# Patient Record
Sex: MALE | Race: WHITE | Employment: OTHER | URBAN - METROPOLITAN AREA
[De-identification: names, ages, dates, MRNs, and addresses within clinical notes are randomized per-mention and may not be internally consistent; named-entity substitution may affect disease eponyms.]

---

## 2018-02-27 PROBLEM — I63.9 CEREBROVASCULAR ACCIDENT (CVA) DETERMINED BY CLINICAL ASSESSMENT (HCC): Status: ACTIVE | Noted: 2018-02-27

## 2018-02-27 PROBLEM — M54.9 CVA TENDERNESS: Status: ACTIVE | Noted: 2018-02-27

## 2018-03-02 PROBLEM — G82.20 PARAPLEGIA (HCC): Status: ACTIVE | Noted: 2018-03-02

## 2018-03-06 ENCOUNTER — HOSPITAL ENCOUNTER (INPATIENT)
Dept: REHABILITATION | Age: 73
LOS: 29 days | Discharge: NURSING, REHAB OR LONG-TERM CARE | DRG: 981 | End: 2018-04-04
Attending: PHYSICAL MEDICINE & REHABILITATION | Admitting: PHYSICAL MEDICINE & REHABILITATION
Payer: MEDICARE

## 2018-03-06 PROBLEM — I63.9 ISCHEMIC STROKE (HCC): Status: ACTIVE | Noted: 2018-03-06

## 2018-03-06 LAB
METER GLUCOSE: 215 MG/DL (ref 70–110)
METER GLUCOSE: 228 MG/DL (ref 70–110)

## 2018-03-06 PROCEDURE — 51798 US URINE CAPACITY MEASURE: CPT

## 2018-03-06 PROCEDURE — 82962 GLUCOSE BLOOD TEST: CPT

## 2018-03-06 PROCEDURE — 9990 CHARGE CONVERSION

## 2018-03-06 RX ORDER — WARFARIN SODIUM 10 MG/1
10 TABLET ORAL DAILY
Status: DISCONTINUED | OUTPATIENT
Start: 2018-03-06 | End: 2018-03-07

## 2018-03-06 RX ORDER — SODIUM CHLORIDE 0.9 % (FLUSH) 0.9 %
10 SYRINGE (ML) INJECTION PRN
Status: DISCONTINUED | OUTPATIENT
Start: 2018-03-06 | End: 2018-04-02

## 2018-03-06 RX ORDER — ATORVASTATIN CALCIUM 40 MG/1
40 TABLET, FILM COATED ORAL NIGHTLY
Status: DISCONTINUED | OUTPATIENT
Start: 2018-03-06 | End: 2018-03-07

## 2018-03-06 RX ORDER — AMLODIPINE BESYLATE 2.5 MG/1
2.5 TABLET ORAL DAILY
Status: DISCONTINUED | OUTPATIENT
Start: 2018-03-07 | End: 2018-03-07

## 2018-03-06 RX ORDER — DEXTROSE MONOHYDRATE 50 MG/ML
100 INJECTION, SOLUTION INTRAVENOUS PRN
Status: DISCONTINUED | OUTPATIENT
Start: 2018-03-06 | End: 2018-04-04 | Stop reason: HOSPADM

## 2018-03-06 RX ORDER — PETROLATUM 42 G/100G
OINTMENT TOPICAL 2 TIMES DAILY PRN
Status: DISCONTINUED | OUTPATIENT
Start: 2018-03-06 | End: 2018-04-04 | Stop reason: HOSPADM

## 2018-03-06 RX ORDER — LEVOTHYROXINE SODIUM 0.05 MG/1
50 TABLET ORAL DAILY
Status: DISCONTINUED | OUTPATIENT
Start: 2018-03-07 | End: 2018-03-07

## 2018-03-06 RX ORDER — NICOTINE POLACRILEX 4 MG
15 LOZENGE BUCCAL PRN
Status: DISCONTINUED | OUTPATIENT
Start: 2018-03-06 | End: 2018-04-04 | Stop reason: HOSPADM

## 2018-03-06 RX ORDER — ACETAMINOPHEN 325 MG/1
650 TABLET ORAL EVERY 4 HOURS PRN
Status: DISCONTINUED | OUTPATIENT
Start: 2018-03-06 | End: 2018-03-20 | Stop reason: ALTCHOICE

## 2018-03-06 RX ORDER — DEXTROSE MONOHYDRATE 25 G/50ML
12.5 INJECTION, SOLUTION INTRAVENOUS PRN
Status: DISCONTINUED | OUTPATIENT
Start: 2018-03-06 | End: 2018-04-04 | Stop reason: HOSPADM

## 2018-03-06 RX ORDER — SODIUM CHLORIDE 0.9 % (FLUSH) 0.9 %
10 SYRINGE (ML) INJECTION EVERY 12 HOURS SCHEDULED
Status: DISCONTINUED | OUTPATIENT
Start: 2018-03-06 | End: 2018-04-02

## 2018-03-06 RX ADMIN — WARFARIN SODIUM 10 MG: 10 TABLET ORAL at 18:20

## 2018-03-06 RX ADMIN — INSULIN LISPRO 1 UNITS: 100 INJECTION, SOLUTION INTRAVENOUS; SUBCUTANEOUS at 22:38

## 2018-03-06 RX ADMIN — Medication 10 ML: at 22:31

## 2018-03-06 RX ADMIN — ATORVASTATIN CALCIUM 40 MG: 40 TABLET, FILM COATED ORAL at 22:30

## 2018-03-06 RX ADMIN — PETROLATUM: 42 OINTMENT TOPICAL at 22:41

## 2018-03-06 RX ADMIN — INSULIN LISPRO 2 UNITS: 100 INJECTION, SOLUTION INTRAVENOUS; SUBCUTANEOUS at 18:21

## 2018-03-06 ASSESSMENT — PAIN DESCRIPTION - FREQUENCY: FREQUENCY: INTERMITTENT

## 2018-03-06 ASSESSMENT — PAIN SCALES - GENERAL: PAINLEVEL_OUTOF10: 0

## 2018-03-06 ASSESSMENT — PAIN DESCRIPTION - DESCRIPTORS: DESCRIPTORS: ACHING;BURNING;DISCOMFORT

## 2018-03-06 ASSESSMENT — PAIN DESCRIPTION - PAIN TYPE: TYPE: CHRONIC PAIN

## 2018-03-06 ASSESSMENT — PAIN DESCRIPTION - ORIENTATION: ORIENTATION: LOWER

## 2018-03-06 ASSESSMENT — PAIN DESCRIPTION - PROGRESSION: CLINICAL_PROGRESSION: NOT CHANGED

## 2018-03-06 ASSESSMENT — PAIN DESCRIPTION - LOCATION: LOCATION: BACK

## 2018-03-07 LAB
ALBUMIN SERPL-MCNC: 3.3 G/DL (ref 3.5–5.2)
ALP BLD-CCNC: 61 U/L (ref 40–129)
ALT SERPL-CCNC: 24 U/L (ref 0–40)
ANION GAP SERPL CALCULATED.3IONS-SCNC: 12 MMOL/L (ref 7–16)
AST SERPL-CCNC: 23 U/L (ref 0–39)
BILIRUB SERPL-MCNC: 0.3 MG/DL (ref 0–1.2)
BUN BLDV-MCNC: 23 MG/DL (ref 8–23)
CALCIUM SERPL-MCNC: 8.7 MG/DL (ref 8.6–10.2)
CHLORIDE BLD-SCNC: 106 MMOL/L (ref 98–107)
CO2: 27 MMOL/L (ref 22–29)
CREAT SERPL-MCNC: 1 MG/DL (ref 0.7–1.2)
D DIMER: 839 NG/ML DDU
GFR AFRICAN AMERICAN: >60
GFR NON-AFRICAN AMERICAN: >60 ML/MIN/1.73
GLUCOSE BLD-MCNC: 236 MG/DL (ref 74–109)
HCT VFR BLD CALC: 40 % (ref 37–54)
HEMOGLOBIN: 13.1 G/DL (ref 12.5–16.5)
INR BLD: 2
MCH RBC QN AUTO: 29.4 PG (ref 26–35)
MCHC RBC AUTO-ENTMCNC: 32.8 % (ref 32–34.5)
MCV RBC AUTO: 89.9 FL (ref 80–99.9)
METER GLUCOSE: 177 MG/DL (ref 70–110)
METER GLUCOSE: 182 MG/DL (ref 70–110)
METER GLUCOSE: 219 MG/DL (ref 70–110)
METER GLUCOSE: 257 MG/DL (ref 70–110)
PDW BLD-RTO: 14.4 FL (ref 11.5–15)
PLATELET # BLD: 275 E9/L (ref 130–450)
PMV BLD AUTO: 10.4 FL (ref 7–12)
POTASSIUM SERPL-SCNC: 3.3 MMOL/L (ref 3.5–5)
PROTHROMBIN TIME: 22.7 SEC (ref 9.3–12.4)
RBC # BLD: 4.45 E12/L (ref 3.8–5.8)
SODIUM BLD-SCNC: 145 MMOL/L (ref 132–146)
TOTAL PROTEIN: 6.3 G/DL (ref 6.4–8.3)
WBC # BLD: 8.5 E9/L (ref 4.5–11.5)

## 2018-03-07 PROCEDURE — 97162 PT EVAL MOD COMPLEX 30 MIN: CPT

## 2018-03-07 PROCEDURE — 82962 GLUCOSE BLOOD TEST: CPT

## 2018-03-07 PROCEDURE — 74230 X-RAY XM SWLNG FUNCJ C+: CPT

## 2018-03-07 PROCEDURE — 85610 PROTHROMBIN TIME: CPT

## 2018-03-07 PROCEDURE — 9990 CHARGE CONVERSION

## 2018-03-07 PROCEDURE — 92610 EVALUATE SWALLOWING FUNCTION: CPT

## 2018-03-07 PROCEDURE — 97112 NEUROMUSCULAR REEDUCATION: CPT

## 2018-03-07 PROCEDURE — 80053 COMPREHEN METABOLIC PANEL: CPT

## 2018-03-07 PROCEDURE — 85027 COMPLETE CBC AUTOMATED: CPT

## 2018-03-07 PROCEDURE — 97166 OT EVAL MOD COMPLEX 45 MIN: CPT

## 2018-03-07 PROCEDURE — 86146 BETA-2 GLYCOPROTEIN ANTIBODY: CPT

## 2018-03-07 PROCEDURE — 97530 THERAPEUTIC ACTIVITIES: CPT

## 2018-03-07 PROCEDURE — 97535 SELF CARE MNGMENT TRAINING: CPT

## 2018-03-07 PROCEDURE — 92523 SPEECH SOUND LANG COMPREHEN: CPT

## 2018-03-07 PROCEDURE — 36415 COLL VENOUS BLD VENIPUNCTURE: CPT

## 2018-03-07 PROCEDURE — 86147 CARDIOLIPIN ANTIBODY EA IG: CPT

## 2018-03-07 PROCEDURE — 92611 MOTION FLUOROSCOPY/SWALLOW: CPT

## 2018-03-07 PROCEDURE — 85379 FIBRIN DEGRADATION QUANT: CPT

## 2018-03-07 RX ORDER — POTASSIUM CHLORIDE 20 MEQ/1
20 TABLET, EXTENDED RELEASE ORAL 2 TIMES DAILY WITH MEALS
Status: DISCONTINUED | OUTPATIENT
Start: 2018-03-07 | End: 2018-03-07

## 2018-03-07 RX ORDER — DEXTROSE, SODIUM CHLORIDE, AND POTASSIUM CHLORIDE 5; .45; .075 G/100ML; G/100ML; G/100ML
INJECTION INTRAVENOUS CONTINUOUS
Status: DISCONTINUED | OUTPATIENT
Start: 2018-03-07 | End: 2018-03-09

## 2018-03-07 RX ORDER — SODIUM PHOSPHATE, DIBASIC AND SODIUM PHOSPHATE, MONOBASIC 7; 19 G/133ML; G/133ML
1 ENEMA RECTAL
Status: COMPLETED | OUTPATIENT
Start: 2018-03-07 | End: 2018-03-07

## 2018-03-07 RX ADMIN — INSULIN LISPRO 3 UNITS: 100 INJECTION, SOLUTION INTRAVENOUS; SUBCUTANEOUS at 12:06

## 2018-03-07 RX ADMIN — Medication 10 ML: at 11:33

## 2018-03-07 RX ADMIN — DEXTROSE, SODIUM CHLORIDE, AND POTASSIUM CHLORIDE: 5; .45; .075 INJECTION INTRAVENOUS at 21:18

## 2018-03-07 RX ADMIN — SODIUM PHOSPHATE, DIBASIC AND SODIUM PHOSPHATE, MONOBASIC 1 ENEMA: 7; 19 ENEMA RECTAL at 11:25

## 2018-03-07 RX ADMIN — Medication 10 ML: at 21:38

## 2018-03-07 RX ADMIN — LEVOTHYROXINE SODIUM 50 MCG: 0.05 TABLET ORAL at 06:38

## 2018-03-07 RX ADMIN — PETROLATUM: 42 OINTMENT TOPICAL at 15:38

## 2018-03-07 RX ADMIN — INSULIN LISPRO 2 UNITS: 100 INJECTION, SOLUTION INTRAVENOUS; SUBCUTANEOUS at 06:59

## 2018-03-07 RX ADMIN — AMLODIPINE BESYLATE 2.5 MG: 2.5 TABLET ORAL at 11:25

## 2018-03-07 ASSESSMENT — PAIN SCALES - GENERAL
PAINLEVEL_OUTOF10: 0

## 2018-03-08 LAB
ANION GAP SERPL CALCULATED.3IONS-SCNC: 12 MMOL/L (ref 7–16)
BUN BLDV-MCNC: 19 MG/DL (ref 8–23)
CALCIUM SERPL-MCNC: 8.4 MG/DL (ref 8.6–10.2)
CHLORIDE BLD-SCNC: 102 MMOL/L (ref 98–107)
CO2: 30 MMOL/L (ref 22–29)
CREAT SERPL-MCNC: 1 MG/DL (ref 0.7–1.2)
GFR AFRICAN AMERICAN: >60
GFR NON-AFRICAN AMERICAN: >60 ML/MIN/1.73
GLUCOSE BLD-MCNC: 227 MG/DL (ref 74–109)
HCT VFR BLD CALC: 38.3 % (ref 37–54)
HEMOGLOBIN: 12.3 G/DL (ref 12.5–16.5)
INR BLD: 2.2
MCH RBC QN AUTO: 29.3 PG (ref 26–35)
MCHC RBC AUTO-ENTMCNC: 32.1 % (ref 32–34.5)
MCV RBC AUTO: 91.2 FL (ref 80–99.9)
METER GLUCOSE: 202 MG/DL (ref 70–110)
METER GLUCOSE: 214 MG/DL (ref 70–110)
METER GLUCOSE: 224 MG/DL (ref 70–110)
METER GLUCOSE: 226 MG/DL (ref 70–110)
PDW BLD-RTO: 14.2 FL (ref 11.5–15)
PLATELET # BLD: 279 E9/L (ref 130–450)
PMV BLD AUTO: 10.4 FL (ref 7–12)
POTASSIUM SERPL-SCNC: 3.5 MMOL/L (ref 3.5–5)
PROTHROMBIN TIME: 24.7 SEC (ref 9.3–12.4)
RBC # BLD: 4.2 E12/L (ref 3.8–5.8)
SODIUM BLD-SCNC: 144 MMOL/L (ref 132–146)
WBC # BLD: 7.8 E9/L (ref 4.5–11.5)

## 2018-03-08 PROCEDURE — 85027 COMPLETE CBC AUTOMATED: CPT

## 2018-03-08 PROCEDURE — 97535 SELF CARE MNGMENT TRAINING: CPT

## 2018-03-08 PROCEDURE — 82962 GLUCOSE BLOOD TEST: CPT

## 2018-03-08 PROCEDURE — 97112 NEUROMUSCULAR REEDUCATION: CPT

## 2018-03-08 PROCEDURE — 85610 PROTHROMBIN TIME: CPT

## 2018-03-08 PROCEDURE — 97530 THERAPEUTIC ACTIVITIES: CPT

## 2018-03-08 PROCEDURE — 71045 X-RAY EXAM CHEST 1 VIEW: CPT

## 2018-03-08 PROCEDURE — 9990 CHARGE CONVERSION

## 2018-03-08 PROCEDURE — 74018 RADEX ABDOMEN 1 VIEW: CPT

## 2018-03-08 PROCEDURE — 97110 THERAPEUTIC EXERCISES: CPT

## 2018-03-08 PROCEDURE — 36415 COLL VENOUS BLD VENIPUNCTURE: CPT

## 2018-03-08 PROCEDURE — 80048 BASIC METABOLIC PNL TOTAL CA: CPT

## 2018-03-08 RX ORDER — WARFARIN SODIUM 10 MG/1
10 TABLET ORAL
Status: COMPLETED | OUTPATIENT
Start: 2018-03-08 | End: 2018-03-08

## 2018-03-08 RX ORDER — ATORVASTATIN CALCIUM 40 MG/1
40 TABLET, FILM COATED ORAL NIGHTLY
Status: DISCONTINUED | OUTPATIENT
Start: 2018-03-08 | End: 2018-03-28

## 2018-03-08 RX ORDER — WARFARIN SODIUM 10 MG/1
10 TABLET ORAL
Status: DISCONTINUED | OUTPATIENT
Start: 2018-03-08 | End: 2018-03-08

## 2018-03-08 RX ORDER — AMLODIPINE BESYLATE 2.5 MG/1
2.5 TABLET ORAL DAILY
Status: DISCONTINUED | OUTPATIENT
Start: 2018-03-08 | End: 2018-03-18

## 2018-03-08 RX ORDER — LEVOTHYROXINE SODIUM 0.05 MG/1
50 TABLET ORAL DAILY
Status: DISCONTINUED | OUTPATIENT
Start: 2018-03-09 | End: 2018-03-28

## 2018-03-08 RX ADMIN — Medication 10 ML: at 08:25

## 2018-03-08 RX ADMIN — CEFTRIAXONE 1 G: 1 INJECTION, POWDER, FOR SOLUTION INTRAMUSCULAR; INTRAVENOUS at 17:49

## 2018-03-08 RX ADMIN — DESMOPRESSIN ACETATE 40 MG: 0.2 TABLET ORAL at 21:56

## 2018-03-08 RX ADMIN — AMLODIPINE BESYLATE 2.5 MG: 2.5 TABLET ORAL at 21:56

## 2018-03-08 RX ADMIN — WARFARIN SODIUM 10 MG: 10 TABLET ORAL at 19:05

## 2018-03-08 RX ADMIN — INSULIN LISPRO 1 UNITS: 100 INJECTION, SOLUTION INTRAVENOUS; SUBCUTANEOUS at 21:57

## 2018-03-08 RX ADMIN — DEXTROSE, SODIUM CHLORIDE, AND POTASSIUM CHLORIDE: 5; .45; .075 INJECTION INTRAVENOUS at 11:48

## 2018-03-08 RX ADMIN — PETROLATUM: 42 OINTMENT TOPICAL at 21:58

## 2018-03-08 ASSESSMENT — PAIN SCALES - GENERAL
PAINLEVEL_OUTOF10: 0

## 2018-03-09 LAB
ANION GAP SERPL CALCULATED.3IONS-SCNC: 13 MMOL/L (ref 7–16)
ANTICARDIOLIPIN IGA ANTIBODY: 8 APL (ref 0–11)
ANTICARDIOLIPIN IGG ANTIBODY: 3 GPL (ref 0–14)
BETA-2 GLYCOPROTEIN 1 IGG ANTIBODY: 0 SGU (ref 0–20)
BETA-2 GLYCOPROTEIN 1 IGM ANTIBODY: 0 SMU (ref 0–20)
BUN BLDV-MCNC: 15 MG/DL (ref 8–23)
CALCIUM SERPL-MCNC: 8.5 MG/DL (ref 8.6–10.2)
CARDIOLIPIN AB IGM: 8 MPL (ref 0–12)
CHLORIDE BLD-SCNC: 101 MMOL/L (ref 98–107)
CO2: 31 MMOL/L (ref 22–29)
CREAT SERPL-MCNC: 0.9 MG/DL (ref 0.7–1.2)
GFR AFRICAN AMERICAN: >60
GFR NON-AFRICAN AMERICAN: >60 ML/MIN/1.73
GLUCOSE BLD-MCNC: 284 MG/DL (ref 74–109)
INR BLD: 2.2
METER GLUCOSE: 109 MG/DL (ref 70–110)
METER GLUCOSE: 111 MG/DL (ref 70–110)
METER GLUCOSE: 220 MG/DL (ref 70–110)
METER GLUCOSE: 277 MG/DL (ref 70–110)
POTASSIUM SERPL-SCNC: 3.2 MMOL/L (ref 3.5–5)
PROTHROMBIN TIME: 25.1 SEC (ref 9.3–12.4)
SODIUM BLD-SCNC: 145 MMOL/L (ref 132–146)

## 2018-03-09 PROCEDURE — 97530 THERAPEUTIC ACTIVITIES: CPT

## 2018-03-09 PROCEDURE — 80048 BASIC METABOLIC PNL TOTAL CA: CPT

## 2018-03-09 PROCEDURE — 9990 CHARGE CONVERSION

## 2018-03-09 PROCEDURE — 85610 PROTHROMBIN TIME: CPT

## 2018-03-09 PROCEDURE — 36415 COLL VENOUS BLD VENIPUNCTURE: CPT

## 2018-03-09 PROCEDURE — 82962 GLUCOSE BLOOD TEST: CPT

## 2018-03-09 PROCEDURE — 97110 THERAPEUTIC EXERCISES: CPT

## 2018-03-09 PROCEDURE — 97112 NEUROMUSCULAR REEDUCATION: CPT

## 2018-03-09 RX ORDER — GLYBURIDE 5 MG/1
5 TABLET ORAL 2 TIMES DAILY
Status: DISCONTINUED | OUTPATIENT
Start: 2018-03-09 | End: 2018-03-30

## 2018-03-09 RX ORDER — WARFARIN SODIUM 10 MG/1
10 TABLET ORAL
Status: COMPLETED | OUTPATIENT
Start: 2018-03-09 | End: 2018-03-09

## 2018-03-09 RX ORDER — POTASSIUM CHLORIDE 20MEQ/15ML
40 LIQUID (ML) ORAL 2 TIMES DAILY
Status: COMPLETED | OUTPATIENT
Start: 2018-03-09 | End: 2018-03-09

## 2018-03-09 RX ADMIN — Medication 40 MEQ: at 11:43

## 2018-03-09 RX ADMIN — GLYBURIDE 5 MG: 5 TABLET ORAL at 11:43

## 2018-03-09 RX ADMIN — DESMOPRESSIN ACETATE 40 MG: 0.2 TABLET ORAL at 20:44

## 2018-03-09 RX ADMIN — INSULIN LISPRO 3 UNITS: 100 INJECTION, SOLUTION INTRAVENOUS; SUBCUTANEOUS at 07:15

## 2018-03-09 RX ADMIN — LEVOTHYROXINE SODIUM 50 MCG: 50 TABLET ORAL at 06:46

## 2018-03-09 RX ADMIN — INSULIN LISPRO 2 UNITS: 100 INJECTION, SOLUTION INTRAVENOUS; SUBCUTANEOUS at 11:43

## 2018-03-09 RX ADMIN — AMLODIPINE BESYLATE 2.5 MG: 2.5 TABLET ORAL at 08:06

## 2018-03-09 RX ADMIN — CEFTRIAXONE 1 G: 1 INJECTION, POWDER, FOR SOLUTION INTRAMUSCULAR; INTRAVENOUS at 16:28

## 2018-03-09 RX ADMIN — GLYBURIDE 5 MG: 5 TABLET ORAL at 16:36

## 2018-03-09 RX ADMIN — WARFARIN SODIUM 10 MG: 10 TABLET ORAL at 16:36

## 2018-03-09 RX ADMIN — Medication 40 MEQ: at 16:36

## 2018-03-09 RX ADMIN — Medication 10 ML: at 20:44

## 2018-03-09 RX ADMIN — DEXTROSE, SODIUM CHLORIDE, AND POTASSIUM CHLORIDE: 5; .45; .075 INJECTION INTRAVENOUS at 04:13

## 2018-03-09 ASSESSMENT — PAIN SCALES - GENERAL
PAINLEVEL_OUTOF10: 0

## 2018-03-10 LAB
ANION GAP SERPL CALCULATED.3IONS-SCNC: 13 MMOL/L (ref 7–16)
BUN BLDV-MCNC: 15 MG/DL (ref 8–23)
CALCIUM SERPL-MCNC: 8.8 MG/DL (ref 8.6–10.2)
CHLORIDE BLD-SCNC: 104 MMOL/L (ref 98–107)
CO2: 29 MMOL/L (ref 22–29)
CREAT SERPL-MCNC: 0.8 MG/DL (ref 0.7–1.2)
GFR AFRICAN AMERICAN: >60
GFR NON-AFRICAN AMERICAN: >60 ML/MIN/1.73
GLUCOSE BLD-MCNC: 175 MG/DL (ref 74–109)
HCT VFR BLD CALC: 39.4 % (ref 37–54)
HEMOGLOBIN: 12.4 G/DL (ref 12.5–16.5)
INR BLD: 2
MAGNESIUM: 2 MG/DL (ref 1.6–2.6)
MCH RBC QN AUTO: 28.5 PG (ref 26–35)
MCHC RBC AUTO-ENTMCNC: 31.5 % (ref 32–34.5)
MCV RBC AUTO: 90.6 FL (ref 80–99.9)
METER GLUCOSE: 130 MG/DL (ref 70–110)
METER GLUCOSE: 142 MG/DL (ref 70–110)
METER GLUCOSE: 151 MG/DL (ref 70–110)
METER GLUCOSE: 168 MG/DL (ref 70–110)
PDW BLD-RTO: 14.1 FL (ref 11.5–15)
PLATELET # BLD: 315 E9/L (ref 130–450)
PMV BLD AUTO: 10.2 FL (ref 7–12)
POTASSIUM SERPL-SCNC: 3.4 MMOL/L (ref 3.5–5)
PROTHROMBIN TIME: 22.6 SEC (ref 9.3–12.4)
RBC # BLD: 4.35 E12/L (ref 3.8–5.8)
SODIUM BLD-SCNC: 146 MMOL/L (ref 132–146)
WBC # BLD: 7.6 E9/L (ref 4.5–11.5)

## 2018-03-10 PROCEDURE — 97530 THERAPEUTIC ACTIVITIES: CPT

## 2018-03-10 PROCEDURE — 2580000003 HC RX 258: Performed by: NEUROMUSCULOSKELETAL MEDICINE & OMM

## 2018-03-10 PROCEDURE — 6370000000 HC RX 637 (ALT 250 FOR IP): Performed by: NEUROMUSCULOSKELETAL MEDICINE & OMM

## 2018-03-10 PROCEDURE — 6360000002 HC RX W HCPCS: Performed by: NEUROMUSCULOSKELETAL MEDICINE & OMM

## 2018-03-10 PROCEDURE — 1280000000 HC REHAB R&B

## 2018-03-10 PROCEDURE — 6370000000 HC RX 637 (ALT 250 FOR IP): Performed by: PHYSICAL MEDICINE & REHABILITATION

## 2018-03-10 PROCEDURE — 82962 GLUCOSE BLOOD TEST: CPT

## 2018-03-10 PROCEDURE — 6370000000 HC RX 637 (ALT 250 FOR IP): Performed by: INTERNAL MEDICINE

## 2018-03-10 PROCEDURE — 92526 ORAL FUNCTION THERAPY: CPT

## 2018-03-10 PROCEDURE — 2700000000 HC OXYGEN THERAPY PER DAY

## 2018-03-10 RX ORDER — WARFARIN SODIUM 6 MG/1
12 TABLET ORAL
Status: COMPLETED | OUTPATIENT
Start: 2018-03-10 | End: 2018-03-10

## 2018-03-10 RX ADMIN — INSULIN LISPRO 1 UNITS: 100 INJECTION, SOLUTION INTRAVENOUS; SUBCUTANEOUS at 17:21

## 2018-03-10 RX ADMIN — AMLODIPINE BESYLATE 2.5 MG: 2.5 TABLET ORAL at 08:34

## 2018-03-10 RX ADMIN — INSULIN LISPRO 1 UNITS: 100 INJECTION, SOLUTION INTRAVENOUS; SUBCUTANEOUS at 11:48

## 2018-03-10 RX ADMIN — INSULIN LISPRO 1 UNITS: 100 INJECTION, SOLUTION INTRAVENOUS; SUBCUTANEOUS at 06:39

## 2018-03-10 RX ADMIN — GLYBURIDE 5 MG: 5 TABLET ORAL at 08:34

## 2018-03-10 RX ADMIN — CEFTRIAXONE 1 G: 1 INJECTION, POWDER, FOR SOLUTION INTRAMUSCULAR; INTRAVENOUS at 16:42

## 2018-03-10 RX ADMIN — GLYBURIDE 5 MG: 5 TABLET ORAL at 16:48

## 2018-03-10 RX ADMIN — Medication 10 ML: at 21:33

## 2018-03-10 RX ADMIN — DESMOPRESSIN ACETATE 40 MG: 0.2 TABLET ORAL at 21:38

## 2018-03-10 RX ADMIN — WARFARIN SODIUM 12 MG: 6 TABLET ORAL at 17:58

## 2018-03-10 RX ADMIN — LEVOTHYROXINE SODIUM 50 MCG: 50 TABLET ORAL at 06:33

## 2018-03-10 RX ADMIN — Medication 10 ML: at 11:56

## 2018-03-10 ASSESSMENT — PAIN SCALES - GENERAL: PAINLEVEL_OUTOF10: 0

## 2018-03-11 LAB
INR BLD: 2
METER GLUCOSE: 136 MG/DL (ref 70–110)
METER GLUCOSE: 145 MG/DL (ref 70–110)
METER GLUCOSE: 148 MG/DL (ref 70–110)
METER GLUCOSE: 155 MG/DL (ref 70–110)
PROTHROMBIN TIME: 22.7 SEC (ref 9.3–12.4)

## 2018-03-11 PROCEDURE — 36415 COLL VENOUS BLD VENIPUNCTURE: CPT

## 2018-03-11 PROCEDURE — 2580000003 HC RX 258: Performed by: NEUROMUSCULOSKELETAL MEDICINE & OMM

## 2018-03-11 PROCEDURE — 97535 SELF CARE MNGMENT TRAINING: CPT

## 2018-03-11 PROCEDURE — 1280000000 HC REHAB R&B

## 2018-03-11 PROCEDURE — 97530 THERAPEUTIC ACTIVITIES: CPT

## 2018-03-11 PROCEDURE — 6370000000 HC RX 637 (ALT 250 FOR IP): Performed by: NEUROMUSCULOSKELETAL MEDICINE & OMM

## 2018-03-11 PROCEDURE — 6370000000 HC RX 637 (ALT 250 FOR IP): Performed by: PHYSICAL MEDICINE & REHABILITATION

## 2018-03-11 PROCEDURE — 6360000002 HC RX W HCPCS: Performed by: NEUROMUSCULOSKELETAL MEDICINE & OMM

## 2018-03-11 PROCEDURE — 82962 GLUCOSE BLOOD TEST: CPT

## 2018-03-11 PROCEDURE — 2700000000 HC OXYGEN THERAPY PER DAY

## 2018-03-11 PROCEDURE — 6370000000 HC RX 637 (ALT 250 FOR IP): Performed by: INTERNAL MEDICINE

## 2018-03-11 PROCEDURE — 85610 PROTHROMBIN TIME: CPT

## 2018-03-11 RX ORDER — WARFARIN SODIUM 6 MG/1
12 TABLET ORAL
Status: COMPLETED | OUTPATIENT
Start: 2018-03-11 | End: 2018-03-11

## 2018-03-11 RX ADMIN — GLYBURIDE 5 MG: 5 TABLET ORAL at 18:16

## 2018-03-11 RX ADMIN — AMLODIPINE BESYLATE 2.5 MG: 2.5 TABLET ORAL at 08:50

## 2018-03-11 RX ADMIN — PETROLATUM: 42 OINTMENT TOPICAL at 22:10

## 2018-03-11 RX ADMIN — DESMOPRESSIN ACETATE 40 MG: 0.2 TABLET ORAL at 22:05

## 2018-03-11 RX ADMIN — WARFARIN SODIUM 12 MG: 6 TABLET ORAL at 18:16

## 2018-03-11 RX ADMIN — CEFTRIAXONE 1 G: 1 INJECTION, POWDER, FOR SOLUTION INTRAMUSCULAR; INTRAVENOUS at 17:22

## 2018-03-11 RX ADMIN — Medication 10 ML: at 22:05

## 2018-03-11 RX ADMIN — INSULIN LISPRO 1 UNITS: 100 INJECTION, SOLUTION INTRAVENOUS; SUBCUTANEOUS at 07:10

## 2018-03-11 RX ADMIN — Medication 10 ML: at 09:06

## 2018-03-11 RX ADMIN — INSULIN LISPRO 1 UNITS: 100 INJECTION, SOLUTION INTRAVENOUS; SUBCUTANEOUS at 11:58

## 2018-03-11 RX ADMIN — GLYBURIDE 5 MG: 5 TABLET ORAL at 07:23

## 2018-03-11 RX ADMIN — LEVOTHYROXINE SODIUM 50 MCG: 50 TABLET ORAL at 06:44

## 2018-03-11 RX ADMIN — INSULIN LISPRO 1 UNITS: 100 INJECTION, SOLUTION INTRAVENOUS; SUBCUTANEOUS at 22:05

## 2018-03-11 ASSESSMENT — PAIN SCALES - GENERAL
PAINLEVEL_OUTOF10: 0

## 2018-03-12 LAB
INR BLD: 2.3
METER GLUCOSE: 133 MG/DL (ref 70–110)
METER GLUCOSE: 139 MG/DL (ref 70–110)
METER GLUCOSE: 141 MG/DL (ref 70–110)
METER GLUCOSE: 170 MG/DL (ref 70–110)
PROTHROMBIN TIME: 25.4 SEC (ref 9.3–12.4)

## 2018-03-12 PROCEDURE — 92526 ORAL FUNCTION THERAPY: CPT

## 2018-03-12 PROCEDURE — 6370000000 HC RX 637 (ALT 250 FOR IP)

## 2018-03-12 PROCEDURE — 2700000000 HC OXYGEN THERAPY PER DAY

## 2018-03-12 PROCEDURE — 97535 SELF CARE MNGMENT TRAINING: CPT

## 2018-03-12 PROCEDURE — 2580000003 HC RX 258: Performed by: NEUROMUSCULOSKELETAL MEDICINE & OMM

## 2018-03-12 PROCEDURE — 97110 THERAPEUTIC EXERCISES: CPT

## 2018-03-12 PROCEDURE — 1280000000 HC REHAB R&B

## 2018-03-12 PROCEDURE — 97530 THERAPEUTIC ACTIVITIES: CPT

## 2018-03-12 PROCEDURE — 82962 GLUCOSE BLOOD TEST: CPT

## 2018-03-12 PROCEDURE — 6370000000 HC RX 637 (ALT 250 FOR IP): Performed by: NEUROMUSCULOSKELETAL MEDICINE & OMM

## 2018-03-12 PROCEDURE — 6370000000 HC RX 637 (ALT 250 FOR IP): Performed by: PHYSICAL MEDICINE & REHABILITATION

## 2018-03-12 PROCEDURE — 6360000002 HC RX W HCPCS: Performed by: NEUROMUSCULOSKELETAL MEDICINE & OMM

## 2018-03-12 PROCEDURE — 85610 PROTHROMBIN TIME: CPT

## 2018-03-12 PROCEDURE — 36415 COLL VENOUS BLD VENIPUNCTURE: CPT

## 2018-03-12 RX ORDER — WARFARIN SODIUM 6 MG/1
12 TABLET ORAL
Status: COMPLETED | OUTPATIENT
Start: 2018-03-12 | End: 2018-03-12

## 2018-03-12 RX ORDER — LACTULOSE 10 G/15ML
20 SOLUTION ORAL 2 TIMES DAILY
Status: DISCONTINUED | OUTPATIENT
Start: 2018-03-12 | End: 2018-03-16

## 2018-03-12 RX ADMIN — LEVOTHYROXINE SODIUM 50 MCG: 50 TABLET ORAL at 06:50

## 2018-03-12 RX ADMIN — CEFTRIAXONE 1 G: 1 INJECTION, POWDER, FOR SOLUTION INTRAMUSCULAR; INTRAVENOUS at 16:54

## 2018-03-12 RX ADMIN — LACTULOSE 20 G: 20 SOLUTION ORAL at 23:02

## 2018-03-12 RX ADMIN — INSULIN LISPRO 1 UNITS: 100 INJECTION, SOLUTION INTRAVENOUS; SUBCUTANEOUS at 11:35

## 2018-03-12 RX ADMIN — AMLODIPINE BESYLATE 2.5 MG: 2.5 TABLET ORAL at 08:09

## 2018-03-12 RX ADMIN — GLYBURIDE 5 MG: 5 TABLET ORAL at 06:49

## 2018-03-12 RX ADMIN — GLYBURIDE 5 MG: 5 TABLET ORAL at 17:10

## 2018-03-12 RX ADMIN — DESMOPRESSIN ACETATE 40 MG: 0.2 TABLET ORAL at 21:53

## 2018-03-12 RX ADMIN — WARFARIN SODIUM 12 MG: 6 TABLET ORAL at 18:11

## 2018-03-12 RX ADMIN — INSULIN LISPRO 1 UNITS: 100 INJECTION, SOLUTION INTRAVENOUS; SUBCUTANEOUS at 06:50

## 2018-03-12 RX ADMIN — Medication 10 ML: at 08:09

## 2018-03-12 ASSESSMENT — PAIN SCALES - GENERAL
PAINLEVEL_OUTOF10: 0

## 2018-03-13 LAB
INR BLD: 2.3
PROTHROMBIN TIME: 26.1 SEC (ref 9.3–12.4)

## 2018-03-13 PROCEDURE — 1280000000 HC REHAB R&B

## 2018-03-13 PROCEDURE — 85610 PROTHROMBIN TIME: CPT

## 2018-03-13 PROCEDURE — 97535 SELF CARE MNGMENT TRAINING: CPT

## 2018-03-13 PROCEDURE — 97110 THERAPEUTIC EXERCISES: CPT

## 2018-03-13 PROCEDURE — 2580000003 HC RX 258: Performed by: NEUROMUSCULOSKELETAL MEDICINE & OMM

## 2018-03-13 PROCEDURE — 97112 NEUROMUSCULAR REEDUCATION: CPT

## 2018-03-13 PROCEDURE — 6370000000 HC RX 637 (ALT 250 FOR IP)

## 2018-03-13 PROCEDURE — 6370000000 HC RX 637 (ALT 250 FOR IP): Performed by: PHYSICAL MEDICINE & REHABILITATION

## 2018-03-13 PROCEDURE — 36415 COLL VENOUS BLD VENIPUNCTURE: CPT

## 2018-03-13 PROCEDURE — 97530 THERAPEUTIC ACTIVITIES: CPT

## 2018-03-13 PROCEDURE — 92526 ORAL FUNCTION THERAPY: CPT

## 2018-03-13 PROCEDURE — 6360000002 HC RX W HCPCS: Performed by: NEUROMUSCULOSKELETAL MEDICINE & OMM

## 2018-03-13 RX ORDER — WARFARIN SODIUM 6 MG/1
12 TABLET ORAL
Status: COMPLETED | OUTPATIENT
Start: 2018-03-13 | End: 2018-03-13

## 2018-03-13 RX ADMIN — WARFARIN SODIUM 12 MG: 6 TABLET ORAL at 18:56

## 2018-03-13 RX ADMIN — GLYBURIDE 5 MG: 5 TABLET ORAL at 07:11

## 2018-03-13 RX ADMIN — LACTULOSE 20 G: 20 SOLUTION ORAL at 20:15

## 2018-03-13 RX ADMIN — LEVOTHYROXINE SODIUM 50 MCG: 50 TABLET ORAL at 07:11

## 2018-03-13 RX ADMIN — Medication 15 ML: at 18:02

## 2018-03-13 RX ADMIN — LACTULOSE 20 G: 20 SOLUTION ORAL at 08:46

## 2018-03-13 RX ADMIN — Medication 10 ML: at 21:00

## 2018-03-13 RX ADMIN — AMLODIPINE BESYLATE 2.5 MG: 2.5 TABLET ORAL at 08:46

## 2018-03-13 RX ADMIN — CEFTRIAXONE 1 G: 1 INJECTION, POWDER, FOR SOLUTION INTRAMUSCULAR; INTRAVENOUS at 20:40

## 2018-03-13 RX ADMIN — DESMOPRESSIN ACETATE 40 MG: 0.2 TABLET ORAL at 20:15

## 2018-03-13 RX ADMIN — Medication 15 ML: at 23:39

## 2018-03-13 RX ADMIN — GLYBURIDE 5 MG: 5 TABLET ORAL at 18:01

## 2018-03-13 ASSESSMENT — PAIN SCALES - GENERAL
PAINLEVEL_OUTOF10: 0

## 2018-03-14 LAB
INR BLD: 2.6
METER GLUCOSE: 142 MG/DL (ref 70–110)
PROTHROMBIN TIME: 28.8 SEC (ref 9.3–12.4)

## 2018-03-14 PROCEDURE — 6360000002 HC RX W HCPCS: Performed by: NEUROMUSCULOSKELETAL MEDICINE & OMM

## 2018-03-14 PROCEDURE — 6370000000 HC RX 637 (ALT 250 FOR IP)

## 2018-03-14 PROCEDURE — 97535 SELF CARE MNGMENT TRAINING: CPT

## 2018-03-14 PROCEDURE — 97110 THERAPEUTIC EXERCISES: CPT

## 2018-03-14 PROCEDURE — 97112 NEUROMUSCULAR REEDUCATION: CPT

## 2018-03-14 PROCEDURE — 6370000000 HC RX 637 (ALT 250 FOR IP): Performed by: PHYSICAL MEDICINE & REHABILITATION

## 2018-03-14 PROCEDURE — 85610 PROTHROMBIN TIME: CPT

## 2018-03-14 PROCEDURE — 2700000000 HC OXYGEN THERAPY PER DAY

## 2018-03-14 PROCEDURE — 82962 GLUCOSE BLOOD TEST: CPT

## 2018-03-14 PROCEDURE — 36415 COLL VENOUS BLD VENIPUNCTURE: CPT

## 2018-03-14 PROCEDURE — 1280000000 HC REHAB R&B

## 2018-03-14 PROCEDURE — 2580000003 HC RX 258: Performed by: NEUROMUSCULOSKELETAL MEDICINE & OMM

## 2018-03-14 PROCEDURE — 97530 THERAPEUTIC ACTIVITIES: CPT

## 2018-03-14 RX ORDER — WARFARIN SODIUM 6 MG/1
12 TABLET ORAL
Status: COMPLETED | OUTPATIENT
Start: 2018-03-14 | End: 2018-03-14

## 2018-03-14 RX ADMIN — GLYBURIDE 5 MG: 5 TABLET ORAL at 06:00

## 2018-03-14 RX ADMIN — Medication 15 ML: at 11:35

## 2018-03-14 RX ADMIN — WARFARIN SODIUM 12 MG: 6 TABLET ORAL at 18:02

## 2018-03-14 RX ADMIN — CEFTRIAXONE 1 G: 1 INJECTION, POWDER, FOR SOLUTION INTRAMUSCULAR; INTRAVENOUS at 16:39

## 2018-03-14 RX ADMIN — Medication 15 ML: at 17:35

## 2018-03-14 RX ADMIN — Medication 10 ML: at 21:55

## 2018-03-14 RX ADMIN — Medication 10 ML: at 08:42

## 2018-03-14 RX ADMIN — LEVOTHYROXINE SODIUM 50 MCG: 50 TABLET ORAL at 06:00

## 2018-03-14 RX ADMIN — PETROLATUM: 42 OINTMENT TOPICAL at 22:04

## 2018-03-14 RX ADMIN — LACTULOSE 20 G: 20 SOLUTION ORAL at 21:55

## 2018-03-14 RX ADMIN — GLYBURIDE 5 MG: 5 TABLET ORAL at 17:34

## 2018-03-14 RX ADMIN — LACTULOSE 20 G: 20 SOLUTION ORAL at 08:42

## 2018-03-14 RX ADMIN — DESMOPRESSIN ACETATE 40 MG: 0.2 TABLET ORAL at 21:55

## 2018-03-14 RX ADMIN — Medication 15 ML: at 06:00

## 2018-03-14 RX ADMIN — AMLODIPINE BESYLATE 2.5 MG: 2.5 TABLET ORAL at 08:42

## 2018-03-14 ASSESSMENT — PAIN SCALES - GENERAL
PAINLEVEL_OUTOF10: 0

## 2018-03-15 LAB
ALBUMIN SERPL-MCNC: 3.1 G/DL (ref 3.5–5.2)
ALP BLD-CCNC: 59 U/L (ref 40–129)
ALT SERPL-CCNC: 14 U/L (ref 0–40)
ANION GAP SERPL CALCULATED.3IONS-SCNC: 9 MMOL/L (ref 7–16)
AST SERPL-CCNC: 20 U/L (ref 0–39)
BILIRUB SERPL-MCNC: 0.2 MG/DL (ref 0–1.2)
BUN BLDV-MCNC: 13 MG/DL (ref 8–23)
CALCIUM SERPL-MCNC: 8.7 MG/DL (ref 8.6–10.2)
CHLORIDE BLD-SCNC: 98 MMOL/L (ref 98–107)
CO2: 35 MMOL/L (ref 22–29)
CREAT SERPL-MCNC: 0.9 MG/DL (ref 0.7–1.2)
GFR AFRICAN AMERICAN: >60
GFR NON-AFRICAN AMERICAN: >60 ML/MIN/1.73
GLUCOSE BLD-MCNC: 150 MG/DL (ref 74–109)
HCT VFR BLD CALC: 39.4 % (ref 37–54)
HEMOGLOBIN: 12.5 G/DL (ref 12.5–16.5)
INR BLD: 2.9
MCH RBC QN AUTO: 29.1 PG (ref 26–35)
MCHC RBC AUTO-ENTMCNC: 31.7 % (ref 32–34.5)
MCV RBC AUTO: 91.8 FL (ref 80–99.9)
PDW BLD-RTO: 14.4 FL (ref 11.5–15)
PLATELET # BLD: 292 E9/L (ref 130–450)
PMV BLD AUTO: 10.6 FL (ref 7–12)
POTASSIUM SERPL-SCNC: 3.3 MMOL/L (ref 3.5–5)
PROTHROMBIN TIME: 32.6 SEC (ref 9.3–12.4)
RBC # BLD: 4.29 E12/L (ref 3.8–5.8)
SODIUM BLD-SCNC: 142 MMOL/L (ref 132–146)
TOTAL PROTEIN: 6.1 G/DL (ref 6.4–8.3)
WBC # BLD: 5.4 E9/L (ref 4.5–11.5)

## 2018-03-15 PROCEDURE — 36415 COLL VENOUS BLD VENIPUNCTURE: CPT

## 2018-03-15 PROCEDURE — 97535 SELF CARE MNGMENT TRAINING: CPT

## 2018-03-15 PROCEDURE — 80053 COMPREHEN METABOLIC PANEL: CPT

## 2018-03-15 PROCEDURE — 85610 PROTHROMBIN TIME: CPT

## 2018-03-15 PROCEDURE — 6370000000 HC RX 637 (ALT 250 FOR IP): Performed by: PHYSICAL MEDICINE & REHABILITATION

## 2018-03-15 PROCEDURE — 97110 THERAPEUTIC EXERCISES: CPT

## 2018-03-15 PROCEDURE — 97112 NEUROMUSCULAR REEDUCATION: CPT

## 2018-03-15 PROCEDURE — 2580000003 HC RX 258: Performed by: NEUROMUSCULOSKELETAL MEDICINE & OMM

## 2018-03-15 PROCEDURE — 2700000000 HC OXYGEN THERAPY PER DAY

## 2018-03-15 PROCEDURE — 97530 THERAPEUTIC ACTIVITIES: CPT

## 2018-03-15 PROCEDURE — 1280000000 HC REHAB R&B

## 2018-03-15 PROCEDURE — 6370000000 HC RX 637 (ALT 250 FOR IP)

## 2018-03-15 PROCEDURE — 85027 COMPLETE CBC AUTOMATED: CPT

## 2018-03-15 RX ORDER — METOCLOPRAMIDE HYDROCHLORIDE 5 MG/5ML
5 SOLUTION ORAL
Status: DISCONTINUED | OUTPATIENT
Start: 2018-03-15 | End: 2018-03-17

## 2018-03-15 RX ORDER — POTASSIUM BICARBONATE 25 MEQ/1
25 TABLET, EFFERVESCENT ORAL 2 TIMES DAILY
Status: DISCONTINUED | OUTPATIENT
Start: 2018-03-15 | End: 2018-03-16

## 2018-03-15 RX ORDER — WARFARIN SODIUM 10 MG/1
10 TABLET ORAL
Status: COMPLETED | OUTPATIENT
Start: 2018-03-15 | End: 2018-03-15

## 2018-03-15 RX ADMIN — METOCLOPRAMIDE HYDROCHLORIDE 5 MG: 5 SOLUTION ORAL at 17:38

## 2018-03-15 RX ADMIN — POTASSIUM BICARBONATE 25 MEQ: 25 TABLET, EFFERVESCENT ORAL at 12:18

## 2018-03-15 RX ADMIN — METOCLOPRAMIDE HYDROCHLORIDE 5 MG: 5 SOLUTION ORAL at 21:59

## 2018-03-15 RX ADMIN — AMLODIPINE BESYLATE 2.5 MG: 2.5 TABLET ORAL at 09:49

## 2018-03-15 RX ADMIN — LACTULOSE 20 G: 20 SOLUTION ORAL at 21:59

## 2018-03-15 RX ADMIN — Medication 15 ML: at 01:12

## 2018-03-15 RX ADMIN — METOCLOPRAMIDE HYDROCHLORIDE 5 MG: 5 SOLUTION ORAL at 13:33

## 2018-03-15 RX ADMIN — POTASSIUM BICARBONATE 25 MEQ: 25 TABLET, EFFERVESCENT ORAL at 21:59

## 2018-03-15 RX ADMIN — Medication 10 ML: at 09:52

## 2018-03-15 RX ADMIN — Medication 10 ML: at 21:59

## 2018-03-15 RX ADMIN — DESMOPRESSIN ACETATE 40 MG: 0.2 TABLET ORAL at 21:59

## 2018-03-15 RX ADMIN — GLYBURIDE 5 MG: 5 TABLET ORAL at 06:31

## 2018-03-15 RX ADMIN — Medication 15 ML: at 23:48

## 2018-03-15 RX ADMIN — Medication 15 ML: at 12:12

## 2018-03-15 RX ADMIN — GLYBURIDE 5 MG: 5 TABLET ORAL at 17:38

## 2018-03-15 RX ADMIN — Medication 15 ML: at 17:38

## 2018-03-15 RX ADMIN — WARFARIN SODIUM 10 MG: 10 TABLET ORAL at 18:38

## 2018-03-15 RX ADMIN — Medication 15 ML: at 06:31

## 2018-03-15 RX ADMIN — LEVOTHYROXINE SODIUM 50 MCG: 50 TABLET ORAL at 06:31

## 2018-03-15 ASSESSMENT — PAIN SCALES - GENERAL
PAINLEVEL_OUTOF10: 0

## 2018-03-16 ENCOUNTER — APPOINTMENT (OUTPATIENT)
Dept: CT IMAGING | Age: 73
DRG: 981 | End: 2018-03-16
Payer: MEDICARE

## 2018-03-16 ENCOUNTER — APPOINTMENT (OUTPATIENT)
Dept: GENERAL RADIOLOGY | Age: 73
DRG: 981 | End: 2018-03-16
Payer: MEDICARE

## 2018-03-16 LAB
ANION GAP SERPL CALCULATED.3IONS-SCNC: 12 MMOL/L (ref 7–16)
BUN BLDV-MCNC: 14 MG/DL (ref 8–23)
CALCIUM SERPL-MCNC: 8.7 MG/DL (ref 8.6–10.2)
CHLORIDE BLD-SCNC: 98 MMOL/L (ref 98–107)
CO2: 34 MMOL/L (ref 22–29)
CREAT SERPL-MCNC: 1 MG/DL (ref 0.7–1.2)
GFR AFRICAN AMERICAN: >60
GFR NON-AFRICAN AMERICAN: >60 ML/MIN/1.73
GLUCOSE BLD-MCNC: 140 MG/DL (ref 74–109)
INR BLD: 2.7
MAGNESIUM: 2.3 MG/DL (ref 1.6–2.6)
POTASSIUM SERPL-SCNC: 3.4 MMOL/L (ref 3.5–5)
PROTHROMBIN TIME: 30.8 SEC (ref 9.3–12.4)
SODIUM BLD-SCNC: 144 MMOL/L (ref 132–146)

## 2018-03-16 PROCEDURE — 83735 ASSAY OF MAGNESIUM: CPT

## 2018-03-16 PROCEDURE — 85610 PROTHROMBIN TIME: CPT

## 2018-03-16 PROCEDURE — 80048 BASIC METABOLIC PNL TOTAL CA: CPT

## 2018-03-16 PROCEDURE — 36415 COLL VENOUS BLD VENIPUNCTURE: CPT

## 2018-03-16 PROCEDURE — 74176 CT ABD & PELVIS W/O CONTRAST: CPT

## 2018-03-16 PROCEDURE — 97110 THERAPEUTIC EXERCISES: CPT

## 2018-03-16 PROCEDURE — 6360000004 HC RX CONTRAST MEDICATION: Performed by: RADIOLOGY

## 2018-03-16 PROCEDURE — 2700000000 HC OXYGEN THERAPY PER DAY

## 2018-03-16 PROCEDURE — 2580000003 HC RX 258: Performed by: NEUROMUSCULOSKELETAL MEDICINE & OMM

## 2018-03-16 PROCEDURE — 97530 THERAPEUTIC ACTIVITIES: CPT

## 2018-03-16 PROCEDURE — 1280000000 HC REHAB R&B

## 2018-03-16 PROCEDURE — 6370000000 HC RX 637 (ALT 250 FOR IP): Performed by: PHYSICAL MEDICINE & REHABILITATION

## 2018-03-16 PROCEDURE — 97535 SELF CARE MNGMENT TRAINING: CPT

## 2018-03-16 RX ORDER — BISACODYL 10 MG
10 SUPPOSITORY, RECTAL RECTAL DAILY
Status: DISPENSED | OUTPATIENT
Start: 2018-03-16 | End: 2018-03-20

## 2018-03-16 RX ORDER — WARFARIN SODIUM 6 MG/1
12 TABLET ORAL
Status: DISPENSED | OUTPATIENT
Start: 2018-03-16 | End: 2018-03-17

## 2018-03-16 RX ADMIN — METOCLOPRAMIDE HYDROCHLORIDE 5 MG: 5 SOLUTION ORAL at 06:06

## 2018-03-16 RX ADMIN — POTASSIUM BICARBONATE 25 MEQ: 25 TABLET, EFFERVESCENT ORAL at 08:01

## 2018-03-16 RX ADMIN — LEVOTHYROXINE SODIUM 50 MCG: 50 TABLET ORAL at 06:05

## 2018-03-16 RX ADMIN — Medication 15 ML: at 12:00

## 2018-03-16 RX ADMIN — Medication 15 ML: at 06:05

## 2018-03-16 RX ADMIN — GLYBURIDE 5 MG: 5 TABLET ORAL at 06:05

## 2018-03-16 RX ADMIN — Medication 10 ML: at 22:36

## 2018-03-16 RX ADMIN — METOCLOPRAMIDE HYDROCHLORIDE 5 MG: 5 SOLUTION ORAL at 11:59

## 2018-03-16 RX ADMIN — IOHEXOL 50 ML: 240 INJECTION, SOLUTION INTRATHECAL; INTRAVASCULAR; INTRAVENOUS; ORAL at 11:00

## 2018-03-16 RX ADMIN — AMLODIPINE BESYLATE 2.5 MG: 2.5 TABLET ORAL at 08:01

## 2018-03-16 RX ADMIN — Medication 10 ML: at 08:01

## 2018-03-16 ASSESSMENT — PAIN SCALES - GENERAL
PAINLEVEL_OUTOF10: 0

## 2018-03-17 ENCOUNTER — APPOINTMENT (OUTPATIENT)
Dept: GENERAL RADIOLOGY | Age: 73
DRG: 981 | End: 2018-03-17
Payer: MEDICARE

## 2018-03-17 LAB
ALBUMIN SERPL-MCNC: 3.4 G/DL (ref 3.5–5.2)
ALP BLD-CCNC: 61 U/L (ref 40–129)
ALT SERPL-CCNC: 13 U/L (ref 0–40)
ANION GAP SERPL CALCULATED.3IONS-SCNC: 12 MMOL/L (ref 7–16)
AST SERPL-CCNC: 24 U/L (ref 0–39)
BASOPHILS ABSOLUTE: 0.1 E9/L (ref 0–0.2)
BASOPHILS RELATIVE PERCENT: 1.9 % (ref 0–2)
BILIRUB SERPL-MCNC: 0.5 MG/DL (ref 0–1.2)
BUN BLDV-MCNC: 11 MG/DL (ref 8–23)
CALCIUM SERPL-MCNC: 9 MG/DL (ref 8.6–10.2)
CHLORIDE BLD-SCNC: 98 MMOL/L (ref 98–107)
CO2: 33 MMOL/L (ref 22–29)
CREAT SERPL-MCNC: 0.9 MG/DL (ref 0.7–1.2)
EOSINOPHILS ABSOLUTE: 0.24 E9/L (ref 0.05–0.5)
EOSINOPHILS RELATIVE PERCENT: 4.6 % (ref 0–6)
GFR AFRICAN AMERICAN: >60
GFR NON-AFRICAN AMERICAN: >60 ML/MIN/1.73
GLUCOSE BLD-MCNC: 122 MG/DL (ref 74–109)
HCT VFR BLD CALC: 41.1 % (ref 37–54)
HEMOGLOBIN: 13.1 G/DL (ref 12.5–16.5)
IMMATURE GRANULOCYTES #: 0.02 E9/L
IMMATURE GRANULOCYTES %: 0.4 % (ref 0–5)
INR BLD: 2.5
LYMPHOCYTES ABSOLUTE: 1.27 E9/L (ref 1.5–4)
LYMPHOCYTES RELATIVE PERCENT: 24.5 % (ref 20–42)
MCH RBC QN AUTO: 28.9 PG (ref 26–35)
MCHC RBC AUTO-ENTMCNC: 31.9 % (ref 32–34.5)
MCV RBC AUTO: 90.5 FL (ref 80–99.9)
MONOCYTES ABSOLUTE: 0.47 E9/L (ref 0.1–0.95)
MONOCYTES RELATIVE PERCENT: 9.1 % (ref 2–12)
NEUTROPHILS ABSOLUTE: 3.09 E9/L (ref 1.8–7.3)
NEUTROPHILS RELATIVE PERCENT: 59.5 % (ref 43–80)
PDW BLD-RTO: 14.1 FL (ref 11.5–15)
PLATELET # BLD: 284 E9/L (ref 130–450)
PMV BLD AUTO: 10.2 FL (ref 7–12)
POTASSIUM SERPL-SCNC: 3.5 MMOL/L (ref 3.5–5)
PROTHROMBIN TIME: 27.9 SEC (ref 9.3–12.4)
RBC # BLD: 4.54 E12/L (ref 3.8–5.8)
SODIUM BLD-SCNC: 143 MMOL/L (ref 132–146)
TOTAL PROTEIN: 6.3 G/DL (ref 6.4–8.3)
WBC # BLD: 5.2 E9/L (ref 4.5–11.5)

## 2018-03-17 PROCEDURE — 80053 COMPREHEN METABOLIC PANEL: CPT

## 2018-03-17 PROCEDURE — 6370000000 HC RX 637 (ALT 250 FOR IP): Performed by: PHYSICAL MEDICINE & REHABILITATION

## 2018-03-17 PROCEDURE — 6370000000 HC RX 637 (ALT 250 FOR IP): Performed by: INTERNAL MEDICINE

## 2018-03-17 PROCEDURE — 85025 COMPLETE CBC W/AUTO DIFF WBC: CPT

## 2018-03-17 PROCEDURE — 71045 X-RAY EXAM CHEST 1 VIEW: CPT

## 2018-03-17 PROCEDURE — 2580000003 HC RX 258: Performed by: SURGERY

## 2018-03-17 PROCEDURE — 85610 PROTHROMBIN TIME: CPT

## 2018-03-17 PROCEDURE — 1280000000 HC REHAB R&B

## 2018-03-17 PROCEDURE — 2580000003 HC RX 258: Performed by: PHYSICAL MEDICINE & REHABILITATION

## 2018-03-17 PROCEDURE — 6360000002 HC RX W HCPCS: Performed by: PHYSICAL MEDICINE & REHABILITATION

## 2018-03-17 PROCEDURE — 36415 COLL VENOUS BLD VENIPUNCTURE: CPT

## 2018-03-17 PROCEDURE — 6360000002 HC RX W HCPCS: Performed by: SURGERY

## 2018-03-17 PROCEDURE — 51702 INSERT TEMP BLADDER CATH: CPT

## 2018-03-17 RX ORDER — METOCLOPRAMIDE HYDROCHLORIDE 5 MG/ML
10 INJECTION INTRAMUSCULAR; INTRAVENOUS EVERY 6 HOURS
Status: DISCONTINUED | OUTPATIENT
Start: 2018-03-17 | End: 2018-03-24

## 2018-03-17 RX ORDER — SODIUM CHLORIDE 0.9 % (FLUSH) 0.9 %
10 SYRINGE (ML) INJECTION EVERY 12 HOURS SCHEDULED
Status: CANCELLED | OUTPATIENT
Start: 2018-03-17

## 2018-03-17 RX ORDER — WARFARIN SODIUM 6 MG/1
12 TABLET ORAL
Status: COMPLETED | OUTPATIENT
Start: 2018-03-17 | End: 2018-03-17

## 2018-03-17 RX ORDER — DEXTROSE MONOHYDRATE 25 G/50ML
12.5 INJECTION, SOLUTION INTRAVENOUS PRN
Status: CANCELLED | OUTPATIENT
Start: 2018-03-17

## 2018-03-17 RX ORDER — AMLODIPINE BESYLATE 2.5 MG/1
2.5 TABLET ORAL DAILY
Status: CANCELLED | OUTPATIENT
Start: 2018-03-17

## 2018-03-17 RX ORDER — NICOTINE POLACRILEX 4 MG
15 LOZENGE BUCCAL PRN
Status: CANCELLED | OUTPATIENT
Start: 2018-03-17

## 2018-03-17 RX ORDER — DEXTROSE MONOHYDRATE 50 MG/ML
100 INJECTION, SOLUTION INTRAVENOUS PRN
Status: CANCELLED | OUTPATIENT
Start: 2018-03-17

## 2018-03-17 RX ORDER — PETROLATUM 42 G/100G
OINTMENT TOPICAL 2 TIMES DAILY PRN
Status: CANCELLED | OUTPATIENT
Start: 2018-03-17

## 2018-03-17 RX ORDER — METOCLOPRAMIDE HYDROCHLORIDE 5 MG/5ML
5 SOLUTION ORAL
Status: CANCELLED | OUTPATIENT
Start: 2018-03-17

## 2018-03-17 RX ORDER — SODIUM CHLORIDE 0.9 % (FLUSH) 0.9 %
10 SYRINGE (ML) INJECTION PRN
Status: CANCELLED | OUTPATIENT
Start: 2018-03-17

## 2018-03-17 RX ORDER — GLYBURIDE 5 MG/1
5 TABLET ORAL 2 TIMES DAILY
Status: CANCELLED | OUTPATIENT
Start: 2018-03-17

## 2018-03-17 RX ORDER — DEXTROSE, SODIUM CHLORIDE, AND POTASSIUM CHLORIDE 5; .45; .15 G/100ML; G/100ML; G/100ML
INJECTION INTRAVENOUS
Status: DISPENSED
Start: 2018-03-17 | End: 2018-03-18

## 2018-03-17 RX ORDER — DEXTROSE, SODIUM CHLORIDE, AND POTASSIUM CHLORIDE 5; .45; .15 G/100ML; G/100ML; G/100ML
INJECTION INTRAVENOUS CONTINUOUS
Status: DISCONTINUED | OUTPATIENT
Start: 2018-03-17 | End: 2018-03-19 | Stop reason: ALTCHOICE

## 2018-03-17 RX ORDER — LEVOTHYROXINE SODIUM 0.05 MG/1
50 TABLET ORAL DAILY
Status: CANCELLED | OUTPATIENT
Start: 2018-03-18

## 2018-03-17 RX ORDER — ATORVASTATIN CALCIUM 40 MG/1
40 TABLET, FILM COATED ORAL NIGHTLY
Status: CANCELLED | OUTPATIENT
Start: 2018-03-17

## 2018-03-17 RX ADMIN — SODIUM CHLORIDE: 9 INJECTION, SOLUTION INTRAVENOUS at 00:49

## 2018-03-17 RX ADMIN — GLYBURIDE 5 MG: 5 TABLET ORAL at 17:53

## 2018-03-17 RX ADMIN — METOCLOPRAMIDE 10 MG: 5 INJECTION, SOLUTION INTRAMUSCULAR; INTRAVENOUS at 19:21

## 2018-03-17 RX ADMIN — Medication 15 ML: at 17:53

## 2018-03-17 RX ADMIN — POTASSIUM CHLORIDE: 2 INJECTION, SOLUTION, CONCENTRATE INTRAVENOUS at 14:13

## 2018-03-17 RX ADMIN — BISACODYL 10 MG: 10 SUPPOSITORY RECTAL at 20:05

## 2018-03-17 RX ADMIN — METOCLOPRAMIDE 10 MG: 5 INJECTION, SOLUTION INTRAMUSCULAR; INTRAVENOUS at 12:15

## 2018-03-17 RX ADMIN — DESMOPRESSIN ACETATE 40 MG: 0.2 TABLET ORAL at 18:20

## 2018-03-17 RX ADMIN — WARFARIN SODIUM 12 MG: 6 TABLET ORAL at 17:53

## 2018-03-17 ASSESSMENT — PAIN SCALES - GENERAL
PAINLEVEL_OUTOF10: 0

## 2018-03-18 LAB
INR BLD: 2.4
PROTHROMBIN TIME: 27.1 SEC (ref 9.3–12.4)

## 2018-03-18 PROCEDURE — 6370000000 HC RX 637 (ALT 250 FOR IP): Performed by: INTERNAL MEDICINE

## 2018-03-18 PROCEDURE — 6360000002 HC RX W HCPCS: Performed by: SURGERY

## 2018-03-18 PROCEDURE — 2500000003 HC RX 250 WO HCPCS: Performed by: PHYSICAL MEDICINE & REHABILITATION

## 2018-03-18 PROCEDURE — 85610 PROTHROMBIN TIME: CPT

## 2018-03-18 PROCEDURE — 6370000000 HC RX 637 (ALT 250 FOR IP): Performed by: PHYSICAL MEDICINE & REHABILITATION

## 2018-03-18 PROCEDURE — 36415 COLL VENOUS BLD VENIPUNCTURE: CPT

## 2018-03-18 PROCEDURE — 1280000000 HC REHAB R&B

## 2018-03-18 RX ORDER — FUROSEMIDE 10 MG/ML
10 INJECTION INTRAMUSCULAR; INTRAVENOUS ONCE
Status: COMPLETED | OUTPATIENT
Start: 2018-03-18 | End: 2018-03-18

## 2018-03-18 RX ORDER — WARFARIN SODIUM 6 MG/1
12 TABLET ORAL
Status: COMPLETED | OUTPATIENT
Start: 2018-03-18 | End: 2018-03-18

## 2018-03-18 RX ORDER — AMLODIPINE BESYLATE 5 MG/1
5 TABLET ORAL DAILY
Status: DISCONTINUED | OUTPATIENT
Start: 2018-03-19 | End: 2018-03-28

## 2018-03-18 RX ADMIN — FUROSEMIDE 10 MG: 10 INJECTION, SOLUTION INTRAVENOUS at 12:04

## 2018-03-18 RX ADMIN — GLYBURIDE 5 MG: 5 TABLET ORAL at 18:06

## 2018-03-18 RX ADMIN — METOCLOPRAMIDE 10 MG: 5 INJECTION, SOLUTION INTRAMUSCULAR; INTRAVENOUS at 01:36

## 2018-03-18 RX ADMIN — Medication 15 ML: at 07:48

## 2018-03-18 RX ADMIN — LEVOTHYROXINE SODIUM 50 MCG: 50 TABLET ORAL at 07:56

## 2018-03-18 RX ADMIN — GLYBURIDE 5 MG: 5 TABLET ORAL at 07:48

## 2018-03-18 RX ADMIN — AMLODIPINE BESYLATE 2.5 MG: 2.5 TABLET ORAL at 07:48

## 2018-03-18 RX ADMIN — DESMOPRESSIN ACETATE 40 MG: 0.2 TABLET ORAL at 20:20

## 2018-03-18 RX ADMIN — WARFARIN SODIUM 12 MG: 6 TABLET ORAL at 18:05

## 2018-03-18 RX ADMIN — BISACODYL 10 MG: 10 SUPPOSITORY RECTAL at 07:49

## 2018-03-18 RX ADMIN — METOCLOPRAMIDE 10 MG: 5 INJECTION, SOLUTION INTRAMUSCULAR; INTRAVENOUS at 12:34

## 2018-03-18 RX ADMIN — Medication 15 ML: at 18:06

## 2018-03-18 RX ADMIN — METOCLOPRAMIDE 10 MG: 5 INJECTION, SOLUTION INTRAMUSCULAR; INTRAVENOUS at 18:12

## 2018-03-18 RX ADMIN — METOCLOPRAMIDE 10 MG: 5 INJECTION, SOLUTION INTRAMUSCULAR; INTRAVENOUS at 06:29

## 2018-03-18 RX ADMIN — DEXTROSE MONOHYDRATE, SODIUM CHLORIDE, AND POTASSIUM CHLORIDE: 50; 4.5; 1.49 INJECTION, SOLUTION INTRAVENOUS at 19:00

## 2018-03-18 ASSESSMENT — PAIN SCALES - GENERAL
PAINLEVEL_OUTOF10: 0

## 2018-03-19 ENCOUNTER — TELEPHONE (OUTPATIENT)
Dept: CARDIOLOGY CLINIC | Age: 73
End: 2018-03-19

## 2018-03-19 LAB
ALBUMIN SERPL-MCNC: 3.2 G/DL (ref 3.5–5.2)
ALP BLD-CCNC: 62 U/L (ref 40–129)
ALT SERPL-CCNC: 10 U/L (ref 0–40)
ANION GAP SERPL CALCULATED.3IONS-SCNC: 15 MMOL/L (ref 7–16)
AST SERPL-CCNC: 21 U/L (ref 0–39)
BASOPHILS ABSOLUTE: 0.09 E9/L (ref 0–0.2)
BASOPHILS RELATIVE PERCENT: 1.3 % (ref 0–2)
BILIRUB SERPL-MCNC: 0.4 MG/DL (ref 0–1.2)
BUN BLDV-MCNC: 8 MG/DL (ref 8–23)
CALCIUM SERPL-MCNC: 8.8 MG/DL (ref 8.6–10.2)
CHLORIDE BLD-SCNC: 100 MMOL/L (ref 98–107)
CO2: 30 MMOL/L (ref 22–29)
CREAT SERPL-MCNC: 0.9 MG/DL (ref 0.7–1.2)
EOSINOPHILS ABSOLUTE: 0.29 E9/L (ref 0.05–0.5)
EOSINOPHILS RELATIVE PERCENT: 4.2 % (ref 0–6)
GFR AFRICAN AMERICAN: >60
GFR NON-AFRICAN AMERICAN: >60 ML/MIN/1.73
GLUCOSE BLD-MCNC: 140 MG/DL (ref 74–109)
HCT VFR BLD CALC: 42.2 % (ref 37–54)
HEMOGLOBIN: 13.2 G/DL (ref 12.5–16.5)
IMMATURE GRANULOCYTES #: 0.02 E9/L
IMMATURE GRANULOCYTES %: 0.3 % (ref 0–5)
INR BLD: 3.2
LYMPHOCYTES ABSOLUTE: 1.5 E9/L (ref 1.5–4)
LYMPHOCYTES RELATIVE PERCENT: 22 % (ref 20–42)
MCH RBC QN AUTO: 28.4 PG (ref 26–35)
MCHC RBC AUTO-ENTMCNC: 31.3 % (ref 32–34.5)
MCV RBC AUTO: 90.8 FL (ref 80–99.9)
MONOCYTES ABSOLUTE: 0.76 E9/L (ref 0.1–0.95)
MONOCYTES RELATIVE PERCENT: 11.1 % (ref 2–12)
NEUTROPHILS ABSOLUTE: 4.17 E9/L (ref 1.8–7.3)
NEUTROPHILS RELATIVE PERCENT: 61.1 % (ref 43–80)
PDW BLD-RTO: 14.2 FL (ref 11.5–15)
PLATELET # BLD: 287 E9/L (ref 130–450)
PMV BLD AUTO: 11.1 FL (ref 7–12)
POTASSIUM SERPL-SCNC: 2.9 MMOL/L (ref 3.5–5)
PROTHROMBIN TIME: 36.9 SEC (ref 9.3–12.4)
RBC # BLD: 4.65 E12/L (ref 3.8–5.8)
SODIUM BLD-SCNC: 145 MMOL/L (ref 132–146)
TOTAL PROTEIN: 6.4 G/DL (ref 6.4–8.3)
WBC # BLD: 6.8 E9/L (ref 4.5–11.5)

## 2018-03-19 PROCEDURE — 1280000000 HC REHAB R&B

## 2018-03-19 PROCEDURE — 6370000000 HC RX 637 (ALT 250 FOR IP): Performed by: PHYSICAL MEDICINE & REHABILITATION

## 2018-03-19 PROCEDURE — 2500000003 HC RX 250 WO HCPCS: Performed by: SURGERY

## 2018-03-19 PROCEDURE — 85025 COMPLETE CBC W/AUTO DIFF WBC: CPT

## 2018-03-19 PROCEDURE — 6370000000 HC RX 637 (ALT 250 FOR IP): Performed by: INTERNAL MEDICINE

## 2018-03-19 PROCEDURE — 97535 SELF CARE MNGMENT TRAINING: CPT

## 2018-03-19 PROCEDURE — 99233 SBSQ HOSP IP/OBS HIGH 50: CPT | Performed by: PHYSICAL MEDICINE & REHABILITATION

## 2018-03-19 PROCEDURE — 80053 COMPREHEN METABOLIC PANEL: CPT

## 2018-03-19 PROCEDURE — 85610 PROTHROMBIN TIME: CPT

## 2018-03-19 PROCEDURE — 6360000002 HC RX W HCPCS: Performed by: SURGERY

## 2018-03-19 PROCEDURE — 36415 COLL VENOUS BLD VENIPUNCTURE: CPT

## 2018-03-19 RX ORDER — DEXTROSE, SODIUM CHLORIDE, AND POTASSIUM CHLORIDE 5; .45; .15 G/100ML; G/100ML; G/100ML
INJECTION INTRAVENOUS CONTINUOUS
Status: DISCONTINUED | OUTPATIENT
Start: 2018-03-19 | End: 2018-03-19 | Stop reason: SDUPTHER

## 2018-03-19 RX ORDER — DEXTROSE, SODIUM CHLORIDE, AND POTASSIUM CHLORIDE 5; .45; .3 G/100ML; G/100ML; G/100ML
INJECTION INTRAVENOUS CONTINUOUS
Status: DISCONTINUED | OUTPATIENT
Start: 2018-03-19 | End: 2018-03-27 | Stop reason: ALTCHOICE

## 2018-03-19 RX ORDER — WARFARIN SODIUM 7.5 MG/1
7.5 TABLET ORAL
Status: DISPENSED | OUTPATIENT
Start: 2018-03-19 | End: 2018-03-20

## 2018-03-19 RX ADMIN — METOCLOPRAMIDE 10 MG: 5 INJECTION, SOLUTION INTRAMUSCULAR; INTRAVENOUS at 06:34

## 2018-03-19 RX ADMIN — Medication 15 ML: at 17:26

## 2018-03-19 RX ADMIN — METOCLOPRAMIDE 10 MG: 5 INJECTION, SOLUTION INTRAMUSCULAR; INTRAVENOUS at 11:50

## 2018-03-19 RX ADMIN — GLYBURIDE 5 MG: 5 TABLET ORAL at 07:11

## 2018-03-19 RX ADMIN — Medication 15 ML: at 00:41

## 2018-03-19 RX ADMIN — AMLODIPINE BESYLATE 5 MG: 5 TABLET ORAL at 08:21

## 2018-03-19 RX ADMIN — DEXTROSE MONOHYDRATE, SODIUM CHLORIDE, AND POTASSIUM CHLORIDE: 50; 4.5; 2.98 INJECTION, SOLUTION INTRAVENOUS at 21:30

## 2018-03-19 RX ADMIN — Medication 15 ML: at 07:11

## 2018-03-19 RX ADMIN — LEVOTHYROXINE SODIUM 50 MCG: 50 TABLET ORAL at 07:11

## 2018-03-19 RX ADMIN — METOCLOPRAMIDE 10 MG: 5 INJECTION, SOLUTION INTRAMUSCULAR; INTRAVENOUS at 17:55

## 2018-03-19 RX ADMIN — GLYBURIDE 5 MG: 5 TABLET ORAL at 17:26

## 2018-03-19 RX ADMIN — Medication 15 ML: at 11:50

## 2018-03-19 RX ADMIN — METOCLOPRAMIDE 10 MG: 5 INJECTION, SOLUTION INTRAMUSCULAR; INTRAVENOUS at 00:50

## 2018-03-19 RX ADMIN — DEXTROSE MONOHYDRATE, SODIUM CHLORIDE, AND POTASSIUM CHLORIDE: 50; 4.5; 2.98 INJECTION, SOLUTION INTRAVENOUS at 08:16

## 2018-03-19 RX ADMIN — DESMOPRESSIN ACETATE 40 MG: 0.2 TABLET ORAL at 21:25

## 2018-03-19 ASSESSMENT — PAIN SCALES - GENERAL
PAINLEVEL_OUTOF10: 0

## 2018-03-20 LAB
ANION GAP SERPL CALCULATED.3IONS-SCNC: 15 MMOL/L (ref 7–16)
BUN BLDV-MCNC: 9 MG/DL (ref 8–23)
CALCIUM SERPL-MCNC: 8.9 MG/DL (ref 8.6–10.2)
CHLORIDE BLD-SCNC: 100 MMOL/L (ref 98–107)
CO2: 30 MMOL/L (ref 22–29)
CREAT SERPL-MCNC: 0.9 MG/DL (ref 0.7–1.2)
GFR AFRICAN AMERICAN: >60
GFR NON-AFRICAN AMERICAN: >60 ML/MIN/1.73
GLUCOSE BLD-MCNC: 155 MG/DL (ref 74–109)
INR BLD: 3.7
POTASSIUM SERPL-SCNC: 2.9 MMOL/L (ref 3.5–5)
PROTHROMBIN TIME: 42.8 SEC (ref 9.3–12.4)
SODIUM BLD-SCNC: 145 MMOL/L (ref 132–146)

## 2018-03-20 PROCEDURE — 6370000000 HC RX 637 (ALT 250 FOR IP): Performed by: PHYSICAL MEDICINE & REHABILITATION

## 2018-03-20 PROCEDURE — 7100000001 HC PACU RECOVERY - ADDTL 15 MIN: Performed by: SURGERY

## 2018-03-20 PROCEDURE — 6360000002 HC RX W HCPCS: Performed by: SURGERY

## 2018-03-20 PROCEDURE — 0D9E8ZZ DRAINAGE OF LARGE INTESTINE, VIA NATURAL OR ARTIFICIAL OPENING ENDOSCOPIC: ICD-10-PCS | Performed by: SURGERY

## 2018-03-20 PROCEDURE — 3609009500 HC COLONOSCOPY DIAGNOSTIC OR SCREENING: Performed by: SURGERY

## 2018-03-20 PROCEDURE — 99152 MOD SED SAME PHYS/QHP 5/>YRS: CPT | Performed by: SURGERY

## 2018-03-20 PROCEDURE — 2500000003 HC RX 250 WO HCPCS: Performed by: SURGERY

## 2018-03-20 PROCEDURE — 36415 COLL VENOUS BLD VENIPUNCTURE: CPT

## 2018-03-20 PROCEDURE — 99233 SBSQ HOSP IP/OBS HIGH 50: CPT | Performed by: PHYSICAL MEDICINE & REHABILITATION

## 2018-03-20 PROCEDURE — 85610 PROTHROMBIN TIME: CPT

## 2018-03-20 PROCEDURE — 7100000000 HC PACU RECOVERY - FIRST 15 MIN: Performed by: SURGERY

## 2018-03-20 PROCEDURE — 80048 BASIC METABOLIC PNL TOTAL CA: CPT

## 2018-03-20 PROCEDURE — 6370000000 HC RX 637 (ALT 250 FOR IP): Performed by: NEUROMUSCULOSKELETAL MEDICINE & OMM

## 2018-03-20 PROCEDURE — 99153 MOD SED SAME PHYS/QHP EA: CPT | Performed by: SURGERY

## 2018-03-20 PROCEDURE — 1280000000 HC REHAB R&B

## 2018-03-20 RX ORDER — ACETAMINOPHEN 160 MG/5ML
650 SOLUTION ORAL EVERY 4 HOURS PRN
Status: DISCONTINUED | OUTPATIENT
Start: 2018-03-20 | End: 2018-03-28

## 2018-03-20 RX ORDER — MEPERIDINE HYDROCHLORIDE 50 MG/ML
INJECTION INTRAMUSCULAR; INTRAVENOUS; SUBCUTANEOUS PRN
Status: DISCONTINUED | OUTPATIENT
Start: 2018-03-20 | End: 2018-03-20

## 2018-03-20 RX ORDER — MIDAZOLAM HYDROCHLORIDE 1 MG/ML
INJECTION INTRAMUSCULAR; INTRAVENOUS PRN
Status: DISCONTINUED | OUTPATIENT
Start: 2018-03-20 | End: 2018-03-20

## 2018-03-20 RX ADMIN — Medication 15 ML: at 06:39

## 2018-03-20 RX ADMIN — Medication 15 ML: at 00:42

## 2018-03-20 RX ADMIN — METOCLOPRAMIDE 10 MG: 5 INJECTION, SOLUTION INTRAMUSCULAR; INTRAVENOUS at 18:30

## 2018-03-20 RX ADMIN — GLYBURIDE 5 MG: 5 TABLET ORAL at 16:51

## 2018-03-20 RX ADMIN — METOCLOPRAMIDE 10 MG: 5 INJECTION, SOLUTION INTRAMUSCULAR; INTRAVENOUS at 06:39

## 2018-03-20 RX ADMIN — Medication 15 ML: at 11:31

## 2018-03-20 RX ADMIN — LEVOTHYROXINE SODIUM 50 MCG: 50 TABLET ORAL at 06:39

## 2018-03-20 RX ADMIN — DESMOPRESSIN ACETATE 40 MG: 0.2 TABLET ORAL at 21:38

## 2018-03-20 RX ADMIN — METOCLOPRAMIDE 10 MG: 5 INJECTION, SOLUTION INTRAMUSCULAR; INTRAVENOUS at 00:42

## 2018-03-20 RX ADMIN — METOCLOPRAMIDE 10 MG: 5 INJECTION, SOLUTION INTRAMUSCULAR; INTRAVENOUS at 11:49

## 2018-03-20 RX ADMIN — Medication 15 ML: at 18:30

## 2018-03-20 RX ADMIN — DEXTROSE MONOHYDRATE, SODIUM CHLORIDE, AND POTASSIUM CHLORIDE: 50; 4.5; 2.98 INJECTION, SOLUTION INTRAVENOUS at 12:33

## 2018-03-20 RX ADMIN — LEVOTHYROXINE SODIUM 50 MCG: 50 TABLET ORAL at 11:31

## 2018-03-20 RX ADMIN — GLYBURIDE 5 MG: 5 TABLET ORAL at 06:39

## 2018-03-20 RX ADMIN — ACETAMINOPHEN 650 MG: 325 TABLET, FILM COATED ORAL at 11:30

## 2018-03-20 ASSESSMENT — PAIN SCALES - GENERAL
PAINLEVEL_OUTOF10: 0
PAINLEVEL_OUTOF10: 8
PAINLEVEL_OUTOF10: 0

## 2018-03-20 ASSESSMENT — PAIN DESCRIPTION - PAIN TYPE
TYPE: SURGICAL PAIN
TYPE: SURGICAL PAIN
TYPE: ACUTE PAIN

## 2018-03-20 ASSESSMENT — PAIN DESCRIPTION - LOCATION: LOCATION: HEAD

## 2018-03-20 ASSESSMENT — PAIN DESCRIPTION - ONSET: ONSET: SUDDEN

## 2018-03-21 LAB
ANION GAP SERPL CALCULATED.3IONS-SCNC: 13 MMOL/L (ref 7–16)
BASOPHILS ABSOLUTE: 0.09 E9/L (ref 0–0.2)
BASOPHILS RELATIVE PERCENT: 1.2 % (ref 0–2)
BUN BLDV-MCNC: 8 MG/DL (ref 8–23)
CALCIUM SERPL-MCNC: 8.6 MG/DL (ref 8.6–10.2)
CHLORIDE BLD-SCNC: 100 MMOL/L (ref 98–107)
CO2: 27 MMOL/L (ref 22–29)
CREAT SERPL-MCNC: 0.8 MG/DL (ref 0.7–1.2)
EOSINOPHILS ABSOLUTE: 0.54 E9/L (ref 0.05–0.5)
EOSINOPHILS RELATIVE PERCENT: 7.5 % (ref 0–6)
GFR AFRICAN AMERICAN: >60
GFR NON-AFRICAN AMERICAN: >60 ML/MIN/1.73
GLUCOSE BLD-MCNC: 144 MG/DL (ref 74–109)
HCT VFR BLD CALC: 42.7 % (ref 37–54)
HEMOGLOBIN: 13.7 G/DL (ref 12.5–16.5)
IMMATURE GRANULOCYTES #: 0.02 E9/L
IMMATURE GRANULOCYTES %: 0.3 % (ref 0–5)
INR BLD: 3.9
LYMPHOCYTES ABSOLUTE: 1.48 E9/L (ref 1.5–4)
LYMPHOCYTES RELATIVE PERCENT: 20.5 % (ref 20–42)
MCH RBC QN AUTO: 28.6 PG (ref 26–35)
MCHC RBC AUTO-ENTMCNC: 32.1 % (ref 32–34.5)
MCV RBC AUTO: 89.1 FL (ref 80–99.9)
METER GLUCOSE: 148 MG/DL (ref 70–110)
MONOCYTES ABSOLUTE: 0.65 E9/L (ref 0.1–0.95)
MONOCYTES RELATIVE PERCENT: 9 % (ref 2–12)
NEUTROPHILS ABSOLUTE: 4.43 E9/L (ref 1.8–7.3)
NEUTROPHILS RELATIVE PERCENT: 61.5 % (ref 43–80)
PDW BLD-RTO: 14 FL (ref 11.5–15)
PLATELET # BLD: 291 E9/L (ref 130–450)
PMV BLD AUTO: 10.7 FL (ref 7–12)
POTASSIUM SERPL-SCNC: 2.8 MMOL/L (ref 3.5–5)
PROTHROMBIN TIME: 45.3 SEC (ref 9.3–12.4)
RBC # BLD: 4.79 E12/L (ref 3.8–5.8)
SODIUM BLD-SCNC: 140 MMOL/L (ref 132–146)
WBC # BLD: 7.2 E9/L (ref 4.5–11.5)

## 2018-03-21 PROCEDURE — 97112 NEUROMUSCULAR REEDUCATION: CPT

## 2018-03-21 PROCEDURE — 80048 BASIC METABOLIC PNL TOTAL CA: CPT

## 2018-03-21 PROCEDURE — 6370000000 HC RX 637 (ALT 250 FOR IP): Performed by: PHYSICAL MEDICINE & REHABILITATION

## 2018-03-21 PROCEDURE — 2500000003 HC RX 250 WO HCPCS: Performed by: SURGERY

## 2018-03-21 PROCEDURE — 1280000000 HC REHAB R&B

## 2018-03-21 PROCEDURE — 2580000003 HC RX 258: Performed by: NEUROMUSCULOSKELETAL MEDICINE & OMM

## 2018-03-21 PROCEDURE — 97110 THERAPEUTIC EXERCISES: CPT

## 2018-03-21 PROCEDURE — 6370000000 HC RX 637 (ALT 250 FOR IP): Performed by: FAMILY MEDICINE

## 2018-03-21 PROCEDURE — 6360000002 HC RX W HCPCS: Performed by: SURGERY

## 2018-03-21 PROCEDURE — 97530 THERAPEUTIC ACTIVITIES: CPT

## 2018-03-21 PROCEDURE — 82962 GLUCOSE BLOOD TEST: CPT

## 2018-03-21 PROCEDURE — 6370000000 HC RX 637 (ALT 250 FOR IP): Performed by: SURGERY

## 2018-03-21 PROCEDURE — 85610 PROTHROMBIN TIME: CPT

## 2018-03-21 PROCEDURE — 36415 COLL VENOUS BLD VENIPUNCTURE: CPT

## 2018-03-21 PROCEDURE — 85025 COMPLETE CBC W/AUTO DIFF WBC: CPT

## 2018-03-21 PROCEDURE — 99233 SBSQ HOSP IP/OBS HIGH 50: CPT | Performed by: PHYSICAL MEDICINE & REHABILITATION

## 2018-03-21 RX ORDER — POTASSIUM CHLORIDE 20 MEQ/1
20 TABLET, EXTENDED RELEASE ORAL 2 TIMES DAILY WITH MEALS
Status: DISCONTINUED | OUTPATIENT
Start: 2018-03-21 | End: 2018-03-22

## 2018-03-21 RX ORDER — POTASSIUM BICARBONATE 25 MEQ/1
25 TABLET, EFFERVESCENT ORAL 2 TIMES DAILY
Status: DISCONTINUED | OUTPATIENT
Start: 2018-03-21 | End: 2018-03-28

## 2018-03-21 RX ADMIN — GLYBURIDE 5 MG: 5 TABLET ORAL at 16:47

## 2018-03-21 RX ADMIN — Medication 10 ML: at 00:50

## 2018-03-21 RX ADMIN — DESMOPRESSIN ACETATE 40 MG: 0.2 TABLET ORAL at 20:53

## 2018-03-21 RX ADMIN — Medication 15 ML: at 07:24

## 2018-03-21 RX ADMIN — AMLODIPINE BESYLATE 5 MG: 5 TABLET ORAL at 08:36

## 2018-03-21 RX ADMIN — POTASSIUM BICARBONATE 25 MEQ: 25 TABLET, EFFERVESCENT ORAL at 11:33

## 2018-03-21 RX ADMIN — METOCLOPRAMIDE 10 MG: 5 INJECTION, SOLUTION INTRAMUSCULAR; INTRAVENOUS at 17:00

## 2018-03-21 RX ADMIN — LEVOTHYROXINE SODIUM 50 MCG: 50 TABLET ORAL at 07:25

## 2018-03-21 RX ADMIN — Medication 15 ML: at 00:47

## 2018-03-21 RX ADMIN — Medication 10 ML: at 07:45

## 2018-03-21 RX ADMIN — Medication 15 ML: at 11:38

## 2018-03-21 RX ADMIN — POTASSIUM BICARBONATE 25 MEQ: 25 TABLET, EFFERVESCENT ORAL at 20:53

## 2018-03-21 RX ADMIN — DEXTROSE MONOHYDRATE, SODIUM CHLORIDE, AND POTASSIUM CHLORIDE: 50; 4.5; 2.98 INJECTION, SOLUTION INTRAVENOUS at 16:47

## 2018-03-21 RX ADMIN — METOCLOPRAMIDE 10 MG: 5 INJECTION, SOLUTION INTRAMUSCULAR; INTRAVENOUS at 07:45

## 2018-03-21 RX ADMIN — Medication 15 ML: at 17:00

## 2018-03-21 RX ADMIN — DEXTROSE MONOHYDRATE, SODIUM CHLORIDE, AND POTASSIUM CHLORIDE: 50; 4.5; 2.98 INJECTION, SOLUTION INTRAVENOUS at 02:04

## 2018-03-21 RX ADMIN — METOCLOPRAMIDE 10 MG: 5 INJECTION, SOLUTION INTRAMUSCULAR; INTRAVENOUS at 00:50

## 2018-03-21 RX ADMIN — PETROLATUM: 42 OINTMENT TOPICAL at 00:50

## 2018-03-21 ASSESSMENT — PAIN SCALES - GENERAL: PAINLEVEL_OUTOF10: 0

## 2018-03-22 LAB
ANION GAP SERPL CALCULATED.3IONS-SCNC: 15 MMOL/L (ref 7–16)
BUN BLDV-MCNC: 10 MG/DL (ref 8–23)
CALCIUM SERPL-MCNC: 8.8 MG/DL (ref 8.6–10.2)
CHLORIDE BLD-SCNC: 102 MMOL/L (ref 98–107)
CO2: 27 MMOL/L (ref 22–29)
CREAT SERPL-MCNC: 1 MG/DL (ref 0.7–1.2)
GFR AFRICAN AMERICAN: >60
GFR NON-AFRICAN AMERICAN: >60 ML/MIN/1.73
GLUCOSE BLD-MCNC: 159 MG/DL (ref 74–109)
INR BLD: 2.9
POTASSIUM SERPL-SCNC: 3.2 MMOL/L (ref 3.5–5)
PROTHROMBIN TIME: 32.6 SEC (ref 9.3–12.4)
SODIUM BLD-SCNC: 144 MMOL/L (ref 132–146)

## 2018-03-22 PROCEDURE — 99233 SBSQ HOSP IP/OBS HIGH 50: CPT | Performed by: PHYSICAL MEDICINE & REHABILITATION

## 2018-03-22 PROCEDURE — 97112 NEUROMUSCULAR REEDUCATION: CPT

## 2018-03-22 PROCEDURE — 6360000002 HC RX W HCPCS: Performed by: SURGERY

## 2018-03-22 PROCEDURE — 97535 SELF CARE MNGMENT TRAINING: CPT

## 2018-03-22 PROCEDURE — 80048 BASIC METABOLIC PNL TOTAL CA: CPT

## 2018-03-22 PROCEDURE — 85610 PROTHROMBIN TIME: CPT

## 2018-03-22 PROCEDURE — 6370000000 HC RX 637 (ALT 250 FOR IP): Performed by: PHYSICAL MEDICINE & REHABILITATION

## 2018-03-22 PROCEDURE — 97110 THERAPEUTIC EXERCISES: CPT

## 2018-03-22 PROCEDURE — 6370000000 HC RX 637 (ALT 250 FOR IP)

## 2018-03-22 PROCEDURE — 2580000003 HC RX 258: Performed by: NEUROMUSCULOSKELETAL MEDICINE & OMM

## 2018-03-22 PROCEDURE — 97530 THERAPEUTIC ACTIVITIES: CPT

## 2018-03-22 PROCEDURE — 2500000003 HC RX 250 WO HCPCS: Performed by: SURGERY

## 2018-03-22 PROCEDURE — 1280000000 HC REHAB R&B

## 2018-03-22 PROCEDURE — 6370000000 HC RX 637 (ALT 250 FOR IP): Performed by: SURGERY

## 2018-03-22 PROCEDURE — 36415 COLL VENOUS BLD VENIPUNCTURE: CPT

## 2018-03-22 RX ORDER — FUROSEMIDE 10 MG/ML
20 INJECTION INTRAMUSCULAR; INTRAVENOUS ONCE
Status: COMPLETED | OUTPATIENT
Start: 2018-03-22 | End: 2018-03-22

## 2018-03-22 RX ORDER — WARFARIN SODIUM 7.5 MG/1
7.5 TABLET ORAL
Status: COMPLETED | OUTPATIENT
Start: 2018-03-22 | End: 2018-03-22

## 2018-03-22 RX ADMIN — Medication 15 ML: at 07:01

## 2018-03-22 RX ADMIN — METOCLOPRAMIDE 10 MG: 5 INJECTION, SOLUTION INTRAMUSCULAR; INTRAVENOUS at 12:06

## 2018-03-22 RX ADMIN — DEXTROSE MONOHYDRATE, SODIUM CHLORIDE, AND POTASSIUM CHLORIDE: 50; 4.5; 2.98 INJECTION, SOLUTION INTRAVENOUS at 06:45

## 2018-03-22 RX ADMIN — METOCLOPRAMIDE 10 MG: 5 INJECTION, SOLUTION INTRAMUSCULAR; INTRAVENOUS at 06:45

## 2018-03-22 RX ADMIN — Medication 15 ML: at 11:50

## 2018-03-22 RX ADMIN — DESMOPRESSIN ACETATE 40 MG: 0.2 TABLET ORAL at 21:31

## 2018-03-22 RX ADMIN — Medication 10 ML: at 17:00

## 2018-03-22 RX ADMIN — DEXTROSE MONOHYDRATE, SODIUM CHLORIDE, AND POTASSIUM CHLORIDE: 50; 4.5; 2.98 INJECTION, SOLUTION INTRAVENOUS at 21:31

## 2018-03-22 RX ADMIN — LEVOTHYROXINE SODIUM 50 MCG: 50 TABLET ORAL at 07:01

## 2018-03-22 RX ADMIN — POTASSIUM BICARBONATE 25 MEQ: 25 TABLET, EFFERVESCENT ORAL at 21:31

## 2018-03-22 RX ADMIN — FUROSEMIDE 20 MG: 10 INJECTION, SOLUTION INTRAVENOUS at 09:41

## 2018-03-22 RX ADMIN — AMLODIPINE BESYLATE 5 MG: 5 TABLET ORAL at 09:30

## 2018-03-22 RX ADMIN — WARFARIN SODIUM 7.5 MG: 7.5 TABLET ORAL at 17:11

## 2018-03-22 RX ADMIN — Medication 10 ML: at 00:32

## 2018-03-22 RX ADMIN — Medication 10 ML: at 06:45

## 2018-03-22 RX ADMIN — GLYBURIDE 5 MG: 5 TABLET ORAL at 18:55

## 2018-03-22 RX ADMIN — GLYBURIDE 5 MG: 5 TABLET ORAL at 07:01

## 2018-03-22 RX ADMIN — METOCLOPRAMIDE 10 MG: 5 INJECTION, SOLUTION INTRAMUSCULAR; INTRAVENOUS at 00:32

## 2018-03-22 RX ADMIN — POTASSIUM BICARBONATE 25 MEQ: 25 TABLET, EFFERVESCENT ORAL at 09:30

## 2018-03-22 RX ADMIN — Medication 15 ML: at 17:11

## 2018-03-22 RX ADMIN — Medication 15 ML: at 02:01

## 2018-03-22 RX ADMIN — METOCLOPRAMIDE 10 MG: 5 INJECTION, SOLUTION INTRAMUSCULAR; INTRAVENOUS at 17:00

## 2018-03-22 ASSESSMENT — PAIN SCALES - GENERAL
PAINLEVEL_OUTOF10: 0

## 2018-03-23 LAB
ANION GAP SERPL CALCULATED.3IONS-SCNC: 11 MMOL/L (ref 7–16)
BASOPHILS ABSOLUTE: 0.07 E9/L (ref 0–0.2)
BASOPHILS RELATIVE PERCENT: 0.9 % (ref 0–2)
BUN BLDV-MCNC: 10 MG/DL (ref 8–23)
CALCIUM SERPL-MCNC: 8.8 MG/DL (ref 8.6–10.2)
CHLORIDE BLD-SCNC: 102 MMOL/L (ref 98–107)
CO2: 30 MMOL/L (ref 22–29)
CREAT SERPL-MCNC: 1 MG/DL (ref 0.7–1.2)
EOSINOPHILS ABSOLUTE: 0.44 E9/L (ref 0.05–0.5)
EOSINOPHILS RELATIVE PERCENT: 5.9 % (ref 0–6)
GFR AFRICAN AMERICAN: >60
GFR NON-AFRICAN AMERICAN: >60 ML/MIN/1.73
GLUCOSE BLD-MCNC: 137 MG/DL (ref 74–109)
HCT VFR BLD CALC: 43.2 % (ref 37–54)
HEMOGLOBIN: 13.8 G/DL (ref 12.5–16.5)
IMMATURE GRANULOCYTES #: 0.02 E9/L
IMMATURE GRANULOCYTES %: 0.3 % (ref 0–5)
INR BLD: 2.9
LYMPHOCYTES ABSOLUTE: 1.39 E9/L (ref 1.5–4)
LYMPHOCYTES RELATIVE PERCENT: 18.5 % (ref 20–42)
MCH RBC QN AUTO: 29.1 PG (ref 26–35)
MCHC RBC AUTO-ENTMCNC: 31.9 % (ref 32–34.5)
MCV RBC AUTO: 91.1 FL (ref 80–99.9)
METER GLUCOSE: 138 MG/DL (ref 70–110)
MONOCYTES ABSOLUTE: 0.57 E9/L (ref 0.1–0.95)
MONOCYTES RELATIVE PERCENT: 7.6 % (ref 2–12)
NEUTROPHILS ABSOLUTE: 5.02 E9/L (ref 1.8–7.3)
NEUTROPHILS RELATIVE PERCENT: 66.8 % (ref 43–80)
OCCULT BLOOD, OTHER: POSITIVE
PDW BLD-RTO: 14.3 FL (ref 11.5–15)
PLATELET # BLD: 267 E9/L (ref 130–450)
PMV BLD AUTO: 11 FL (ref 7–12)
POTASSIUM SERPL-SCNC: 3.3 MMOL/L (ref 3.5–5)
PROTHROMBIN TIME: 32.4 SEC (ref 9.3–12.4)
RBC # BLD: 4.74 E12/L (ref 3.8–5.8)
SODIUM BLD-SCNC: 143 MMOL/L (ref 132–146)
TRIGL SERPL-MCNC: 145 MG/DL (ref 0–149)
WBC # BLD: 7.5 E9/L (ref 4.5–11.5)

## 2018-03-23 PROCEDURE — 97110 THERAPEUTIC EXERCISES: CPT

## 2018-03-23 PROCEDURE — 85610 PROTHROMBIN TIME: CPT

## 2018-03-23 PROCEDURE — 80048 BASIC METABOLIC PNL TOTAL CA: CPT

## 2018-03-23 PROCEDURE — 6360000002 HC RX W HCPCS: Performed by: SURGERY

## 2018-03-23 PROCEDURE — 97530 THERAPEUTIC ACTIVITIES: CPT

## 2018-03-23 PROCEDURE — 84478 ASSAY OF TRIGLYCERIDES: CPT

## 2018-03-23 PROCEDURE — 1280000000 HC REHAB R&B

## 2018-03-23 PROCEDURE — G0515 COGNITIVE SKILLS DEVELOPMENT: HCPCS

## 2018-03-23 PROCEDURE — 6370000000 HC RX 637 (ALT 250 FOR IP): Performed by: PHYSICAL MEDICINE & REHABILITATION

## 2018-03-23 PROCEDURE — 97535 SELF CARE MNGMENT TRAINING: CPT

## 2018-03-23 PROCEDURE — 2500000003 HC RX 250 WO HCPCS: Performed by: SURGERY

## 2018-03-23 PROCEDURE — 6370000000 HC RX 637 (ALT 250 FOR IP): Performed by: SURGERY

## 2018-03-23 PROCEDURE — 0D9670Z DRAINAGE OF STOMACH WITH DRAINAGE DEVICE, VIA NATURAL OR ARTIFICIAL OPENING: ICD-10-PCS | Performed by: SURGERY

## 2018-03-23 PROCEDURE — 2580000003 HC RX 258: Performed by: NEUROMUSCULOSKELETAL MEDICINE & OMM

## 2018-03-23 PROCEDURE — 92526 ORAL FUNCTION THERAPY: CPT

## 2018-03-23 PROCEDURE — 96523 IRRIG DRUG DELIVERY DEVICE: CPT

## 2018-03-23 PROCEDURE — 82962 GLUCOSE BLOOD TEST: CPT

## 2018-03-23 PROCEDURE — 36415 COLL VENOUS BLD VENIPUNCTURE: CPT

## 2018-03-23 PROCEDURE — 85025 COMPLETE CBC W/AUTO DIFF WBC: CPT

## 2018-03-23 RX ORDER — ERYTHROMYCIN ETHYLSUCCINATE 200 MG/5ML
250 SUSPENSION ORAL DAILY
Status: DISCONTINUED | OUTPATIENT
Start: 2018-03-23 | End: 2018-03-28

## 2018-03-23 RX ORDER — SUCRALFATE ORAL 1 G/10ML
1 SUSPENSION ORAL EVERY 6 HOURS SCHEDULED
Status: DISCONTINUED | OUTPATIENT
Start: 2018-03-23 | End: 2018-03-26 | Stop reason: CLARIF

## 2018-03-23 RX ADMIN — DEXTROSE MONOHYDRATE, SODIUM CHLORIDE, AND POTASSIUM CHLORIDE: 50; 4.5; 2.98 INJECTION, SOLUTION INTRAVENOUS at 12:09

## 2018-03-23 RX ADMIN — DESMOPRESSIN ACETATE 40 MG: 0.2 TABLET ORAL at 20:42

## 2018-03-23 RX ADMIN — METOCLOPRAMIDE 10 MG: 5 INJECTION, SOLUTION INTRAMUSCULAR; INTRAVENOUS at 12:09

## 2018-03-23 RX ADMIN — Medication 250 MG: at 11:43

## 2018-03-23 RX ADMIN — SUCRALFATE 1 G: 1 SUSPENSION ORAL at 11:43

## 2018-03-23 RX ADMIN — SUCRALFATE 1 G: 1 SUSPENSION ORAL at 17:52

## 2018-03-23 RX ADMIN — LEVOTHYROXINE SODIUM 50 MCG: 50 TABLET ORAL at 06:24

## 2018-03-23 RX ADMIN — METOCLOPRAMIDE 10 MG: 5 INJECTION, SOLUTION INTRAMUSCULAR; INTRAVENOUS at 06:33

## 2018-03-23 RX ADMIN — DEXTROSE MONOHYDRATE, SODIUM CHLORIDE, AND POTASSIUM CHLORIDE: 50; 4.5; 2.98 INJECTION, SOLUTION INTRAVENOUS at 11:59

## 2018-03-23 RX ADMIN — AMLODIPINE BESYLATE 5 MG: 5 TABLET ORAL at 09:05

## 2018-03-23 RX ADMIN — POTASSIUM BICARBONATE 25 MEQ: 25 TABLET, EFFERVESCENT ORAL at 09:05

## 2018-03-23 RX ADMIN — Medication 10 ML: at 00:30

## 2018-03-23 RX ADMIN — METOCLOPRAMIDE 10 MG: 5 INJECTION, SOLUTION INTRAMUSCULAR; INTRAVENOUS at 17:51

## 2018-03-23 RX ADMIN — METOCLOPRAMIDE 10 MG: 5 INJECTION, SOLUTION INTRAMUSCULAR; INTRAVENOUS at 00:30

## 2018-03-23 RX ADMIN — Medication 10 ML: at 06:34

## 2018-03-23 RX ADMIN — Medication 15 ML: at 06:24

## 2018-03-23 RX ADMIN — POTASSIUM BICARBONATE 25 MEQ: 25 TABLET, EFFERVESCENT ORAL at 20:42

## 2018-03-23 RX ADMIN — GLYBURIDE 5 MG: 5 TABLET ORAL at 06:24

## 2018-03-23 RX ADMIN — GLYBURIDE 5 MG: 5 TABLET ORAL at 17:52

## 2018-03-23 RX ADMIN — Medication 15 ML: at 00:20

## 2018-03-23 ASSESSMENT — PAIN SCALES - GENERAL
PAINLEVEL_OUTOF10: 0

## 2018-03-24 LAB
ALBUMIN SERPL-MCNC: 3.2 G/DL (ref 3.5–5.2)
ALP BLD-CCNC: 60 U/L (ref 40–129)
ALT SERPL-CCNC: 10 U/L (ref 0–40)
ANION GAP SERPL CALCULATED.3IONS-SCNC: 11 MMOL/L (ref 7–16)
AST SERPL-CCNC: 17 U/L (ref 0–39)
BILIRUB SERPL-MCNC: 0.5 MG/DL (ref 0–1.2)
BUN BLDV-MCNC: 10 MG/DL (ref 8–23)
CALCIUM SERPL-MCNC: 8.8 MG/DL (ref 8.6–10.2)
CHLORIDE BLD-SCNC: 101 MMOL/L (ref 98–107)
CO2: 33 MMOL/L (ref 22–29)
CREAT SERPL-MCNC: 1.1 MG/DL (ref 0.7–1.2)
GFR AFRICAN AMERICAN: >60
GFR NON-AFRICAN AMERICAN: >60 ML/MIN/1.73
GLUCOSE BLD-MCNC: 142 MG/DL (ref 74–109)
HCT VFR BLD CALC: 40.2 % (ref 37–54)
HEMOGLOBIN: 12.5 G/DL (ref 12.5–16.5)
INR BLD: 3.1
MAGNESIUM: 2 MG/DL (ref 1.6–2.6)
MCH RBC QN AUTO: 28.6 PG (ref 26–35)
MCHC RBC AUTO-ENTMCNC: 31.1 % (ref 32–34.5)
MCV RBC AUTO: 92 FL (ref 80–99.9)
METER GLUCOSE: 101 MG/DL (ref 70–110)
METER GLUCOSE: 113 MG/DL (ref 70–110)
METER GLUCOSE: 124 MG/DL (ref 70–110)
METER GLUCOSE: 124 MG/DL (ref 70–110)
PDW BLD-RTO: 14.3 FL (ref 11.5–15)
PHOSPHORUS: 3.6 MG/DL (ref 2.5–4.5)
PLATELET # BLD: 236 E9/L (ref 130–450)
PMV BLD AUTO: 10.8 FL (ref 7–12)
POTASSIUM REFLEX MAGNESIUM: 3.9 MMOL/L (ref 3.5–5)
PREALBUMIN: 12 MG/DL (ref 20–40)
PROTHROMBIN TIME: 34.8 SEC (ref 9.3–12.4)
RBC # BLD: 4.37 E12/L (ref 3.8–5.8)
SODIUM BLD-SCNC: 145 MMOL/L (ref 132–146)
TOTAL PROTEIN: 6.2 G/DL (ref 6.4–8.3)
WBC # BLD: 6.7 E9/L (ref 4.5–11.5)

## 2018-03-24 PROCEDURE — 85027 COMPLETE CBC AUTOMATED: CPT

## 2018-03-24 PROCEDURE — 83735 ASSAY OF MAGNESIUM: CPT

## 2018-03-24 PROCEDURE — 1280000000 HC REHAB R&B

## 2018-03-24 PROCEDURE — 85610 PROTHROMBIN TIME: CPT

## 2018-03-24 PROCEDURE — 6370000000 HC RX 637 (ALT 250 FOR IP): Performed by: PHYSICAL MEDICINE & REHABILITATION

## 2018-03-24 PROCEDURE — 80053 COMPREHEN METABOLIC PANEL: CPT

## 2018-03-24 PROCEDURE — 82962 GLUCOSE BLOOD TEST: CPT

## 2018-03-24 PROCEDURE — 2580000003 HC RX 258: Performed by: NEUROMUSCULOSKELETAL MEDICINE & OMM

## 2018-03-24 PROCEDURE — 6360000002 HC RX W HCPCS: Performed by: SURGERY

## 2018-03-24 PROCEDURE — 92526 ORAL FUNCTION THERAPY: CPT

## 2018-03-24 PROCEDURE — 2500000003 HC RX 250 WO HCPCS: Performed by: SURGERY

## 2018-03-24 PROCEDURE — 84100 ASSAY OF PHOSPHORUS: CPT

## 2018-03-24 PROCEDURE — 97530 THERAPEUTIC ACTIVITIES: CPT

## 2018-03-24 PROCEDURE — 84134 ASSAY OF PREALBUMIN: CPT

## 2018-03-24 PROCEDURE — 36415 COLL VENOUS BLD VENIPUNCTURE: CPT

## 2018-03-24 PROCEDURE — 97110 THERAPEUTIC EXERCISES: CPT

## 2018-03-24 PROCEDURE — 6370000000 HC RX 637 (ALT 250 FOR IP): Performed by: SURGERY

## 2018-03-24 RX ORDER — FUROSEMIDE 10 MG/ML
20 INJECTION INTRAMUSCULAR; INTRAVENOUS ONCE
Status: COMPLETED | OUTPATIENT
Start: 2018-03-24 | End: 2018-03-24

## 2018-03-24 RX ORDER — METOCLOPRAMIDE HYDROCHLORIDE 5 MG/5ML
10 SOLUTION ORAL EVERY 6 HOURS
Status: DISCONTINUED | OUTPATIENT
Start: 2018-03-24 | End: 2018-03-30

## 2018-03-24 RX ORDER — SUCRALFATE ORAL 1 G/10ML
1 SUSPENSION ORAL EVERY 6 HOURS SCHEDULED
Status: DISCONTINUED | OUTPATIENT
Start: 2018-03-24 | End: 2018-03-24 | Stop reason: CLARIF

## 2018-03-24 RX ADMIN — POTASSIUM BICARBONATE 25 MEQ: 25 TABLET, EFFERVESCENT ORAL at 08:01

## 2018-03-24 RX ADMIN — SUCRALFATE 1 G: 1 SUSPENSION ORAL at 06:58

## 2018-03-24 RX ADMIN — DESMOPRESSIN ACETATE 40 MG: 0.2 TABLET ORAL at 20:16

## 2018-03-24 RX ADMIN — AMLODIPINE BESYLATE 5 MG: 5 TABLET ORAL at 08:01

## 2018-03-24 RX ADMIN — METOCLOPRAMIDE HYDROCHLORIDE 10 MG: 5 SOLUTION ORAL at 20:16

## 2018-03-24 RX ADMIN — GLYBURIDE 5 MG: 5 TABLET ORAL at 06:58

## 2018-03-24 RX ADMIN — LEVOTHYROXINE SODIUM 50 MCG: 50 TABLET ORAL at 06:58

## 2018-03-24 RX ADMIN — METOCLOPRAMIDE 10 MG: 5 INJECTION, SOLUTION INTRAMUSCULAR; INTRAVENOUS at 00:53

## 2018-03-24 RX ADMIN — Medication 10 ML: at 06:00

## 2018-03-24 RX ADMIN — GLYBURIDE 5 MG: 5 TABLET ORAL at 18:06

## 2018-03-24 RX ADMIN — SUCRALFATE 1 G: 1 SUSPENSION ORAL at 18:07

## 2018-03-24 RX ADMIN — Medication 10 ML: at 00:53

## 2018-03-24 RX ADMIN — Medication 250 MG: at 08:08

## 2018-03-24 RX ADMIN — POTASSIUM BICARBONATE 25 MEQ: 25 TABLET, EFFERVESCENT ORAL at 20:16

## 2018-03-24 RX ADMIN — SUCRALFATE 1 G: 1 SUSPENSION ORAL at 01:17

## 2018-03-24 RX ADMIN — FUROSEMIDE 20 MG: 10 INJECTION, SOLUTION INTRAVENOUS at 17:23

## 2018-03-24 RX ADMIN — DEXTROSE MONOHYDRATE, SODIUM CHLORIDE, AND POTASSIUM CHLORIDE: 50; 4.5; 2.98 INJECTION, SOLUTION INTRAVENOUS at 03:24

## 2018-03-24 RX ADMIN — METOCLOPRAMIDE 10 MG: 5 INJECTION, SOLUTION INTRAMUSCULAR; INTRAVENOUS at 05:59

## 2018-03-24 ASSESSMENT — PAIN SCALES - GENERAL
PAINLEVEL_OUTOF10: 0
PAINLEVEL_OUTOF10: 0

## 2018-03-25 LAB
ANION GAP SERPL CALCULATED.3IONS-SCNC: 13 MMOL/L (ref 7–16)
BUN BLDV-MCNC: 10 MG/DL (ref 8–23)
CALCIUM SERPL-MCNC: 9.1 MG/DL (ref 8.6–10.2)
CHLORIDE BLD-SCNC: 101 MMOL/L (ref 98–107)
CO2: 30 MMOL/L (ref 22–29)
CREAT SERPL-MCNC: 1 MG/DL (ref 0.7–1.2)
GFR AFRICAN AMERICAN: >60
GFR NON-AFRICAN AMERICAN: >60 ML/MIN/1.73
GLUCOSE BLD-MCNC: 133 MG/DL (ref 74–109)
INR BLD: 2.2
MAGNESIUM: 1.8 MG/DL (ref 1.6–2.6)
METER GLUCOSE: 102 MG/DL (ref 70–110)
METER GLUCOSE: 103 MG/DL (ref 70–110)
METER GLUCOSE: 120 MG/DL (ref 70–110)
METER GLUCOSE: 123 MG/DL (ref 70–110)
PHOSPHORUS: 4 MG/DL (ref 2.5–4.5)
POTASSIUM REFLEX MAGNESIUM: 3.6 MMOL/L (ref 3.5–5)
PROTHROMBIN TIME: 24.4 SEC (ref 9.3–12.4)
SODIUM BLD-SCNC: 144 MMOL/L (ref 132–146)

## 2018-03-25 PROCEDURE — 6370000000 HC RX 637 (ALT 250 FOR IP): Performed by: SURGERY

## 2018-03-25 PROCEDURE — 83735 ASSAY OF MAGNESIUM: CPT

## 2018-03-25 PROCEDURE — 97530 THERAPEUTIC ACTIVITIES: CPT

## 2018-03-25 PROCEDURE — 1280000000 HC REHAB R&B

## 2018-03-25 PROCEDURE — 84100 ASSAY OF PHOSPHORUS: CPT

## 2018-03-25 PROCEDURE — 6370000000 HC RX 637 (ALT 250 FOR IP): Performed by: INTERNAL MEDICINE

## 2018-03-25 PROCEDURE — 82962 GLUCOSE BLOOD TEST: CPT

## 2018-03-25 PROCEDURE — 85610 PROTHROMBIN TIME: CPT

## 2018-03-25 PROCEDURE — 51702 INSERT TEMP BLADDER CATH: CPT

## 2018-03-25 PROCEDURE — 6370000000 HC RX 637 (ALT 250 FOR IP): Performed by: PHYSICAL MEDICINE & REHABILITATION

## 2018-03-25 PROCEDURE — 6360000002 HC RX W HCPCS: Performed by: SURGERY

## 2018-03-25 PROCEDURE — 36415 COLL VENOUS BLD VENIPUNCTURE: CPT

## 2018-03-25 PROCEDURE — 97112 NEUROMUSCULAR REEDUCATION: CPT

## 2018-03-25 PROCEDURE — 97535 SELF CARE MNGMENT TRAINING: CPT

## 2018-03-25 PROCEDURE — 80048 BASIC METABOLIC PNL TOTAL CA: CPT

## 2018-03-25 PROCEDURE — 2500000003 HC RX 250 WO HCPCS: Performed by: SURGERY

## 2018-03-25 RX ORDER — FUROSEMIDE 10 MG/ML
20 INJECTION INTRAMUSCULAR; INTRAVENOUS ONCE
Status: COMPLETED | OUTPATIENT
Start: 2018-03-25 | End: 2018-03-25

## 2018-03-25 RX ORDER — WARFARIN SODIUM 5 MG/1
5 TABLET ORAL
Status: COMPLETED | OUTPATIENT
Start: 2018-03-25 | End: 2018-03-25

## 2018-03-25 RX ADMIN — GLYBURIDE 5 MG: 5 TABLET ORAL at 17:41

## 2018-03-25 RX ADMIN — SUCRALFATE 1 G: 1 SUSPENSION ORAL at 06:54

## 2018-03-25 RX ADMIN — SUCRALFATE 1 G: 1 SUSPENSION ORAL at 12:56

## 2018-03-25 RX ADMIN — GLYBURIDE 5 MG: 5 TABLET ORAL at 06:54

## 2018-03-25 RX ADMIN — WARFARIN SODIUM 5 MG: 5 TABLET ORAL at 16:11

## 2018-03-25 RX ADMIN — DEXTROSE MONOHYDRATE, SODIUM CHLORIDE, AND POTASSIUM CHLORIDE: 50; 4.5; 2.98 INJECTION, SOLUTION INTRAVENOUS at 15:56

## 2018-03-25 RX ADMIN — POTASSIUM BICARBONATE 25 MEQ: 25 TABLET, EFFERVESCENT ORAL at 07:59

## 2018-03-25 RX ADMIN — AMLODIPINE BESYLATE 5 MG: 5 TABLET ORAL at 07:59

## 2018-03-25 RX ADMIN — METOCLOPRAMIDE HYDROCHLORIDE 10 MG: 5 SOLUTION ORAL at 00:46

## 2018-03-25 RX ADMIN — DESMOPRESSIN ACETATE 40 MG: 0.2 TABLET ORAL at 21:50

## 2018-03-25 RX ADMIN — METOCLOPRAMIDE HYDROCHLORIDE 10 MG: 5 SOLUTION ORAL at 19:45

## 2018-03-25 RX ADMIN — LEVOTHYROXINE SODIUM 50 MCG: 50 TABLET ORAL at 06:54

## 2018-03-25 RX ADMIN — DEXTROSE MONOHYDRATE, SODIUM CHLORIDE, AND POTASSIUM CHLORIDE: 50; 4.5; 2.98 INJECTION, SOLUTION INTRAVENOUS at 01:39

## 2018-03-25 RX ADMIN — POTASSIUM BICARBONATE 25 MEQ: 25 TABLET, EFFERVESCENT ORAL at 21:50

## 2018-03-25 RX ADMIN — FUROSEMIDE 20 MG: 10 INJECTION, SOLUTION INTRAVENOUS at 14:10

## 2018-03-25 RX ADMIN — SUCRALFATE 1 G: 1 SUSPENSION ORAL at 00:46

## 2018-03-25 RX ADMIN — METOCLOPRAMIDE HYDROCHLORIDE 10 MG: 5 SOLUTION ORAL at 06:54

## 2018-03-25 RX ADMIN — Medication 250 MG: at 07:59

## 2018-03-25 RX ADMIN — METOCLOPRAMIDE HYDROCHLORIDE 10 MG: 5 SOLUTION ORAL at 13:47

## 2018-03-25 ASSESSMENT — PAIN SCALES - GENERAL
PAINLEVEL_OUTOF10: 0
PAINLEVEL_OUTOF10: 0

## 2018-03-26 ENCOUNTER — APPOINTMENT (OUTPATIENT)
Dept: GENERAL RADIOLOGY | Age: 73
DRG: 981 | End: 2018-03-26
Payer: MEDICARE

## 2018-03-26 LAB
ANION GAP SERPL CALCULATED.3IONS-SCNC: 15 MMOL/L (ref 7–16)
BUN BLDV-MCNC: 10 MG/DL (ref 8–23)
CALCIUM SERPL-MCNC: 9 MG/DL (ref 8.6–10.2)
CHLORIDE BLD-SCNC: 101 MMOL/L (ref 98–107)
CO2: 30 MMOL/L (ref 22–29)
CREAT SERPL-MCNC: 1 MG/DL (ref 0.7–1.2)
GFR AFRICAN AMERICAN: >60
GFR NON-AFRICAN AMERICAN: >60 ML/MIN/1.73
GLUCOSE BLD-MCNC: 147 MG/DL (ref 74–109)
INR BLD: 1.8
MAGNESIUM: 1.7 MG/DL (ref 1.6–2.6)
METER GLUCOSE: 139 MG/DL (ref 70–110)
METER GLUCOSE: 152 MG/DL (ref 70–110)
METER GLUCOSE: 95 MG/DL (ref 70–110)
PHOSPHORUS: 3.9 MG/DL (ref 2.5–4.5)
POTASSIUM REFLEX MAGNESIUM: 4.2 MMOL/L (ref 3.5–5)
PROTHROMBIN TIME: 19.4 SEC (ref 9.3–12.4)
SODIUM BLD-SCNC: 146 MMOL/L (ref 132–146)

## 2018-03-26 PROCEDURE — 97110 THERAPEUTIC EXERCISES: CPT

## 2018-03-26 PROCEDURE — 92611 MOTION FLUOROSCOPY/SWALLOW: CPT

## 2018-03-26 PROCEDURE — 83735 ASSAY OF MAGNESIUM: CPT

## 2018-03-26 PROCEDURE — 6370000000 HC RX 637 (ALT 250 FOR IP): Performed by: PHYSICAL MEDICINE & REHABILITATION

## 2018-03-26 PROCEDURE — 97530 THERAPEUTIC ACTIVITIES: CPT

## 2018-03-26 PROCEDURE — 82962 GLUCOSE BLOOD TEST: CPT

## 2018-03-26 PROCEDURE — 6370000000 HC RX 637 (ALT 250 FOR IP): Performed by: SURGERY

## 2018-03-26 PROCEDURE — 6370000000 HC RX 637 (ALT 250 FOR IP)

## 2018-03-26 PROCEDURE — 1280000000 HC REHAB R&B

## 2018-03-26 PROCEDURE — 85610 PROTHROMBIN TIME: CPT

## 2018-03-26 PROCEDURE — 74230 X-RAY XM SWLNG FUNCJ C+: CPT

## 2018-03-26 PROCEDURE — 80048 BASIC METABOLIC PNL TOTAL CA: CPT

## 2018-03-26 PROCEDURE — 84100 ASSAY OF PHOSPHORUS: CPT

## 2018-03-26 PROCEDURE — 36415 COLL VENOUS BLD VENIPUNCTURE: CPT

## 2018-03-26 PROCEDURE — 2580000003 HC RX 258: Performed by: NEUROMUSCULOSKELETAL MEDICINE & OMM

## 2018-03-26 RX ORDER — SUCRALFATE 1 G/1
1 TABLET ORAL EVERY 6 HOURS SCHEDULED
Status: DISCONTINUED | OUTPATIENT
Start: 2018-03-26 | End: 2018-03-30

## 2018-03-26 RX ORDER — WARFARIN SODIUM 7.5 MG/1
7.5 TABLET ORAL
Status: COMPLETED | OUTPATIENT
Start: 2018-03-26 | End: 2018-03-26

## 2018-03-26 RX ADMIN — GLYBURIDE 5 MG: 5 TABLET ORAL at 07:03

## 2018-03-26 RX ADMIN — METOCLOPRAMIDE HYDROCHLORIDE 10 MG: 5 SOLUTION ORAL at 07:03

## 2018-03-26 RX ADMIN — Medication 250 MG: at 09:16

## 2018-03-26 RX ADMIN — METOCLOPRAMIDE HYDROCHLORIDE 10 MG: 5 SOLUTION ORAL at 13:19

## 2018-03-26 RX ADMIN — LEVOTHYROXINE SODIUM 50 MCG: 50 TABLET ORAL at 07:03

## 2018-03-26 RX ADMIN — SUCRALFATE 1 G: 1 SUSPENSION ORAL at 13:34

## 2018-03-26 RX ADMIN — WARFARIN SODIUM 7.5 MG: 7.5 TABLET ORAL at 18:29

## 2018-03-26 RX ADMIN — METOCLOPRAMIDE HYDROCHLORIDE 10 MG: 5 SOLUTION ORAL at 01:35

## 2018-03-26 RX ADMIN — POTASSIUM BICARBONATE 25 MEQ: 25 TABLET, EFFERVESCENT ORAL at 21:05

## 2018-03-26 RX ADMIN — SUCRALFATE 1 G: 1 SUSPENSION ORAL at 01:35

## 2018-03-26 RX ADMIN — AMLODIPINE BESYLATE 5 MG: 5 TABLET ORAL at 09:16

## 2018-03-26 RX ADMIN — METOCLOPRAMIDE HYDROCHLORIDE 10 MG: 5 SOLUTION ORAL at 18:30

## 2018-03-26 RX ADMIN — GLYBURIDE 5 MG: 5 TABLET ORAL at 17:03

## 2018-03-26 RX ADMIN — SUCRALFATE 1 G: 1 TABLET ORAL at 18:30

## 2018-03-26 RX ADMIN — Medication 10 ML: at 21:05

## 2018-03-26 RX ADMIN — POTASSIUM BICARBONATE 25 MEQ: 25 TABLET, EFFERVESCENT ORAL at 09:16

## 2018-03-26 RX ADMIN — DESMOPRESSIN ACETATE 40 MG: 0.2 TABLET ORAL at 21:05

## 2018-03-26 ASSESSMENT — PAIN SCALES - GENERAL
PAINLEVEL_OUTOF10: 0

## 2018-03-27 ENCOUNTER — APPOINTMENT (OUTPATIENT)
Dept: GENERAL RADIOLOGY | Age: 73
DRG: 981 | End: 2018-03-27
Payer: MEDICARE

## 2018-03-27 LAB
INR BLD: 1.8
METER GLUCOSE: 106 MG/DL (ref 70–110)
METER GLUCOSE: 110 MG/DL (ref 70–110)
METER GLUCOSE: 110 MG/DL (ref 70–110)
METER GLUCOSE: 156 MG/DL (ref 70–110)
PROTHROMBIN TIME: 19.5 SEC (ref 9.3–12.4)

## 2018-03-27 PROCEDURE — 85610 PROTHROMBIN TIME: CPT

## 2018-03-27 PROCEDURE — 82962 GLUCOSE BLOOD TEST: CPT

## 2018-03-27 PROCEDURE — 2580000003 HC RX 258: Performed by: NEUROMUSCULOSKELETAL MEDICINE & OMM

## 2018-03-27 PROCEDURE — 97530 THERAPEUTIC ACTIVITIES: CPT

## 2018-03-27 PROCEDURE — 6370000000 HC RX 637 (ALT 250 FOR IP): Performed by: SURGERY

## 2018-03-27 PROCEDURE — 97110 THERAPEUTIC EXERCISES: CPT

## 2018-03-27 PROCEDURE — 36415 COLL VENOUS BLD VENIPUNCTURE: CPT

## 2018-03-27 PROCEDURE — 92526 ORAL FUNCTION THERAPY: CPT

## 2018-03-27 PROCEDURE — 6370000000 HC RX 637 (ALT 250 FOR IP): Performed by: PHYSICAL MEDICINE & REHABILITATION

## 2018-03-27 PROCEDURE — 71045 X-RAY EXAM CHEST 1 VIEW: CPT

## 2018-03-27 PROCEDURE — 6370000000 HC RX 637 (ALT 250 FOR IP)

## 2018-03-27 PROCEDURE — 97535 SELF CARE MNGMENT TRAINING: CPT

## 2018-03-27 PROCEDURE — 1280000000 HC REHAB R&B

## 2018-03-27 RX ORDER — WARFARIN SODIUM 7.5 MG/1
7.5 TABLET ORAL
Status: COMPLETED | OUTPATIENT
Start: 2018-03-27 | End: 2018-03-27

## 2018-03-27 RX ADMIN — WARFARIN SODIUM 7.5 MG: 7.5 TABLET ORAL at 20:53

## 2018-03-27 RX ADMIN — SUCRALFATE 1 G: 1 TABLET ORAL at 20:52

## 2018-03-27 RX ADMIN — SUCRALFATE 1 G: 1 TABLET ORAL at 12:24

## 2018-03-27 RX ADMIN — GLYBURIDE 5 MG: 5 TABLET ORAL at 20:52

## 2018-03-27 RX ADMIN — SUCRALFATE 1 G: 1 TABLET ORAL at 07:01

## 2018-03-27 RX ADMIN — POTASSIUM BICARBONATE 25 MEQ: 25 TABLET, EFFERVESCENT ORAL at 09:18

## 2018-03-27 RX ADMIN — METOCLOPRAMIDE HYDROCHLORIDE 10 MG: 5 SOLUTION ORAL at 07:02

## 2018-03-27 RX ADMIN — METOCLOPRAMIDE HYDROCHLORIDE 10 MG: 5 SOLUTION ORAL at 20:52

## 2018-03-27 RX ADMIN — LEVOTHYROXINE SODIUM 50 MCG: 50 TABLET ORAL at 07:01

## 2018-03-27 RX ADMIN — SUCRALFATE 1 G: 1 TABLET ORAL at 00:25

## 2018-03-27 RX ADMIN — DESMOPRESSIN ACETATE 40 MG: 0.2 TABLET ORAL at 20:52

## 2018-03-27 RX ADMIN — POTASSIUM BICARBONATE 25 MEQ: 25 TABLET, EFFERVESCENT ORAL at 20:51

## 2018-03-27 RX ADMIN — Medication 10 ML: at 09:18

## 2018-03-27 RX ADMIN — AMLODIPINE BESYLATE 5 MG: 5 TABLET ORAL at 09:18

## 2018-03-27 RX ADMIN — METOCLOPRAMIDE HYDROCHLORIDE 10 MG: 5 SOLUTION ORAL at 12:24

## 2018-03-27 RX ADMIN — GLYBURIDE 5 MG: 5 TABLET ORAL at 07:02

## 2018-03-27 RX ADMIN — Medication 250 MG: at 09:18

## 2018-03-27 RX ADMIN — Medication 10 ML: at 20:53

## 2018-03-27 RX ADMIN — METOCLOPRAMIDE HYDROCHLORIDE 10 MG: 5 SOLUTION ORAL at 00:25

## 2018-03-27 ASSESSMENT — PAIN SCALES - GENERAL
PAINLEVEL_OUTOF10: 0

## 2018-03-28 LAB
ANION GAP SERPL CALCULATED.3IONS-SCNC: 14 MMOL/L (ref 7–16)
BUN BLDV-MCNC: 16 MG/DL (ref 8–23)
CALCIUM SERPL-MCNC: 9.1 MG/DL (ref 8.6–10.2)
CHLORIDE BLD-SCNC: 103 MMOL/L (ref 98–107)
CO2: 28 MMOL/L (ref 22–29)
CREAT SERPL-MCNC: 1.1 MG/DL (ref 0.7–1.2)
GFR AFRICAN AMERICAN: >60
GFR NON-AFRICAN AMERICAN: >60 ML/MIN/1.73
GLUCOSE BLD-MCNC: 126 MG/DL (ref 74–109)
INR BLD: 1.6
MAGNESIUM: 1.7 MG/DL (ref 1.6–2.6)
METER GLUCOSE: 125 MG/DL (ref 70–110)
METER GLUCOSE: 89 MG/DL (ref 70–110)
METER GLUCOSE: 97 MG/DL (ref 70–110)
METER GLUCOSE: 99 MG/DL (ref 70–110)
PHOSPHORUS: 4.2 MG/DL (ref 2.5–4.5)
POTASSIUM REFLEX MAGNESIUM: 3.6 MMOL/L (ref 3.5–5)
PROTHROMBIN TIME: 17.5 SEC (ref 9.3–12.4)
SODIUM BLD-SCNC: 145 MMOL/L (ref 132–146)

## 2018-03-28 PROCEDURE — 83735 ASSAY OF MAGNESIUM: CPT

## 2018-03-28 PROCEDURE — 97535 SELF CARE MNGMENT TRAINING: CPT

## 2018-03-28 PROCEDURE — 80048 BASIC METABOLIC PNL TOTAL CA: CPT

## 2018-03-28 PROCEDURE — 97530 THERAPEUTIC ACTIVITIES: CPT

## 2018-03-28 PROCEDURE — 85610 PROTHROMBIN TIME: CPT

## 2018-03-28 PROCEDURE — 6370000000 HC RX 637 (ALT 250 FOR IP): Performed by: PHYSICAL MEDICINE & REHABILITATION

## 2018-03-28 PROCEDURE — 6370000000 HC RX 637 (ALT 250 FOR IP): Performed by: SURGERY

## 2018-03-28 PROCEDURE — 84100 ASSAY OF PHOSPHORUS: CPT

## 2018-03-28 PROCEDURE — 92526 ORAL FUNCTION THERAPY: CPT

## 2018-03-28 PROCEDURE — 2580000003 HC RX 258: Performed by: NEUROMUSCULOSKELETAL MEDICINE & OMM

## 2018-03-28 PROCEDURE — 1280000000 HC REHAB R&B

## 2018-03-28 PROCEDURE — 82962 GLUCOSE BLOOD TEST: CPT

## 2018-03-28 PROCEDURE — 36415 COLL VENOUS BLD VENIPUNCTURE: CPT

## 2018-03-28 PROCEDURE — G0515 COGNITIVE SKILLS DEVELOPMENT: HCPCS

## 2018-03-28 RX ORDER — ACETAMINOPHEN 160 MG/5ML
650 SOLUTION ORAL EVERY 4 HOURS PRN
Status: DISCONTINUED | OUTPATIENT
Start: 2018-03-28 | End: 2018-03-30

## 2018-03-28 RX ORDER — POTASSIUM BICARBONATE 25 MEQ/1
25 TABLET, EFFERVESCENT ORAL 2 TIMES DAILY
Status: DISCONTINUED | OUTPATIENT
Start: 2018-03-28 | End: 2018-03-30

## 2018-03-28 RX ORDER — ERYTHROMYCIN ETHYLSUCCINATE 200 MG/5ML
250 SUSPENSION ORAL DAILY
Status: DISCONTINUED | OUTPATIENT
Start: 2018-03-29 | End: 2018-03-30

## 2018-03-28 RX ORDER — AMLODIPINE BESYLATE 5 MG/1
5 TABLET ORAL DAILY
Status: DISCONTINUED | OUTPATIENT
Start: 2018-03-29 | End: 2018-03-30

## 2018-03-28 RX ORDER — LEVOTHYROXINE SODIUM 0.05 MG/1
50 TABLET ORAL DAILY
Status: DISCONTINUED | OUTPATIENT
Start: 2018-03-29 | End: 2018-03-30

## 2018-03-28 RX ORDER — ATORVASTATIN CALCIUM 40 MG/1
40 TABLET, FILM COATED ORAL NIGHTLY
Status: DISCONTINUED | OUTPATIENT
Start: 2018-03-28 | End: 2018-03-30

## 2018-03-28 RX ADMIN — POTASSIUM BICARBONATE 25 MEQ: 25 TABLET, EFFERVESCENT ORAL at 21:24

## 2018-03-28 RX ADMIN — AMLODIPINE BESYLATE 5 MG: 5 TABLET ORAL at 08:47

## 2018-03-28 RX ADMIN — SUCRALFATE 1 G: 1 TABLET ORAL at 23:57

## 2018-03-28 RX ADMIN — GLYBURIDE 5 MG: 5 TABLET ORAL at 17:53

## 2018-03-28 RX ADMIN — METOCLOPRAMIDE HYDROCHLORIDE 10 MG: 5 SOLUTION ORAL at 06:43

## 2018-03-28 RX ADMIN — SUCRALFATE 1 G: 1 TABLET ORAL at 06:44

## 2018-03-28 RX ADMIN — Medication 10 ML: at 08:47

## 2018-03-28 RX ADMIN — METOCLOPRAMIDE HYDROCHLORIDE 10 MG: 5 SOLUTION ORAL at 18:47

## 2018-03-28 RX ADMIN — METOCLOPRAMIDE HYDROCHLORIDE 10 MG: 5 SOLUTION ORAL at 00:19

## 2018-03-28 RX ADMIN — Medication 250 MG: at 08:47

## 2018-03-28 RX ADMIN — METOCLOPRAMIDE HYDROCHLORIDE 10 MG: 5 SOLUTION ORAL at 23:57

## 2018-03-28 RX ADMIN — GLYBURIDE 5 MG: 5 TABLET ORAL at 06:44

## 2018-03-28 RX ADMIN — POTASSIUM BICARBONATE 25 MEQ: 25 TABLET, EFFERVESCENT ORAL at 08:46

## 2018-03-28 RX ADMIN — LEVOTHYROXINE SODIUM 50 MCG: 50 TABLET ORAL at 06:44

## 2018-03-28 RX ADMIN — SUCRALFATE 1 G: 1 TABLET ORAL at 00:19

## 2018-03-28 RX ADMIN — SUCRALFATE 1 G: 1 TABLET ORAL at 18:47

## 2018-03-28 RX ADMIN — PETROLATUM: 42 OINTMENT TOPICAL at 18:58

## 2018-03-28 RX ADMIN — METOCLOPRAMIDE HYDROCHLORIDE 10 MG: 5 SOLUTION ORAL at 12:20

## 2018-03-28 RX ADMIN — DESMOPRESSIN ACETATE 40 MG: 0.2 TABLET ORAL at 21:24

## 2018-03-28 RX ADMIN — Medication 10 ML: at 21:46

## 2018-03-28 RX ADMIN — SUCRALFATE 1 G: 1 TABLET ORAL at 12:20

## 2018-03-28 ASSESSMENT — PAIN SCALES - GENERAL
PAINLEVEL_OUTOF10: 0

## 2018-03-29 LAB
ANION GAP SERPL CALCULATED.3IONS-SCNC: 12 MMOL/L (ref 7–16)
BUN BLDV-MCNC: 14 MG/DL (ref 8–23)
CALCIUM SERPL-MCNC: 8.9 MG/DL (ref 8.6–10.2)
CHLORIDE BLD-SCNC: 101 MMOL/L (ref 98–107)
CO2: 30 MMOL/L (ref 22–29)
CREAT SERPL-MCNC: 1 MG/DL (ref 0.7–1.2)
GFR AFRICAN AMERICAN: >60
GFR NON-AFRICAN AMERICAN: >60 ML/MIN/1.73
GLUCOSE BLD-MCNC: 117 MG/DL (ref 74–109)
HCT VFR BLD CALC: 40.7 % (ref 37–54)
HEMOGLOBIN: 12.7 G/DL (ref 12.5–16.5)
INR BLD: 1.6
MCH RBC QN AUTO: 28.5 PG (ref 26–35)
MCHC RBC AUTO-ENTMCNC: 31.2 % (ref 32–34.5)
MCV RBC AUTO: 91.3 FL (ref 80–99.9)
METER GLUCOSE: 112 MG/DL (ref 70–110)
METER GLUCOSE: 112 MG/DL (ref 70–110)
METER GLUCOSE: 75 MG/DL (ref 70–110)
PDW BLD-RTO: 14.1 FL (ref 11.5–15)
PLATELET # BLD: 210 E9/L (ref 130–450)
PMV BLD AUTO: 10.7 FL (ref 7–12)
POTASSIUM SERPL-SCNC: 3.1 MMOL/L (ref 3.5–5)
PROTHROMBIN TIME: 17.9 SEC (ref 9.3–12.4)
RBC # BLD: 4.46 E12/L (ref 3.8–5.8)
SODIUM BLD-SCNC: 143 MMOL/L (ref 132–146)
WBC # BLD: 6.4 E9/L (ref 4.5–11.5)

## 2018-03-29 PROCEDURE — 6370000000 HC RX 637 (ALT 250 FOR IP): Performed by: PHYSICAL MEDICINE & REHABILITATION

## 2018-03-29 PROCEDURE — 2740000010 HC MISC ORTHOTIC: Performed by: SURGERY

## 2018-03-29 PROCEDURE — 82962 GLUCOSE BLOOD TEST: CPT

## 2018-03-29 PROCEDURE — 7100000001 HC PACU RECOVERY - ADDTL 15 MIN: Performed by: SURGERY

## 2018-03-29 PROCEDURE — 1280000000 HC REHAB R&B

## 2018-03-29 PROCEDURE — 0DH63UZ INSERTION OF FEEDING DEVICE INTO STOMACH, PERCUTANEOUS APPROACH: ICD-10-PCS | Performed by: SURGERY

## 2018-03-29 PROCEDURE — 36415 COLL VENOUS BLD VENIPUNCTURE: CPT

## 2018-03-29 PROCEDURE — 85610 PROTHROMBIN TIME: CPT

## 2018-03-29 PROCEDURE — 6370000000 HC RX 637 (ALT 250 FOR IP): Performed by: SURGERY

## 2018-03-29 PROCEDURE — L0625 LO FLEX L1-BELOW L5 PRE OTS: HCPCS | Performed by: SURGERY

## 2018-03-29 PROCEDURE — 2500000003 HC RX 250 WO HCPCS: Performed by: SURGERY

## 2018-03-29 PROCEDURE — 99152 MOD SED SAME PHYS/QHP 5/>YRS: CPT | Performed by: SURGERY

## 2018-03-29 PROCEDURE — 7100000000 HC PACU RECOVERY - FIRST 15 MIN: Performed by: SURGERY

## 2018-03-29 PROCEDURE — 80048 BASIC METABOLIC PNL TOTAL CA: CPT

## 2018-03-29 PROCEDURE — 2580000003 HC RX 258: Performed by: NEUROMUSCULOSKELETAL MEDICINE & OMM

## 2018-03-29 PROCEDURE — 3609018000 HC EGD ESOPHAGOGASTRODUODENOSCOPY PEG TUBE INSERTION: Performed by: SURGERY

## 2018-03-29 PROCEDURE — 85027 COMPLETE CBC AUTOMATED: CPT

## 2018-03-29 RX ORDER — DEXTROSE, SODIUM CHLORIDE, AND POTASSIUM CHLORIDE 5; .45; .3 G/100ML; G/100ML; G/100ML
INJECTION INTRAVENOUS CONTINUOUS
Status: DISCONTINUED | OUTPATIENT
Start: 2018-03-29 | End: 2018-04-01

## 2018-03-29 RX ADMIN — POTASSIUM BICARBONATE 25 MEQ: 25 TABLET, EFFERVESCENT ORAL at 21:16

## 2018-03-29 RX ADMIN — METOCLOPRAMIDE HYDROCHLORIDE 10 MG: 5 SOLUTION ORAL at 18:44

## 2018-03-29 RX ADMIN — LEVOTHYROXINE SODIUM 50 MCG: 50 TABLET ORAL at 07:26

## 2018-03-29 RX ADMIN — GLYBURIDE 5 MG: 5 TABLET ORAL at 17:33

## 2018-03-29 RX ADMIN — SUCRALFATE 1 G: 1 TABLET ORAL at 18:44

## 2018-03-29 RX ADMIN — SUCRALFATE 1 G: 1 TABLET ORAL at 07:26

## 2018-03-29 RX ADMIN — DEXTROSE MONOHYDRATE, SODIUM CHLORIDE, AND POTASSIUM CHLORIDE: 50; 4.5; 2.98 INJECTION, SOLUTION INTRAVENOUS at 16:19

## 2018-03-29 RX ADMIN — Medication 10 ML: at 09:01

## 2018-03-29 RX ADMIN — DESMOPRESSIN ACETATE 40 MG: 0.2 TABLET ORAL at 21:16

## 2018-03-29 RX ADMIN — ERYTHROMYCIN ETHYLSUCCINATE 250 MG: 200 SUSPENSION ORAL at 08:49

## 2018-03-29 RX ADMIN — AMLODIPINE BESYLATE 5 MG: 5 TABLET ORAL at 08:49

## 2018-03-29 RX ADMIN — METOCLOPRAMIDE HYDROCHLORIDE 10 MG: 5 SOLUTION ORAL at 07:26

## 2018-03-29 RX ADMIN — ACETAMINOPHEN 650 MG: 650 SOLUTION ORAL at 17:33

## 2018-03-29 RX ADMIN — ACETAMINOPHEN 650 MG: 650 SOLUTION ORAL at 21:18

## 2018-03-29 RX ADMIN — POTASSIUM BICARBONATE 25 MEQ: 25 TABLET, EFFERVESCENT ORAL at 08:49

## 2018-03-29 ASSESSMENT — PAIN DESCRIPTION - PAIN TYPE
TYPE: ACUTE PAIN
TYPE: ACUTE PAIN

## 2018-03-29 ASSESSMENT — PAIN DESCRIPTION - LOCATION
LOCATION: ABDOMEN
LOCATION: ABDOMEN

## 2018-03-29 ASSESSMENT — PAIN DESCRIPTION - DESCRIPTORS: DESCRIPTORS: CRAMPING;SORE

## 2018-03-29 ASSESSMENT — PAIN DESCRIPTION - FREQUENCY: FREQUENCY: INTERMITTENT

## 2018-03-29 ASSESSMENT — PAIN SCALES - GENERAL
PAINLEVEL_OUTOF10: 5
PAINLEVEL_OUTOF10: 0
PAINLEVEL_OUTOF10: 0
PAINLEVEL_OUTOF10: 3
PAINLEVEL_OUTOF10: 0
PAINLEVEL_OUTOF10: 0

## 2018-03-29 ASSESSMENT — PAIN DESCRIPTION - ONSET: ONSET: SUDDEN

## 2018-03-30 LAB
ANION GAP SERPL CALCULATED.3IONS-SCNC: 16 MMOL/L (ref 7–16)
BUN BLDV-MCNC: 13 MG/DL (ref 8–23)
CALCIUM SERPL-MCNC: 9.1 MG/DL (ref 8.6–10.2)
CHLORIDE BLD-SCNC: 99 MMOL/L (ref 98–107)
CO2: 28 MMOL/L (ref 22–29)
CREAT SERPL-MCNC: 1 MG/DL (ref 0.7–1.2)
GFR AFRICAN AMERICAN: >60
GFR NON-AFRICAN AMERICAN: >60 ML/MIN/1.73
GLUCOSE BLD-MCNC: 143 MG/DL (ref 74–109)
INR BLD: 1.6
MAGNESIUM: 1.7 MG/DL (ref 1.6–2.6)
METER GLUCOSE: 124 MG/DL (ref 70–110)
METER GLUCOSE: 125 MG/DL (ref 70–110)
METER GLUCOSE: 127 MG/DL (ref 70–110)
METER GLUCOSE: 132 MG/DL (ref 70–110)
PHOSPHORUS: 2.6 MG/DL (ref 2.5–4.5)
POTASSIUM REFLEX MAGNESIUM: 3 MMOL/L (ref 3.5–5)
PROTHROMBIN TIME: 18 SEC (ref 9.3–12.4)
SODIUM BLD-SCNC: 143 MMOL/L (ref 132–146)

## 2018-03-30 PROCEDURE — 2500000003 HC RX 250 WO HCPCS: Performed by: SURGERY

## 2018-03-30 PROCEDURE — 6370000000 HC RX 637 (ALT 250 FOR IP)

## 2018-03-30 PROCEDURE — 36415 COLL VENOUS BLD VENIPUNCTURE: CPT

## 2018-03-30 PROCEDURE — 97530 THERAPEUTIC ACTIVITIES: CPT

## 2018-03-30 PROCEDURE — 6370000000 HC RX 637 (ALT 250 FOR IP): Performed by: SURGERY

## 2018-03-30 PROCEDURE — 97110 THERAPEUTIC EXERCISES: CPT

## 2018-03-30 PROCEDURE — 82962 GLUCOSE BLOOD TEST: CPT

## 2018-03-30 PROCEDURE — 85610 PROTHROMBIN TIME: CPT

## 2018-03-30 PROCEDURE — 3E0G76Z INTRODUCTION OF NUTRITIONAL SUBSTANCE INTO UPPER GI, VIA NATURAL OR ARTIFICIAL OPENING: ICD-10-PCS | Performed by: SURGERY

## 2018-03-30 PROCEDURE — 1280000000 HC REHAB R&B

## 2018-03-30 PROCEDURE — 97535 SELF CARE MNGMENT TRAINING: CPT

## 2018-03-30 PROCEDURE — 80048 BASIC METABOLIC PNL TOTAL CA: CPT

## 2018-03-30 PROCEDURE — 92526 ORAL FUNCTION THERAPY: CPT

## 2018-03-30 PROCEDURE — 6370000000 HC RX 637 (ALT 250 FOR IP): Performed by: PHYSICAL MEDICINE & REHABILITATION

## 2018-03-30 PROCEDURE — 84100 ASSAY OF PHOSPHORUS: CPT

## 2018-03-30 PROCEDURE — 83735 ASSAY OF MAGNESIUM: CPT

## 2018-03-30 RX ORDER — SUCRALFATE 1 G/1
1 TABLET ORAL EVERY 6 HOURS SCHEDULED
Status: DISCONTINUED | OUTPATIENT
Start: 2018-03-30 | End: 2018-04-04 | Stop reason: HOSPADM

## 2018-03-30 RX ORDER — ACETAMINOPHEN 160 MG/5ML
650 SOLUTION ORAL EVERY 4 HOURS PRN
Status: DISCONTINUED | OUTPATIENT
Start: 2018-03-30 | End: 2018-04-04 | Stop reason: HOSPADM

## 2018-03-30 RX ORDER — AMLODIPINE BESYLATE 5 MG/1
5 TABLET ORAL DAILY
Status: DISCONTINUED | OUTPATIENT
Start: 2018-03-30 | End: 2018-04-04 | Stop reason: HOSPADM

## 2018-03-30 RX ORDER — GLYBURIDE 5 MG/1
5 TABLET ORAL 2 TIMES DAILY
Status: DISCONTINUED | OUTPATIENT
Start: 2018-03-30 | End: 2018-04-04 | Stop reason: HOSPADM

## 2018-03-30 RX ORDER — WARFARIN SODIUM 10 MG/1
10 TABLET ORAL
Status: COMPLETED | OUTPATIENT
Start: 2018-03-30 | End: 2018-03-30

## 2018-03-30 RX ORDER — POTASSIUM BICARBONATE 25 MEQ/1
25 TABLET, EFFERVESCENT ORAL 2 TIMES DAILY
Status: DISCONTINUED | OUTPATIENT
Start: 2018-03-30 | End: 2018-04-04 | Stop reason: HOSPADM

## 2018-03-30 RX ORDER — POTASSIUM CHLORIDE 1.5 G/1.77G
40 POWDER, FOR SOLUTION ORAL ONCE
Status: COMPLETED | OUTPATIENT
Start: 2018-03-30 | End: 2018-03-30

## 2018-03-30 RX ORDER — ATORVASTATIN CALCIUM 40 MG/1
40 TABLET, FILM COATED ORAL NIGHTLY
Status: DISCONTINUED | OUTPATIENT
Start: 2018-03-30 | End: 2018-04-04 | Stop reason: HOSPADM

## 2018-03-30 RX ORDER — ERYTHROMYCIN ETHYLSUCCINATE 200 MG/5ML
250 SUSPENSION ORAL DAILY
Status: DISCONTINUED | OUTPATIENT
Start: 2018-03-30 | End: 2018-04-04 | Stop reason: HOSPADM

## 2018-03-30 RX ORDER — LEVOTHYROXINE SODIUM 0.05 MG/1
50 TABLET ORAL DAILY
Status: DISCONTINUED | OUTPATIENT
Start: 2018-03-30 | End: 2018-04-04 | Stop reason: HOSPADM

## 2018-03-30 RX ORDER — METOCLOPRAMIDE HYDROCHLORIDE 5 MG/5ML
10 SOLUTION ORAL EVERY 6 HOURS
Status: DISCONTINUED | OUTPATIENT
Start: 2018-03-30 | End: 2018-04-04 | Stop reason: HOSPADM

## 2018-03-30 RX ADMIN — SUCRALFATE 1 G: 1 TABLET ORAL at 20:24

## 2018-03-30 RX ADMIN — AMLODIPINE BESYLATE 5 MG: 5 TABLET ORAL at 09:03

## 2018-03-30 RX ADMIN — POTASSIUM BICARBONATE 25 MEQ: 25 TABLET, EFFERVESCENT ORAL at 21:36

## 2018-03-30 RX ADMIN — POTASSIUM BICARBONATE 25 MEQ: 25 TABLET, EFFERVESCENT ORAL at 09:03

## 2018-03-30 RX ADMIN — METOCLOPRAMIDE HYDROCHLORIDE 10 MG: 5 SOLUTION ORAL at 06:27

## 2018-03-30 RX ADMIN — POTASSIUM CHLORIDE 40 MEQ: 1.5 POWDER, FOR SOLUTION ORAL at 19:58

## 2018-03-30 RX ADMIN — LEVOTHYROXINE SODIUM 50 MCG: 50 TABLET ORAL at 06:27

## 2018-03-30 RX ADMIN — ACETAMINOPHEN 650 MG: 650 SOLUTION ORAL at 01:22

## 2018-03-30 RX ADMIN — POTASSIUM CHLORIDE 40 MEQ: 1.5 POWDER, FOR SOLUTION ORAL at 16:55

## 2018-03-30 RX ADMIN — WARFARIN SODIUM 10 MG: 10 TABLET ORAL at 17:55

## 2018-03-30 RX ADMIN — SUCRALFATE 1 G: 1 TABLET ORAL at 11:51

## 2018-03-30 RX ADMIN — SUCRALFATE 1 G: 1 TABLET ORAL at 06:27

## 2018-03-30 RX ADMIN — METOCLOPRAMIDE HYDROCHLORIDE 10 MG: 5 SOLUTION ORAL at 11:52

## 2018-03-30 RX ADMIN — DESMOPRESSIN ACETATE 40 MG: 0.2 TABLET ORAL at 21:36

## 2018-03-30 RX ADMIN — GLYBURIDE 5 MG: 5 TABLET ORAL at 16:55

## 2018-03-30 RX ADMIN — SUCRALFATE 1 G: 1 TABLET ORAL at 00:18

## 2018-03-30 RX ADMIN — METOCLOPRAMIDE HYDROCHLORIDE 10 MG: 5 SOLUTION ORAL at 20:24

## 2018-03-30 RX ADMIN — METOCLOPRAMIDE HYDROCHLORIDE 10 MG: 5 SOLUTION ORAL at 00:18

## 2018-03-30 RX ADMIN — GLYBURIDE 5 MG: 5 TABLET ORAL at 06:27

## 2018-03-30 RX ADMIN — ERYTHROMYCIN ETHYLSUCCINATE 250 MG: 200 SUSPENSION ORAL at 09:03

## 2018-03-30 RX ADMIN — DEXTROSE MONOHYDRATE, SODIUM CHLORIDE, AND POTASSIUM CHLORIDE: 50; 4.5; 2.98 INJECTION, SOLUTION INTRAVENOUS at 09:05

## 2018-03-30 ASSESSMENT — PAIN SCALES - GENERAL
PAINLEVEL_OUTOF10: 0
PAINLEVEL_OUTOF10: 3
PAINLEVEL_OUTOF10: 0
PAINLEVEL_OUTOF10: 0

## 2018-03-31 LAB
ALBUMIN SERPL-MCNC: 3.1 G/DL (ref 3.5–5.2)
ALP BLD-CCNC: 60 U/L (ref 40–129)
ALT SERPL-CCNC: 8 U/L (ref 0–40)
ANION GAP SERPL CALCULATED.3IONS-SCNC: 13 MMOL/L (ref 7–16)
AST SERPL-CCNC: 13 U/L (ref 0–39)
BILIRUB SERPL-MCNC: 0.4 MG/DL (ref 0–1.2)
BUN BLDV-MCNC: 14 MG/DL (ref 8–23)
CALCIUM SERPL-MCNC: 8.8 MG/DL (ref 8.6–10.2)
CHLORIDE BLD-SCNC: 103 MMOL/L (ref 98–107)
CO2: 27 MMOL/L (ref 22–29)
CREAT SERPL-MCNC: 0.9 MG/DL (ref 0.7–1.2)
GFR AFRICAN AMERICAN: >60
GFR NON-AFRICAN AMERICAN: >60 ML/MIN/1.73
GLUCOSE BLD-MCNC: 116 MG/DL (ref 74–109)
HCT VFR BLD CALC: 39.6 % (ref 37–54)
HEMOGLOBIN: 12.5 G/DL (ref 12.5–16.5)
INR BLD: 1.8
MAGNESIUM: 1.8 MG/DL (ref 1.6–2.6)
MCH RBC QN AUTO: 28.9 PG (ref 26–35)
MCHC RBC AUTO-ENTMCNC: 31.6 % (ref 32–34.5)
MCV RBC AUTO: 91.5 FL (ref 80–99.9)
METER GLUCOSE: 103 MG/DL (ref 70–110)
METER GLUCOSE: 115 MG/DL (ref 70–110)
METER GLUCOSE: 130 MG/DL (ref 70–110)
METER GLUCOSE: 90 MG/DL (ref 70–110)
PDW BLD-RTO: 14.2 FL (ref 11.5–15)
PLATELET # BLD: 215 E9/L (ref 130–450)
PMV BLD AUTO: 10.8 FL (ref 7–12)
POTASSIUM REFLEX MAGNESIUM: 3.2 MMOL/L (ref 3.5–5)
POTASSIUM SERPL-SCNC: ABNORMAL MMOL/L (ref 3.5–5)
PROTHROMBIN TIME: 20.1 SEC (ref 9.3–12.4)
RBC # BLD: 4.33 E12/L (ref 3.8–5.8)
SODIUM BLD-SCNC: 143 MMOL/L (ref 132–146)
TOTAL PROTEIN: 6.1 G/DL (ref 6.4–8.3)
WBC # BLD: 6.4 E9/L (ref 4.5–11.5)

## 2018-03-31 PROCEDURE — 85027 COMPLETE CBC AUTOMATED: CPT

## 2018-03-31 PROCEDURE — 6370000000 HC RX 637 (ALT 250 FOR IP): Performed by: PHYSICAL MEDICINE & REHABILITATION

## 2018-03-31 PROCEDURE — 6370000000 HC RX 637 (ALT 250 FOR IP)

## 2018-03-31 PROCEDURE — 83735 ASSAY OF MAGNESIUM: CPT

## 2018-03-31 PROCEDURE — 85610 PROTHROMBIN TIME: CPT

## 2018-03-31 PROCEDURE — 97535 SELF CARE MNGMENT TRAINING: CPT

## 2018-03-31 PROCEDURE — 99232 SBSQ HOSP IP/OBS MODERATE 35: CPT | Performed by: PHYSICAL MEDICINE & REHABILITATION

## 2018-03-31 PROCEDURE — 92526 ORAL FUNCTION THERAPY: CPT | Performed by: SPEECH-LANGUAGE PATHOLOGIST

## 2018-03-31 PROCEDURE — 36415 COLL VENOUS BLD VENIPUNCTURE: CPT

## 2018-03-31 PROCEDURE — 97110 THERAPEUTIC EXERCISES: CPT

## 2018-03-31 PROCEDURE — 82962 GLUCOSE BLOOD TEST: CPT

## 2018-03-31 PROCEDURE — 97530 THERAPEUTIC ACTIVITIES: CPT

## 2018-03-31 PROCEDURE — 1280000000 HC REHAB R&B

## 2018-03-31 PROCEDURE — 80053 COMPREHEN METABOLIC PANEL: CPT

## 2018-03-31 RX ORDER — WARFARIN SODIUM 10 MG/1
10 TABLET ORAL
Status: COMPLETED | OUTPATIENT
Start: 2018-03-31 | End: 2018-03-31

## 2018-03-31 RX ADMIN — GLYBURIDE 5 MG: 5 TABLET ORAL at 06:45

## 2018-03-31 RX ADMIN — LEVOTHYROXINE SODIUM 50 MCG: 50 TABLET ORAL at 06:45

## 2018-03-31 RX ADMIN — DESMOPRESSIN ACETATE 40 MG: 0.2 TABLET ORAL at 21:17

## 2018-03-31 RX ADMIN — ERYTHROMYCIN ETHYLSUCCINATE 250 MG: 200 SUSPENSION ORAL at 10:15

## 2018-03-31 RX ADMIN — PETROLATUM: 42 OINTMENT TOPICAL at 19:20

## 2018-03-31 RX ADMIN — GLYBURIDE 5 MG: 5 TABLET ORAL at 18:01

## 2018-03-31 RX ADMIN — POTASSIUM BICARBONATE 25 MEQ: 25 TABLET, EFFERVESCENT ORAL at 10:09

## 2018-03-31 RX ADMIN — METOCLOPRAMIDE HYDROCHLORIDE 10 MG: 5 SOLUTION ORAL at 12:39

## 2018-03-31 RX ADMIN — POTASSIUM BICARBONATE 25 MEQ: 25 TABLET, EFFERVESCENT ORAL at 21:17

## 2018-03-31 RX ADMIN — METOCLOPRAMIDE HYDROCHLORIDE 10 MG: 5 SOLUTION ORAL at 06:45

## 2018-03-31 RX ADMIN — SUCRALFATE 1 G: 1 TABLET ORAL at 06:45

## 2018-03-31 RX ADMIN — SUCRALFATE 1 G: 1 TABLET ORAL at 12:39

## 2018-03-31 RX ADMIN — METOCLOPRAMIDE HYDROCHLORIDE 10 MG: 5 SOLUTION ORAL at 19:07

## 2018-03-31 RX ADMIN — AMLODIPINE BESYLATE 5 MG: 5 TABLET ORAL at 10:10

## 2018-03-31 RX ADMIN — SUCRALFATE 1 G: 1 TABLET ORAL at 19:08

## 2018-03-31 RX ADMIN — SUCRALFATE 1 G: 1 TABLET ORAL at 00:16

## 2018-03-31 RX ADMIN — METOCLOPRAMIDE HYDROCHLORIDE 10 MG: 5 SOLUTION ORAL at 00:16

## 2018-03-31 RX ADMIN — WARFARIN SODIUM 10 MG: 10 TABLET ORAL at 18:01

## 2018-03-31 ASSESSMENT — PAIN SCALES - GENERAL
PAINLEVEL_OUTOF10: 0

## 2018-04-01 LAB
ANION GAP SERPL CALCULATED.3IONS-SCNC: 11 MMOL/L (ref 7–16)
BUN BLDV-MCNC: 15 MG/DL (ref 8–23)
CALCIUM SERPL-MCNC: 8.7 MG/DL (ref 8.6–10.2)
CHLORIDE BLD-SCNC: 100 MMOL/L (ref 98–107)
CO2: 29 MMOL/L (ref 22–29)
CREAT SERPL-MCNC: 0.9 MG/DL (ref 0.7–1.2)
GFR AFRICAN AMERICAN: >60
GFR NON-AFRICAN AMERICAN: >60 ML/MIN/1.73
GLUCOSE BLD-MCNC: 121 MG/DL (ref 74–109)
INR BLD: 2
METER GLUCOSE: 125 MG/DL (ref 70–110)
METER GLUCOSE: 133 MG/DL (ref 70–110)
METER GLUCOSE: 84 MG/DL (ref 70–110)
POTASSIUM SERPL-SCNC: 3.1 MMOL/L (ref 3.5–5)
PROTHROMBIN TIME: 22.2 SEC (ref 9.3–12.4)
SODIUM BLD-SCNC: 140 MMOL/L (ref 132–146)

## 2018-04-01 PROCEDURE — 97110 THERAPEUTIC EXERCISES: CPT

## 2018-04-01 PROCEDURE — 80048 BASIC METABOLIC PNL TOTAL CA: CPT

## 2018-04-01 PROCEDURE — 36415 COLL VENOUS BLD VENIPUNCTURE: CPT

## 2018-04-01 PROCEDURE — 6370000000 HC RX 637 (ALT 250 FOR IP): Performed by: PHYSICAL MEDICINE & REHABILITATION

## 2018-04-01 PROCEDURE — 85610 PROTHROMBIN TIME: CPT

## 2018-04-01 PROCEDURE — 97535 SELF CARE MNGMENT TRAINING: CPT

## 2018-04-01 PROCEDURE — 97530 THERAPEUTIC ACTIVITIES: CPT

## 2018-04-01 PROCEDURE — 82962 GLUCOSE BLOOD TEST: CPT

## 2018-04-01 PROCEDURE — 6370000000 HC RX 637 (ALT 250 FOR IP)

## 2018-04-01 PROCEDURE — 1280000000 HC REHAB R&B

## 2018-04-01 RX ORDER — WARFARIN SODIUM 10 MG/1
10 TABLET ORAL
Status: COMPLETED | OUTPATIENT
Start: 2018-04-01 | End: 2018-04-01

## 2018-04-01 RX ADMIN — METOCLOPRAMIDE HYDROCHLORIDE 10 MG: 5 SOLUTION ORAL at 00:08

## 2018-04-01 RX ADMIN — GLYBURIDE 5 MG: 5 TABLET ORAL at 06:34

## 2018-04-01 RX ADMIN — GLYBURIDE 5 MG: 5 TABLET ORAL at 17:55

## 2018-04-01 RX ADMIN — POTASSIUM BICARBONATE 25 MEQ: 25 TABLET, EFFERVESCENT ORAL at 10:29

## 2018-04-01 RX ADMIN — SUCRALFATE 1 G: 1 TABLET ORAL at 00:08

## 2018-04-01 RX ADMIN — METOCLOPRAMIDE HYDROCHLORIDE 10 MG: 5 SOLUTION ORAL at 06:34

## 2018-04-01 RX ADMIN — SUCRALFATE 1 G: 1 TABLET ORAL at 19:19

## 2018-04-01 RX ADMIN — METOCLOPRAMIDE HYDROCHLORIDE 10 MG: 5 SOLUTION ORAL at 12:00

## 2018-04-01 RX ADMIN — DESMOPRESSIN ACETATE 40 MG: 0.2 TABLET ORAL at 21:14

## 2018-04-01 RX ADMIN — AMLODIPINE BESYLATE 5 MG: 5 TABLET ORAL at 10:29

## 2018-04-01 RX ADMIN — SUCRALFATE 1 G: 1 TABLET ORAL at 12:00

## 2018-04-01 RX ADMIN — ERYTHROMYCIN ETHYLSUCCINATE 250 MG: 200 SUSPENSION ORAL at 10:30

## 2018-04-01 RX ADMIN — SUCRALFATE 1 G: 1 TABLET ORAL at 06:34

## 2018-04-01 RX ADMIN — METOCLOPRAMIDE HYDROCHLORIDE 10 MG: 5 SOLUTION ORAL at 19:19

## 2018-04-01 RX ADMIN — POTASSIUM BICARBONATE 25 MEQ: 25 TABLET, EFFERVESCENT ORAL at 21:14

## 2018-04-01 RX ADMIN — LEVOTHYROXINE SODIUM 50 MCG: 50 TABLET ORAL at 06:34

## 2018-04-01 RX ADMIN — WARFARIN SODIUM 10 MG: 10 TABLET ORAL at 17:55

## 2018-04-01 ASSESSMENT — PAIN SCALES - GENERAL
PAINLEVEL_OUTOF10: 0

## 2018-04-02 ENCOUNTER — APPOINTMENT (OUTPATIENT)
Dept: GENERAL RADIOLOGY | Age: 73
DRG: 981 | End: 2018-04-02
Payer: MEDICARE

## 2018-04-02 LAB
ANION GAP SERPL CALCULATED.3IONS-SCNC: 17 MMOL/L (ref 7–16)
BUN BLDV-MCNC: 12 MG/DL (ref 8–23)
CALCIUM SERPL-MCNC: 9 MG/DL (ref 8.6–10.2)
CHLORIDE BLD-SCNC: 101 MMOL/L (ref 98–107)
CO2: 26 MMOL/L (ref 22–29)
CREAT SERPL-MCNC: 0.8 MG/DL (ref 0.7–1.2)
GFR AFRICAN AMERICAN: >60
GFR NON-AFRICAN AMERICAN: >60 ML/MIN/1.73
GLUCOSE BLD-MCNC: 130 MG/DL (ref 74–109)
INR BLD: 1.9
MAGNESIUM: 1.8 MG/DL (ref 1.6–2.6)
METER GLUCOSE: 107 MG/DL (ref 70–110)
METER GLUCOSE: 128 MG/DL (ref 70–110)
METER GLUCOSE: 147 MG/DL (ref 70–110)
METER GLUCOSE: 89 MG/DL (ref 70–110)
PHOSPHORUS: 3.7 MG/DL (ref 2.5–4.5)
POTASSIUM REFLEX MAGNESIUM: 3.1 MMOL/L (ref 3.5–5)
PROTHROMBIN TIME: 21.5 SEC (ref 9.3–12.4)
SODIUM BLD-SCNC: 144 MMOL/L (ref 132–146)

## 2018-04-02 PROCEDURE — 85610 PROTHROMBIN TIME: CPT

## 2018-04-02 PROCEDURE — 97110 THERAPEUTIC EXERCISES: CPT

## 2018-04-02 PROCEDURE — 80048 BASIC METABOLIC PNL TOTAL CA: CPT

## 2018-04-02 PROCEDURE — 92526 ORAL FUNCTION THERAPY: CPT

## 2018-04-02 PROCEDURE — 36415 COLL VENOUS BLD VENIPUNCTURE: CPT

## 2018-04-02 PROCEDURE — 1280000000 HC REHAB R&B

## 2018-04-02 PROCEDURE — 97530 THERAPEUTIC ACTIVITIES: CPT

## 2018-04-02 PROCEDURE — 6370000000 HC RX 637 (ALT 250 FOR IP): Performed by: SURGERY

## 2018-04-02 PROCEDURE — 6370000000 HC RX 637 (ALT 250 FOR IP)

## 2018-04-02 PROCEDURE — 97535 SELF CARE MNGMENT TRAINING: CPT

## 2018-04-02 PROCEDURE — 99232 SBSQ HOSP IP/OBS MODERATE 35: CPT | Performed by: PHYSICAL MEDICINE & REHABILITATION

## 2018-04-02 PROCEDURE — 71045 X-RAY EXAM CHEST 1 VIEW: CPT

## 2018-04-02 PROCEDURE — 82962 GLUCOSE BLOOD TEST: CPT

## 2018-04-02 PROCEDURE — 6370000000 HC RX 637 (ALT 250 FOR IP): Performed by: PHYSICAL MEDICINE & REHABILITATION

## 2018-04-02 PROCEDURE — 83735 ASSAY OF MAGNESIUM: CPT

## 2018-04-02 PROCEDURE — 84100 ASSAY OF PHOSPHORUS: CPT

## 2018-04-02 RX ORDER — POTASSIUM BICARBONATE 25 MEQ/1
25 TABLET, EFFERVESCENT ORAL ONCE
Status: COMPLETED | OUTPATIENT
Start: 2018-04-02 | End: 2018-04-02

## 2018-04-02 RX ORDER — WARFARIN SODIUM 10 MG/1
10 TABLET ORAL
Status: COMPLETED | OUTPATIENT
Start: 2018-04-02 | End: 2018-04-02

## 2018-04-02 RX ADMIN — METOCLOPRAMIDE HYDROCHLORIDE 10 MG: 5 SOLUTION ORAL at 00:03

## 2018-04-02 RX ADMIN — METOCLOPRAMIDE HYDROCHLORIDE 10 MG: 5 SOLUTION ORAL at 13:30

## 2018-04-02 RX ADMIN — METOCLOPRAMIDE HYDROCHLORIDE 10 MG: 5 SOLUTION ORAL at 17:21

## 2018-04-02 RX ADMIN — METOCLOPRAMIDE HYDROCHLORIDE 10 MG: 5 SOLUTION ORAL at 06:30

## 2018-04-02 RX ADMIN — GLYBURIDE 5 MG: 5 TABLET ORAL at 17:21

## 2018-04-02 RX ADMIN — POTASSIUM BICARBONATE 25 MEQ: 25 TABLET, EFFERVESCENT ORAL at 09:27

## 2018-04-02 RX ADMIN — SUCRALFATE 1 G: 1 TABLET ORAL at 06:29

## 2018-04-02 RX ADMIN — POTASSIUM BICARBONATE 25 MEQ: 25 TABLET, EFFERVESCENT ORAL at 21:00

## 2018-04-02 RX ADMIN — LEVOTHYROXINE SODIUM 50 MCG: 50 TABLET ORAL at 06:30

## 2018-04-02 RX ADMIN — SUCRALFATE 1 G: 1 TABLET ORAL at 17:22

## 2018-04-02 RX ADMIN — GLYBURIDE 5 MG: 5 TABLET ORAL at 06:30

## 2018-04-02 RX ADMIN — POTASSIUM BICARBONATE 25 MEQ: 25 TABLET, EFFERVESCENT ORAL at 11:18

## 2018-04-02 RX ADMIN — SUCRALFATE 1 G: 1 TABLET ORAL at 00:03

## 2018-04-02 RX ADMIN — AMLODIPINE BESYLATE 5 MG: 5 TABLET ORAL at 09:27

## 2018-04-02 RX ADMIN — WARFARIN SODIUM 10 MG: 10 TABLET ORAL at 17:21

## 2018-04-02 RX ADMIN — DESMOPRESSIN ACETATE 40 MG: 0.2 TABLET ORAL at 21:00

## 2018-04-02 RX ADMIN — SUCRALFATE 1 G: 1 TABLET ORAL at 11:19

## 2018-04-02 RX ADMIN — ERYTHROMYCIN ETHYLSUCCINATE 250 MG: 200 SUSPENSION ORAL at 09:27

## 2018-04-02 ASSESSMENT — PAIN SCALES - GENERAL
PAINLEVEL_OUTOF10: 0

## 2018-04-03 LAB
ANION GAP SERPL CALCULATED.3IONS-SCNC: 11 MMOL/L (ref 7–16)
CHLORIDE BLD-SCNC: 102 MMOL/L (ref 98–107)
CO2: 31 MMOL/L (ref 22–29)
INR BLD: 2.1
METER GLUCOSE: 119 MG/DL (ref 70–110)
METER GLUCOSE: 131 MG/DL (ref 70–110)
METER GLUCOSE: 131 MG/DL (ref 70–110)
METER GLUCOSE: 98 MG/DL (ref 70–110)
POTASSIUM SERPL-SCNC: 3.8 MMOL/L (ref 3.5–5)
PROTHROMBIN TIME: 22.9 SEC (ref 9.3–12.4)
SODIUM BLD-SCNC: 144 MMOL/L (ref 132–146)

## 2018-04-03 PROCEDURE — 97535 SELF CARE MNGMENT TRAINING: CPT

## 2018-04-03 PROCEDURE — 97112 NEUROMUSCULAR REEDUCATION: CPT

## 2018-04-03 PROCEDURE — 97530 THERAPEUTIC ACTIVITIES: CPT

## 2018-04-03 PROCEDURE — 6370000000 HC RX 637 (ALT 250 FOR IP): Performed by: PHYSICAL MEDICINE & REHABILITATION

## 2018-04-03 PROCEDURE — 97110 THERAPEUTIC EXERCISES: CPT

## 2018-04-03 PROCEDURE — 85610 PROTHROMBIN TIME: CPT

## 2018-04-03 PROCEDURE — 36415 COLL VENOUS BLD VENIPUNCTURE: CPT

## 2018-04-03 PROCEDURE — 6370000000 HC RX 637 (ALT 250 FOR IP)

## 2018-04-03 PROCEDURE — 80051 ELECTROLYTE PANEL: CPT

## 2018-04-03 PROCEDURE — 82962 GLUCOSE BLOOD TEST: CPT

## 2018-04-03 PROCEDURE — 1280000000 HC REHAB R&B

## 2018-04-03 RX ORDER — WARFARIN SODIUM 10 MG/1
10 TABLET ORAL
Status: COMPLETED | OUTPATIENT
Start: 2018-04-03 | End: 2018-04-03

## 2018-04-03 RX ADMIN — DESMOPRESSIN ACETATE 40 MG: 0.2 TABLET ORAL at 21:12

## 2018-04-03 RX ADMIN — WARFARIN SODIUM 10 MG: 10 TABLET ORAL at 17:48

## 2018-04-03 RX ADMIN — METOCLOPRAMIDE HYDROCHLORIDE 10 MG: 5 SOLUTION ORAL at 17:48

## 2018-04-03 RX ADMIN — SUCRALFATE 1 G: 1 TABLET ORAL at 17:48

## 2018-04-03 RX ADMIN — SUCRALFATE 1 G: 1 TABLET ORAL at 07:03

## 2018-04-03 RX ADMIN — AMLODIPINE BESYLATE 5 MG: 5 TABLET ORAL at 08:51

## 2018-04-03 RX ADMIN — POTASSIUM BICARBONATE 25 MEQ: 25 TABLET, EFFERVESCENT ORAL at 08:51

## 2018-04-03 RX ADMIN — ERYTHROMYCIN ETHYLSUCCINATE 250 MG: 200 SUSPENSION ORAL at 08:51

## 2018-04-03 RX ADMIN — SUCRALFATE 1 G: 1 TABLET ORAL at 11:36

## 2018-04-03 RX ADMIN — METOCLOPRAMIDE HYDROCHLORIDE 10 MG: 5 SOLUTION ORAL at 00:32

## 2018-04-03 RX ADMIN — POTASSIUM BICARBONATE 25 MEQ: 25 TABLET, EFFERVESCENT ORAL at 21:12

## 2018-04-03 RX ADMIN — SUCRALFATE 1 G: 1 TABLET ORAL at 00:31

## 2018-04-03 RX ADMIN — LEVOTHYROXINE SODIUM 50 MCG: 50 TABLET ORAL at 07:03

## 2018-04-03 RX ADMIN — METOCLOPRAMIDE HYDROCHLORIDE 10 MG: 5 SOLUTION ORAL at 11:36

## 2018-04-03 RX ADMIN — GLYBURIDE 5 MG: 5 TABLET ORAL at 07:03

## 2018-04-03 RX ADMIN — GLYBURIDE 5 MG: 5 TABLET ORAL at 17:48

## 2018-04-03 RX ADMIN — METOCLOPRAMIDE HYDROCHLORIDE 10 MG: 5 SOLUTION ORAL at 07:03

## 2018-04-03 ASSESSMENT — PAIN SCALES - GENERAL
PAINLEVEL_OUTOF10: 0

## 2018-04-04 VITALS
RESPIRATION RATE: 16 BRPM | TEMPERATURE: 98.4 F | SYSTOLIC BLOOD PRESSURE: 126 MMHG | HEIGHT: 74 IN | DIASTOLIC BLOOD PRESSURE: 64 MMHG | WEIGHT: 298.4 LBS | BODY MASS INDEX: 38.3 KG/M2 | HEART RATE: 54 BPM | OXYGEN SATURATION: 95 %

## 2018-04-04 LAB
ANION GAP SERPL CALCULATED.3IONS-SCNC: 11 MMOL/L (ref 7–16)
ANION GAP SERPL CALCULATED.3IONS-SCNC: 14 MMOL/L (ref 7–16)
BUN BLDV-MCNC: 15 MG/DL (ref 8–23)
CALCIUM SERPL-MCNC: 9.1 MG/DL (ref 8.6–10.2)
CHLORIDE BLD-SCNC: 101 MMOL/L (ref 98–107)
CHLORIDE BLD-SCNC: 101 MMOL/L (ref 98–107)
CO2: 25 MMOL/L (ref 22–29)
CO2: 28 MMOL/L (ref 22–29)
CREAT SERPL-MCNC: 0.8 MG/DL (ref 0.7–1.2)
GFR AFRICAN AMERICAN: >60
GFR NON-AFRICAN AMERICAN: >60 ML/MIN/1.73
GLUCOSE BLD-MCNC: 130 MG/DL (ref 74–109)
INR BLD: 2.4
MAGNESIUM: 1.8 MG/DL (ref 1.6–2.6)
METER GLUCOSE: 104 MG/DL (ref 70–110)
METER GLUCOSE: 121 MG/DL (ref 70–110)
PHOSPHORUS: 4.5 MG/DL (ref 2.5–4.5)
POTASSIUM REFLEX MAGNESIUM: 3.6 MMOL/L (ref 3.5–5)
POTASSIUM SERPL-SCNC: 3.9 MMOL/L (ref 3.5–5)
PROTHROMBIN TIME: 26.8 SEC (ref 9.3–12.4)
SODIUM BLD-SCNC: 140 MMOL/L (ref 132–146)
SODIUM BLD-SCNC: 140 MMOL/L (ref 132–146)

## 2018-04-04 PROCEDURE — 82962 GLUCOSE BLOOD TEST: CPT

## 2018-04-04 PROCEDURE — 51798 US URINE CAPACITY MEASURE: CPT

## 2018-04-04 PROCEDURE — 6370000000 HC RX 637 (ALT 250 FOR IP): Performed by: PHYSICAL MEDICINE & REHABILITATION

## 2018-04-04 PROCEDURE — 80051 ELECTROLYTE PANEL: CPT

## 2018-04-04 PROCEDURE — 6360000002 HC RX W HCPCS: Performed by: SURGERY

## 2018-04-04 PROCEDURE — 80048 BASIC METABOLIC PNL TOTAL CA: CPT

## 2018-04-04 PROCEDURE — 84100 ASSAY OF PHOSPHORUS: CPT

## 2018-04-04 PROCEDURE — 36415 COLL VENOUS BLD VENIPUNCTURE: CPT

## 2018-04-04 PROCEDURE — 85610 PROTHROMBIN TIME: CPT

## 2018-04-04 PROCEDURE — 83735 ASSAY OF MAGNESIUM: CPT

## 2018-04-04 RX ORDER — WARFARIN SODIUM 10 MG/1
10 TABLET ORAL DAILY
Status: DISCONTINUED | OUTPATIENT
Start: 2018-04-04 | End: 2018-04-04 | Stop reason: HOSPADM

## 2018-04-04 RX ORDER — SUCRALFATE 1 G/1
1 TABLET ORAL 4 TIMES DAILY
Qty: 120 TABLET | Refills: 3
Start: 2018-04-04 | End: 2018-09-07 | Stop reason: ALTCHOICE

## 2018-04-04 RX ORDER — PETROLATUM 42 G/100G
OINTMENT TOPICAL
Qty: 1 TUBE | Refills: 0
Start: 2018-04-04 | End: 2019-01-30

## 2018-04-04 RX ORDER — POTASSIUM BICARBONATE 25 MEQ/1
25 TABLET, EFFERVESCENT ORAL 2 TIMES DAILY
Qty: 60 TABLET | Refills: 3
Start: 2018-04-04 | End: 2018-09-07 | Stop reason: ALTCHOICE

## 2018-04-04 RX ORDER — AMLODIPINE BESYLATE 5 MG/1
5 TABLET ORAL DAILY
Qty: 30 TABLET | Refills: 3
Start: 2018-04-04 | End: 2018-09-07 | Stop reason: SDUPTHER

## 2018-04-04 RX ORDER — WARFARIN SODIUM 10 MG/1
TABLET ORAL
Qty: 30 TABLET | Refills: 3
Start: 2018-04-04 | End: 2018-09-07

## 2018-04-04 RX ORDER — METOCLOPRAMIDE HYDROCHLORIDE 5 MG/5ML
10 SOLUTION ORAL EVERY 6 HOURS
Qty: 600 ML | Refills: 0
Start: 2018-04-04 | End: 2018-09-07 | Stop reason: ALTCHOICE

## 2018-04-04 RX ORDER — ERYTHROMYCIN ETHYLSUCCINATE 200 MG/5ML
250 SUSPENSION ORAL DAILY
Qty: 63 ML | Refills: 0
Start: 2018-04-04 | End: 2018-04-14

## 2018-04-04 RX ORDER — GLYBURIDE 5 MG/1
5 TABLET ORAL 2 TIMES DAILY
Qty: 30 TABLET | Refills: 3
Start: 2018-04-04 | End: 2018-09-07 | Stop reason: ALTCHOICE

## 2018-04-04 RX ORDER — ACETAMINOPHEN 160 MG/5ML
650 SOLUTION ORAL EVERY 4 HOURS PRN
Qty: 473 ML | Refills: 3
Start: 2018-04-04 | End: 2018-09-07 | Stop reason: ALTCHOICE

## 2018-04-04 RX ORDER — ATORVASTATIN CALCIUM 40 MG/1
40 TABLET, FILM COATED ORAL NIGHTLY
Qty: 30 TABLET | Refills: 3
Start: 2018-04-04 | End: 2018-09-07 | Stop reason: ALTCHOICE

## 2018-04-04 RX ORDER — LEVOTHYROXINE SODIUM 0.05 MG/1
50 TABLET ORAL DAILY
Qty: 30 TABLET | Refills: 3 | Status: ON HOLD
Start: 2018-04-05 | End: 2018-09-14 | Stop reason: HOSPADM

## 2018-04-04 RX ORDER — NICOTINE POLACRILEX 4 MG
15 LOZENGE BUCCAL PRN
Qty: 45 G | Refills: 1 | Status: ON HOLD | OUTPATIENT
Start: 2018-04-04 | End: 2018-09-28 | Stop reason: HOSPADM

## 2018-04-04 RX ADMIN — POTASSIUM BICARBONATE 25 MEQ: 25 TABLET, EFFERVESCENT ORAL at 08:48

## 2018-04-04 RX ADMIN — METOCLOPRAMIDE HYDROCHLORIDE 10 MG: 5 SOLUTION ORAL at 00:24

## 2018-04-04 RX ADMIN — SUCRALFATE 1 G: 1 TABLET ORAL at 00:24

## 2018-04-04 RX ADMIN — ERYTHROMYCIN ETHYLSUCCINATE 250 MG: 200 SUSPENSION ORAL at 08:48

## 2018-04-04 RX ADMIN — GLYBURIDE 5 MG: 5 TABLET ORAL at 06:46

## 2018-04-04 RX ADMIN — AMLODIPINE BESYLATE 5 MG: 5 TABLET ORAL at 08:48

## 2018-04-04 RX ADMIN — LEVOTHYROXINE SODIUM 50 MCG: 50 TABLET ORAL at 06:46

## 2018-04-04 RX ADMIN — METOCLOPRAMIDE HYDROCHLORIDE 10 MG: 5 SOLUTION ORAL at 06:46

## 2018-04-04 RX ADMIN — SUCRALFATE 1 G: 1 TABLET ORAL at 06:46

## 2018-04-04 ASSESSMENT — PAIN SCALES - GENERAL
PAINLEVEL_OUTOF10: 0

## 2018-06-18 ENCOUNTER — HOSPITAL ENCOUNTER (OUTPATIENT)
Age: 73
Discharge: HOME | End: 2018-06-20
Payer: MEDICARE

## 2018-06-18 ENCOUNTER — OFFICE VISIT (OUTPATIENT)
Dept: NON INVASIVE DIAGNOSTICS | Age: 73
End: 2018-06-18
Payer: MEDICARE

## 2018-06-18 VITALS
WEIGHT: 295.6 LBS | HEART RATE: 81 BPM | SYSTOLIC BLOOD PRESSURE: 120 MMHG | DIASTOLIC BLOOD PRESSURE: 78 MMHG | RESPIRATION RATE: 18 BRPM | BODY MASS INDEX: 37.94 KG/M2 | HEIGHT: 74 IN

## 2018-06-18 DIAGNOSIS — Z95.0 CARDIAC PACEMAKER IN SITU: Primary | ICD-10-CM

## 2018-06-18 DIAGNOSIS — R00.1 BRADYCARDIA: ICD-10-CM

## 2018-06-18 LAB
INR BLD: 2
PROTHROMBIN TIME: 22.9 SEC (ref 9.3–12.4)

## 2018-06-18 PROCEDURE — G8417 CALC BMI ABV UP PARAM F/U: HCPCS | Performed by: INTERNAL MEDICINE

## 2018-06-18 PROCEDURE — 93280 PM DEVICE PROGR EVAL DUAL: CPT | Performed by: INTERNAL MEDICINE

## 2018-06-18 PROCEDURE — 3017F COLORECTAL CA SCREEN DOC REV: CPT | Performed by: INTERNAL MEDICINE

## 2018-06-18 PROCEDURE — 85610 PROTHROMBIN TIME: CPT

## 2018-06-18 PROCEDURE — 99214 OFFICE O/P EST MOD 30 MIN: CPT | Performed by: INTERNAL MEDICINE

## 2018-06-18 PROCEDURE — G8598 ASA/ANTIPLAT THER USED: HCPCS | Performed by: INTERNAL MEDICINE

## 2018-06-18 PROCEDURE — G8427 DOCREV CUR MEDS BY ELIG CLIN: HCPCS | Performed by: INTERNAL MEDICINE

## 2018-06-18 PROCEDURE — 4040F PNEUMOC VAC/ADMIN/RCVD: CPT | Performed by: INTERNAL MEDICINE

## 2018-06-18 PROCEDURE — 1036F TOBACCO NON-USER: CPT | Performed by: INTERNAL MEDICINE

## 2018-06-18 PROCEDURE — 1123F ACP DISCUSS/DSCN MKR DOCD: CPT | Performed by: INTERNAL MEDICINE

## 2018-06-18 PROCEDURE — 93288 INTERROG EVL PM/LDLS PM IP: CPT | Performed by: INTERNAL MEDICINE

## 2018-07-09 ENCOUNTER — HOSPITAL ENCOUNTER (OUTPATIENT)
Age: 73
Discharge: HOME | End: 2018-07-11
Payer: MEDICARE

## 2018-07-09 LAB
INR BLD: 2.7
PROTHROMBIN TIME: 30.3 SEC (ref 9.3–12.4)

## 2018-07-09 PROCEDURE — 85610 PROTHROMBIN TIME: CPT

## 2018-07-11 ENCOUNTER — HOSPITAL ENCOUNTER (OUTPATIENT)
Age: 73
Discharge: HOME | End: 2018-07-11
Payer: MEDICARE

## 2018-07-11 LAB
ALBUMIN SERPL-MCNC: 4.3 G/DL (ref 3.5–5.2)
ALP BLD-CCNC: 60 U/L (ref 40–129)
ALT SERPL-CCNC: 7 U/L (ref 0–40)
ANION GAP SERPL CALCULATED.3IONS-SCNC: 13 MMOL/L (ref 7–16)
AST SERPL-CCNC: 12 U/L (ref 0–39)
BASOPHILS ABSOLUTE: 0.07 E9/L (ref 0–0.2)
BASOPHILS RELATIVE PERCENT: 1.1 % (ref 0–2)
BILIRUB SERPL-MCNC: 0.4 MG/DL (ref 0–1.2)
BUN BLDV-MCNC: 28 MG/DL (ref 8–23)
CALCIUM SERPL-MCNC: 9.7 MG/DL (ref 8.6–10.2)
CHLORIDE BLD-SCNC: 103 MMOL/L (ref 98–107)
CO2: 27 MMOL/L (ref 22–29)
CREAT SERPL-MCNC: 1 MG/DL (ref 0.7–1.2)
EOSINOPHILS ABSOLUTE: 0.18 E9/L (ref 0.05–0.5)
EOSINOPHILS RELATIVE PERCENT: 2.9 % (ref 0–6)
GFR AFRICAN AMERICAN: >60
GFR NON-AFRICAN AMERICAN: >60 ML/MIN/1.73
GLUCOSE BLD-MCNC: 105 MG/DL (ref 74–109)
HBA1C MFR BLD: 5.2 % (ref 4–5.6)
HCT VFR BLD CALC: 42.1 % (ref 37–54)
HEMOGLOBIN: 13.3 G/DL (ref 12.5–16.5)
IMMATURE GRANULOCYTES #: 0.01 E9/L
IMMATURE GRANULOCYTES %: 0.2 % (ref 0–5)
LYMPHOCYTES ABSOLUTE: 1.48 E9/L (ref 1.5–4)
LYMPHOCYTES RELATIVE PERCENT: 23.8 % (ref 20–42)
MAGNESIUM: 1.5 MG/DL (ref 1.6–2.6)
MCH RBC QN AUTO: 28.4 PG (ref 26–35)
MCHC RBC AUTO-ENTMCNC: 31.6 % (ref 32–34.5)
MCV RBC AUTO: 90 FL (ref 80–99.9)
MONOCYTES ABSOLUTE: 0.49 E9/L (ref 0.1–0.95)
MONOCYTES RELATIVE PERCENT: 7.9 % (ref 2–12)
NEUTROPHILS ABSOLUTE: 3.99 E9/L (ref 1.8–7.3)
NEUTROPHILS RELATIVE PERCENT: 64.1 % (ref 43–80)
PDW BLD-RTO: 14.3 FL (ref 11.5–15)
PHOSPHORUS: 3.2 MG/DL (ref 2.5–4.5)
PLATELET # BLD: 236 E9/L (ref 130–450)
PMV BLD AUTO: 10.5 FL (ref 7–12)
POTASSIUM SERPL-SCNC: 3.9 MMOL/L (ref 3.5–5)
RBC # BLD: 4.68 E12/L (ref 3.8–5.8)
SODIUM BLD-SCNC: 143 MMOL/L (ref 132–146)
TOTAL PROTEIN: 7.3 G/DL (ref 6.4–8.3)
URIC ACID, SERUM: 6.9 MG/DL (ref 3.4–7)
WBC # BLD: 6.2 E9/L (ref 4.5–11.5)

## 2018-07-11 PROCEDURE — 84100 ASSAY OF PHOSPHORUS: CPT

## 2018-07-11 PROCEDURE — 85025 COMPLETE CBC W/AUTO DIFF WBC: CPT

## 2018-07-11 PROCEDURE — 83036 HEMOGLOBIN GLYCOSYLATED A1C: CPT

## 2018-07-11 PROCEDURE — 80053 COMPREHEN METABOLIC PANEL: CPT

## 2018-07-11 PROCEDURE — 86147 CARDIOLIPIN ANTIBODY EA IG: CPT

## 2018-07-11 PROCEDURE — 83735 ASSAY OF MAGNESIUM: CPT

## 2018-07-11 PROCEDURE — 84550 ASSAY OF BLOOD/URIC ACID: CPT

## 2018-07-11 PROCEDURE — 36415 COLL VENOUS BLD VENIPUNCTURE: CPT

## 2018-07-13 LAB
ANTICARDIOLIPIN IGA ANTIBODY: 4 APL (ref 0–11)
ANTICARDIOLIPIN IGG ANTIBODY: 3 GPL (ref 0–14)
CARDIOLIPIN AB IGM: 0 MPL (ref 0–12)

## 2018-07-23 ENCOUNTER — HOSPITAL ENCOUNTER (OUTPATIENT)
Age: 73
Discharge: HOME | End: 2018-07-25
Payer: MEDICARE

## 2018-07-23 LAB
INR BLD: 3.4
PROTHROMBIN TIME: 38 SEC (ref 9.3–12.4)
T3 TOTAL: 71.6 NG/DL (ref 80–200)
T3 UPTAKE PERCENT: 35 % (ref 22.5–37)
T4 TOTAL: 7.2 MCG/DL (ref 4.5–11.7)
TSH SERPL DL<=0.05 MIU/L-ACNC: 1.59 UIU/ML (ref 0.27–4.2)

## 2018-07-23 PROCEDURE — 85610 PROTHROMBIN TIME: CPT

## 2018-07-23 PROCEDURE — 84480 ASSAY TRIIODOTHYRONINE (T3): CPT

## 2018-07-23 PROCEDURE — 84443 ASSAY THYROID STIM HORMONE: CPT

## 2018-07-23 PROCEDURE — 84479 ASSAY OF THYROID (T3 OR T4): CPT

## 2018-07-23 PROCEDURE — 84436 ASSAY OF TOTAL THYROXINE: CPT

## 2018-08-06 ENCOUNTER — HOSPITAL ENCOUNTER (OUTPATIENT)
Age: 73
Discharge: HOME | End: 2018-08-08
Payer: MEDICARE

## 2018-08-06 LAB
INR BLD: 2.5
PROTHROMBIN TIME: 28.9 SEC (ref 9.3–12.4)

## 2018-08-06 PROCEDURE — 85610 PROTHROMBIN TIME: CPT

## 2018-09-04 ENCOUNTER — HOSPITAL ENCOUNTER (OUTPATIENT)
Age: 73
Discharge: HOME | DRG: 178 | End: 2018-09-06
Payer: MEDICARE

## 2018-09-04 LAB
ALBUMIN SERPL-MCNC: 4.1 G/DL (ref 3.5–5.2)
ALP BLD-CCNC: 62 U/L (ref 40–129)
ALT SERPL-CCNC: 9 U/L (ref 0–40)
ANION GAP SERPL CALCULATED.3IONS-SCNC: 19 MMOL/L (ref 7–16)
AST SERPL-CCNC: 17 U/L (ref 0–39)
BASOPHILS ABSOLUTE: 0.07 E9/L (ref 0–0.2)
BASOPHILS RELATIVE PERCENT: 0.9 % (ref 0–2)
BILIRUB SERPL-MCNC: 0.3 MG/DL (ref 0–1.2)
BUN BLDV-MCNC: 31 MG/DL (ref 8–23)
CALCIUM SERPL-MCNC: 9.6 MG/DL (ref 8.6–10.2)
CHLORIDE BLD-SCNC: 102 MMOL/L (ref 98–107)
CHOLESTEROL, TOTAL: 164 MG/DL (ref 0–199)
CO2: 23 MMOL/L (ref 22–29)
CREAT SERPL-MCNC: 1.2 MG/DL (ref 0.7–1.2)
EOSINOPHILS ABSOLUTE: 0.28 E9/L (ref 0.05–0.5)
EOSINOPHILS RELATIVE PERCENT: 3.7 % (ref 0–6)
GFR AFRICAN AMERICAN: >60
GFR NON-AFRICAN AMERICAN: 59 ML/MIN/1.73
GLUCOSE BLD-MCNC: 91 MG/DL (ref 74–109)
HBA1C MFR BLD: 5.4 % (ref 4–5.6)
HCT VFR BLD CALC: 45.7 % (ref 37–54)
HDLC SERPL-MCNC: 39 MG/DL
HEMOGLOBIN: 14 G/DL (ref 12.5–16.5)
IMMATURE GRANULOCYTES #: 0.03 E9/L
IMMATURE GRANULOCYTES %: 0.4 % (ref 0–5)
INR BLD: 13.3
LDL CHOLESTEROL CALCULATED: 86 MG/DL (ref 0–99)
LYMPHOCYTES ABSOLUTE: 2.33 E9/L (ref 1.5–4)
LYMPHOCYTES RELATIVE PERCENT: 31.1 % (ref 20–42)
MCH RBC QN AUTO: 28.5 PG (ref 26–35)
MCHC RBC AUTO-ENTMCNC: 30.6 % (ref 32–34.5)
MCV RBC AUTO: 92.9 FL (ref 80–99.9)
MONOCYTES ABSOLUTE: 0.51 E9/L (ref 0.1–0.95)
MONOCYTES RELATIVE PERCENT: 6.8 % (ref 2–12)
NEUTROPHILS ABSOLUTE: 4.27 E9/L (ref 1.8–7.3)
NEUTROPHILS RELATIVE PERCENT: 57.1 % (ref 43–80)
PDW BLD-RTO: 14.9 FL (ref 11.5–15)
PLATELET # BLD: 238 E9/L (ref 130–450)
PMV BLD AUTO: 11.1 FL (ref 7–12)
POTASSIUM SERPL-SCNC: 4.3 MMOL/L (ref 3.5–5)
PROTHROMBIN TIME: 144.5 SEC (ref 9.3–12.4)
RBC # BLD: 4.92 E12/L (ref 3.8–5.8)
SODIUM BLD-SCNC: 144 MMOL/L (ref 132–146)
TOTAL PROTEIN: 7.4 G/DL (ref 6.4–8.3)
TRIGL SERPL-MCNC: 194 MG/DL (ref 0–149)
TSH SERPL DL<=0.05 MIU/L-ACNC: 1.85 UIU/ML (ref 0.27–4.2)
VLDLC SERPL CALC-MCNC: 39 MG/DL
WBC # BLD: 7.5 E9/L (ref 4.5–11.5)

## 2018-09-04 PROCEDURE — 83036 HEMOGLOBIN GLYCOSYLATED A1C: CPT

## 2018-09-04 PROCEDURE — 85610 PROTHROMBIN TIME: CPT

## 2018-09-04 PROCEDURE — 85025 COMPLETE CBC W/AUTO DIFF WBC: CPT

## 2018-09-04 PROCEDURE — 80061 LIPID PANEL: CPT

## 2018-09-04 PROCEDURE — 80053 COMPREHEN METABOLIC PANEL: CPT

## 2018-09-04 PROCEDURE — 84443 ASSAY THYROID STIM HORMONE: CPT

## 2018-09-07 ENCOUNTER — HOSPITAL ENCOUNTER (INPATIENT)
Age: 73
LOS: 7 days | Discharge: NURSING, REHAB OR LONG-TERM CARE | DRG: 178 | End: 2018-09-14
Attending: EMERGENCY MEDICINE | Admitting: FAMILY MEDICINE
Payer: MEDICARE

## 2018-09-07 ENCOUNTER — HOSPITAL ENCOUNTER (OUTPATIENT)
Age: 73
Discharge: HOME | End: 2018-09-07
Payer: MEDICARE

## 2018-09-07 ENCOUNTER — APPOINTMENT (OUTPATIENT)
Dept: CT IMAGING | Age: 73
DRG: 178 | End: 2018-09-07
Payer: MEDICARE

## 2018-09-07 ENCOUNTER — APPOINTMENT (OUTPATIENT)
Dept: GENERAL RADIOLOGY | Age: 73
DRG: 178 | End: 2018-09-07
Payer: MEDICARE

## 2018-09-07 DIAGNOSIS — R07.9 CHEST PAIN, UNSPECIFIED TYPE: Primary | ICD-10-CM

## 2018-09-07 DIAGNOSIS — J18.9 PNEUMONIA DUE TO ORGANISM: ICD-10-CM

## 2018-09-07 LAB
ALBUMIN SERPL-MCNC: 4 G/DL (ref 3.5–5.2)
ALP BLD-CCNC: 68 U/L (ref 40–129)
ALT SERPL-CCNC: 8 U/L (ref 0–40)
ANION GAP SERPL CALCULATED.3IONS-SCNC: 14 MMOL/L (ref 7–16)
ANION GAP SERPL CALCULATED.3IONS-SCNC: 15 MMOL/L (ref 7–16)
AST SERPL-CCNC: 12 U/L (ref 0–39)
BASOPHILS ABSOLUTE: 0.04 E9/L (ref 0–0.2)
BASOPHILS ABSOLUTE: 0.07 E9/L (ref 0–0.2)
BASOPHILS RELATIVE PERCENT: 0.4 % (ref 0–2)
BASOPHILS RELATIVE PERCENT: 0.6 % (ref 0–2)
BILIRUB SERPL-MCNC: 0.7 MG/DL (ref 0–1.2)
BUN BLDV-MCNC: 28 MG/DL (ref 8–23)
BUN BLDV-MCNC: 29 MG/DL (ref 8–23)
CALCIUM SERPL-MCNC: 9.2 MG/DL (ref 8.6–10.2)
CALCIUM SERPL-MCNC: 9.6 MG/DL (ref 8.6–10.2)
CHLORIDE BLD-SCNC: 102 MMOL/L (ref 98–107)
CHLORIDE BLD-SCNC: 103 MMOL/L (ref 98–107)
CHOLESTEROL, TOTAL: 164 MG/DL (ref 0–199)
CO2: 24 MMOL/L (ref 22–29)
CO2: 27 MMOL/L (ref 22–29)
CREAT SERPL-MCNC: 1.2 MG/DL (ref 0.7–1.2)
CREAT SERPL-MCNC: 1.3 MG/DL (ref 0.7–1.2)
EKG ATRIAL RATE: 83 BPM
EKG P AXIS: 31 DEGREES
EKG P-R INTERVAL: 230 MS
EKG Q-T INTERVAL: 358 MS
EKG QRS DURATION: 88 MS
EKG QTC CALCULATION (BAZETT): 420 MS
EKG R AXIS: -39 DEGREES
EKG T AXIS: 47 DEGREES
EKG VENTRICULAR RATE: 83 BPM
EOSINOPHILS ABSOLUTE: 0.02 E9/L (ref 0.05–0.5)
EOSINOPHILS ABSOLUTE: 0.15 E9/L (ref 0.05–0.5)
EOSINOPHILS RELATIVE PERCENT: 0.2 % (ref 0–6)
EOSINOPHILS RELATIVE PERCENT: 1.4 % (ref 0–6)
GFR AFRICAN AMERICAN: >60
GFR AFRICAN AMERICAN: >60
GFR NON-AFRICAN AMERICAN: 54 ML/MIN/1.73
GFR NON-AFRICAN AMERICAN: 59 ML/MIN/1.73
GLUCOSE BLD-MCNC: 101 MG/DL (ref 74–109)
GLUCOSE BLD-MCNC: 149 MG/DL (ref 74–109)
HBA1C MFR BLD: 5.4 % (ref 4–5.6)
HCT VFR BLD CALC: 40.9 % (ref 37–54)
HCT VFR BLD CALC: 45.7 % (ref 37–54)
HDLC SERPL-MCNC: 41 MG/DL
HEMOGLOBIN: 13.2 G/DL (ref 12.5–16.5)
HEMOGLOBIN: 14.2 G/DL (ref 12.5–16.5)
IMMATURE GRANULOCYTES #: 0.04 E9/L
IMMATURE GRANULOCYTES #: 0.05 E9/L
IMMATURE GRANULOCYTES %: 0.4 % (ref 0–5)
IMMATURE GRANULOCYTES %: 0.5 % (ref 0–5)
INR BLD: 3.1
LACTIC ACID: 1.7 MMOL/L (ref 0.5–2.2)
LDL CHOLESTEROL CALCULATED: 86 MG/DL (ref 0–99)
LYMPHOCYTES ABSOLUTE: 1.01 E9/L (ref 1.5–4)
LYMPHOCYTES ABSOLUTE: 1.35 E9/L (ref 1.5–4)
LYMPHOCYTES RELATIVE PERCENT: 12.3 % (ref 20–42)
LYMPHOCYTES RELATIVE PERCENT: 9.9 % (ref 20–42)
MCH RBC QN AUTO: 28.2 PG (ref 26–35)
MCH RBC QN AUTO: 28.3 PG (ref 26–35)
MCHC RBC AUTO-ENTMCNC: 31.1 % (ref 32–34.5)
MCHC RBC AUTO-ENTMCNC: 32.3 % (ref 32–34.5)
MCV RBC AUTO: 87.4 FL (ref 80–99.9)
MCV RBC AUTO: 91.2 FL (ref 80–99.9)
MONOCYTES ABSOLUTE: 0.7 E9/L (ref 0.1–0.95)
MONOCYTES ABSOLUTE: 0.91 E9/L (ref 0.1–0.95)
MONOCYTES RELATIVE PERCENT: 6.4 % (ref 2–12)
MONOCYTES RELATIVE PERCENT: 8.9 % (ref 2–12)
NEUTROPHILS ABSOLUTE: 8.16 E9/L (ref 1.8–7.3)
NEUTROPHILS ABSOLUTE: 8.62 E9/L (ref 1.8–7.3)
NEUTROPHILS RELATIVE PERCENT: 78.8 % (ref 43–80)
NEUTROPHILS RELATIVE PERCENT: 80.2 % (ref 43–80)
PDW BLD-RTO: 14.4 FL (ref 11.5–15)
PDW BLD-RTO: 14.4 FL (ref 11.5–15)
PLATELET # BLD: 234 E9/L (ref 130–450)
PLATELET # BLD: 246 E9/L (ref 130–450)
PMV BLD AUTO: 10.8 FL (ref 7–12)
PMV BLD AUTO: 11.1 FL (ref 7–12)
POTASSIUM REFLEX MAGNESIUM: 3.6 MMOL/L (ref 3.5–5)
POTASSIUM SERPL-SCNC: 3.7 MMOL/L (ref 3.5–5)
PROTHROMBIN TIME: 34 SEC (ref 9.3–12.4)
RBC # BLD: 4.68 E12/L (ref 3.8–5.8)
RBC # BLD: 5.01 E12/L (ref 3.8–5.8)
SODIUM BLD-SCNC: 140 MMOL/L (ref 132–146)
SODIUM BLD-SCNC: 145 MMOL/L (ref 132–146)
TOTAL PROTEIN: 7.6 G/DL (ref 6.4–8.3)
TRIGL SERPL-MCNC: 187 MG/DL (ref 0–149)
TROPONIN: <0.01 NG/ML (ref 0–0.03)
TSH SERPL DL<=0.05 MIU/L-ACNC: 2.17 UIU/ML (ref 0.27–4.2)
VLDLC SERPL CALC-MCNC: 37 MG/DL
WBC # BLD: 10.2 E9/L (ref 4.5–11.5)
WBC # BLD: 10.9 E9/L (ref 4.5–11.5)

## 2018-09-07 PROCEDURE — 2580000003 HC RX 258: Performed by: EMERGENCY MEDICINE

## 2018-09-07 PROCEDURE — 84484 ASSAY OF TROPONIN QUANT: CPT

## 2018-09-07 PROCEDURE — 36415 COLL VENOUS BLD VENIPUNCTURE: CPT

## 2018-09-07 PROCEDURE — 87040 BLOOD CULTURE FOR BACTERIA: CPT

## 2018-09-07 PROCEDURE — 80061 LIPID PANEL: CPT

## 2018-09-07 PROCEDURE — 85610 PROTHROMBIN TIME: CPT

## 2018-09-07 PROCEDURE — 85025 COMPLETE CBC W/AUTO DIFF WBC: CPT

## 2018-09-07 PROCEDURE — 70450 CT HEAD/BRAIN W/O DYE: CPT

## 2018-09-07 PROCEDURE — 84443 ASSAY THYROID STIM HORMONE: CPT

## 2018-09-07 PROCEDURE — 80053 COMPREHEN METABOLIC PANEL: CPT

## 2018-09-07 PROCEDURE — 80048 BASIC METABOLIC PNL TOTAL CA: CPT

## 2018-09-07 PROCEDURE — 6360000002 HC RX W HCPCS: Performed by: INTERNAL MEDICINE

## 2018-09-07 PROCEDURE — 96374 THER/PROPH/DIAG INJ IV PUSH: CPT

## 2018-09-07 PROCEDURE — 84479 ASSAY OF THYROID (T3 OR T4): CPT

## 2018-09-07 PROCEDURE — 83036 HEMOGLOBIN GLYCOSYLATED A1C: CPT

## 2018-09-07 PROCEDURE — 6370000000 HC RX 637 (ALT 250 FOR IP): Performed by: EMERGENCY MEDICINE

## 2018-09-07 PROCEDURE — 2140000000 HC CCU INTERMEDIATE R&B

## 2018-09-07 PROCEDURE — 83605 ASSAY OF LACTIC ACID: CPT

## 2018-09-07 PROCEDURE — 71045 X-RAY EXAM CHEST 1 VIEW: CPT

## 2018-09-07 PROCEDURE — 99285 EMERGENCY DEPT VISIT HI MDM: CPT

## 2018-09-07 PROCEDURE — 84480 ASSAY TRIIODOTHYRONINE (T3): CPT

## 2018-09-07 PROCEDURE — 2500000003 HC RX 250 WO HCPCS: Performed by: EMERGENCY MEDICINE

## 2018-09-07 PROCEDURE — 6360000002 HC RX W HCPCS: Performed by: EMERGENCY MEDICINE

## 2018-09-07 PROCEDURE — 84436 ASSAY OF TOTAL THYROXINE: CPT

## 2018-09-07 RX ORDER — GLIPIZIDE 5 MG/1
5 TABLET, FILM COATED, EXTENDED RELEASE ORAL 2 TIMES DAILY
Status: ON HOLD | COMMUNITY
End: 2018-09-14 | Stop reason: HOSPADM

## 2018-09-07 RX ORDER — METOCLOPRAMIDE 10 MG/1
10 TABLET ORAL 4 TIMES DAILY PRN
Status: ON HOLD | COMMUNITY
End: 2018-09-14 | Stop reason: HOSPADM

## 2018-09-07 RX ORDER — 0.9 % SODIUM CHLORIDE 0.9 %
1000 INTRAVENOUS SOLUTION INTRAVENOUS ONCE
Status: DISCONTINUED | OUTPATIENT
Start: 2018-09-07 | End: 2018-09-12

## 2018-09-07 RX ORDER — NITROGLYCERIN 0.4 MG/1
0.4 TABLET SUBLINGUAL EVERY 5 MIN PRN
Status: DISCONTINUED | OUTPATIENT
Start: 2018-09-07 | End: 2018-09-12

## 2018-09-07 RX ORDER — DEXTROSE MONOHYDRATE 50 MG/ML
100 INJECTION, SOLUTION INTRAVENOUS PRN
Status: DISCONTINUED | OUTPATIENT
Start: 2018-09-07 | End: 2018-09-12

## 2018-09-07 RX ORDER — DEXTROSE MONOHYDRATE 25 G/50ML
12.5 INJECTION, SOLUTION INTRAVENOUS PRN
Status: DISCONTINUED | OUTPATIENT
Start: 2018-09-07 | End: 2018-09-12

## 2018-09-07 RX ORDER — SODIUM CHLORIDE 9 MG/ML
INJECTION, SOLUTION INTRAVENOUS CONTINUOUS
Status: DISCONTINUED | OUTPATIENT
Start: 2018-09-07 | End: 2018-09-08

## 2018-09-07 RX ORDER — PETROLATUM 42 G/100G
OINTMENT TOPICAL 2 TIMES DAILY PRN
Status: DISCONTINUED | OUTPATIENT
Start: 2018-09-07 | End: 2018-09-12

## 2018-09-07 RX ORDER — WARFARIN SODIUM 10 MG/1
10 TABLET ORAL DAILY
Status: ON HOLD | COMMUNITY
End: 2018-09-14

## 2018-09-07 RX ORDER — LOVASTATIN 20 MG/1
20 TABLET ORAL NIGHTLY
Status: ON HOLD | COMMUNITY
End: 2018-09-14

## 2018-09-07 RX ORDER — FENTANYL CITRATE 50 UG/ML
50 INJECTION, SOLUTION INTRAMUSCULAR; INTRAVENOUS ONCE
Status: COMPLETED | OUTPATIENT
Start: 2018-09-07 | End: 2018-09-07

## 2018-09-07 RX ADMIN — DOXYCYCLINE 100 MG: 100 INJECTION, POWDER, LYOPHILIZED, FOR SOLUTION INTRAVENOUS at 23:17

## 2018-09-07 RX ADMIN — FENTANYL CITRATE 50 MCG: 50 INJECTION, SOLUTION INTRAMUSCULAR; INTRAVENOUS at 19:33

## 2018-09-07 RX ADMIN — NITROGLYCERIN 0.4 MG: 0.4 TABLET SUBLINGUAL at 18:09

## 2018-09-07 RX ADMIN — SODIUM CHLORIDE: 9 INJECTION, SOLUTION INTRAVENOUS at 18:10

## 2018-09-07 RX ADMIN — SODIUM CHLORIDE: 9 INJECTION, SOLUTION INTRAVENOUS at 22:29

## 2018-09-07 RX ADMIN — NITROGLYCERIN 0.4 MG: 0.4 TABLET SUBLINGUAL at 17:50

## 2018-09-07 RX ADMIN — CEFTRIAXONE SODIUM 2 G: 2 INJECTION, POWDER, FOR SOLUTION INTRAMUSCULAR; INTRAVENOUS at 21:02

## 2018-09-07 ASSESSMENT — PAIN DESCRIPTION - PROGRESSION
CLINICAL_PROGRESSION: NOT CHANGED
CLINICAL_PROGRESSION: NOT CHANGED

## 2018-09-07 ASSESSMENT — PAIN DESCRIPTION - FREQUENCY
FREQUENCY: INTERMITTENT
FREQUENCY: INTERMITTENT

## 2018-09-07 ASSESSMENT — PAIN DESCRIPTION - DESCRIPTORS
DESCRIPTORS: CONSTANT;DISCOMFORT;SORE
DESCRIPTORS: DISCOMFORT;CONSTANT;SORE

## 2018-09-07 ASSESSMENT — PAIN DESCRIPTION - ORIENTATION
ORIENTATION: MID
ORIENTATION: MID

## 2018-09-07 ASSESSMENT — PAIN SCALES - GENERAL
PAINLEVEL_OUTOF10: 7
PAINLEVEL_OUTOF10: 6
PAINLEVEL_OUTOF10: 6

## 2018-09-07 ASSESSMENT — PAIN DESCRIPTION - PAIN TYPE
TYPE: ACUTE PAIN
TYPE: ACUTE PAIN

## 2018-09-07 ASSESSMENT — PAIN DESCRIPTION - LOCATION
LOCATION: CHEST
LOCATION: CHEST

## 2018-09-07 NOTE — ED PROVIDER NOTES
HPI:  18, Time: 5:49 PM         Vita Berrios is a 67 y.o. male past medical history of hypertension, hyperlipidemia, type II diabetes, right cardiac s/p pacemaker in , CVA (right mudullar stroke in , right CHRISTIAN stroke 2018) DVT x3, factor 5 [place]iden on coumadin 10 mg presenting to the ED for chest pain, beginning about 7 hours ago. The complaint has been constant, chest heaviness, 6-7/10 in severity, and worsened by nothing, better by nothing, no radiation. He reports he was sitting in the chair watching TV when he first felt chest pain. He states nitroglycerin did not help the pain. He also reports dizziness, SOB and weakness bilateral lower extremities and denied diaphoresis. He also mentioned he had fall after chest pain developed because he legs were weak but  LOC, seizure-like movement, hit his head. His previous INR on 18 was 13 and his PCP advised him to hold coumadin. His INR this AM was 3.1. He was seen by EP in July to interrogation pacemaker. Review of Systems:   Pertinent positives and negatives are stated within HPI, all other systems reviewed and are negative.      --------------------------------------------- PAST HISTORY ---------------------------------------------  Past Medical History:  has a past medical history of Diabetes mellitus ([facility] [address] 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[facility] [address] Hypertension; Ischemic stroke ([facility] [address] Pacemaker; Paraplegia ([facility] [address] Stroke (cerebrum) ([facility] [address] and Thyroid disease. Past Surgical History:  has a past surgical history that includes eye surgery; Knee arthroscopy (right knee); pr colonoscopy flx dx w/collj spec when pfrmd (N/A, 3/20/2018); Colonoscopy; and pr egd percutaneous placement gastrostomy tube (N/A, 3/29/2018). Social History:  reports that he has quit smoking. His smoking use included Cigars. He has never used smokeless tobacco. He reports that he does not drink alcohol or use drugs.     Family History: family history includes Heart Disease in his

## 2018-09-08 ENCOUNTER — APPOINTMENT (OUTPATIENT)
Dept: GENERAL RADIOLOGY | Age: 73
DRG: 178 | End: 2018-09-08
Payer: MEDICARE

## 2018-09-08 LAB
ANION GAP SERPL CALCULATED.3IONS-SCNC: 13 MMOL/L (ref 7–16)
BACTERIA: ABNORMAL /HPF
BASOPHILS ABSOLUTE: 0.04 E9/L (ref 0–0.2)
BASOPHILS RELATIVE PERCENT: 0.3 % (ref 0–2)
BILIRUBIN URINE: NEGATIVE
BLOOD, URINE: ABNORMAL
BUN BLDV-MCNC: 29 MG/DL (ref 8–23)
CALCIUM SERPL-MCNC: 8.8 MG/DL (ref 8.6–10.2)
CHLORIDE BLD-SCNC: 102 MMOL/L (ref 98–107)
CHLORIDE URINE RANDOM: 59 MMOL/L
CLARITY: CLEAR
CO2: 24 MMOL/L (ref 22–29)
COLOR: YELLOW
CREAT SERPL-MCNC: 1.1 MG/DL (ref 0.7–1.2)
CREATININE URINE: 203 MG/DL (ref 40–278)
CRYSTALS, UA: ABNORMAL
EOSINOPHILS ABSOLUTE: 0.03 E9/L (ref 0.05–0.5)
EOSINOPHILS RELATIVE PERCENT: 0.3 % (ref 0–6)
GFR AFRICAN AMERICAN: >60
GFR NON-AFRICAN AMERICAN: >60 ML/MIN/1.73
GLUCOSE BLD-MCNC: 139 MG/DL (ref 74–109)
GLUCOSE URINE: NEGATIVE MG/DL
HCT VFR BLD CALC: 39 % (ref 37–54)
HEMOGLOBIN: 12.5 G/DL (ref 12.5–16.5)
IMMATURE GRANULOCYTES #: 0.07 E9/L
IMMATURE GRANULOCYTES %: 0.6 % (ref 0–5)
INR BLD: 3.7
KETONES, URINE: NEGATIVE MG/DL
LEUKOCYTE ESTERASE, URINE: NEGATIVE
LYMPHOCYTES ABSOLUTE: 1.57 E9/L (ref 1.5–4)
LYMPHOCYTES RELATIVE PERCENT: 13.2 % (ref 20–42)
MCH RBC QN AUTO: 28.4 PG (ref 26–35)
MCHC RBC AUTO-ENTMCNC: 32.1 % (ref 32–34.5)
MCV RBC AUTO: 88.6 FL (ref 80–99.9)
METER GLUCOSE: 141 MG/DL (ref 70–110)
METER GLUCOSE: 144 MG/DL (ref 70–110)
METER GLUCOSE: 150 MG/DL (ref 70–110)
MONOCYTES ABSOLUTE: 1.3 E9/L (ref 0.1–0.95)
MONOCYTES RELATIVE PERCENT: 11 % (ref 2–12)
NEUTROPHILS ABSOLUTE: 8.86 E9/L (ref 1.8–7.3)
NEUTROPHILS RELATIVE PERCENT: 74.6 % (ref 43–80)
NITRITE, URINE: NEGATIVE
OSMOLALITY URINE: 871 MOSM/KG (ref 300–900)
PDW BLD-RTO: 14.5 FL (ref 11.5–15)
PH UA: 6 (ref 5–9)
PLATELET # BLD: 212 E9/L (ref 130–450)
PMV BLD AUTO: 10.7 FL (ref 7–12)
POTASSIUM SERPL-SCNC: 3.4 MMOL/L (ref 3.5–5)
POTASSIUM, UR: 53.6 MMOL/L
PROCALCITONIN: 0.14 NG/ML (ref 0–0.08)
PROTEIN PROTEIN: 46 MG/DL (ref 0–12)
PROTEIN UA: 30 MG/DL
PROTEIN/CREAT RATIO: 0.2
PROTEIN/CREAT RATIO: 0.2 (ref 0–0.2)
PROTHROMBIN TIME: 41.5 SEC (ref 9.3–12.4)
RBC # BLD: 4.4 E12/L (ref 3.8–5.8)
RBC UA: ABNORMAL /HPF (ref 0–2)
SODIUM BLD-SCNC: 139 MMOL/L (ref 132–146)
SODIUM URINE: 30 MMOL/L
SPECIFIC GRAVITY UA: 1.02 (ref 1–1.03)
T3 TOTAL: 72.43 NG/DL (ref 80–200)
T3 UPTAKE PERCENT: 36.2 % (ref 22.5–37)
T4 TOTAL: 7.5 MCG/DL (ref 4.5–11.7)
UROBILINOGEN, URINE: 0.2 E.U./DL
WBC # BLD: 11.9 E9/L (ref 4.5–11.5)
WBC UA: ABNORMAL /HPF (ref 0–5)

## 2018-09-08 PROCEDURE — 6370000000 HC RX 637 (ALT 250 FOR IP): Performed by: FAMILY MEDICINE

## 2018-09-08 PROCEDURE — 85025 COMPLETE CBC W/AUTO DIFF WBC: CPT

## 2018-09-08 PROCEDURE — 71046 X-RAY EXAM CHEST 2 VIEWS: CPT

## 2018-09-08 PROCEDURE — 84300 ASSAY OF URINE SODIUM: CPT

## 2018-09-08 PROCEDURE — 99222 1ST HOSP IP/OBS MODERATE 55: CPT | Performed by: INTERNAL MEDICINE

## 2018-09-08 PROCEDURE — 84133 ASSAY OF URINE POTASSIUM: CPT

## 2018-09-08 PROCEDURE — 2140000000 HC CCU INTERMEDIATE R&B

## 2018-09-08 PROCEDURE — 82436 ASSAY OF URINE CHLORIDE: CPT

## 2018-09-08 PROCEDURE — 2580000003 HC RX 258: Performed by: FAMILY MEDICINE

## 2018-09-08 PROCEDURE — 82962 GLUCOSE BLOOD TEST: CPT

## 2018-09-08 PROCEDURE — 80048 BASIC METABOLIC PNL TOTAL CA: CPT

## 2018-09-08 PROCEDURE — 81001 URINALYSIS AUTO W/SCOPE: CPT

## 2018-09-08 PROCEDURE — 2700000000 HC OXYGEN THERAPY PER DAY

## 2018-09-08 PROCEDURE — 36415 COLL VENOUS BLD VENIPUNCTURE: CPT

## 2018-09-08 PROCEDURE — 85610 PROTHROMBIN TIME: CPT

## 2018-09-08 PROCEDURE — 6360000002 HC RX W HCPCS: Performed by: INTERNAL MEDICINE

## 2018-09-08 PROCEDURE — 83935 ASSAY OF URINE OSMOLALITY: CPT

## 2018-09-08 PROCEDURE — 84156 ASSAY OF PROTEIN URINE: CPT

## 2018-09-08 PROCEDURE — 99221 1ST HOSP IP/OBS SF/LOW 40: CPT | Performed by: PSYCHIATRY & NEUROLOGY

## 2018-09-08 PROCEDURE — 82570 ASSAY OF URINE CREATININE: CPT

## 2018-09-08 PROCEDURE — 94664 DEMO&/EVAL PT USE INHALER: CPT

## 2018-09-08 PROCEDURE — 84145 PROCALCITONIN (PCT): CPT

## 2018-09-08 RX ORDER — ALBUTEROL SULFATE 2.5 MG/3ML
2.5 SOLUTION RESPIRATORY (INHALATION) 3 TIMES DAILY
Status: DISCONTINUED | OUTPATIENT
Start: 2018-09-08 | End: 2018-09-12

## 2018-09-08 RX ORDER — AMLODIPINE BESYLATE 5 MG/1
5 TABLET ORAL DAILY
Status: DISCONTINUED | OUTPATIENT
Start: 2018-09-08 | End: 2018-09-12

## 2018-09-08 RX ORDER — SODIUM CHLORIDE 0.9 % (FLUSH) 0.9 %
10 SYRINGE (ML) INJECTION 2 TIMES DAILY
Status: DISCONTINUED | OUTPATIENT
Start: 2018-09-08 | End: 2018-09-12

## 2018-09-08 RX ORDER — LISINOPRIL 20 MG/1
40 TABLET ORAL DAILY
Status: DISCONTINUED | OUTPATIENT
Start: 2018-09-08 | End: 2018-09-12

## 2018-09-08 RX ORDER — GLIPIZIDE 5 MG/1
10 TABLET, FILM COATED, EXTENDED RELEASE ORAL
Status: DISCONTINUED | OUTPATIENT
Start: 2018-09-09 | End: 2018-09-12

## 2018-09-08 RX ORDER — LEVOTHYROXINE SODIUM 0.05 MG/1
50 TABLET ORAL DAILY
Status: DISCONTINUED | OUTPATIENT
Start: 2018-09-08 | End: 2018-09-12

## 2018-09-08 RX ORDER — LOVASTATIN 20 MG/1
20 TABLET ORAL NIGHTLY
Status: DISCONTINUED | OUTPATIENT
Start: 2018-09-08 | End: 2018-09-12

## 2018-09-08 RX ORDER — METOCLOPRAMIDE 10 MG/1
10 TABLET ORAL
Status: DISCONTINUED | OUTPATIENT
Start: 2018-09-08 | End: 2018-09-12

## 2018-09-08 RX ADMIN — METFORMIN HYDROCHLORIDE 1000 MG: 1000 TABLET ORAL at 12:43

## 2018-09-08 RX ADMIN — METOCLOPRAMIDE 10 MG: 10 TABLET ORAL at 21:25

## 2018-09-08 RX ADMIN — AMLODIPINE BESYLATE 5 MG: 5 TABLET ORAL at 12:43

## 2018-09-08 RX ADMIN — METOCLOPRAMIDE 10 MG: 10 TABLET ORAL at 17:26

## 2018-09-08 RX ADMIN — LISINOPRIL 40 MG: 20 TABLET ORAL at 12:43

## 2018-09-08 RX ADMIN — METOCLOPRAMIDE 10 MG: 10 TABLET ORAL at 12:43

## 2018-09-08 RX ADMIN — METFORMIN HYDROCHLORIDE 1000 MG: 1000 TABLET ORAL at 17:25

## 2018-09-08 RX ADMIN — ALBUTEROL SULFATE 2.5 MG: 2.5 SOLUTION RESPIRATORY (INHALATION) at 17:15

## 2018-09-08 RX ADMIN — Medication 10 ML: at 21:30

## 2018-09-08 RX ADMIN — LEVOTHYROXINE SODIUM 50 MCG: 50 TABLET ORAL at 12:43

## 2018-09-08 ASSESSMENT — PAIN DESCRIPTION - ORIENTATION: ORIENTATION: OUTER

## 2018-09-08 ASSESSMENT — PAIN DESCRIPTION - PAIN TYPE: TYPE: ACUTE PAIN

## 2018-09-08 ASSESSMENT — PAIN DESCRIPTION - FREQUENCY: FREQUENCY: INTERMITTENT

## 2018-09-08 ASSESSMENT — PAIN DESCRIPTION - PROGRESSION: CLINICAL_PROGRESSION: NOT CHANGED

## 2018-09-08 ASSESSMENT — PAIN DESCRIPTION - DESCRIPTORS: DESCRIPTORS: CONSTANT;DISCOMFORT

## 2018-09-08 ASSESSMENT — PAIN DESCRIPTION - LOCATION: LOCATION: CHEST

## 2018-09-08 ASSESSMENT — PAIN SCALES - GENERAL: PAINLEVEL_OUTOF10: 3

## 2018-09-08 NOTE — CONSULTS
mg, 2.5 mg, Nebulization, TID  nitroGLYCERIN (NITROSTAT) SL tablet 0.4 mg, 0.4 mg, Sublingual, Q5 Min PRN  0.9 % sodium chloride bolus, 1,000 mL, Intravenous, Once  dextrose 5 % solution, 100 mL/hr, Intravenous, PRN  dextrose 50 % solution 12.5 g, 12.5 g, Intravenous, PRN  glucagon (rDNA) injection 1 mg, 1 mg, Intramuscular, PRN  mineral oil-hydrophilic petrolatum (HYDROPHOR) ointment, , Topical, BID PRN  [START ON 3/8/2019] warfarin (COUMADIN) daily dosing (placeholder), , Other, RX Placeholder  Allergies:  Patient has no known allergies.     Social History:    TOBACCO:  Never used tobacco  ETOH:  Never drank alcohol  DRUGS:  Never used recreational drugs    Family History:   Family History   Problem Relation Age of Onset    Heart Disease Mother     Stroke Father      REVIEW OF SYSTEMS:    CONSTITUTIONAL:  positive for  fatigue  EYES:  negative  HEENT:  negative  RESPIRATORY:  negative  CARDIOVASCULAR:  positive for  chest pain  GASTROINTESTINAL:  negative  GENITOURINARY:  negative  INTEGUMENT/BREAST:  negative  HEMATOLOGIC/LYMPHATIC:  negative  PHYSICAL EXAM:      Vitals:    VITALS:  BP (!) 156/72   Pulse 65   Temp 97.9 °F (36.6 °C) (Oral)   Resp 18   Ht 6' 2\" (1.88 m)   Wt 271 lb 8 oz (123.2 kg)   SpO2 96%   BMI 34.86 kg/m²   24HR INTAKE/OUTPUT:    Intake/Output Summary (Last 24 hours) at 09/08/18 1337  Last data filed at 09/08/18 1203   Gross per 24 hour   Intake              240 ml   Output              350 ml   Net             -110 ml       Constitutional:  Well developed and nourished, appears weak  HEENT:  Normal , oral mucosa dry  Respiratory:  Clear except for decreased breath sounds at bases  Cardiovascular/Edema:  Regular, no rub or gallop  Gastrointestinal:  Obese , soft, no cva tenderness  Neurologic:  Alert and oriented x 3 , nonfocal  Skin:  Decreased turgor  Other:  No calf tenderness    DATA:    CBC:   Lab Results   Component Value Date    WBC 11.9 09/08/2018    RBC 4.40 09/08/2018    HGB

## 2018-09-08 NOTE — CONSULTS
Inpatient consult to Neurology  Consult performed by: Carolyne Topete ordered by: Myrna Morrell        Neurology Consult Note    9/8/2018     REASON FOR CONSULTATION: Recurrent CVA        HISTORY OF PRESENT ILLNESS:   Yesterday around 10 AM he felt numbness under his chin, in front of neck and his chest. Few hours later he was walking in his driveway when he veered to left and fell. No LC. No visual changes and no additonal weakness or numbness aside from his chronic problems. No change in his voice or speech. A 67year old  man was seen by neurology service in Feb/March 2018: At that time he presented with b/l leg weakness and fatigue. He was unable to stand up from his chair due to this weakness and was light headed upon trying. He fell asleep in the recliner. He awoke having vomited on himself and was incontinent of urine and stool, which is not normal for him. He was still unable to stand. Open magnet MRI then showed probable old R medullary, left thalamic and L occipital strokes. MRI again without DWI in 2014 showed R medullary and L occipital strokes. Therefore the gliosis in left thalamus in Feb/March 2018 MRI could be responsible for the symptoms at that time but unfortunately there was no DWI and hence it was not clear it this lesion was new or old. Limited quality MRA showed possible moderate focal atherosclerotic narrowing at the origin of the left internal carotid artery and possible mild focal atherosclerotic narrowing at the origin of the right internal carotid artery. TTE and then KINA were normal.     He has a hx of Factor V for which he was on Coumadin. At home he is on Coumadin and 20 mg Lovastatin. No ASA. He lives alone.  He walks by cane.         Past Medical History:   Diagnosis Date    Diabetes mellitus ([address]     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[address]     Hypertension     Ischemic stroke ([address] 03/06/2018    Pacemaker     Paraplegia ([address]     Stroke (cerebrum) ([address] 02/27/2018    Thyroid disease         Past Surgical History:   Procedure Laterality Date    COLONOSCOPY      EYE SURGERY      KNEE ARTHROSCOPY  right knee    PACEMAKER PLACEMENT      [place] COLONOSCOPY FLX DX W/COLLJ SPEC WHEN PFRMD N/A 3/20/2018    COLONOSCOPY DIAGNOSTIC performed by Norma Tejada MD at [facility] EGD PERCUTANEOUS PLACEMENT GASTROSTOMY TUBE N/A 3/29/2018    PEG TUBE/PT. IN 5507 B performed by Norma Tejada MD at [facility]       Prior to Admission medications    Medication Sig Start Date End Date Taking? Authorizing Provider   metoclopramide (REGLAN) 10 MG tablet Take 10 mg by mouth 4 times daily as needed   Yes Historical Provider, MD   warfarin (COUMADIN) 10 MG tablet Take 10 mg by mouth daily   Yes Historical Provider, MD   glipiZIDE (GLUCOTROL XL) 5 MG extended release tablet Take 5 mg by mouth 2 times daily   Yes Historical Provider, MD   lovastatin (MEVACOR) 20 MG tablet Take 20 mg by mouth nightly   Yes Historical Provider, MD   mineral oil-hydrophilic petrolatum (HYDROPHOR) ointment Apply topically as needed. 4/4/18  Yes Arben Coronado MD   levothyroxine (SYNTHROID) 50 MCG tablet 1 tablet by PEG Tube route Daily 4/5/18  Yes Arben Coronado MD   amLODIPine (NORVASC) 5 MG tablet Take 1 tablet by mouth daily 1/19/18  Yes Historical Provider, MD   metFORMIN (GLUCOPHAGE) 1000 MG tablet Take 1 tablet by mouth 2 times daily 1/19/18  Yes Historical Provider, MD   lisinopril (PRINIVIL;ZESTRIL) 40 MG tablet Take 40 mg by mouth daily   Yes Historical Provider, MD   glucose (GLUTOSE) 40 % GEL Take 37.5 mLs by mouth as needed (hypoglycemia) 4/4/18   Arben Coronado MD       Allergies:  Patient has no known allergies.      Family History   Problem Relation Age of Onset    Heart Disease Mother     Stroke Father        Social History   Substance Use Topics    Smoking status: Former Smoker     Types: Cigars    Smokeless tobacco: Never Used      Comment: occassional cigar    Alcohol use No          ROS:  GENERAL:  No weight change, change in appetite, fever or chills. HEENT:  No headache, blurred or double vision. CARDIOPULMONARY:  No chest pain, palpitations or shortness of breath. GASTROINTESTINAL:  No anorexia, nausea or vomiting, no diarrhea  GENITOURINARY:  No dysuria  ENDOCRINE:  No heat/cold intolerance, no excessive thirst.  HEMATOLOGY/ONCOLOGY:  No bruising or bleeding. MUSCULOSKELETAL: No swelling. PSYCHIATRIC:  No change in personality, affect or depression. EXAMINATION:  BP (!) 156/72   Pulse 65   Temp 97.9 °F (36.6 °C) (Oral)   Resp 18   Ht 6' 2\" (1.88 m)   Wt 271 lb 8 oz (123.2 kg)   SpO2 96%   BMI 34.86 kg/m²   GEN: NAD  HEENT: Neck supple, no carotid, ophthalmic or ocular bruits  Cardiovascular: RRR, 2+ distal pulses x 4 ext  Pulm: CTA  AAOx3, follows commands, no aphasia/dysarthria. PERRL, EOMI, VFF, normal pursuit, Has undershoot saccades bilaterally. no gaze preference/nystagmus. Intact facial sensation, Subtle right sided central facial palsy. Intact hearing bilaterally. Tongue midline. Right palate is lower than left. Neck muscles and shoulder shrug 5/5. Sensation: intact all over to LT, no neglect. Motor: 5/5 in bilateral upper extremities. At least 4/5 in bilateral lower extremities. Normal bulk and tone, No drift/orbiting. DTRs: +1 biceps/triceps/BR/Knees, +1 ankles, plantar is down in right but UP in left. Coordination: Mild impaired F-N in right . Normal H-S B/L. Gait: Was not tested    ASSESSMENT:  Central trunk numbness with some subtle abnormal neurological signs, unclear which is new and which can be attributed to previous strokes. At home on Coumadin and Lovastatin 20 mg which were continued. INR: 9/4/2018: 13.3; 9/7: 3.1; 9/8: 3.7. [facility] on 9/7: no acute intracranial disease (known old left sided occipital stroke).      PLAN:   MRI brain + MRA head and neck  Lipid panel,

## 2018-09-08 NOTE — CONSULTS
EXAM:   VITALS:   Patient Vitals for the past 8 hrs:   BP Temp Temp src Pulse Resp SpO2   09/08/18 1202 (!) 156/72 97.9 °F (36.6 °C) Oral 65 18 -   09/08/18 0800 (!) 142/68 97.7 °F (36.5 °C) Oral 65 18 96 %       Intake/Output Summary (Last 24 hours) at 09/08/18 1250  Last data filed at 09/08/18 1203   Gross per 24 hour   Intake              240 ml   Output              350 ml   Net             -110 ml        CONSTITUTIONAL:   A&O x 3, NAD  SKIN:    No rash, No suspicious lesions  HEENT:    EOMI, MMM, No thrush  NECK:   No bruits, No JVP apprechiated  CV:     Sinus,  No Murmus, No Rubs, No gallop  PULMONARY:  Couse,  No Wheezing, No Rales, No Rhonchi or Egophony  ABDOMEN:    Soft, non-tender. BS normal. No R/R/G  EXT:   No deformities or skin discoloration.   No clubbing. + lower    extremity edema, No venous stasis  PULSE:  Peripheral pulses barely palpable  PSYCHIATRIC: Approprate  MS:   No fracture, No gross muscle weakness  NEUROLOGIC:  Non focal exam.     DATA: IMAGING & TESTING:     LABORATORY TESTS:    CBC:   Lab Results   Component Value Date    WBC 11.9 09/08/2018    RBC 4.40 09/08/2018    HGB 12.5 09/08/2018    HCT 39.0 09/08/2018    MCV 88.6 09/08/2018    MCH 28.4 09/08/2018    MCHC 32.1 09/08/2018    RDW 14.5 09/08/2018     09/08/2018    MPV 10.7 09/08/2018     CMP:    Lab Results   Component Value Date     09/08/2018    K 3.4 09/08/2018    K 3.6 09/07/2018     09/08/2018    CO2 24 09/08/2018    BUN 29 09/08/2018    CREATININE 1.1 09/08/2018    GFRAA >60 09/08/2018    LABGLOM >60 09/08/2018    GLUCOSE 139 09/08/2018    GLUCOSE 297 01/04/2012    PROT 7.6 09/07/2018    LABALBU 4.0 09/07/2018    LABALBU 3.4 01/04/2012    CALCIUM 8.8 09/08/2018    BILITOT 0.7 09/07/2018    ALKPHOS 68 09/07/2018    AST 12 09/07/2018    ALT 8 09/07/2018     PT/INR:    Lab Results   Component Value Date    PROTIME 41.5 09/08/2018    PROTIME 12.2 12/30/2011    INR 3.7 09/08/2018     TSH:    Lab Results Component Value Date    TSH 2.170 2018        PRO-BNP: No results found for: PROBNP   ABGs:   Lab Results   Component Value Date    PH 7.417 2014    PO2 87.9 2014    PCO2 44.8 2014     Hemoglobin A1C: No components found for: HGBA1C    IMAGING:  Imaging tests were completed and reviewed and discussed radiology and primary care team and reveals   Xr Chest Standard (2 Vw)    Result Date: 2018  Patient MRN: [id number] : 1945 Age:  67 years Gender: Male Order Date: 2018 9:45 AM Exam: XR CHEST (2 VW) Number of Images: 2 views Indication:   re: possible CAP MAY GO TO RADIOLOGY OFF THE MONITOR re: possible CAP Comparison: None. Findings: There is a suggestion of bilateral pleural effusions The heart is enlarged. The lung fields demonstrate no significant pulmonary vascular congestion and edema. The aorta is tortuous ectatic and calcified. .     Cardiomegaly Findings compatible with atherosclerotic disease of the aorta. There are likely small bilateral pleural effusions     Ct Head Wo Contrast    Result Date: 2018  Patient MRN:  [id number] : 1945 Age: 67 years Gender: Male Order Date:  2018 5:45 PM EXAM: CT HEAD WO CONTRAST NUMBER OF IMAGES:  120 INDICATION:  STROKE COMPARISON: MRI 2018 Technique: Low-dose CT  acquisition technique included one of following options; 1 . Automated exposure control, 2. Adjustment of MA and or KV according to patient's size or 3. Use of iterative reconstruction. Multiple CT sections were obtained with sagittal and coronal MPR reconstructions. The ventricles are prominent. The gyri and sulci appear  prominent. The white matter appears  prominent. There is no evidence for hemorrhage. There is no mass effect identified. There is no mass identified. There is evidence for a lacunar infarct likely old. There is evidence for a cortical infarct likely old.      Diffuse atrophy likely age related Findings compatible with small vessel ischemic

## 2018-09-09 ENCOUNTER — APPOINTMENT (OUTPATIENT)
Dept: MRI IMAGING | Age: 73
DRG: 178 | End: 2018-09-09
Payer: MEDICARE

## 2018-09-09 LAB
ALBUMIN SERPL-MCNC: 3.2 G/DL (ref 3.5–5.2)
ALP BLD-CCNC: 51 U/L (ref 40–129)
ALT SERPL-CCNC: 11 U/L (ref 0–40)
ANION GAP SERPL CALCULATED.3IONS-SCNC: 17 MMOL/L (ref 7–16)
AST SERPL-CCNC: 11 U/L (ref 0–39)
BILIRUB SERPL-MCNC: 0.6 MG/DL (ref 0–1.2)
BUN BLDV-MCNC: 26 MG/DL (ref 8–23)
CALCIUM SERPL-MCNC: 8.5 MG/DL (ref 8.6–10.2)
CHLORIDE BLD-SCNC: 102 MMOL/L (ref 98–107)
CHOLESTEROL, TOTAL: 130 MG/DL (ref 0–199)
CO2: 24 MMOL/L (ref 22–29)
CREAT SERPL-MCNC: 1.4 MG/DL (ref 0.7–1.2)
GFR AFRICAN AMERICAN: 60
GFR NON-AFRICAN AMERICAN: 50 ML/MIN/1.73
GLUCOSE BLD-MCNC: 143 MG/DL (ref 74–109)
HBA1C MFR BLD: 5.3 % (ref 4–5.6)
HCT VFR BLD CALC: 36.6 % (ref 37–54)
HDLC SERPL-MCNC: 38 MG/DL
HEMOGLOBIN: 11.4 G/DL (ref 12.5–16.5)
INR BLD: 3.3
LDL CHOLESTEROL CALCULATED: 70 MG/DL (ref 0–99)
MCH RBC QN AUTO: 28 PG (ref 26–35)
MCHC RBC AUTO-ENTMCNC: 31.1 % (ref 32–34.5)
MCV RBC AUTO: 89.9 FL (ref 80–99.9)
METER GLUCOSE: 119 MG/DL (ref 70–110)
METER GLUCOSE: 125 MG/DL (ref 70–110)
METER GLUCOSE: 175 MG/DL (ref 70–110)
PDW BLD-RTO: 14.9 FL (ref 11.5–15)
PLATELET # BLD: 205 E9/L (ref 130–450)
PMV BLD AUTO: 11.3 FL (ref 7–12)
POTASSIUM SERPL-SCNC: 3.5 MMOL/L (ref 3.5–5)
PROTHROMBIN TIME: 37 SEC (ref 9.3–12.4)
RBC # BLD: 4.07 E12/L (ref 3.8–5.8)
SODIUM BLD-SCNC: 143 MMOL/L (ref 132–146)
TOTAL PROTEIN: 6.3 G/DL (ref 6.4–8.3)
TRIGL SERPL-MCNC: 111 MG/DL (ref 0–149)
VLDLC SERPL CALC-MCNC: 22 MG/DL
WBC # BLD: 10.8 E9/L (ref 4.5–11.5)

## 2018-09-09 PROCEDURE — 80061 LIPID PANEL: CPT

## 2018-09-09 PROCEDURE — 6370000000 HC RX 637 (ALT 250 FOR IP): Performed by: FAMILY MEDICINE

## 2018-09-09 PROCEDURE — 36415 COLL VENOUS BLD VENIPUNCTURE: CPT

## 2018-09-09 PROCEDURE — 80053 COMPREHEN METABOLIC PANEL: CPT

## 2018-09-09 PROCEDURE — 6360000002 HC RX W HCPCS: Performed by: INTERNAL MEDICINE

## 2018-09-09 PROCEDURE — 94640 AIRWAY INHALATION TREATMENT: CPT

## 2018-09-09 PROCEDURE — 2140000000 HC CCU INTERMEDIATE R&B

## 2018-09-09 PROCEDURE — 2700000000 HC OXYGEN THERAPY PER DAY

## 2018-09-09 PROCEDURE — 99232 SBSQ HOSP IP/OBS MODERATE 35: CPT | Performed by: CLINICAL NURSE SPECIALIST

## 2018-09-09 PROCEDURE — 85027 COMPLETE CBC AUTOMATED: CPT

## 2018-09-09 PROCEDURE — 83036 HEMOGLOBIN GLYCOSYLATED A1C: CPT

## 2018-09-09 PROCEDURE — 85610 PROTHROMBIN TIME: CPT

## 2018-09-09 PROCEDURE — 70547 MR ANGIOGRAPHY NECK W/O DYE: CPT

## 2018-09-09 PROCEDURE — 70544 MR ANGIOGRAPHY HEAD W/O DYE: CPT

## 2018-09-09 PROCEDURE — 2580000003 HC RX 258: Performed by: FAMILY MEDICINE

## 2018-09-09 PROCEDURE — 2580000003 HC RX 258: Performed by: INTERNAL MEDICINE

## 2018-09-09 PROCEDURE — 99232 SBSQ HOSP IP/OBS MODERATE 35: CPT | Performed by: INTERNAL MEDICINE

## 2018-09-09 PROCEDURE — P9046 ALBUMIN (HUMAN), 25%, 20 ML: HCPCS | Performed by: INTERNAL MEDICINE

## 2018-09-09 PROCEDURE — 70551 MRI BRAIN STEM W/O DYE: CPT

## 2018-09-09 PROCEDURE — 82962 GLUCOSE BLOOD TEST: CPT

## 2018-09-09 RX ORDER — ALBUMIN (HUMAN) 12.5 G/50ML
25 SOLUTION INTRAVENOUS ONCE
Status: COMPLETED | OUTPATIENT
Start: 2018-09-09 | End: 2018-09-09

## 2018-09-09 RX ORDER — SODIUM CHLORIDE 450 MG/100ML
INJECTION, SOLUTION INTRAVENOUS CONTINUOUS
Status: DISCONTINUED | OUTPATIENT
Start: 2018-09-09 | End: 2018-09-10

## 2018-09-09 RX ADMIN — ALBUTEROL SULFATE 2.5 MG: 2.5 SOLUTION RESPIRATORY (INHALATION) at 08:21

## 2018-09-09 RX ADMIN — SODIUM CHLORIDE: 4.5 INJECTION, SOLUTION INTRAVENOUS at 15:11

## 2018-09-09 RX ADMIN — Medication 10 ML: at 21:25

## 2018-09-09 RX ADMIN — AMLODIPINE BESYLATE 5 MG: 5 TABLET ORAL at 08:05

## 2018-09-09 RX ADMIN — METOCLOPRAMIDE 10 MG: 10 TABLET ORAL at 06:27

## 2018-09-09 RX ADMIN — METOCLOPRAMIDE 10 MG: 10 TABLET ORAL at 15:11

## 2018-09-09 RX ADMIN — METOCLOPRAMIDE 10 MG: 10 TABLET ORAL at 21:25

## 2018-09-09 RX ADMIN — ALBUMIN (HUMAN) 25 G: 0.25 INJECTION, SOLUTION INTRAVENOUS at 15:11

## 2018-09-09 RX ADMIN — METFORMIN HYDROCHLORIDE 1000 MG: 1000 TABLET ORAL at 15:11

## 2018-09-09 RX ADMIN — METFORMIN HYDROCHLORIDE 1000 MG: 1000 TABLET ORAL at 08:06

## 2018-09-09 RX ADMIN — Medication 10 ML: at 08:06

## 2018-09-09 RX ADMIN — LISINOPRIL 40 MG: 20 TABLET ORAL at 08:06

## 2018-09-09 RX ADMIN — LEVOTHYROXINE SODIUM 50 MCG: 50 TABLET ORAL at 06:27

## 2018-09-09 RX ADMIN — ALBUTEROL SULFATE 2.5 MG: 2.5 SOLUTION RESPIRATORY (INHALATION) at 20:57

## 2018-09-09 RX ADMIN — METOCLOPRAMIDE 10 MG: 10 TABLET ORAL at 12:42

## 2018-09-09 ASSESSMENT — PAIN SCALES - GENERAL: PAINLEVEL_OUTOF10: 0

## 2018-09-09 NOTE — PLAN OF CARE
Problem: Nutrition  Goal: Optimal nutrition therapy  Outcome: Ongoing  Nutrition Problem: Unintended weight loss  Intervention: Food and/or Nutrient Delivery: Continue current diet, Start ONS (Ensure high protein BID per d/w pt)  Nutritional Goals: pt to consume >75% meals and ONS

## 2018-09-09 NOTE — PROGRESS NOTES
· Comparative Standards (Estimated Nutrition Needs):  · Estimated Daily Total Kcal: 8298-1959 (MSJ REE 2065 x 1.2 SF)  · Estimated Daily Protein (g): 115-130      Nutrition Risk Level: Low    Nutrition Interventions: Continued Inpatient Monitoring, Coordination of Care, Education Initiated (supplement options discussed)    Nutrition Evaluation:   · Evaluation: Goals set   · Goals: pt to consume >75% meals and ONS     · Monitoring: Meal Intake, Supplement Intake, Comparative Standards, Weight, Pertinent Labs, Diet Tolerance, Skin Integrity, Ascites/Edema    See Adult Nutrition Doc Flowsheet for more detail.      Electronically signed by Beto Vicente RD, LD on 9/9/18 at 1:12 PM    Contact Number: Ext [phone]

## 2018-09-09 NOTE — PROGRESS NOTES
Yellow 09/08/2018    PROTEINU 30 09/08/2018    PHUR 6.0 09/08/2018    WBCUA 1-3 09/08/2018    WBCUA 0-1 12/30/2011    RBCUA 1-3 09/08/2018    RBCUA 1-3 12/30/2011    BACTERIA RARE 09/08/2018    CLARITYU Clear 09/08/2018    SPECGRAV 1.025 09/08/2018    LEUKOCYTESUR Negative 09/08/2018    UROBILINOGEN 0.2 09/08/2018    BILIRUBINUR Negative 09/08/2018    BILIRUBINUR NEGATIVE 12/30/2011    BLOODU SMALL 09/08/2018    GLUCOSEU Negative 09/08/2018    GLUCOSEU >=1000 12/30/2011     HgBA1c:    Lab Results   Component Value Date    LABA1C 5.3 09/09/2018     Microalbumen/Creatinine ratio:  No components found for: RUCREAT  TSH:    Lab Results   Component Value Date    TSH 2.170 09/07/2018     VITAMIN B12: No components found for: B12  FOLATE:  No results found for: FOLATE  Scheduled Meds:   metoclopramide  10 mg Oral 4x Daily AC & HS    glipiZIDE  10 mg Oral Daily with breakfast    metFORMIN  1,000 mg Oral BID WC    amLODIPine  5 mg Oral Daily    levothyroxine  50 mcg Oral Daily    lovastatin  20 mg Oral Nightly    lisinopril  40 mg Oral Daily    albuterol  2.5 mg Nebulization TID    sodium chloride flush  10 mL Intravenous BID    sodium chloride  1,000 mL Intravenous Once    [START ON 3/8/2019] warfarin (COUMADIN) daily dosing (placeholder)   Other RX Placeholder     Continuous Infusions:   dextrose       PRN Meds:.nitroGLYCERIN, dextrose, dextrose, glucagon (rDNA), mineral oil-hydrophilic petrolatum    IMPRESSION/RECOMMENDATIONS:      1. Stage 2 CKD with prerenal azotemia versus volume overload. Baseline serum creatinine is 0.8 mg/dl  2. Hx of hypertension  3. Hx of multiple CVA  4. Hx PPM/SSS with normal LVEF  5. DM type 2  6. Chest pain- rule out aspiration versus GERD  7. Atelectasis -procalcitonin levels ordered to evaluate need for antibiotics.   8. Hypoalbuminemia      PLAN:     - urine studies noted and suggest prerenal state  - await pulmonary workup  - strict intake and output  - monitor renal function

## 2018-09-09 NOTE — PROGRESS NOTES
EOSABS 0.03 09/08/2018    BASOSABS 0.04 09/08/2018     CMP:    Lab Results   Component Value Date     09/09/2018    K 3.5 09/09/2018    K 3.6 09/07/2018     09/09/2018    CO2 24 09/09/2018    BUN 26 09/09/2018    CREATININE 1.4 09/09/2018    GFRAA 60 09/09/2018    LABGLOM 50 09/09/2018    GLUCOSE 143 09/09/2018    GLUCOSE 297 01/04/2012    PROT 6.3 09/09/2018    LABALBU 3.2 09/09/2018    LABALBU 3.4 01/04/2012    CALCIUM 8.5 09/09/2018    BILITOT 0.6 09/09/2018    ALKPHOS 51 09/09/2018    AST 11 09/09/2018    ALT 11 09/09/2018     HgBA1c:    Lab Results   Component Value Date    LABA1C 5.3 09/09/2018     FLP:    Lab Results   Component Value Date    TRIG 111 09/09/2018    HDL 38 09/09/2018    LDLCALC 70 09/09/2018    LABVLDL 22 09/09/2018     MRI Brain:  Mild-to-moderate chronic small vessel ischemic disease and age-related  involutional changes without acute stroke, midline shift mass effect.     Chronic encephalomalacia left PCA distribution     Chronic lacunar infarcts left anteromedial thalamus and right dorsal  medulla.     Loss of normal V4 segment flow void consistent with occlusion as on  prior examinations. I personally reviewed the patient's lab and imaging studies at this time. Assessment:     Doubt acute stroke   No focal weakness numbness or tingling    Now notes intermitted chest discomfort with deep breathing -- doubt neurological    He is at risk for recurrent strokes   After review of MRA, marked stenosis of his posterior circulation - most likely cause of his previous infarctions (medulary and occipital lobe)    ?  Hypercoagulability -- heme reports lab work normal   Numerous strokes, DVTs and has been maintained on warfarin     Patient Active Problem List   Diagnosis    Hypertension    Diabetes mellitus ([facility] [address]    Hyperlipidemia        Plan:     Neuro to follow serially    All questions answered by patient and Zahra Bailey  3:02 PM  9/9/2018    I spent 25

## 2018-09-10 ENCOUNTER — APPOINTMENT (OUTPATIENT)
Dept: GENERAL RADIOLOGY | Age: 73
DRG: 178 | End: 2018-09-10
Payer: MEDICARE

## 2018-09-10 LAB
ALBUMIN SERPL-MCNC: 3.3 G/DL (ref 3.5–5.2)
ALP BLD-CCNC: 62 U/L (ref 40–129)
ALT SERPL-CCNC: 13 U/L (ref 0–40)
ANION GAP SERPL CALCULATED.3IONS-SCNC: 16 MMOL/L (ref 7–16)
AST SERPL-CCNC: 15 U/L (ref 0–39)
BILIRUB SERPL-MCNC: 0.6 MG/DL (ref 0–1.2)
BUN BLDV-MCNC: 26 MG/DL (ref 8–23)
CALCIUM SERPL-MCNC: 8.7 MG/DL (ref 8.6–10.2)
CHLORIDE BLD-SCNC: 104 MMOL/L (ref 98–107)
CO2: 22 MMOL/L (ref 22–29)
CREAT SERPL-MCNC: 1.2 MG/DL (ref 0.7–1.2)
GFR AFRICAN AMERICAN: >60
GFR NON-AFRICAN AMERICAN: 59 ML/MIN/1.73
GLUCOSE BLD-MCNC: 155 MG/DL (ref 74–109)
HCT VFR BLD CALC: 41.3 % (ref 37–54)
HEMOGLOBIN: 13 G/DL (ref 12.5–16.5)
INR BLD: 2.8
MAGNESIUM: 1.5 MG/DL (ref 1.6–2.6)
MCH RBC QN AUTO: 28.3 PG (ref 26–35)
MCHC RBC AUTO-ENTMCNC: 31.5 % (ref 32–34.5)
MCV RBC AUTO: 90 FL (ref 80–99.9)
METER GLUCOSE: 141 MG/DL (ref 70–110)
PDW BLD-RTO: 14.9 FL (ref 11.5–15)
PHOSPHORUS: 2.6 MG/DL (ref 2.5–4.5)
PLATELET # BLD: 229 E9/L (ref 130–450)
PMV BLD AUTO: 11.1 FL (ref 7–12)
POTASSIUM SERPL-SCNC: 3.1 MMOL/L (ref 3.5–5)
PROTHROMBIN TIME: 31.5 SEC (ref 9.3–12.4)
RBC # BLD: 4.59 E12/L (ref 3.8–5.8)
SODIUM BLD-SCNC: 142 MMOL/L (ref 132–146)
TOTAL PROTEIN: 6.9 G/DL (ref 6.4–8.3)
WBC # BLD: 8.8 E9/L (ref 4.5–11.5)

## 2018-09-10 PROCEDURE — 85027 COMPLETE CBC AUTOMATED: CPT

## 2018-09-10 PROCEDURE — 84100 ASSAY OF PHOSPHORUS: CPT

## 2018-09-10 PROCEDURE — 85610 PROTHROMBIN TIME: CPT

## 2018-09-10 PROCEDURE — 97166 OT EVAL MOD COMPLEX 45 MIN: CPT

## 2018-09-10 PROCEDURE — G8987 SELF CARE CURRENT STATUS: HCPCS

## 2018-09-10 PROCEDURE — 97530 THERAPEUTIC ACTIVITIES: CPT

## 2018-09-10 PROCEDURE — 2700000000 HC OXYGEN THERAPY PER DAY

## 2018-09-10 PROCEDURE — 6360000002 HC RX W HCPCS: Performed by: INTERNAL MEDICINE

## 2018-09-10 PROCEDURE — 6370000000 HC RX 637 (ALT 250 FOR IP): Performed by: FAMILY MEDICINE

## 2018-09-10 PROCEDURE — 83735 ASSAY OF MAGNESIUM: CPT

## 2018-09-10 PROCEDURE — 97162 PT EVAL MOD COMPLEX 30 MIN: CPT

## 2018-09-10 PROCEDURE — 6370000000 HC RX 637 (ALT 250 FOR IP): Performed by: INTERNAL MEDICINE

## 2018-09-10 PROCEDURE — 80053 COMPREHEN METABOLIC PANEL: CPT

## 2018-09-10 PROCEDURE — G8988 SELF CARE GOAL STATUS: HCPCS

## 2018-09-10 PROCEDURE — 2140000000 HC CCU INTERMEDIATE R&B

## 2018-09-10 PROCEDURE — 36415 COLL VENOUS BLD VENIPUNCTURE: CPT

## 2018-09-10 PROCEDURE — 92611 MOTION FLUOROSCOPY/SWALLOW: CPT | Performed by: SPEECH-LANGUAGE PATHOLOGIST

## 2018-09-10 PROCEDURE — 82962 GLUCOSE BLOOD TEST: CPT

## 2018-09-10 PROCEDURE — 99232 SBSQ HOSP IP/OBS MODERATE 35: CPT | Performed by: INTERNAL MEDICINE

## 2018-09-10 PROCEDURE — G8978 MOBILITY CURRENT STATUS: HCPCS

## 2018-09-10 PROCEDURE — 94640 AIRWAY INHALATION TREATMENT: CPT

## 2018-09-10 PROCEDURE — 74230 X-RAY XM SWLNG FUNCJ C+: CPT

## 2018-09-10 PROCEDURE — 2580000003 HC RX 258: Performed by: INTERNAL MEDICINE

## 2018-09-10 PROCEDURE — 2500000003 HC RX 250 WO HCPCS: Performed by: FAMILY MEDICINE

## 2018-09-10 PROCEDURE — G8979 MOBILITY GOAL STATUS: HCPCS

## 2018-09-10 RX ORDER — POTASSIUM CHLORIDE 1.5 G/1.77G
40 POWDER, FOR SOLUTION ORAL ONCE
Status: COMPLETED | OUTPATIENT
Start: 2018-09-10 | End: 2018-09-10

## 2018-09-10 RX ORDER — DEXTROSE, SODIUM CHLORIDE, AND POTASSIUM CHLORIDE 5; .45; .15 G/100ML; G/100ML; G/100ML
INJECTION INTRAVENOUS CONTINUOUS
Status: DISCONTINUED | OUTPATIENT
Start: 2018-09-10 | End: 2018-09-11

## 2018-09-10 RX ORDER — MAGNESIUM SULFATE IN WATER 40 MG/ML
2 INJECTION, SOLUTION INTRAVENOUS ONCE
Status: COMPLETED | OUTPATIENT
Start: 2018-09-10 | End: 2018-09-10

## 2018-09-10 RX ADMIN — LISINOPRIL 40 MG: 20 TABLET ORAL at 07:54

## 2018-09-10 RX ADMIN — METFORMIN HYDROCHLORIDE 1000 MG: 1000 TABLET ORAL at 07:54

## 2018-09-10 RX ADMIN — MAGNESIUM SULFATE HEPTAHYDRATE 2 G: 40 INJECTION, SOLUTION INTRAVENOUS at 12:06

## 2018-09-10 RX ADMIN — LEVOTHYROXINE SODIUM 50 MCG: 50 TABLET ORAL at 07:05

## 2018-09-10 RX ADMIN — METOCLOPRAMIDE 10 MG: 10 TABLET ORAL at 12:06

## 2018-09-10 RX ADMIN — SODIUM CHLORIDE: 4.5 INJECTION, SOLUTION INTRAVENOUS at 05:03

## 2018-09-10 RX ADMIN — BARIUM SULFATE 45 G: 0.6 CREAM ORAL at 15:08

## 2018-09-10 RX ADMIN — POTASSIUM CHLORIDE 40 MEQ: 1.5 POWDER, FOR SOLUTION ORAL at 12:06

## 2018-09-10 RX ADMIN — ALBUTEROL SULFATE 2.5 MG: 2.5 SOLUTION RESPIRATORY (INHALATION) at 16:20

## 2018-09-10 RX ADMIN — AMLODIPINE BESYLATE 5 MG: 5 TABLET ORAL at 07:54

## 2018-09-10 RX ADMIN — BARIUM SULFATE 58 G: 960 POWDER, FOR SUSPENSION ORAL at 15:09

## 2018-09-10 RX ADMIN — METOCLOPRAMIDE 10 MG: 10 TABLET ORAL at 07:05

## 2018-09-10 ASSESSMENT — PAIN SCALES - GENERAL: PAINLEVEL_OUTOF10: 0

## 2018-09-10 NOTE — PROGRESS NOTES
SPEECH/LANGUAGE PATHOLOGY  VIDEOFLUOROSCOPIC STUDY OF SWALLOWING (MBS)      SUMMARY OF EVALUATION     DYSPHAGIA DIAGNOSIS:  Marked-severe pharyngeal dysphagia     [] Malnutrition indicators have been identified and nursing has been notified to ensure a dietary consult is ordered. DIET RECOMMENDATIONS:    [x]   NPO  Nothing by mouth including oral meds. Consider   []  NG tube  [x]  PEG tube     []  Regular consistency foods with _consistency liquids     []   Dental soft with _consistency liquids  []  Dysphagia 3 (advanced) with _consistency liquids   []  Dysphagia 2 (mechanically altered) with_consistency liquids   []  Dysphagia 1 (pureed) with _consistency liquids   []  Initiate trial feeding under the Speech Pathologists supervision   []  Other:         FEEDING RECOMMENDATIONS:   []No assistance required    []Stand by assist   []Full assistance required   []Set up required   []Supervision with all PO intake    []Double swallow    []Chin tuck  []Multiple swallow     []Feed in upright position   []Small bites/sips   []Feed reclined 45 degrees_  []Alternate solids / liquids  []Check for oral pocketing  []No straw    []Throat clear       []Place food on left side  []Place food on right side  []Spoon sip liquids   []Effortful swallow  []Lewis water protocol  []Modified Veronica Marycarmen water protocol     []Administer meds (crushedwhole) with: __   []Administer meds via feeding tube  []Other       THERAPY RECOMMENDATIONS:      []Therapy is not recommended      [x]Therapy is recommended to:    []Improve oral motor strength and range of motion    [x]Improve tongue base retraction    [x]Improve laryngeal strength and range of motion    []Address cricopharyngeal dysfunction (Shaker Exercises)    [x]Provide Deep Pharyngeal Neuromuscular Stimulation (DPNS)   []Repeat swallow study in    []Other:    []Therapy at the discretion of facility/treating Speech Pathologist       PROCEDURE     Consistencies Administered During the

## 2018-09-10 NOTE — PROGRESS NOTES
NC          ALLERGIES: Patient has no known allergies.     DIET : DIET LOW SODIUM 2 GM; Carb Control: 4 carb choices (60 gms)/meal  Dietary Nutrition Supplements: Low Calorie High Protein Supplement    Current Lab and Diagnostic Tests:   Recent Results (from the past 24 hour(s))   POCT Glucose    Collection Time: 09/09/18  3:09 PM   Result Value Ref Range    Meter Glucose 119 (H) 70 - 110 mg/dL   POCT Glucose    Collection Time: 09/09/18  9:33 PM   Result Value Ref Range    Meter Glucose 175 (H) 70 - 110 mg/dL   Protime-INR    Collection Time: 09/10/18  6:45 AM   Result Value Ref Range    Protime 31.5 (H) 9.3 - 12.4 sec    INR 2.8    CBC    Collection Time: 09/10/18  6:45 AM   Result Value Ref Range    WBC 8.8 4.5 - 11.5 E9/L    RBC 4.59 3.80 - 5.80 E12/L    Hemoglobin 13.0 12.5 - 16.5 g/dL    Hematocrit 41.3 37.0 - 54.0 %    MCV 90.0 80.0 - 99.9 fL    MCH 28.3 26.0 - 35.0 pg    MCHC 31.5 (L) 32.0 - 34.5 %    RDW 14.9 11.5 - 15.0 fL    Platelets 040 354 - 254 E9/L    MPV 11.1 7.0 - 12.0 fL   Magnesium    Collection Time: 09/10/18  6:45 AM   Result Value Ref Range    Magnesium 1.5 (L) 1.6 - 2.6 mg/dL   Phosphorus    Collection Time: 09/10/18  6:45 AM   Result Value Ref Range    Phosphorus 2.6 2.5 - 4.5 mg/dL   Comprehensive Metabolic Panel    Collection Time: 09/10/18  6:45 AM   Result Value Ref Range    Sodium 142 132 - 146 mmol/L    Potassium 3.1 (L) 3.5 - 5.0 mmol/L    Chloride 104 98 - 107 mmol/L    CO2 22 22 - 29 mmol/L    Anion Gap 16 7 - 16 mmol/L    Glucose 155 (H) 74 - 109 mg/dL    BUN 26 (H) 8 - 23 mg/dL    CREATININE 1.2 0.7 - 1.2 mg/dL    GFR Non-African American 59 >=60 mL/min/1.73    GFR African American >60     Calcium 8.7 8.6 - 10.2 mg/dL    Total Protein 6.9 6.4 - 8.3 g/dL    Alb 3.3 (L) 3.5 - 5.2 g/dL    Total Bilirubin 0.6 0.0 - 1.2 mg/dL    Alkaline Phosphatase 62 40 - 129 U/L    ALT 13 0 - 40 U/L    AST 15 0 - 39 U/L   POCT Glucose    Collection Time: 09/10/18  7:06 AM   Result Value Ref Range Physician Signature:_____________________________________    Print Signature:_________________________________________    Date:     Time:      9/17/18 - Signed off at this date and time 0700. Patient was discharged on 9/14/17 - 1831.

## 2018-09-10 NOTE — PROGRESS NOTES
0.3 09/08/2018    MONOSABS 1.30 09/08/2018    LYMPHSABS 1.57 09/08/2018    EOSABS 0.03 09/08/2018    BASOSABS 0.04 09/08/2018     BMP:    Lab Results   Component Value Date     09/09/2018    K 3.5 09/09/2018    K 3.6 09/07/2018     09/09/2018    CO2 24 09/09/2018    BUN 26 09/09/2018    LABALBU 3.2 09/09/2018    LABALBU 3.4 01/04/2012    CREATININE 1.4 09/09/2018    CALCIUM 8.5 09/09/2018    GFRAA 60 09/09/2018    LABGLOM 50 09/09/2018     PT/INR:    Lab Results   Component Value Date    PROTIME 37.0 09/09/2018    PROTIME 12.2 12/30/2011    INR 3.3 09/09/2018     Last 3 Troponin:    Lab Results   Component Value Date    TROPONINI <0.01 09/07/2018    TROPONINI 0.06 02/28/2018    TROPONINI 0.06 02/27/2018     TSH:    Lab Results   Component Value Date    TSH 2.170 09/07/2018      Assessment:     1. Fever on admission with elevated procalcitonin level ? C/W pneumonia ? aspiration; MBS has been ordered  2. CKD2  3. H/O prior CVA's with no evidence for new CVA on this admission  4. H/O DVT's  5. SSS with h/o pacemaker  6. Type II DM  7.  Benign essential HTN    Plan:       Continue same plan and orders

## 2018-09-10 NOTE — PROGRESS NOTES
GLUCOSE 155 09/10/2018    GLUCOSE 297 2012    PROT 6.9 09/10/2018    LABALBU 3.3 09/10/2018    LABALBU 3.4 2012    CALCIUM 8.7 09/10/2018    BILITOT 0.6 09/10/2018    ALKPHOS 62 09/10/2018    AST 15 09/10/2018    ALT 13 09/10/2018     PT/INR:    Lab Results   Component Value Date    PROTIME 31.5 09/10/2018    PROTIME 12.2 2011    INR 2.8 09/10/2018     TSH:    Lab Results   Component Value Date    TSH 2.170 2018        PRO-BNP: No results found for: PROBNP   ABGs:   Lab Results   Component Value Date    PH 7.417 2014    PO2 87.9 2014    PCO2 44.8 2014     Hemoglobin A1C: No components found for: HGBA1C    IMAGING:  Imaging tests were completed and reviewed and discussed radiology and primary care team and reveals   Xr Chest Standard (2 Vw)    Result Date: 2018  Patient MRN: [id number] : 1945 Age:  67 years Gender: Male Order Date: 2018 9:45 AM Exam: XR CHEST (2 VW) Number of Images: 2 views Indication:   re: possible CAP MAY GO TO RADIOLOGY OFF THE MONITOR re: possible CAP Comparison: None. Findings: There is a suggestion of bilateral pleural effusions The heart is enlarged. The lung fields demonstrate no significant pulmonary vascular congestion and edema. The aorta is tortuous ectatic and calcified. .     Cardiomegaly Findings compatible with atherosclerotic disease of the aorta. There are likely small bilateral pleural effusions     Ct Head Wo Contrast    Result Date: 2018  Patient MRN:  [id number] : 1945 Age: 67 years Gender: Male Order Date:  2018 5:45 PM EXAM: CT HEAD WO CONTRAST NUMBER OF IMAGES:  120 INDICATION:  STROKE COMPARISON: MRI 2018 Technique: Low-dose CT  acquisition technique included one of following options; 1 . Automated exposure control, 2. Adjustment of MA and or KV according to patient's size or 3. Use of iterative reconstruction. Multiple CT sections were obtained with sagittal and coronal MPR reconstructions.  The

## 2018-09-11 LAB
ANION GAP SERPL CALCULATED.3IONS-SCNC: 15 MMOL/L (ref 7–16)
BUN BLDV-MCNC: 22 MG/DL (ref 8–23)
CALCIUM SERPL-MCNC: 9.1 MG/DL (ref 8.6–10.2)
CHLORIDE BLD-SCNC: 105 MMOL/L (ref 98–107)
CO2: 24 MMOL/L (ref 22–29)
CREAT SERPL-MCNC: 1.1 MG/DL (ref 0.7–1.2)
GFR AFRICAN AMERICAN: >60
GFR NON-AFRICAN AMERICAN: >60 ML/MIN/1.73
GLUCOSE BLD-MCNC: 179 MG/DL (ref 74–109)
INR BLD: 2.3
MAGNESIUM: 1.6 MG/DL (ref 1.6–2.6)
METER GLUCOSE: 156 MG/DL (ref 70–110)
METER GLUCOSE: 167 MG/DL (ref 70–110)
METER GLUCOSE: 168 MG/DL (ref 70–110)
POTASSIUM SERPL-SCNC: 3.7 MMOL/L (ref 3.5–5)
PROCALCITONIN: 0.1 NG/ML (ref 0–0.08)
PROTHROMBIN TIME: 26.3 SEC (ref 9.3–12.4)
SODIUM BLD-SCNC: 144 MMOL/L (ref 132–146)

## 2018-09-11 PROCEDURE — 82962 GLUCOSE BLOOD TEST: CPT

## 2018-09-11 PROCEDURE — 84145 PROCALCITONIN (PCT): CPT

## 2018-09-11 PROCEDURE — 2700000000 HC OXYGEN THERAPY PER DAY

## 2018-09-11 PROCEDURE — 6360000002 HC RX W HCPCS: Performed by: INTERNAL MEDICINE

## 2018-09-11 PROCEDURE — 94640 AIRWAY INHALATION TREATMENT: CPT

## 2018-09-11 PROCEDURE — 36415 COLL VENOUS BLD VENIPUNCTURE: CPT

## 2018-09-11 PROCEDURE — 83735 ASSAY OF MAGNESIUM: CPT

## 2018-09-11 PROCEDURE — 99233 SBSQ HOSP IP/OBS HIGH 50: CPT | Performed by: INTERNAL MEDICINE

## 2018-09-11 PROCEDURE — 2500000003 HC RX 250 WO HCPCS: Performed by: INTERNAL MEDICINE

## 2018-09-11 PROCEDURE — 80048 BASIC METABOLIC PNL TOTAL CA: CPT

## 2018-09-11 PROCEDURE — 85610 PROTHROMBIN TIME: CPT

## 2018-09-11 PROCEDURE — 97530 THERAPEUTIC ACTIVITIES: CPT

## 2018-09-11 PROCEDURE — 2580000003 HC RX 258: Performed by: FAMILY MEDICINE

## 2018-09-11 PROCEDURE — 2140000000 HC CCU INTERMEDIATE R&B

## 2018-09-11 PROCEDURE — 92526 ORAL FUNCTION THERAPY: CPT

## 2018-09-11 RX ORDER — DEXTROSE, SODIUM CHLORIDE, AND POTASSIUM CHLORIDE 5; .45; .15 G/100ML; G/100ML; G/100ML
INJECTION INTRAVENOUS CONTINUOUS
Status: DISCONTINUED | OUTPATIENT
Start: 2018-09-11 | End: 2018-09-12

## 2018-09-11 RX ORDER — MAGNESIUM SULFATE 1 G/100ML
1 INJECTION INTRAVENOUS ONCE
Status: COMPLETED | OUTPATIENT
Start: 2018-09-11 | End: 2018-09-11

## 2018-09-11 RX ADMIN — DEXTROSE, SODIUM CHLORIDE, AND POTASSIUM CHLORIDE: 5; .45; .15 INJECTION INTRAVENOUS at 23:10

## 2018-09-11 RX ADMIN — Medication 10 ML: at 12:29

## 2018-09-11 RX ADMIN — DEXTROSE, SODIUM CHLORIDE, AND POTASSIUM CHLORIDE: 5; .45; .15 INJECTION INTRAVENOUS at 01:01

## 2018-09-11 RX ADMIN — ALBUTEROL SULFATE 2.5 MG: 2.5 SOLUTION RESPIRATORY (INHALATION) at 08:48

## 2018-09-11 RX ADMIN — MAGNESIUM SULFATE IN DEXTROSE 1 G: 10 INJECTION, SOLUTION INTRAVENOUS at 12:29

## 2018-09-11 RX ADMIN — ALBUTEROL SULFATE 2.5 MG: 2.5 SOLUTION RESPIRATORY (INHALATION) at 14:59

## 2018-09-11 RX ADMIN — Medication 10 ML: at 01:01

## 2018-09-11 RX ADMIN — DEXTROSE, SODIUM CHLORIDE, AND POTASSIUM CHLORIDE: 5; .45; .15 INJECTION INTRAVENOUS at 16:26

## 2018-09-11 ASSESSMENT — PAIN SCALES - GENERAL
PAINLEVEL_OUTOF10: 0

## 2018-09-11 NOTE — PROGRESS NOTES
OT BEDSIDE TREATMENT NOTE      Date:2018  Patient Name: Maci Quinn  MRN: [id number]  : 1945  Room: [address] [place], [place] [address]        Evaluating OT: Mar Valencia OTR/L #[id number]      Recommended Adaptive Equipment: tbd   AM-PAC Daily Activity Raw Score:   G-Code: CK     Diagnosis: Chest pain; Fall     Past Medical History:   Past Medical History        Past Medical History:   Diagnosis Date    Diabetes mellitus ([facility]      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[facility]      Hypertension      Ischemic stroke ([facility] 2018    Pacemaker      Paraplegia (HCC)      Stroke (cerebrum) ([facility] 2018    Thyroid disease           Precautions: Fall risk, O2, Hx CVA    Per OT Eval:   Home Living: Pt lives alone in a 1 story home with 3 ADRIANNA and B hand rails    Bathroom setup: walk-in shower  Equipment owned: [facility]     Prior Level of Function: Independent with ADLs; Independent with IADLs; ambulated with [facility]  Driving: yes  Occupation: enjoys watching football    Pain: No c/o pain this date. Cognition: Pt able to follow multiple step commands. Fair+ safety/problem solving. Goals:  STG 1 :  Pt will complete LB self-care tasks with min A  STG 2 :  Pt will complete functional transfers with min A  STG 3 :  Pt will demonstrate F activity tolerance while completing ADL task. STG 4 :  Pt will complete functional ambulation / item retrieval task with min A     Functional Assessment:      Initial Eval 9/10 Current status: 2018   Feeding  indep  Independent   Grooming  Min A (seated) SBA  Pt demo'd ability to wash face seated EOB requiring assist for balance and safety. Upper Body Dressing Min A  Per eval.   Will continue to assess.    Lower Body Dressing Dependent  Max A  Pt educated on cross leg method, Pt limited by decreased ROM. Pt able to partially doff L sock  requiring assistance to doff completely.     Bathing Mod A Per eval.  Will continue to assess.    Toileting  NT NT    Bed Mobility  Supine to Sit: Max A  Sit to Supine:max A

## 2018-09-11 NOTE — PROGRESS NOTES
[facility]  Division of Pulmonary, Critical Care and Sleep Medicine  Progress Note      Admit Date:  9/7/2018    MRN:   [id number]        Patient:        PCP:   Newton Min DO    CONSULT : Possible pneumonia    SUBJECTIVE:    MBS done - final results pending  Patient is choking when he eats  The MRI was reviewed with neurology   Procalcitonin is low (> 1 is clinically important)  MRI pending  Cough noted    OBJECTIVE:     PHYSICAL EXAM:   VITALS:   Patient Vitals for the past 8 hrs:   BP Temp Temp src Pulse Resp SpO2 Weight   09/11/18 1130 - 99 °F (37.2 °C) Oral 68 20 98 % -   09/11/18 0745 126/68 97.5 °F (36.4 °C) Oral 64 18 97 % -   09/11/18 0612 - - - - - - 278 lb 3.2 oz (126.2 kg)       Intake/Output Summary (Last 24 hours) at 09/11/18 1229  Last data filed at 09/11/18 0745   Gross per 24 hour   Intake              180 ml   Output              300 ml   Net             -120 ml        CONSTITUTIONAL:   A&O x 3, NAD  SKIN:    No rash, No suspicious lesions  HEENT:    EOMI, MMM, No thrush  NECK:   No bruits, No JVP apprechiated  CV:     Sinus,  No Murmus, No Rubs, No gallop  PULMONARY:  Couse,    ABDOMEN:    Soft, non-tender. BS normal. No R/R/G  EXT:   No deformities or skin discoloration. No clubbing. + lower    extremity edema,  PULSE:  Peripheral pulses barely palpable  PSYCHIATRIC: Approprate  MS:   No fracture.    NEUROLOGIC:  Slight weakness on the legs      DATA: IMAGING & TESTING:     LABORATORY TESTS:    CBC:   Lab Results   Component Value Date    WBC 8.8 09/10/2018    RBC 4.59 09/10/2018    HGB 13.0 09/10/2018    HCT 41.3 09/10/2018    MCV 90.0 09/10/2018    MCH 28.3 09/10/2018    MCHC 31.5 09/10/2018    RDW 14.9 09/10/2018     09/10/2018    MPV 11.1 09/10/2018     CMP:    Lab Results   Component Value Date     09/11/2018    K 3.7 09/11/2018    K 3.6 09/07/2018     09/11/2018    CO2 24 09/11/2018    BUN 22 09/11/2018    CREATININE 1.1 09/11/2018

## 2018-09-11 NOTE — PROGRESS NOTES
Department of Internal Medicine  Nephrology Attending Progress Note    Events review. SUBJECTIVE:  We are following Mr Amy Parmar for hypertension. Patient reports feeling much better has no medical complaints.     Physical Exam:    VITALS:  /68   Pulse 64   Temp 97.5 °F (36.4 °C) (Oral)   Resp 18   Ht 6' 2\" (1.88 m)   Wt 278 lb 3.2 oz (126.2 kg)   SpO2 97%   BMI 35.72 kg/m²   24HR INTAKE/OUTPUT:      Intake/Output Summary (Last 24 hours) at 09/11/18 1019  Last data filed at 09/11/18 0612   Gross per 24 hour   Intake              180 ml   Output              300 ml   Net             -120 ml       Constitutional:  Awake alert and nonacute distress  HEENT:  Pupils are equal and reactive  Cardiovascular/Edema:  Heart sounds regular rate and rhythm  Respiratory:  Lungs clear to auscultation  Gastrointestinal:  Abdomen soft nontender  Other:  No lower extremities edema    DATA:    CBC with Differential:    Lab Results   Component Value Date    WBC 8.8 09/10/2018    RBC 4.59 09/10/2018    HGB 13.0 09/10/2018    HCT 41.3 09/10/2018     09/10/2018    MCV 90.0 09/10/2018    MCH 28.3 09/10/2018    MCHC 31.5 09/10/2018    RDW 14.9 09/10/2018    SEGSPCT 80 01/04/2012    BANDSPCT 1 11/27/2014    LYMPHOPCT 13.2 09/08/2018    MONOPCT 11.0 09/08/2018    MYELOPCT 0.9 02/28/2018    BASOPCT 0.3 09/08/2018    MONOSABS 1.30 09/08/2018    LYMPHSABS 1.57 09/08/2018    EOSABS 0.03 09/08/2018    BASOSABS 0.04 09/08/2018     CMP:    Lab Results   Component Value Date     09/11/2018    K 3.7 09/11/2018    K 3.6 09/07/2018     09/11/2018    CO2 24 09/11/2018    BUN 22 09/11/2018    CREATININE 1.1 09/11/2018    GFRAA >60 09/11/2018    LABGLOM >60 09/11/2018    GLUCOSE 179 09/11/2018    GLUCOSE 297 01/04/2012    PROT 6.9 09/10/2018    LABALBU 3.3 09/10/2018    LABALBU 3.4 01/04/2012    CALCIUM 9.1 09/11/2018    BILITOT 0.6 09/10/2018    ALKPHOS 62 09/10/2018    AST 15 09/10/2018    ALT 13 09/10/2018     Magnesium:

## 2018-09-11 NOTE — PROGRESS NOTES
Physical Therapy  [facility]  Daily Treatment Note  NAME: Husam Ngo  : 1945  MRN: [id number]    Date of Service: 2018   Evaluating Therapist: Yuki Feliz PT, DPT     Room #: 8962/6008-P  DIAGNOSIS: Chest Pain  PRECAUTIONS: Falls, Hx of multiple CVAs, L residual paresthesia, DM, O2  PROCEDURES: NA     Social:  Pt lives with alone in a 1 floor plan with 3 step(s) and 2 rail(s) to enter. Prior to admission pt walked with [facility] and was Independent.                Initial Evaluation  Date: 9/10/18 Treatment  Date:    18 Short Term/ Long Term   Goals   AM-PAC 6 Clicks 51/61  77/85     Was pt agreeable to Eval/treatment? Yes  yes     Does pt have pain? No c/o pain  no c/o pain      Bed Mobility  Rolling: NT  Supine to sit: MaxA  Sit to supine: NT  Scooting: Dep  Rolling; NT  Supine to sit: Mod A  Sit to supine: NT  Scooting; Mod A to EOB Basim   Transfers Sit to stand: MaxA  Stand to sit: MaxA  Stand pivot: MaxA with [address]  Sit to stand: Max A  Stand to sit: Mod A  Stand pivot: Mod A with ww Basim with [address]   Ambulation   2 feet with MaxA with [address]  3 feet to bedside chair Mod A with ww >75 feet with Basim with [address]   Stair negotiation: ascended and descended NT  NT >4 steps with 1 rail with Basim   BLE ROM WNL       BLE strength B hip flex 4-/5  B knee ext and DF 4+/5   Increase by 1/3 MMT grade   Balance Sitting: Basim  Standing: MaxA with [address]  Sitting: SBA  Standing: Mod A with ww Sitting: SBA  Standing: Basim with [address]        Pt performed therapeutic exercise of the following: Seated B LE AP's 20 reps and LAQ 3 x 10 reps. Patient education  Pt was educated on safety, therex and hand placement for transfers    Patient response to education:   Pt verbalized understanding Pt demonstrated skill Pt requires further education in this area   x x x     Additional Comments: Pt supine on arrival and agreeable to treatment. Pt was able to advance B LE's to EOB.  However required Mod A for trunk

## 2018-09-11 NOTE — CARE COORDINATION
SOCIAL WORK AND DISCHARGE PLANNING:  Pt lives with alone in a 1 floor plan with 3 steps to enter. Prior to admission pt walked with cane and was Independent. 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 [place] in going [place] [facility] [place]. He was there not long ago this year. I provided him with kyle choices incase he is denied. Pt for peg tube today.  Boyd Dodd  9/11/2018

## 2018-09-12 LAB
BLOOD CULTURE, ROUTINE: NORMAL
CULTURE, BLOOD 2: NORMAL
INR BLD: 2.4
METER GLUCOSE: 136 MG/DL (ref 70–110)
METER GLUCOSE: 160 MG/DL (ref 70–110)
METER GLUCOSE: 164 MG/DL (ref 70–110)
PROTHROMBIN TIME: 26.9 SEC (ref 9.3–12.4)

## 2018-09-12 PROCEDURE — 6360000002 HC RX W HCPCS: Performed by: SURGERY

## 2018-09-12 PROCEDURE — 2580000003 HC RX 258: Performed by: SURGERY

## 2018-09-12 PROCEDURE — 99152 MOD SED SAME PHYS/QHP 5/>YRS: CPT | Performed by: SURGERY

## 2018-09-12 PROCEDURE — 36415 COLL VENOUS BLD VENIPUNCTURE: CPT

## 2018-09-12 PROCEDURE — 82962 GLUCOSE BLOOD TEST: CPT

## 2018-09-12 PROCEDURE — 92526 ORAL FUNCTION THERAPY: CPT

## 2018-09-12 PROCEDURE — 85610 PROTHROMBIN TIME: CPT

## 2018-09-12 PROCEDURE — 97535 SELF CARE MNGMENT TRAINING: CPT

## 2018-09-12 PROCEDURE — 97530 THERAPEUTIC ACTIVITIES: CPT

## 2018-09-12 PROCEDURE — 94640 AIRWAY INHALATION TREATMENT: CPT

## 2018-09-12 PROCEDURE — 2700000000 HC OXYGEN THERAPY PER DAY

## 2018-09-12 PROCEDURE — L0625 LO FLEX L1-BELOW L5 PRE OTS: HCPCS | Performed by: SURGERY

## 2018-09-12 PROCEDURE — 2740000010 HC MISC ORTHOTIC: Performed by: SURGERY

## 2018-09-12 PROCEDURE — 3609013300 HC EGD TUBE PLACEMENT: Performed by: SURGERY

## 2018-09-12 PROCEDURE — 7100000001 HC PACU RECOVERY - ADDTL 15 MIN: Performed by: SURGERY

## 2018-09-12 PROCEDURE — 2500000003 HC RX 250 WO HCPCS: Performed by: SURGERY

## 2018-09-12 PROCEDURE — 99233 SBSQ HOSP IP/OBS HIGH 50: CPT | Performed by: INTERNAL MEDICINE

## 2018-09-12 PROCEDURE — 2140000000 HC CCU INTERMEDIATE R&B

## 2018-09-12 PROCEDURE — 7100000000 HC PACU RECOVERY - FIRST 15 MIN: Performed by: SURGERY

## 2018-09-12 PROCEDURE — 6370000000 HC RX 637 (ALT 250 FOR IP): Performed by: SURGERY

## 2018-09-12 PROCEDURE — 0DH63UZ INSERTION OF FEEDING DEVICE INTO STOMACH, PERCUTANEOUS APPROACH: ICD-10-PCS | Performed by: SURGERY

## 2018-09-12 PROCEDURE — 3E0G76Z INTRODUCTION OF NUTRITIONAL SUBSTANCE INTO UPPER GI, VIA NATURAL OR ARTIFICIAL OPENING: ICD-10-PCS | Performed by: SURGERY

## 2018-09-12 RX ORDER — LOVASTATIN 20 MG/1
20 TABLET ORAL NIGHTLY
Status: DISCONTINUED | OUTPATIENT
Start: 2018-09-12 | End: 2018-09-14 | Stop reason: HOSPADM

## 2018-09-12 RX ORDER — MIDAZOLAM HYDROCHLORIDE 1 MG/ML
INJECTION INTRAMUSCULAR; INTRAVENOUS PRN
Status: DISCONTINUED | OUTPATIENT
Start: 2018-09-12 | End: 2018-09-14 | Stop reason: HOSPADM

## 2018-09-12 RX ORDER — AMLODIPINE BESYLATE 5 MG/1
5 TABLET ORAL DAILY
Status: DISCONTINUED | OUTPATIENT
Start: 2018-09-12 | End: 2018-09-14 | Stop reason: HOSPADM

## 2018-09-12 RX ORDER — MEPERIDINE HYDROCHLORIDE 50 MG/ML
INJECTION INTRAMUSCULAR; INTRAVENOUS; SUBCUTANEOUS PRN
Status: DISCONTINUED | OUTPATIENT
Start: 2018-09-12 | End: 2018-09-12

## 2018-09-12 RX ORDER — PETROLATUM 42 G/100G
OINTMENT TOPICAL 2 TIMES DAILY PRN
Status: DISCONTINUED | OUTPATIENT
Start: 2018-09-12 | End: 2018-09-14 | Stop reason: HOSPADM

## 2018-09-12 RX ORDER — SODIUM CHLORIDE 0.9 % (FLUSH) 0.9 %
10 SYRINGE (ML) INJECTION 2 TIMES DAILY
Status: DISCONTINUED | OUTPATIENT
Start: 2018-09-12 | End: 2018-09-14 | Stop reason: HOSPADM

## 2018-09-12 RX ORDER — LEVOTHYROXINE SODIUM 0.05 MG/1
50 TABLET ORAL DAILY
Status: DISCONTINUED | OUTPATIENT
Start: 2018-09-12 | End: 2018-09-14 | Stop reason: HOSPADM

## 2018-09-12 RX ORDER — METOCLOPRAMIDE 10 MG/1
10 TABLET ORAL
Status: DISCONTINUED | OUTPATIENT
Start: 2018-09-12 | End: 2018-09-14 | Stop reason: HOSPADM

## 2018-09-12 RX ORDER — NITROGLYCERIN 0.4 MG/1
0.4 TABLET SUBLINGUAL EVERY 5 MIN PRN
Status: DISCONTINUED | OUTPATIENT
Start: 2018-09-12 | End: 2018-09-14 | Stop reason: HOSPADM

## 2018-09-12 RX ORDER — GLIPIZIDE 5 MG/1
10 TABLET, FILM COATED, EXTENDED RELEASE ORAL
Status: DISCONTINUED | OUTPATIENT
Start: 2018-09-13 | End: 2018-09-13 | Stop reason: SDUPTHER

## 2018-09-12 RX ORDER — ALBUTEROL SULFATE 2.5 MG/3ML
2.5 SOLUTION RESPIRATORY (INHALATION) 3 TIMES DAILY
Status: DISCONTINUED | OUTPATIENT
Start: 2018-09-12 | End: 2018-09-14 | Stop reason: HOSPADM

## 2018-09-12 RX ORDER — LISINOPRIL 20 MG/1
40 TABLET ORAL DAILY
Status: DISCONTINUED | OUTPATIENT
Start: 2018-09-12 | End: 2018-09-13

## 2018-09-12 RX ORDER — DEXTROSE, SODIUM CHLORIDE, AND POTASSIUM CHLORIDE 5; .45; .15 G/100ML; G/100ML; G/100ML
INJECTION INTRAVENOUS CONTINUOUS
Status: DISCONTINUED | OUTPATIENT
Start: 2018-09-12 | End: 2018-09-12

## 2018-09-12 RX ADMIN — METOCLOPRAMIDE 10 MG: 10 TABLET ORAL at 11:16

## 2018-09-12 RX ADMIN — PETROLATUM: 42 OINTMENT TOPICAL at 11:14

## 2018-09-12 RX ADMIN — METOCLOPRAMIDE 10 MG: 10 TABLET ORAL at 22:07

## 2018-09-12 RX ADMIN — ALBUTEROL SULFATE 2.5 MG: 2.5 SOLUTION RESPIRATORY (INHALATION) at 20:02

## 2018-09-12 RX ADMIN — Medication 10 ML: at 22:08

## 2018-09-12 RX ADMIN — METFORMIN HYDROCHLORIDE 1000 MG: 1000 TABLET ORAL at 16:12

## 2018-09-12 RX ADMIN — DEXTROSE, SODIUM CHLORIDE, AND POTASSIUM CHLORIDE: 5; .45; .15 INJECTION INTRAVENOUS at 11:13

## 2018-09-12 RX ADMIN — METOCLOPRAMIDE 10 MG: 10 TABLET ORAL at 16:12

## 2018-09-12 RX ADMIN — LISINOPRIL 40 MG: 20 TABLET ORAL at 11:18

## 2018-09-12 RX ADMIN — METFORMIN HYDROCHLORIDE 1000 MG: 1000 TABLET ORAL at 11:17

## 2018-09-12 RX ADMIN — ALBUTEROL SULFATE 2.5 MG: 2.5 SOLUTION RESPIRATORY (INHALATION) at 12:57

## 2018-09-12 RX ADMIN — AMLODIPINE BESYLATE 5 MG: 5 TABLET ORAL at 11:19

## 2018-09-12 ASSESSMENT — PAIN SCALES - GENERAL
PAINLEVEL_OUTOF10: 0

## 2018-09-12 NOTE — PROGRESS NOTES
to Sit: Max A  Sit to Supine:max A Supine<>Sit Max A  Therapist provided skilled cuing on technique and safety along with max A for supine<.sit  (educated on log roll due to peg tube placement). Functional Transfers Max A Max A  Min A (elevated surface)  Therapist facilitated multiple sit to stand transfers and stand pivot transfers utilizing w/w. Pt required min A from elevated bed and max A from chair. Cuing on hand placement, body mechanics and safety . Functional Mobility Mod A Min A  Therapist facilitated several side steps and several steps to.from chair - cuing on posture, w/w management and safety       Sit balance: SBA seated EOB for balance and safety. Stand balance: Mod/Min A with ww for balance. Endurance/Activity tolerance: Fair-     Comments: Upon arrival pt supine in bed and agreeable to OT Session. Therapist facilitated bed mobility (educating pt on log roll technique due to peg tube placement), unsupported sitting balance at EOB, functional transfers (sit to stand and stand pivots; max A from standard height surfaces, min A from elevated surfaces), standing tolerance tasks (at w/w addressing posture, balance and activity tolerance) and functional ambulation task with w/w (cuing on posture, w/w management and safety) - skilled cuing on hand placement, posture, body mechanics and safety. Therapist facilitated self-care retraining: UB/LB self-care tasks, toileting hygiene task and grooming task while educating pt on modified techniques, posture, safety and energy conservation techniques. Skilled monitoring of HR, O2 sats and pts response to treatment. Pt demonstrating fair understanding of education/techniques, requiring additional training / education   At end of session semi-supine in bed all lines and tubes intact, call light within reach. · Pt has made good progress towards set goals.    · Continue with current plan of care    Timed Treatment: 25 minutes  Tx Time in: 1425  TxTime out: 6056 [facility] system OTR/L #[id number]

## 2018-09-12 NOTE — PROGRESS NOTES
ALKPHOS 62 09/10/2018    AST 15 09/10/2018    ALT 13 09/10/2018     Magnesium:    Lab Results   Component Value Date    MG 1.6 09/11/2018     Phosphorus:    Lab Results   Component Value Date    PHOS 2.6 09/10/2018     Radiology Review:      CXR 9/7/18   1.  Mild CHF pattern versus low lung volumes with crowding of the   bronchovascular structures. 2.  Left basilar opacity suspicious for scarring and/or platelike   atelectasis.             MRI Brain 9/9/18           Mild-to-moderate chronic small vessel ischemic disease and age-related   involutional changes without acute stroke, midline shift mass effect.       Chronic encephalomalacia left PCA distribution       Chronic lacunar infarcts left anteromedial thalamus and right dorsal   medulla.       Loss of normal V4 segment flow void consistent with occlusion as on   prior examinations. BRIEF SUMMARY OF INITIAL CONSULT:    Briefly Mr. Yomi Oglesby is a 70-year-old man with history of hypertension, type II mellitus, factor V Leyden deficiency, hyperlipidemia, sick sinus syndrome status post pacemaker placement, CVA, hypothyroidism, who was follow up regularly with Dr. Blaine Garza was admitted on September 7, 2018 after she presented to the emergency room complaining of lower extremities weakness. His creatinine level on admission was 1.2 mg/dl. IMPRESSION/RECOMMENDATIONS:      1. Hypertension, controlled, on amlodipine and lisinopril   2.  Dysphagia,  for PEG today    Plan:    · Continue D5 1/2NS + 20 mEq KCL at 60 cc/hour until TF started  · Continue to monitor renal function  · Continue to monitor blood pressure

## 2018-09-13 LAB
ANION GAP SERPL CALCULATED.3IONS-SCNC: 12 MMOL/L (ref 7–16)
ANION GAP SERPL CALCULATED.3IONS-SCNC: 16 MMOL/L (ref 7–16)
BUN BLDV-MCNC: 23 MG/DL (ref 8–23)
BUN BLDV-MCNC: 27 MG/DL (ref 8–23)
CALCIUM SERPL-MCNC: 9.1 MG/DL (ref 8.6–10.2)
CALCIUM SERPL-MCNC: 9.1 MG/DL (ref 8.6–10.2)
CHLORIDE BLD-SCNC: 106 MMOL/L (ref 98–107)
CHLORIDE BLD-SCNC: 107 MMOL/L (ref 98–107)
CO2: 20 MMOL/L (ref 22–29)
CO2: 25 MMOL/L (ref 22–29)
CREAT SERPL-MCNC: 1.6 MG/DL (ref 0.7–1.2)
CREAT SERPL-MCNC: 1.7 MG/DL (ref 0.7–1.2)
GFR AFRICAN AMERICAN: 48
GFR AFRICAN AMERICAN: 51
GFR NON-AFRICAN AMERICAN: 40 ML/MIN/1.73
GFR NON-AFRICAN AMERICAN: 42 ML/MIN/1.73
GLUCOSE BLD-MCNC: 147 MG/DL (ref 74–109)
GLUCOSE BLD-MCNC: 155 MG/DL (ref 74–109)
HCT VFR BLD CALC: 35.9 % (ref 37–54)
HEMOGLOBIN: 11.3 G/DL (ref 12.5–16.5)
INR BLD: 3
MCH RBC QN AUTO: 28 PG (ref 26–35)
MCHC RBC AUTO-ENTMCNC: 31.5 % (ref 32–34.5)
MCV RBC AUTO: 89.1 FL (ref 80–99.9)
METER GLUCOSE: 133 MG/DL (ref 70–110)
METER GLUCOSE: 139 MG/DL (ref 70–110)
METER GLUCOSE: 153 MG/DL (ref 70–110)
METER GLUCOSE: 155 MG/DL (ref 70–110)
PDW BLD-RTO: 15.1 FL (ref 11.5–15)
PLATELET # BLD: 288 E9/L (ref 130–450)
PMV BLD AUTO: 10.2 FL (ref 7–12)
POTASSIUM SERPL-SCNC: 3.3 MMOL/L (ref 3.5–5)
POTASSIUM SERPL-SCNC: 3.7 MMOL/L (ref 3.5–5)
PROTHROMBIN TIME: 33.5 SEC (ref 9.3–12.4)
RBC # BLD: 4.03 E12/L (ref 3.8–5.8)
SODIUM BLD-SCNC: 139 MMOL/L (ref 132–146)
SODIUM BLD-SCNC: 147 MMOL/L (ref 132–146)
WBC # BLD: 7 E9/L (ref 4.5–11.5)

## 2018-09-13 PROCEDURE — 85610 PROTHROMBIN TIME: CPT

## 2018-09-13 PROCEDURE — 82962 GLUCOSE BLOOD TEST: CPT

## 2018-09-13 PROCEDURE — 2580000003 HC RX 258: Performed by: SURGERY

## 2018-09-13 PROCEDURE — 36415 COLL VENOUS BLD VENIPUNCTURE: CPT

## 2018-09-13 PROCEDURE — 94640 AIRWAY INHALATION TREATMENT: CPT

## 2018-09-13 PROCEDURE — 87147 CULTURE TYPE IMMUNOLOGIC: CPT

## 2018-09-13 PROCEDURE — 6360000002 HC RX W HCPCS: Performed by: SURGERY

## 2018-09-13 PROCEDURE — 6370000000 HC RX 637 (ALT 250 FOR IP): Performed by: SURGERY

## 2018-09-13 PROCEDURE — 92526 ORAL FUNCTION THERAPY: CPT

## 2018-09-13 PROCEDURE — 80048 BASIC METABOLIC PNL TOTAL CA: CPT

## 2018-09-13 PROCEDURE — 6370000000 HC RX 637 (ALT 250 FOR IP): Performed by: FAMILY MEDICINE

## 2018-09-13 PROCEDURE — 2580000003 HC RX 258: Performed by: INTERNAL MEDICINE

## 2018-09-13 PROCEDURE — 87205 SMEAR GRAM STAIN: CPT

## 2018-09-13 PROCEDURE — 87070 CULTURE OTHR SPECIMN AEROBIC: CPT

## 2018-09-13 PROCEDURE — 85027 COMPLETE CBC AUTOMATED: CPT

## 2018-09-13 PROCEDURE — 2700000000 HC OXYGEN THERAPY PER DAY

## 2018-09-13 PROCEDURE — 2140000000 HC CCU INTERMEDIATE R&B

## 2018-09-13 PROCEDURE — 99232 SBSQ HOSP IP/OBS MODERATE 35: CPT | Performed by: INTERNAL MEDICINE

## 2018-09-13 RX ORDER — SODIUM CHLORIDE 9 MG/ML
INJECTION, SOLUTION INTRAVENOUS CONTINUOUS
Status: DISCONTINUED | OUTPATIENT
Start: 2018-09-13 | End: 2018-09-14

## 2018-09-13 RX ORDER — POTASSIUM CHLORIDE 20MEQ/15ML
40 LIQUID (ML) ORAL ONCE
Status: COMPLETED | OUTPATIENT
Start: 2018-09-13 | End: 2018-09-13

## 2018-09-13 RX ORDER — IPRATROPIUM BROMIDE AND ALBUTEROL SULFATE 2.5; .5 MG/3ML; MG/3ML
1 SOLUTION RESPIRATORY (INHALATION) EVERY 4 HOURS PRN
Status: DISCONTINUED | OUTPATIENT
Start: 2018-09-13 | End: 2018-09-14 | Stop reason: HOSPADM

## 2018-09-13 RX ORDER — GLIPIZIDE 5 MG/1
5 TABLET, FILM COATED, EXTENDED RELEASE ORAL 2 TIMES DAILY
Status: DISCONTINUED | OUTPATIENT
Start: 2018-09-13 | End: 2018-09-14 | Stop reason: HOSPADM

## 2018-09-13 RX ORDER — DEXTROSE MONOHYDRATE 50 MG/ML
INJECTION, SOLUTION INTRAVENOUS CONTINUOUS
Status: DISCONTINUED | OUTPATIENT
Start: 2018-09-13 | End: 2018-09-13

## 2018-09-13 RX ORDER — AMOXICILLIN AND CLAVULANATE POTASSIUM 875; 125 MG/1; MG/1
1 TABLET, FILM COATED ORAL EVERY 12 HOURS SCHEDULED
Status: DISCONTINUED | OUTPATIENT
Start: 2018-09-13 | End: 2018-09-14 | Stop reason: HOSPADM

## 2018-09-13 RX ADMIN — SODIUM CHLORIDE: 9 INJECTION, SOLUTION INTRAVENOUS at 23:32

## 2018-09-13 RX ADMIN — METFORMIN HYDROCHLORIDE 1000 MG: 1000 TABLET ORAL at 10:30

## 2018-09-13 RX ADMIN — ALBUTEROL SULFATE 2.5 MG: 2.5 SOLUTION RESPIRATORY (INHALATION) at 08:07

## 2018-09-13 RX ADMIN — METOCLOPRAMIDE 10 MG: 10 TABLET ORAL at 23:32

## 2018-09-13 RX ADMIN — DEXTROSE MONOHYDRATE: 50 INJECTION, SOLUTION INTRAVENOUS at 11:43

## 2018-09-13 RX ADMIN — AMOXICILLIN AND CLAVULANATE POTASSIUM 1 TABLET: 875; 125 TABLET, FILM COATED ORAL at 23:32

## 2018-09-13 RX ADMIN — AMLODIPINE BESYLATE 5 MG: 5 TABLET ORAL at 10:30

## 2018-09-13 RX ADMIN — LEVOTHYROXINE SODIUM 50 MCG: 50 TABLET ORAL at 05:45

## 2018-09-13 RX ADMIN — POTASSIUM CHLORIDE 40 MEQ: 1.5 SOLUTION ORAL at 10:30

## 2018-09-13 RX ADMIN — METFORMIN HYDROCHLORIDE 1000 MG: 1000 TABLET ORAL at 16:49

## 2018-09-13 RX ADMIN — ALBUTEROL SULFATE 2.5 MG: 2.5 SOLUTION RESPIRATORY (INHALATION) at 11:25

## 2018-09-13 RX ADMIN — ALBUTEROL SULFATE 2.5 MG: 2.5 SOLUTION RESPIRATORY (INHALATION) at 20:03

## 2018-09-13 RX ADMIN — Medication 10 ML: at 10:32

## 2018-09-13 RX ADMIN — METOCLOPRAMIDE 10 MG: 10 TABLET ORAL at 16:49

## 2018-09-13 RX ADMIN — DEXTROSE MONOHYDRATE: 50 INJECTION, SOLUTION INTRAVENOUS at 10:31

## 2018-09-13 RX ADMIN — METOCLOPRAMIDE 10 MG: 10 TABLET ORAL at 05:45

## 2018-09-13 RX ADMIN — AMOXICILLIN AND CLAVULANATE POTASSIUM 1 TABLET: 875; 125 TABLET, FILM COATED ORAL at 10:30

## 2018-09-13 RX ADMIN — METOCLOPRAMIDE 10 MG: 10 TABLET ORAL at 10:33

## 2018-09-13 ASSESSMENT — PAIN SCALES - GENERAL
PAINLEVEL_OUTOF10: 0

## 2018-09-13 NOTE — PROGRESS NOTES
Department of Internal Medicine  Nephrology Attending Progress Note    Events review. SUBJECTIVE:  We are following Mr Larry Juares for hypertension. Patient reports feeling much better has no medical complaints.     Physical Exam:    VITALS:  BP (!) 114/59   Pulse 74   Temp 97.9 °F (36.6 °C) (Temporal)   Resp 16   Ht 6' 2\" (1.88 m)   Wt 280 lb (127 kg)   SpO2 92%   BMI 35.95 kg/m²   24HR INTAKE/OUTPUT:      Intake/Output Summary (Last 24 hours) at 09/13/18 0847  Last data filed at 09/13/18 4875   Gross per 24 hour   Intake                0 ml   Output              400 ml   Net             -400 ml       Constitutional:  Awake alert and nonacute distress  HEENT:  Pupils are equal and reactive  Cardiovascular/Edema:  Heart sounds regular rate and rhythm  Respiratory:  Lungs clear to auscultation  Gastrointestinal:  Abdomen soft nontender, PEG tube in place   Other:  No lower extremities edema    Scheduled Meds:   [START ON 3/8/2019] warfarin (COUMADIN) daily dosing (placeholder)   Other RX Placeholder    metoclopramide  10 mg Oral 4x Daily AC & HS    glipiZIDE  10 mg Oral Daily with breakfast    metFORMIN  1,000 mg Oral BID WC    amLODIPine  5 mg Oral Daily    levothyroxine  50 mcg Oral Daily    lovastatin  20 mg Oral Nightly    lisinopril  40 mg Oral Daily    albuterol  2.5 mg Nebulization TID    sodium chloride flush  10 mL Intravenous BID     Continuous Infusions:    PRN Meds:.midazolam, nitroGLYCERIN, mineral oil-hydrophilic petrolatum      DATA:    CBC with Differential:    Lab Results   Component Value Date    WBC 7.0 09/13/2018    RBC 4.03 09/13/2018    HGB 11.3 09/13/2018    HCT 35.9 09/13/2018     09/13/2018    MCV 89.1 09/13/2018    MCH 28.0 09/13/2018    MCHC 31.5 09/13/2018    RDW 15.1 09/13/2018    SEGSPCT 80 01/04/2012    BANDSPCT 1 11/27/2014    LYMPHOPCT 13.2 09/08/2018    MONOPCT 11.0 09/08/2018    MYELOPCT 0.9 02/28/2018    BASOPCT 0.3 09/08/2018    MONOSABS 1.30 09/08/2018 inhibition. 2. Hypertension, controlled, on amlodipine and lisinopril   3. Hypernatremia, dehydration, NPO last 36 hours   4. Hypokalemia, 2/2 no oral intake   5.  Dysphagia,  S/p PEG, to start TF today     Plan:    · D5W at 75 cc/hour  · Hold lisinopril   · Replace K   · Continue to monitor renal function  · Continue to monitor blood pressure

## 2018-09-13 NOTE — PROGRESS NOTES
1959 ml water ( EN + free water flush)  · Anthropometric Measures:  · Ht: 6' 2\" (188 cm)   · Current Body Wt: 280 lb (127 kg) (9/13 standing scale)  · Admission Body Wt: 271 lb (122.9 kg) (9/7 first measured)  · Usual Body Wt: 320 lb (145.2 kg) (2/2018 EMR actual (confirmed by pt))  · % Weight Change: ~46lb (14%) wt loss x 7 mon ,     · Ideal Body Wt: 175 lb (79.4 kg) (adjust for paraplegic), % [place] Body 155% (used admission weight)  · Adjusted Body Wt: 200 lb (90.7 kg), body weight adjusted for Obesity, Paraplegia  · BMI Classification: BMI 35.0 - 39.9 Obese Class II  · Comparative Standards (Estimated Nutrition Needs):  · Estimated Daily Total Kcal: 8955-3006  · Estimated Daily Protein (g): 115-130  · Estimated Daily Fluid (ml/day): 5727-4041    Nutrition Risk Level: Moderate    Nutrition Interventions:   Continue NPO, Modify current Tube Feeding (Recommend 1.5 calorie diabetic formula ( Glucerna 1.5) goal rate 65 ml/hr to provide 1560 ml 2340 kcals, 128 grams protein, 1174 ml water. Continue current free water flush when no IV fliuids.)  Continued Inpatient Monitoring, Coordination of Care, Education not appropriate at this time    Nutrition Evaluation:   · Evaluation: Goals set   · Goals: Tolerate EN at goal rate. · Monitoring: NPO Status, TF Intake, TF Tolerance, Gastric Residuals, Fluid Balance, Ascites/Edema, Weight, Comparative Standards, Pertinent Labs, Chewing/Swallowing    See Adult Nutrition Doc Flowsheet for more detail.      Electronically signed by Jessee Hamman, RD, LD on 9/13/18 at 12:02 PM    Contact Number:  Ext [phone]

## 2018-09-13 NOTE — PROGRESS NOTES
13.2 09/08/2018    MONOPCT 11.0 09/08/2018    MYELOPCT 0.9 02/28/2018    BASOPCT 0.3 09/08/2018    MONOSABS 1.30 09/08/2018    LYMPHSABS 1.57 09/08/2018    EOSABS 0.03 09/08/2018    BASOSABS 0.04 09/08/2018     CMP:    Lab Results   Component Value Date     09/13/2018    K 3.3 09/13/2018    K 3.6 09/07/2018     09/13/2018    CO2 25 09/13/2018    BUN 23 09/13/2018    CREATININE 1.7 09/13/2018    GFRAA 48 09/13/2018    LABGLOM 40 09/13/2018    GLUCOSE 147 09/13/2018    GLUCOSE 297 01/04/2012    PROT 6.9 09/10/2018    LABALBU 3.3 09/10/2018    LABALBU 3.4 01/04/2012    CALCIUM 9.1 09/13/2018    BILITOT 0.6 09/10/2018    ALKPHOS 62 09/10/2018    AST 15 09/10/2018    ALT 13 09/10/2018       Current Inpatient Medications    Current Facility-Administered Medications: dextrose 5 % solution, , Intravenous, Continuous  ipratropium-albuterol (DUONEB) nebulizer solution 1 ampule, 1 ampule, Inhalation, Q4H PRN  amoxicillin-clavulanate (AUGMENTIN) 875-125 MG per tablet 1 tablet, 1 tablet, Oral, 2 times per day  midazolam (VERSED) injection, , , PRN  nitroGLYCERIN (NITROSTAT) SL tablet 0.4 mg, 0.4 mg, Sublingual, Q5 Min PRN  mineral oil-hydrophilic petrolatum (HYDROPHOR) ointment, , Topical, BID PRN  [START ON 3/8/2019] warfarin (COUMADIN) daily dosing (placeholder), , Other, RX Placeholder  metoclopramide (REGLAN) tablet 10 mg, 10 mg, Oral, 4x Daily AC & HS  glipiZIDE (GLUCOTROL XL) extended release tablet 10 mg, 10 mg, Oral, Daily with breakfast  metFORMIN (GLUCOPHAGE) tablet 1,000 mg, 1,000 mg, Oral, BID WC  amLODIPine (NORVASC) tablet 5 mg, 5 mg, Oral, Daily  levothyroxine (SYNTHROID) tablet 50 mcg, 50 mcg, Oral, Daily  lovastatin (MEVACOR) tablet 20 mg, 20 mg, Oral, Nightly  albuterol (PROVENTIL) nebulizer solution 2.5 mg, 2.5 mg, Nebulization, TID  sodium chloride flush 0.9 % injection 10 mL, 10 mL, Intravenous, BID    ASSESSMENT AND PLAN productive cough assess  G in place initiate TF  Mobilize as possible  Long

## 2018-09-13 NOTE — PROGRESS NOTES
[place]  Division of Pulmonary, Critical Care and Sleep Medicine  Progress Note      Admit Date:  9/7/2018    MRN:   [id number]        Patient:        PCP:   Bree Casas DO    CONSULT : Possible pneumonia    SUBJECTIVE:    MBS done - final results pending  Patient is choking when he eats  The MRI was reviewed with neurology   Procalcitonin is low (> 1 is clinically important)  Cough noted  D5 started - for high Na    OBJECTIVE:     PHYSICAL EXAM:   VITALS:   Patient Vitals for the past 8 hrs:   Temp Temp src Height Weight   09/13/18 1142 - - 6' 2\" (1.88 m) -   09/13/18 0830 97.9 °F (36.6 °C) Temporal - -   09/13/18 0544 - - - 280 lb (127 kg)       Intake/Output Summary (Last 24 hours) at 09/13/18 1340  Last data filed at 09/13/18 8243   Gross per 24 hour   Intake                0 ml   Output              200 ml   Net             -200 ml        CONSTITUTIONAL:   A&O x 3, NAD  SKIN:    No rash, No suspicious lesions  HEENT:    EOMI, MMM, No thrush  NECK:   No bruits, No JVP apprechiated  CV:     Sinus,  No Murmus, No Rubs, No gallop  PULMONARY:  Couse,    ABDOMEN:    Soft, non-tender. BS normal. No R/R/G  EXT:   No deformities or skin discoloration. No clubbing. + lower    extremity edema,  PULSE:  Peripheral pulses barely palpable  PSYCHIATRIC: Approprate  MS:   No fracture.    NEUROLOGIC:  Slight weakness on the legs      DATA: IMAGING & TESTING:     LABORATORY TESTS:    CBC:   Lab Results   Component Value Date    WBC 7.0 09/13/2018    RBC 4.03 09/13/2018    HGB 11.3 09/13/2018    HCT 35.9 09/13/2018    MCV 89.1 09/13/2018    MCH 28.0 09/13/2018    MCHC 31.5 09/13/2018    RDW 15.1 09/13/2018     09/13/2018    MPV 10.2 09/13/2018     CMP:    Lab Results   Component Value Date     09/13/2018    K 3.3 09/13/2018    K 3.6 09/07/2018     09/13/2018    CO2 25 09/13/2018    BUN 23 09/13/2018    CREATININE 1.7 09/13/2018    GFRAA 48 09/13/2018    LABGLOM 40

## 2018-09-14 ENCOUNTER — APPOINTMENT (OUTPATIENT)
Dept: GENERAL RADIOLOGY | Age: 73
DRG: 178 | End: 2018-09-14
Payer: MEDICARE

## 2018-09-14 VITALS
TEMPERATURE: 97 F | RESPIRATION RATE: 16 BRPM | HEART RATE: 71 BPM | OXYGEN SATURATION: 96 % | WEIGHT: 277.7 LBS | HEIGHT: 74 IN | BODY MASS INDEX: 35.64 KG/M2 | DIASTOLIC BLOOD PRESSURE: 67 MMHG | SYSTOLIC BLOOD PRESSURE: 146 MMHG

## 2018-09-14 LAB
ANION GAP SERPL CALCULATED.3IONS-SCNC: 13 MMOL/L (ref 7–16)
BUN BLDV-MCNC: 24 MG/DL (ref 8–23)
CALCIUM SERPL-MCNC: 9.3 MG/DL (ref 8.6–10.2)
CHLORIDE BLD-SCNC: 105 MMOL/L (ref 98–107)
CO2: 26 MMOL/L (ref 22–29)
CREAT SERPL-MCNC: 1.3 MG/DL (ref 0.7–1.2)
GFR AFRICAN AMERICAN: >60
GFR NON-AFRICAN AMERICAN: 54 ML/MIN/1.73
GLUCOSE BLD-MCNC: 159 MG/DL (ref 74–109)
INR BLD: 2.7
METER GLUCOSE: 142 MG/DL (ref 70–110)
METER GLUCOSE: 148 MG/DL (ref 70–110)
METER GLUCOSE: 159 MG/DL (ref 70–110)
POTASSIUM SERPL-SCNC: 3.6 MMOL/L (ref 3.5–5)
PROTHROMBIN TIME: 30.4 SEC (ref 9.3–12.4)
SODIUM BLD-SCNC: 144 MMOL/L (ref 132–146)

## 2018-09-14 PROCEDURE — 6370000000 HC RX 637 (ALT 250 FOR IP): Performed by: SURGERY

## 2018-09-14 PROCEDURE — 97535 SELF CARE MNGMENT TRAINING: CPT

## 2018-09-14 PROCEDURE — 36415 COLL VENOUS BLD VENIPUNCTURE: CPT

## 2018-09-14 PROCEDURE — 99232 SBSQ HOSP IP/OBS MODERATE 35: CPT | Performed by: INTERNAL MEDICINE

## 2018-09-14 PROCEDURE — 6370000000 HC RX 637 (ALT 250 FOR IP): Performed by: FAMILY MEDICINE

## 2018-09-14 PROCEDURE — 85610 PROTHROMBIN TIME: CPT

## 2018-09-14 PROCEDURE — 71045 X-RAY EXAM CHEST 1 VIEW: CPT

## 2018-09-14 PROCEDURE — 80048 BASIC METABOLIC PNL TOTAL CA: CPT

## 2018-09-14 PROCEDURE — 97530 THERAPEUTIC ACTIVITIES: CPT

## 2018-09-14 PROCEDURE — 6360000002 HC RX W HCPCS: Performed by: SURGERY

## 2018-09-14 PROCEDURE — 82962 GLUCOSE BLOOD TEST: CPT

## 2018-09-14 PROCEDURE — 2580000003 HC RX 258: Performed by: SURGERY

## 2018-09-14 PROCEDURE — 94640 AIRWAY INHALATION TREATMENT: CPT

## 2018-09-14 RX ORDER — METOCLOPRAMIDE 10 MG/1
10 TABLET ORAL
Qty: 120 TABLET | Refills: 3 | Status: SHIPPED | OUTPATIENT
Start: 2018-09-14 | End: 2019-01-30

## 2018-09-14 RX ORDER — WARFARIN SODIUM 10 MG/1
10 TABLET ORAL DAILY
Qty: 30 TABLET | Refills: 3 | Status: ON HOLD | OUTPATIENT
Start: 2018-09-14 | End: 2018-09-28 | Stop reason: HOSPADM

## 2018-09-14 RX ORDER — LEVOTHYROXINE SODIUM 0.05 MG/1
50 TABLET ORAL DAILY
Qty: 30 TABLET | Refills: 3 | Status: SHIPPED | OUTPATIENT
Start: 2018-09-15 | End: 2019-02-04 | Stop reason: SDUPTHER

## 2018-09-14 RX ORDER — NITROGLYCERIN 0.4 MG/1
TABLET SUBLINGUAL
Qty: 25 TABLET | Refills: 3 | Status: SHIPPED | OUTPATIENT
Start: 2018-09-14 | End: 2019-01-30

## 2018-09-14 RX ORDER — LISINOPRIL 40 MG/1
40 TABLET ORAL DAILY
Qty: 30 TABLET | Refills: 3 | Status: ON HOLD | OUTPATIENT
Start: 2018-09-14 | End: 2018-09-28 | Stop reason: HOSPADM

## 2018-09-14 RX ORDER — DOXAZOSIN 2 MG/1
2 TABLET ORAL DAILY
Status: DISCONTINUED | OUTPATIENT
Start: 2018-09-14 | End: 2018-09-14 | Stop reason: HOSPADM

## 2018-09-14 RX ORDER — LOVASTATIN 20 MG/1
20 TABLET ORAL NIGHTLY
Qty: 30 TABLET | Refills: 3 | Status: SHIPPED | OUTPATIENT
Start: 2018-09-14 | End: 2019-02-04 | Stop reason: SDUPTHER

## 2018-09-14 RX ORDER — ALBUTEROL SULFATE 2.5 MG/3ML
2.5 SOLUTION RESPIRATORY (INHALATION) 3 TIMES DAILY
Qty: 120 EACH | Refills: 3 | Status: SHIPPED | OUTPATIENT
Start: 2018-09-14 | End: 2019-01-30

## 2018-09-14 RX ORDER — AMLODIPINE BESYLATE 5 MG/1
5 TABLET ORAL DAILY
Qty: 30 TABLET | Refills: 0 | Status: SHIPPED | OUTPATIENT
Start: 2018-09-14 | End: 2019-01-30

## 2018-09-14 RX ORDER — AMOXICILLIN AND CLAVULANATE POTASSIUM 875; 125 MG/1; MG/1
1 TABLET, FILM COATED ORAL 2 TIMES DAILY
Qty: 14 TABLET | Refills: 0 | Status: SHIPPED | OUTPATIENT
Start: 2018-09-14 | End: 2018-09-21

## 2018-09-14 RX ORDER — GLIPIZIDE 10 MG/1
10 TABLET ORAL
Qty: 60 TABLET | Refills: 3 | Status: SHIPPED | OUTPATIENT
Start: 2018-09-14 | End: 2019-02-04 | Stop reason: SDUPTHER

## 2018-09-14 RX ADMIN — METFORMIN HYDROCHLORIDE 1000 MG: 1000 TABLET ORAL at 08:34

## 2018-09-14 RX ADMIN — ALBUTEROL SULFATE 2.5 MG: 2.5 SOLUTION RESPIRATORY (INHALATION) at 08:50

## 2018-09-14 RX ADMIN — METOCLOPRAMIDE 10 MG: 10 TABLET ORAL at 17:36

## 2018-09-14 RX ADMIN — METOCLOPRAMIDE 10 MG: 10 TABLET ORAL at 11:36

## 2018-09-14 RX ADMIN — ALBUTEROL SULFATE 2.5 MG: 2.5 SOLUTION RESPIRATORY (INHALATION) at 12:10

## 2018-09-14 RX ADMIN — AMLODIPINE BESYLATE 5 MG: 5 TABLET ORAL at 08:34

## 2018-09-14 RX ADMIN — AMOXICILLIN AND CLAVULANATE POTASSIUM 1 TABLET: 875; 125 TABLET, FILM COATED ORAL at 08:35

## 2018-09-14 RX ADMIN — LEVOTHYROXINE SODIUM 50 MCG: 50 TABLET ORAL at 07:08

## 2018-09-14 RX ADMIN — Medication 10 ML: at 08:35

## 2018-09-14 RX ADMIN — METFORMIN HYDROCHLORIDE 1000 MG: 1000 TABLET ORAL at 17:36

## 2018-09-14 RX ADMIN — METOCLOPRAMIDE 10 MG: 10 TABLET ORAL at 07:08

## 2018-09-14 ASSESSMENT — PAIN SCALES - GENERAL
PAINLEVEL_OUTOF10: 0

## 2018-09-14 NOTE — PROGRESS NOTES
Physical Therapy    [facility]  Daily [facility] Note  NAME: Prudence Dan  : 1945  MRN: [id number]    Date of Service: 2018    Evaluating Therapist: Tamy Powers PT, DPT     Room #: 2819/1024-B  DIAGNOSIS: Chest Pain  PRECAUTIONS: Falls, Hx of multiple CVAs, L residual paresthesia, DM, O2  PROCEDURES: PEG placement ()     Social:  Pt lives with alone in a 1 floor plan with 3 step(s) and 2 rail(s) to enter. Prior to admission pt walked with [facility] and was Independent.                       Initial Evaluation  Date: 9/10/18 Treatment  Date: 18 Short Term/ Long Term   Goals   AM-PAC 6 Clicks      Was pt agreeable to Eval/treatment? Yes Yes     Does pt have pain? No c/o pain No c/o pain     Bed Mobility  Rolling: NT  Supine to sit: MaxA  Sit to supine: NT  Scooting: Dep Rolling: NT  Supine to sit: Min A  Sit to supine: NT  Scooting: Min A toward EOB Basim   Transfers Sit to stand: MaxA  Stand to sit: MaxA  Stand pivot: MaxA with [employer] Sit to stand: Min A elevated bed  Stand to sit: Min A  Stand pivot: Min A with [employer] Basim with [employer]   Ambulation   2 feet with MaxA with [employer] 5 feet forward and backward with Basim with ww >75 feet with Basim with [employer]   Stair negotiation: ascended and descended NT NT >4 steps with 1 rail with Basim   BLE ROM WNL NT     BLE strength B hip flex 4-/5  B knee ext and DF 4+/5 NT Increase by 1/3 MMT grade   Balance Sitting: Basim  Standing: MaxA with [employer] Sitting: SBA  Standing: Min A with [employer] Sitting: SBA  Standing: Basim with [employer]      Pt is alert and oriented x 3    Pt performed therapeutic exercise of the following:   Functional mobility  5x STS transfer with Basim from bedside chair    Patient education  Pt was educated on safety with functional mobility, gait mechanics, and benefits of OOB activity.      Patient response to education:   Pt verbalized understanding Pt demonstrated skill Pt requires further education in this area   x x x     Additional

## 2018-09-14 NOTE — PROGRESS NOTES
Occupational Therapy    OT BEDSIDE TREATMENT NOTE      Date:2018  Patient Name: Lili Quiroga  MRN: [id number]  : 1945  Room: [address] [place], [place] [address]    Evaluating OT: Tj Gutiérrez OTR/L #[id number]      Recommended Adaptive Equipment: tbd   AM-PAC Daily Activity Raw Score: 15/24  G-Code: CK     Diagnosis: Chest pain; Fall    Surgery:  PEG tube placement 18     Past Medical History:   Past Medical History           Past Medical History:   Diagnosis Date    Diabetes mellitus ([facility]      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      Hypertension      I[facility]ic stroke [facility] 2018    Pacemaker      Paraplegia (HCC)      Stroke (cerebrum) ([facility] 2018    Thyroid disease           Precautions: Fall risk, O2, Hx CVA     Per OT Eval:   Home Living: Pt lives alone in a 1 story home with 3 ADRIANNA and B hand rails    Bathroom setup: walk-in shower  Equipment owned: [facility]     Prior Level of Function: Independent with ADLs; Independent with IADLs; ambulated with [facility]  Driving: yes  Occupation: enjoys watching football     Pain: No c/o pain this date.     Cognition: Pt able to follow multiple step commands. Fair+ safety/problem solving.     Goals:  STG 1 :  Pt will complete LB self-care tasks with min A  STG 2 :  Pt will complete functional transfers with min A  STG 3 :  Pt will demonstrate F activity tolerance while completing ADL task. STG 4 :  Pt will complete functional ambulation / item retrieval task with min A      Functional Assessment:     Initial Eval 9/10 Current status: 2018   Feeding  NPO PEG tube   Grooming  Min A (seated) SBA  N/t per last report    Upper Body Dressing Min A  Min A  To [facility] gown requiring assist to thread B UE over hands, pt able to pull over B shoulders.     Lower Body Dressing Dependent  MOD A to david underwear, shorts requiring assist to thread B LE with v/c's for technique and safety     Bathing Mod A Mod A (simulated) per last report; pt educated with regards to DME, energy

## 2018-09-14 NOTE — PROGRESS NOTES
Admit Date: 9/7/2018      Subjective:     Patient: no acute issues reported overnight  Medication side effects: none    Scheduled Meds:   amoxicillin-clavulanate  1 tablet Oral 2 times per day    glipiZIDE  5 mg Oral BID    [START ON 3/8/2019] warfarin (COUMADIN) daily dosing (placeholder)   Other RX Placeholder    metoclopramide  10 mg Oral 4x Daily AC & HS    metFORMIN  1,000 mg Oral BID WC    amLODIPine  5 mg Oral Daily    levothyroxine  50 mcg Oral Daily    lovastatin  20 mg Oral Nightly    albuterol  2.5 mg Nebulization TID    sodium chloride flush  10 mL Intravenous BID     Continuous Infusions:   sodium chloride 50 mL/hr at 09/13/18 2332     PRN Meds:ipratropium-albuterol, midazolam, nitroGLYCERIN, mineral oil-hydrophilic petrolatum    Objective:   I/O last 3 completed shifts: In: 380 [I.V.:380]  Out: 625 [Urine:625]  No intake/output data recorded.     BP (!) 139/57   Pulse 70   Temp 98.1 °F (36.7 °C) (Temporal)   Resp 18   Ht 6' 2\" (1.88 m)   Wt 280 lb (127 kg)   SpO2 93%   BMI 35.95 kg/m²   General appearance: alert, appears stated age and cooperative  Skin:negative  Heent:negative  Lungs: diminished breath sounds bibasilar  Heart: regular rate and rhythm, S1, S2 normal, no murmur, click, rub or gallop  Abdomen: soft, non-tender; bowel sounds normal; no masses,  no organomegaly  Extremities:no edema  Neurologic: Grossly normal    Labs:  CBC with Differential:    Lab Results   Component Value Date    WBC 7.0 09/13/2018    RBC 4.03 09/13/2018    HGB 11.3 09/13/2018    HCT 35.9 09/13/2018     09/13/2018    MCV 89.1 09/13/2018    MCH 28.0 09/13/2018    MCHC 31.5 09/13/2018    RDW 15.1 09/13/2018    SEGSPCT 80 01/04/2012    BANDSPCT 1 11/27/2014    LYMPHOPCT 13.2 09/08/2018    MONOPCT 11.0 09/08/2018    MYELOPCT 0.9 02/28/2018    BASOPCT 0.3 09/08/2018    MONOSABS 1.30 09/08/2018    LYMPHSABS 1.57 09/08/2018    EOSABS 0.03 09/08/2018    BASOSABS 0.04 09/08/2018     BMP:    Lab Results

## 2018-09-14 NOTE — CARE COORDINATION
SOCIAL WORK AND DISCHARGE PLANNING: notified rep that pt would not be coming to [facility] today but to expect him over weekend. Son will need called by jairo Fitzpatrick  9/14/2018

## 2018-09-15 LAB
CULTURE, RESPIRATORY: NORMAL
SMEAR, RESPIRATORY: NORMAL

## 2018-09-23 ENCOUNTER — TELEPHONE (OUTPATIENT)
Dept: OTHER | Facility: CLINIC | Age: 73
End: 2018-09-23

## 2018-09-23 ENCOUNTER — HOSPITAL ENCOUNTER (INPATIENT)
Age: 73
LOS: 5 days | Discharge: NURSING, REHAB OR LONG-TERM CARE | DRG: 177 | End: 2018-09-28
Attending: EMERGENCY MEDICINE | Admitting: INTERNAL MEDICINE
Payer: MEDICARE

## 2018-09-23 ENCOUNTER — APPOINTMENT (OUTPATIENT)
Dept: GENERAL RADIOLOGY | Age: 73
DRG: 177 | End: 2018-09-23
Payer: MEDICARE

## 2018-09-23 DIAGNOSIS — J81.0 ACUTE PULMONARY EDEMA (HCC): ICD-10-CM

## 2018-09-23 DIAGNOSIS — J18.9 HCAP (HEALTHCARE-ASSOCIATED PNEUMONIA): Primary | ICD-10-CM

## 2018-09-23 DIAGNOSIS — R79.1 SUPRATHERAPEUTIC INR: ICD-10-CM

## 2018-09-23 LAB
ANION GAP SERPL CALCULATED.3IONS-SCNC: 11 MMOL/L (ref 7–16)
BACTERIA: PRESENT /HPF
BASOPHILS ABSOLUTE: 0.1 E9/L (ref 0–0.2)
BASOPHILS RELATIVE PERCENT: 0.7 % (ref 0–2)
BILIRUBIN URINE: NEGATIVE
BLOOD, URINE: NEGATIVE
BUN BLDV-MCNC: 37 MG/DL (ref 8–23)
CALCIUM SERPL-MCNC: 9.5 MG/DL (ref 8.6–10.2)
CHLORIDE BLD-SCNC: 95 MMOL/L (ref 98–107)
CLARITY: CLEAR
CO2: 32 MMOL/L (ref 22–29)
COLOR: YELLOW
CREAT SERPL-MCNC: 1.1 MG/DL (ref 0.7–1.2)
EKG ATRIAL RATE: 178 BPM
EKG Q-T INTERVAL: 476 MS
EKG QRS DURATION: 78 MS
EKG QTC CALCULATION (BAZETT): 635 MS
EKG R AXIS: -27 DEGREES
EKG T AXIS: 30 DEGREES
EKG VENTRICULAR RATE: 107 BPM
EOSINOPHILS ABSOLUTE: 0.36 E9/L (ref 0.05–0.5)
EOSINOPHILS RELATIVE PERCENT: 2.4 % (ref 0–6)
GFR AFRICAN AMERICAN: >60
GFR NON-AFRICAN AMERICAN: >60 ML/MIN/1.73
GLUCOSE BLD-MCNC: 162 MG/DL (ref 74–109)
GLUCOSE URINE: NEGATIVE MG/DL
HCT VFR BLD CALC: 35.8 % (ref 37–54)
HEMOGLOBIN: 11.1 G/DL (ref 12.5–16.5)
IMMATURE GRANULOCYTES #: 0.12 E9/L
IMMATURE GRANULOCYTES %: 0.8 % (ref 0–5)
INR BLD: 5.1
KETONES, URINE: NEGATIVE MG/DL
LACTIC ACID: 2 MMOL/L (ref 0.5–2.2)
LEUKOCYTE ESTERASE, URINE: NEGATIVE
LYMPHOCYTES ABSOLUTE: 1.25 E9/L (ref 1.5–4)
LYMPHOCYTES RELATIVE PERCENT: 8.3 % (ref 20–42)
MCH RBC QN AUTO: 27.8 PG (ref 26–35)
MCHC RBC AUTO-ENTMCNC: 31 % (ref 32–34.5)
MCV RBC AUTO: 89.5 FL (ref 80–99.9)
METER GLUCOSE: 110 MG/DL (ref 70–110)
METER GLUCOSE: 112 MG/DL (ref 70–110)
MONOCYTES ABSOLUTE: 1.29 E9/L (ref 0.1–0.95)
MONOCYTES RELATIVE PERCENT: 8.5 % (ref 2–12)
NEUTROPHILS ABSOLUTE: 11.98 E9/L (ref 1.8–7.3)
NEUTROPHILS RELATIVE PERCENT: 79.3 % (ref 43–80)
NITRITE, URINE: NEGATIVE
PDW BLD-RTO: 15.2 FL (ref 11.5–15)
PH UA: 5.5 (ref 5–9)
PLATELET # BLD: 409 E9/L (ref 130–450)
PMV BLD AUTO: 11.1 FL (ref 7–12)
POTASSIUM SERPL-SCNC: 4.9 MMOL/L (ref 3.5–5)
PRO-BNP: 618 PG/ML (ref 0–125)
PROTEIN UA: NORMAL MG/DL
PROTHROMBIN TIME: 57.3 SEC (ref 9.3–12.4)
RBC # BLD: 4 E12/L (ref 3.8–5.8)
RBC UA: ABNORMAL /HPF (ref 0–2)
SODIUM BLD-SCNC: 138 MMOL/L (ref 132–146)
SPECIFIC GRAVITY UA: 1.02 (ref 1–1.03)
TROPONIN: <0.01 NG/ML (ref 0–0.03)
UROBILINOGEN, URINE: 0.2 E.U./DL
WBC # BLD: 15.1 E9/L (ref 4.5–11.5)
WBC UA: ABNORMAL /HPF (ref 0–5)

## 2018-09-23 PROCEDURE — 83605 ASSAY OF LACTIC ACID: CPT

## 2018-09-23 PROCEDURE — 83880 ASSAY OF NATRIURETIC PEPTIDE: CPT

## 2018-09-23 PROCEDURE — 85610 PROTHROMBIN TIME: CPT

## 2018-09-23 PROCEDURE — 94660 CPAP INITIATION&MGMT: CPT

## 2018-09-23 PROCEDURE — 6370000000 HC RX 637 (ALT 250 FOR IP): Performed by: INTERNAL MEDICINE

## 2018-09-23 PROCEDURE — 2060000000 HC ICU INTERMEDIATE R&B

## 2018-09-23 PROCEDURE — 96374 THER/PROPH/DIAG INJ IV PUSH: CPT

## 2018-09-23 PROCEDURE — 87040 BLOOD CULTURE FOR BACTERIA: CPT

## 2018-09-23 PROCEDURE — 82962 GLUCOSE BLOOD TEST: CPT

## 2018-09-23 PROCEDURE — 80048 BASIC METABOLIC PNL TOTAL CA: CPT

## 2018-09-23 PROCEDURE — 71045 X-RAY EXAM CHEST 1 VIEW: CPT

## 2018-09-23 PROCEDURE — 87088 URINE BACTERIA CULTURE: CPT

## 2018-09-23 PROCEDURE — 6360000002 HC RX W HCPCS: Performed by: EMERGENCY MEDICINE

## 2018-09-23 PROCEDURE — 87324 CLOSTRIDIUM AG IA: CPT

## 2018-09-23 PROCEDURE — 6360000002 HC RX W HCPCS: Performed by: INTERNAL MEDICINE

## 2018-09-23 PROCEDURE — 99285 EMERGENCY DEPT VISIT HI MDM: CPT

## 2018-09-23 PROCEDURE — 81001 URINALYSIS AUTO W/SCOPE: CPT

## 2018-09-23 PROCEDURE — 36415 COLL VENOUS BLD VENIPUNCTURE: CPT

## 2018-09-23 PROCEDURE — 85025 COMPLETE CBC W/AUTO DIFF WBC: CPT

## 2018-09-23 PROCEDURE — 2580000003 HC RX 258: Performed by: INTERNAL MEDICINE

## 2018-09-23 PROCEDURE — 2580000003 HC RX 258: Performed by: EMERGENCY MEDICINE

## 2018-09-23 PROCEDURE — 84484 ASSAY OF TROPONIN QUANT: CPT

## 2018-09-23 RX ORDER — GUAIFENESIN 100 MG/5ML
200 SOLUTION ORAL EVERY 4 HOURS PRN
Status: ON HOLD | COMMUNITY
End: 2018-09-28 | Stop reason: HOSPADM

## 2018-09-23 RX ORDER — LEVOTHYROXINE SODIUM 0.05 MG/1
50 TABLET ORAL DAILY
Status: DISCONTINUED | OUTPATIENT
Start: 2018-09-24 | End: 2018-09-28 | Stop reason: HOSPADM

## 2018-09-23 RX ORDER — KETOROLAC TROMETHAMINE 30 MG/ML
30 INJECTION, SOLUTION INTRAMUSCULAR; INTRAVENOUS ONCE
Status: COMPLETED | OUTPATIENT
Start: 2018-09-23 | End: 2018-09-23

## 2018-09-23 RX ORDER — SODIUM CHLORIDE 9 MG/ML
INJECTION, SOLUTION INTRAVENOUS CONTINUOUS
Status: DISCONTINUED | OUTPATIENT
Start: 2018-09-23 | End: 2018-09-24

## 2018-09-23 RX ORDER — POTASSIUM CHLORIDE 1.5 G/1.77G
40 POWDER, FOR SOLUTION ORAL DAILY
Status: ON HOLD | COMMUNITY
End: 2018-09-28 | Stop reason: HOSPADM

## 2018-09-23 RX ORDER — ACETAMINOPHEN 325 MG/1
650 TABLET ORAL EVERY 4 HOURS PRN
Status: DISCONTINUED | OUTPATIENT
Start: 2018-09-23 | End: 2018-09-28 | Stop reason: HOSPADM

## 2018-09-23 RX ORDER — ACETAMINOPHEN 325 MG/1
650 TABLET ORAL EVERY 4 HOURS PRN
COMMUNITY
End: 2019-07-25

## 2018-09-23 RX ORDER — ALBUTEROL SULFATE 2.5 MG/3ML
2.5 SOLUTION RESPIRATORY (INHALATION) 3 TIMES DAILY
Status: DISCONTINUED | OUTPATIENT
Start: 2018-09-23 | End: 2018-09-28 | Stop reason: HOSPADM

## 2018-09-23 RX ORDER — 0.9 % SODIUM CHLORIDE 0.9 %
500 INTRAVENOUS SOLUTION INTRAVENOUS ONCE
Status: COMPLETED | OUTPATIENT
Start: 2018-09-23 | End: 2018-09-23

## 2018-09-23 RX ORDER — NYSTATIN 100000 U/G
CREAM TOPICAL 2 TIMES DAILY
Status: DISCONTINUED | OUTPATIENT
Start: 2018-09-23 | End: 2018-09-28 | Stop reason: HOSPADM

## 2018-09-23 RX ORDER — GUAIFENESIN 100 MG/5ML
200 SOLUTION ORAL EVERY 4 HOURS PRN
Status: DISCONTINUED | OUTPATIENT
Start: 2018-09-23 | End: 2018-09-28 | Stop reason: HOSPADM

## 2018-09-23 RX ORDER — PETROLATUM 42 G/100G
OINTMENT TOPICAL 2 TIMES DAILY PRN
Status: DISCONTINUED | OUTPATIENT
Start: 2018-09-23 | End: 2018-09-28 | Stop reason: HOSPADM

## 2018-09-23 RX ORDER — 0.9 % SODIUM CHLORIDE 0.9 %
2000 INTRAVENOUS SOLUTION INTRAVENOUS ONCE
Status: DISCONTINUED | OUTPATIENT
Start: 2018-09-23 | End: 2018-09-23

## 2018-09-23 RX ORDER — NICOTINE POLACRILEX 4 MG
15 LOZENGE BUCCAL PRN
Status: DISCONTINUED | OUTPATIENT
Start: 2018-09-23 | End: 2018-09-25 | Stop reason: SDUPTHER

## 2018-09-23 RX ORDER — GLIPIZIDE 5 MG/1
10 TABLET ORAL
Status: DISCONTINUED | OUTPATIENT
Start: 2018-09-24 | End: 2018-09-28 | Stop reason: HOSPADM

## 2018-09-23 RX ORDER — ACETAMINOPHEN 325 MG/1
650 TABLET ORAL ONCE
Status: DISCONTINUED | OUTPATIENT
Start: 2018-09-23 | End: 2018-09-23

## 2018-09-23 RX ORDER — NITROGLYCERIN 0.4 MG/1
0.4 TABLET SUBLINGUAL EVERY 5 MIN PRN
Status: DISCONTINUED | OUTPATIENT
Start: 2018-09-23 | End: 2018-09-28 | Stop reason: HOSPADM

## 2018-09-23 RX ORDER — METOCLOPRAMIDE 10 MG/1
10 TABLET ORAL
Status: DISCONTINUED | OUTPATIENT
Start: 2018-09-23 | End: 2018-09-28 | Stop reason: HOSPADM

## 2018-09-23 RX ORDER — SIMVASTATIN 10 MG
10 TABLET ORAL NIGHTLY
Status: DISCONTINUED | OUTPATIENT
Start: 2018-09-23 | End: 2018-09-28 | Stop reason: HOSPADM

## 2018-09-23 RX ADMIN — KETOROLAC TROMETHAMINE 30 MG: 30 INJECTION, SOLUTION INTRAMUSCULAR at 09:37

## 2018-09-23 RX ADMIN — PETROLATUM: 42 OINTMENT TOPICAL at 21:33

## 2018-09-23 RX ADMIN — NYSTATIN: 100000 CREAM TOPICAL at 21:33

## 2018-09-23 RX ADMIN — PIPERACILLIN SODIUM AND TAZOBACTAM SODIUM 3.38 G: 3; .375 INJECTION, POWDER, LYOPHILIZED, FOR SOLUTION INTRAVENOUS at 10:34

## 2018-09-23 RX ADMIN — METOCLOPRAMIDE 10 MG: 10 TABLET ORAL at 21:33

## 2018-09-23 RX ADMIN — SODIUM CHLORIDE: 9 INJECTION, SOLUTION INTRAVENOUS at 17:19

## 2018-09-23 RX ADMIN — SODIUM CHLORIDE 500 ML: 9 INJECTION, SOLUTION INTRAVENOUS at 13:41

## 2018-09-23 RX ADMIN — PIPERACILLIN SODIUM AND TAZOBACTAM SODIUM 3.38 G: 3; .375 INJECTION, POWDER, LYOPHILIZED, FOR SOLUTION INTRAVENOUS at 20:45

## 2018-09-23 RX ADMIN — METFORMIN HYDROCHLORIDE 1000 MG: 1000 TABLET ORAL at 21:33

## 2018-09-23 RX ADMIN — SODIUM CHLORIDE 2000 ML: 9 INJECTION, SOLUTION INTRAVENOUS at 09:37

## 2018-09-23 RX ADMIN — SIMVASTATIN 10 MG: 10 TABLET, FILM COATED ORAL at 20:46

## 2018-09-23 RX ADMIN — VANCOMYCIN HYDROCHLORIDE 2000 MG: 10 INJECTION, POWDER, LYOPHILIZED, FOR SOLUTION INTRAVENOUS at 11:46

## 2018-09-23 ASSESSMENT — PAIN SCALES - GENERAL
PAINLEVEL_OUTOF10: 0

## 2018-09-23 NOTE — ED NOTES
Pt suctioned orally for large amount of thick yellow green sputum     Ranjeet Roper RN  09/23/18 0791

## 2018-09-23 NOTE — ED PROVIDER NOTES
History: family history includes Heart Disease in his mother; Stroke in his father. The patients home medications have been reviewed. Allergies: Patient has no known allergies. ---------------------------------------------------PHYSICAL EXAM--------------------------------------    Constitutional/General: Alert and oriented x3, non toxic in NAD  Head: Normocephalic and atraumatic  Eyes: PERRL, EOMI  Neck: Supple, full ROM. Pulmonary: Rhonchi diffusely. No wheezes or rales. Not in respiratory distress  Cardiovascular:  Tachycardic. Regular rhythm. No murmurs, gallops, or rubs. 2+ distal pulses  Chest: no chest wall tenderness  Abdomen: Soft. Non tender. Non distended. Peg tube in place. +BS. No rebound, guarding, or rigidity. No pulsatile masses appreciated. Musculoskeletal: Moves all extremities x 4. 1+ edema. Warm and well perfused, no clubbing or cyanosis. Capillary refill <3 seconds  Skin: warm and dry. No rashes. Neurologic: GCS 15, CN II-XII grossly intact, no focal deficits, symmetric strength 5/5 in the upper and lower extremities bilaterally  Psych: Normal Affect    -------------------------------------------------- RESULTS -------------------------------------------------  I have personally reviewed all laboratory and imaging results for this patient. Results are listed below.      LABS:  Results for orders placed or performed during the hospital encounter of 09/23/18   Culture Blood #1   Result Value Ref Range    Blood Culture, Routine 5 Days- no growth    Culture Blood #2   Result Value Ref Range    Culture, Blood 2 5 Days- no growth    Urine Culture   Result Value Ref Range    Urine Culture, Routine Growth not present    CLOSTRIDIUM DIFFICILE EIA   Result Value Ref Range    C difficile Toxin, EIA       Result: C Difficile Toxins A and B not detected  *  *  Normal Range: Not detected     Respiratory Panel, Film Array   Result Value Ref Range    Film Array Adenovirus       Result: Not Detected  *  *  Normal Range: Not Detected      Film Array Coronavirus HKU1       Result: Not Detected  *  *  Normal Range: Not Detected      Film Array Conoravirus NL63       Result: Not Detected  *  *  Normal Range: Not Detected      Film Array Coronavirus 229E       Result: Not Detected  *  *  Normal Range: Not Detected      Film Array Coronavirus OC43       Result: Not Detected  *  *  Normal Range: Not Detected      Film Array Influenza A Virus       Result: Not Detected  *  *  Normal Range: Not Detected      Film Array Influenza A Virus H1       Result: Not Detected  *  *  Normal Range: Not Detected      Film Array Influenza A Virus 09H1       Result: Not Detected  *  *  Normal Range: Not Detected      Film Array Influenza A Virus H3       Result: Not Detected  *  *  Normal Range: Not Detected      Film Array Influenza B       Result: Not Detected  *  *  Normal Range: Not Detected      Film Array Metapneumovirus       Result: Not Detected  *  *  Normal Range: Not Detected      Film Array Parainfluenza Virus 1       Result: Not Detected  *  *  Normal Range: Not Detected      Film Array Parainfluenza Virus 2       Result: Not Detected  *  *  Normal Range: Not Detected      Film Array Parainfluenza Virus 3       Result: Not Detected  *  *  Normal Range: Not Detected      Film Array Parainfluenza Virus 4       Result: Not Detected  *  *  Normal Range: Not Detected      Film Array Respiratory Syncitial Virus       Result: Not Detected  *  *  Normal Range: Not Detected      Film Array Rhinovirus/Enterovirus       Result: Not Detected  *  *  Normal Range: Not Detected      Film Array Bordetella Pertusis       Result: Not Detected  *  *  Normal Range: Not Detected      Film Array Chlamydophilia Pneumoniae       Result: Not Detected  *  *  Normal Range: Not Detected      Film Array Mycoplasma Pneumoniae       Result: Not Detected  *  *  Normal Range: Not Detected     Respiratory Culture   Result Value Ref Range >60     Calcium 9.5 8.6 - 10.2 mg/dL   Protime-INR   Result Value Ref Range    Protime 57.3 (H) 9.3 - 12.4 sec    INR 5.1 (HH)    Brain Natriuretic Peptide   Result Value Ref Range    Pro- (H) 0 - 125 pg/mL   Lactic Acid, Plasma   Result Value Ref Range    Lactic Acid 2.0 0.5 - 2.2 mmol/L   Urinalysis   Result Value Ref Range    Color, UA Yellow Straw/Yellow    Clarity, UA Clear Clear    Glucose, Ur Negative Negative mg/dL    Bilirubin Urine Negative Negative    Ketones, Urine Negative Negative mg/dL    Specific Gravity, UA 1.020 1.005 - 1.030    Blood, Urine Negative Negative    pH, UA 5.5 5.0 - 9.0    Protein, UA TRACE Negative mg/dL    Urobilinogen, Urine 0.2 <2.0 E.U./dL    Nitrite, Urine Negative Negative    Leukocyte Esterase, Urine Negative Negative   Microscopic Urinalysis   Result Value Ref Range    WBC, UA NONE 0 - 5 /HPF    RBC, UA NONE 0 - 2 /HPF    Bacteria, UA PRESENT (A) /HPF   Basic metabolic panel   Result Value Ref Range    Sodium 136 132 - 146 mmol/L    Potassium 4.6 3.5 - 5.0 mmol/L    Chloride 99 98 - 107 mmol/L    CO2 27 22 - 29 mmol/L    Anion Gap 10 7 - 16 mmol/L    Glucose 116 (H) 74 - 109 mg/dL    BUN 42 (H) 8 - 23 mg/dL    CREATININE 1.4 (H) 0.7 - 1.2 mg/dL    GFR Non-African American 50 >=60 mL/min/1.73    GFR African American 60     Calcium 8.6 8.6 - 10.2 mg/dL   CBC   Result Value Ref Range    WBC 10.8 4.5 - 11.5 E9/L    RBC 3.55 (L) 3.80 - 5.80 E12/L    Hemoglobin 9.9 (L) 12.5 - 16.5 g/dL    Hematocrit 32.1 (L) 37.0 - 54.0 %    MCV 90.4 80.0 - 99.9 fL    MCH 27.9 26.0 - 35.0 pg    MCHC 30.8 (L) 32.0 - 34.5 %    RDW 15.3 (H) 11.5 - 15.0 fL    Platelets 969 534 - 222 E9/L    MPV 10.9 7.0 - 12.0 fL   Procalcitonin   Result Value Ref Range    Procalcitonin 0.12 (H) 0.00 - 0.08 ng/mL   Protime-INR   Result Value Ref Range    Protime 82.4 (H) 9.3 - 12.4 sec    INR 7.5 (HH)    Comprehensive metabolic panel   Result Value Ref Range    Sodium 138 132 - 146 mmol/L    Potassium 4.0 3.5 - 5.0 mmol/L    Chloride 94 (L) 98 - 107 mmol/L    CO2 25 22 - 29 mmol/L    Anion Gap 19 (H) 7 - 16 mmol/L    Glucose 298 (H) 74 - 109 mg/dL    BUN 29 (H) 8 - 23 mg/dL    CREATININE 0.9 0.7 - 1.2 mg/dL    GFR Non-African American >60 >=60 mL/min/1.73    GFR African American >60     Calcium 7.0 (L) 8.6 - 10.2 mg/dL    Total Protein 5.2 (L) 6.4 - 8.3 g/dL    Alb 2.1 (L) 3.5 - 5.2 g/dL    Total Bilirubin 0.2 0.0 - 1.2 mg/dL    Alkaline Phosphatase 64 40 - 129 U/L    ALT 14 0 - 40 U/L    AST 17 0 - 39 U/L   Protime-INR   Result Value Ref Range    Protime 81.2 (H) 9.3 - 12.4 sec    INR 7.4 (HH)    CBC Auto Differential   Result Value Ref Range    WBC 7.9 4.5 - 11.5 E9/L    RBC 3.09 (L) 3.80 - 5.80 E12/L    Hemoglobin 8.6 (L) 12.5 - 16.5 g/dL    Hematocrit 28.6 (L) 37.0 - 54.0 %    MCV 92.6 80.0 - 99.9 fL    MCH 27.8 26.0 - 35.0 pg    MCHC 30.1 (L) 32.0 - 34.5 %    RDW 15.1 (H) 11.5 - 15.0 fL    Platelets 679 685 - 844 E9/L    MPV 10.7 7.0 - 12.0 fL    Neutrophils % 65.5 43.0 - 80.0 %    Immature Granulocytes % 0.6 0.0 - 5.0 %    Lymphocytes % 15.0 (L) 20.0 - 42.0 %    Monocytes % 7.7 2.0 - 12.0 %    Eosinophils % 10.4 (H) 0.0 - 6.0 %    Basophils % 0.8 0.0 - 2.0 %    Neutrophils # 5.18 1.80 - 7.30 E9/L    Immature Granulocytes # 0.05 E9/L    Lymphocytes # 1.19 (L) 1.50 - 4.00 E9/L    Monocytes # 0.61 0.10 - 0.95 E9/L    Eosinophils # 0.82 (H) 0.05 - 0.50 E9/L    Basophils # 0.06 0.00 - 0.20 E9/L   D-Dimer, Quantitative   Result Value Ref Range    D-Dimer, Quant 934 ng/mL DDU   IgG, IgA, IgM   Result Value Ref Range    IgA 240 70 - 400 mg/dL    IgG 609 (L) 700 - 1600 mg/dL    IgM <25 (L) 40 - 230 mg/dL   Protime-INR   Result Value Ref Range    Protime 47.2 (H) 9.3 - 12.4 sec    INR 4.2    Protime-INR   Result Value Ref Range    Protime 35.2 (H) 9.3 - 12.4 sec    INR 3.1    Basic metabolic panel   Result Value Ref Range    Sodium 145 132 - 146 mmol/L    Potassium 5.0 3.5 - 5.0 mmol/L    Chloride 102 98 - 107 mmol/L    CO2 34 (H) 22 - 29 mmol/L    Anion Gap 9 7 - 16 mmol/L    Glucose 95 74 - 109 mg/dL    BUN 17 8 - 23 mg/dL    CREATININE 0.9 0.7 - 1.2 mg/dL    GFR Non-African American >60 >=60 mL/min/1.73    GFR African American >60     Calcium 9.2 8.6 - 10.2 mg/dL   Protime-INR   Result Value Ref Range    Protime 27.3 (H) 9.3 - 12.4 sec    INR 2.4    Basic metabolic panel   Result Value Ref Range    Sodium 142 132 - 146 mmol/L    Potassium 5.3 (H) 3.5 - 5.0 mmol/L    Chloride 99 98 - 107 mmol/L    CO2 35 (H) 22 - 29 mmol/L    Anion Gap 8 7 - 16 mmol/L    Glucose 106 74 - 109 mg/dL    BUN 16 8 - 23 mg/dL    CREATININE 0.9 0.7 - 1.2 mg/dL    GFR Non-African American >60 >=60 mL/min/1.73    GFR African American >60     Calcium 9.6 8.6 - 10.2 mg/dL   CBC   Result Value Ref Range    WBC 10.6 4.5 - 11.5 E9/L    RBC 3.88 3.80 - 5.80 E12/L    Hemoglobin 10.7 (L) 12.5 - 16.5 g/dL    Hematocrit 35.9 (L) 37.0 - 54.0 %    MCV 92.5 80.0 - 99.9 fL    MCH 27.6 26.0 - 35.0 pg    MCHC 29.8 (L) 32.0 - 34.5 %    RDW 14.3 11.5 - 15.0 fL    Platelets 404 141 - 793 E9/L    MPV 9.8 7.0 - 12.0 fL   POCT Glucose   Result Value Ref Range    Meter Glucose 112 (H) 70 - 110 mg/dL   POCT Glucose   Result Value Ref Range    Meter Glucose 110 70 - 110 mg/dL   POCT Glucose   Result Value Ref Range    Meter Glucose 131 (H) 70 - 110 mg/dL   POCT Glucose   Result Value Ref Range    Meter Glucose 75 70 - 110 mg/dL   POCT Glucose   Result Value Ref Range    Meter Glucose 77 70 - 110 mg/dL   POCT Glucose   Result Value Ref Range    Meter Glucose 59 (L) 70 - 110 mg/dL   POCT Glucose   Result Value Ref Range    Meter Glucose 137 (H) 70 - 110 mg/dL   POCT Glucose   Result Value Ref Range    Meter Glucose 77 70 - 110 mg/dL   POCT Glucose   Result Value Ref Range    Meter Glucose 93 70 - 110 mg/dL   POCT Glucose   Result Value Ref Range    Meter Glucose 100 70 - 110 mg/dL   POCT Glucose   Result Value Ref Range    Meter Glucose 148 (H) 70 - 110 mg/dL   POCT Glucose   Result Value were reviewed. ------------------------------ ED COURSE/MEDICAL DECISION MAKING----------------------  Medications   ketorolac (TORADOL) injection 30 mg (30 mg Intravenous Given 9/23/18 0937)   vancomycin (VANCOCIN) 2,000 mg in dextrose 5 % 500 mL IVPB (0 mg Intravenous Stopped 9/23/18 1447)   piperacillin-tazobactam (ZOSYN) 3.375 g in dextrose 5 % 100 mL IVPB (mini-bag) (0 g Intravenous Stopped 9/23/18 1114)   0.9 % sodium chloride bolus (0 mLs Intravenous Stopped 9/23/18 1447)   dextrose 50 % solution (  Given 9/24/18 0460)       Medical Decision Making:    Patient presents with a productive sputum and hypoxia at his facility. I am concern for healthcare associated pneumonia. X-ray confirms diagnosis. Patient has remained hemodynamically stable in the emergency department. He does have increased work of breathing well on the nasal cannula and I feel he would benefit from BiPAP. I believe this is secondary to pulmonary edema. He had no chest pain at any point. I do not feel this is consistent with ACS. We will hold off on his IV hydration after he already gotten some in the emergency department seeing the pulmonary edema. I believe patient will benefit from admission for further evaluation and management. I do not feel he requires the intensive care unit at this point. He does feel improved while in the emergency department. He'll be admitted to telemetry. Discussed patient care with Dr. Dl Arguello for admission. This patient's ED course included: a personal history and physicial examination, re-evaluation prior to disposition, cardiac monitoring and continuous pulse oximetry    This patient has remained hemodynamically stable during their ED course.     Re-Evaluations:           4-Hour Sepsis Re-examination  9/23/18   9:17 AM          Vital Signs:   Vitals:    09/27/18 1550 09/27/18 1910 09/27/18 2000 09/28/18 0815   BP:   (!) 176/83 138/70   Pulse:   93 101   Resp: 14 14 20 (!) 32   Temp:   98.3 °F (36.8

## 2018-09-24 LAB
ANION GAP SERPL CALCULATED.3IONS-SCNC: 10 MMOL/L (ref 7–16)
BUN BLDV-MCNC: 42 MG/DL (ref 8–23)
C DIFFICILE TOXIN, EIA: NORMAL
CALCIUM SERPL-MCNC: 8.6 MG/DL (ref 8.6–10.2)
CHLORIDE BLD-SCNC: 99 MMOL/L (ref 98–107)
CO2: 27 MMOL/L (ref 22–29)
CREAT SERPL-MCNC: 1.4 MG/DL (ref 0.7–1.2)
GFR AFRICAN AMERICAN: 60
GFR NON-AFRICAN AMERICAN: 50 ML/MIN/1.73
GLUCOSE BLD-MCNC: 116 MG/DL (ref 74–109)
HCT VFR BLD CALC: 32.1 % (ref 37–54)
HEMOGLOBIN: 9.9 G/DL (ref 12.5–16.5)
INR BLD: 7.5
MCH RBC QN AUTO: 27.9 PG (ref 26–35)
MCHC RBC AUTO-ENTMCNC: 30.8 % (ref 32–34.5)
MCV RBC AUTO: 90.4 FL (ref 80–99.9)
METER GLUCOSE: 131 MG/DL (ref 70–110)
METER GLUCOSE: 137 MG/DL (ref 70–110)
METER GLUCOSE: 59 MG/DL (ref 70–110)
METER GLUCOSE: 75 MG/DL (ref 70–110)
METER GLUCOSE: 77 MG/DL (ref 70–110)
PDW BLD-RTO: 15.3 FL (ref 11.5–15)
PLATELET # BLD: 330 E9/L (ref 130–450)
PMV BLD AUTO: 10.9 FL (ref 7–12)
POTASSIUM SERPL-SCNC: 4.6 MMOL/L (ref 3.5–5)
PROCALCITONIN: 0.12 NG/ML (ref 0–0.08)
PROTHROMBIN TIME: 82.4 SEC (ref 9.3–12.4)
RBC # BLD: 3.55 E12/L (ref 3.8–5.8)
SODIUM BLD-SCNC: 136 MMOL/L (ref 132–146)
WBC # BLD: 10.8 E9/L (ref 4.5–11.5)

## 2018-09-24 PROCEDURE — 87486 CHLMYD PNEUM DNA AMP PROBE: CPT

## 2018-09-24 PROCEDURE — 2580000003 HC RX 258: Performed by: INTERNAL MEDICINE

## 2018-09-24 PROCEDURE — 82962 GLUCOSE BLOOD TEST: CPT

## 2018-09-24 PROCEDURE — 80048 BASIC METABOLIC PNL TOTAL CA: CPT

## 2018-09-24 PROCEDURE — 2060000000 HC ICU INTERMEDIATE R&B

## 2018-09-24 PROCEDURE — 6360000002 HC RX W HCPCS: Performed by: INTERNAL MEDICINE

## 2018-09-24 PROCEDURE — 84145 PROCALCITONIN (PCT): CPT

## 2018-09-24 PROCEDURE — 87798 DETECT AGENT NOS DNA AMP: CPT

## 2018-09-24 PROCEDURE — 94660 CPAP INITIATION&MGMT: CPT

## 2018-09-24 PROCEDURE — 2580000003 HC RX 258

## 2018-09-24 PROCEDURE — 99222 1ST HOSP IP/OBS MODERATE 55: CPT | Performed by: INTERNAL MEDICINE

## 2018-09-24 PROCEDURE — 87581 M.PNEUMON DNA AMP PROBE: CPT

## 2018-09-24 PROCEDURE — 85610 PROTHROMBIN TIME: CPT

## 2018-09-24 PROCEDURE — 99223 1ST HOSP IP/OBS HIGH 75: CPT | Performed by: INTERNAL MEDICINE

## 2018-09-24 PROCEDURE — 36415 COLL VENOUS BLD VENIPUNCTURE: CPT

## 2018-09-24 PROCEDURE — 51798 US URINE CAPACITY MEASURE: CPT

## 2018-09-24 PROCEDURE — 6370000000 HC RX 637 (ALT 250 FOR IP): Performed by: INTERNAL MEDICINE

## 2018-09-24 PROCEDURE — APPSS45 APP SPLIT SHARED TIME 31-45 MINUTES: Performed by: NURSE PRACTITIONER

## 2018-09-24 PROCEDURE — 85027 COMPLETE CBC AUTOMATED: CPT

## 2018-09-24 PROCEDURE — 94640 AIRWAY INHALATION TREATMENT: CPT

## 2018-09-24 PROCEDURE — 87633 RESP VIRUS 12-25 TARGETS: CPT

## 2018-09-24 RX ORDER — SODIUM CHLORIDE 9 MG/ML
INJECTION, SOLUTION INTRAVENOUS CONTINUOUS
Status: DISCONTINUED | OUTPATIENT
Start: 2018-09-24 | End: 2018-09-25

## 2018-09-24 RX ORDER — DEXTROSE MONOHYDRATE 25 G/50ML
INJECTION, SOLUTION INTRAVENOUS
Status: COMPLETED
Start: 2018-09-24 | End: 2018-09-24

## 2018-09-24 RX ADMIN — METOCLOPRAMIDE 10 MG: 10 TABLET ORAL at 06:06

## 2018-09-24 RX ADMIN — SODIUM CHLORIDE: 9 INJECTION, SOLUTION INTRAVENOUS at 06:07

## 2018-09-24 RX ADMIN — NYSTATIN: 100000 CREAM TOPICAL at 20:18

## 2018-09-24 RX ADMIN — ALBUTEROL SULFATE 2.5 MG: 2.5 SOLUTION RESPIRATORY (INHALATION) at 10:38

## 2018-09-24 RX ADMIN — GLIPIZIDE 10 MG: 5 TABLET ORAL at 16:03

## 2018-09-24 RX ADMIN — METOCLOPRAMIDE 10 MG: 10 TABLET ORAL at 20:08

## 2018-09-24 RX ADMIN — NYSTATIN: 100000 CREAM TOPICAL at 09:11

## 2018-09-24 RX ADMIN — PIPERACILLIN SODIUM AND TAZOBACTAM SODIUM 3.38 G: 3; .375 INJECTION, POWDER, LYOPHILIZED, FOR SOLUTION INTRAVENOUS at 20:08

## 2018-09-24 RX ADMIN — LEVOTHYROXINE SODIUM 50 MCG: 50 TABLET ORAL at 06:06

## 2018-09-24 RX ADMIN — METFORMIN HYDROCHLORIDE 1000 MG: 1000 TABLET ORAL at 20:08

## 2018-09-24 RX ADMIN — METOCLOPRAMIDE 10 MG: 10 TABLET ORAL at 16:03

## 2018-09-24 RX ADMIN — PIPERACILLIN SODIUM AND TAZOBACTAM SODIUM 3.38 G: 3; .375 INJECTION, POWDER, LYOPHILIZED, FOR SOLUTION INTRAVENOUS at 04:21

## 2018-09-24 RX ADMIN — SODIUM CHLORIDE: 9 INJECTION, SOLUTION INTRAVENOUS at 22:35

## 2018-09-24 RX ADMIN — METOCLOPRAMIDE 10 MG: 10 TABLET ORAL at 10:51

## 2018-09-24 RX ADMIN — ALBUTEROL SULFATE 2.5 MG: 2.5 SOLUTION RESPIRATORY (INHALATION) at 22:05

## 2018-09-24 RX ADMIN — GLIPIZIDE 10 MG: 5 TABLET ORAL at 06:06

## 2018-09-24 RX ADMIN — SIMVASTATIN 10 MG: 10 TABLET, FILM COATED ORAL at 20:08

## 2018-09-24 RX ADMIN — DEXTROSE MONOHYDRATE: 25 INJECTION, SOLUTION INTRAVENOUS at 23:20

## 2018-09-24 RX ADMIN — PIPERACILLIN SODIUM AND TAZOBACTAM SODIUM 3.38 G: 3; .375 INJECTION, POWDER, LYOPHILIZED, FOR SOLUTION INTRAVENOUS at 11:59

## 2018-09-24 RX ADMIN — METFORMIN HYDROCHLORIDE 1000 MG: 1000 TABLET ORAL at 09:11

## 2018-09-24 ASSESSMENT — PAIN SCALES - GENERAL
PAINLEVEL_OUTOF10: 0
PAINLEVEL_OUTOF10: 0

## 2018-09-24 NOTE — CONSULTS
8. 6       LIVER PROFILE:No results for input(s): AST, ALT, LABALBU in the last 72 hours. URINARY CATHETER OUTPUT (Romero):  Urethral Catheter Non-latex;Straight-tip 16 fr-Output (mL): 575 mL    DRAIN/TUBE OUTPUT:     Microbiology :  Recent Labs      09/23/18   0855   BC  24 Hours- no growth     Recent Labs      09/23/18   0910   BLOODCULT2  24 Hours- no growth     Recent Labs      09/23/18   0912   LABURIN  Growth not present, incubation continues     No results for input(s): CULTRESP in the last 72 hours. No results for input(s): WNDABS in the last 72 hours. Radiology :  XR CHEST PORTABLE   Final Result   Since examination dated 09/14/2018, interval development   of bilateral pleural effusions with associated airspace disease   involving the left mid to lower lung field and right lower lobe. Recommend clinical correlation.               Assessment and Plan:      44-year-old male with multiple comorbidities and recurrent strokes with a recently placed PEG tube presenting with shortness of breath, fever or chills, hypoxia and productive cough    Concern for nosocomial pneumonia  Recurrent strokes last in February 2018  CK D  Bradycardia status post pacemaker dependent    Plan  - Currently, there  is a concern if he's having tube feed aspiration  - He does have productive cough currently, consider lowering the tube feed rate, consult surgery  - Respiratory cultures  - Continue Zosyn for now  - Keep head elevated 45°    Thank you for your consult, please call for any qs                Yu Loyd MD

## 2018-09-24 NOTE — PROGRESS NOTES
Called Dr. Linda Luna about resuming coumadin.  Would still like to hold because of pt INR level of 5.1 9/23/18

## 2018-09-24 NOTE — PLAN OF CARE
Problem: Nutrition  Goal: Optimal nutrition therapy  Outcome: Ongoing  Nutrition Problem: Inadequate oral intake  Intervention: Food and/or Nutrient Delivery: Continue NPO, Modify current Tube Feeding (Reccommend modifying goal to Diabetic @ 65ml/hr continous w/ protein modulars BID )  Nutritional Goals: TF to goal w/ good tolerance

## 2018-09-24 NOTE — CONSULTS
CONSTITUTIONAL:   A&O x 3, NAD  SKIN:    No rash, No suspicious lesions  HEENT:    EOMI, MMM, No thrush  NECK:   No bruits, No JVP apprechiated  CV:     Sinus,    PULMONARY:  Couse,  + Rhonchi or Egophony  ABDOMEN:    Soft, non-tender. BS normal. No R/R/G  EXT:   No deformities. No clubbing. + lower        extremity edema, No venous stasis  PULSE:  Peripheral pulses barely palpable   PSYCHIATRIC: Unable to assess  MS:   No fracture, Gross muscle weakness LE, Biceps less  NEUROLOGIC:  Findings similar to previous with focal exam deficites. DATA: IMAGING & TESTING:     LABORATORY TESTS:    CBC:   Lab Results   Component Value Date    WBC 10.8 09/24/2018    RBC 3.55 09/24/2018    HGB 9.9 09/24/2018    HCT 32.1 09/24/2018    MCV 90.4 09/24/2018    MCH 27.9 09/24/2018    MCHC 30.8 09/24/2018    RDW 15.3 09/24/2018     09/24/2018    MPV 10.9 09/24/2018     CMP:    Lab Results   Component Value Date     09/24/2018    K 4.6 09/24/2018    K 3.6 09/07/2018    CL 99 09/24/2018    CO2 27 09/24/2018    BUN 42 09/24/2018    CREATININE 1.4 09/24/2018    GFRAA 60 09/24/2018    LABGLOM 50 09/24/2018    GLUCOSE 116 09/24/2018    GLUCOSE 297 01/04/2012    PROT 6.9 09/10/2018    LABALBU 3.3 09/10/2018    LABALBU 3.4 01/04/2012    CALCIUM 8.6 09/24/2018    BILITOT 0.6 09/10/2018    ALKPHOS 62 09/10/2018    AST 15 09/10/2018    ALT 13 09/10/2018        PRO-BNP:   Lab Results   Component Value Date    PROBNP 618 (H) 09/23/2018      ABGs:   Lab Results   Component Value Date    PH 7.417 12/29/2014    PO2 87.9 12/29/2014    PCO2 44.8 12/29/2014     Hemoglobin A1C: No components found for: HGBA1C    IMAGING:  Imaging tests were completed and reviewed and discussed radiology and primary care team and reveals   Ct Head Wo Contrast    Result Date: 9/7/2018    Diffuse atrophy likely age related Findings compatible with small vessel ischemic changes. Findings compatible with a lacunar infarct, likely old.  Findings compatible

## 2018-09-24 NOTE — PROGRESS NOTES
Date: 9/24/2018    Time: 12:18 AM    Patient Placed On BIPAP/CPAP/ Non-Invasive Ventilation? Yes    If no must comment. Facial area red/color change? No           If YES are Blister/Lesion present? No   If yes must notify nursing staff  BIPAP/CPAP skin barrier?   Yes    Skin barrier type:Liquicel       Comments: Patient remains on BIPAP from previous shift        Tammy Aragon

## 2018-09-24 NOTE — PROGRESS NOTES
Spoke with Dr. Hilaria Perez about critical lab values for PT/INR.  States to continue to hold coumadin, no vitamin K at this time

## 2018-09-24 NOTE — CONSULTS
Mela Shrestha is a 67 y.o. male    Neurology consulted for possible stroke    Past Medical History:     Past Medical History:   Diagnosis Date    Diabetes mellitus ([facility]     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[facility]     Hypertension     Ischemic stroke ([facility] 03/06/2018    Pacemaker     Paraplegia ([facility]     Stroke (cerebrum) ([facility] 02/27/2018    Thyroid disease        Past Surgical History:     Past Surgical History:   Procedure Laterality Date    COLONOSCOPY      EYE SURGERY      KNEE ARTHROSCOPY  right knee    PACEMAKER PLACEMENT      IA COLONOSCOPY FLX DX W/COLLJ SPEC WHEN PFRMD N/A 3/20/2018    COLONOSCOPY DIAGNOSTIC performed by Xenia Melgar MD at [address] [place] [address] EGD PERCUTANEOUS PLACEMENT GASTROSTOMY TUBE N/A 3/29/2018    PEG TUBE/PT. IN 5507 B performed by Xenia Melgar MD at [address] [place] [address] EGD PERCUTANEOUS PLACEMENT GASTROSTOMY TUBE N/A 9/12/2018    EGD PEG TUBE PLACEMENT performed by Xenia Melgar MD at [facility] ENDOSCOPY       Allergies:     Patient has no known allergies. Medications:     Prior to Admission medications    Medication Sig Start Date End Date Taking? Authorizing Provider   acetaminophen (TYLENOL) 325 MG tablet 650 mg by PEG Tube route every 4 hours as needed for Pain or Fever    Historical Provider, MD   guaiFENesin (ROBITUSSIN) 100 MG/5ML SOLN oral solution 200 mg by Per G Tube route every 4 hours as needed (PRN congestion)    Historical Provider, MD   magnesium hydroxide (MILK OF MAGNESIA) 400 MG/5ML suspension 30 mLs by Per G Tube route as needed for Constipation    Historical Provider, MD   potassium chloride (KLOR-CON) 20 MEQ packet Take 40 mEq by mouth daily    Historical Provider, MD   nitroGLYCERIN (NITROSTAT) 0.4 MG SL tablet up to max of 3 total doses.  If no relief after 1 dose, call 911. 9/14/18   Lesley Wang DO   albuterol (PROVENTIL) (2.5 MG/3ML) 0.083% nebulizer solution Take 3 mLs by nebulization 3 times daily 9/14/18   Lesley Wang DO 3.4 01/04/2012    CALCIUM 8.6 09/24/2018    BILITOT 0.6 09/10/2018    ALKPHOS 62 09/10/2018    AST 15 09/10/2018    ALT 13 09/10/2018         I independently reviewed the labs and imaging studies at today's appointment. Assessment:     Nonfocal exam.   Unlikely this event represents acute stroke. Patient Active Problem List   Diagnosis    Hypertension    Diabetes mellitus ([address]    Hyperlipidemia with target LDL less than 100    Abnormal EKG    Normal nuclear stress test    Thyroid mass    Bradycardia    Cerebral infarction (HCC)    Aspiration into airway    Dysphagia    Dizziness    Respiratory failure (HCC)    Difficult airway for intubation    Staphylococcus aureus pneumonia (HCC)    Altered mental status    Acute respiratory failure (HCC)    Altered mental state    Stroke ([address]    Hypernatremia    Impaired mobility and ADLs    Cardiac pacemaker in situ    Cerebrovascular accident (CVA) determined by clinical assessment ([address]    CVA tenderness    Paraplegia (HCC)    Ischemic stroke ([address]    Chest pain    HCAP (healthcare-associated pneumonia)    Pneumonia    Acute diastolic congestive heart failure ([address]       Plan:     No further stroke work up needed at this time. Neurology will sign off. Please call with questions.      Mónica Gil  3:46 PM  9/24/2018

## 2018-09-24 NOTE — PLAN OF CARE
Problem: Falls - Risk of:  Goal: Absence of physical injury  Absence of physical injury     Outcome: Met This Shift      Problem: Confusion - Acute:  Goal: Mental status will be restored to baseline  Mental status will be restored to baseline    Outcome: Ongoing      Problem: Injury - Risk of, Physical Injury:  Goal: Absence of physical injury  Absence of physical injury     Outcome: Met This Shift

## 2018-09-24 NOTE — H&P
Abhi Sutherland is a 67 y.o. male presenting to the ED for shortness for breath, beginning this morning. Pt was found by nursing staff at the NH with a low SpO2 of 76%. He was given an albuterol tx PTA. Upon arrival patient complains of subjective fever, chills, and has had a productive cough with colored sputum. Pt says that he has been choking on sputum within the past week. Hx of CVA and gastronomy tube in place and takes nothing PO. He is anticoagulated on coumadin. No other complaints. Pt denies GI symptoms, abdominal pain, headache, dizziness, changes in vision, chest pain, back pain, neck pain, extremity pain, weakness, numbness, tingling or any other symptoms at this time. was hypotensive and low urine output admitted for treatment . Past Medical History:   Diagnosis Date    Diabetes mellitus ([facility]     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[facility]     Hypertension     Ischemic stroke ([facility] 03/06/2018    Pacemaker     Paraplegia ([facility]     Stroke (cerebrum) ([facility] 02/27/2018    Thyroid disease        Past Surgical History:   Procedure Laterality Date    COLONOSCOPY      EYE SURGERY      KNEE ARTHROSCOPY  right knee    PACEMAKER PLACEMENT      LA COLONOSCOPY FLX DX W/COLLJ SPEC WHEN PFRMD N/A 3/20/2018    COLONOSCOPY DIAGNOSTIC performed by Diya Amaro MD at [address] [place] [address] EGD PERCUTANEOUS PLACEMENT GASTROSTOMY TUBE N/A 3/29/2018    PEG TUBE/PT. IN 5507 B performed by Diya Amaro MD at [address] [place] [address] EGD PERCUTANEOUS PLACEMENT GASTROSTOMY TUBE N/A 9/12/2018    EGD PEG TUBE PLACEMENT performed by Diya Amaro MD at [facility] [address] ENDOSCOPY       Family History   Problem Relation Age of Onset    Heart Disease Mother     Stroke Father        Prior to Admission medications    Medication Sig Start Date End Date Taking?  Authorizing Provider   acetaminophen (TYLENOL) 325 MG tablet 650 mg by PEG Tube route every 4 hours as needed for Pain or Fever    Historical Provider, MD   guaiFENesin (ROBITUSSIN) 12/30/2011    INR 5.1 09/23/2018          Assessment and Plan:    Patient Active Problem List   Diagnosis    Pneumonia HCAP    Hypoxia due to above     Leukocytosis     HTN    DM    CKD    Cerebral infarction (HCC)remote     Cardiac pacemaker in situ    Intravascular volume depletion

## 2018-09-25 LAB
ALBUMIN SERPL-MCNC: 2.1 G/DL (ref 3.5–5.2)
ALP BLD-CCNC: 64 U/L (ref 40–129)
ALT SERPL-CCNC: 14 U/L (ref 0–40)
ANION GAP SERPL CALCULATED.3IONS-SCNC: 19 MMOL/L (ref 7–16)
AST SERPL-CCNC: 17 U/L (ref 0–39)
BASOPHILS ABSOLUTE: 0.06 E9/L (ref 0–0.2)
BASOPHILS RELATIVE PERCENT: 0.8 % (ref 0–2)
BILIRUB SERPL-MCNC: 0.2 MG/DL (ref 0–1.2)
BUN BLDV-MCNC: 29 MG/DL (ref 8–23)
CALCIUM SERPL-MCNC: 7 MG/DL (ref 8.6–10.2)
CHLORIDE BLD-SCNC: 94 MMOL/L (ref 98–107)
CO2: 25 MMOL/L (ref 22–29)
CREAT SERPL-MCNC: 0.9 MG/DL (ref 0.7–1.2)
D DIMER: 934 NG/ML DDU
EOSINOPHILS ABSOLUTE: 0.82 E9/L (ref 0.05–0.5)
EOSINOPHILS RELATIVE PERCENT: 10.4 % (ref 0–6)
FILM ARRAY ADENOVIRUS: NORMAL
FILM ARRAY BORDETELLA PERTUSSIS: NORMAL
FILM ARRAY CHLAMYDOPHILIA PNEUMONIAE: NORMAL
FILM ARRAY CORONAVIRUS 229E: NORMAL
FILM ARRAY CORONAVIRUS HKU1: NORMAL
FILM ARRAY CORONAVIRUS NL63: NORMAL
FILM ARRAY CORONAVIRUS OC43: NORMAL
FILM ARRAY INFLUENZA A VIRUS 09H1: NORMAL
FILM ARRAY INFLUENZA A VIRUS H1: NORMAL
FILM ARRAY INFLUENZA A VIRUS H3: NORMAL
FILM ARRAY INFLUENZA A VIRUS: NORMAL
FILM ARRAY INFLUENZA B: NORMAL
FILM ARRAY METAPNEUMOVIRUS: NORMAL
FILM ARRAY MYCOPLASMA PNEUMONIAE: NORMAL
FILM ARRAY PARAINFLUENZA VIRUS 1: NORMAL
FILM ARRAY PARAINFLUENZA VIRUS 2: NORMAL
FILM ARRAY PARAINFLUENZA VIRUS 3: NORMAL
FILM ARRAY PARAINFLUENZA VIRUS 4: NORMAL
FILM ARRAY RESPIRATORY SYNCITIAL VIRUS: NORMAL
FILM ARRAY RHINOVIRUS/ENTEROVIRUS: NORMAL
GFR AFRICAN AMERICAN: >60
GFR NON-AFRICAN AMERICAN: >60 ML/MIN/1.73
GLUCOSE BLD-MCNC: 298 MG/DL (ref 74–109)
HCT VFR BLD CALC: 28.6 % (ref 37–54)
HEMOGLOBIN: 8.6 G/DL (ref 12.5–16.5)
IGA: 240 MG/DL (ref 70–400)
IGG: 609 MG/DL (ref 700–1600)
IGM: <25 MG/DL (ref 40–230)
IMMATURE GRANULOCYTES #: 0.05 E9/L
IMMATURE GRANULOCYTES %: 0.6 % (ref 0–5)
INR BLD: 7.4
LYMPHOCYTES ABSOLUTE: 1.19 E9/L (ref 1.5–4)
LYMPHOCYTES RELATIVE PERCENT: 15 % (ref 20–42)
MCH RBC QN AUTO: 27.8 PG (ref 26–35)
MCHC RBC AUTO-ENTMCNC: 30.1 % (ref 32–34.5)
MCV RBC AUTO: 92.6 FL (ref 80–99.9)
METER GLUCOSE: 100 MG/DL (ref 70–110)
METER GLUCOSE: 109 MG/DL (ref 70–110)
METER GLUCOSE: 129 MG/DL (ref 70–110)
METER GLUCOSE: 148 MG/DL (ref 70–110)
METER GLUCOSE: 77 MG/DL (ref 70–110)
METER GLUCOSE: 93 MG/DL (ref 70–110)
MONOCYTES ABSOLUTE: 0.61 E9/L (ref 0.1–0.95)
MONOCYTES RELATIVE PERCENT: 7.7 % (ref 2–12)
NEUTROPHILS ABSOLUTE: 5.18 E9/L (ref 1.8–7.3)
NEUTROPHILS RELATIVE PERCENT: 65.5 % (ref 43–80)
PDW BLD-RTO: 15.1 FL (ref 11.5–15)
PLATELET # BLD: 317 E9/L (ref 130–450)
PMV BLD AUTO: 10.7 FL (ref 7–12)
POTASSIUM SERPL-SCNC: 4 MMOL/L (ref 3.5–5)
PROTHROMBIN TIME: 81.2 SEC (ref 9.3–12.4)
RBC # BLD: 3.09 E12/L (ref 3.8–5.8)
SODIUM BLD-SCNC: 138 MMOL/L (ref 132–146)
TOTAL PROTEIN: 5.2 G/DL (ref 6.4–8.3)
URINE CULTURE, ROUTINE: NORMAL
WBC # BLD: 7.9 E9/L (ref 4.5–11.5)

## 2018-09-25 PROCEDURE — 85378 FIBRIN DEGRADE SEMIQUANT: CPT

## 2018-09-25 PROCEDURE — 80053 COMPREHEN METABOLIC PANEL: CPT

## 2018-09-25 PROCEDURE — 2060000000 HC ICU INTERMEDIATE R&B

## 2018-09-25 PROCEDURE — 87070 CULTURE OTHR SPECIMN AEROBIC: CPT

## 2018-09-25 PROCEDURE — 6370000000 HC RX 637 (ALT 250 FOR IP): Performed by: INTERNAL MEDICINE

## 2018-09-25 PROCEDURE — 2580000003 HC RX 258: Performed by: INTERNAL MEDICINE

## 2018-09-25 PROCEDURE — 99233 SBSQ HOSP IP/OBS HIGH 50: CPT | Performed by: INTERNAL MEDICINE

## 2018-09-25 PROCEDURE — 85025 COMPLETE CBC W/AUTO DIFF WBC: CPT

## 2018-09-25 PROCEDURE — 85610 PROTHROMBIN TIME: CPT

## 2018-09-25 PROCEDURE — 94640 AIRWAY INHALATION TREATMENT: CPT

## 2018-09-25 PROCEDURE — 82784 ASSAY IGA/IGD/IGG/IGM EACH: CPT

## 2018-09-25 PROCEDURE — 87205 SMEAR GRAM STAIN: CPT

## 2018-09-25 PROCEDURE — 82962 GLUCOSE BLOOD TEST: CPT

## 2018-09-25 PROCEDURE — 6360000002 HC RX W HCPCS: Performed by: INTERNAL MEDICINE

## 2018-09-25 PROCEDURE — 87186 SC STD MICRODIL/AGAR DIL: CPT

## 2018-09-25 PROCEDURE — 2700000000 HC OXYGEN THERAPY PER DAY

## 2018-09-25 PROCEDURE — 94660 CPAP INITIATION&MGMT: CPT

## 2018-09-25 PROCEDURE — 87077 CULTURE AEROBIC IDENTIFY: CPT

## 2018-09-25 PROCEDURE — 99232 SBSQ HOSP IP/OBS MODERATE 35: CPT | Performed by: INTERNAL MEDICINE

## 2018-09-25 PROCEDURE — 36415 COLL VENOUS BLD VENIPUNCTURE: CPT

## 2018-09-25 RX ORDER — DEXTROSE MONOHYDRATE 50 MG/ML
100 INJECTION, SOLUTION INTRAVENOUS PRN
Status: DISCONTINUED | OUTPATIENT
Start: 2018-09-24 | End: 2018-09-28 | Stop reason: HOSPADM

## 2018-09-25 RX ORDER — DEXTROSE MONOHYDRATE 25 G/50ML
12.5 INJECTION, SOLUTION INTRAVENOUS PRN
Status: DISCONTINUED | OUTPATIENT
Start: 2018-09-24 | End: 2018-09-28 | Stop reason: HOSPADM

## 2018-09-25 RX ORDER — NICOTINE POLACRILEX 4 MG
15 LOZENGE BUCCAL PRN
Status: DISCONTINUED | OUTPATIENT
Start: 2018-09-24 | End: 2018-09-28 | Stop reason: HOSPADM

## 2018-09-25 RX ADMIN — METOCLOPRAMIDE 10 MG: 10 TABLET ORAL at 20:30

## 2018-09-25 RX ADMIN — PIPERACILLIN SODIUM AND TAZOBACTAM SODIUM 3.38 G: 3; .375 INJECTION, POWDER, LYOPHILIZED, FOR SOLUTION INTRAVENOUS at 02:47

## 2018-09-25 RX ADMIN — METFORMIN HYDROCHLORIDE 1000 MG: 1000 TABLET ORAL at 20:30

## 2018-09-25 RX ADMIN — ALBUTEROL SULFATE 2.5 MG: 2.5 SOLUTION RESPIRATORY (INHALATION) at 07:58

## 2018-09-25 RX ADMIN — LEVOTHYROXINE SODIUM 50 MCG: 50 TABLET ORAL at 06:21

## 2018-09-25 RX ADMIN — METOCLOPRAMIDE 10 MG: 10 TABLET ORAL at 12:31

## 2018-09-25 RX ADMIN — PIPERACILLIN SODIUM AND TAZOBACTAM SODIUM 3.38 G: 3; .375 INJECTION, POWDER, LYOPHILIZED, FOR SOLUTION INTRAVENOUS at 20:30

## 2018-09-25 RX ADMIN — METFORMIN HYDROCHLORIDE 1000 MG: 1000 TABLET ORAL at 12:31

## 2018-09-25 RX ADMIN — METOCLOPRAMIDE 10 MG: 10 TABLET ORAL at 06:21

## 2018-09-25 RX ADMIN — PETROLATUM: 42 OINTMENT TOPICAL at 20:30

## 2018-09-25 RX ADMIN — METOCLOPRAMIDE 10 MG: 10 TABLET ORAL at 17:00

## 2018-09-25 RX ADMIN — SODIUM CHLORIDE: 9 INJECTION, SOLUTION INTRAVENOUS at 12:31

## 2018-09-25 RX ADMIN — ALBUTEROL SULFATE 2.5 MG: 2.5 SOLUTION RESPIRATORY (INHALATION) at 20:13

## 2018-09-25 RX ADMIN — NYSTATIN: 100000 CREAM TOPICAL at 09:27

## 2018-09-25 RX ADMIN — NYSTATIN: 100000 CREAM TOPICAL at 20:31

## 2018-09-25 RX ADMIN — SIMVASTATIN 10 MG: 10 TABLET, FILM COATED ORAL at 20:30

## 2018-09-25 RX ADMIN — PIPERACILLIN SODIUM AND TAZOBACTAM SODIUM 3.38 G: 3; .375 INJECTION, POWDER, LYOPHILIZED, FOR SOLUTION INTRAVENOUS at 12:32

## 2018-09-25 ASSESSMENT — PAIN SCALES - GENERAL
PAINLEVEL_OUTOF10: 0
PAINLEVEL_OUTOF10: 0

## 2018-09-25 NOTE — CARE COORDINATION
SOCIAL WORK / DISCHARGE PLANNING:  Sw met with pt and dtr, William Hayward at bedside confirmed by pt that he wishes to return to [facility] at discharge. No precert will be needed. N17,envelope and ambulance forms completed, in soft chart.                      Electronically signed by ROHIT Phillips on 9/25/2018 at 11:47 AM

## 2018-09-25 NOTE — PROGRESS NOTES
[facility] Cardiology Inpatient Progress Note    Patient is a 67 y.o. male of [address] [place] [address] seen in hospital follow up. Chief complaint: SOB/Pneumonia    HPI: SOB. No CP.      Patient Active Problem List   Diagnosis    Hypertension    Diabetes mellitus ([facility]    Hyperlipidemia with target LDL less than 100    Abnormal EKG    Normal nuclear stress test    Thyroid mass    Bradycardia    Cerebral infarction (HCC)    Aspiration into airway    Dysphagia    Dizziness    Respiratory failure (HCC)    Difficult airway for intubation    Staphylococcus aureus pneumonia (HCC)    Altered mental status    Acute respiratory failure (HCC)    Altered mental state    Stroke ([facility]    Hypernatremia    Impaired mobility and ADLs    Cardiac pacemaker in situ    Cerebrovascular accident (CVA) determined by clinical assessment ([facility]    CVA tenderness    Paraplegia (HCC)    Ischemic stroke ([facility]    Chest pain    HCAP (healthcare-associated pneumonia)    Pneumonia    Acute diastolic congestive heart failure (HCC)       No Known Allergies    Current Facility-Administered Medications   Medication Dose Route Frequency Provider Last Rate Last Dose    glucose (GLUTOSE) 40 % oral gel 15 g  15 g Oral PRN Rory Martinez MD        dextrose 50 % solution 12.5 g  12.5 g Intravenous PRN Rory Martinez MD        glucagon (rDNA) injection 1 mg  1 mg Intramuscular PRN Rory Martinez MD        dextrose 5 % solution  100 mL/hr Intravenous PRN Rory Martinez MD        warfarin (COUMADIN) daily dosing (placeholder)   Other RX Placeholder Nohemy Lancaster MD        0.9 % sodium chloride infusion   Intravenous Continuous Kwesi Novoa MD 75 mL/hr at 09/24/18 2154      acetaminophen (TYLENOL) tablet 650 mg  650 mg PEG Tube Q4H PRN Paola Helton MD        albuterol (PROVENTIL) nebulizer solution 2.5 mg  2.5 mg Nebulization TID Rory Martinez MD   2.5 mg at 09/25/18 8775    glipiZIDE (GLUCOTROL)

## 2018-09-25 NOTE — PROGRESS NOTES
2012    PROT 5.2 2018    LABALBU 2.1 2018    LABALBU 3.4 2012    CALCIUM 7.0 2018    BILITOT 0.2 2018    ALKPHOS 64 2018    AST 17 2018    ALT 14 2018        PRO-BNP:   Lab Results   Component Value Date    PROBNP 618 (H) 2018      ABGs:   Lab Results   Component Value Date    PH 7.417 2014    PO2 87.9 2014    PCO2 44.8 2014     Hemoglobin A1C: No components found for: HGBA1C    IMAGING:  Imaging tests were completed and reviewed and discussed radiology and primary care team and reveals   Ct Head Wo Contrast    Result Date: 2018    Diffuse atrophy likely age related Findings compatible with small vessel ischemic changes. Findings compatible with a lacunar infarct, likely old. Findings compatible with a cortical infarct, likely old. Mra Head Wo Contrast    Redemonstration of occlusion of the mid to distal right V4 segment and nonvisualization of the right A1 segment. . Otherwise, negative for abrupt occlusion or hemodynamically significant stenosis. Xr Chest Portable    Result Date: 2018  Patient MRN:  [id number] : 1945 Age: 67 years Gender: Male Order Date:  2018 9:00 AM EXAM: XR CHEST PORTABLE NUMBER OF IMAGES:  2 INDICATION: Cough Technique: AP view of the chest obtained portably on 2018 9:00 AM Comparison:  Comparison is made to AP view of the chest dated 2018. Findings: The cardiomediastinal silhouette is enlarged. There has been interval development of bilateral pleural effusions with associated patchy airspace opacities involving the left mid to lower lung field and right lower lobe. There is no pneumothorax. The visualized osseous structures demonstrate degenerative changes. Lines and Tubes: A left-sided dual-lead cardiac pacer is identified, with leads terminating in satisfactory position.      Since examination dated 2018, interval development of bilateral pleural effusions with

## 2018-09-25 NOTE — PROGRESS NOTES
09/07/2018    CL 94 09/25/2018    CO2 25 09/25/2018    BUN 29 09/25/2018    CREATININE 0.9 09/25/2018    GFRAA >60 09/25/2018    LABGLOM >60 09/25/2018    GLUCOSE 298 09/25/2018    GLUCOSE 297 01/04/2012    PROT 5.2 09/25/2018    LABALBU 2.1 09/25/2018    LABALBU 3.4 01/04/2012    CALCIUM 7.0 09/25/2018    BILITOT 0.2 09/25/2018    ALKPHOS 64 09/25/2018    AST 17 09/25/2018    ALT 14 09/25/2018     Magnesium:    Lab Results   Component Value Date    MG 1.6 09/11/2018     Phosphorus:    Lab Results   Component Value Date    PHOS 2.6 09/10/2018       Radiology Review:    CXR 09/23/18   Since examination dated 09/14/2018, interval development   of bilateral pleural effusions with associated airspace disease   involving the left mid to lower lung field and right lower lobe. Recommend clinical correlation.           BRIEF SUMMARY OF INITIAL CONSULT     Briefly Mr. Grisel Jones  is a 71-year-old man with history of hypertension, type II diabetes mellitus, factor V Leyden deficiency, hyperlipidemia, sick sinus syndrome s/p pacemaker placement, CVA, hypothyroidism, recently admitted with weakness; and who was readmitted on 09/23/18 after presented to the ER from [facility] complaining of SOB and aspiration pneumonia; his creatinine level on admission was 1.1 and has increased to 1.4 mg/dl  reason for consultation. Priro to admission his medications included lisinopril and he had received Ketorolac in the ER.      IMPRESSION/RECOMMENDATIONS:       1. JUAN stage I, Pre-renal JUAN hemodynamically mediated 2/2 to poor oral intake, ACE inhibition and VILLATORO-2 inhibition. Resolved with iv fluids. 2. Aspiration Pneumonia vs HCAP, on Zosyn  3. HTN, lisinopril on hold   4. T2DM, on metformin, glipizide  5. Factor V deficiency, INR 5.1, Coumadin on hold  6. HFpEF, 60%, stage II DD, on nitroglycerin   7. Dysphagia s/p PEG tube placement, on TF   8. S/p CVA  9. Anemia, normocytic  10. QT prolongation QTc 635  11.  Nutrition: TF 65cc/h + 30 Patient/surrogate requesting vaccine

## 2018-09-26 LAB
INR BLD: 4.2
METER GLUCOSE: 120 MG/DL (ref 70–110)
METER GLUCOSE: 129 MG/DL (ref 70–110)
METER GLUCOSE: 144 MG/DL (ref 70–110)
METER GLUCOSE: 153 MG/DL (ref 70–110)
PROTHROMBIN TIME: 47.2 SEC (ref 9.3–12.4)

## 2018-09-26 PROCEDURE — 94660 CPAP INITIATION&MGMT: CPT

## 2018-09-26 PROCEDURE — 2700000000 HC OXYGEN THERAPY PER DAY

## 2018-09-26 PROCEDURE — 99232 SBSQ HOSP IP/OBS MODERATE 35: CPT | Performed by: INTERNAL MEDICINE

## 2018-09-26 PROCEDURE — 6360000002 HC RX W HCPCS: Performed by: INTERNAL MEDICINE

## 2018-09-26 PROCEDURE — 2580000003 HC RX 258: Performed by: INTERNAL MEDICINE

## 2018-09-26 PROCEDURE — 85610 PROTHROMBIN TIME: CPT

## 2018-09-26 PROCEDURE — 82962 GLUCOSE BLOOD TEST: CPT

## 2018-09-26 PROCEDURE — 94640 AIRWAY INHALATION TREATMENT: CPT

## 2018-09-26 PROCEDURE — 36415 COLL VENOUS BLD VENIPUNCTURE: CPT

## 2018-09-26 PROCEDURE — 2060000000 HC ICU INTERMEDIATE R&B

## 2018-09-26 PROCEDURE — 6370000000 HC RX 637 (ALT 250 FOR IP): Performed by: INTERNAL MEDICINE

## 2018-09-26 RX ADMIN — METFORMIN HYDROCHLORIDE 1000 MG: 1000 TABLET ORAL at 20:30

## 2018-09-26 RX ADMIN — ALBUTEROL SULFATE 2.5 MG: 2.5 SOLUTION RESPIRATORY (INHALATION) at 20:51

## 2018-09-26 RX ADMIN — PIPERACILLIN SODIUM AND TAZOBACTAM SODIUM 3.38 G: 3; .375 INJECTION, POWDER, LYOPHILIZED, FOR SOLUTION INTRAVENOUS at 03:57

## 2018-09-26 RX ADMIN — PIPERACILLIN SODIUM AND TAZOBACTAM SODIUM 3.38 G: 3; .375 INJECTION, POWDER, LYOPHILIZED, FOR SOLUTION INTRAVENOUS at 20:25

## 2018-09-26 RX ADMIN — GLIPIZIDE 10 MG: 5 TABLET ORAL at 16:25

## 2018-09-26 RX ADMIN — METFORMIN HYDROCHLORIDE 1000 MG: 1000 TABLET ORAL at 08:00

## 2018-09-26 RX ADMIN — GLIPIZIDE 10 MG: 5 TABLET ORAL at 06:10

## 2018-09-26 RX ADMIN — PETROLATUM: 42 OINTMENT TOPICAL at 08:02

## 2018-09-26 RX ADMIN — ALBUTEROL SULFATE 2.5 MG: 2.5 SOLUTION RESPIRATORY (INHALATION) at 10:08

## 2018-09-26 RX ADMIN — SIMVASTATIN 10 MG: 10 TABLET, FILM COATED ORAL at 20:30

## 2018-09-26 RX ADMIN — METOCLOPRAMIDE 10 MG: 10 TABLET ORAL at 10:32

## 2018-09-26 RX ADMIN — METOCLOPRAMIDE 10 MG: 10 TABLET ORAL at 06:10

## 2018-09-26 RX ADMIN — METOCLOPRAMIDE 10 MG: 10 TABLET ORAL at 16:25

## 2018-09-26 RX ADMIN — NYSTATIN: 100000 CREAM TOPICAL at 08:52

## 2018-09-26 RX ADMIN — PIPERACILLIN SODIUM AND TAZOBACTAM SODIUM 3.38 G: 3; .375 INJECTION, POWDER, LYOPHILIZED, FOR SOLUTION INTRAVENOUS at 12:30

## 2018-09-26 RX ADMIN — LEVOTHYROXINE SODIUM 50 MCG: 50 TABLET ORAL at 06:10

## 2018-09-26 RX ADMIN — METOCLOPRAMIDE 10 MG: 10 TABLET ORAL at 20:30

## 2018-09-26 RX ADMIN — NYSTATIN: 100000 CREAM TOPICAL at 20:37

## 2018-09-26 ASSESSMENT — PAIN SCALES - GENERAL
PAINLEVEL_OUTOF10: 0
PAINLEVEL_OUTOF10: 4
PAINLEVEL_OUTOF10: 0
PAINLEVEL_OUTOF10: 0

## 2018-09-26 NOTE — PROGRESS NOTES
CALCIUM 7.0 2018    BILITOT 0.2 2018    ALKPHOS 64 2018    AST 17 2018    ALT 14 2018        PRO-BNP:   Lab Results   Component Value Date    PROBNP 618 (H) 2018      ABGs:   Lab Results   Component Value Date    PH 7.417 2014    PO2 87.9 2014    PCO2 44.8 2014     Hemoglobin A1C: No components found for: HGBA1C    IMAGING:  Imaging tests were completed and reviewed and discussed radiology and primary care team and reveals   Ct Head Wo Contrast    Result Date: 2018    Diffuse atrophy likely age related Findings compatible with small vessel ischemic changes. Findings compatible with a lacunar infarct, likely old. Findings compatible with a cortical infarct, likely old. Mra Head Wo Contrast    Redemonstration of occlusion of the mid to distal right V4 segment and nonvisualization of the right A1 segment. . Otherwise, negative for abrupt occlusion or hemodynamically significant stenosis. Xr Chest Portable    Result Date: 2018  Patient MRN:  [id number] : 1945 Age: 67 years Gender: Male Order Date:  2018 9:00 AM EXAM: XR CHEST PORTABLE NUMBER OF IMAGES:  2 INDICATION: Cough Technique: AP view of the chest obtained portably on 2018 9:00 AM Comparison:  Comparison is made to AP view of the chest dated 2018. Findings: The cardiomediastinal silhouette is enlarged. There has been interval development of bilateral pleural effusions with associated patchy airspace opacities involving the left mid to lower lung field and right lower lobe. There is no pneumothorax. The visualized osseous structures demonstrate degenerative changes. Lines and Tubes: A left-sided dual-lead cardiac pacer is identified, with leads terminating in satisfactory position. Since examination dated 2018, interval development of bilateral pleural effusions with associated airspace disease involving the left mid to lower lung field and right lower lobe.

## 2018-09-26 NOTE — CONSULTS
Department of General Surgery - Adult  Surgical Service   Attending Consult Note      Reason for Consult:  Aspiration  Requesting Physician:  Lesley Wang DO    CHIEF COMPLAINT:  Aspiration    History Obtained From:  patient, electronic medical record    HISTORY OF PRESENT ILLNESS:                The patient is a 67 y.o. male who presents with aspiration and respiratory compromise. Past Medical History:        Diagnosis Date    Diabetes mellitus ([facility]     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[facility]     Hypertension     Ischemic stroke ([facility] 03/06/2018    Pacemaker     Paraplegia ([facility]     Stroke (cerebrum) ([facility] 02/27/2018    Thyroid disease      Past Surgical History:        Procedure Laterality Date    COLONOSCOPY      EYE SURGERY      KNEE ARTHROSCOPY  right knee    PACEMAKER PLACEMENT      DE COLONOSCOPY FLX DX W/COLLJ SPEC WHEN PFRMD N/A 3/20/2018    COLONOSCOPY DIAGNOSTIC performed by Maycol Mendez MD at [address] [place] [address] EGD PERCUTANEOUS PLACEMENT GASTROSTOMY TUBE N/A 3/29/2018    PEG TUBE/PT.  IN 5507 B performed by Maycol Mendez MD at [address] [place] [address] EGD PERCUTANEOUS PLACEMENT GASTROSTOMY TUBE N/A 9/12/2018    EGD PEG TUBE PLACEMENT performed by Maycol Mendez MD at [facility] ENDOSCOPY     Current Medications:   Current Facility-Administered Medications: glucose (GLUTOSE) 40 % oral gel 15 g, 15 g, Oral, PRN  dextrose 50 % solution 12.5 g, 12.5 g, Intravenous, PRN  glucagon (rDNA) injection 1 mg, 1 mg, Intramuscular, PRN  dextrose 5 % solution, 100 mL/hr, Intravenous, PRN  warfarin (COUMADIN) daily dosing (placeholder), , Other, RX Placeholder  acetaminophen (TYLENOL) tablet 650 mg, 650 mg, PEG Tube, Q4H PRN  albuterol (PROVENTIL) nebulizer solution 2.5 mg, 2.5 mg, Nebulization, TID  glipiZIDE (GLUCOTROL) tablet 10 mg, 10 mg, PEG Tube, BID AC  guaiFENesin (ROBITUSSIN) 100 MG/5ML oral solution 200 mg, 200 mg, Per G Tube, Q4H PRN  levothyroxine (SYNTHROID) tablet 50 mcg, 50 mcg, PEG Tube,

## 2018-09-26 NOTE — PROGRESS NOTES
[facility] Cardiology Inpatient Progress Note    Patient is a 67 y.o. male of Kristina Meredith DO seen in hospital follow up. Chief complaint: SOB/Pneumonia    HPI: SOB. No CP.      Patient Active Problem List   Diagnosis    Hypertension    Diabetes mellitus ([facility]    Hyperlipidemia with target LDL less than 100    Abnormal EKG    Normal nuclear stress test    Thyroid mass    Bradycardia    Cerebral infarction (HCC)    Aspiration into airway    Dysphagia    Dizziness    Respiratory failure (HCC)    Difficult airway for intubation    Staphylococcus aureus pneumonia (HCC)    Altered mental status    Acute respiratory failure (HCC)    Altered mental state    Stroke ([facility]    Hypernatremia    Impaired mobility and ADLs    Cardiac pacemaker in situ    Cerebrovascular accident (CVA) determined by clinical assessment ([facility]    CVA tenderness    Paraplegia (HCC)    Ischemic stroke ([facility]    Chest pain    HCAP (healthcare-associated pneumonia)    Pneumonia    Acute diastolic congestive heart failure (HCC)       No Known Allergies    Current Facility-Administered Medications   Medication Dose Route Frequency Provider Last Rate Last Dose    glucose (GLUTOSE) 40 % oral gel 15 g  15 g Oral PRN Gregoria Hilario MD        dextrose 50 % solution 12.5 g  12.5 g Intravenous PRN Gregoria Hilario MD        glucagon (rDNA) injection 1 mg  1 mg Intramuscular PRN Gregoria Hilario MD        dextrose 5 % solution  100 mL/hr Intravenous PRN Gregoria Hilario MD        warfarin (COUMADIN) daily dosing (placeholder)   Other RX Placeholder Krystin Forrester MD        acetaminophen (TYLENOL) tablet 650 mg  650 mg PEG Tube Q4H PRN Gregoria Hilario MD        albuterol (PROVENTIL) nebulizer solution 2.5 mg  2.5 mg Nebulization TID Gregoria Hilario MD   2.5 mg at 09/25/18 2013    glipiZIDE (GLUCOTROL) tablet 10 mg  10 mg PEG Tube BID AC Paola Lloyd MD   10 mg at 09/26/18 0610    guaiFENesin (ROBITUSSIN) 100

## 2018-09-26 NOTE — PROGRESS NOTES
Microbiology :  No results for input(s): BC in the last 72 hours. No results for input(s): Sachin Black in the last 72 hours. No results for input(s): LABURIN in the last 72 hours. Recent Labs      09/25/18   1230   CULTRESP  Oral Pharyngeal Ana Cristina reduced*  Moderate growth  Identification and sensitivity to follow       No results for input(s): WNDABS in the last 72 hours. Radiology :  XR CHEST PORTABLE   Final Result   Since examination dated 09/14/2018, interval development   of bilateral pleural effusions with associated airspace disease   involving the left mid to lower lung field and right lower lobe. Recommend clinical correlation.             URINARY CATHETER OUTPUT (Romero):  Urethral Catheter Non-latex;Straight-tip 16 fr-Output (mL): 1250 mL    Assessment and Plan:      65-year-old male with multiple comorbidities and recurrent strokes with a recently placed PEG tube presenting with shortness of breath, fever or chills, hypoxia and productive cough     Concern for nosocomial pneumonia  - resp cultures with reduced ana cristina , presence of GNR oxidase positive  - not the best sample but pt has copious productive cough   Recurrent strokes last in February 2018  CK D  Bradycardia status post pacemaker dependent     Plan  -- Continue Zosyn for now  - Keep head elevated 45°  - follow resp cultures          Electronically signed by Amy Muse MD on 9/26/2018 at 3:36 PM

## 2018-09-27 LAB
ANION GAP SERPL CALCULATED.3IONS-SCNC: 9 MMOL/L (ref 7–16)
BUN BLDV-MCNC: 17 MG/DL (ref 8–23)
CALCIUM SERPL-MCNC: 9.2 MG/DL (ref 8.6–10.2)
CHLORIDE BLD-SCNC: 102 MMOL/L (ref 98–107)
CO2: 34 MMOL/L (ref 22–29)
CREAT SERPL-MCNC: 0.9 MG/DL (ref 0.7–1.2)
CULTURE, RESPIRATORY: ABNORMAL
CULTURE, RESPIRATORY: ABNORMAL
GFR AFRICAN AMERICAN: >60
GFR NON-AFRICAN AMERICAN: >60 ML/MIN/1.73
GLUCOSE BLD-MCNC: 95 MG/DL (ref 74–109)
INR BLD: 3.1
METER GLUCOSE: 120 MG/DL (ref 70–110)
METER GLUCOSE: 86 MG/DL (ref 70–110)
METER GLUCOSE: 92 MG/DL (ref 70–110)
ORGANISM: ABNORMAL
POTASSIUM SERPL-SCNC: 5 MMOL/L (ref 3.5–5)
PROTHROMBIN TIME: 35.2 SEC (ref 9.3–12.4)
SMEAR, RESPIRATORY: ABNORMAL
SODIUM BLD-SCNC: 145 MMOL/L (ref 132–146)

## 2018-09-27 PROCEDURE — 2580000003 HC RX 258: Performed by: INTERNAL MEDICINE

## 2018-09-27 PROCEDURE — 99232 SBSQ HOSP IP/OBS MODERATE 35: CPT | Performed by: INTERNAL MEDICINE

## 2018-09-27 PROCEDURE — 6370000000 HC RX 637 (ALT 250 FOR IP): Performed by: INTERNAL MEDICINE

## 2018-09-27 PROCEDURE — 2580000003 HC RX 258: Performed by: FAMILY MEDICINE

## 2018-09-27 PROCEDURE — 36415 COLL VENOUS BLD VENIPUNCTURE: CPT

## 2018-09-27 PROCEDURE — 82962 GLUCOSE BLOOD TEST: CPT

## 2018-09-27 PROCEDURE — 6360000002 HC RX W HCPCS: Performed by: INTERNAL MEDICINE

## 2018-09-27 PROCEDURE — 2060000000 HC ICU INTERMEDIATE R&B

## 2018-09-27 PROCEDURE — 80048 BASIC METABOLIC PNL TOTAL CA: CPT

## 2018-09-27 PROCEDURE — 94640 AIRWAY INHALATION TREATMENT: CPT

## 2018-09-27 PROCEDURE — 94660 CPAP INITIATION&MGMT: CPT

## 2018-09-27 PROCEDURE — 85610 PROTHROMBIN TIME: CPT

## 2018-09-27 PROCEDURE — 2700000000 HC OXYGEN THERAPY PER DAY

## 2018-09-27 PROCEDURE — 2500000003 HC RX 250 WO HCPCS: Performed by: SURGERY

## 2018-09-27 RX ORDER — SODIUM CHLORIDE 0.9 % (FLUSH) 0.9 %
10 SYRINGE (ML) INJECTION 2 TIMES DAILY
Status: DISCONTINUED | OUTPATIENT
Start: 2018-09-27 | End: 2018-09-28 | Stop reason: HOSPADM

## 2018-09-27 RX ORDER — LEVOFLOXACIN 750 MG/1
750 TABLET ORAL DAILY
Status: DISCONTINUED | OUTPATIENT
Start: 2018-09-27 | End: 2018-09-28

## 2018-09-27 RX ORDER — AMLODIPINE BESYLATE 5 MG/1
5 TABLET ORAL DAILY
Status: DISCONTINUED | OUTPATIENT
Start: 2018-09-27 | End: 2018-09-28 | Stop reason: HOSPADM

## 2018-09-27 RX ADMIN — Medication 10 ML: at 21:43

## 2018-09-27 RX ADMIN — PIPERACILLIN SODIUM AND TAZOBACTAM SODIUM 3.38 G: 3; .375 INJECTION, POWDER, LYOPHILIZED, FOR SOLUTION INTRAVENOUS at 13:53

## 2018-09-27 RX ADMIN — METOCLOPRAMIDE 10 MG: 10 TABLET ORAL at 12:41

## 2018-09-27 RX ADMIN — LEVOFLOXACIN 750 MG: 750 TABLET, FILM COATED ORAL at 21:43

## 2018-09-27 RX ADMIN — METOCLOPRAMIDE 10 MG: 10 TABLET ORAL at 21:43

## 2018-09-27 RX ADMIN — ALBUTEROL SULFATE 2.5 MG: 2.5 SOLUTION RESPIRATORY (INHALATION) at 08:07

## 2018-09-27 RX ADMIN — ALBUTEROL SULFATE 2.5 MG: 2.5 SOLUTION RESPIRATORY (INHALATION) at 19:10

## 2018-09-27 RX ADMIN — PIPERACILLIN SODIUM AND TAZOBACTAM SODIUM 3.38 G: 3; .375 INJECTION, POWDER, LYOPHILIZED, FOR SOLUTION INTRAVENOUS at 04:19

## 2018-09-27 RX ADMIN — SILVER SULFADIAZINE: 10 CREAM TOPICAL at 12:41

## 2018-09-27 RX ADMIN — METFORMIN HYDROCHLORIDE 1000 MG: 1000 TABLET ORAL at 09:16

## 2018-09-27 RX ADMIN — NYSTATIN: 100000 CREAM TOPICAL at 09:17

## 2018-09-27 RX ADMIN — METFORMIN HYDROCHLORIDE 1000 MG: 1000 TABLET ORAL at 21:43

## 2018-09-27 RX ADMIN — LEVOTHYROXINE SODIUM 50 MCG: 50 TABLET ORAL at 06:53

## 2018-09-27 RX ADMIN — AMLODIPINE BESYLATE 5 MG: 5 TABLET ORAL at 13:49

## 2018-09-27 RX ADMIN — METOCLOPRAMIDE 10 MG: 10 TABLET ORAL at 18:02

## 2018-09-27 RX ADMIN — SILVER SULFADIAZINE: 10 CREAM TOPICAL at 21:44

## 2018-09-27 RX ADMIN — METOCLOPRAMIDE 10 MG: 10 TABLET ORAL at 06:53

## 2018-09-27 RX ADMIN — SIMVASTATIN 10 MG: 10 TABLET, FILM COATED ORAL at 21:43

## 2018-09-27 RX ADMIN — ALBUTEROL SULFATE 2.5 MG: 2.5 SOLUTION RESPIRATORY (INHALATION) at 15:50

## 2018-09-27 RX ADMIN — GLIPIZIDE 10 MG: 5 TABLET ORAL at 06:53

## 2018-09-27 RX ADMIN — Medication 10 ML: at 13:58

## 2018-09-27 RX ADMIN — GLIPIZIDE 10 MG: 5 TABLET ORAL at 18:02

## 2018-09-27 ASSESSMENT — PAIN SCALES - GENERAL
PAINLEVEL_OUTOF10: 0
PAINLEVEL_OUTOF10: 0

## 2018-09-27 NOTE — PROGRESS NOTES
TSH:    Lab Results   Component Value Date    TSH 2.170 09/07/2018         Rhythm Strip: normal sinus rhythm. ASSESSMENT & PLAN:    Patient Active Problem List   Diagnosis    Hypertension    Diabetes mellitus ([facility]    Hyperlipidemia with target LDL less than 100    Abnormal EKG    Normal nuclear stress test    Thyroid mass    Bradycardia    Cerebral infarction (HCC)    Aspiration into airway    Dysphagia    Dizziness    Respiratory failure (HCC)    Difficult airway for intubation    Staphylococcus aureus pneumonia (HCC)    Altered mental status    Acute respiratory failure (HCC)    Altered mental state    Stroke ([facility]    Hypernatremia    Impaired mobility and ADLs    Cardiac pacemaker in situ    Cerebrovascular accident (CVA) determined by clinical assessment ([facility]    CVA tenderness    Paraplegia ([facility]    Ischemic stroke ([facility]    Chest pain    HCAP (healthcare-associated pneumonia)    Pneumonia    Acute diastolic congestive heart failure ([facility]     1. SOB/Pneumonia: Per Pulmonary.      See below. On antibiotics.      2. Acute diastolic CHF: CXR suggests some congestion and effusions.      Marginally elevated BNP.      PRN loop diuretics.      Normal LVEF and no significant valve issues as of KINA of 3/18.      3. Possible CVA/Hx CVA: Echo and monitor okay.      KINA negative for cardiac SP.      4. Pacer: Follows with [facility] EP. [employer] interrogated device, stable.      5. HTN: Observe.     6. DM: Per primary service.      7. Hypercoag state/Factor 5 Leiden: On warfarin.      8. Renal insuff: Follow labs. ACE-I held due to GRECIA     Discussed with Dr. Dana Henderson. Electronically signed by MELANIE Garcia CNP on 9/27/18 at 9:57 AM    I independently performed an evaluation on the patient. I have reviewed the above documentation completed by the advance practitioner.  Please see my additional contributions to the HPI, physical exam, assessment and medical decision

## 2018-09-27 NOTE — PROGRESS NOTES
Admit Date: 9/23/2018      Subjective:     Patient: no complaints this am; remains on IV antibiotics per ID for aspiration pneumonia      Scheduled Meds:   warfarin (COUMADIN) daily dosing (placeholder)   Other RX Placeholder    albuterol  2.5 mg Nebulization TID    glipiZIDE  10 mg PEG Tube BID AC    levothyroxine  50 mcg PEG Tube Daily    simvastatin  10 mg Oral Nightly    metFORMIN  1,000 mg PEG Tube BID    metoclopramide  10 mg PEG Tube 4x Daily AC & HS    piperacillin-tazobactam  3.375 g Intravenous Q8H    nystatin   Topical BID     Continuous Infusions:   dextrose       PRN Meds:glucose, dextrose, glucagon (rDNA), dextrose, acetaminophen, guaiFENesin, magnesium hydroxide, mineral oil-hydrophilic petrolatum, nitroGLYCERIN    Objective:   I/O last 3 completed shifts: In: 2899 [I.V.:254; NG/GT:1901; IV Piggyback:400]  Out: 2175 [Urine:2175]  I/O this shift:   In: 482 [NG/GT:482]  Out: 450 [Urine:450]    BP (!) 158/74   Pulse 71   Temp 97.9 °F (36.6 °C) (Temporal)   Resp 22   Ht 6' 2\" (1.88 m)   Wt 293 lb (132.9 kg)   SpO2 96%   BMI 37.62 kg/m²   General appearance: alert, appears stated age and cooperative  Skin:negative  Heent:negative  Lungs: diminished breath sounds bibasilar  Heart: regular rate and rhythm, S1, S2 normal, no murmur, click, rub or gallop  Abdomen: soft, non-tender; bowel sounds normal; no masses,  no organomegaly  Extremities:no edema  Neurologic: Grossly normal    Labs:  CBC with Differential:    Lab Results   Component Value Date    WBC 7.9 09/25/2018    RBC 3.09 09/25/2018    HGB 8.6 09/25/2018    HCT 28.6 09/25/2018     09/25/2018    MCV 92.6 09/25/2018    MCH 27.8 09/25/2018    MCHC 30.1 09/25/2018    RDW 15.1 09/25/2018    SEGSPCT 80 01/04/2012    BANDSPCT 1 11/27/2014    LYMPHOPCT 15.0 09/25/2018    MONOPCT 7.7 09/25/2018    MYELOPCT 0.9 02/28/2018    BASOPCT 0.8 09/25/2018    MONOSABS 0.61 09/25/2018    LYMPHSABS 1.19 09/25/2018    EOSABS 0.82 09/25/2018

## 2018-09-27 NOTE — PROGRESS NOTES
3.4 2012    CALCIUM 7.0 2018    BILITOT 0.2 2018    ALKPHOS 64 2018    AST 17 2018    ALT 14 2018        PRO-BNP:   Lab Results   Component Value Date    PROBNP 618 (H) 2018      ABGs:   Lab Results   Component Value Date    PH 7.417 2014    PO2 87.9 2014    PCO2 44.8 2014     Hemoglobin A1C: No components found for: HGBA1C    IMAGING:  Imaging tests were completed and reviewed and discussed radiology and primary care team and reveals   Ct Head Wo Contrast    Result Date: 2018    Diffuse atrophy likely age related Findings compatible with small vessel ischemic changes. Findings compatible with a lacunar infarct, likely old. Findings compatible with a cortical infarct, likely old. Mra Head Wo Contrast    Redemonstration of occlusion of the mid to distal right V4 segment and nonvisualization of the right A1 segment. . Otherwise, negative for abrupt occlusion or hemodynamically significant stenosis. Xr Chest Portable    Result Date: 2018  Patient MRN:  [id number] : 1945 Age: 67 years Gender: Male Order Date:  2018 9:00 AM EXAM: XR CHEST PORTABLE NUMBER OF IMAGES:  2 INDICATION: Cough Technique: AP view of the chest obtained portably on 2018 9:00 AM Comparison:  Comparison is made to AP view of the chest dated 2018. Findings: The cardiomediastinal silhouette is enlarged. There has been interval development of bilateral pleural effusions with associated patchy airspace opacities involving the left mid to lower lung field and right lower lobe. There is no pneumothorax. The visualized osseous structures demonstrate degenerative changes. Lines and Tubes: A left-sided dual-lead cardiac pacer is identified, with leads terminating in satisfactory position.      Since examination dated 2018, interval development of bilateral pleural effusions with associated airspace disease involving the left mid to lower lung field and right lower lobe. Recommend clinical correlation. Mra Neck Wo Contrast  Mild atherosclerotic disease without hemodynamically stenosis or occlusion identified involving cervical arterial vasculature. Mri Brain Wo Contrast  Result Date: 9/9/2018    Mild-to-moderate chronic small vessel ischemic disease and age-related involutional changes without acute stroke, midline shift mass effect. Chronic encephalomalacia left PCA distribution Chronic lacunar infarcts left anteromedial thalamus and right dorsal medulla. Loss of normal V4 segment flow void consistent with occlusion as on prior examinations. Fl Modified Barium Swallow W Video    Result Date: 9/11/2018  1. Aspiration with pudding consistency, laryngeal penetration with honey and pudding consistencies identified. 2. Please refer to the report by speech pathology for additional details and recommendations. Assessment  1. Aspiration pneumonia   2. NHAP - pseudomonas   3. CVA (multiple)  4. Reported factor deficiency - non found but still reported? 5. CKD  6. Pacer for SSS  7. Recent PEG tube    Plan  1. ID consulted - notes antibiotics noted  2. Bronchodialtors to continue for airway clearance  3. HOB elevated. 4. Viral panel = negative  5. Monitoring INR  6. PT OT  7.            Lilliam Zarco DO, MPH, FCCP, FACOI, FACP,

## 2018-09-27 NOTE — PROGRESS NOTES
09/25/2018    BUN 29 09/25/2018    CREATININE 0.9 09/25/2018    GFRAA >60 09/25/2018    LABGLOM >60 09/25/2018    GLUCOSE 298 09/25/2018    GLUCOSE 297 01/04/2012    PROT 5.2 09/25/2018    LABALBU 2.1 09/25/2018    LABALBU 3.4 01/04/2012    CALCIUM 7.0 09/25/2018    BILITOT 0.2 09/25/2018    ALKPHOS 64 09/25/2018    AST 17 09/25/2018    ALT 14 09/25/2018     Magnesium:    Lab Results   Component Value Date    MG 1.6 09/11/2018     Phosphorus:    Lab Results   Component Value Date    PHOS 2.6 09/10/2018       Radiology Review:    CXR 09/23/18   Since examination dated 09/14/2018, interval development   of bilateral pleural effusions with associated airspace disease   involving the left mid to lower lung field and right lower lobe. Recommend clinical correlation.           BRIEF SUMMARY OF INITIAL CONSULT     Briefly Mr. Dakotah Gardner  is a 76-year-old man with history of hypertension, type II diabetes mellitus, factor V Leyden deficiency, hyperlipidemia, sick sinus syndrome s/p pacemaker placement, CVA, hypothyroidism, recently admitted with weakness; and who was readmitted on 09/23/18 after presented to the ER from [facility] complaining of SOB and aspiration pneumonia; his creatinine level on admission was 1.1 and has increased to 1.4 mg/dl  reason for consultation. Priro to admission his medications included lisinopril and he had received Ketorolac in the ER.      IMPRESSION/RECOMMENDATIONS:       1. JUAN stage I, Pre-renal JUAN hemodynamically mediated 2/2 to poor oral intake, ACE inhibition and VILLATORO-2 inhibition. Resolved    2. Aspiration Pneumonia vs HCAP, on Zosyn  3. HTN, lisinopril on hold, start amlodipine   4. T2DM, on metformin, glipizide  5. Factor V deficiency, INR 5.1, Coumadin on hold  6. HFpEF, 60%, stage II DD, on nitroglycerin   7. Dysphagia s/p PEG tube placement, on TF   8. S/p CVA  9. Anemia, normocytic  10. QT prolongation QTc 635  11.  Nutrition: TF 75cc/h + 30 cc/4h    PLAN:    · Amlodipine 5 mg daily

## 2018-09-28 VITALS
BODY MASS INDEX: 37.6 KG/M2 | RESPIRATION RATE: 32 BRPM | TEMPERATURE: 98.3 F | OXYGEN SATURATION: 93 % | DIASTOLIC BLOOD PRESSURE: 70 MMHG | WEIGHT: 293 LBS | HEIGHT: 74 IN | HEART RATE: 101 BPM | SYSTOLIC BLOOD PRESSURE: 138 MMHG

## 2018-09-28 LAB
ANION GAP SERPL CALCULATED.3IONS-SCNC: 8 MMOL/L (ref 7–16)
BLOOD CULTURE, ROUTINE: NORMAL
BUN BLDV-MCNC: 16 MG/DL (ref 8–23)
CALCIUM SERPL-MCNC: 9.6 MG/DL (ref 8.6–10.2)
CHLORIDE BLD-SCNC: 99 MMOL/L (ref 98–107)
CO2: 35 MMOL/L (ref 22–29)
CREAT SERPL-MCNC: 0.9 MG/DL (ref 0.7–1.2)
CULTURE, BLOOD 2: NORMAL
GFR AFRICAN AMERICAN: >60
GFR NON-AFRICAN AMERICAN: >60 ML/MIN/1.73
GLUCOSE BLD-MCNC: 106 MG/DL (ref 74–109)
HCT VFR BLD CALC: 35.9 % (ref 37–54)
HEMOGLOBIN: 10.7 G/DL (ref 12.5–16.5)
INR BLD: 2.4
MCH RBC QN AUTO: 27.6 PG (ref 26–35)
MCHC RBC AUTO-ENTMCNC: 29.8 % (ref 32–34.5)
MCV RBC AUTO: 92.5 FL (ref 80–99.9)
METER GLUCOSE: 86 MG/DL (ref 70–110)
PDW BLD-RTO: 14.3 FL (ref 11.5–15)
PLATELET # BLD: 411 E9/L (ref 130–450)
PMV BLD AUTO: 9.8 FL (ref 7–12)
POTASSIUM SERPL-SCNC: 5.3 MMOL/L (ref 3.5–5)
PROTHROMBIN TIME: 27.3 SEC (ref 9.3–12.4)
RBC # BLD: 3.88 E12/L (ref 3.8–5.8)
SODIUM BLD-SCNC: 142 MMOL/L (ref 132–146)
WBC # BLD: 10.6 E9/L (ref 4.5–11.5)

## 2018-09-28 PROCEDURE — 82962 GLUCOSE BLOOD TEST: CPT

## 2018-09-28 PROCEDURE — 36569 INSJ PICC 5 YR+ W/O IMAGING: CPT

## 2018-09-28 PROCEDURE — 6360000002 HC RX W HCPCS: Performed by: INTERNAL MEDICINE

## 2018-09-28 PROCEDURE — 94660 CPAP INITIATION&MGMT: CPT

## 2018-09-28 PROCEDURE — 6370000000 HC RX 637 (ALT 250 FOR IP): Performed by: INTERNAL MEDICINE

## 2018-09-28 PROCEDURE — 2700000000 HC OXYGEN THERAPY PER DAY

## 2018-09-28 PROCEDURE — APPSS15 APP SPLIT SHARED TIME 0-15 MINUTES: Performed by: NURSE PRACTITIONER

## 2018-09-28 PROCEDURE — 99232 SBSQ HOSP IP/OBS MODERATE 35: CPT | Performed by: INTERNAL MEDICINE

## 2018-09-28 PROCEDURE — 85610 PROTHROMBIN TIME: CPT

## 2018-09-28 PROCEDURE — 76937 US GUIDE VASCULAR ACCESS: CPT

## 2018-09-28 PROCEDURE — 36415 COLL VENOUS BLD VENIPUNCTURE: CPT

## 2018-09-28 PROCEDURE — 80048 BASIC METABOLIC PNL TOTAL CA: CPT

## 2018-09-28 PROCEDURE — 2580000003 HC RX 258: Performed by: FAMILY MEDICINE

## 2018-09-28 PROCEDURE — 02HV33Z INSERTION OF INFUSION DEVICE INTO SUPERIOR VENA CAVA, PERCUTANEOUS APPROACH: ICD-10-PCS | Performed by: FAMILY MEDICINE

## 2018-09-28 PROCEDURE — 85027 COMPLETE CBC AUTOMATED: CPT

## 2018-09-28 PROCEDURE — 2580000003 HC RX 258: Performed by: INTERNAL MEDICINE

## 2018-09-28 PROCEDURE — C1751 CATH, INF, PER/CENT/MIDLINE: HCPCS

## 2018-09-28 RX ORDER — WARFARIN SODIUM 5 MG/1
5 TABLET ORAL DAILY
Status: DISCONTINUED | OUTPATIENT
Start: 2018-09-28 | End: 2018-09-28 | Stop reason: HOSPADM

## 2018-09-28 RX ORDER — WARFARIN SODIUM 5 MG/1
5 TABLET ORAL
Status: DISCONTINUED | OUTPATIENT
Start: 2018-09-28 | End: 2018-09-28

## 2018-09-28 RX ORDER — WARFARIN SODIUM 5 MG/1
TABLET ORAL
Qty: 30 TABLET | Refills: 3 | Status: SHIPPED | OUTPATIENT
Start: 2018-09-28 | End: 2018-10-01 | Stop reason: DRUGHIGH

## 2018-09-28 RX ADMIN — METOCLOPRAMIDE 10 MG: 10 TABLET ORAL at 06:18

## 2018-09-28 RX ADMIN — NYSTATIN: 100000 CREAM TOPICAL at 09:29

## 2018-09-28 RX ADMIN — GLIPIZIDE 10 MG: 5 TABLET ORAL at 06:18

## 2018-09-28 RX ADMIN — METFORMIN HYDROCHLORIDE 1000 MG: 1000 TABLET ORAL at 09:29

## 2018-09-28 RX ADMIN — METOCLOPRAMIDE 10 MG: 10 TABLET ORAL at 09:29

## 2018-09-28 RX ADMIN — SILVER SULFADIAZINE: 10 CREAM TOPICAL at 09:30

## 2018-09-28 RX ADMIN — ALBUTEROL SULFATE 2.5 MG: 2.5 SOLUTION RESPIRATORY (INHALATION) at 11:41

## 2018-09-28 RX ADMIN — CEFEPIME HYDROCHLORIDE 2 G: 2 INJECTION, POWDER, FOR SOLUTION INTRAVENOUS at 11:55

## 2018-09-28 RX ADMIN — AMLODIPINE BESYLATE 5 MG: 5 TABLET ORAL at 09:29

## 2018-09-28 RX ADMIN — LEVOTHYROXINE SODIUM 50 MCG: 50 TABLET ORAL at 06:18

## 2018-09-28 ASSESSMENT — PAIN SCALES - GENERAL: PAINLEVEL_OUTOF10: 0

## 2018-09-28 NOTE — PROGRESS NOTES
standpoint when okay with others. Follow-up with Dr. Ruperto Nissen at his discretion.      Discussed with Dr. Ruperto Nissen. Electronically signed by MELANIE Ko CNP on 9/28/18 at 9:47 AM    I independently performed an evaluation on the patient. I have reviewed the above documentation completed by the advance practitioner. Please see my additional contributions to the HPI, physical exam, assessment and medical decision making. Bro Raya D.O.   Cardiologist  Cardiology, [address]

## 2018-09-28 NOTE — PROGRESS NOTES
Department of General Surgery - Adult  Surgical Service   Attending Progress Note      SUBJECTIVE:  Stable close to baseline    OBJECTIVE  Tolerating TF    Physical    VITALS:  /70   Pulse 101   Temp 98.3 °F (36.8 °C) (Temporal)   Resp (!) 32   Ht 6' 2\" (1.88 m)   Wt 293 lb (132.9 kg)   SpO2 93%   BMI 37.62 kg/m²   INTAKE/OUTPUT:    Intake/Output Summary (Last 24 hours) at 18 1056  Last data filed at 18 0647   Gross per 24 hour   Intake             2207 ml   Output             3900 ml   Net            -1693 ml     TEMPERATURE:  Current - Temp: 98.3 °F (36.8 °C);  Max - Temp  Av.2 °F (36.8 °C)  Min: 98.1 °F (36.7 °C)  Max: 98.3 °F (36.8 °C)  RESPIRATIONS RANGE: Resp  Av  Min: 14  Max: 32  PULSE RANGE: Pulse  Av.8  Min: 61  Max: 101  BLOOD PRESSURE RANGE:  Systolic (93AXG), TDK:356 , Min:138 , KNM:034   ; Diastolic (88BIT), SCE:36, Min:66, Max:83    CONSTITUTIONAL:  awake, alert, cooperative, no apparent distress, appears older than stated age and morbidly obese  EYES:  lids and lashes normal, pupils equal, round and reactive to light, extra-ocular muscles intact, sclera clear and conjunctiva normal  NECK:  supple, symmetrical, trachea midline, skin normal, no stridor and no jugular venous distension  LUNGS:  no increased work of breathing, good air exchange, no retractions and diminished breath sounds right base and left base  CARDIOVASCULAR:  normal apical pulses, regularly irregular rhythm and normal S1 and S2  ABDOMEN:  hypoactive bowel sounds, soft, distended, non-tender, voluntary guarding absent and no masses palpated  Data  CBC with Differential:    Lab Results   Component Value Date    WBC 7.9 2018    RBC 3.09 2018    HGB 8.6 2018    HCT 28.6 2018     2018    MCV 92.6 2018    MCH 27.8 2018    MCHC 30.1 2018    RDW 15.1 2018    SEGSPCT 80 2012    BANDSPCT 1 2014    LYMPHOPCT 15.0 2018 tablet 10 mg, 10 mg, PEG Tube, 4x Daily AC & HS  mineral oil-hydrophilic petrolatum (HYDROPHOR) ointment, , Topical, BID PRN  nitroGLYCERIN (NITROSTAT) SL tablet 0.4 mg, 0.4 mg, Sublingual, Q5 Min PRN  nystatin (MYCOSTATIN) cream, , Topical, BID    ASSESSMENT AND PLAN clinically back to baseline  Tolerating TF   OK for discharge

## 2018-09-28 NOTE — PROGRESS NOTES
ID Progress Note      Brief Interval History  pneumonia  Subjective:  Nasal bridge injury from oxygen tube  Minimal cough  Tube feed residual  not high  The patient is awake and alert. Tolerating medications. Reports no side effects. Afebrile. 10 ROS otherwise negative unless otherwise specified above. Objective:    Vitals:    09/28/18 0815   BP: 138/70   Pulse: 101   Resp: (!) 32   Temp:    SpO2: 93%     General appearance: Alert, Awake   Skin: Warm and dry  Eyes: Pink palpebral conjunctivae. PERRL  Ears: External ears normal. No tragal tenderness. TM intact  Nose/Sinuses: Nares normal. Septum midline. Mucosa normal. No sinus tenderness. Oropharynx: Oropharynx clear with no exudates seen  Neck: Neck supple. No jugular venous distension, lymphadenopathy or thyromegaly Trachea midline  Lungs: Lungs clear to auscultation bilaterally. No rhonchi, crackles or wheezes  Heart: S1 S2  Regular rate and rhythm. No rub, murmur or gallop  Abdomen: Abdomen soft, non-tender. BS normal. No masses, organomegaly, PEG tube in place  Extremities: No edema, Peripheral pulses palpable  Musculoskeletal: Muscular strength appears intact. No joint effusion, tenderness, swelling or warmth          Labs:  No results for input(s): WBC, RBC, HGB, HCT, MCV, MCH, MCHC, RDW, PLT, MPV in the last 72 hours. CMP:    Lab Results   Component Value Date     09/27/2018    K 5.0 09/27/2018    K 3.6 09/07/2018     09/27/2018    CO2 34 09/27/2018    BUN 17 09/27/2018    CREATININE 0.9 09/27/2018    GFRAA >60 09/27/2018    LABGLOM >60 09/27/2018    GLUCOSE 95 09/27/2018    GLUCOSE 297 01/04/2012    PROT 5.2 09/25/2018    LABALBU 2.1 09/25/2018    LABALBU 3.4 01/04/2012    CALCIUM 9.2 09/27/2018    BILITOT 0.2 09/25/2018    ALKPHOS 64 09/25/2018    AST 17 09/25/2018    ALT 14 09/25/2018          Microbiology :  No results for input(s): BC in the last 72 hours. No results for input(s): Sachin Black in the last 72 hours.   No results

## 2018-09-28 NOTE — CARE COORDINATION
SOCIAL WORK / DISCHARGE PLANNING:  Sw spoke with [facility] Primrose rep, alert to probable dc today with IV atb x 7 days. He will have midline placed. Transport will be arranged via ambulance when discharge order placed. No precert is needed.                    Electronically signed by ROHIT Mercedes on 9/28/2018 at 10:22 AM

## 2018-09-28 NOTE — PROGRESS NOTES
ID Progress Note      Brief Interval History  pneumonia  Subjective:  More comfortable today  Minimal cough  Tube feed residual  not high  The patient is awake and alert. Tolerating medications. Reports no side effects. Afebrile. 10 ROS otherwise negative unless otherwise specified above. Objective:    Vitals:    09/27/18 1910   BP:    Pulse:    Resp: 14   Temp:    SpO2: 94%     General appearance: Alert, Awake   Skin: Warm and dry  Eyes: Pink palpebral conjunctivae. PERRL  Ears: External ears normal. No tragal tenderness. TM intact  Nose/Sinuses: Nares normal. Septum midline. Mucosa normal. No sinus tenderness. Oropharynx: Oropharynx clear with no exudates seen  Neck: Neck supple. No jugular venous distension, lymphadenopathy or thyromegaly Trachea midline  Lungs: Lungs clear to auscultation bilaterally. No rhonchi, crackles or wheezes  Heart: S1 S2  Regular rate and rhythm. No rub, murmur or gallop  Abdomen: Abdomen soft, non-tender. BS normal. No masses, organomegaly, PEG tube in place  Extremities: No edema, Peripheral pulses palpable  Musculoskeletal: Muscular strength appears intact. No joint effusion, tenderness, swelling or warmth          Labs:  Recent Labs      09/25/18   0445   WBC  7.9   RBC  3.09*   HGB  8.6*   HCT  28.6*   MCV  92.6   MCH  27.8   MCHC  30.1*   RDW  15.1*   PLT  317   MPV  10.7     CMP:    Lab Results   Component Value Date     09/27/2018    K 5.0 09/27/2018    K 3.6 09/07/2018     09/27/2018    CO2 34 09/27/2018    BUN 17 09/27/2018    CREATININE 0.9 09/27/2018    GFRAA >60 09/27/2018    LABGLOM >60 09/27/2018    GLUCOSE 95 09/27/2018    GLUCOSE 297 01/04/2012    PROT 5.2 09/25/2018    LABALBU 2.1 09/25/2018    LABALBU 3.4 01/04/2012    CALCIUM 9.2 09/27/2018    BILITOT 0.2 09/25/2018    ALKPHOS 64 09/25/2018    AST 17 09/25/2018    ALT 14 09/25/2018          Microbiology :  No results for input(s): BC in the last 72 hours.   No results for input(s): Izzy Gonzales in the last 72 hours. No results for input(s): LABURIN in the last 72 hours. Recent Labs      09/25/18   1230   CULTRESP  Oral Pharyngeal Ana Cristina reduced*  Moderate growth     No results for input(s): WNDABS in the last 72 hours. Radiology :  XR CHEST PORTABLE   Final Result   Since examination dated 09/14/2018, interval development   of bilateral pleural effusions with associated airspace disease   involving the left mid to lower lung field and right lower lobe. Recommend clinical correlation.             URINARY CATHETER OUTPUT (Romero):  Urethral Catheter Non-latex;Straight-tip 16 fr-Output (mL): 700 mL    Assessment and Plan:      78-year-old male with multiple comorbidities and recurrent strokes with a recently placed PEG tube presenting with shortness of breath, fever or chills, hypoxia and productive cough     Concern for nosocomial pneumonia  - resp cultures with reduced ana cristina , presence of GNR oxidase positive  - not the best sample but pt has copious productive cough   Recurrent strokes last in February 2018  CK D  Bradycardia status post pacemaker dependent     Plan  -- D/C Zosyn for now, CHANGE to cefepime for 7 more days (Levaquin in intermediate resistance  - can use midline for the same  - Keep head elevated 45°            Electronically signed by Saida Vance MD on 9/27/2018 at 8:43 PM

## 2018-09-28 NOTE — PROGRESS NOTES
Admit Date: 9/23/2018      Subjective:     Patient: no complaints this am  Medication side effects: none    Scheduled Meds:   silver sulfADIAZINE   Topical BID    amLODIPine  5 mg Oral Daily    sodium chloride flush  10 mL Intravenous BID    levofloxacin  750 mg Oral Daily    warfarin (COUMADIN) daily dosing (placeholder)   Other RX Placeholder    albuterol  2.5 mg Nebulization TID    glipiZIDE  10 mg PEG Tube BID AC    levothyroxine  50 mcg PEG Tube Daily    simvastatin  10 mg Oral Nightly    metFORMIN  1,000 mg PEG Tube BID    metoclopramide  10 mg PEG Tube 4x Daily AC & HS    nystatin   Topical BID     Continuous Infusions:   dextrose       PRN Meds:glucose, dextrose, glucagon (rDNA), dextrose, acetaminophen, guaiFENesin, magnesium hydroxide, mineral oil-hydrophilic petrolatum, nitroGLYCERIN    Objective:   I/O last 3 completed shifts: In: 2161 [NG/GT:1627]  Out: 2650 [Urine:2650]  No intake/output data recorded.     BP (!) 176/83   Pulse 93   Temp 98.3 °F (36.8 °C) (Temporal)   Resp 20   Ht 6' 2\" (1.88 m)   Wt 293 lb (132.9 kg)   SpO2 92%   BMI 37.62 kg/m²   General appearance: alert, appears stated age and cooperative  Skin:negative  Heent:negative  Lungs: diminished breath sounds bibasilar  Heart: regular rate and rhythm, S1, S2 normal, no murmur, click, rub or gallop  Abdomen: soft, non-tender; bowel sounds normal; no masses,  no organomegaly  Extremities:no edema  Neurologic: Grossly normal    Labs:  CBC with Differential:    Lab Results   Component Value Date    WBC 7.9 09/25/2018    RBC 3.09 09/25/2018    HGB 8.6 09/25/2018    HCT 28.6 09/25/2018     09/25/2018    MCV 92.6 09/25/2018    MCH 27.8 09/25/2018    MCHC 30.1 09/25/2018    RDW 15.1 09/25/2018    SEGSPCT 80 01/04/2012    BANDSPCT 1 11/27/2014    LYMPHOPCT 15.0 09/25/2018    MONOPCT 7.7 09/25/2018    MYELOPCT 0.9 02/28/2018    BASOPCT 0.8 09/25/2018    MONOSABS 0.61 09/25/2018    LYMPHSABS 1.19 09/25/2018    EOSABS 0.82 09/25/2018    BASOSABS 0.06 09/25/2018     BMP:    Lab Results   Component Value Date     09/27/2018    K 5.0 09/27/2018    K 3.6 09/07/2018     09/27/2018    CO2 34 09/27/2018    BUN 17 09/27/2018    LABALBU 2.1 09/25/2018    LABALBU 3.4 01/04/2012    CREATININE 0.9 09/27/2018    CALCIUM 9.2 09/27/2018    GFRAA >60 09/27/2018    LABGLOM >60 09/27/2018     PT/INR:    Lab Results   Component Value Date    PROTIME 27.3 09/28/2018    PROTIME 12.2 12/30/2011    INR 2.4 09/28/2018     Last 3 Troponin:    Lab Results   Component Value Date    TROPONINI <0.01 09/23/2018    TROPONINI <0.01 09/07/2018    TROPONINI 0.06 02/28/2018     TSH:    Lab Results   Component Value Date    TSH 2.170 09/07/2018      Assessment:     1. Aspiration pneumonia  2. Acute diastolic HF  3. H/O recurrent DVT of the LE's  4. Pacemaker  5.  H/O CVA with dysphagia s/p PEG    Plan:       Continue same plan and orders    Restart coumadin once INR <3  IV antibiotics as per ID service  DC back to SNF when medically stable

## 2018-09-28 NOTE — PROGRESS NOTES
AST 17 2018    ALT 14 2018        PRO-BNP:   Lab Results   Component Value Date    PROBNP 618 (H) 2018      ABGs:   Lab Results   Component Value Date    PH 7.417 2014    PO2 87.9 2014    PCO2 44.8 2014     Hemoglobin A1C: No components found for: HGBA1C    IMAGING:  Imaging tests were completed and reviewed and discussed radiology and primary care team and reveals   Ct Head Wo Contrast    Result Date: 2018    Diffuse atrophy likely age related Findings compatible with small vessel ischemic changes. Findings compatible with a lacunar infarct, likely old. Findings compatible with a cortical infarct, likely old. Mra Head Wo Contrast    Redemonstration of occlusion of the mid to distal right V4 segment and nonvisualization of the right A1 segment. . Otherwise, negative for abrupt occlusion or hemodynamically significant stenosis. Xr Chest Portable    Result Date: 2018  Patient MRN:  [id number] : 1945 Age: 67 years Gender: Male Order Date:  2018 9:00 AM EXAM: XR CHEST PORTABLE NUMBER OF IMAGES:  2 INDICATION: Cough Technique: AP view of the chest obtained portably on 2018 9:00 AM Comparison:  Comparison is made to AP view of the chest dated 2018. Findings: The cardiomediastinal silhouette is enlarged. There has been interval development of bilateral pleural effusions with associated patchy airspace opacities involving the left mid to lower lung field and right lower lobe. There is no pneumothorax. The visualized osseous structures demonstrate degenerative changes. Lines and Tubes: A left-sided dual-lead cardiac pacer is identified, with leads terminating in satisfactory position. Since examination dated 2018, interval development of bilateral pleural effusions with associated airspace disease involving the left mid to lower lung field and right lower lobe. Recommend clinical correlation.     Mra Neck Wo Contrast  Mild atherosclerotic

## 2018-10-01 ENCOUNTER — HOSPITAL ENCOUNTER (INPATIENT)
Age: 73
LOS: 4 days | Discharge: NURSING, REHAB OR LONG-TERM CARE | DRG: 871 | End: 2018-10-05
Attending: EMERGENCY MEDICINE | Admitting: FAMILY MEDICINE
Payer: MEDICARE

## 2018-10-01 ENCOUNTER — APPOINTMENT (OUTPATIENT)
Dept: GENERAL RADIOLOGY | Age: 73
DRG: 871 | End: 2018-10-01
Payer: MEDICARE

## 2018-10-01 ENCOUNTER — APPOINTMENT (OUTPATIENT)
Dept: CT IMAGING | Age: 73
DRG: 871 | End: 2018-10-01
Payer: MEDICARE

## 2018-10-01 DIAGNOSIS — J96.02 ACUTE RESPIRATORY FAILURE WITH HYPOXIA AND HYPERCAPNIA (HCC): Primary | ICD-10-CM

## 2018-10-01 DIAGNOSIS — J96.01 ACUTE RESPIRATORY FAILURE WITH HYPOXIA AND HYPERCAPNIA (HCC): Primary | ICD-10-CM

## 2018-10-01 DIAGNOSIS — J18.9 HCAP (HEALTHCARE-ASSOCIATED PNEUMONIA): ICD-10-CM

## 2018-10-01 DIAGNOSIS — J90 PLEURAL EFFUSION: ICD-10-CM

## 2018-10-01 LAB
AADO2: 345.4 MMHG
AADO2: 504.2 MMHG
ALBUMIN SERPL-MCNC: 3.3 G/DL (ref 3.5–5.2)
ALP BLD-CCNC: 77 U/L (ref 40–129)
ALT SERPL-CCNC: 14 U/L (ref 0–40)
ANION GAP SERPL CALCULATED.3IONS-SCNC: 5 MMOL/L (ref 7–16)
ANION GAP SERPL CALCULATED.3IONS-SCNC: 6 MMOL/L (ref 7–16)
APTT: 31.1 SEC (ref 24.5–35.1)
AST SERPL-CCNC: 16 U/L (ref 0–39)
B.E.: 11.4 MMOL/L (ref -3–3)
B.E.: 11.7 MMOL/L (ref -3–3)
BACTERIA: ABNORMAL /HPF
BASOPHILS ABSOLUTE: 0.13 E9/L (ref 0–0.2)
BASOPHILS RELATIVE PERCENT: 1.1 % (ref 0–2)
BILIRUB SERPL-MCNC: 0.4 MG/DL (ref 0–1.2)
BILIRUBIN URINE: NEGATIVE
BLOOD, URINE: ABNORMAL
BUN BLDV-MCNC: 32 MG/DL (ref 8–23)
BUN BLDV-MCNC: 35 MG/DL (ref 8–23)
CALCIUM SERPL-MCNC: 8.8 MG/DL (ref 8.6–10.2)
CALCIUM SERPL-MCNC: 9.5 MG/DL (ref 8.6–10.2)
CASTS 2: ABNORMAL /LPF
CASTS UA: ABNORMAL /LPF
CASTS: ABNORMAL /LPF
CHLORIDE BLD-SCNC: 94 MMOL/L (ref 98–107)
CHLORIDE BLD-SCNC: 98 MMOL/L (ref 98–107)
CLARITY: CLEAR
CO2: 38 MMOL/L (ref 22–29)
CO2: 38 MMOL/L (ref 22–29)
COHB: 0.9 % (ref 0–1.5)
COHB: 2 % (ref 0–1.5)
COLOR: YELLOW
CREAT SERPL-MCNC: 0.9 MG/DL (ref 0.7–1.2)
CREAT SERPL-MCNC: 1.1 MG/DL (ref 0.7–1.2)
CRITICAL: ABNORMAL
CRITICAL: ABNORMAL
DATE ANALYZED: ABNORMAL
DATE ANALYZED: ABNORMAL
DATE OF COLLECTION: ABNORMAL
DATE OF COLLECTION: ABNORMAL
EKG ATRIAL RATE: 95 BPM
EKG P AXIS: 27 DEGREES
EKG P-R INTERVAL: 230 MS
EKG Q-T INTERVAL: 336 MS
EKG QRS DURATION: 76 MS
EKG QTC CALCULATION (BAZETT): 422 MS
EKG R AXIS: -28 DEGREES
EKG T AXIS: 7 DEGREES
EKG VENTRICULAR RATE: 95 BPM
EOSINOPHILS ABSOLUTE: 1.32 E9/L (ref 0.05–0.5)
EOSINOPHILS RELATIVE PERCENT: 10.7 % (ref 0–6)
FIO2: 100 %
FIO2: 100 %
GFR AFRICAN AMERICAN: >60
GFR AFRICAN AMERICAN: >60
GFR NON-AFRICAN AMERICAN: >60 ML/MIN/1.73
GFR NON-AFRICAN AMERICAN: >60 ML/MIN/1.73
GLUCOSE BLD-MCNC: 134 MG/DL (ref 74–109)
GLUCOSE BLD-MCNC: 160 MG/DL (ref 74–109)
GLUCOSE URINE: NEGATIVE MG/DL
HCO3: 38.4 MMOL/L (ref 22–26)
HCO3: 42.7 MMOL/L (ref 22–26)
HCT VFR BLD CALC: 39.2 % (ref 37–54)
HEMOGLOBIN: 11.6 G/DL (ref 12.5–16.5)
HHB: 0.5 % (ref 0–5)
HHB: 5.7 % (ref 0–5)
IMMATURE GRANULOCYTES #: 0.23 E9/L
IMMATURE GRANULOCYTES %: 1.9 % (ref 0–5)
INR BLD: 1.3
KETONES, URINE: NEGATIVE MG/DL
LAB: ABNORMAL
LAB: ABNORMAL
LACTIC ACID: 1 MMOL/L (ref 0.5–2.2)
LEUKOCYTE ESTERASE, URINE: NEGATIVE
LYMPHOCYTES ABSOLUTE: 1.42 E9/L (ref 1.5–4)
LYMPHOCYTES RELATIVE PERCENT: 11.5 % (ref 20–42)
Lab: ABNORMAL
Lab: ABNORMAL
MCH RBC QN AUTO: 28.3 PG (ref 26–35)
MCHC RBC AUTO-ENTMCNC: 29.6 % (ref 32–34.5)
MCV RBC AUTO: 95.6 FL (ref 80–99.9)
METER GLUCOSE: 139 MG/DL (ref 70–110)
METER GLUCOSE: 161 MG/DL (ref 70–110)
METHB: 0.1 % (ref 0–1.5)
METHB: 0.3 % (ref 0–1.5)
MODE: AC
MODE: AC
MONOCYTES ABSOLUTE: 0.96 E9/L (ref 0.1–0.95)
MONOCYTES RELATIVE PERCENT: 7.8 % (ref 2–12)
NEUTROPHILS ABSOLUTE: 8.26 E9/L (ref 1.8–7.3)
NEUTROPHILS RELATIVE PERCENT: 67 % (ref 43–80)
NITRITE, URINE: NEGATIVE
O2 CONTENT: 15.8 ML/DL
O2 CONTENT: 16 ML/DL
O2 SATURATION: 94.2 % (ref 92–98.5)
O2 SATURATION: 99.5 % (ref 92–98.5)
O2HB: 92 % (ref 94–97)
O2HB: 98.5 % (ref 94–97)
OPERATOR ID: 166
OPERATOR ID: 166
PATIENT TEMP: 37 C
PATIENT TEMP: 37 C
PCO2: 102.7 MMHG (ref 35–45)
PCO2: 62.4 MMHG (ref 35–45)
PDW BLD-RTO: 14.6 FL (ref 11.5–15)
PEEP/CPAP: 5 CMH2O
PEEP/CPAP: 8 CMH2O
PFO2: 0.81 MMHG/%
PFO2: 2.8 MMHG/%
PH BLOOD GAS: 7.24 (ref 7.35–7.45)
PH BLOOD GAS: 7.41 (ref 7.35–7.45)
PH UA: 5.5 (ref 5–9)
PLATELET # BLD: 468 E9/L (ref 130–450)
PMV BLD AUTO: 9.7 FL (ref 7–12)
PO2: 280.2 MMHG (ref 60–100)
PO2: 81.1 MMHG (ref 60–100)
POTASSIUM SERPL-SCNC: 5.3 MMOL/L (ref 3.5–5)
POTASSIUM SERPL-SCNC: 6.3 MMOL/L (ref 3.5–5)
PRO-BNP: 231 PG/ML (ref 0–125)
PROTEIN UA: 30 MG/DL
PROTHROMBIN TIME: 14.8 SEC (ref 9.3–12.4)
RBC # BLD: 4.1 E12/L (ref 3.8–5.8)
RBC UA: ABNORMAL /HPF (ref 0–2)
RI(T): 123 %
RI(T): 622 %
RR MECHANICAL: 14 B/MIN
RR MECHANICAL: 16 B/MIN
SODIUM BLD-SCNC: 138 MMOL/L (ref 132–146)
SODIUM BLD-SCNC: 141 MMOL/L (ref 132–146)
SOURCE, BLOOD GAS: ABNORMAL
SOURCE, BLOOD GAS: ABNORMAL
SPECIFIC GRAVITY UA: >=1.03 (ref 1–1.03)
THB: 10.9 G/DL (ref 11.5–16.5)
THB: 12.3 G/DL (ref 11.5–16.5)
TIME ANALYZED: 1148
TIME ANALYZED: 1623
TOTAL PROTEIN: 7.3 G/DL (ref 6.4–8.3)
TROPONIN: <0.01 NG/ML (ref 0–0.03)
UROBILINOGEN, URINE: 0.2 E.U./DL
VT MECHANICAL: 500 ML
VT MECHANICAL: 500 ML
WBC # BLD: 12.3 E9/L (ref 4.5–11.5)
WBC UA: ABNORMAL /HPF (ref 0–5)
YEAST: ABNORMAL

## 2018-10-01 PROCEDURE — 2580000003 HC RX 258

## 2018-10-01 PROCEDURE — 71045 X-RAY EXAM CHEST 1 VIEW: CPT

## 2018-10-01 PROCEDURE — 36556 INSERT NON-TUNNEL CV CATH: CPT

## 2018-10-01 PROCEDURE — 36415 COLL VENOUS BLD VENIPUNCTURE: CPT

## 2018-10-01 PROCEDURE — 2580000003 HC RX 258: Performed by: EMERGENCY MEDICINE

## 2018-10-01 PROCEDURE — 6370000000 HC RX 637 (ALT 250 FOR IP)

## 2018-10-01 PROCEDURE — C9113 INJ PANTOPRAZOLE SODIUM, VIA: HCPCS | Performed by: INTERNAL MEDICINE

## 2018-10-01 PROCEDURE — 94760 N-INVAS EAR/PLS OXIMETRY 1: CPT

## 2018-10-01 PROCEDURE — 2500000003 HC RX 250 WO HCPCS: Performed by: EMERGENCY MEDICINE

## 2018-10-01 PROCEDURE — 81001 URINALYSIS AUTO W/SCOPE: CPT

## 2018-10-01 PROCEDURE — 3E0G76Z INTRODUCTION OF NUTRITIONAL SUBSTANCE INTO UPPER GI, VIA NATURAL OR ARTIFICIAL OPENING: ICD-10-PCS | Performed by: FAMILY MEDICINE

## 2018-10-01 PROCEDURE — 83880 ASSAY OF NATRIURETIC PEPTIDE: CPT

## 2018-10-01 PROCEDURE — 71250 CT THORAX DX C-: CPT

## 2018-10-01 PROCEDURE — 80048 BASIC METABOLIC PNL TOTAL CA: CPT

## 2018-10-01 PROCEDURE — 02HV33Z INSERTION OF INFUSION DEVICE INTO SUPERIOR VENA CAVA, PERCUTANEOUS APPROACH: ICD-10-PCS | Performed by: STUDENT IN AN ORGANIZED HEALTH CARE EDUCATION/TRAINING PROGRAM

## 2018-10-01 PROCEDURE — 87081 CULTURE SCREEN ONLY: CPT

## 2018-10-01 PROCEDURE — 2000000000 HC ICU R&B

## 2018-10-01 PROCEDURE — 82805 BLOOD GASES W/O2 SATURATION: CPT

## 2018-10-01 PROCEDURE — 0BH18EZ INSERTION OF ENDOTRACHEAL AIRWAY INTO TRACHEA, VIA NATURAL OR ARTIFICIAL OPENING ENDOSCOPIC: ICD-10-PCS | Performed by: EMERGENCY MEDICINE

## 2018-10-01 PROCEDURE — 6360000002 HC RX W HCPCS: Performed by: EMERGENCY MEDICINE

## 2018-10-01 PROCEDURE — 6360000002 HC RX W HCPCS: Performed by: INTERNAL MEDICINE

## 2018-10-01 PROCEDURE — 99285 EMERGENCY DEPT VISIT HI MDM: CPT

## 2018-10-01 PROCEDURE — 36592 COLLECT BLOOD FROM PICC: CPT

## 2018-10-01 PROCEDURE — 85025 COMPLETE CBC W/AUTO DIFF WBC: CPT

## 2018-10-01 PROCEDURE — 36600 WITHDRAWAL OF ARTERIAL BLOOD: CPT

## 2018-10-01 PROCEDURE — 85610 PROTHROMBIN TIME: CPT

## 2018-10-01 PROCEDURE — 31500 INSERT EMERGENCY AIRWAY: CPT

## 2018-10-01 PROCEDURE — 5A1945Z RESPIRATORY VENTILATION, 24-96 CONSECUTIVE HOURS: ICD-10-PCS | Performed by: EMERGENCY MEDICINE

## 2018-10-01 PROCEDURE — 87040 BLOOD CULTURE FOR BACTERIA: CPT

## 2018-10-01 PROCEDURE — 6370000000 HC RX 637 (ALT 250 FOR IP): Performed by: EMERGENCY MEDICINE

## 2018-10-01 PROCEDURE — 2580000003 HC RX 258: Performed by: INTERNAL MEDICINE

## 2018-10-01 PROCEDURE — 96375 TX/PRO/DX INJ NEW DRUG ADDON: CPT

## 2018-10-01 PROCEDURE — 80053 COMPREHEN METABOLIC PANEL: CPT

## 2018-10-01 PROCEDURE — 85730 THROMBOPLASTIN TIME PARTIAL: CPT

## 2018-10-01 PROCEDURE — 96365 THER/PROPH/DIAG IV INF INIT: CPT

## 2018-10-01 PROCEDURE — 83605 ASSAY OF LACTIC ACID: CPT

## 2018-10-01 PROCEDURE — 94002 VENT MGMT INPAT INIT DAY: CPT

## 2018-10-01 PROCEDURE — 0W9B3ZZ DRAINAGE OF LEFT PLEURAL CAVITY, PERCUTANEOUS APPROACH: ICD-10-PCS | Performed by: STUDENT IN AN ORGANIZED HEALTH CARE EDUCATION/TRAINING PROGRAM

## 2018-10-01 PROCEDURE — 82962 GLUCOSE BLOOD TEST: CPT

## 2018-10-01 PROCEDURE — 84484 ASSAY OF TROPONIN QUANT: CPT

## 2018-10-01 RX ORDER — PROPOFOL 10 MG/ML
10 INJECTION, EMULSION INTRAVENOUS CONTINUOUS
Status: DISCONTINUED | OUTPATIENT
Start: 2018-10-01 | End: 2018-10-01

## 2018-10-01 RX ORDER — ACETAMINOPHEN 325 MG/1
650 TABLET ORAL EVERY 4 HOURS PRN
Status: DISCONTINUED | OUTPATIENT
Start: 2018-10-01 | End: 2018-10-05 | Stop reason: HOSPADM

## 2018-10-01 RX ORDER — ETOMIDATE 2 MG/ML
INJECTION INTRAVENOUS DAILY PRN
Status: COMPLETED | OUTPATIENT
Start: 2018-10-01 | End: 2018-10-01

## 2018-10-01 RX ORDER — PANTOPRAZOLE SODIUM 40 MG/10ML
40 INJECTION, POWDER, LYOPHILIZED, FOR SOLUTION INTRAVENOUS DAILY
Status: DISCONTINUED | OUTPATIENT
Start: 2018-10-01 | End: 2018-10-05 | Stop reason: HOSPADM

## 2018-10-01 RX ORDER — SODIUM CHLORIDE 0.9 % (FLUSH) 0.9 %
10 SYRINGE (ML) INJECTION EVERY 12 HOURS SCHEDULED
Status: DISCONTINUED | OUTPATIENT
Start: 2018-10-01 | End: 2018-10-05 | Stop reason: HOSPADM

## 2018-10-01 RX ORDER — CHLORHEXIDINE GLUCONATE 0.12 MG/ML
15 RINSE ORAL 2 TIMES DAILY
Status: DISCONTINUED | OUTPATIENT
Start: 2018-10-01 | End: 2018-10-03

## 2018-10-01 RX ORDER — ROCURONIUM BROMIDE 10 MG/ML
INJECTION, SOLUTION INTRAVENOUS
Status: DISPENSED
Start: 2018-10-01 | End: 2018-10-01

## 2018-10-01 RX ORDER — DEXTROSE MONOHYDRATE 25 G/50ML
50 INJECTION, SOLUTION INTRAVENOUS PRN
Status: DISCONTINUED | OUTPATIENT
Start: 2018-10-01 | End: 2018-10-05 | Stop reason: HOSPADM

## 2018-10-01 RX ORDER — WARFARIN SODIUM 7.5 MG/1
7.5 TABLET ORAL NIGHTLY
COMMUNITY
Start: 2018-09-30 | End: 2019-01-30

## 2018-10-01 RX ORDER — FENTANYL CITRATE 50 UG/ML
100 INJECTION, SOLUTION INTRAMUSCULAR; INTRAVENOUS ONCE
Status: DISCONTINUED | OUTPATIENT
Start: 2018-10-01 | End: 2018-10-02

## 2018-10-01 RX ORDER — ACETAMINOPHEN 650 MG/1
650 SUPPOSITORY RECTAL ONCE
Status: COMPLETED | OUTPATIENT
Start: 2018-10-01 | End: 2018-10-01

## 2018-10-01 RX ORDER — ETOMIDATE 2 MG/ML
INJECTION INTRAVENOUS
Status: DISPENSED
Start: 2018-10-01 | End: 2018-10-01

## 2018-10-01 RX ORDER — 0.9 % SODIUM CHLORIDE 0.9 %
1000 INTRAVENOUS SOLUTION INTRAVENOUS ONCE
Status: COMPLETED | OUTPATIENT
Start: 2018-10-01 | End: 2018-10-01

## 2018-10-01 RX ORDER — SODIUM CHLORIDE 0.9 % (FLUSH) 0.9 %
SYRINGE (ML) INJECTION
Status: COMPLETED
Start: 2018-10-01 | End: 2018-10-01

## 2018-10-01 RX ORDER — DEXTROSE MONOHYDRATE 100 MG/ML
INJECTION, SOLUTION INTRAVENOUS CONTINUOUS
Status: DISCONTINUED | OUTPATIENT
Start: 2018-10-01 | End: 2018-10-02

## 2018-10-01 RX ORDER — CALCIUM GLUCONATE 94 MG/ML
1 INJECTION, SOLUTION INTRAVENOUS ONCE
Status: COMPLETED | OUTPATIENT
Start: 2018-10-01 | End: 2018-10-01

## 2018-10-01 RX ORDER — 0.9 % SODIUM CHLORIDE 0.9 %
10 VIAL (ML) INJECTION DAILY
Status: DISCONTINUED | OUTPATIENT
Start: 2018-10-01 | End: 2018-10-05 | Stop reason: HOSPADM

## 2018-10-01 RX ORDER — SODIUM CHLORIDE 0.9 % (FLUSH) 0.9 %
10 SYRINGE (ML) INJECTION PRN
Status: DISCONTINUED | OUTPATIENT
Start: 2018-10-01 | End: 2018-10-05 | Stop reason: HOSPADM

## 2018-10-01 RX ORDER — GLYCOPYRROLATE 1 MG/5ML
1 SOLUTION ORAL EVERY 8 HOURS PRN
COMMUNITY
Start: 2018-09-30 | End: 2019-01-30

## 2018-10-01 RX ORDER — SODIUM CHLORIDE 0.9 % (FLUSH) 0.9 %
10 SYRINGE (ML) INJECTION PRN
Status: DISCONTINUED | OUTPATIENT
Start: 2018-10-01 | End: 2018-10-04

## 2018-10-01 RX ORDER — ROCURONIUM BROMIDE 10 MG/ML
INJECTION, SOLUTION INTRAVENOUS DAILY PRN
Status: COMPLETED | OUTPATIENT
Start: 2018-10-01 | End: 2018-10-01

## 2018-10-01 RX ORDER — ACETAMINOPHEN 325 MG/1
TABLET ORAL
Status: COMPLETED
Start: 2018-10-01 | End: 2018-10-01

## 2018-10-01 RX ADMIN — CALCIUM GLUCONATE 1 G: 98 INJECTION, SOLUTION INTRAVENOUS at 14:29

## 2018-10-01 RX ADMIN — ROCURONIUM BROMIDE 100 MG: 10 INJECTION, SOLUTION INTRAVENOUS at 11:05

## 2018-10-01 RX ADMIN — SODIUM CHLORIDE, PRESERVATIVE FREE 10 ML: 5 INJECTION INTRAVENOUS at 15:53

## 2018-10-01 RX ADMIN — DEXTROSE MONOHYDRATE: 100 INJECTION, SOLUTION INTRAVENOUS at 15:51

## 2018-10-01 RX ADMIN — INSULIN HUMAN 10 UNITS: 100 INJECTION, SOLUTION PARENTERAL at 14:35

## 2018-10-01 RX ADMIN — Medication 10 ML: at 16:04

## 2018-10-01 RX ADMIN — MEROPENEM 1 G: 1 INJECTION, POWDER, FOR SOLUTION INTRAVENOUS at 18:18

## 2018-10-01 RX ADMIN — Medication 125 MCG/HR: at 20:49

## 2018-10-01 RX ADMIN — MEROPENEM 1 G: 1 INJECTION, POWDER, FOR SOLUTION INTRAVENOUS at 11:58

## 2018-10-01 RX ADMIN — DEXTROSE MONOHYDRATE 50 ML: 25 INJECTION, SOLUTION INTRAVENOUS at 14:35

## 2018-10-01 RX ADMIN — SODIUM BICARBONATE 50 MEQ: 84 INJECTION, SOLUTION INTRAVENOUS at 14:35

## 2018-10-01 RX ADMIN — Medication 10 ML: at 15:53

## 2018-10-01 RX ADMIN — ACETAMINOPHEN 650 MG: 650 SUPPOSITORY RECTAL at 12:03

## 2018-10-01 RX ADMIN — SODIUM CHLORIDE 1000 ML: 9 INJECTION, SOLUTION INTRAVENOUS at 11:27

## 2018-10-01 RX ADMIN — ETOMIDATE 16 MG: 2 INJECTION, SOLUTION INTRAVENOUS at 11:04

## 2018-10-01 RX ADMIN — ACETAMINOPHEN 650 MG: 325 TABLET ORAL at 20:32

## 2018-10-01 RX ADMIN — Medication 50 MCG/HR: at 15:44

## 2018-10-01 RX ADMIN — PANTOPRAZOLE SODIUM 40 MG: 40 INJECTION, POWDER, FOR SOLUTION INTRAVENOUS at 16:04

## 2018-10-01 RX ADMIN — PROPOFOL 10 MCG/KG/MIN: 10 INJECTION, EMULSION INTRAVENOUS at 12:06

## 2018-10-01 ASSESSMENT — PAIN SCALES - GENERAL
PAINLEVEL_OUTOF10: 0

## 2018-10-01 ASSESSMENT — PULMONARY FUNCTION TESTS
PIF_VALUE: 25
PIF_VALUE: 23
PIF_VALUE: 26

## 2018-10-01 NOTE — FLOWSHEET NOTE
Initial Inpatient Wound Care    Admit Date: 10/1/2018 10:49 AM    Reason for consult:  Sacrum, nose ,penis, gluteal fold  Significant history:  Adm with respiratory distress  Hx chronic aspiration, CHF, ischemic stroke, paraplegia  PEG,  Wound history:  POA  Findings: intubated  Left side of penis.  2 cm  X 0.5 blister     10/01/18 1542   Wound 10/01/18 Sacrum   Date First Assessed/Time First Assessed: 10/01/18 1542   Wound Event: Pressure  [facility]: Sacrum  Pre-existing: Yes  Photo Taken: Yes   Wound Image    Wound Deep tissue/Injury   Wound Length (cm) 4 cm   Wound Width (cm) 8 cm   Wound Depth (cm)  utd   Calculated Wound Size (cm^2) (l*w) 32 cm^2   Wound Assessment (dark purple)   Drainage Amount None   Odor None   Tory-wound Assessment Red   Wound 10/01/18 Nose   Date First Assessed/Time First Assessed: 10/01/18 1545   Wound Event: Pressure  [facility]: Nose  Pre-existing: Yes  Photo Taken: Yes   Wound Image    Wound Unstageable   Wound Length (cm) 2 cm   Wound Width (cm) 1 cm   Wound Depth (cm)  obs   Calculated Wound Size (cm^2) (l*w) 2 cm^2   Wound Assessment Brown;Red;Yellow  (br)   Drainage Amount Scant   Drainage Description Serosanguinous   Odor None   Tory-wound Assessment Pink   Wound 10/01/18 rt gluteal cleft   Date First Assessed/Time First Assessed: 10/01/18 1551   Wound Event: Pressure  Wound Description (Comments): rt gluteal cleft  Pre-existing: Yes   Wound Length (cm) 4 cm   Wound Width (cm) 2 cm   Wound Depth (cm)  obs   Calculated Wound Size (cm^2) (l*w) 8 cm^2   Wound Assessment Yellow  (purple)   Drainage Amount Scant   Odor None   Tory-wound Assessment Pink       Impression: unstageable nose pressure injury  Pressure injury sacrum DTI  Wound rt gluteal cleft  Penis blister  Plan: aquaphor  Dr. Khris Dixon talking to family re plans    Halina Tyson 10/1/2018 3:53 PM

## 2018-10-01 NOTE — ED PROVIDER NOTES
--------------------------------------------- PAST HISTORY ---------------------------------------------  Past Medical History:  has a past medical history of Diabetes mellitus ([facility] 75.); 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[facility] 75.); Hypertension; Ischemic stroke ([facility] 75.); Pacemaker; Paraplegia ([facility] Stroke (cerebrum) ([facility] 75.); and Thyroid disease. Past Surgical History:  has a past surgical history that includes eye surgery; Knee arthroscopy (right knee); pr colonoscopy flx dx w/collj spec when pfrmd (N/A, 3/20/2018); Colonoscopy; pr egd percutaneous placement gastrostomy tube (N/A, 3/29/2018); pacemaker placement; and pr egd percutaneous placement gastrostomy tube (N/A, 9/12/2018). Social History:  reports that he has quit smoking. His smoking use included Cigars. He has never used smokeless tobacco. He reports that he does not drink alcohol or use drugs. Family History: family history includes Heart Disease in his mother; Stroke in his father. The patients home medications have been reviewed. Allergies: Patient has no known allergies.     -------------------------------------------------- RESULTS -------------------------------------------------    LABS:  Results for orders placed or performed during the hospital encounter of 10/01/18   CBC Auto Differential   Result Value Ref Range    WBC 12.3 (H) 4.5 - 11.5 E9/L    RBC 4.10 3.80 - 5.80 E12/L    Hemoglobin 11.6 (L) 12.5 - 16.5 g/dL    Hematocrit 39.2 37.0 - 54.0 %    MCV 95.6 80.0 - 99.9 fL    MCH 28.3 26.0 - 35.0 pg    MCHC 29.6 (L) 32.0 - 34.5 %    RDW 14.6 11.5 - 15.0 fL    Platelets 298 (H) 857 - 450 E9/L    MPV 9.7 7.0 - 12.0 fL    Neutrophils % 67.0 43.0 - 80.0 %    Immature Granulocytes % 1.9 0.0 - 5.0 %    Lymphocytes % 11.5 (L) 20.0 - 42.0 %    Monocytes % 7.8 2.0 - 12.0 %    Eosinophils % 10.7 (H) 0.0 - 6.0 %    Basophils % 1.1 0.0 - 2.0 %    Neutrophils # 8.26 (H) 1.80 - 7.30 E9/L    Immature Granulocytes # 0.23 E9/L    Lymphocytes # 1.42 (L) 1.50 - 4.00 E9/L    Monocytes # 0.96 (H) 0.10 - 0.95 E9/L    Eosinophils # 1.32 (H) 0.05 - 0.50 E9/L    Basophils # 0.13 0.00 - 0.20 E9/L   Comprehensive Metabolic Panel   Result Value Ref Range    Sodium 138 132 - 146 mmol/L    Potassium 6.3 (H) 3.5 - 5.0 mmol/L    Chloride 94 (L) 98 - 107 mmol/L    CO2 38 (H) 22 - 29 mmol/L    Anion Gap 6 (L) 7 - 16 mmol/L    Glucose 134 (H) 74 - 109 mg/dL    BUN 32 (H) 8 - 23 mg/dL    CREATININE 0.9 0.7 - 1.2 mg/dL    GFR Non-African American >60 >=60 mL/min/1.73    GFR African American >60     Calcium 9.5 8.6 - 10.2 mg/dL    Total Protein 7.3 6.4 - 8.3 g/dL    Alb 3.3 (L) 3.5 - 5.2 g/dL    Total Bilirubin 0.4 0.0 - 1.2 mg/dL    Alkaline Phosphatase 77 40 - 129 U/L    ALT 14 0 - 40 U/L    AST 16 0 - 39 U/L   Lactic Acid, Plasma   Result Value Ref Range    Lactic Acid 1.0 0.5 - 2.2 mmol/L   Urinalysis   Result Value Ref Range    Color, UA Yellow Straw/Yellow    Clarity, UA Clear Clear    Glucose, Ur Negative Negative mg/dL    Bilirubin Urine Negative Negative    Ketones, Urine Negative Negative mg/dL    Specific Gravity, UA >=1.030 1.005 - 1.030    Blood, Urine MODERATE (A) Negative    pH, UA 5.5 5.0 - 9.0    Protein, UA 30 (A) Negative mg/dL    Urobilinogen, Urine 0.2 <2.0 E.U./dL    Nitrite, Urine Negative Negative    Leukocyte Esterase, Urine Negative Negative   Troponin   Result Value Ref Range    Troponin <0.01 0.00 - 0.03 ng/mL   Brain Natriuretic Peptide   Result Value Ref Range    Pro- (H) 0 - 125 pg/mL   Blood Gas, Arterial   Result Value Ref Range    Date Analyzed [id number]     Time Analyzed 1560     Source: Blood Arterial     pH, Blood Gas 7.237 (L) 7.350 - 7.450    PCO2 102.7 (HH) 35.0 - 45.0 mmHg    PO2 81.1 60.0 - 100.0 mmHg    HCO3 42.7 (H) 22.0 - 26.0 mmol/L    B.E. 11.4 (H) -3.0 - 3.0 mmol/L    O2 Sat 94.2 92.0 - 98.5 %    PO2/FIO2 0.81 mmHg/%    AaDO2 504.2 mmHg    RI(T) 622 %    O2Hb 92.0 (L) 94.0 - 97.0 %    COHb 2.0 (H) 0.0 - 1.5 %    MetHb 0.3 0.0 - 1.5 %    O2

## 2018-10-01 NOTE — ED NOTES
Patient has skin alterations including:  [facility] nose  Description wound    Patient has skin alterations including:  [facility] left buttock   Description wound    Patient has skin alterations including:  [facility] coccyx  Description approx.  6cm x 5cm wound         Luis Angel Styles RN  10/01/18 [id number]

## 2018-10-01 NOTE — FLOWSHEET NOTE
Initial Inpatient Wound Care    Admit Date: 10/1/2018 10:49 AM    Reason for consult:  Sacral and nose wounds, rt gluteal fold  Significant history:  ***    Wound history:  POA    Findings:  Intubated     10/01/18 1542   Wound 10/01/18 Sacrum   Date First Assessed/Time First Assessed: 10/01/18 1542   Wound Event: Pressure  [facility]: Sacrum  Pre-existing: Yes  Photo Taken: Yes   Wound Image    Wound Length (cm) 4 cm   Wound Width (cm) 8 cm   Wound Depth (cm)  utd   Calculated Wound Size (cm^2) (l*w) 32 cm^2   Wound Assessment (dark purple)   Drainage Amount None   Odor None   Tory-wound Assessment Red     Impression:  ***    Interventions in place:  ***    Plan:***      Carmin Boxer 10/1/2018 3:44 PM

## 2018-10-02 ENCOUNTER — APPOINTMENT (OUTPATIENT)
Dept: GENERAL RADIOLOGY | Age: 73
DRG: 871 | End: 2018-10-02
Payer: MEDICARE

## 2018-10-02 LAB
ANION GAP SERPL CALCULATED.3IONS-SCNC: 6 MMOL/L (ref 7–16)
BUN BLDV-MCNC: 41 MG/DL (ref 8–23)
CALCIUM SERPL-MCNC: 8.8 MG/DL (ref 8.6–10.2)
CHLORIDE BLD-SCNC: 93 MMOL/L (ref 98–107)
CO2: 38 MMOL/L (ref 22–29)
CREAT SERPL-MCNC: 1.1 MG/DL (ref 0.7–1.2)
GFR AFRICAN AMERICAN: >60
GFR NON-AFRICAN AMERICAN: >60 ML/MIN/1.73
GLUCOSE BLD-MCNC: 181 MG/DL (ref 74–109)
HCT VFR BLD CALC: 30.5 % (ref 37–54)
HEMOGLOBIN: 9.4 G/DL (ref 12.5–16.5)
LV EF: 55 %
LVEF MODALITY: NORMAL
MCH RBC QN AUTO: 28.7 PG (ref 26–35)
MCHC RBC AUTO-ENTMCNC: 30.8 % (ref 32–34.5)
MCV RBC AUTO: 93.3 FL (ref 80–99.9)
PDW BLD-RTO: 15 FL (ref 11.5–15)
PLATELET # BLD: 286 E9/L (ref 130–450)
PMV BLD AUTO: 10.6 FL (ref 7–12)
POTASSIUM SERPL-SCNC: 4.6 MMOL/L (ref 3.5–5)
RBC # BLD: 3.27 E12/L (ref 3.8–5.8)
SODIUM BLD-SCNC: 137 MMOL/L (ref 132–146)
WBC # BLD: 8.3 E9/L (ref 4.5–11.5)

## 2018-10-02 PROCEDURE — 85027 COMPLETE CBC AUTOMATED: CPT

## 2018-10-02 PROCEDURE — 80048 BASIC METABOLIC PNL TOTAL CA: CPT

## 2018-10-02 PROCEDURE — 93306 TTE W/DOPPLER COMPLETE: CPT

## 2018-10-02 PROCEDURE — 6370000000 HC RX 637 (ALT 250 FOR IP): Performed by: FAMILY MEDICINE

## 2018-10-02 PROCEDURE — 2000000000 HC ICU R&B

## 2018-10-02 PROCEDURE — 87205 SMEAR GRAM STAIN: CPT

## 2018-10-02 PROCEDURE — 2580000003 HC RX 258: Performed by: INTERNAL MEDICINE

## 2018-10-02 PROCEDURE — 87077 CULTURE AEROBIC IDENTIFY: CPT

## 2018-10-02 PROCEDURE — 6360000002 HC RX W HCPCS: Performed by: INTERNAL MEDICINE

## 2018-10-02 PROCEDURE — C9113 INJ PANTOPRAZOLE SODIUM, VIA: HCPCS | Performed by: INTERNAL MEDICINE

## 2018-10-02 PROCEDURE — 6360000004 HC RX CONTRAST MEDICATION: Performed by: INTERNAL MEDICINE

## 2018-10-02 PROCEDURE — 2500000003 HC RX 250 WO HCPCS: Performed by: EMERGENCY MEDICINE

## 2018-10-02 PROCEDURE — 94640 AIRWAY INHALATION TREATMENT: CPT

## 2018-10-02 PROCEDURE — 94003 VENT MGMT INPAT SUBQ DAY: CPT

## 2018-10-02 PROCEDURE — 2580000003 HC RX 258: Performed by: EMERGENCY MEDICINE

## 2018-10-02 PROCEDURE — 87070 CULTURE OTHR SPECIMN AEROBIC: CPT

## 2018-10-02 PROCEDURE — 71045 X-RAY EXAM CHEST 1 VIEW: CPT

## 2018-10-02 PROCEDURE — 36415 COLL VENOUS BLD VENIPUNCTURE: CPT

## 2018-10-02 PROCEDURE — 2580000003 HC RX 258: Performed by: FAMILY MEDICINE

## 2018-10-02 PROCEDURE — 87186 SC STD MICRODIL/AGAR DIL: CPT

## 2018-10-02 PROCEDURE — 6370000000 HC RX 637 (ALT 250 FOR IP): Performed by: INTERNAL MEDICINE

## 2018-10-02 RX ORDER — PROPOFOL 10 MG/ML
10 INJECTION, EMULSION INTRAVENOUS
Status: DISCONTINUED | OUTPATIENT
Start: 2018-10-02 | End: 2018-10-03

## 2018-10-02 RX ORDER — ALBUTEROL SULFATE 2.5 MG/3ML
2.5 SOLUTION RESPIRATORY (INHALATION) 4 TIMES DAILY
Status: DISCONTINUED | OUTPATIENT
Start: 2018-10-02 | End: 2018-10-05 | Stop reason: HOSPADM

## 2018-10-02 RX ORDER — DEXTROSE AND SODIUM CHLORIDE 5; .45 G/100ML; G/100ML
INJECTION, SOLUTION INTRAVENOUS CONTINUOUS
Status: DISCONTINUED | OUTPATIENT
Start: 2018-10-02 | End: 2018-10-05 | Stop reason: HOSPADM

## 2018-10-02 RX ADMIN — Medication 125 MCG/HR: at 05:42

## 2018-10-02 RX ADMIN — DEXTROSE AND SODIUM CHLORIDE: 5; 450 INJECTION, SOLUTION INTRAVENOUS at 11:46

## 2018-10-02 RX ADMIN — MEROPENEM 1 G: 1 INJECTION, POWDER, FOR SOLUTION INTRAVENOUS at 07:58

## 2018-10-02 RX ADMIN — Medication 200 MCG/HR: at 22:37

## 2018-10-02 RX ADMIN — Medication 10 ML: at 07:58

## 2018-10-02 RX ADMIN — MEROPENEM 1 G: 1 INJECTION, POWDER, FOR SOLUTION INTRAVENOUS at 16:19

## 2018-10-02 RX ADMIN — CHLORHEXIDINE GLUCONATE 0.12% ORAL RINSE 15 ML: 1.2 LIQUID ORAL at 07:58

## 2018-10-02 RX ADMIN — PROPOFOL 10 MCG/KG/MIN: 10 INJECTION, EMULSION INTRAVENOUS at 09:28

## 2018-10-02 RX ADMIN — Medication 175 MCG/HR: at 10:05

## 2018-10-02 RX ADMIN — CHLORHEXIDINE GLUCONATE 0.12% ORAL RINSE 15 ML: 1.2 LIQUID ORAL at 00:48

## 2018-10-02 RX ADMIN — ENOXAPARIN SODIUM 40 MG: 40 INJECTION, SOLUTION INTRAVENOUS; SUBCUTANEOUS at 11:46

## 2018-10-02 RX ADMIN — PETROLATUM: 42 OINTMENT TOPICAL at 08:16

## 2018-10-02 RX ADMIN — PANTOPRAZOLE SODIUM 40 MG: 40 INJECTION, POWDER, FOR SOLUTION INTRAVENOUS at 07:58

## 2018-10-02 RX ADMIN — PETROLATUM: 42 OINTMENT TOPICAL at 00:48

## 2018-10-02 RX ADMIN — DEXTROSE MONOHYDRATE: 100 INJECTION, SOLUTION INTRAVENOUS at 04:03

## 2018-10-02 RX ADMIN — ALBUTEROL SULFATE 2.5 MG: 2.5 SOLUTION RESPIRATORY (INHALATION) at 21:22

## 2018-10-02 RX ADMIN — Medication 200 MCG/HR: at 19:26

## 2018-10-02 RX ADMIN — Medication 200 MCG/HR: at 16:48

## 2018-10-02 RX ADMIN — Medication 10 ML: at 00:50

## 2018-10-02 RX ADMIN — Medication 200 MCG/HR: at 13:28

## 2018-10-02 RX ADMIN — MEROPENEM 1 G: 1 INJECTION, POWDER, FOR SOLUTION INTRAVENOUS at 00:48

## 2018-10-02 RX ADMIN — PROPOFOL 10 MCG/KG/MIN: 10 INJECTION, EMULSION INTRAVENOUS at 19:29

## 2018-10-02 RX ADMIN — Medication 125 MCG/HR: at 01:21

## 2018-10-02 RX ADMIN — PERFLUTREN 1.65 MG: 6.52 INJECTION, SUSPENSION INTRAVENOUS at 12:44

## 2018-10-02 ASSESSMENT — PAIN SCALES - GENERAL
PAINLEVEL_OUTOF10: 0

## 2018-10-02 ASSESSMENT — PULMONARY FUNCTION TESTS
PIF_VALUE: 22
PIF_VALUE: 22
PIF_VALUE: 5

## 2018-10-02 NOTE — PATIENT CARE CONFERENCE
Intensive Care Daily Quality Rounding Checklist      ICU Team Members: Dr. Maria Antonia Kitchen, Dr. Jennie Tucker (resident), charge nurse, bedside nurse, clinical pharmacist    ICU Day #: 2    Intubation Date: 10/1    Ventilator Day #: 2    Central Line Insertion Date: 10/1      Day #: 2     Arterial Line Insertion Date: N/A      Day #: N/A    DVT Prophylaxis: Lovenox    GI Prophylaxis: Protonix    Skin Issues/ Wounds and ordered treatment discussed on rounds: yes-SOS precautions and has wounds on sacrum and buttocks    Goals/ Plans for the Day: Daily labs, wean vent to extubate, soft wrist restraints while on vent to prevent pulling tube, consult thoracic surgery

## 2018-10-02 NOTE — H&P
newly  developed loculated pleural effusion. ASSESSMENT:  1. Acute hypoxic and hypercapnic respiratory failure in the setting of  what appears to be a newly developed loculated pleural effusion compatible  with probable empyema. 2.  Probable septic shock due to empyema. 3.  History of dysphagia with PEG tube placement. 4.  Prior history of CVA. 5.  History of chronic DVTs. PLAN AND TREATMENT:  See orders with appropriate consultations. DISCHARGE PLAN:  We will speak with family regarding code status and  advanced directives.         Janice Rodriguez DO    D: 10/02/2018 6:17:41       T: 10/02/2018 6:57:36     [email or web address]  Job#: [id number]     Doc#: [id number]    CC:

## 2018-10-02 NOTE — PROGRESS NOTES
154/71 - - 62 15 98 %   10/02/18 0900 (!) 153/76 - - 66 21 100 %   10/02/18 0800 137/67 99 °F (37.2 °C) CORE 61 15 90 %   10/02/18 0700 (!) 149/65 - - 60 15 94 %   10/02/18 0600 131/64 - - 65 20 97 %         Intake/Output Summary (Last 24 hours) at 10/02/18 1129  Last data filed at 10/02/18 1100   Gross per 24 hour   Intake              700 ml   Output             1682 ml   Net             -982 ml     Wt Readings from Last 2 Encounters:   10/01/18 290 lb (131.5 kg)   09/14/18 277 lb 11.2 oz (126 kg)     Body mass index is 37.23 kg/m².         PHYSICAL EXAMINATION:    General appearance - Awakens to voice, no apparent distress  Mental status - Awakens to voice, weakly follows commands  Eyes - pp pupils  Nose -cut on bridge of nose, ng in place  Mouth -orally intubated, poor dentition  Neck - supple, no significant adenopathy  Chest - symmetric but diminished air entry, few left chest rales  Heart - normal rate, regular rhythm, normal S1, S2, no murmurs, rubs, clicks or gallops  Abdomen - soft, nontender, nondistended, no masses or organomegaly +peg  Extremities - peripheral pulses normal, no pedal edema, no clubbing or cyanosis  Skin - normal coloration and turgor, no rashes, no suspicious skin lesions noted      Any additional physical findings:    MEDICATIONS:    Scheduled Meds:   albuterol  2.5 mg Nebulization Once    sodium chloride flush  10 mL Intravenous 2 times per day    pantoprazole  40 mg Intravenous Daily    And    sodium chloride (PF)  10 mL Intravenous Daily    chlorhexidine  15 mL Mouth/Throat BID    mineral oil-hydrophilic petrolatum   Topical BID    meropenem  1 g Intravenous Q8H     Continuous Infusions:   propofol 10 mcg/kg/min (10/02/18 0928)    dextrose 5 % and 0.45 % NaCl      fentaNYL 5 mcg/ml in 0.9%  ml infusion 200 mcg/hr (10/02/18 1015)     PRN Meds:     perflutren lipid microspheres 1.5 mL ONCE PRN   dextrose 50 mL PRN   sodium chloride flush 10 mL PRN   sodium chloride

## 2018-10-02 NOTE — CONSULTS
Scattered rhonchi. Cardiovascular: S1 and S2 are rhythmic and regular. No murmurs appreciated. Abdomen: Positive bowel sounds to auscultation. Benign to palpation. No masses felt. No hepatosplenomegaly. PEG. Extremities: No clubbing, no cyanosis, no edema. Lines: Right femoral TLC 10/1/18.       CBC+dif:  Recent Labs      10/01/18   1115  10/02/18   1000   WBC  12.3*  8.3   HGB  11.6*  9.4*   HCT  39.2  30.5*   MCV  95.6  93.3   PLT  468*  286   NEUTROABS  8.26*   --      Lab Results   Component Value Date    CRP 25.2 (H) 03/01/2018      No results found for: CRPHS  Lab Results   Component Value Date    SEDRATE 62 (H) 03/01/2018     Lab Results   Component Value Date    ALT 14 10/01/2018    AST 16 10/01/2018    ALKPHOS 77 10/01/2018    BILITOT 0.4 10/01/2018     Lab Results   Component Value Date     10/02/2018    K 4.6 10/02/2018    K 3.6 09/07/2018    CL 93 10/02/2018    CO2 38 10/02/2018    BUN 41 10/02/2018    CREATININE 1.1 10/02/2018    GFRAA >60 10/02/2018    LABGLOM >60 10/02/2018    GLUCOSE 181 10/02/2018    GLUCOSE 297 01/04/2012    PROT 7.3 10/01/2018    LABALBU 3.3 10/01/2018    LABALBU 3.4 01/04/2012    CALCIUM 8.8 10/02/2018    BILITOT 0.4 10/01/2018    ALKPHOS 77 10/01/2018    AST 16 10/01/2018    ALT 14 10/01/2018       Lab Results   Component Value Date    PROTIME 14.8 10/01/2018    PROTIME 12.2 12/30/2011    INR 1.3 10/01/2018       Lab Results   Component Value Date    TSH 2.170 09/07/2018       Lab Results   Component Value Date    COLORU Yellow 10/01/2018    PHUR 5.5 10/01/2018    LABCAST MODERATE 10/01/2018    WBCUA 0-1 10/01/2018    WBCUA 0-1 12/30/2011    RBCUA 10-20 10/01/2018    RBCUA 1-3 12/30/2011    YEAST FEW 10/01/2018    BACTERIA NONE 10/01/2018    CLARITYU Clear 10/01/2018    SPECGRAV >=1.030 10/01/2018    LEUKOCYTESUR Negative 10/01/2018    UROBILINOGEN 0.2 10/01/2018    BILIRUBINUR Negative 10/01/2018    BILIRUBINUR NEGATIVE 12/30/2011    BLOODU MODERATE 10/01/2018 GLUCOSEU Negative 10/01/2018    GLUCOSEU >=1000 12/30/2011     Radiology:  Noted    Microbiology:  Pending  No results for input(s): BC in the last 72 hours. No results for input(s): ORG in the last 72 hours. No results for input(s): Seamus Dunks in the last 72 hours. No results for input(s): STREPNEUMAGU in the last 72 hours. No results for input(s): LP1UAG in the last 72 hours. No results for input(s): ASO in the last 72 hours. No results for input(s): CULTRESP in the last 72 hours. Assessment:  · HCAP with Pseudomonas aeruginosa   · Empyema  · Acute respiratory failure  · Fever with leukocytosis associated to empyema    Plan:    · Continue Meropenem  For now   · Check cultures, baseline ESR, CRP  · CTS to see for decortication  · Will follow with you    Thank you for having us see this patient in consultation. The case was discussed with Dr Yaneth Herrmann.     Leno Ohara  12:28 PM  10/2/2018

## 2018-10-02 NOTE — CONSULTS
being treated with  cefepime for pneumonia, but came back in extremeness. He is hypotensive to  the 70s. PAST MEDICAL HISTORY:  Reported to include prior strokes, factor V Leiden  is reported, diabetes, hypertension, he had a PEG previously, he has had a  pacemaker, paraplegia, and thyroid disease. PAST SURGICAL HISTORY:  Includes eye surgery _____, thoracoscopy,  colonoscopy. FAMILY HISTORY:  Reported positive for heart disease and stroke. SOCIAL HISTORY:  He was residing at [facility]. MEDICATIONS PRIOR TO ADMISSION:  Warfarin 7.5 mg daily, oxygen 10 liters  per minute, glycopyrrolate, cefepime 2 g IV q. 12, nitro p.r.n., albuterol  nebs t.i.d., Reglan 10 per G-tube q.i.d., glipizide 10 b.i.d. per PEG,  Synthroid 50 mcg daily, amlodipine 5 daily, lovastatin 20 daily at night  and _____ b.i.d. REVIEW OF SYSTEMS:  Obviously could not be obtained as he is unresponsive,  sedated with propofol. ALLERGIES:  He has no known drug allergies. PHYSICAL EXAMINATION:  GENERAL:  He is comfortable on the vent. Vent settings as I mentioned  above. VITAL SIGNS:  Temp is 38.8, up from 38. Pulse 76. Respiratory rate was  16. Blood pressure was 70/46 when evaluated, but it is now up to 85/45  with wide open fluids. SKIN:  There was an abrasion over the bridge of the nose. HEENT:  Eyes had clear sclerae and equal pupils. Palate and gums were  normal.  He is orally intubated. NECK:  Without masses or adenopathy. CHEST:  Symmetric. Actually, there is surprisingly no accessory muscle  use. HEART:  Regular in rate and rhythm. LUNGS:  Surprisingly had symmetric breath sounds, likely referred from the  right. ABDOMEN:  Obese, soft, and nontender without masses or organomegaly. EXTREMITIES:  Showed no cyanosis and no limb edema. DATA:  CT images of the chest were reviewed. There is complete  opacification of the left hemithorax by a newly developed loculated pleural  effusion.   Chest x-ray also

## 2018-10-03 ENCOUNTER — APPOINTMENT (OUTPATIENT)
Dept: GENERAL RADIOLOGY | Age: 73
DRG: 871 | End: 2018-10-03
Payer: MEDICARE

## 2018-10-03 ENCOUNTER — APPOINTMENT (OUTPATIENT)
Dept: CT IMAGING | Age: 73
DRG: 871 | End: 2018-10-03
Payer: MEDICARE

## 2018-10-03 ENCOUNTER — APPOINTMENT (OUTPATIENT)
Dept: ULTRASOUND IMAGING | Age: 73
DRG: 871 | End: 2018-10-03
Payer: MEDICARE

## 2018-10-03 LAB
AADO2: 102.4 MMHG
AADO2: 114.7 MMHG
ANION GAP SERPL CALCULATED.3IONS-SCNC: 5 MMOL/L (ref 7–16)
APPEARANCE FLUID: NORMAL
APPEARANCE FLUID: NORMAL
B.E.: 7.6 MMOL/L (ref -3–3)
B.E.: 9.3 MMOL/L (ref -3–3)
BASOPHILS ABSOLUTE: 0.08 E9/L (ref 0–0.2)
BASOPHILS RELATIVE PERCENT: 1.1 % (ref 0–2)
BUN BLDV-MCNC: 36 MG/DL (ref 8–23)
C-REACTIVE PROTEIN: 4.9 MG/DL (ref 0–0.4)
CALCIUM SERPL-MCNC: 8.6 MG/DL (ref 8.6–10.2)
CELL COUNT FLUID TYPE: NORMAL
CELL COUNT FLUID TYPE: NORMAL
CHLORIDE BLD-SCNC: 94 MMOL/L (ref 98–107)
CO2: 36 MMOL/L (ref 22–29)
COHB: 1 % (ref 0–1.5)
COHB: 1.1 % (ref 0–1.5)
COLOR FLUID: NORMAL
CREAT SERPL-MCNC: 1 MG/DL (ref 0.7–1.2)
CRITICAL: ABNORMAL
CRITICAL: ABNORMAL
CRITICAL: NORMAL
DATE ANALYZED: ABNORMAL
DATE ANALYZED: ABNORMAL
DATE ANALYZED: NORMAL
DATE OF COLLECTION: ABNORMAL
DATE OF COLLECTION: ABNORMAL
DATE OF COLLECTION: NORMAL
EOSINOPHILS ABSOLUTE: 0.9 E9/L (ref 0.05–0.5)
EOSINOPHILS RELATIVE PERCENT: 12.4 % (ref 0–6)
FIO2: 40 %
FIO2: 40 %
FLUID TYPE: NORMAL
GFR AFRICAN AMERICAN: >60
GFR NON-AFRICAN AMERICAN: >60 ML/MIN/1.73
GLUCOSE BLD-MCNC: 146 MG/DL (ref 74–109)
GLUCOSE, FLUID: 140 MG/DL
HCO3: 32.4 MMOL/L (ref 22–26)
HCO3: 35.7 MMOL/L (ref 22–26)
HCT VFR BLD CALC: 30.7 % (ref 37–54)
HEMATOCRIT FLUID: 2 %
HEMOGLOBIN: 9.2 G/DL (ref 12.5–16.5)
HHB: 2.2 % (ref 0–5)
HHB: 2.3 % (ref 0–5)
IMMATURE GRANULOCYTES #: 0.09 E9/L
IMMATURE GRANULOCYTES %: 1.2 % (ref 0–5)
LAB: ABNORMAL
LAB: ABNORMAL
LAB: NORMAL
LD, FLUID: 323 U/L
LYMPHOCYTES ABSOLUTE: 1.11 E9/L (ref 1.5–4)
LYMPHOCYTES RELATIVE PERCENT: 15.3 % (ref 20–42)
Lab: ABNORMAL
Lab: ABNORMAL
Lab: NORMAL
MCH RBC QN AUTO: 27.8 PG (ref 26–35)
MCHC RBC AUTO-ENTMCNC: 30 % (ref 32–34.5)
MCV RBC AUTO: 92.7 FL (ref 80–99.9)
METHB: 0.1 % (ref 0–1.5)
METHB: 0.3 % (ref 0–1.5)
MODE: ABNORMAL
MODE: AC
MONOCYTE, FLUID: 67 %
MONOCYTES ABSOLUTE: 0.59 E9/L (ref 0.1–0.95)
MONOCYTES RELATIVE PERCENT: 8.1 % (ref 2–12)
MRSA CULTURE ONLY: NORMAL
NEUTROPHIL, FLUID: 33 %
NEUTROPHILS ABSOLUTE: 4.48 E9/L (ref 1.8–7.3)
NEUTROPHILS RELATIVE PERCENT: 61.9 % (ref 43–80)
NUCLEATED CELLS FLUID: 1146 /UL
O2 CONTENT: 14 ML/DL
O2 CONTENT: 15.1 ML/DL
O2 SATURATION: 97.7 % (ref 92–98.5)
O2 SATURATION: 97.8 % (ref 92–98.5)
O2HB: 96.4 % (ref 94–97)
O2HB: 96.6 % (ref 94–97)
OPERATOR ID: 1190
OPERATOR ID: ABNORMAL
OPERATOR ID: ABNORMAL
PATIENT TEMP: 37 C
PATIENT TEMP: 37 C
PCO2: 47 MMHG (ref 35–45)
PCO2: 58.5 MMHG (ref 35–45)
PDW BLD-RTO: 15.3 FL (ref 11.5–15)
PEEP/CPAP: 8 CMH2O
PFO2: 2.64 MMHG/%
PFO2: 2.66 MMHG/%
PH BLOOD GAS: 7.4 (ref 7.35–7.45)
PH BLOOD GAS: 7.46 (ref 7.35–7.45)
PH BLOOD GAS: 7.47
PLATELET # BLD: 262 E9/L (ref 130–450)
PMV BLD AUTO: 9.6 FL (ref 7–12)
PO2: 105.5 MMHG (ref 60–100)
PO2: 106.5 MMHG (ref 60–100)
POTASSIUM SERPL-SCNC: 4.3 MMOL/L (ref 3.5–5)
PROTEIN FLUID: 3.9 G/DL
PS: 5 CMH20
RBC # BLD: 3.31 E12/L (ref 3.8–5.8)
RBC FLUID: NORMAL /UL
RI(T): 108 %
RI(T): 97 %
RR MECHANICAL: 16 B/MIN
SEDIMENTATION RATE, ERYTHROCYTE: 77 MM/HR (ref 0–15)
SODIUM BLD-SCNC: 135 MMOL/L (ref 132–146)
SOURCE, BLOOD GAS: ABNORMAL
SOURCE, BLOOD GAS: ABNORMAL
SOURCE, BLOOD GAS: NORMAL
THB: 10.2 G/DL (ref 11.5–16.5)
THB: 11 G/DL (ref 11.5–16.5)
TIME ANALYZED: 1308
TIME ANALYZED: 1628
TIME ANALYZED: 639
VT MECHANICAL: 500 ML
WBC # BLD: 7.3 E9/L (ref 4.5–11.5)

## 2018-10-03 PROCEDURE — 87210 SMEAR WET MOUNT SALINE/INK: CPT

## 2018-10-03 PROCEDURE — 84157 ASSAY OF PROTEIN OTHER: CPT

## 2018-10-03 PROCEDURE — 87102 FUNGUS ISOLATION CULTURE: CPT

## 2018-10-03 PROCEDURE — 88112 CYTOPATH CELL ENHANCE TECH: CPT

## 2018-10-03 PROCEDURE — 89051 BODY FLUID CELL COUNT: CPT

## 2018-10-03 PROCEDURE — 80048 BASIC METABOLIC PNL TOTAL CA: CPT

## 2018-10-03 PROCEDURE — 83986 ASSAY PH BODY FLUID NOS: CPT

## 2018-10-03 PROCEDURE — 86140 C-REACTIVE PROTEIN: CPT

## 2018-10-03 PROCEDURE — 36415 COLL VENOUS BLD VENIPUNCTURE: CPT

## 2018-10-03 PROCEDURE — 6360000002 HC RX W HCPCS: Performed by: INTERNAL MEDICINE

## 2018-10-03 PROCEDURE — 6370000000 HC RX 637 (ALT 250 FOR IP): Performed by: INTERNAL MEDICINE

## 2018-10-03 PROCEDURE — C9113 INJ PANTOPRAZOLE SODIUM, VIA: HCPCS | Performed by: INTERNAL MEDICINE

## 2018-10-03 PROCEDURE — 85013 SPUN MICROHEMATOCRIT: CPT

## 2018-10-03 PROCEDURE — 87206 SMEAR FLUORESCENT/ACID STAI: CPT

## 2018-10-03 PROCEDURE — 87070 CULTURE OTHR SPECIMN AEROBIC: CPT

## 2018-10-03 PROCEDURE — 94640 AIRWAY INHALATION TREATMENT: CPT

## 2018-10-03 PROCEDURE — 32555 ASPIRATE PLEURA W/ IMAGING: CPT

## 2018-10-03 PROCEDURE — 87116 MYCOBACTERIA CULTURE: CPT

## 2018-10-03 PROCEDURE — 2500000003 HC RX 250 WO HCPCS: Performed by: EMERGENCY MEDICINE

## 2018-10-03 PROCEDURE — 71045 X-RAY EXAM CHEST 1 VIEW: CPT

## 2018-10-03 PROCEDURE — 2000000000 HC ICU R&B

## 2018-10-03 PROCEDURE — 82805 BLOOD GASES W/O2 SATURATION: CPT

## 2018-10-03 PROCEDURE — 36600 WITHDRAWAL OF ARTERIAL BLOOD: CPT

## 2018-10-03 PROCEDURE — 2580000003 HC RX 258: Performed by: FAMILY MEDICINE

## 2018-10-03 PROCEDURE — 85025 COMPLETE CBC W/AUTO DIFF WBC: CPT

## 2018-10-03 PROCEDURE — 87205 SMEAR GRAM STAIN: CPT

## 2018-10-03 PROCEDURE — 36592 COLLECT BLOOD FROM PICC: CPT

## 2018-10-03 PROCEDURE — 71250 CT THORAX DX C-: CPT

## 2018-10-03 PROCEDURE — 82947 ASSAY GLUCOSE BLOOD QUANT: CPT

## 2018-10-03 PROCEDURE — 2580000003 HC RX 258: Performed by: INTERNAL MEDICINE

## 2018-10-03 PROCEDURE — 87015 SPECIMEN INFECT AGNT CONCNTJ: CPT

## 2018-10-03 PROCEDURE — 94003 VENT MGMT INPAT SUBQ DAY: CPT

## 2018-10-03 PROCEDURE — 85651 RBC SED RATE NONAUTOMATED: CPT

## 2018-10-03 PROCEDURE — 88305 TISSUE EXAM BY PATHOLOGIST: CPT

## 2018-10-03 PROCEDURE — 83615 LACTATE (LD) (LDH) ENZYME: CPT

## 2018-10-03 RX ADMIN — ALBUTEROL SULFATE 2.5 MG: 2.5 SOLUTION RESPIRATORY (INHALATION) at 08:48

## 2018-10-03 RX ADMIN — DEXTROSE AND SODIUM CHLORIDE: 5; 450 INJECTION, SOLUTION INTRAVENOUS at 16:59

## 2018-10-03 RX ADMIN — Medication 150 MCG/HR: at 01:03

## 2018-10-03 RX ADMIN — Medication 10 ML: at 08:38

## 2018-10-03 RX ADMIN — PROPOFOL 10 MCG/KG/MIN: 10 INJECTION, EMULSION INTRAVENOUS at 08:00

## 2018-10-03 RX ADMIN — CHLORHEXIDINE GLUCONATE 0.12% ORAL RINSE 15 ML: 1.2 LIQUID ORAL at 08:37

## 2018-10-03 RX ADMIN — PETROLATUM: 42 OINTMENT TOPICAL at 20:06

## 2018-10-03 RX ADMIN — DEXTROSE AND SODIUM CHLORIDE: 5; 450 INJECTION, SOLUTION INTRAVENOUS at 02:31

## 2018-10-03 RX ADMIN — ALBUTEROL SULFATE 2.5 MG: 2.5 SOLUTION RESPIRATORY (INHALATION) at 11:19

## 2018-10-03 RX ADMIN — MEROPENEM 1 G: 1 INJECTION, POWDER, FOR SOLUTION INTRAVENOUS at 08:00

## 2018-10-03 RX ADMIN — ENOXAPARIN SODIUM 40 MG: 40 INJECTION, SOLUTION INTRAVENOUS; SUBCUTANEOUS at 08:38

## 2018-10-03 RX ADMIN — ALBUTEROL SULFATE 2.5 MG: 2.5 SOLUTION RESPIRATORY (INHALATION) at 15:47

## 2018-10-03 RX ADMIN — Medication 10 ML: at 20:07

## 2018-10-03 RX ADMIN — MEROPENEM 1 G: 1 INJECTION, POWDER, FOR SOLUTION INTRAVENOUS at 01:09

## 2018-10-03 RX ADMIN — CHLORHEXIDINE GLUCONATE 0.12% ORAL RINSE 15 ML: 1.2 LIQUID ORAL at 01:09

## 2018-10-03 RX ADMIN — PANTOPRAZOLE SODIUM 40 MG: 40 INJECTION, POWDER, FOR SOLUTION INTRAVENOUS at 08:37

## 2018-10-03 RX ADMIN — PETROLATUM: 42 OINTMENT TOPICAL at 08:40

## 2018-10-03 RX ADMIN — MEROPENEM 1 G: 1 INJECTION, POWDER, FOR SOLUTION INTRAVENOUS at 16:59

## 2018-10-03 RX ADMIN — Medication 150 MCG/HR: at 09:48

## 2018-10-03 RX ADMIN — PETROLATUM: 42 OINTMENT TOPICAL at 01:09

## 2018-10-03 ASSESSMENT — PULMONARY FUNCTION TESTS
PIF_VALUE: 24
PIF_VALUE: 30
PIF_VALUE: 23
PIF_VALUE: 16
PIF_VALUE: 23
PIF_VALUE: 13
PIF_VALUE: 26
PIF_VALUE: 21

## 2018-10-03 ASSESSMENT — PAIN SCALES - GENERAL
PAINLEVEL_OUTOF10: 0

## 2018-10-03 NOTE — PROGRESS NOTES
110/75 - - 70 9 100 %   10/03/18 0849 - - - - 16 99 %   10/03/18 0845 - - - 63 15 99 %   10/03/18 0800 135/62 98.4 °F (36.9 °C) CORE 64 16 99 %   10/03/18 0700 (!) 155/83 - - 61 15 100 %         Intake/Output Summary (Last 24 hours) at 10/03/18 1208  Last data filed at 10/03/18 0800   Gross per 24 hour   Intake              260 ml   Output             1173 ml   Net             -913 ml     Wt Readings from Last 2 Encounters:   10/02/18 281 lb 4.9 oz (127.6 kg)   09/14/18 277 lb 11.2 oz (126 kg)     Body mass index is 36.12 kg/m².         PHYSICAL EXAMINATION:    General appearance - Sedated, no apparent distress  Mental status - Sedated, weakly follows commands  Eyes - pp pupils  Nose -cut on bridge of nose, ng in place  Mouth -orally intubated, poor dentition  Neck - supple, no significant adenopathy  Chest - symmetric but diminished air entry, few left chest rales  Heart - normal rate, regular rhythm, normal S1, S2, no murmurs, rubs, clicks or gallops  Abdomen - soft, nontender, nondistended, no masses or organomegaly +peg  Extremities - peripheral pulses normal, no pedal edema, no clubbing or cyanosis  Skin - normal coloration and turgor, no rashes, no suspicious skin lesions noted      Any additional physical findings:    MEDICATIONS:    Scheduled Meds:   enoxaparin  40 mg Subcutaneous Daily    albuterol  2.5 mg Nebulization 4x daily    albuterol  2.5 mg Nebulization Once    sodium chloride flush  10 mL Intravenous 2 times per day    pantoprazole  40 mg Intravenous Daily    And    sodium chloride (PF)  10 mL Intravenous Daily    chlorhexidine  15 mL Mouth/Throat BID    mineral oil-hydrophilic petrolatum   Topical BID    meropenem  1 g Intravenous Q8H     Continuous Infusions:   propofol 10 mcg/kg/min (10/03/18 0800)    dextrose 5 % and 0.45 % NaCl 75 mL/hr at 10/03/18 0231    fentaNYL 5 mcg/ml in 0.9%  ml infusion 150 mcg/hr (10/03/18 0948)     PRN Meds:     dextrose 50 mL PRN   sodium chloride

## 2018-10-03 NOTE — PROGRESS NOTES
10/03/2018    LYMPHSABS 1.11 10/03/2018    EOSABS 0.90 10/03/2018    BASOSABS 0.08 10/03/2018     BMP:    Lab Results   Component Value Date     10/03/2018    K 4.3 10/03/2018    K 3.6 09/07/2018    CL 94 10/03/2018    CO2 36 10/03/2018    BUN 36 10/03/2018    LABALBU 3.3 10/01/2018    LABALBU 3.4 01/04/2012    CREATININE 1.0 10/03/2018    CALCIUM 8.6 10/03/2018    GFRAA >60 10/03/2018    LABGLOM >60 10/03/2018     PT/INR:    Lab Results   Component Value Date    PROTIME 14.8 10/01/2018    PROTIME 12.2 12/30/2011    INR 1.3 10/01/2018     Last 3 Troponin:    Lab Results   Component Value Date    TROPONINI <0.01 10/01/2018    TROPONINI <0.01 09/23/2018    TROPONINI <0.01 09/07/2018     TSH:    Lab Results   Component Value Date    TSH 2.170 09/07/2018      Assessment:     1. Acute hypoxic respiratory failure in the setting of HCAP Pseudomonas  2. H/O CVA  3. H/O chronic DVT's  4. Type II DM  5.  H/O pacemaker    Plan:       Continue same plan and orders

## 2018-10-03 NOTE — CONSULTS
aeration of both lung  fields with slight haziness compatible with layering pleural fluid. No  obvious loculations are identified. PAST MEDICAL HISTORY:  Relative to prior strokes and history of factor V  Leiden deficiency. The patient had been maintained on chronic Coumadin  therapy for his life. There is no history of pulmonary embolism. The  patient does have history of bilateral lower extremity DVTs. At one point,  he was told that he might be having \"white clot\" thrown to his brain  causing strokes. There has never been a discussion or a study for atrial  septal defect or other explanation for the above physiology as described. OTHER MEDICAL/SURGICAL HISTORY:  Remarkable for diabetes, hypertension,  percutaneous gastrostomy, pacemaker, paraplegia, and thyroid disease. There is history of knee arthroplasty. SOCIAL HISTORY:  Negative for alcohol or tobacco use. CURRENT MEDICATIONS:  Include Tylenol, Proventil, Peridex, Lovenox  prophylactic dose, meropenem, and Protonix. ALLERGIES:  Denied. REVIEW OF SYSTEMS:  Could not be obtained as the patient is intubated. PHYSICAL EXAMINATION:  NECK:  Reveals no cervical or supraclavicular lymphadenopathy. No jugular  venous distention is noted. CHEST:  Breath sounds are reduced to both bases and present anteriorly  without rhonchi or wheezes. CARDIOVASCULAR:  Heart tones are irregular. ABDOMEN:  Soft, nontender. No masses. EXTREMITIES:  Peripheral pulses are easily palpable. There is 1+ edema,  both lower extremities without venous stasis changes or ulceration. LABORATORY STUDIES:  Include admitting white blood count of 10.8 with  maximum WBC of 12.3 yesterday, currently 8.3. There is a history of  positive blood cultures from 09/25/2018, Pseudomonas aeruginosa, with no  positive culture subsequent to that time.     IMPRESSION:  Pseudomonas aeruginosa pneumonia with history of aspiration  and respiratory failure, requiring ventilatory support. Marked improvement  in what appeared to be a left-sided loculated effusion, now with symmetric  bilateral pulmonary aeration with what appears to be a layered pleural  effusion. Impression at this point is that the patient does not require  surgical intervention such as a decortication. Consideration may be given  to repeat CT scan of the chest to reassess overall pleural space fluid and  infiltrates. If significant effusions are noted which are nonloculated,  consideration may be given to bilateral thoracocentesis under ultrasound  guidance at bedside. Above has been discussed with the patient's family and again, there is no  indication for decortication at this time.         Fadumo Cortez MD    D: 10/02/2018 17:57:56       T: 10/02/2018 19:21:49     [email or web address]  Job#: [id number]     Doc#: [id number]    CC:  Dr. Sincere Gill DO

## 2018-10-03 NOTE — PROCEDURES
Blayne Palacios is a 67 y.o. male patient. 1. Acute respiratory failure with hypoxia and hypercapnia (HCC)    2. Pleural effusion    3. HCAP (healthcare-associated pneumonia)      Past Medical History:   Diagnosis Date    Diabetes mellitus ([facility].)     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[facility].)     Hypertension     Ischemic stroke ([facility].) 03/06/2018    Pacemaker     Paraplegia ([facility]     Stroke (cerebrum) ([facility].) 02/27/2018    Thyroid disease      Blood pressure 135/77, pulse 71, temperature 98.4 °F (36.9 °C), temperature source Core, resp. rate 16, height 6' 2\" (1.88 m), weight 281 lb 4.9 oz (127.6 kg), SpO2 96 %. Thoracentesis  Date/Time: 10/3/2018 12:44 PM  Performed by: Chris Hu by: Lorenza Salazar   Consent: Written consent obtained. Risks and benefits: risks, benefits and alternatives were discussed  Consent given by: power of   Patient consent: the patient's understanding of the procedure matches consent given  Procedure consent: procedure consent matches procedure scheduled  Relevant documents: relevant documents present and verified  Test results: test results available and properly labeled  Site marked: the operative site was marked  Imaging studies: imaging studies available  Required items: required blood products, implants, devices, and special equipment available  Patient identity confirmed: arm band  Time out: Immediately prior to procedure a \"time out\" was called to verify the correct patient, procedure, equipment, support staff and site/side marked as required. Preparation: Patient was prepped and draped in the usual sterile fashion. Local anesthesia used: yes    Anesthesia:  Local anesthesia used: yes  Local Anesthetic: lidocaine 1% without epinephrine  Anesthetic total: 4 mL    Sedation:  Patient sedated: no  Patient tolerance: Patient tolerated the procedure well with no immediate complications  Comments: Left thoracentesis. Ultrasound assisted. Directly supervised by Dr. Khris Dixon.

## 2018-10-03 NOTE — PROGRESS NOTES
DAILY VENTILATOR WEANING ASSESSMENT PERFORMED    P/FIO2 Ratio = 266          (<100= do not Wean)                  Cs = 63                         (<32= Instability)  Plat. Pressure = 17   MV = 8.14  RSBI =    Instabilities:       Cardiovascular =       CNS =       Respiratory =       Metabolic =    Parameters    yes    Wean per protocol  yes    Ask Physician for a weaning plan yes    Additional Comments: Patient going for CT at 11am, will begin weaning to PS after. Performed by Clarisa Lyle RRT      Reference Table:    Cardiovascular     CNS      1. Mean BP less than or equal to 75   1. Neuromuscular blockade  2. Heart Rate greater than 130   2. RASS of -3, -4, -5  3. Myocardial Ischemia    3. RASS of +3, +4  4. Mechanical Assist Device    4. ICP greater than 15 or             Intracranial Hypertension         Respiratory      Metabolic  1. PEEP equal to or greater than 10cm/H20  1. Temp. (8hrs) less than 95 or > 103  2. Respiratory Rate greater than 35   2. WBC < 5000 or > 59519  3. Minute Volume greater than 15L  4. pH less than 7.30  5.  Deteriorating chest X-ray

## 2018-10-03 NOTE — PROGRESS NOTES
palpation. No masses felt. No hepatosplenomegaly. PEG. Extremities: No clubbing, no cyanosis, Minimal edema. Lines: Right femoral TLC 10/1/18.     Laboratory and Tests Review:  Lab Results   Component Value Date    WBC 7.3 10/03/2018    WBC 8.3 10/02/2018    WBC 12.3 (H) 10/01/2018    HGB 9.2 (L) 10/03/2018    HCT 30.7 (L) 10/03/2018    MCV 92.7 10/03/2018     10/03/2018     Lab Results   Component Value Date    NEUTROABS 4.48 10/03/2018    NEUTROABS 8.26 (H) 10/01/2018    NEUTROABS 5.18 09/25/2018     Lab Results   Component Value Date    CRP 4.9 (H) 10/03/2018    CRP 25.2 (H) 03/01/2018     No results found for: CRPHS  Lab Results   Component Value Date    SEDRATE 77 (H) 10/03/2018    SEDRATE 62 (H) 03/01/2018     Lab Results   Component Value Date    ALT 14 10/01/2018    AST 16 10/01/2018    ALKPHOS 77 10/01/2018    BILITOT 0.4 10/01/2018     Lab Results   Component Value Date     10/03/2018    K 4.3 10/03/2018    K 3.6 09/07/2018    CL 94 10/03/2018    CO2 36 10/03/2018    BUN 36 10/03/2018    CREATININE 1.0 10/03/2018    CREATININE 1.1 10/02/2018    CREATININE 1.1 10/01/2018    GFRAA >60 10/03/2018    LABGLOM >60 10/03/2018    GLUCOSE 146 10/03/2018    GLUCOSE 297 01/04/2012    PROT 7.3 10/01/2018    LABALBU 3.3 10/01/2018    LABALBU 3.4 01/04/2012    CALCIUM 8.6 10/03/2018    BILITOT 0.4 10/01/2018    ALKPHOS 77 10/01/2018    AST 16 10/01/2018    ALT 14 10/01/2018     Radiology:      Microbiology:   Respiratory culture 9/25/18 Pseudomonas aeruginosa (intermediate to Levofloxacin)  Respiratory culture 10/2/18 oropharyngeal ana cristina absent  Nares pending  Blood cultures 2/1/18 negative so far    ASSESSMENT:  · HCAP secondary to Pseudomonas aeruginosa  · Empyema associated to pneumonia  · Acute respiratory failure  · Fever with leukocytosis secondary to empyema    PLAN:  · Continue Meropenem  · Check cultures  · Repeat CT    Renetta Galindo  10:06 AM  10/3/2018

## 2018-10-04 ENCOUNTER — TELEPHONE (OUTPATIENT)
Dept: CARDIOLOGY CLINIC | Age: 73
End: 2018-10-04

## 2018-10-04 LAB
ANION GAP SERPL CALCULATED.3IONS-SCNC: 7 MMOL/L (ref 7–16)
BASOPHILS ABSOLUTE: 0.06 E9/L (ref 0–0.2)
BASOPHILS RELATIVE PERCENT: 0.9 % (ref 0–2)
BUN BLDV-MCNC: 23 MG/DL (ref 8–23)
CALCIUM SERPL-MCNC: 8.8 MG/DL (ref 8.6–10.2)
CHLORIDE BLD-SCNC: 101 MMOL/L (ref 98–107)
CO2: 34 MMOL/L (ref 22–29)
CREAT SERPL-MCNC: 0.9 MG/DL (ref 0.7–1.2)
EOSINOPHILS ABSOLUTE: 0.76 E9/L (ref 0.05–0.5)
EOSINOPHILS RELATIVE PERCENT: 11.1 % (ref 0–6)
GFR AFRICAN AMERICAN: >60
GFR NON-AFRICAN AMERICAN: >60 ML/MIN/1.73
GLUCOSE BLD-MCNC: 133 MG/DL (ref 74–109)
GRAM STAIN ORDERABLE: NORMAL
HCT VFR BLD CALC: 30.7 % (ref 37–54)
HEMOGLOBIN: 9.5 G/DL (ref 12.5–16.5)
IMMATURE GRANULOCYTES #: 0.07 E9/L
IMMATURE GRANULOCYTES %: 1 % (ref 0–5)
KOH PREP: NORMAL
LYMPHOCYTES ABSOLUTE: 1.01 E9/L (ref 1.5–4)
LYMPHOCYTES RELATIVE PERCENT: 14.8 % (ref 20–42)
MCH RBC QN AUTO: 28.7 PG (ref 26–35)
MCHC RBC AUTO-ENTMCNC: 30.9 % (ref 32–34.5)
MCV RBC AUTO: 92.7 FL (ref 80–99.9)
MONOCYTES ABSOLUTE: 0.64 E9/L (ref 0.1–0.95)
MONOCYTES RELATIVE PERCENT: 9.4 % (ref 2–12)
NEUTROPHILS ABSOLUTE: 4.29 E9/L (ref 1.8–7.3)
NEUTROPHILS RELATIVE PERCENT: 62.8 % (ref 43–80)
PDW BLD-RTO: 14.8 FL (ref 11.5–15)
PLATELET # BLD: 243 E9/L (ref 130–450)
PMV BLD AUTO: 9.7 FL (ref 7–12)
POTASSIUM SERPL-SCNC: 4 MMOL/L (ref 3.5–5)
RBC # BLD: 3.31 E12/L (ref 3.8–5.8)
SODIUM BLD-SCNC: 142 MMOL/L (ref 132–146)
WBC # BLD: 6.8 E9/L (ref 4.5–11.5)

## 2018-10-04 PROCEDURE — 85025 COMPLETE CBC W/AUTO DIFF WBC: CPT

## 2018-10-04 PROCEDURE — 2580000003 HC RX 258: Performed by: INTERNAL MEDICINE

## 2018-10-04 PROCEDURE — 94640 AIRWAY INHALATION TREATMENT: CPT

## 2018-10-04 PROCEDURE — 1200000000 HC SEMI PRIVATE

## 2018-10-04 PROCEDURE — 6370000000 HC RX 637 (ALT 250 FOR IP): Performed by: FAMILY MEDICINE

## 2018-10-04 PROCEDURE — 80048 BASIC METABOLIC PNL TOTAL CA: CPT

## 2018-10-04 PROCEDURE — 36592 COLLECT BLOOD FROM PICC: CPT

## 2018-10-04 PROCEDURE — 2580000003 HC RX 258: Performed by: FAMILY MEDICINE

## 2018-10-04 PROCEDURE — 6360000002 HC RX W HCPCS: Performed by: INTERNAL MEDICINE

## 2018-10-04 PROCEDURE — 2700000000 HC OXYGEN THERAPY PER DAY

## 2018-10-04 PROCEDURE — 36415 COLL VENOUS BLD VENIPUNCTURE: CPT

## 2018-10-04 PROCEDURE — C9113 INJ PANTOPRAZOLE SODIUM, VIA: HCPCS | Performed by: INTERNAL MEDICINE

## 2018-10-04 RX ADMIN — MEROPENEM 1 G: 1 INJECTION, POWDER, FOR SOLUTION INTRAVENOUS at 16:39

## 2018-10-04 RX ADMIN — PETROLATUM: 42 OINTMENT TOPICAL at 17:30

## 2018-10-04 RX ADMIN — ENOXAPARIN SODIUM 40 MG: 40 INJECTION, SOLUTION INTRAVENOUS; SUBCUTANEOUS at 09:00

## 2018-10-04 RX ADMIN — Medication 10 ML: at 09:00

## 2018-10-04 RX ADMIN — MEROPENEM 1 G: 1 INJECTION, POWDER, FOR SOLUTION INTRAVENOUS at 09:00

## 2018-10-04 RX ADMIN — DEXTROSE AND SODIUM CHLORIDE: 5; 450 INJECTION, SOLUTION INTRAVENOUS at 22:45

## 2018-10-04 RX ADMIN — PANTOPRAZOLE SODIUM 40 MG: 40 INJECTION, POWDER, FOR SOLUTION INTRAVENOUS at 09:00

## 2018-10-04 RX ADMIN — PETROLATUM: 42 OINTMENT TOPICAL at 21:35

## 2018-10-04 RX ADMIN — ALBUTEROL SULFATE 2.5 MG: 2.5 SOLUTION RESPIRATORY (INHALATION) at 20:40

## 2018-10-04 RX ADMIN — ALBUTEROL SULFATE 2.5 MG: 2.5 SOLUTION RESPIRATORY (INHALATION) at 12:18

## 2018-10-04 RX ADMIN — DEXTROSE AND SODIUM CHLORIDE: 5; 450 INJECTION, SOLUTION INTRAVENOUS at 06:03

## 2018-10-04 RX ADMIN — ALBUTEROL SULFATE 2.5 MG: 2.5 SOLUTION RESPIRATORY (INHALATION) at 16:10

## 2018-10-04 RX ADMIN — MEROPENEM 1 G: 1 INJECTION, POWDER, FOR SOLUTION INTRAVENOUS at 00:20

## 2018-10-04 RX ADMIN — ALBUTEROL SULFATE 2.5 MG: 2.5 SOLUTION RESPIRATORY (INHALATION) at 08:53

## 2018-10-04 RX ADMIN — PETROLATUM: 42 OINTMENT TOPICAL at 09:00

## 2018-10-04 ASSESSMENT — PAIN SCALES - GENERAL
PAINLEVEL_OUTOF10: 0

## 2018-10-04 NOTE — PROGRESS NOTES
Intensive Care Daily Quality Rounding Checklist      ICU Team Members: Bedside Nurse, ,  and Nursing Unit Leadership    ICU Day #: NUMBER: 4    Intubation Date: extubated 10/3 0    Ventilator Day #: NUMBER: NA    Central Line Insertion Date: October 1        Day #: NUMBER: 4     Arterial Line Insertion Date: NA 0      Day #: NUMBER: 0    DVT Prophylaxis: lovenox  GI Prophylaxis:    Skin Issues/ Wounds and ordered treatment discussed on rounds: Y    Goals/ Plans for the Day: {[facility] Quality Flow Goal(s) of the Day/Commitment(s):[id number]}

## 2018-10-04 NOTE — PATIENT CARE CONFERENCE
..  Intensive Care Daily Quality Rounding Checklist      ICU Team Members: Bedside Nurse, Nursing Unit Leadership, Respiratory Therapy, Pharmacist and [facility]    ICU Day #: NUMBER: 4    Intubation Date: October 1    Ventilator Day #: NA    Central Line Insertion Date: NA        Day #:NA     Arterial Line Insertion Date: NA      Day #: NA    DVT Prophylaxis:    GI Prophylaxis:    Skin Issues/ Wounds and ordered treatment discussed on rounds: Y    Goals/ Plans for the Day: transfer out of ICU

## 2018-10-04 NOTE — PROGRESS NOTES
[address] [place] [address] [facility]  Progress Note    SUBJECTIVE:  Chief Complaint   Patient presents with    Respiratory Distress     unresponsive resp distress      Patient is tolerating medications. RN reported no adverse drug reactions. Patient underwent a thoracentesis and about 1 L of bloody fluid was obtained Gram stain showing mononuclear leukocytes. Patient was extubated. Review of systems:  A 10 point review of systems was done. As stated above, otherwise negative. Medications:   enoxaparin  40 mg Subcutaneous Daily    albuterol  2.5 mg Nebulization 4x daily    albuterol  2.5 mg Nebulization Once    sodium chloride flush  10 mL Intravenous 2 times per day    pantoprazole  40 mg Intravenous Daily    And    sodium chloride (PF)  10 mL Intravenous Daily    mineral oil-hydrophilic petrolatum   Topical BID    meropenem  1 g Intravenous Q8H      dextrose 5 % and 0.45 % NaCl 75 mL/hr at 10/04/18 0603     dextrose, sodium chloride flush, sodium chloride flush, acetaminophen    OBJECTIVE:  BP (!) 164/71   Pulse 77   Temp 98.5 °F (36.9 °C) (Core)   Resp 30   Ht 6' 2\" (1.88 m)   Wt 271 lb 13.2 oz (123.3 kg)   SpO2 97%   BMI 34.90 kg/m²   Temp  Av.7 °F (37.6 °C)  Min: 98.5 °F (36.9 °C)  Max: 100.5 °F (38.1 °C)  Constitutional: The patient is extubated. Family present. Skin: Warm and dry. No rashes were noted. HEENT: Round pupils. No jaundice. Moist mucous membranes, no ulcerations, no thrush. ETT. Abrasion over bridge of nose. Neck: Supple to movements. Chest: No use of accessory muscles to breathe. Diminished breath sounds. Scattered rhonchi. Cardiovascular: S1 and S2 are rhythmic and regular. No murmurs appreciated. Abdomen: Positive bowel sounds to auscultation. Benign to palpation. PEG. Extremities: No clubbing, no cyanosis, Minimal edema. Lines: Right femoral TLC 10/1/18.     Laboratory and Tests Review:  Lab Results   Component Value Date    WBC 6.8

## 2018-10-04 NOTE — TELEPHONE ENCOUNTER
I spoke to Rehab to make Mr. Blanquita Bowling a 2 wk HFU apt.   THey states he is still in the hospital.

## 2018-10-04 NOTE — PROGRESS NOTES
10/4/2018  2:14 PM      Nutrition Assessment    Type and Reason for Visit: Reassess, Consult (TF Ordering and Management)    Nutrition Recommendations: Pt has been extubated, is tolerating it and will be transferred out of ICU. Per consult, ordered TF:    TF ORDERED: Diabetic 1.5 (Glucerna 1.5) to goal rate 50 ml/hr and 150 ml water flushes every 6 hrs  This will provide: 1200 ml/d, 1800 ginette, 99 g pro, 1511 ml total free water    Malnutrition Assessment:  · Malnutrition Status: At risk for malnutrition  · Context: Chronic illness  · Findings of the 6 clinical characteristics of malnutrition (Minimum of 2 out of 6 clinical characteristics is required to make the diagnosis of moderate or severe Protein Calorie Malnutrition based on AND/ASPEN Guidelines):  1. Energy Intake-Greater than 75%, not able to assess    2. Weight Loss-10% loss or greater, in 6 months  3. Fat Loss-No significant subcutaneous fat loss,    4. Muscle Loss-No significant muscle mass loss,    5. Fluid Accumulation-Mild fluid accumulation,    6.   Strength-Not measured    Nutrition Diagnosis:   · Problem: Inadequate oral intake  · Etiology: related to Difficulty swallowing     Signs and symptoms:  as evidenced by Nutrition support - EN, NPO status due to medical condition, Presence of wounds, Weight loss greater than or equal to 10% in 6 months, Localized or generalized fluid accumulation    Nutrition Assessment:  · Subjective Assessment: Pt extubated and to be transferred out of ICU  · Nutrition-Focused Physical Findings: appears resting, PEG in place/clamped, +BS, +1 edema, -I/O >3L  · Wound Type: Pressure Ulcer, Unstageable, Deep Tissue Injury  · Current Nutrition Therapies:  · Oral Diet Orders: NPO   · Oral Diet intake: NPO  · Oral Nutrition Supplement (ONS) Orders: None  · ONS intake: NPO  · Anthropometric Measures:  · Ht: 6' 2\" (188 cm)   · Current Body Wt: 271 lb (122.9 kg) (10/4 ICU bed - loss with -fluid balance)  · Admission Body Wt:

## 2018-10-04 NOTE — CARE COORDINATION
Social Work/ Discharge planning : Patient was admitted from [facility] SNF. SW spoke to Landon goetz from [facility] and they can accept back at discharge if plan. Patient has been extubated and currently on 6 liters. Patient will not need pre-cert. N 17 generated.  SW to follow Electronically signed by ROHIT Read on 10/4/2018 at 11:55 AM

## 2018-10-05 VITALS
BODY MASS INDEX: 34.71 KG/M2 | TEMPERATURE: 98.5 F | HEART RATE: 65 BPM | WEIGHT: 270.44 LBS | DIASTOLIC BLOOD PRESSURE: 68 MMHG | OXYGEN SATURATION: 95 % | SYSTOLIC BLOOD PRESSURE: 138 MMHG | RESPIRATION RATE: 16 BRPM | HEIGHT: 74 IN

## 2018-10-05 LAB
ANION GAP SERPL CALCULATED.3IONS-SCNC: 10 MMOL/L (ref 7–16)
BASOPHILS ABSOLUTE: 0.04 E9/L (ref 0–0.2)
BASOPHILS RELATIVE PERCENT: 0.6 % (ref 0–2)
BUN BLDV-MCNC: 19 MG/DL (ref 8–23)
CALCIUM SERPL-MCNC: 9 MG/DL (ref 8.6–10.2)
CHLORIDE BLD-SCNC: 101 MMOL/L (ref 98–107)
CO2: 30 MMOL/L (ref 22–29)
CREAT SERPL-MCNC: 0.7 MG/DL (ref 0.7–1.2)
EOSINOPHILS ABSOLUTE: 0.71 E9/L (ref 0.05–0.5)
EOSINOPHILS RELATIVE PERCENT: 10.2 % (ref 0–6)
GFR AFRICAN AMERICAN: >60
GFR NON-AFRICAN AMERICAN: >60 ML/MIN/1.73
GLUCOSE BLD-MCNC: 140 MG/DL (ref 74–109)
HCT VFR BLD CALC: 27.6 % (ref 37–54)
HEMOGLOBIN: 8.5 G/DL (ref 12.5–16.5)
IMMATURE GRANULOCYTES #: 0.06 E9/L
IMMATURE GRANULOCYTES %: 0.9 % (ref 0–5)
LYMPHOCYTES ABSOLUTE: 0.93 E9/L (ref 1.5–4)
LYMPHOCYTES RELATIVE PERCENT: 13.4 % (ref 20–42)
MCH RBC QN AUTO: 28.6 PG (ref 26–35)
MCHC RBC AUTO-ENTMCNC: 30.8 % (ref 32–34.5)
MCV RBC AUTO: 92.9 FL (ref 80–99.9)
MONOCYTES ABSOLUTE: 0.46 E9/L (ref 0.1–0.95)
MONOCYTES RELATIVE PERCENT: 6.6 % (ref 2–12)
NEUTROPHILS ABSOLUTE: 4.74 E9/L (ref 1.8–7.3)
NEUTROPHILS RELATIVE PERCENT: 68.3 % (ref 43–80)
PDW BLD-RTO: 14.6 FL (ref 11.5–15)
PLATELET # BLD: 219 E9/L (ref 130–450)
PMV BLD AUTO: 10.1 FL (ref 7–12)
POTASSIUM SERPL-SCNC: 4.1 MMOL/L (ref 3.5–5)
RBC # BLD: 2.97 E12/L (ref 3.8–5.8)
SODIUM BLD-SCNC: 141 MMOL/L (ref 132–146)
WBC # BLD: 6.9 E9/L (ref 4.5–11.5)

## 2018-10-05 PROCEDURE — 2700000000 HC OXYGEN THERAPY PER DAY

## 2018-10-05 PROCEDURE — 36415 COLL VENOUS BLD VENIPUNCTURE: CPT

## 2018-10-05 PROCEDURE — 94640 AIRWAY INHALATION TREATMENT: CPT

## 2018-10-05 PROCEDURE — 80048 BASIC METABOLIC PNL TOTAL CA: CPT

## 2018-10-05 PROCEDURE — C9113 INJ PANTOPRAZOLE SODIUM, VIA: HCPCS | Performed by: INTERNAL MEDICINE

## 2018-10-05 PROCEDURE — 85025 COMPLETE CBC W/AUTO DIFF WBC: CPT

## 2018-10-05 PROCEDURE — 2580000003 HC RX 258: Performed by: EMERGENCY MEDICINE

## 2018-10-05 PROCEDURE — 6360000002 HC RX W HCPCS: Performed by: INTERNAL MEDICINE

## 2018-10-05 PROCEDURE — 6360000002 HC RX W HCPCS: Performed by: FAMILY MEDICINE

## 2018-10-05 PROCEDURE — 2580000003 HC RX 258: Performed by: FAMILY MEDICINE

## 2018-10-05 PROCEDURE — 2580000003 HC RX 258: Performed by: INTERNAL MEDICINE

## 2018-10-05 RX ORDER — HEPARIN SODIUM (PORCINE) LOCK FLUSH IV SOLN 100 UNIT/ML 100 UNIT/ML
300 SOLUTION INTRAVENOUS EVERY 12 HOURS SCHEDULED
Status: DISCONTINUED | OUTPATIENT
Start: 2018-10-05 | End: 2018-10-05 | Stop reason: HOSPADM

## 2018-10-05 RX ORDER — HEPARIN SODIUM (PORCINE) LOCK FLUSH IV SOLN 100 UNIT/ML 100 UNIT/ML
300 SOLUTION INTRAVENOUS PRN
Status: DISCONTINUED | OUTPATIENT
Start: 2018-10-05 | End: 2018-10-05 | Stop reason: HOSPADM

## 2018-10-05 RX ADMIN — MEROPENEM 1 G: 1 INJECTION, POWDER, FOR SOLUTION INTRAVENOUS at 16:49

## 2018-10-05 RX ADMIN — Medication 300 UNITS: at 09:36

## 2018-10-05 RX ADMIN — ALBUTEROL SULFATE 2.5 MG: 2.5 SOLUTION RESPIRATORY (INHALATION) at 09:00

## 2018-10-05 RX ADMIN — ENOXAPARIN SODIUM 40 MG: 40 INJECTION, SOLUTION INTRAVENOUS; SUBCUTANEOUS at 09:35

## 2018-10-05 RX ADMIN — MEROPENEM 1 G: 1 INJECTION, POWDER, FOR SOLUTION INTRAVENOUS at 09:35

## 2018-10-05 RX ADMIN — SODIUM CHLORIDE, PRESERVATIVE FREE 300 UNITS: 5 INJECTION INTRAVENOUS at 17:30

## 2018-10-05 RX ADMIN — ALTEPLASE 1 MG: 2.2 INJECTION, POWDER, LYOPHILIZED, FOR SOLUTION INTRAVENOUS at 11:27

## 2018-10-05 RX ADMIN — ALBUTEROL SULFATE 2.5 MG: 2.5 SOLUTION RESPIRATORY (INHALATION) at 17:11

## 2018-10-05 RX ADMIN — ALBUTEROL SULFATE 2.5 MG: 2.5 SOLUTION RESPIRATORY (INHALATION) at 12:23

## 2018-10-05 RX ADMIN — Medication 10 ML: at 09:37

## 2018-10-05 RX ADMIN — Medication 10 ML: at 09:35

## 2018-10-05 RX ADMIN — PANTOPRAZOLE SODIUM 40 MG: 40 INJECTION, POWDER, FOR SOLUTION INTRAVENOUS at 09:35

## 2018-10-05 RX ADMIN — PETROLATUM: 42 OINTMENT TOPICAL at 09:36

## 2018-10-05 RX ADMIN — SODIUM CHLORIDE, PRESERVATIVE FREE 10 ML: 5 INJECTION INTRAVENOUS at 17:30

## 2018-10-05 RX ADMIN — MEROPENEM 1 G: 1 INJECTION, POWDER, FOR SOLUTION INTRAVENOUS at 00:47

## 2018-10-05 ASSESSMENT — PAIN SCALES - GENERAL
PAINLEVEL_OUTOF10: 0
PAINLEVEL_OUTOF10: 0

## 2018-10-05 NOTE — CARE COORDINATION
Social Work / discharge planning    10/5/2018 10:23 AM     Wife and daughter at bedside and feel patient is not ready for SNF level of care and asked for LTAC. Choices provided and would like [facility].     Referral made to [facility]  Await chart review,    Electronically signed by ROHIT Harris on 10/5/2018 at 10:24 AM

## 2018-10-05 NOTE — PROGRESS NOTES
normocephalic, atraumatic. Neck: no adenopathy, no carotid bruit, no JVD, supple, symmetrical, trachea midline and thyroid not enlarged, symmetric, no tenderness/mass/nodules  Lungs: diminished breath sounds bibasilar  Heart: regular rate and rhythm, S1, S2 normal, no murmur, click, rub or gallop  Abdomen: soft, non-tender; bowel sounds normal; no masses,  no organomegaly  Extremities: extremities normal, atraumatic, no cyanosis or edema  Neurologic: Mental status: Alert, oriented, thought content appropriate    Assessment:   Active Problems:    Acute respiratory failure with hypoxia (HCC)  Resolved Problems:    * No resolved hospital problems. *    Plan:   1. Liza Mckinley for discharge to rehab facility.     Matthew Brown D.O.  8:19 AM  Jennifer Stephen

## 2018-10-05 NOTE — PROGRESS NOTES
edema. Lines: Right femoral TLC 10/1/18. Laboratory and Tests Review:  Lab Results   Component Value Date    WBC 6.9 10/05/2018    WBC 6.8 10/04/2018    WBC 7.3 10/03/2018    HGB 8.5 (L) 10/05/2018    HCT 27.6 (L) 10/05/2018    MCV 92.9 10/05/2018     10/05/2018     Lab Results   Component Value Date    NEUTROABS 4.74 10/05/2018    NEUTROABS 4.29 10/04/2018    NEUTROABS 4.48 10/03/2018     Lab Results   Component Value Date    CRP 4.9 (H) 10/03/2018    CRP 25.2 (H) 03/01/2018     No results found for: CRPHS  Lab Results   Component Value Date    SEDRATE 77 (H) 10/03/2018    SEDRATE 62 (H) 03/01/2018     Lab Results   Component Value Date    ALT 14 10/01/2018    AST 16 10/01/2018    ALKPHOS 77 10/01/2018    BILITOT 0.4 10/01/2018     Lab Results   Component Value Date     10/05/2018    K 4.1 10/05/2018    K 3.6 09/07/2018     10/05/2018    CO2 30 10/05/2018    BUN 19 10/05/2018    CREATININE 0.7 10/05/2018    CREATININE 0.9 10/04/2018    CREATININE 1.0 10/03/2018    GFRAA >60 10/05/2018    LABGLOM >60 10/05/2018    GLUCOSE 140 10/05/2018    GLUCOSE 297 01/04/2012    PROT 7.3 10/01/2018    LABALBU 3.3 10/01/2018    LABALBU 3.4 01/04/2012    CALCIUM 9.0 10/05/2018    BILITOT 0.4 10/01/2018    ALKPHOS 77 10/01/2018    AST 16 10/01/2018    ALT 14 10/01/2018     Radiology:      Microbiology:   Respiratory culture 9/25/18 Pseudomonas aeruginosa (intermediate to Levofloxacin)  Respiratory culture 10/2/18 oropharyngeal ana cristina absent. Pseudomonas aeruginosa  Nares pending  Blood cultures 2/1/18 negative so far    ASSESSMENT:  · HCAP secondary to Pseudomonas aeruginosa  · Pleural effusion associated to pneumonia. Underwent thoracentesis. Bloody fluid was obtained.  Empyema seems to be less likely  · Acute respiratory failure  · Fever with leukocytosis secondary to empyema    PLAN:  · Continue Meropenem  · Check pleural fluid cultures  · Discharge planning the [facility]  1:38 PM  10/5/2018

## 2018-10-06 LAB
BLOOD CULTURE, ROUTINE: NORMAL
BODY FLUID CULTURE, STERILE: NORMAL
CULTURE, BLOOD 2: NORMAL
CULTURE, RESPIRATORY: ABNORMAL
CULTURE, RESPIRATORY: ABNORMAL
GRAM STAIN RESULT: NORMAL
ORGANISM: ABNORMAL
SMEAR, RESPIRATORY: ABNORMAL

## 2018-10-12 NOTE — DISCHARGE SUMMARY
ear canals, both ears   Nose:   Nares normal, septum midline, mucosa normal, no drainage    or sinus tenderness   Throat:   Lips, mucosa, and tongue normal; teeth and gums normal   Neck:   Supple, symmetrical, trachea midline, no adenopathy;        thyroid:  No enlargement/tenderness/nodules; no carotid    bruit or JVD   Back:     Symmetric, no curvature, ROM normal, no CVA tenderness   Lungs:     Clear to auscultation bilaterally, respirations unlabored   Chest wall:    No tenderness or deformity   Heart:    Regular rate and rhythm, S1 and S2 normal, no murmur, rub   or gallop   Abdomen:     Soft, non-tender, bowel sounds active all four quadrants,     no masses, no organomegaly   Genitalia:    Normal male without lesion, discharge or tenderness   Rectal:    Normal tone, normal prostate, no masses or tenderness;    guaiac negative stool   Extremities:   Extremities normal, atraumatic, no cyanosis or edema   Pulses:   2+ and symmetric all extremities   Skin:   Skin color, texture, turgor normal, no rashes or lesions   Lymph nodes:   Cervical, supraclavicular, and axillary nodes normal   Neurologic:   CNII-XII intact. Normal strength, sensation and reflexes       throughout       Disposition:   LTAC, [facility].     Signed:  Chely Casillas D.O.  10/12/2018, 9:20 AM

## 2018-10-23 NOTE — DISCHARGE SUMMARY
[address]                 [address] [facility],  [address]                              DISCHARGE SUMMARY    PATIENT NAME: America Serrano                     :         1945  MED REC NO:   [id number]                            ROOM:       4501  ACCOUNT NO:   [de-identified]                           ADMIT DATE:  2018  PROVIDER:     Pretty Galvan DO                  DISCHARGE DATE:  2018    FINAL DIAGNOSES:  1. Acute hypoxic respiratory failure in the setting of aspiration  pneumonia. 2.  History of CVA with dysphagia, status post PEG tube. 3.  Benign essential hypertension. 4.  Acute diastolic heart failure. 5.  History of pacemaker. 6.  Type 2 diabetes mellitus without complications. 7.  History of chronic DVTs of the lower extremities. CHIEF COMPLAINT/PRESENTING ILLNESS/PHYSICAL FINDINGS:  The patient is  an elderly male with prior history of CVA with dysphagia, currently  residing in a skilled nursing facility on PEG tube feeds. He presents  to this hospital emergency room with acute hypoxic respiratory  failure. Imaging studies performed of the chest reveals the presence  of aspiration pneumonia. His PEG tube feeds were placed on hold and  Pulmonary and Infectious Disease were asked to assess the patient. Physical assessment reveals his temperature and vital signs to be  stable. ENT exam is negative. Heart is regular. The lungs  demonstrate diminished breath sounds at both lung bases. The abdomen  is noted to be soft with positive bowel sounds. Extremities reveal no  evidence for edema and/or cyanosis. HOSPITAL COURSE:  His chest x-ray revealed lung infiltrate, but also  there is noted to be some pulmonary vascular congestion for which  Cardiology was asked to see him. They felt that he had acute  diastolic heart failure, superimposed upon aspiration pneumonia.   He  was treated accordingly and eventually was restarted on his PEG tube  feeds and was transferred back to the nursing home in satisfactory  condition. PHYSICIANS FOLLOWING THE PATIENT DURING THIS HOSPITAL STAY:   Pulmonary, Cardiology and Infectious Disease services. DISCHARGE CONDITION:  Level of function is fair. UNREPORTED TEST RESULTS AND INTENDED FOLLOWUP:  Nonapplicable. LEVEL OF ACTIVITY:  Up ad goldie with assist.    DISCHARGE DIET:  Includes PEG tube feeds. DISCHARGE MEDICATIONS:  See discharge med reconciliation form. FOLLOWUP:  Followup will be by me upon his release from the nursing  home.         Brandie Florian DO    D: 10/22/2018 19:48:15       T: 10/23/2018 2:29:01     [email or web address]  Job#: [id number]     Doc#: [id number]    CC:

## 2018-11-05 LAB
FUNGUS (MYCOLOGY) CULTURE: NORMAL
FUNGUS STAIN: NORMAL

## 2018-11-07 ENCOUNTER — APPOINTMENT (OUTPATIENT)
Dept: CT IMAGING | Age: 73
End: 2018-11-07
Payer: MEDICARE

## 2018-11-07 ENCOUNTER — HOSPITAL ENCOUNTER (EMERGENCY)
Age: 73
Discharge: HOME | End: 2018-11-07
Attending: EMERGENCY MEDICINE
Payer: MEDICARE

## 2018-11-07 VITALS
WEIGHT: 260 LBS | DIASTOLIC BLOOD PRESSURE: 74 MMHG | SYSTOLIC BLOOD PRESSURE: 151 MMHG | RESPIRATION RATE: 16 BRPM | OXYGEN SATURATION: 94 % | TEMPERATURE: 97.8 F | HEART RATE: 72 BPM | HEIGHT: 74 IN | BODY MASS INDEX: 33.37 KG/M2

## 2018-11-07 DIAGNOSIS — N30.00 ACUTE CYSTITIS WITHOUT HEMATURIA: ICD-10-CM

## 2018-11-07 DIAGNOSIS — R10.9 ABDOMINAL PAIN, UNSPECIFIED ABDOMINAL LOCATION: Primary | ICD-10-CM

## 2018-11-07 LAB
ALBUMIN SERPL-MCNC: 3 G/DL (ref 3.5–5.2)
ALP BLD-CCNC: 97 U/L (ref 40–129)
ALT SERPL-CCNC: 8 U/L (ref 0–40)
ANION GAP SERPL CALCULATED.3IONS-SCNC: 11 MMOL/L (ref 7–16)
AST SERPL-CCNC: 10 U/L (ref 0–39)
BACTERIA: ABNORMAL /HPF
BASOPHILS ABSOLUTE: 0.07 E9/L (ref 0–0.2)
BASOPHILS RELATIVE PERCENT: 0.9 % (ref 0–2)
BILIRUB SERPL-MCNC: 0.3 MG/DL (ref 0–1.2)
BILIRUBIN URINE: NEGATIVE
BLOOD, URINE: NEGATIVE
BUN BLDV-MCNC: 29 MG/DL (ref 8–23)
CALCIUM SERPL-MCNC: 8.9 MG/DL (ref 8.6–10.2)
CHLORIDE BLD-SCNC: 96 MMOL/L (ref 98–107)
CLARITY: ABNORMAL
CO2: 29 MMOL/L (ref 22–29)
COLOR: YELLOW
CREAT SERPL-MCNC: 1 MG/DL (ref 0.7–1.2)
CRYSTALS, UA: ABNORMAL
EOSINOPHILS ABSOLUTE: 0.35 E9/L (ref 0.05–0.5)
EOSINOPHILS RELATIVE PERCENT: 4.4 % (ref 0–6)
EPITHELIAL CELLS, UA: ABNORMAL /HPF
GFR AFRICAN AMERICAN: >60
GFR NON-AFRICAN AMERICAN: >60 ML/MIN/1.73
GLUCOSE BLD-MCNC: 144 MG/DL (ref 74–99)
GLUCOSE URINE: NEGATIVE MG/DL
HCT VFR BLD CALC: 31.6 % (ref 37–54)
HEMOGLOBIN: 10.3 G/DL (ref 12.5–16.5)
IMMATURE GRANULOCYTES #: 0.03 E9/L
IMMATURE GRANULOCYTES %: 0.4 % (ref 0–5)
INR BLD: 2.7
KETONES, URINE: NEGATIVE MG/DL
LACTIC ACID: 1.9 MMOL/L (ref 0.5–2.2)
LEUKOCYTE ESTERASE, URINE: ABNORMAL
LIPASE: 18 U/L (ref 13–60)
LYMPHOCYTES ABSOLUTE: 1.51 E9/L (ref 1.5–4)
LYMPHOCYTES RELATIVE PERCENT: 18.8 % (ref 20–42)
MCH RBC QN AUTO: 28.6 PG (ref 26–35)
MCHC RBC AUTO-ENTMCNC: 32.6 % (ref 32–34.5)
MCV RBC AUTO: 87.8 FL (ref 80–99.9)
MONOCYTES ABSOLUTE: 0.61 E9/L (ref 0.1–0.95)
MONOCYTES RELATIVE PERCENT: 7.6 % (ref 2–12)
NEUTROPHILS ABSOLUTE: 5.45 E9/L (ref 1.8–7.3)
NEUTROPHILS RELATIVE PERCENT: 67.9 % (ref 43–80)
NITRITE, URINE: NEGATIVE
PDW BLD-RTO: 15.2 FL (ref 11.5–15)
PH UA: >=9 (ref 5–9)
PLATELET # BLD: 292 E9/L (ref 130–450)
PMV BLD AUTO: 11.2 FL (ref 7–12)
POTASSIUM SERPL-SCNC: 4.3 MMOL/L (ref 3.5–5)
PROTEIN UA: ABNORMAL MG/DL
PROTHROMBIN TIME: 30.6 SEC (ref 9.3–12.4)
RBC # BLD: 3.6 E12/L (ref 3.8–5.8)
RBC UA: ABNORMAL /HPF (ref 0–2)
SODIUM BLD-SCNC: 136 MMOL/L (ref 132–146)
SPECIFIC GRAVITY UA: 1.01 (ref 1–1.03)
TOTAL PROTEIN: 6.8 G/DL (ref 6.4–8.3)
UROBILINOGEN, URINE: 0.2 E.U./DL
WBC # BLD: 8 E9/L (ref 4.5–11.5)
WBC UA: ABNORMAL /HPF (ref 0–5)

## 2018-11-07 PROCEDURE — 36415 COLL VENOUS BLD VENIPUNCTURE: CPT

## 2018-11-07 PROCEDURE — 81001 URINALYSIS AUTO W/SCOPE: CPT

## 2018-11-07 PROCEDURE — 85610 PROTHROMBIN TIME: CPT

## 2018-11-07 PROCEDURE — 83690 ASSAY OF LIPASE: CPT

## 2018-11-07 PROCEDURE — 85025 COMPLETE CBC W/AUTO DIFF WBC: CPT

## 2018-11-07 PROCEDURE — 74177 CT ABD & PELVIS W/CONTRAST: CPT

## 2018-11-07 PROCEDURE — 6360000004 HC RX CONTRAST MEDICATION: Performed by: RADIOLOGY

## 2018-11-07 PROCEDURE — 87186 SC STD MICRODIL/AGAR DIL: CPT

## 2018-11-07 PROCEDURE — 80053 COMPREHEN METABOLIC PANEL: CPT

## 2018-11-07 PROCEDURE — 87088 URINE BACTERIA CULTURE: CPT

## 2018-11-07 PROCEDURE — 83605 ASSAY OF LACTIC ACID: CPT

## 2018-11-07 PROCEDURE — 99284 EMERGENCY DEPT VISIT MOD MDM: CPT

## 2018-11-07 RX ORDER — SODIUM CHLORIDE 0.9 % (FLUSH) 0.9 %
10 SYRINGE (ML) INJECTION PRN
Status: DISCONTINUED | OUTPATIENT
Start: 2018-11-07 | End: 2018-11-08 | Stop reason: HOSPADM

## 2018-11-07 RX ORDER — CEFDINIR 300 MG/1
300 CAPSULE ORAL 2 TIMES DAILY
Qty: 20 CAPSULE | Refills: 0 | Status: SHIPPED | OUTPATIENT
Start: 2018-11-07 | End: 2018-11-17

## 2018-11-07 RX ADMIN — IOPAMIDOL 110 ML: 755 INJECTION, SOLUTION INTRAVENOUS at 19:56

## 2018-11-07 ASSESSMENT — PAIN DESCRIPTION - PAIN TYPE: TYPE: ACUTE PAIN

## 2018-11-07 ASSESSMENT — PAIN SCALES - GENERAL: PAINLEVEL_OUTOF10: 8

## 2018-11-07 ASSESSMENT — PAIN DESCRIPTION - LOCATION: LOCATION: ABDOMEN

## 2018-11-07 ASSESSMENT — ENCOUNTER SYMPTOMS
COUGH: 0
SHORTNESS OF BREATH: 0
DIARRHEA: 1
WHEEZING: 0
SINUS PRESSURE: 0
SORE THROAT: 0
NAUSEA: 0
BACK PAIN: 0
ABDOMINAL PAIN: 1
EYE DISCHARGE: 0
EYE REDNESS: 0
VOMITING: 0
EYE PAIN: 0

## 2018-11-08 NOTE — ED PROVIDER NOTES
Patient is a 41-year-old male presenting to the ED from [facility] rehab facility. Patient states he had a stroke earlier this year and pneumonia in September patient has been at the rehab facility since September. Patient has had improvement in his shortness of breath though increased abdominal distention. Patient states he's had watery diarrhea since this morning patient had x-ray performed at facility showing suspected obstruction. Patient states he has pain in the right lower quadrant denies any history of abdominal surgery in the past. Patient denies any nausea or vomiting rash fever weakness headache vision changes. Review of Systems   Constitutional: Negative for chills and fever. HENT: Negative for ear pain, sinus pressure and sore throat. Eyes: Negative for pain, discharge and redness. Respiratory: Negative for cough, shortness of breath and wheezing. Cardiovascular: Negative for chest pain. Gastrointestinal: Positive for abdominal pain and diarrhea. Negative for nausea and vomiting. Genitourinary: Negative for dysuria and frequency. Musculoskeletal: Negative for arthralgias and back pain. Skin: Negative for rash and wound. Neurological: Negative for weakness and headaches. Hematological: Negative for adenopathy. All other systems reviewed and are negative. Physical Exam   Constitutional: He is oriented to person, place, and time. He appears well-developed and well-nourished. HENT:   Head: Normocephalic and atraumatic. Eyes: Conjunctivae are normal.   Neck: Normal range of motion. Neck supple. Cardiovascular: Normal rate, regular rhythm and normal heart sounds. No murmur heard. Pulmonary/Chest: Effort normal and breath sounds normal. No respiratory distress. He has no wheezes. He has no rales. Abdominal: Soft. Bowel sounds are normal. He exhibits no distension. There is tenderness. There is no rebound and no guarding.    Musculoskeletal: He exhibits no edema, tenderness or deformity. Neurological: He is alert and oriented to person, place, and time. No cranial nerve deficit. Coordination normal.   Skin: Skin is warm and dry. Nursing note and vitals reviewed. Procedures    MDM  Number of Diagnoses or Management Options  Diagnosis management comments: Patient be evaluated for possible obstruction will perform CT abdomen pelvis with IV contrast. Patient has full belly but not distended. Patient has no guarding does not appear to have or peritonitis. 8:30 Ct shows no obstruction, given patients urinalysis showing high amount of wbc present will treat possible cystitis. Patient will follow up with pcp as outpatient.     --------------------------------------------- PAST HISTORY ---------------------------------------------  Past Medical History:  has a past medical history of Diabetes mellitus ([facility] 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[facility] Hypertension; Ischemic stroke ([facility] Pacemaker; Paraplegia ([facility] Stroke (cerebrum) ([facility] and Thyroid disease. Past Surgical History:  has a past surgical history that includes eye surgery; Knee arthroscopy (right knee); pr colonoscopy flx dx w/collj spec when pfrmd (N/A, 3/20/2018); Colonoscopy; pr egd percutaneous placement gastrostomy tube (N/A, 3/29/2018); pacemaker placement; and pr egd percutaneous placement gastrostomy tube (N/A, 9/12/2018). Social History:  reports that he has quit smoking. His smoking use included Cigars. He has never used smokeless tobacco. He reports that he does not drink alcohol or use drugs. Family History: family history includes Heart Disease in his mother; Stroke in his father. The patients home medications have been reviewed. Allergies: Patient has no known allergies.     -------------------------------------------------- RESULTS -------------------------------------------------  Labs:  Results for orders placed or performed during the hospital encounter of 11/07/18   CBC Auto Protein, UA TRACE Negative mg/dL    Urobilinogen, Urine 0.2 <2.0 E.U./dL    Nitrite, Urine Negative Negative    Leukocyte Esterase, Urine LARGE (A) Negative   Microscopic Urinalysis   Result Value Ref Range    WBC, UA 5-10 0 - 5 /HPF    RBC, UA 1-3 0 - 2 /HPF    Epi Cells NONE /HPF    Bacteria, UA MANY (A) /HPF    Crystals Many        Radiology:  CT ABDOMEN PELVIS W IV CONTRAST Additional Contrast? None   Final Result       Left renal mass appears to be unchanged from prior study      Cholelithiasis      Moderate left-sided pleural effusion      Coronary calcification suggesting of coronary artery disease      There is no evidence of small bowel obstruction                      ------------------------- NURSING NOTES AND VITALS REVIEWED ---------------------------  Date / Time Roomed:  11/7/2018  6:16 PM  ED Bed Assignment:  25/25    The nursing notes within the ED encounter and vital signs as below have been reviewed. BP (!) 151/74   Pulse 72   Temp 97.8 °F (36.6 °C) (Oral)   Resp 16   Ht 6' 2\" (1.88 m)   Wt 260 lb (117.9 kg)   SpO2 94%   BMI 33.38 kg/m²   Oxygen Saturation Interpretation: Normal      ------------------------------------------ PROGRESS NOTES ------------------------------------------  12:05 AM  I have spoken with the patient and discussed todays results, in addition to providing specific details for the plan of care and counseling regarding the diagnosis and prognosis. Their questions are answered at this time and they are agreeable with the plan. I discussed at length with them reasons for immediate return here for re evaluation. They will followup with their primary care physician by calling their office tomorrow. --------------------------------- ADDITIONAL PROVIDER NOTES ---------------------------------  At this time the patient is without objective evidence of an acute process requiring hospitalization or inpatient management.   They have remained hemodynamically stable

## 2018-11-10 LAB
ORGANISM: ABNORMAL
URINE CULTURE, ROUTINE: ABNORMAL
URINE CULTURE, ROUTINE: ABNORMAL

## 2018-11-20 LAB
AFB CULTURE (MYCOBACTERIA): NORMAL
AFB SMEAR: NORMAL

## 2018-11-26 ENCOUNTER — TELEPHONE (OUTPATIENT)
Dept: NON INVASIVE DIAGNOSTICS | Age: 73
End: 2018-11-26

## 2019-01-30 PROBLEM — E66.01 CLASS 2 SEVERE OBESITY DUE TO EXCESS CALORIES WITH SERIOUS COMORBIDITY AND BODY MASS INDEX (BMI) OF 35.0 TO 35.9 IN ADULT (HCC): Status: ACTIVE | Noted: 2019-01-30

## 2019-01-30 PROBLEM — N28.89 LEFT RENAL MASS: Status: ACTIVE | Noted: 2019-01-30

## 2019-01-30 PROBLEM — J18.9 PNEUMONIA: Status: RESOLVED | Noted: 2018-09-23 | Resolved: 2019-01-30

## 2019-01-30 PROBLEM — M54.9 CVA TENDERNESS: Status: RESOLVED | Noted: 2018-02-27 | Resolved: 2019-01-30

## 2019-01-30 PROBLEM — R07.9 CHEST PAIN: Status: RESOLVED | Noted: 2018-09-07 | Resolved: 2019-01-30

## 2019-01-30 PROBLEM — J18.9 HCAP (HEALTHCARE-ASSOCIATED PNEUMONIA): Status: RESOLVED | Noted: 2018-09-23 | Resolved: 2019-01-30

## 2019-01-30 PROBLEM — J96.01 ACUTE RESPIRATORY FAILURE WITH HYPOXIA (HCC): Status: RESOLVED | Noted: 2018-10-01 | Resolved: 2019-01-30

## 2019-01-30 PROBLEM — G82.20 PARAPLEGIA (HCC): Status: RESOLVED | Noted: 2018-03-02 | Resolved: 2019-01-30

## 2019-01-30 PROBLEM — E03.9 ACQUIRED HYPOTHYROIDISM: Status: ACTIVE | Noted: 2019-01-30

## 2019-01-30 PROBLEM — Z86.718 H/O DEEP VENOUS THROMBOSIS: Status: ACTIVE | Noted: 2019-01-30

## 2019-03-01 ENCOUNTER — TELEPHONE (OUTPATIENT)
Dept: NON INVASIVE DIAGNOSTICS | Age: 74
End: 2019-03-01

## 2019-03-04 ENCOUNTER — NURSE ONLY (OUTPATIENT)
Dept: NON INVASIVE DIAGNOSTICS | Age: 74
End: 2019-03-04
Payer: MEDICARE

## 2019-03-04 DIAGNOSIS — Z95.0 CARDIAC PACEMAKER IN SITU: Primary | ICD-10-CM

## 2019-03-04 DIAGNOSIS — R00.1 BRADYCARDIA: ICD-10-CM

## 2019-03-04 PROCEDURE — 93294 REM INTERROG EVL PM/LDLS PM: CPT | Performed by: INTERNAL MEDICINE

## 2019-03-04 PROCEDURE — 93296 REM INTERROG EVL PM/IDS: CPT | Performed by: INTERNAL MEDICINE

## 2019-04-08 ENCOUNTER — HOSPITAL ENCOUNTER (OUTPATIENT)
Age: 74
Discharge: HOME | End: 2019-04-10
Payer: MEDICARE

## 2019-04-08 DIAGNOSIS — Z86.718 H/O DEEP VENOUS THROMBOSIS: ICD-10-CM

## 2019-04-08 LAB
INR BLD: 1.7
PROTHROMBIN TIME: 19.7 SEC (ref 9.3–12.4)

## 2019-04-08 PROCEDURE — 85610 PROTHROMBIN TIME: CPT

## 2019-04-18 ENCOUNTER — HOSPITAL ENCOUNTER (OUTPATIENT)
Age: 74
Discharge: HOME | End: 2019-04-18
Payer: MEDICARE

## 2019-04-18 LAB
ALBUMIN SERPL-MCNC: 4.2 G/DL (ref 3.5–5.2)
ALP BLD-CCNC: 64 U/L (ref 40–129)
ALT SERPL-CCNC: 7 U/L (ref 0–40)
ANION GAP SERPL CALCULATED.3IONS-SCNC: 14 MMOL/L (ref 7–16)
AST SERPL-CCNC: 13 U/L (ref 0–39)
BASOPHILS ABSOLUTE: 0.07 E9/L (ref 0–0.2)
BASOPHILS RELATIVE PERCENT: 1.2 % (ref 0–2)
BILIRUB SERPL-MCNC: 0.3 MG/DL (ref 0–1.2)
BUN BLDV-MCNC: 35 MG/DL (ref 8–23)
CALCIUM SERPL-MCNC: 9.6 MG/DL (ref 8.6–10.2)
CHLORIDE BLD-SCNC: 104 MMOL/L (ref 98–107)
CO2: 23 MMOL/L (ref 22–29)
CREAT SERPL-MCNC: 1.5 MG/DL (ref 0.7–1.2)
EOSINOPHILS ABSOLUTE: 0.16 E9/L (ref 0.05–0.5)
EOSINOPHILS RELATIVE PERCENT: 2.6 % (ref 0–6)
GFR AFRICAN AMERICAN: 55
GFR NON-AFRICAN AMERICAN: 46 ML/MIN/1.73
GLUCOSE BLD-MCNC: 108 MG/DL (ref 74–99)
HCT VFR BLD CALC: 42.8 % (ref 37–54)
HEMOGLOBIN: 13.5 G/DL (ref 12.5–16.5)
IMMATURE GRANULOCYTES #: 0.03 E9/L
IMMATURE GRANULOCYTES %: 0.5 % (ref 0–5)
LYMPHOCYTES ABSOLUTE: 1.76 E9/L (ref 1.5–4)
LYMPHOCYTES RELATIVE PERCENT: 28.9 % (ref 20–42)
MAGNESIUM: 1.7 MG/DL (ref 1.6–2.6)
MCH RBC QN AUTO: 28.6 PG (ref 26–35)
MCHC RBC AUTO-ENTMCNC: 31.5 % (ref 32–34.5)
MCV RBC AUTO: 90.7 FL (ref 80–99.9)
MONOCYTES ABSOLUTE: 0.46 E9/L (ref 0.1–0.95)
MONOCYTES RELATIVE PERCENT: 7.6 % (ref 2–12)
NEUTROPHILS ABSOLUTE: 3.6 E9/L (ref 1.8–7.3)
NEUTROPHILS RELATIVE PERCENT: 59.2 % (ref 43–80)
PDW BLD-RTO: 15.5 FL (ref 11.5–15)
PHOSPHORUS: 2.9 MG/DL (ref 2.5–4.5)
PLATELET # BLD: 253 E9/L (ref 130–450)
PMV BLD AUTO: 10.5 FL (ref 7–12)
POTASSIUM SERPL-SCNC: 4.5 MMOL/L (ref 3.5–5)
RBC # BLD: 4.72 E12/L (ref 3.8–5.8)
SODIUM BLD-SCNC: 141 MMOL/L (ref 132–146)
TOTAL PROTEIN: 7.6 G/DL (ref 6.4–8.3)
URIC ACID, SERUM: 8.5 MG/DL (ref 3.4–7)
WBC # BLD: 6.1 E9/L (ref 4.5–11.5)

## 2019-04-18 PROCEDURE — 84100 ASSAY OF PHOSPHORUS: CPT

## 2019-04-18 PROCEDURE — 83735 ASSAY OF MAGNESIUM: CPT

## 2019-04-18 PROCEDURE — 84550 ASSAY OF BLOOD/URIC ACID: CPT

## 2019-04-18 PROCEDURE — 85025 COMPLETE CBC W/AUTO DIFF WBC: CPT

## 2019-04-18 PROCEDURE — 80053 COMPREHEN METABOLIC PANEL: CPT

## 2019-04-18 PROCEDURE — 36415 COLL VENOUS BLD VENIPUNCTURE: CPT

## 2019-04-22 ENCOUNTER — HOSPITAL ENCOUNTER (OUTPATIENT)
Age: 74
Discharge: HOME | End: 2019-04-24
Payer: MEDICARE

## 2019-04-22 DIAGNOSIS — Z86.718 H/O DEEP VENOUS THROMBOSIS: ICD-10-CM

## 2019-04-22 LAB
INR BLD: 2
PROTHROMBIN TIME: 22.4 SEC (ref 9.3–12.4)

## 2019-04-22 PROCEDURE — 85610 PROTHROMBIN TIME: CPT

## 2019-05-06 ENCOUNTER — HOSPITAL ENCOUNTER (OUTPATIENT)
Age: 74
Discharge: HOME | End: 2019-05-08
Payer: MEDICARE

## 2019-05-06 DIAGNOSIS — I10 ESSENTIAL HYPERTENSION: ICD-10-CM

## 2019-05-06 DIAGNOSIS — E11.9 TYPE 2 DIABETES MELLITUS WITHOUT COMPLICATION, WITHOUT LONG-TERM CURRENT USE OF INSULIN (HCC): ICD-10-CM

## 2019-05-06 DIAGNOSIS — E03.9 ACQUIRED HYPOTHYROIDISM: ICD-10-CM

## 2019-05-06 DIAGNOSIS — Z86.718 H/O DEEP VENOUS THROMBOSIS: ICD-10-CM

## 2019-05-06 LAB
ALBUMIN SERPL-MCNC: 4.2 G/DL (ref 3.5–5.2)
ALP BLD-CCNC: 63 U/L (ref 40–129)
ALT SERPL-CCNC: 7 U/L (ref 0–40)
ANION GAP SERPL CALCULATED.3IONS-SCNC: 12 MMOL/L (ref 7–16)
AST SERPL-CCNC: 17 U/L (ref 0–39)
BASOPHILS ABSOLUTE: 0.08 E9/L (ref 0–0.2)
BASOPHILS RELATIVE PERCENT: 1.3 % (ref 0–2)
BILIRUB SERPL-MCNC: 0.2 MG/DL (ref 0–1.2)
BUN BLDV-MCNC: 38 MG/DL (ref 8–23)
CALCIUM SERPL-MCNC: 9.8 MG/DL (ref 8.6–10.2)
CHLORIDE BLD-SCNC: 103 MMOL/L (ref 98–107)
CHOLESTEROL, TOTAL: 168 MG/DL (ref 0–199)
CO2: 27 MMOL/L (ref 22–29)
CREAT SERPL-MCNC: 1.5 MG/DL (ref 0.7–1.2)
EOSINOPHILS ABSOLUTE: 0.18 E9/L (ref 0.05–0.5)
EOSINOPHILS RELATIVE PERCENT: 3 % (ref 0–6)
GFR AFRICAN AMERICAN: 55
GFR NON-AFRICAN AMERICAN: 46 ML/MIN/1.73
GLUCOSE BLD-MCNC: 79 MG/DL (ref 74–99)
HCT VFR BLD CALC: 43.6 % (ref 37–54)
HDLC SERPL-MCNC: 36 MG/DL
HEMOGLOBIN: 13.5 G/DL (ref 12.5–16.5)
IMMATURE GRANULOCYTES #: 0.03 E9/L
IMMATURE GRANULOCYTES %: 0.5 % (ref 0–5)
INR BLD: 1.8
LDL CHOLESTEROL CALCULATED: 96 MG/DL (ref 0–99)
LYMPHOCYTES ABSOLUTE: 1.4 E9/L (ref 1.5–4)
LYMPHOCYTES RELATIVE PERCENT: 23 % (ref 20–42)
MCH RBC QN AUTO: 29.1 PG (ref 26–35)
MCHC RBC AUTO-ENTMCNC: 31 % (ref 32–34.5)
MCV RBC AUTO: 94 FL (ref 80–99.9)
MONOCYTES ABSOLUTE: 0.47 E9/L (ref 0.1–0.95)
MONOCYTES RELATIVE PERCENT: 7.7 % (ref 2–12)
NEUTROPHILS ABSOLUTE: 3.94 E9/L (ref 1.8–7.3)
NEUTROPHILS RELATIVE PERCENT: 64.5 % (ref 43–80)
PDW BLD-RTO: 15.6 FL (ref 11.5–15)
PLATELET # BLD: 240 E9/L (ref 130–450)
PMV BLD AUTO: 11.2 FL (ref 7–12)
POTASSIUM SERPL-SCNC: 5.1 MMOL/L (ref 3.5–5)
PROTHROMBIN TIME: 20.4 SEC (ref 9.3–12.4)
RBC # BLD: 4.64 E12/L (ref 3.8–5.8)
SODIUM BLD-SCNC: 142 MMOL/L (ref 132–146)
TOTAL PROTEIN: 7.5 G/DL (ref 6.4–8.3)
TRIGL SERPL-MCNC: 181 MG/DL (ref 0–149)
TSH SERPL DL<=0.05 MIU/L-ACNC: 2.24 UIU/ML (ref 0.27–4.2)
VLDLC SERPL CALC-MCNC: 36 MG/DL
WBC # BLD: 6.1 E9/L (ref 4.5–11.5)

## 2019-05-06 PROCEDURE — 80061 LIPID PANEL: CPT

## 2019-05-06 PROCEDURE — 85610 PROTHROMBIN TIME: CPT

## 2019-05-06 PROCEDURE — 84443 ASSAY THYROID STIM HORMONE: CPT

## 2019-05-06 PROCEDURE — 80053 COMPREHEN METABOLIC PANEL: CPT

## 2019-05-06 PROCEDURE — 85025 COMPLETE CBC W/AUTO DIFF WBC: CPT

## 2019-05-06 PROCEDURE — 83036 HEMOGLOBIN GLYCOSYLATED A1C: CPT

## 2019-05-07 LAB — HBA1C MFR BLD: 5.3 % (ref 4–5.6)

## 2019-05-20 ENCOUNTER — HOSPITAL ENCOUNTER (OUTPATIENT)
Age: 74
Discharge: HOME | End: 2019-05-22
Payer: MEDICARE

## 2019-05-20 DIAGNOSIS — Z86.718 H/O DEEP VENOUS THROMBOSIS: ICD-10-CM

## 2019-05-20 LAB
INR BLD: 1.8
PROTHROMBIN TIME: 20.4 SEC (ref 9.3–12.4)

## 2019-05-20 PROCEDURE — 85610 PROTHROMBIN TIME: CPT

## 2019-06-03 ENCOUNTER — NURSE ONLY (OUTPATIENT)
Dept: NON INVASIVE DIAGNOSTICS | Age: 74
End: 2019-06-03
Payer: MEDICARE

## 2019-06-03 DIAGNOSIS — R00.1 BRADYCARDIA: ICD-10-CM

## 2019-06-03 DIAGNOSIS — Z95.0 CARDIAC PACEMAKER IN SITU: Primary | ICD-10-CM

## 2019-06-03 PROCEDURE — 93294 REM INTERROG EVL PM/LDLS PM: CPT | Performed by: INTERNAL MEDICINE

## 2019-06-03 PROCEDURE — 93296 REM INTERROG EVL PM/IDS: CPT | Performed by: INTERNAL MEDICINE

## 2019-06-17 ENCOUNTER — HOSPITAL ENCOUNTER (OUTPATIENT)
Age: 74
Discharge: HOME | End: 2019-06-19
Payer: MEDICARE

## 2019-06-17 DIAGNOSIS — Z86.718 H/O DEEP VENOUS THROMBOSIS: ICD-10-CM

## 2019-06-17 LAB
INR BLD: 2
PROTHROMBIN TIME: 22.6 SEC (ref 9.3–12.4)

## 2019-06-17 PROCEDURE — 85610 PROTHROMBIN TIME: CPT

## 2019-06-25 ENCOUNTER — TELEPHONE (OUTPATIENT)
Dept: NON INVASIVE DIAGNOSTICS | Age: 74
End: 2019-06-25

## 2019-06-25 NOTE — PROGRESS NOTES
See [employer] [place] report. Remote monitoring reviewed over a 90 day period. End of 90 day monitoring period date of service 6-3-19.

## 2019-06-25 NOTE — TELEPHONE ENCOUNTER
We have received your remote transmission. Our staff will contact you if there is anything that needs to be discussed. Your next appointment is 09/03/2019 remote transmission from home    Pt is non-wireless    Spoke with pt and verified remote transmission from home. Grace Jaquez

## 2019-06-25 NOTE — TELEPHONE ENCOUNTER
----- Message from Mandie Figueroa RN sent at 6/25/2019  7:14 AM EDT -----  Successful transmission received. Please call patient and give next appointment.

## 2019-07-15 ENCOUNTER — HOSPITAL ENCOUNTER (OUTPATIENT)
Age: 74
Discharge: HOME | End: 2019-07-17
Payer: MEDICARE

## 2019-07-15 DIAGNOSIS — Z86.718 H/O DEEP VENOUS THROMBOSIS: ICD-10-CM

## 2019-07-15 LAB
INR BLD: 2.2
PROTHROMBIN TIME: 24.5 SEC (ref 9.3–12.4)

## 2019-07-15 PROCEDURE — 85610 PROTHROMBIN TIME: CPT

## 2019-07-18 NOTE — PROGRESS NOTES
[facility]  Outpatient Progress Note  Sarah Bond  1945  Date of Service: 7/25/2019  PCP: Sunny Rodriguez DO  Electrophysiologist: Tamiko Hawkins MD        Subjective: Sarah Bond is seen today in outpatient follow-up. The patient currently feels well and offers no complaints from a device POV. The device site looks well healed and free from infection or erosion. The patient denies any chest pain, dyspnea, palpitations, dizziness, syncope, orthopnea or paroxysmal nocturnal dyspnea.     Patient Active Problem List   Diagnosis    Hypertension    Diabetes mellitus ([facility]    Hyperlipidemia with target LDL less than 100    Cerebral infarction ([facility]    Impaired mobility and ADLs    Cardiac pacemaker in situ    [facility]brovascular accident ([facility]A) determined by clinical assessment [facility]    Left renal mass    Class 2 severe obesity due to excess calories with serious comorbidity and body mass index (BMI) of 35.0 to 35.9 in adult [facility])    Acquired hypothyroidism    H/O deep venous thrombosis       Current Outpatient Medications   Medication Sig Dispense Refill    omeprazole (PRILOSEC) 20 MG delayed release capsule Take 1 capsule by mouth daily 90 capsule 0    lovastatin (MEVACOR) 20 MG tablet Take 1 tablet by mouth nightly 90 tablet 0    metoprolol tartrate (LOPRESSOR) 25 MG tablet Take 1 tablet by mouth 2 times daily 180 tablet 0    lisinopril (PRINIVIL;ZESTRIL) 10 MG tablet Take 1 tablet by mouth daily 90 tablet 0    metFORMIN (GLUCOPHAGE) 1000 MG tablet Take 1 tablet by mouth 2 times daily 180 tablet 0    levothyroxine (SYNTHROID) 50 MCG tablet Take 1 tablet by mouth Daily 90 tablet 0    amLODIPine (NORVASC) 5 MG tablet Take 1 tablet by mouth daily (Patient taking differently: Take 10 mg by mouth daily ) 90 tablet 1    warfarin (COUMADIN) 5 MG tablet Take 1 tablet by mouth daily 7.5mg mon-fri and 10mg Saturday and

## 2019-07-25 ENCOUNTER — OFFICE VISIT (OUTPATIENT)
Dept: NON INVASIVE DIAGNOSTICS | Age: 74
End: 2019-07-25
Payer: MEDICARE

## 2019-07-25 VITALS
SYSTOLIC BLOOD PRESSURE: 100 MMHG | HEART RATE: 94 BPM | WEIGHT: 262 LBS | HEIGHT: 74 IN | BODY MASS INDEX: 33.62 KG/M2 | RESPIRATION RATE: 20 BRPM | DIASTOLIC BLOOD PRESSURE: 72 MMHG

## 2019-07-25 DIAGNOSIS — I49.5 SINUS NODE DYSFUNCTION (HCC): ICD-10-CM

## 2019-07-25 DIAGNOSIS — Z95.0 CARDIAC PACEMAKER IN SITU: Primary | ICD-10-CM

## 2019-07-25 PROCEDURE — 93280 PM DEVICE PROGR EVAL DUAL: CPT | Performed by: INTERNAL MEDICINE

## 2019-07-25 PROCEDURE — G8427 DOCREV CUR MEDS BY ELIG CLIN: HCPCS | Performed by: INTERNAL MEDICINE

## 2019-07-25 PROCEDURE — 99214 OFFICE O/P EST MOD 30 MIN: CPT | Performed by: INTERNAL MEDICINE

## 2019-07-25 PROCEDURE — 1036F TOBACCO NON-USER: CPT | Performed by: INTERNAL MEDICINE

## 2019-07-25 PROCEDURE — G8417 CALC BMI ABV UP PARAM F/U: HCPCS | Performed by: INTERNAL MEDICINE

## 2019-07-25 PROCEDURE — G8598 ASA/ANTIPLAT THER USED: HCPCS | Performed by: INTERNAL MEDICINE

## 2019-07-25 PROCEDURE — 4040F PNEUMOC VAC/ADMIN/RCVD: CPT | Performed by: INTERNAL MEDICINE

## 2019-07-25 PROCEDURE — 1123F ACP DISCUSS/DSCN MKR DOCD: CPT | Performed by: INTERNAL MEDICINE

## 2019-07-25 PROCEDURE — 3017F COLORECTAL CA SCREEN DOC REV: CPT | Performed by: INTERNAL MEDICINE

## 2019-08-05 ENCOUNTER — HOSPITAL ENCOUNTER (OUTPATIENT)
Age: 74
Discharge: HOME | End: 2019-08-07
Payer: MEDICARE

## 2019-08-05 DIAGNOSIS — Z86.718 H/O DEEP VENOUS THROMBOSIS: ICD-10-CM

## 2019-08-05 DIAGNOSIS — E11.9 TYPE 2 DIABETES MELLITUS WITHOUT COMPLICATION, WITHOUT LONG-TERM CURRENT USE OF INSULIN (HCC): ICD-10-CM

## 2019-08-05 DIAGNOSIS — E03.9 ACQUIRED HYPOTHYROIDISM: ICD-10-CM

## 2019-08-05 LAB
ALBUMIN SERPL-MCNC: 4.1 G/DL (ref 3.5–5.2)
ALP BLD-CCNC: 62 U/L (ref 40–129)
ALT SERPL-CCNC: <5 U/L (ref 0–40)
ANION GAP SERPL CALCULATED.3IONS-SCNC: 18 MMOL/L (ref 7–16)
AST SERPL-CCNC: 16 U/L (ref 0–39)
BASOPHILS ABSOLUTE: 0.09 E9/L (ref 0–0.2)
BASOPHILS RELATIVE PERCENT: 1.3 % (ref 0–2)
BILIRUB SERPL-MCNC: <0.2 MG/DL (ref 0–1.2)
BUN BLDV-MCNC: 41 MG/DL (ref 8–23)
CALCIUM SERPL-MCNC: 9.6 MG/DL (ref 8.6–10.2)
CHLORIDE BLD-SCNC: 105 MMOL/L (ref 98–107)
CHOLESTEROL, TOTAL: 199 MG/DL (ref 0–199)
CO2: 21 MMOL/L (ref 22–29)
CREAT SERPL-MCNC: 1.6 MG/DL (ref 0.7–1.2)
EOSINOPHILS ABSOLUTE: 0.25 E9/L (ref 0.05–0.5)
EOSINOPHILS RELATIVE PERCENT: 3.7 % (ref 0–6)
GFR AFRICAN AMERICAN: 51
GFR NON-AFRICAN AMERICAN: 42 ML/MIN/1.73
GLUCOSE BLD-MCNC: 107 MG/DL (ref 74–99)
HBA1C MFR BLD: 5.8 % (ref 4–5.6)
HCT VFR BLD CALC: 42.7 % (ref 37–54)
HDLC SERPL-MCNC: 36 MG/DL
HEMOGLOBIN: 13 G/DL (ref 12.5–16.5)
IMMATURE GRANULOCYTES #: 0.03 E9/L
IMMATURE GRANULOCYTES %: 0.4 % (ref 0–5)
INR BLD: 2.1
LDL CHOLESTEROL CALCULATED: 122 MG/DL (ref 0–99)
LYMPHOCYTES ABSOLUTE: 1.63 E9/L (ref 1.5–4)
LYMPHOCYTES RELATIVE PERCENT: 24.1 % (ref 20–42)
MCH RBC QN AUTO: 29.7 PG (ref 26–35)
MCHC RBC AUTO-ENTMCNC: 30.4 % (ref 32–34.5)
MCV RBC AUTO: 97.5 FL (ref 80–99.9)
MONOCYTES ABSOLUTE: 0.57 E9/L (ref 0.1–0.95)
MONOCYTES RELATIVE PERCENT: 8.4 % (ref 2–12)
NEUTROPHILS ABSOLUTE: 4.18 E9/L (ref 1.8–7.3)
NEUTROPHILS RELATIVE PERCENT: 62.1 % (ref 43–80)
PDW BLD-RTO: 14.6 FL (ref 11.5–15)
PLATELET # BLD: 250 E9/L (ref 130–450)
PMV BLD AUTO: 10.9 FL (ref 7–12)
POTASSIUM SERPL-SCNC: 5 MMOL/L (ref 3.5–5)
PROTHROMBIN TIME: 23 SEC (ref 9.3–12.4)
RBC # BLD: 4.38 E12/L (ref 3.8–5.8)
SODIUM BLD-SCNC: 144 MMOL/L (ref 132–146)
TOTAL PROTEIN: 7.5 G/DL (ref 6.4–8.3)
TRIGL SERPL-MCNC: 205 MG/DL (ref 0–149)
TSH SERPL DL<=0.05 MIU/L-ACNC: 1.74 UIU/ML (ref 0.27–4.2)
VLDLC SERPL CALC-MCNC: 41 MG/DL
WBC # BLD: 6.8 E9/L (ref 4.5–11.5)

## 2019-08-05 PROCEDURE — 85610 PROTHROMBIN TIME: CPT

## 2019-08-05 PROCEDURE — 80053 COMPREHEN METABOLIC PANEL: CPT

## 2019-08-05 PROCEDURE — 85025 COMPLETE CBC W/AUTO DIFF WBC: CPT

## 2019-08-05 PROCEDURE — 80061 LIPID PANEL: CPT

## 2019-08-05 PROCEDURE — 83036 HEMOGLOBIN GLYCOSYLATED A1C: CPT

## 2019-08-05 PROCEDURE — 84443 ASSAY THYROID STIM HORMONE: CPT

## 2019-09-03 ENCOUNTER — NURSE ONLY (OUTPATIENT)
Dept: NON INVASIVE DIAGNOSTICS | Age: 74
End: 2019-09-03
Payer: MEDICARE

## 2019-09-03 ENCOUNTER — HOSPITAL ENCOUNTER (OUTPATIENT)
Age: 74
Discharge: HOME | End: 2019-09-05
Payer: MEDICARE

## 2019-09-03 DIAGNOSIS — Z95.0 CARDIAC PACEMAKER IN SITU: Primary | ICD-10-CM

## 2019-09-03 DIAGNOSIS — Z86.718 H/O DEEP VENOUS THROMBOSIS: ICD-10-CM

## 2019-09-03 DIAGNOSIS — I49.5 SINUS NODE DYSFUNCTION (HCC): ICD-10-CM

## 2019-09-03 PROCEDURE — 93296 REM INTERROG EVL PM/IDS: CPT | Performed by: INTERNAL MEDICINE

## 2019-09-03 PROCEDURE — 93294 REM INTERROG EVL PM/LDLS PM: CPT | Performed by: INTERNAL MEDICINE

## 2019-09-03 PROCEDURE — 85610 PROTHROMBIN TIME: CPT

## 2019-09-04 LAB
INR BLD: 1.9
PROTHROMBIN TIME: 21.4 SEC (ref 9.3–12.4)

## 2019-09-17 ENCOUNTER — TELEPHONE (OUTPATIENT)
Dept: NON INVASIVE DIAGNOSTICS | Age: 74
End: 2019-09-17

## 2019-09-30 ENCOUNTER — HOSPITAL ENCOUNTER (OUTPATIENT)
Age: 74
Discharge: HOME | End: 2019-10-02
Payer: MEDICARE

## 2019-09-30 LAB
INR BLD: 2.1
PROTHROMBIN TIME: 24.1 SEC (ref 9.3–12.4)

## 2019-09-30 PROCEDURE — 85610 PROTHROMBIN TIME: CPT

## 2019-11-15 ENCOUNTER — HOSPITAL ENCOUNTER (OUTPATIENT)
Age: 74
Discharge: HOME | End: 2019-11-17
Payer: MEDICARE

## 2019-11-15 DIAGNOSIS — Z86.718 H/O DEEP VENOUS THROMBOSIS: ICD-10-CM

## 2019-11-15 DIAGNOSIS — E03.9 ACQUIRED HYPOTHYROIDISM: ICD-10-CM

## 2019-11-15 DIAGNOSIS — I10 ESSENTIAL HYPERTENSION: ICD-10-CM

## 2019-11-15 DIAGNOSIS — E11.9 TYPE 2 DIABETES MELLITUS WITHOUT COMPLICATION, WITHOUT LONG-TERM CURRENT USE OF INSULIN (HCC): ICD-10-CM

## 2019-11-15 PROBLEM — N18.30 STAGE 3 CHRONIC KIDNEY DISEASE (HCC): Status: ACTIVE | Noted: 2019-11-15

## 2019-11-15 LAB
ALBUMIN SERPL-MCNC: 4.1 G/DL (ref 3.5–5.2)
ALP BLD-CCNC: 74 U/L (ref 40–129)
ALT SERPL-CCNC: 8 U/L (ref 0–40)
ANION GAP SERPL CALCULATED.3IONS-SCNC: 18 MMOL/L (ref 7–16)
AST SERPL-CCNC: 14 U/L (ref 0–39)
BASOPHILS ABSOLUTE: 0.07 E9/L (ref 0–0.2)
BASOPHILS RELATIVE PERCENT: 1.2 % (ref 0–2)
BILIRUB SERPL-MCNC: 0.4 MG/DL (ref 0–1.2)
BUN BLDV-MCNC: 33 MG/DL (ref 8–23)
CALCIUM SERPL-MCNC: 9.7 MG/DL (ref 8.6–10.2)
CHLORIDE BLD-SCNC: 101 MMOL/L (ref 98–107)
CHOLESTEROL, TOTAL: 206 MG/DL (ref 0–199)
CO2: 22 MMOL/L (ref 22–29)
CREAT SERPL-MCNC: 1.4 MG/DL (ref 0.7–1.2)
EOSINOPHILS ABSOLUTE: 0.18 E9/L (ref 0.05–0.5)
EOSINOPHILS RELATIVE PERCENT: 3.2 % (ref 0–6)
GFR AFRICAN AMERICAN: 60
GFR NON-AFRICAN AMERICAN: 49 ML/MIN/1.73
GLUCOSE BLD-MCNC: 103 MG/DL (ref 74–99)
HBA1C MFR BLD: 6.2 % (ref 4–5.6)
HCT VFR BLD CALC: 44.8 % (ref 37–54)
HDLC SERPL-MCNC: 37 MG/DL
HEMOGLOBIN: 13.8 G/DL (ref 12.5–16.5)
IMMATURE GRANULOCYTES #: 0.02 E9/L
IMMATURE GRANULOCYTES %: 0.4 % (ref 0–5)
LDL CHOLESTEROL CALCULATED: 122 MG/DL (ref 0–99)
LYMPHOCYTES ABSOLUTE: 1.49 E9/L (ref 1.5–4)
LYMPHOCYTES RELATIVE PERCENT: 26.3 % (ref 20–42)
MCH RBC QN AUTO: 29.1 PG (ref 26–35)
MCHC RBC AUTO-ENTMCNC: 30.8 % (ref 32–34.5)
MCV RBC AUTO: 94.3 FL (ref 80–99.9)
MONOCYTES ABSOLUTE: 0.41 E9/L (ref 0.1–0.95)
MONOCYTES RELATIVE PERCENT: 7.2 % (ref 2–12)
NEUTROPHILS ABSOLUTE: 3.5 E9/L (ref 1.8–7.3)
NEUTROPHILS RELATIVE PERCENT: 61.7 % (ref 43–80)
PDW BLD-RTO: 13.6 FL (ref 11.5–15)
PLATELET # BLD: 245 E9/L (ref 130–450)
PMV BLD AUTO: 11.1 FL (ref 7–12)
POTASSIUM SERPL-SCNC: 5.1 MMOL/L (ref 3.5–5)
RBC # BLD: 4.75 E12/L (ref 3.8–5.8)
SODIUM BLD-SCNC: 141 MMOL/L (ref 132–146)
TOTAL PROTEIN: 7.9 G/DL (ref 6.4–8.3)
TRIGL SERPL-MCNC: 233 MG/DL (ref 0–149)
TSH SERPL DL<=0.05 MIU/L-ACNC: 2.09 UIU/ML (ref 0.27–4.2)
VLDLC SERPL CALC-MCNC: 47 MG/DL
WBC # BLD: 5.7 E9/L (ref 4.5–11.5)

## 2019-11-15 PROCEDURE — 85025 COMPLETE CBC W/AUTO DIFF WBC: CPT

## 2019-11-15 PROCEDURE — 80061 LIPID PANEL: CPT

## 2019-11-15 PROCEDURE — 83036 HEMOGLOBIN GLYCOSYLATED A1C: CPT

## 2019-11-15 PROCEDURE — 80053 COMPREHEN METABOLIC PANEL: CPT

## 2019-11-15 PROCEDURE — 84443 ASSAY THYROID STIM HORMONE: CPT

## 2019-11-25 ENCOUNTER — HOSPITAL ENCOUNTER (OUTPATIENT)
Age: 74
Discharge: HOME | End: 2019-11-27
Payer: MEDICARE

## 2019-11-25 DIAGNOSIS — Z79.01 CURRENT USE OF LONG TERM ANTICOAGULATION: ICD-10-CM

## 2019-11-25 DIAGNOSIS — Z86.718 H/O DEEP VENOUS THROMBOSIS: ICD-10-CM

## 2019-11-25 LAB
INR BLD: 2.2
PROTHROMBIN TIME: 24.9 SEC (ref 9.3–12.4)

## 2019-11-25 PROCEDURE — 85610 PROTHROMBIN TIME: CPT

## 2019-12-05 ENCOUNTER — NURSE ONLY (OUTPATIENT)
Dept: NON INVASIVE DIAGNOSTICS | Age: 74
End: 2019-12-05
Payer: MEDICARE

## 2019-12-05 DIAGNOSIS — I49.5 SINUS NODE DYSFUNCTION (HCC): ICD-10-CM

## 2019-12-05 DIAGNOSIS — Z95.0 CARDIAC PACEMAKER IN SITU: Primary | ICD-10-CM

## 2019-12-05 PROCEDURE — 93294 REM INTERROG EVL PM/LDLS PM: CPT | Performed by: INTERNAL MEDICINE

## 2019-12-05 PROCEDURE — 93296 REM INTERROG EVL PM/IDS: CPT | Performed by: INTERNAL MEDICINE

## 2019-12-17 ENCOUNTER — TELEPHONE (OUTPATIENT)
Dept: NON INVASIVE DIAGNOSTICS | Age: 74
End: 2019-12-17

## 2020-01-07 ENCOUNTER — HOSPITAL ENCOUNTER (OUTPATIENT)
Age: 75
Discharge: HOME | End: 2020-01-09
Payer: MEDICARE

## 2020-01-07 LAB
INR BLD: 2.2
PROTHROMBIN TIME: 25.5 SEC (ref 9.3–12.4)

## 2020-01-07 PROCEDURE — 85610 PROTHROMBIN TIME: CPT

## 2020-02-03 ENCOUNTER — HOSPITAL ENCOUNTER (OUTPATIENT)
Age: 75
Discharge: HOME | End: 2020-02-05
Payer: MEDICARE

## 2020-02-03 LAB
INR BLD: 2.9
PROTHROMBIN TIME: 33.6 SEC (ref 9.3–12.4)

## 2020-02-03 PROCEDURE — 85610 PROTHROMBIN TIME: CPT

## 2020-02-14 ENCOUNTER — HOSPITAL ENCOUNTER (OUTPATIENT)
Age: 75
Discharge: HOME | End: 2020-02-16
Payer: MEDICARE

## 2020-02-14 LAB
ALBUMIN SERPL-MCNC: 4.1 G/DL (ref 3.5–5.2)
ALP BLD-CCNC: 57 U/L (ref 40–129)
ALT SERPL-CCNC: 8 U/L (ref 0–40)
ANION GAP SERPL CALCULATED.3IONS-SCNC: 19 MMOL/L (ref 7–16)
AST SERPL-CCNC: 13 U/L (ref 0–39)
BASOPHILS ABSOLUTE: 0.05 E9/L (ref 0–0.2)
BASOPHILS RELATIVE PERCENT: 0.9 % (ref 0–2)
BILIRUB SERPL-MCNC: 0.4 MG/DL (ref 0–1.2)
BUN BLDV-MCNC: 38 MG/DL (ref 8–23)
CALCIUM SERPL-MCNC: 9.9 MG/DL (ref 8.6–10.2)
CHLORIDE BLD-SCNC: 103 MMOL/L (ref 98–107)
CHOLESTEROL, TOTAL: 168 MG/DL (ref 0–199)
CO2: 21 MMOL/L (ref 22–29)
CREAT SERPL-MCNC: 1.6 MG/DL (ref 0.7–1.2)
EOSINOPHILS ABSOLUTE: 0.14 E9/L (ref 0.05–0.5)
EOSINOPHILS RELATIVE PERCENT: 2.5 % (ref 0–6)
GFR AFRICAN AMERICAN: 51
GFR NON-AFRICAN AMERICAN: 42 ML/MIN/1.73
GLUCOSE BLD-MCNC: 109 MG/DL (ref 74–99)
HBA1C MFR BLD: 6.1 % (ref 4–5.6)
HCT VFR BLD CALC: 44 % (ref 37–54)
HDLC SERPL-MCNC: 35 MG/DL
HEMOGLOBIN: 13.5 G/DL (ref 12.5–16.5)
IMMATURE GRANULOCYTES #: 0.02 E9/L
IMMATURE GRANULOCYTES %: 0.4 % (ref 0–5)
INR BLD: 2.1
LDL CHOLESTEROL CALCULATED: 86 MG/DL (ref 0–99)
LYMPHOCYTES ABSOLUTE: 1.45 E9/L (ref 1.5–4)
LYMPHOCYTES RELATIVE PERCENT: 25.5 % (ref 20–42)
MCH RBC QN AUTO: 29.2 PG (ref 26–35)
MCHC RBC AUTO-ENTMCNC: 30.7 % (ref 32–34.5)
MCV RBC AUTO: 95 FL (ref 80–99.9)
MONOCYTES ABSOLUTE: 0.46 E9/L (ref 0.1–0.95)
MONOCYTES RELATIVE PERCENT: 8.1 % (ref 2–12)
NEUTROPHILS ABSOLUTE: 3.57 E9/L (ref 1.8–7.3)
NEUTROPHILS RELATIVE PERCENT: 62.6 % (ref 43–80)
PDW BLD-RTO: 13.8 FL (ref 11.5–15)
PLATELET # BLD: 241 E9/L (ref 130–450)
PMV BLD AUTO: 11.1 FL (ref 7–12)
POTASSIUM SERPL-SCNC: 4.8 MMOL/L (ref 3.5–5)
PROTHROMBIN TIME: 24 SEC (ref 9.3–12.4)
RBC # BLD: 4.63 E12/L (ref 3.8–5.8)
SODIUM BLD-SCNC: 143 MMOL/L (ref 132–146)
TOTAL PROTEIN: 7.5 G/DL (ref 6.4–8.3)
TRIGL SERPL-MCNC: 235 MG/DL (ref 0–149)
TSH SERPL DL<=0.05 MIU/L-ACNC: 1.58 UIU/ML (ref 0.27–4.2)
VLDLC SERPL CALC-MCNC: 47 MG/DL
WBC # BLD: 5.7 E9/L (ref 4.5–11.5)

## 2020-02-14 PROCEDURE — 80061 LIPID PANEL: CPT

## 2020-02-14 PROCEDURE — 85610 PROTHROMBIN TIME: CPT

## 2020-02-14 PROCEDURE — 80053 COMPREHEN METABOLIC PANEL: CPT

## 2020-02-14 PROCEDURE — 85025 COMPLETE CBC W/AUTO DIFF WBC: CPT

## 2020-02-14 PROCEDURE — 84443 ASSAY THYROID STIM HORMONE: CPT

## 2020-02-14 PROCEDURE — 83036 HEMOGLOBIN GLYCOSYLATED A1C: CPT

## 2020-03-05 ENCOUNTER — NURSE ONLY (OUTPATIENT)
Dept: NON INVASIVE DIAGNOSTICS | Age: 75
End: 2020-03-05
Payer: MEDICARE

## 2020-03-05 PROCEDURE — 93294 REM INTERROG EVL PM/LDLS PM: CPT | Performed by: INTERNAL MEDICINE

## 2020-03-05 PROCEDURE — 93296 REM INTERROG EVL PM/IDS: CPT | Performed by: INTERNAL MEDICINE

## 2020-03-11 ENCOUNTER — TELEPHONE (OUTPATIENT)
Dept: NON INVASIVE DIAGNOSTICS | Age: 75
End: 2020-03-11

## 2020-06-09 ENCOUNTER — NURSE ONLY (OUTPATIENT)
Dept: NON INVASIVE DIAGNOSTICS | Age: 75
End: 2020-06-09
Payer: MEDICARE

## 2020-06-09 PROCEDURE — 93294 REM INTERROG EVL PM/LDLS PM: CPT | Performed by: INTERNAL MEDICINE

## 2020-06-09 PROCEDURE — 93296 REM INTERROG EVL PM/IDS: CPT | Performed by: INTERNAL MEDICINE

## 2020-07-08 ENCOUNTER — HOSPITAL ENCOUNTER (OUTPATIENT)
Age: 75
Discharge: HOME | End: 2020-07-10
Payer: MEDICARE

## 2020-07-08 LAB
ALBUMIN SERPL-MCNC: 4.1 G/DL (ref 3.5–5.2)
ALP BLD-CCNC: 67 U/L (ref 40–129)
ALT SERPL-CCNC: 7 U/L (ref 0–40)
ANION GAP SERPL CALCULATED.3IONS-SCNC: 15 MMOL/L (ref 7–16)
AST SERPL-CCNC: 15 U/L (ref 0–39)
BASOPHILS ABSOLUTE: 0.06 E9/L (ref 0–0.2)
BASOPHILS RELATIVE PERCENT: 1 % (ref 0–2)
BILIRUB SERPL-MCNC: 0.2 MG/DL (ref 0–1.2)
BUN BLDV-MCNC: 30 MG/DL (ref 8–23)
CALCIUM SERPL-MCNC: 9.4 MG/DL (ref 8.6–10.2)
CHLORIDE BLD-SCNC: 102 MMOL/L (ref 98–107)
CHOLESTEROL, TOTAL: 189 MG/DL (ref 0–199)
CO2: 23 MMOL/L (ref 22–29)
CREAT SERPL-MCNC: 1.5 MG/DL (ref 0.7–1.2)
EOSINOPHILS ABSOLUTE: 0.16 E9/L (ref 0.05–0.5)
EOSINOPHILS RELATIVE PERCENT: 2.7 % (ref 0–6)
GFR AFRICAN AMERICAN: 55
GFR NON-AFRICAN AMERICAN: 46 ML/MIN/1.73
GLUCOSE BLD-MCNC: 126 MG/DL (ref 74–99)
HBA1C MFR BLD: 6.4 % (ref 4–5.6)
HCT VFR BLD CALC: 43.8 % (ref 37–54)
HDLC SERPL-MCNC: 33 MG/DL
HEMOGLOBIN: 13.3 G/DL (ref 12.5–16.5)
IMMATURE GRANULOCYTES #: 0.02 E9/L
IMMATURE GRANULOCYTES %: 0.3 % (ref 0–5)
INR BLD: 2.9
LDL CHOLESTEROL CALCULATED: 94 MG/DL (ref 0–99)
LYMPHOCYTES ABSOLUTE: 1.46 E9/L (ref 1.5–4)
LYMPHOCYTES RELATIVE PERCENT: 25 % (ref 20–42)
MCH RBC QN AUTO: 29.3 PG (ref 26–35)
MCHC RBC AUTO-ENTMCNC: 30.4 % (ref 32–34.5)
MCV RBC AUTO: 96.5 FL (ref 80–99.9)
MONOCYTES ABSOLUTE: 0.51 E9/L (ref 0.1–0.95)
MONOCYTES RELATIVE PERCENT: 8.7 % (ref 2–12)
NEUTROPHILS ABSOLUTE: 3.63 E9/L (ref 1.8–7.3)
NEUTROPHILS RELATIVE PERCENT: 62.3 % (ref 43–80)
PDW BLD-RTO: 14.4 FL (ref 11.5–15)
PLATELET # BLD: 236 E9/L (ref 130–450)
PMV BLD AUTO: 10.8 FL (ref 7–12)
POTASSIUM SERPL-SCNC: 5.2 MMOL/L (ref 3.5–5)
PROTHROMBIN TIME: 33.1 SEC (ref 9.3–12.4)
RBC # BLD: 4.54 E12/L (ref 3.8–5.8)
SODIUM BLD-SCNC: 140 MMOL/L (ref 132–146)
TOTAL PROTEIN: 7.2 G/DL (ref 6.4–8.3)
TRIGL SERPL-MCNC: 308 MG/DL (ref 0–149)
TSH SERPL DL<=0.05 MIU/L-ACNC: 2.35 UIU/ML (ref 0.27–4.2)
VLDLC SERPL CALC-MCNC: 62 MG/DL
WBC # BLD: 5.8 E9/L (ref 4.5–11.5)

## 2020-07-08 PROCEDURE — 85610 PROTHROMBIN TIME: CPT

## 2020-07-08 PROCEDURE — 85025 COMPLETE CBC W/AUTO DIFF WBC: CPT

## 2020-07-08 PROCEDURE — 80061 LIPID PANEL: CPT

## 2020-07-08 PROCEDURE — 80053 COMPREHEN METABOLIC PANEL: CPT

## 2020-07-08 PROCEDURE — 83036 HEMOGLOBIN GLYCOSYLATED A1C: CPT

## 2020-07-08 PROCEDURE — 84443 ASSAY THYROID STIM HORMONE: CPT

## 2020-10-13 ENCOUNTER — HOSPITAL ENCOUNTER (OUTPATIENT)
Age: 75
Discharge: HOME | End: 2020-10-15
Payer: MEDICARE

## 2020-10-13 LAB
ALBUMIN SERPL-MCNC: 4 G/DL (ref 3.5–5.2)
ALP BLD-CCNC: 65 U/L (ref 40–129)
ALT SERPL-CCNC: 7 U/L (ref 0–40)
ANION GAP SERPL CALCULATED.3IONS-SCNC: 15 MMOL/L (ref 7–16)
AST SERPL-CCNC: 18 U/L (ref 0–39)
BASOPHILS ABSOLUTE: 0.07 E9/L (ref 0–0.2)
BASOPHILS RELATIVE PERCENT: 1.1 % (ref 0–2)
BILIRUB SERPL-MCNC: 0.2 MG/DL (ref 0–1.2)
BUN BLDV-MCNC: 35 MG/DL (ref 8–23)
CALCIUM SERPL-MCNC: 9.5 MG/DL (ref 8.6–10.2)
CHLORIDE BLD-SCNC: 103 MMOL/L (ref 98–107)
CHOLESTEROL, TOTAL: 173 MG/DL (ref 0–199)
CO2: 22 MMOL/L (ref 22–29)
CREAT SERPL-MCNC: 1.5 MG/DL (ref 0.7–1.2)
EOSINOPHILS ABSOLUTE: 0.13 E9/L (ref 0.05–0.5)
EOSINOPHILS RELATIVE PERCENT: 2.1 % (ref 0–6)
GFR AFRICAN AMERICAN: 55
GFR NON-AFRICAN AMERICAN: 46 ML/MIN/1.73
GLUCOSE BLD-MCNC: 111 MG/DL (ref 74–99)
HCT VFR BLD CALC: 44.1 % (ref 37–54)
HDLC SERPL-MCNC: 32 MG/DL
HEMOGLOBIN: 13.6 G/DL (ref 12.5–16.5)
IMMATURE GRANULOCYTES #: 0.02 E9/L
IMMATURE GRANULOCYTES %: 0.3 % (ref 0–5)
INR BLD: 2.8
LDL CHOLESTEROL CALCULATED: 90 MG/DL (ref 0–99)
LYMPHOCYTES ABSOLUTE: 1.14 E9/L (ref 1.5–4)
LYMPHOCYTES RELATIVE PERCENT: 18.6 % (ref 20–42)
MCH RBC QN AUTO: 29.6 PG (ref 26–35)
MCHC RBC AUTO-ENTMCNC: 30.8 % (ref 32–34.5)
MCV RBC AUTO: 96.1 FL (ref 80–99.9)
MONOCYTES ABSOLUTE: 0.4 E9/L (ref 0.1–0.95)
MONOCYTES RELATIVE PERCENT: 6.5 % (ref 2–12)
NEUTROPHILS ABSOLUTE: 4.36 E9/L (ref 1.8–7.3)
NEUTROPHILS RELATIVE PERCENT: 71.4 % (ref 43–80)
PDW BLD-RTO: 14.4 FL (ref 11.5–15)
PLATELET # BLD: 226 E9/L (ref 130–450)
PMV BLD AUTO: 10.9 FL (ref 7–12)
POTASSIUM SERPL-SCNC: 5.4 MMOL/L (ref 3.5–5)
PROTHROMBIN TIME: 32.2 SEC (ref 9.3–12.4)
RBC # BLD: 4.59 E12/L (ref 3.8–5.8)
SODIUM BLD-SCNC: 140 MMOL/L (ref 132–146)
TOTAL PROTEIN: 7 G/DL (ref 6.4–8.3)
TRIGL SERPL-MCNC: 256 MG/DL (ref 0–149)
TSH SERPL DL<=0.05 MIU/L-ACNC: 1.67 UIU/ML (ref 0.27–4.2)
VLDLC SERPL CALC-MCNC: 51 MG/DL
WBC # BLD: 6.1 E9/L (ref 4.5–11.5)

## 2020-10-13 PROCEDURE — 80053 COMPREHEN METABOLIC PANEL: CPT

## 2020-10-13 PROCEDURE — 85610 PROTHROMBIN TIME: CPT

## 2020-10-13 PROCEDURE — 85025 COMPLETE CBC W/AUTO DIFF WBC: CPT

## 2020-10-13 PROCEDURE — 84443 ASSAY THYROID STIM HORMONE: CPT

## 2020-10-13 PROCEDURE — 83036 HEMOGLOBIN GLYCOSYLATED A1C: CPT

## 2020-10-13 PROCEDURE — 80061 LIPID PANEL: CPT

## 2020-10-14 LAB — HBA1C MFR BLD: 6.4 % (ref 4–5.6)

## 2021-01-04 DIAGNOSIS — E11.9 TYPE 2 DIABETES MELLITUS WITHOUT COMPLICATION, WITHOUT LONG-TERM CURRENT USE OF INSULIN (HCC): ICD-10-CM

## 2021-01-04 DIAGNOSIS — Z86.718 H/O DEEP VENOUS THROMBOSIS: ICD-10-CM

## 2021-01-04 DIAGNOSIS — E03.9 ACQUIRED HYPOTHYROIDISM: ICD-10-CM

## 2021-01-04 LAB
ALBUMIN SERPL-MCNC: 3.8 G/DL (ref 3.5–5.2)
ALP BLD-CCNC: 62 U/L (ref 40–129)
ALT SERPL-CCNC: 13 U/L (ref 0–40)
ANION GAP SERPL CALCULATED.3IONS-SCNC: 10 MMOL/L (ref 7–16)
AST SERPL-CCNC: 18 U/L (ref 0–39)
BASOPHILS ABSOLUTE: 0.07 E9/L (ref 0–0.2)
BASOPHILS RELATIVE PERCENT: 1.2 % (ref 0–2)
BILIRUB SERPL-MCNC: 0.4 MG/DL (ref 0–1.2)
BUN BLDV-MCNC: 26 MG/DL (ref 8–23)
CALCIUM SERPL-MCNC: 9.3 MG/DL (ref 8.6–10.2)
CHLORIDE BLD-SCNC: 99 MMOL/L (ref 98–107)
CHOLESTEROL, TOTAL: 213 MG/DL (ref 0–199)
CO2: 29 MMOL/L (ref 22–29)
CREAT SERPL-MCNC: 2.1 MG/DL (ref 0.7–1.2)
EOSINOPHILS ABSOLUTE: 0.17 E9/L (ref 0.05–0.5)
EOSINOPHILS RELATIVE PERCENT: 2.9 % (ref 0–6)
GFR AFRICAN AMERICAN: 37
GFR NON-AFRICAN AMERICAN: 31 ML/MIN/1.73
GLUCOSE BLD-MCNC: 149 MG/DL (ref 74–99)
HCT VFR BLD CALC: 44 % (ref 37–54)
HDLC SERPL-MCNC: 36 MG/DL
HEMOGLOBIN: 13.6 G/DL (ref 12.5–16.5)
IMMATURE GRANULOCYTES #: 0.03 E9/L
IMMATURE GRANULOCYTES %: 0.5 % (ref 0–5)
INR BLD: 2
LDL CHOLESTEROL CALCULATED: 115 MG/DL (ref 0–99)
LYMPHOCYTES ABSOLUTE: 1.24 E9/L (ref 1.5–4)
LYMPHOCYTES RELATIVE PERCENT: 21.3 % (ref 20–42)
MCH RBC QN AUTO: 29.9 PG (ref 26–35)
MCHC RBC AUTO-ENTMCNC: 30.9 % (ref 32–34.5)
MCV RBC AUTO: 96.7 FL (ref 80–99.9)
MONOCYTES ABSOLUTE: 0.46 E9/L (ref 0.1–0.95)
MONOCYTES RELATIVE PERCENT: 7.9 % (ref 2–12)
NEUTROPHILS ABSOLUTE: 3.84 E9/L (ref 1.8–7.3)
NEUTROPHILS RELATIVE PERCENT: 66.2 % (ref 43–80)
PDW BLD-RTO: 14.3 FL (ref 11.5–15)
PLATELET # BLD: 230 E9/L (ref 130–450)
PMV BLD AUTO: 10.9 FL (ref 7–12)
POTASSIUM SERPL-SCNC: 5 MMOL/L (ref 3.5–5)
PROTHROMBIN TIME: 22.8 SEC (ref 9.3–12.4)
RBC # BLD: 4.55 E12/L (ref 3.8–5.8)
SODIUM BLD-SCNC: 138 MMOL/L (ref 132–146)
TOTAL PROTEIN: 7.3 G/DL (ref 6.4–8.3)
TRIGL SERPL-MCNC: 312 MG/DL (ref 0–149)
TSH SERPL DL<=0.05 MIU/L-ACNC: 2.44 UIU/ML (ref 0.27–4.2)
VLDLC SERPL CALC-MCNC: 62 MG/DL
WBC # BLD: 5.8 E9/L (ref 4.5–11.5)

## 2021-01-05 LAB — HBA1C MFR BLD: 6.7 % (ref 4–5.6)

## 2021-01-18 ENCOUNTER — TELEPHONE (OUTPATIENT)
Dept: NON INVASIVE DIAGNOSTICS | Age: 76
End: 2021-01-18

## 2021-01-18 NOTE — TELEPHONE ENCOUNTER
Patient was due 09/10/2020 and has not yet set a remote transmission to the office. Mailed the patient a letter to remote them to hook up their monitor and send us a remote transmission.

## 2021-01-26 ENCOUNTER — NURSE ONLY (OUTPATIENT)
Dept: NON INVASIVE DIAGNOSTICS | Age: 76
End: 2021-01-26
Payer: MEDICARE

## 2021-01-26 DIAGNOSIS — Z95.0 CARDIAC PACEMAKER IN SITU: Primary | ICD-10-CM

## 2021-01-26 DIAGNOSIS — I49.5 SINUS NODE DYSFUNCTION (HCC): ICD-10-CM

## 2021-01-26 PROCEDURE — 93296 REM INTERROG EVL PM/IDS: CPT | Performed by: INTERNAL MEDICINE

## 2021-01-26 PROCEDURE — 93294 REM INTERROG EVL PM/LDLS PM: CPT | Performed by: INTERNAL MEDICINE

## 2021-02-16 NOTE — PROGRESS NOTES
See [employer] [place] report. Remote monitoring reviewed over a 90 day period. End of 90 day monitoring period date of service 1.26.2021.

## 2021-02-18 ENCOUNTER — TELEPHONE (OUTPATIENT)
Dept: NON INVASIVE DIAGNOSTICS | Age: 76
End: 2021-02-18

## 2021-02-18 NOTE — TELEPHONE ENCOUNTER
We have received your remote transmission. Our staff will contact you if there is anything that needs to be discussed. Your next appointment is 04/27/2021 remote transmission from home. Spoke to patient. Verbalized understanding of next transmission date.

## 2021-02-18 NOTE — TELEPHONE ENCOUNTER
----- Message from Julieta Redding RN sent at 2/16/2021 11:35 AM EST -----  Successful transmission received. Please call patient and give next appointment.

## 2021-04-13 DIAGNOSIS — E11.9 TYPE 2 DIABETES MELLITUS WITHOUT COMPLICATION, WITHOUT LONG-TERM CURRENT USE OF INSULIN (HCC): ICD-10-CM

## 2021-04-13 DIAGNOSIS — E03.9 ACQUIRED HYPOTHYROIDISM: ICD-10-CM

## 2021-04-13 PROBLEM — I49.5 SINUS NODE DYSFUNCTION (HCC): Status: ACTIVE | Noted: 2021-04-13

## 2021-04-13 LAB
ALBUMIN SERPL-MCNC: 4.1 G/DL (ref 3.5–5.2)
ALP BLD-CCNC: 59 U/L (ref 40–129)
ALT SERPL-CCNC: 10 U/L (ref 0–40)
ANION GAP SERPL CALCULATED.3IONS-SCNC: 17 MMOL/L (ref 7–16)
AST SERPL-CCNC: 20 U/L (ref 0–39)
BASOPHILS ABSOLUTE: 0.05 E9/L (ref 0–0.2)
BASOPHILS RELATIVE PERCENT: 0.8 % (ref 0–2)
BILIRUB SERPL-MCNC: 0.2 MG/DL (ref 0–1.2)
BUN BLDV-MCNC: 31 MG/DL (ref 8–23)
CALCIUM SERPL-MCNC: 9.6 MG/DL (ref 8.6–10.2)
CHLORIDE BLD-SCNC: 104 MMOL/L (ref 98–107)
CHOLESTEROL, TOTAL: 216 MG/DL (ref 0–199)
CO2: 21 MMOL/L (ref 22–29)
CREAT SERPL-MCNC: 1.7 MG/DL (ref 0.7–1.2)
EOSINOPHILS ABSOLUTE: 0.16 E9/L (ref 0.05–0.5)
EOSINOPHILS RELATIVE PERCENT: 2.7 % (ref 0–6)
GFR AFRICAN AMERICAN: 48
GFR NON-AFRICAN AMERICAN: 39 ML/MIN/1.73
GLUCOSE BLD-MCNC: 113 MG/DL (ref 74–99)
HBA1C MFR BLD: 6.8 % (ref 4–5.6)
HCT VFR BLD CALC: 44.8 % (ref 37–54)
HDLC SERPL-MCNC: 34 MG/DL
HEMOGLOBIN: 13.5 G/DL (ref 12.5–16.5)
IMMATURE GRANULOCYTES #: 0.04 E9/L
IMMATURE GRANULOCYTES %: 0.7 % (ref 0–5)
LDL CHOLESTEROL CALCULATED: ABNORMAL MG/DL (ref 0–99)
LYMPHOCYTES ABSOLUTE: 1.31 E9/L (ref 1.5–4)
LYMPHOCYTES RELATIVE PERCENT: 22.1 % (ref 20–42)
MCH RBC QN AUTO: 29.2 PG (ref 26–35)
MCHC RBC AUTO-ENTMCNC: 30.1 % (ref 32–34.5)
MCV RBC AUTO: 96.8 FL (ref 80–99.9)
MONOCYTES ABSOLUTE: 0.39 E9/L (ref 0.1–0.95)
MONOCYTES RELATIVE PERCENT: 6.6 % (ref 2–12)
NEUTROPHILS ABSOLUTE: 3.97 E9/L (ref 1.8–7.3)
NEUTROPHILS RELATIVE PERCENT: 67.1 % (ref 43–80)
PDW BLD-RTO: 14.5 FL (ref 11.5–15)
PLATELET # BLD: 240 E9/L (ref 130–450)
PMV BLD AUTO: 11.2 FL (ref 7–12)
POTASSIUM SERPL-SCNC: 4.8 MMOL/L (ref 3.5–5)
RBC # BLD: 4.63 E12/L (ref 3.8–5.8)
SODIUM BLD-SCNC: 142 MMOL/L (ref 132–146)
TOTAL PROTEIN: 7 G/DL (ref 6.4–8.3)
TRIGL SERPL-MCNC: 427 MG/DL (ref 0–149)
TSH SERPL DL<=0.05 MIU/L-ACNC: 2.25 UIU/ML (ref 0.27–4.2)
VLDLC SERPL CALC-MCNC: ABNORMAL MG/DL
WBC # BLD: 5.9 E9/L (ref 4.5–11.5)

## 2021-07-12 DIAGNOSIS — Z86.718 H/O DEEP VENOUS THROMBOSIS: ICD-10-CM

## 2021-07-12 DIAGNOSIS — E03.9 ACQUIRED HYPOTHYROIDISM: ICD-10-CM

## 2021-07-12 DIAGNOSIS — E11.9 TYPE 2 DIABETES MELLITUS WITHOUT COMPLICATION, WITHOUT LONG-TERM CURRENT USE OF INSULIN (HCC): ICD-10-CM

## 2021-07-12 LAB
ALBUMIN SERPL-MCNC: 3.9 G/DL (ref 3.5–5.2)
ALP BLD-CCNC: 59 U/L (ref 40–129)
ALT SERPL-CCNC: 8 U/L (ref 0–40)
ANION GAP SERPL CALCULATED.3IONS-SCNC: 14 MMOL/L (ref 7–16)
AST SERPL-CCNC: 17 U/L (ref 0–39)
BASOPHILS ABSOLUTE: 0.07 E9/L (ref 0–0.2)
BASOPHILS RELATIVE PERCENT: 1.2 % (ref 0–2)
BILIRUB SERPL-MCNC: 0.2 MG/DL (ref 0–1.2)
BUN BLDV-MCNC: 30 MG/DL (ref 6–23)
CALCIUM SERPL-MCNC: 8.8 MG/DL (ref 8.6–10.2)
CHLORIDE BLD-SCNC: 103 MMOL/L (ref 98–107)
CHOLESTEROL, TOTAL: 181 MG/DL (ref 0–199)
CO2: 24 MMOL/L (ref 22–29)
CREAT SERPL-MCNC: 1.4 MG/DL (ref 0.7–1.2)
EOSINOPHILS ABSOLUTE: 0.14 E9/L (ref 0.05–0.5)
EOSINOPHILS RELATIVE PERCENT: 2.5 % (ref 0–6)
GFR AFRICAN AMERICAN: 60
GFR NON-AFRICAN AMERICAN: 49 ML/MIN/1.73
GLUCOSE BLD-MCNC: 132 MG/DL (ref 74–99)
HBA1C MFR BLD: 6.6 % (ref 4–5.6)
HCT VFR BLD CALC: 43.8 % (ref 37–54)
HDLC SERPL-MCNC: 31 MG/DL
HEMOGLOBIN: 13.5 G/DL (ref 12.5–16.5)
IMMATURE GRANULOCYTES #: 0.02 E9/L
IMMATURE GRANULOCYTES %: 0.4 % (ref 0–5)
INR BLD: 3.1
LDL CHOLESTEROL CALCULATED: 92 MG/DL (ref 0–99)
LYMPHOCYTES ABSOLUTE: 1.33 E9/L (ref 1.5–4)
LYMPHOCYTES RELATIVE PERCENT: 23.7 % (ref 20–42)
MCH RBC QN AUTO: 29.5 PG (ref 26–35)
MCHC RBC AUTO-ENTMCNC: 30.8 % (ref 32–34.5)
MCV RBC AUTO: 95.6 FL (ref 80–99.9)
MONOCYTES ABSOLUTE: 0.52 E9/L (ref 0.1–0.95)
MONOCYTES RELATIVE PERCENT: 9.3 % (ref 2–12)
NEUTROPHILS ABSOLUTE: 3.54 E9/L (ref 1.8–7.3)
NEUTROPHILS RELATIVE PERCENT: 62.9 % (ref 43–80)
PDW BLD-RTO: 14.6 FL (ref 11.5–15)
PLATELET # BLD: 225 E9/L (ref 130–450)
PMV BLD AUTO: 10.9 FL (ref 7–12)
POTASSIUM SERPL-SCNC: 4.5 MMOL/L (ref 3.5–5)
PROTHROMBIN TIME: 34.9 SEC (ref 9.3–12.4)
RBC # BLD: 4.58 E12/L (ref 3.8–5.8)
SODIUM BLD-SCNC: 141 MMOL/L (ref 132–146)
TOTAL PROTEIN: 7.1 G/DL (ref 6.4–8.3)
TRIGL SERPL-MCNC: 289 MG/DL (ref 0–149)
TSH SERPL DL<=0.05 MIU/L-ACNC: 2.52 UIU/ML (ref 0.27–4.2)
VLDLC SERPL CALC-MCNC: 58 MG/DL
WBC # BLD: 5.6 E9/L (ref 4.5–11.5)

## 2021-10-22 DIAGNOSIS — E03.9 ACQUIRED HYPOTHYROIDISM: ICD-10-CM

## 2021-10-22 DIAGNOSIS — E11.9 TYPE 2 DIABETES MELLITUS WITHOUT COMPLICATION, WITHOUT LONG-TERM CURRENT USE OF INSULIN (HCC): ICD-10-CM

## 2021-10-22 LAB
ALBUMIN SERPL-MCNC: 4.1 G/DL (ref 3.5–5.2)
ALP BLD-CCNC: 60 U/L (ref 40–129)
ALT SERPL-CCNC: 10 U/L (ref 0–40)
ANION GAP SERPL CALCULATED.3IONS-SCNC: 15 MMOL/L (ref 7–16)
AST SERPL-CCNC: 20 U/L (ref 0–39)
BASOPHILS ABSOLUTE: 0.07 E9/L (ref 0–0.2)
BASOPHILS RELATIVE PERCENT: 1.3 % (ref 0–2)
BILIRUB SERPL-MCNC: 0.2 MG/DL (ref 0–1.2)
BUN BLDV-MCNC: 30 MG/DL (ref 6–23)
CALCIUM SERPL-MCNC: 9.4 MG/DL (ref 8.6–10.2)
CHLORIDE BLD-SCNC: 101 MMOL/L (ref 98–107)
CHOLESTEROL, TOTAL: 194 MG/DL (ref 0–199)
CO2: 23 MMOL/L (ref 22–29)
CREAT SERPL-MCNC: 1.4 MG/DL (ref 0.7–1.2)
EOSINOPHILS ABSOLUTE: 0.14 E9/L (ref 0.05–0.5)
EOSINOPHILS RELATIVE PERCENT: 2.5 % (ref 0–6)
GFR AFRICAN AMERICAN: 59
GFR NON-AFRICAN AMERICAN: 49 ML/MIN/1.73
GLUCOSE BLD-MCNC: 135 MG/DL (ref 74–99)
HBA1C MFR BLD: 6.6 % (ref 4–5.6)
HCT VFR BLD CALC: 42.1 % (ref 37–54)
HDLC SERPL-MCNC: 34 MG/DL
HEMOGLOBIN: 13.5 G/DL (ref 12.5–16.5)
IMMATURE GRANULOCYTES #: 0.03 E9/L
IMMATURE GRANULOCYTES %: 0.5 % (ref 0–5)
LDL CHOLESTEROL CALCULATED: 97 MG/DL (ref 0–99)
LYMPHOCYTES ABSOLUTE: 1.27 E9/L (ref 1.5–4)
LYMPHOCYTES RELATIVE PERCENT: 22.7 % (ref 20–42)
MCH RBC QN AUTO: 29.2 PG (ref 26–35)
MCHC RBC AUTO-ENTMCNC: 32.1 % (ref 32–34.5)
MCV RBC AUTO: 91.1 FL (ref 80–99.9)
MONOCYTES ABSOLUTE: 0.49 E9/L (ref 0.1–0.95)
MONOCYTES RELATIVE PERCENT: 8.8 % (ref 2–12)
NEUTROPHILS ABSOLUTE: 3.6 E9/L (ref 1.8–7.3)
NEUTROPHILS RELATIVE PERCENT: 64.2 % (ref 43–80)
PDW BLD-RTO: 14.6 FL (ref 11.5–15)
PLATELET # BLD: 227 E9/L (ref 130–450)
PMV BLD AUTO: 10.9 FL (ref 7–12)
POTASSIUM SERPL-SCNC: 5.1 MMOL/L (ref 3.5–5)
RBC # BLD: 4.62 E12/L (ref 3.8–5.8)
SODIUM BLD-SCNC: 139 MMOL/L (ref 132–146)
TOTAL PROTEIN: 6.5 G/DL (ref 6.4–8.3)
TRIGL SERPL-MCNC: 317 MG/DL (ref 0–149)
TSH SERPL DL<=0.05 MIU/L-ACNC: 2.05 UIU/ML (ref 0.27–4.2)
VLDLC SERPL CALC-MCNC: 63 MG/DL
WBC # BLD: 5.6 E9/L (ref 4.5–11.5)

## 2021-11-22 DIAGNOSIS — Z01.812 PRE-OPERATIVE LABORATORY EXAMINATION: ICD-10-CM

## 2021-11-22 LAB
ANION GAP SERPL CALCULATED.3IONS-SCNC: 16 MMOL/L (ref 7–16)
BUN BLDV-MCNC: 28 MG/DL (ref 6–23)
CALCIUM SERPL-MCNC: 9.8 MG/DL (ref 8.6–10.2)
CHLORIDE BLD-SCNC: 104 MMOL/L (ref 98–107)
CO2: 22 MMOL/L (ref 22–29)
CREAT SERPL-MCNC: 1.5 MG/DL (ref 0.7–1.2)
GFR AFRICAN AMERICAN: 55
GFR NON-AFRICAN AMERICAN: 45 ML/MIN/1.73
GLUCOSE BLD-MCNC: 147 MG/DL (ref 74–99)
POTASSIUM SERPL-SCNC: 5.1 MMOL/L (ref 3.5–5)
SODIUM BLD-SCNC: 142 MMOL/L (ref 132–146)

## 2022-01-01 ENCOUNTER — APPOINTMENT (OUTPATIENT)
Dept: GENERAL RADIOLOGY | Age: 77
DRG: 177 | End: 2022-01-01
Payer: MEDICARE

## 2022-01-01 ENCOUNTER — CLINICAL DOCUMENTATION ONLY (OUTPATIENT)
Facility: CLINIC | Age: 77
End: 2022-01-01

## 2022-01-01 ENCOUNTER — HOSPITAL ENCOUNTER (INPATIENT)
Age: 77
LOS: 5 days | DRG: 177 | End: 2022-12-04
Attending: STUDENT IN AN ORGANIZED HEALTH CARE EDUCATION/TRAINING PROGRAM | Admitting: INTERNAL MEDICINE
Payer: MEDICARE

## 2022-01-01 ENCOUNTER — APPOINTMENT (OUTPATIENT)
Dept: ULTRASOUND IMAGING | Age: 77
DRG: 177 | End: 2022-01-01
Payer: MEDICARE

## 2022-01-01 ENCOUNTER — ANESTHESIA EVENT (OUTPATIENT)
Dept: ENDOSCOPY | Age: 77
End: 2022-01-01
Payer: MEDICARE

## 2022-01-01 ENCOUNTER — ANESTHESIA (OUTPATIENT)
Dept: ENDOSCOPY | Age: 77
End: 2022-01-01
Payer: MEDICARE

## 2022-01-01 ENCOUNTER — APPOINTMENT (OUTPATIENT)
Dept: CT IMAGING | Age: 77
DRG: 177 | End: 2022-01-01
Payer: MEDICARE

## 2022-01-01 ENCOUNTER — TELEPHONE (OUTPATIENT)
Dept: OTHER | Facility: CLINIC | Age: 77
End: 2022-01-01

## 2022-01-01 VITALS
WEIGHT: 307 LBS | HEART RATE: 59 BPM | SYSTOLIC BLOOD PRESSURE: 118 MMHG | OXYGEN SATURATION: 50 % | DIASTOLIC BLOOD PRESSURE: 58 MMHG | TEMPERATURE: 97.4 F | BODY MASS INDEX: 38.17 KG/M2 | HEIGHT: 75 IN

## 2022-01-01 DIAGNOSIS — E11.9 TYPE 2 DIABETES MELLITUS WITHOUT COMPLICATION, WITHOUT LONG-TERM CURRENT USE OF INSULIN (HCC): ICD-10-CM

## 2022-01-01 DIAGNOSIS — E03.9 ACQUIRED HYPOTHYROIDISM: ICD-10-CM

## 2022-01-01 DIAGNOSIS — R14.0 ABDOMINAL DISTENTION: Primary | ICD-10-CM

## 2022-01-01 LAB
ALBUMIN SERPL-MCNC: 2.3 G/DL (ref 3.5–5.2)
ALBUMIN SERPL-MCNC: 2.6 G/DL (ref 3.5–5.2)
ALBUMIN SERPL-MCNC: 2.7 G/DL (ref 3.5–5.2)
ALBUMIN SERPL-MCNC: 2.7 G/DL (ref 3.5–5.2)
ALBUMIN SERPL-MCNC: 3.9 G/DL (ref 3.5–5.2)
ALP BLD-CCNC: 58 U/L (ref 40–129)
ALP BLD-CCNC: 65 U/L (ref 40–129)
ALP BLD-CCNC: 70 U/L (ref 40–129)
ALP BLD-CCNC: 73 U/L (ref 40–129)
ALP BLD-CCNC: 78 U/L (ref 40–129)
ALT SERPL-CCNC: 10 U/L (ref 0–40)
ALT SERPL-CCNC: 5 U/L (ref 0–40)
ALT SERPL-CCNC: 6 U/L (ref 0–40)
ALT SERPL-CCNC: 9 U/L (ref 0–40)
ALT SERPL-CCNC: <5 U/L (ref 0–40)
ANION GAP SERPL CALCULATED.3IONS-SCNC: 10 MMOL/L (ref 7–16)
ANION GAP SERPL CALCULATED.3IONS-SCNC: 14 MMOL/L (ref 7–16)
ANION GAP SERPL CALCULATED.3IONS-SCNC: 7 MMOL/L (ref 7–16)
ANION GAP SERPL CALCULATED.3IONS-SCNC: 9 MMOL/L (ref 7–16)
ANION GAP SERPL CALCULATED.3IONS-SCNC: 9 MMOL/L (ref 7–16)
APTT: 36.3 SEC (ref 24.5–35.1)
AST SERPL-CCNC: 12 U/L (ref 0–39)
AST SERPL-CCNC: 14 U/L (ref 0–39)
AST SERPL-CCNC: 7 U/L (ref 0–39)
AST SERPL-CCNC: 7 U/L (ref 0–39)
AST SERPL-CCNC: 8 U/L (ref 0–39)
B.E.: -3.2 MMOL/L (ref -3–3)
B.E.: -3.6 MMOL/L (ref -3–3)
BASOPHILS ABSOLUTE: 0.05 E9/L (ref 0–0.2)
BASOPHILS ABSOLUTE: 0.05 E9/L (ref 0–0.2)
BASOPHILS ABSOLUTE: 0.06 E9/L (ref 0–0.2)
BASOPHILS RELATIVE PERCENT: 0.7 % (ref 0–2)
BASOPHILS RELATIVE PERCENT: 0.9 % (ref 0–2)
BILIRUB SERPL-MCNC: 0.3 MG/DL (ref 0–1.2)
BILIRUB SERPL-MCNC: <0.2 MG/DL (ref 0–1.2)
BUN BLDV-MCNC: 17 MG/DL (ref 6–23)
BUN BLDV-MCNC: 22 MG/DL (ref 6–23)
BUN BLDV-MCNC: 25 MG/DL (ref 6–23)
BUN BLDV-MCNC: 33 MG/DL (ref 6–23)
BUN BLDV-MCNC: 34 MG/DL (ref 6–23)
CALCIUM SERPL-MCNC: 8 MG/DL (ref 8.6–10.2)
CALCIUM SERPL-MCNC: 8.3 MG/DL (ref 8.6–10.2)
CALCIUM SERPL-MCNC: 8.4 MG/DL (ref 8.6–10.2)
CALCIUM SERPL-MCNC: 8.4 MG/DL (ref 8.6–10.2)
CALCIUM SERPL-MCNC: 9.5 MG/DL (ref 8.6–10.2)
CHLORIDE BLD-SCNC: 110 MMOL/L (ref 98–107)
CHLORIDE BLD-SCNC: 110 MMOL/L (ref 98–107)
CHLORIDE BLD-SCNC: 112 MMOL/L (ref 98–107)
CHLORIDE BLD-SCNC: 115 MMOL/L (ref 98–107)
CHLORIDE BLD-SCNC: 98 MMOL/L (ref 98–107)
CHOLESTEROL, TOTAL: 190 MG/DL (ref 0–199)
CO2: 23 MMOL/L (ref 22–29)
CO2: 24 MMOL/L (ref 22–29)
CO2: 24 MMOL/L (ref 22–29)
CO2: 25 MMOL/L (ref 22–29)
CO2: 25 MMOL/L (ref 22–29)
COHB: 0.8 % (ref 0–1.5)
COHB: 1 % (ref 0–1.5)
CREAT SERPL-MCNC: 1.5 MG/DL (ref 0.7–1.2)
CREAT SERPL-MCNC: 1.6 MG/DL (ref 0.7–1.2)
CREAT SERPL-MCNC: 1.7 MG/DL (ref 0.7–1.2)
CREAT SERPL-MCNC: 1.7 MG/DL (ref 0.7–1.2)
CREAT SERPL-MCNC: 2 MG/DL (ref 0.7–1.2)
CRITICAL: ABNORMAL
CRITICAL: ABNORMAL
CRYPTOSPORIDIUM ANTIGEN STOOL: NORMAL
CULTURE, STOOL: NORMAL
DATE ANALYZED: ABNORMAL
DATE ANALYZED: ABNORMAL
DATE OF COLLECTION: ABNORMAL
DATE OF COLLECTION: ABNORMAL
EOSINOPHILS ABSOLUTE: 0.12 E9/L (ref 0.05–0.5)
EOSINOPHILS ABSOLUTE: 0.32 E9/L (ref 0.05–0.5)
EOSINOPHILS ABSOLUTE: 0.38 E9/L (ref 0.05–0.5)
EOSINOPHILS ABSOLUTE: 0.43 E9/L (ref 0.05–0.5)
EOSINOPHILS ABSOLUTE: 0.45 E9/L (ref 0.05–0.5)
EOSINOPHILS RELATIVE PERCENT: 1.8 % (ref 0–6)
EOSINOPHILS RELATIVE PERCENT: 3.5 % (ref 0–6)
EOSINOPHILS RELATIVE PERCENT: 5.1 % (ref 0–6)
EOSINOPHILS RELATIVE PERCENT: 5.2 % (ref 0–6)
EOSINOPHILS RELATIVE PERCENT: 5.9 % (ref 0–6)
FIO2: 100 %
FIO2: 85 %
GFR AFRICAN AMERICAN: 55
GFR NON-AFRICAN AMERICAN: 45 ML/MIN/1.73
GFR SERPL CREATININE-BSD FRML MDRD: 34 ML/MIN/1.73
GFR SERPL CREATININE-BSD FRML MDRD: 41 ML/MIN/1.73
GFR SERPL CREATININE-BSD FRML MDRD: 41 ML/MIN/1.73
GFR SERPL CREATININE-BSD FRML MDRD: 44 ML/MIN/1.73
GIARDIA ANTIGEN STOOL: NORMAL
GLUCOSE BLD-MCNC: 110 MG/DL (ref 74–99)
GLUCOSE BLD-MCNC: 132 MG/DL (ref 74–99)
GLUCOSE BLD-MCNC: 133 MG/DL (ref 74–99)
GLUCOSE BLD-MCNC: 137 MG/DL (ref 74–99)
GLUCOSE BLD-MCNC: 191 MG/DL (ref 74–99)
HBA1C MFR BLD: 6.5 % (ref 4–5.6)
HCO3: 24.1 MMOL/L (ref 22–26)
HCO3: 27.9 MMOL/L (ref 22–26)
HCT VFR BLD CALC: 28.2 % (ref 37–54)
HCT VFR BLD CALC: 30.3 % (ref 37–54)
HCT VFR BLD CALC: 30.4 % (ref 37–54)
HCT VFR BLD CALC: 31 % (ref 37–54)
HCT VFR BLD CALC: 43.9 % (ref 37–54)
HDLC SERPL-MCNC: 34 MG/DL
HEMOGLOBIN: 13.4 G/DL (ref 12.5–16.5)
HEMOGLOBIN: 8.4 G/DL (ref 12.5–16.5)
HEMOGLOBIN: 8.9 G/DL (ref 12.5–16.5)
HEMOGLOBIN: 9 G/DL (ref 12.5–16.5)
HEMOGLOBIN: 9.4 G/DL (ref 12.5–16.5)
HHB: 12.7 % (ref 0–5)
HHB: 23.2 % (ref 0–5)
IMMATURE GRANULOCYTES #: 0.03 E9/L
IMMATURE GRANULOCYTES #: 0.04 E9/L
IMMATURE GRANULOCYTES #: 0.04 E9/L
IMMATURE GRANULOCYTES #: 0.05 E9/L
IMMATURE GRANULOCYTES #: 0.05 E9/L
IMMATURE GRANULOCYTES %: 0.4 % (ref 0–5)
IMMATURE GRANULOCYTES %: 0.4 % (ref 0–5)
IMMATURE GRANULOCYTES %: 0.5 % (ref 0–5)
IMMATURE GRANULOCYTES %: 0.6 % (ref 0–5)
IMMATURE GRANULOCYTES %: 0.7 % (ref 0–5)
INFLUENZA A BY PCR: NOT DETECTED
INFLUENZA B BY PCR: NOT DETECTED
INR BLD: 2.4
INR BLD: 2.4
INR BLD: 2.7
LAB: ABNORMAL
LAB: ABNORMAL
LACTIC ACID: 1 MMOL/L (ref 0.5–2.2)
LDL CHOLESTEROL CALCULATED: 101 MG/DL (ref 0–99)
LIPASE: 10 U/L (ref 13–60)
LYMPHOCYTES ABSOLUTE: 0.95 E9/L (ref 1.5–4)
LYMPHOCYTES ABSOLUTE: 1 E9/L (ref 1.5–4)
LYMPHOCYTES ABSOLUTE: 1.06 E9/L (ref 1.5–4)
LYMPHOCYTES ABSOLUTE: 1.23 E9/L (ref 1.5–4)
LYMPHOCYTES ABSOLUTE: 1.4 E9/L (ref 1.5–4)
LYMPHOCYTES RELATIVE PERCENT: 11.7 % (ref 20–42)
LYMPHOCYTES RELATIVE PERCENT: 12.9 % (ref 20–42)
LYMPHOCYTES RELATIVE PERCENT: 14.6 % (ref 20–42)
LYMPHOCYTES RELATIVE PERCENT: 15.9 % (ref 20–42)
LYMPHOCYTES RELATIVE PERCENT: 16.8 % (ref 20–42)
Lab: ABNORMAL
Lab: ABNORMAL
MAGNESIUM: 1.8 MG/DL (ref 1.6–2.6)
MAGNESIUM: 2 MG/DL (ref 1.6–2.6)
MCH RBC QN AUTO: 26.5 PG (ref 26–35)
MCH RBC QN AUTO: 26.6 PG (ref 26–35)
MCH RBC QN AUTO: 27.5 PG (ref 26–35)
MCH RBC QN AUTO: 27.6 PG (ref 26–35)
MCH RBC QN AUTO: 29.5 PG (ref 26–35)
MCHC RBC AUTO-ENTMCNC: 29 % (ref 32–34.5)
MCHC RBC AUTO-ENTMCNC: 29.3 % (ref 32–34.5)
MCHC RBC AUTO-ENTMCNC: 29.8 % (ref 32–34.5)
MCHC RBC AUTO-ENTMCNC: 30.5 % (ref 32–34.5)
MCHC RBC AUTO-ENTMCNC: 31 % (ref 32–34.5)
MCV RBC AUTO: 89.1 FL (ref 80–99.9)
MCV RBC AUTO: 90.5 FL (ref 80–99.9)
MCV RBC AUTO: 91.7 FL (ref 80–99.9)
MCV RBC AUTO: 92.2 FL (ref 80–99.9)
MCV RBC AUTO: 96.7 FL (ref 80–99.9)
METER GLUCOSE: 110 MG/DL (ref 74–99)
METER GLUCOSE: 114 MG/DL (ref 74–99)
METER GLUCOSE: 116 MG/DL (ref 74–99)
METER GLUCOSE: 120 MG/DL (ref 74–99)
METER GLUCOSE: 125 MG/DL (ref 74–99)
METER GLUCOSE: 128 MG/DL (ref 74–99)
METER GLUCOSE: 130 MG/DL (ref 74–99)
METER GLUCOSE: 132 MG/DL (ref 74–99)
METER GLUCOSE: 139 MG/DL (ref 74–99)
METER GLUCOSE: 143 MG/DL (ref 74–99)
METER GLUCOSE: 145 MG/DL (ref 74–99)
METER GLUCOSE: 166 MG/DL (ref 74–99)
METER GLUCOSE: 166 MG/DL (ref 74–99)
METER GLUCOSE: 170 MG/DL (ref 74–99)
METER GLUCOSE: 184 MG/DL (ref 74–99)
METHB: 0.3 % (ref 0–1.5)
METHB: 0.3 % (ref 0–1.5)
MODE: ABNORMAL
MODE: ABNORMAL
MONOCYTES ABSOLUTE: 0.48 E9/L (ref 0.1–0.95)
MONOCYTES ABSOLUTE: 0.52 E9/L (ref 0.1–0.95)
MONOCYTES ABSOLUTE: 0.58 E9/L (ref 0.1–0.95)
MONOCYTES ABSOLUTE: 0.68 E9/L (ref 0.1–0.95)
MONOCYTES ABSOLUTE: 0.69 E9/L (ref 0.1–0.95)
MONOCYTES RELATIVE PERCENT: 6.6 % (ref 2–12)
MONOCYTES RELATIVE PERCENT: 7.1 % (ref 2–12)
MONOCYTES RELATIVE PERCENT: 7.6 % (ref 2–12)
MONOCYTES RELATIVE PERCENT: 7.7 % (ref 2–12)
MONOCYTES RELATIVE PERCENT: 8.5 % (ref 2–12)
NEUTROPHILS ABSOLUTE: 5.04 E9/L (ref 1.8–7.3)
NEUTROPHILS ABSOLUTE: 5.11 E9/L (ref 1.8–7.3)
NEUTROPHILS ABSOLUTE: 5.36 E9/L (ref 1.8–7.3)
NEUTROPHILS ABSOLUTE: 6.14 E9/L (ref 1.8–7.3)
NEUTROPHILS ABSOLUTE: 6.89 E9/L (ref 1.8–7.3)
NEUTROPHILS RELATIVE PERCENT: 70 % (ref 43–80)
NEUTROPHILS RELATIVE PERCENT: 70 % (ref 43–80)
NEUTROPHILS RELATIVE PERCENT: 72.9 % (ref 43–80)
NEUTROPHILS RELATIVE PERCENT: 73.8 % (ref 43–80)
NEUTROPHILS RELATIVE PERCENT: 76.1 % (ref 43–80)
O2 CONTENT: 11 ML/DL
O2 CONTENT: 13.8 ML/DL
O2 SATURATION: 76.5 % (ref 92–98.5)
O2 SATURATION: 87.1 % (ref 92–98.5)
O2HB: 75.7 % (ref 94–97)
O2HB: 86 % (ref 94–97)
OPERATOR ID: 3114
OPERATOR ID: 316
PATIENT TEMP: 37 C
PATIENT TEMP: 37 C
PCO2: 54.7 MMHG (ref 35–45)
PCO2: 94 MMHG (ref 35–45)
PDW BLD-RTO: 14.1 FL (ref 11.5–15)
PDW BLD-RTO: 15.4 FL (ref 11.5–15)
PDW BLD-RTO: 15.6 FL (ref 11.5–15)
PDW BLD-RTO: 15.7 FL (ref 11.5–15)
PDW BLD-RTO: 15.9 FL (ref 11.5–15)
PEEP/CPAP: 8 CMH2O
PFO2: 0.51 MMHG/%
PFO2: 0.68 MMHG/%
PH BLOOD GAS: 7.09 (ref 7.35–7.45)
PH BLOOD GAS: 7.26 (ref 7.35–7.45)
PHOSPHORUS: 2.7 MG/DL (ref 2.5–4.5)
PLATELET # BLD: 217 E9/L (ref 130–450)
PLATELET # BLD: 274 E9/L (ref 130–450)
PLATELET # BLD: 331 E9/L (ref 130–450)
PLATELET # BLD: 347 E9/L (ref 130–450)
PLATELET # BLD: 356 E9/L (ref 130–450)
PMV BLD AUTO: 11.4 FL (ref 7–12)
PMV BLD AUTO: 9.3 FL (ref 7–12)
PMV BLD AUTO: 9.5 FL (ref 7–12)
PMV BLD AUTO: 9.6 FL (ref 7–12)
PMV BLD AUTO: 9.8 FL (ref 7–12)
PO2: 43.6 MMHG (ref 75–100)
PO2: 67.7 MMHG (ref 75–100)
POTASSIUM REFLEX MAGNESIUM: 3.5 MMOL/L (ref 3.5–5)
POTASSIUM SERPL-SCNC: 3.3 MMOL/L (ref 3.5–5)
POTASSIUM SERPL-SCNC: 3.3 MMOL/L (ref 3.5–5)
POTASSIUM SERPL-SCNC: 3.6 MMOL/L (ref 3.5–5)
POTASSIUM SERPL-SCNC: 5 MMOL/L (ref 3.5–5)
PRO-BNP: 5331 PG/ML (ref 0–450)
PRO-BNP: 5515 PG/ML (ref 0–450)
PROTHROMBIN TIME: 26.7 SEC (ref 9.3–12.4)
PROTHROMBIN TIME: 26.9 SEC (ref 9.3–12.4)
PROTHROMBIN TIME: 29.1 SEC (ref 9.3–12.4)
RBC # BLD: 3.06 E12/L (ref 3.8–5.8)
RBC # BLD: 3.36 E12/L (ref 3.8–5.8)
RBC # BLD: 3.38 E12/L (ref 3.8–5.8)
RBC # BLD: 3.4 E12/L (ref 3.8–5.8)
RBC # BLD: 4.54 E12/L (ref 3.8–5.8)
ROTAVIRUS ANTIGEN: NORMAL
RR MECHANICAL: 22 B/MIN
SARS-COV-2, NAAT: NOT DETECTED
SODIUM BLD-SCNC: 136 MMOL/L (ref 132–146)
SODIUM BLD-SCNC: 142 MMOL/L (ref 132–146)
SODIUM BLD-SCNC: 145 MMOL/L (ref 132–146)
SODIUM BLD-SCNC: 146 MMOL/L (ref 132–146)
SODIUM BLD-SCNC: 146 MMOL/L (ref 132–146)
SOURCE, BLOOD GAS: ABNORMAL
SOURCE, BLOOD GAS: ABNORMAL
THB: 10.3 G/DL (ref 11.5–16.5)
THB: 11.4 G/DL (ref 11.5–16.5)
TIME ANALYZED: 1316
TIME ANALYZED: 907
TOTAL PROTEIN: 5.4 G/DL (ref 6.4–8.3)
TOTAL PROTEIN: 5.5 G/DL (ref 6.4–8.3)
TOTAL PROTEIN: 6 G/DL (ref 6.4–8.3)
TOTAL PROTEIN: 6.3 G/DL (ref 6.4–8.3)
TOTAL PROTEIN: 7.1 G/DL (ref 6.4–8.3)
TRIGL SERPL-MCNC: 273 MG/DL (ref 0–149)
TROPONIN, HIGH SENSITIVITY: 65 NG/L (ref 0–11)
TSH SERPL DL<=0.05 MIU/L-ACNC: 1.82 UIU/ML (ref 0.27–4.2)
VLDLC SERPL CALC-MCNC: 55 MG/DL
VT MECHANICAL: 500 ML
WBC # BLD: 6.8 E9/L (ref 4.5–11.5)
WBC # BLD: 7.3 E9/L (ref 4.5–11.5)
WBC # BLD: 7.4 E9/L (ref 4.5–11.5)
WBC # BLD: 8.8 E9/L (ref 4.5–11.5)
WBC # BLD: 9.1 E9/L (ref 4.5–11.5)
WHITE BLOOD CELLS (WBC), STOOL: NORMAL

## 2022-01-01 PROCEDURE — 82962 GLUCOSE BLOOD TEST: CPT

## 2022-01-01 PROCEDURE — 71045 X-RAY EXAM CHEST 1 VIEW: CPT

## 2022-01-01 PROCEDURE — 74018 RADEX ABDOMEN 1 VIEW: CPT

## 2022-01-01 PROCEDURE — 99285 EMERGENCY DEPT VISIT HI MDM: CPT

## 2022-01-01 PROCEDURE — 85610 PROTHROMBIN TIME: CPT

## 2022-01-01 PROCEDURE — 2709999900 HC NON-CHARGEABLE SUPPLY: Performed by: SURGERY

## 2022-01-01 PROCEDURE — 94664 DEMO&/EVAL PT USE INHALER: CPT

## 2022-01-01 PROCEDURE — 2700000000 HC OXYGEN THERAPY PER DAY

## 2022-01-01 PROCEDURE — 97165 OT EVAL LOW COMPLEX 30 MIN: CPT

## 2022-01-01 PROCEDURE — 36415 COLL VENOUS BLD VENIPUNCTURE: CPT

## 2022-01-01 PROCEDURE — 6360000002 HC RX W HCPCS: Performed by: STUDENT IN AN ORGANIZED HEALTH CARE EDUCATION/TRAINING PROGRAM

## 2022-01-01 PROCEDURE — 80053 COMPREHEN METABOLIC PANEL: CPT

## 2022-01-01 PROCEDURE — 87328 CRYPTOSPORIDIUM AG IA: CPT

## 2022-01-01 PROCEDURE — 6370000000 HC RX 637 (ALT 250 FOR IP): Performed by: INTERNAL MEDICINE

## 2022-01-01 PROCEDURE — 2500000003 HC RX 250 WO HCPCS: Performed by: NURSE PRACTITIONER

## 2022-01-01 PROCEDURE — 3609155600 HC COLONOSCOPY WITH DECOMPRESSION: Performed by: SURGERY

## 2022-01-01 PROCEDURE — 85025 COMPLETE CBC W/AUTO DIFF WBC: CPT

## 2022-01-01 PROCEDURE — 6360000002 HC RX W HCPCS

## 2022-01-01 PROCEDURE — 6360000002 HC RX W HCPCS: Performed by: INTERNAL MEDICINE

## 2022-01-01 PROCEDURE — 87329 GIARDIA AG IA: CPT

## 2022-01-01 PROCEDURE — 87635 SARS-COV-2 COVID-19 AMP PRB: CPT

## 2022-01-01 PROCEDURE — 2060000000 HC ICU INTERMEDIATE R&B

## 2022-01-01 PROCEDURE — 87502 INFLUENZA DNA AMP PROBE: CPT

## 2022-01-01 PROCEDURE — 84484 ASSAY OF TROPONIN QUANT: CPT

## 2022-01-01 PROCEDURE — 6360000002 HC RX W HCPCS: Performed by: NURSE PRACTITIONER

## 2022-01-01 PROCEDURE — 89055 LEUKOCYTE ASSESSMENT FECAL: CPT

## 2022-01-01 PROCEDURE — 96375 TX/PRO/DX INJ NEW DRUG ADDON: CPT

## 2022-01-01 PROCEDURE — 97530 THERAPEUTIC ACTIVITIES: CPT

## 2022-01-01 PROCEDURE — 7100000001 HC PACU RECOVERY - ADDTL 15 MIN: Performed by: SURGERY

## 2022-01-01 PROCEDURE — 94640 AIRWAY INHALATION TREATMENT: CPT

## 2022-01-01 PROCEDURE — 96374 THER/PROPH/DIAG INJ IV PUSH: CPT

## 2022-01-01 PROCEDURE — 0D7E8ZZ DILATION OF LARGE INTESTINE, VIA NATURAL OR ARTIFICIAL OPENING ENDOSCOPIC: ICD-10-PCS | Performed by: SURGERY

## 2022-01-01 PROCEDURE — 92610 EVALUATE SWALLOWING FUNCTION: CPT | Performed by: SPEECH-LANGUAGE PATHOLOGIST

## 2022-01-01 PROCEDURE — 99222 1ST HOSP IP/OBS MODERATE 55: CPT | Performed by: NURSE PRACTITIONER

## 2022-01-01 PROCEDURE — 83735 ASSAY OF MAGNESIUM: CPT

## 2022-01-01 PROCEDURE — 2580000003 HC RX 258

## 2022-01-01 PROCEDURE — 87425 ROTAVIRUS AG IA: CPT

## 2022-01-01 PROCEDURE — 2500000003 HC RX 250 WO HCPCS: Performed by: INTERNAL MEDICINE

## 2022-01-01 PROCEDURE — 85730 THROMBOPLASTIN TIME PARTIAL: CPT

## 2022-01-01 PROCEDURE — 83605 ASSAY OF LACTIC ACID: CPT

## 2022-01-01 PROCEDURE — 2580000003 HC RX 258: Performed by: INTERNAL MEDICINE

## 2022-01-01 PROCEDURE — 92526 ORAL FUNCTION THERAPY: CPT | Performed by: SPEECH-LANGUAGE PATHOLOGIST

## 2022-01-01 PROCEDURE — 82805 BLOOD GASES W/O2 SATURATION: CPT

## 2022-01-01 PROCEDURE — 99291 CRITICAL CARE FIRST HOUR: CPT | Performed by: INTERNAL MEDICINE

## 2022-01-01 PROCEDURE — 94660 CPAP INITIATION&MGMT: CPT

## 2022-01-01 PROCEDURE — 83880 ASSAY OF NATRIURETIC PEPTIDE: CPT

## 2022-01-01 PROCEDURE — 87045 FECES CULTURE AEROBIC BACT: CPT

## 2022-01-01 PROCEDURE — 74176 CT ABD & PELVIS W/O CONTRAST: CPT

## 2022-01-01 PROCEDURE — 84100 ASSAY OF PHOSPHORUS: CPT

## 2022-01-01 PROCEDURE — 97161 PT EVAL LOW COMPLEX 20 MIN: CPT

## 2022-01-01 PROCEDURE — 3700000001 HC ADD 15 MINUTES (ANESTHESIA): Performed by: SURGERY

## 2022-01-01 PROCEDURE — 83690 ASSAY OF LIPASE: CPT

## 2022-01-01 PROCEDURE — 3700000000 HC ANESTHESIA ATTENDED CARE: Performed by: SURGERY

## 2022-01-01 PROCEDURE — 7100000000 HC PACU RECOVERY - FIRST 15 MIN: Performed by: SURGERY

## 2022-01-01 RX ORDER — PANTOPRAZOLE SODIUM 40 MG/1
40 TABLET, DELAYED RELEASE ORAL DAILY
Status: DISCONTINUED | OUTPATIENT
Start: 2022-01-01 | End: 2022-01-01 | Stop reason: HOSPADM

## 2022-01-01 RX ORDER — SODIUM CHLORIDE 9 MG/ML
INJECTION, SOLUTION INTRAVENOUS CONTINUOUS
Status: DISCONTINUED | OUTPATIENT
Start: 2022-01-01 | End: 2022-01-01

## 2022-01-01 RX ORDER — ACETAMINOPHEN 325 MG/1
650 TABLET ORAL EVERY 4 HOURS PRN
Status: DISCONTINUED | OUTPATIENT
Start: 2022-01-01 | End: 2022-01-01 | Stop reason: HOSPADM

## 2022-01-01 RX ORDER — POTASSIUM CHLORIDE 20 MEQ/1
20 TABLET, EXTENDED RELEASE ORAL 2 TIMES DAILY
COMMUNITY

## 2022-01-01 RX ORDER — GLYCOPYRROLATE 0.2 MG/ML
0.2 INJECTION INTRAMUSCULAR; INTRAVENOUS EVERY 4 HOURS PRN
Status: DISCONTINUED | OUTPATIENT
Start: 2022-01-01 | End: 2022-01-01 | Stop reason: HOSPADM

## 2022-01-01 RX ORDER — FUROSEMIDE 10 MG/ML
40 INJECTION INTRAMUSCULAR; INTRAVENOUS ONCE
Status: COMPLETED | OUTPATIENT
Start: 2022-01-01 | End: 2022-01-01

## 2022-01-01 RX ORDER — DEXTROSE MONOHYDRATE 100 MG/ML
INJECTION, SOLUTION INTRAVENOUS CONTINUOUS PRN
Status: DISCONTINUED | OUTPATIENT
Start: 2022-01-01 | End: 2022-01-01 | Stop reason: HOSPADM

## 2022-01-01 RX ORDER — INSULIN LISPRO 100 [IU]/ML
0-4 INJECTION, SOLUTION INTRAVENOUS; SUBCUTANEOUS
Status: DISCONTINUED | OUTPATIENT
Start: 2022-01-01 | End: 2022-01-01 | Stop reason: HOSPADM

## 2022-01-01 RX ORDER — POTASSIUM CHLORIDE 20 MEQ/1
20 TABLET, EXTENDED RELEASE ORAL 2 TIMES DAILY
Status: DISCONTINUED | OUTPATIENT
Start: 2022-01-01 | End: 2022-01-01 | Stop reason: HOSPADM

## 2022-01-01 RX ORDER — INSULIN LISPRO 100 [IU]/ML
0-4 INJECTION, SOLUTION INTRAVENOUS; SUBCUTANEOUS NIGHTLY
Status: DISCONTINUED | OUTPATIENT
Start: 2022-01-01 | End: 2022-01-01 | Stop reason: HOSPADM

## 2022-01-01 RX ORDER — MORPHINE SULFATE 2 MG/ML
2 INJECTION, SOLUTION INTRAMUSCULAR; INTRAVENOUS
Status: DISCONTINUED | OUTPATIENT
Start: 2022-01-01 | End: 2022-01-01 | Stop reason: HOSPADM

## 2022-01-01 RX ORDER — FENTANYL CITRATE 50 UG/ML
50 INJECTION, SOLUTION INTRAMUSCULAR; INTRAVENOUS ONCE
Status: COMPLETED | OUTPATIENT
Start: 2022-01-01 | End: 2022-01-01

## 2022-01-01 RX ORDER — PROPOFOL 10 MG/ML
INJECTION, EMULSION INTRAVENOUS PRN
Status: DISCONTINUED | OUTPATIENT
Start: 2022-01-01 | End: 2022-01-01 | Stop reason: SDUPTHER

## 2022-01-01 RX ORDER — LEVOTHYROXINE SODIUM 0.05 MG/1
50 TABLET ORAL DAILY
Status: DISCONTINUED | OUTPATIENT
Start: 2022-01-01 | End: 2022-01-01 | Stop reason: HOSPADM

## 2022-01-01 RX ORDER — GABAPENTIN 300 MG/1
300 CAPSULE ORAL NIGHTLY
Status: DISCONTINUED | OUTPATIENT
Start: 2022-01-01 | End: 2022-01-01 | Stop reason: HOSPADM

## 2022-01-01 RX ORDER — LACTOBACILLUS RHAMNOSUS GG 10B CELL
1 CAPSULE ORAL DAILY
Status: DISCONTINUED | OUTPATIENT
Start: 2022-01-01 | End: 2022-01-01 | Stop reason: HOSPADM

## 2022-01-01 RX ORDER — BUMETANIDE 0.25 MG/ML
0.5 INJECTION, SOLUTION INTRAMUSCULAR; INTRAVENOUS ONCE
Status: COMPLETED | OUTPATIENT
Start: 2022-01-01 | End: 2022-01-01

## 2022-01-01 RX ORDER — ONDANSETRON 2 MG/ML
4 INJECTION INTRAMUSCULAR; INTRAVENOUS ONCE
Status: COMPLETED | OUTPATIENT
Start: 2022-01-01 | End: 2022-01-01

## 2022-01-01 RX ORDER — BISACODYL 10 MG
10 SUPPOSITORY, RECTAL RECTAL DAILY PRN
Status: DISCONTINUED | OUTPATIENT
Start: 2022-01-01 | End: 2022-01-01 | Stop reason: HOSPADM

## 2022-01-01 RX ORDER — POTASSIUM CHLORIDE 20 MEQ/1
40 TABLET, EXTENDED RELEASE ORAL ONCE
Status: COMPLETED | OUTPATIENT
Start: 2022-01-01 | End: 2022-01-01

## 2022-01-01 RX ORDER — 0.9 % SODIUM CHLORIDE 0.9 %
1000 INTRAVENOUS SOLUTION INTRAVENOUS ONCE
Status: COMPLETED | OUTPATIENT
Start: 2022-01-01 | End: 2022-01-01

## 2022-01-01 RX ORDER — LORAZEPAM 2 MG/ML
1 INJECTION INTRAMUSCULAR
Status: DISCONTINUED | OUTPATIENT
Start: 2022-01-01 | End: 2022-01-01 | Stop reason: HOSPADM

## 2022-01-01 RX ORDER — AMLODIPINE BESYLATE 2.5 MG/1
2.5 TABLET ORAL DAILY
Status: DISCONTINUED | OUTPATIENT
Start: 2022-01-01 | End: 2022-01-01 | Stop reason: HOSPADM

## 2022-01-01 RX ORDER — IPRATROPIUM BROMIDE AND ALBUTEROL SULFATE 2.5; .5 MG/3ML; MG/3ML
1 SOLUTION RESPIRATORY (INHALATION) 4 TIMES DAILY
Status: DISCONTINUED | OUTPATIENT
Start: 2022-01-01 | End: 2022-01-01 | Stop reason: HOSPADM

## 2022-01-01 RX ORDER — WARFARIN SODIUM 4 MG/1
8 TABLET ORAL DAILY
Status: DISCONTINUED | OUTPATIENT
Start: 2022-01-01 | End: 2022-01-01 | Stop reason: HOSPADM

## 2022-01-01 RX ORDER — FERROUS SULFATE 325(65) MG
325 TABLET ORAL 2 TIMES DAILY
Status: DISCONTINUED | OUTPATIENT
Start: 2022-01-01 | End: 2022-01-01 | Stop reason: HOSPADM

## 2022-01-01 RX ORDER — MORPHINE SULFATE 4 MG/ML
4 INJECTION, SOLUTION INTRAMUSCULAR; INTRAVENOUS
Status: DISCONTINUED | OUTPATIENT
Start: 2022-01-01 | End: 2022-01-01 | Stop reason: HOSPADM

## 2022-01-01 RX ORDER — ONDANSETRON 2 MG/ML
4 INJECTION INTRAMUSCULAR; INTRAVENOUS EVERY 6 HOURS PRN
Status: DISCONTINUED | OUTPATIENT
Start: 2022-01-01 | End: 2022-01-01 | Stop reason: HOSPADM

## 2022-01-01 RX ORDER — ATORVASTATIN CALCIUM 20 MG/1
20 TABLET, FILM COATED ORAL NIGHTLY
Status: DISCONTINUED | OUTPATIENT
Start: 2022-01-01 | End: 2022-01-01 | Stop reason: HOSPADM

## 2022-01-01 RX ORDER — POTASSIUM CHLORIDE 7.45 MG/ML
10 INJECTION INTRAVENOUS
Status: COMPLETED | OUTPATIENT
Start: 2022-01-01 | End: 2022-01-01

## 2022-01-01 RX ADMIN — IPRATROPIUM BROMIDE AND ALBUTEROL SULFATE 1 AMPULE: 2.5; .5 SOLUTION RESPIRATORY (INHALATION) at 09:16

## 2022-01-01 RX ADMIN — MORPHINE SULFATE 2 MG: 2 INJECTION, SOLUTION INTRAMUSCULAR; INTRAVENOUS at 04:09

## 2022-01-01 RX ADMIN — AMPICILLIN AND SULBACTAM 3000 MG: 1; 2 INJECTION, POWDER, FOR SOLUTION INTRAMUSCULAR; INTRAVENOUS at 01:45

## 2022-01-01 RX ADMIN — AMPICILLIN AND SULBACTAM 3000 MG: 1; 2 INJECTION, POWDER, FOR SOLUTION INTRAMUSCULAR; INTRAVENOUS at 00:56

## 2022-01-01 RX ADMIN — MORPHINE SULFATE 2 MG: 2 INJECTION, SOLUTION INTRAMUSCULAR; INTRAVENOUS at 20:34

## 2022-01-01 RX ADMIN — AMPICILLIN AND SULBACTAM 3000 MG: 1; 2 INJECTION, POWDER, FOR SOLUTION INTRAMUSCULAR; INTRAVENOUS at 21:09

## 2022-01-01 RX ADMIN — ATORVASTATIN CALCIUM 20 MG: 20 TABLET, FILM COATED ORAL at 20:59

## 2022-01-01 RX ADMIN — ANORECTAL OINTMENT: 15.7; .44; 24; 20.6 OINTMENT TOPICAL at 08:25

## 2022-01-01 RX ADMIN — AMPICILLIN AND SULBACTAM 3000 MG: 1; 2 INJECTION, POWDER, FOR SOLUTION INTRAMUSCULAR; INTRAVENOUS at 08:28

## 2022-01-01 RX ADMIN — IPRATROPIUM BROMIDE AND ALBUTEROL SULFATE 1 AMPULE: 2.5; .5 SOLUTION RESPIRATORY (INHALATION) at 12:40

## 2022-01-01 RX ADMIN — PETROLATUM: 420 OINTMENT TOPICAL at 08:26

## 2022-01-01 RX ADMIN — MORPHINE SULFATE 2 MG: 2 INJECTION, SOLUTION INTRAMUSCULAR; INTRAVENOUS at 18:31

## 2022-01-01 RX ADMIN — SODIUM CHLORIDE: 9 INJECTION, SOLUTION INTRAVENOUS at 00:04

## 2022-01-01 RX ADMIN — COLLAGENASE SANTYL: 250 OINTMENT TOPICAL at 08:25

## 2022-01-01 RX ADMIN — LORAZEPAM 1 MG: 2 INJECTION INTRAMUSCULAR; INTRAVENOUS at 18:31

## 2022-01-01 RX ADMIN — GABAPENTIN 300 MG: 300 CAPSULE ORAL at 20:59

## 2022-01-01 RX ADMIN — LEVOTHYROXINE SODIUM 50 MCG: 0.05 TABLET ORAL at 06:22

## 2022-01-01 RX ADMIN — SODIUM CHLORIDE 1000 ML: 9 INJECTION, SOLUTION INTRAVENOUS at 17:47

## 2022-01-01 RX ADMIN — LORAZEPAM 1 MG: 2 INJECTION INTRAMUSCULAR; INTRAVENOUS at 22:15

## 2022-01-01 RX ADMIN — GLYCOPYRROLATE 0.2 MG: 0.2 INJECTION INTRAMUSCULAR; INTRAVENOUS at 20:34

## 2022-01-01 RX ADMIN — AMPICILLIN AND SULBACTAM 3000 MG: 1; 2 INJECTION, POWDER, FOR SOLUTION INTRAMUSCULAR; INTRAVENOUS at 13:51

## 2022-01-01 RX ADMIN — ANORECTAL OINTMENT: 15.7; .44; 24; 20.6 OINTMENT TOPICAL at 08:26

## 2022-01-01 RX ADMIN — LORAZEPAM 1 MG: 2 INJECTION INTRAMUSCULAR; INTRAVENOUS at 02:48

## 2022-01-01 RX ADMIN — AMPICILLIN AND SULBACTAM 3000 MG: 1; 2 INJECTION, POWDER, FOR SOLUTION INTRAMUSCULAR; INTRAVENOUS at 19:42

## 2022-01-01 RX ADMIN — ONDANSETRON 4 MG: 2 INJECTION INTRAMUSCULAR; INTRAVENOUS at 17:58

## 2022-01-01 RX ADMIN — POTASSIUM CHLORIDE 10 MEQ: 10 INJECTION, SOLUTION INTRAVENOUS at 12:31

## 2022-01-01 RX ADMIN — LEVOTHYROXINE SODIUM 50 MCG: 0.05 TABLET ORAL at 11:22

## 2022-01-01 RX ADMIN — AMPICILLIN AND SULBACTAM 3000 MG: 1; 2 INJECTION, POWDER, FOR SOLUTION INTRAMUSCULAR; INTRAVENOUS at 09:25

## 2022-01-01 RX ADMIN — POTASSIUM CHLORIDE 20 MEQ: 1500 TABLET, EXTENDED RELEASE ORAL at 11:22

## 2022-01-01 RX ADMIN — MICONAZOLE NITRATE: 2 POWDER TOPICAL at 21:06

## 2022-01-01 RX ADMIN — MICONAZOLE NITRATE: 2 POWDER TOPICAL at 08:26

## 2022-01-01 RX ADMIN — SODIUM CHLORIDE: 9 INJECTION, SOLUTION INTRAVENOUS at 08:28

## 2022-01-01 RX ADMIN — ANORECTAL OINTMENT: 15.7; .44; 24; 20.6 OINTMENT TOPICAL at 21:05

## 2022-01-01 RX ADMIN — MORPHINE SULFATE 2 MG: 2 INJECTION, SOLUTION INTRAMUSCULAR; INTRAVENOUS at 21:26

## 2022-01-01 RX ADMIN — IPRATROPIUM BROMIDE AND ALBUTEROL SULFATE 1 AMPULE: 2.5; .5 SOLUTION RESPIRATORY (INHALATION) at 20:22

## 2022-01-01 RX ADMIN — ANORECTAL OINTMENT: 15.7; .44; 24; 20.6 OINTMENT TOPICAL at 21:07

## 2022-01-01 RX ADMIN — MORPHINE SULFATE 2 MG: 2 INJECTION, SOLUTION INTRAMUSCULAR; INTRAVENOUS at 01:04

## 2022-01-01 RX ADMIN — FERROUS SULFATE TAB 325 MG (65 MG ELEMENTAL FE) 325 MG: 325 (65 FE) TAB at 11:22

## 2022-01-01 RX ADMIN — IPRATROPIUM BROMIDE AND ALBUTEROL SULFATE 1 AMPULE: 2.5; .5 SOLUTION RESPIRATORY (INHALATION) at 15:47

## 2022-01-01 RX ADMIN — Medication 1 CAPSULE: at 11:22

## 2022-01-01 RX ADMIN — POTASSIUM CHLORIDE 10 MEQ: 10 INJECTION, SOLUTION INTRAVENOUS at 09:54

## 2022-01-01 RX ADMIN — MICONAZOLE NITRATE: 2 POWDER TOPICAL at 19:41

## 2022-01-01 RX ADMIN — IPRATROPIUM BROMIDE AND ALBUTEROL SULFATE 1 AMPULE: 2.5; .5 SOLUTION RESPIRATORY (INHALATION) at 20:10

## 2022-01-01 RX ADMIN — COLLAGENASE SANTYL: 250 OINTMENT TOPICAL at 10:55

## 2022-01-01 RX ADMIN — IPRATROPIUM BROMIDE AND ALBUTEROL SULFATE 1 AMPULE: 2.5; .5 SOLUTION RESPIRATORY (INHALATION) at 08:43

## 2022-01-01 RX ADMIN — MICONAZOLE NITRATE: 2 POWDER TOPICAL at 21:05

## 2022-01-01 RX ADMIN — BUMETANIDE 0.5 MG: 0.25 INJECTION, SOLUTION INTRAMUSCULAR; INTRAVENOUS at 11:22

## 2022-01-01 RX ADMIN — IPRATROPIUM BROMIDE AND ALBUTEROL SULFATE 1 AMPULE: 2.5; .5 SOLUTION RESPIRATORY (INHALATION) at 11:51

## 2022-01-01 RX ADMIN — MICONAZOLE NITRATE: 2 POWDER TOPICAL at 07:50

## 2022-01-01 RX ADMIN — POTASSIUM CHLORIDE 40 MEQ: 1500 TABLET, EXTENDED RELEASE ORAL at 12:14

## 2022-01-01 RX ADMIN — METOPROLOL TARTRATE 25 MG: 25 TABLET, FILM COATED ORAL at 20:59

## 2022-01-01 RX ADMIN — IPRATROPIUM BROMIDE AND ALBUTEROL SULFATE 1 AMPULE: 2.5; .5 SOLUTION RESPIRATORY (INHALATION) at 19:39

## 2022-01-01 RX ADMIN — IPRATROPIUM BROMIDE AND ALBUTEROL SULFATE 1 AMPULE: 2.5; .5 SOLUTION RESPIRATORY (INHALATION) at 08:01

## 2022-01-01 RX ADMIN — POTASSIUM CHLORIDE 10 MEQ: 10 INJECTION, SOLUTION INTRAVENOUS at 10:54

## 2022-01-01 RX ADMIN — COLLAGENASE SANTYL: 250 OINTMENT TOPICAL at 08:26

## 2022-01-01 RX ADMIN — POTASSIUM CHLORIDE 20 MEQ: 1500 TABLET, EXTENDED RELEASE ORAL at 20:59

## 2022-01-01 RX ADMIN — WARFARIN SODIUM 8 MG: 4 TABLET ORAL at 17:42

## 2022-01-01 RX ADMIN — FERROUS SULFATE TAB 325 MG (65 MG ELEMENTAL FE) 325 MG: 325 (65 FE) TAB at 20:59

## 2022-01-01 RX ADMIN — AMPICILLIN AND SULBACTAM 3000 MG: 1; 2 INJECTION, POWDER, FOR SOLUTION INTRAMUSCULAR; INTRAVENOUS at 21:03

## 2022-01-01 RX ADMIN — MICONAZOLE NITRATE: 2 POWDER TOPICAL at 10:55

## 2022-01-01 RX ADMIN — MORPHINE SULFATE 2 MG: 2 INJECTION, SOLUTION INTRAMUSCULAR; INTRAVENOUS at 22:14

## 2022-01-01 RX ADMIN — PETROLATUM: 420 OINTMENT TOPICAL at 09:28

## 2022-01-01 RX ADMIN — AMPICILLIN AND SULBACTAM 3000 MG: 1; 2 INJECTION, POWDER, FOR SOLUTION INTRAMUSCULAR; INTRAVENOUS at 08:29

## 2022-01-01 RX ADMIN — SODIUM CHLORIDE 3000 MG: 900 INJECTION INTRAVENOUS at 12:53

## 2022-01-01 RX ADMIN — LORAZEPAM 1 MG: 2 INJECTION INTRAMUSCULAR; INTRAVENOUS at 17:40

## 2022-01-01 RX ADMIN — ANORECTAL OINTMENT: 15.7; .44; 24; 20.6 OINTMENT TOPICAL at 19:40

## 2022-01-01 RX ADMIN — FUROSEMIDE 40 MG: 10 INJECTION, SOLUTION INTRAMUSCULAR; INTRAVENOUS at 13:08

## 2022-01-01 RX ADMIN — SODIUM CHLORIDE 3000 MG: 900 INJECTION INTRAVENOUS at 00:04

## 2022-01-01 RX ADMIN — IPRATROPIUM BROMIDE AND ALBUTEROL SULFATE 1 AMPULE: 2.5; .5 SOLUTION RESPIRATORY (INHALATION) at 16:26

## 2022-01-01 RX ADMIN — SODIUM CHLORIDE: 9 INJECTION, SOLUTION INTRAVENOUS at 12:53

## 2022-01-01 RX ADMIN — GLYCOPYRROLATE 0.2 MG: 0.2 INJECTION INTRAMUSCULAR; INTRAVENOUS at 00:48

## 2022-01-01 RX ADMIN — MICONAZOLE NITRATE: 2 POWDER TOPICAL at 08:25

## 2022-01-01 RX ADMIN — MICONAZOLE NITRATE: 2 POWDER TOPICAL at 19:40

## 2022-01-01 RX ADMIN — AMPICILLIN AND SULBACTAM 3000 MG: 1; 2 INJECTION, POWDER, FOR SOLUTION INTRAMUSCULAR; INTRAVENOUS at 13:59

## 2022-01-01 RX ADMIN — ANORECTAL OINTMENT: 15.7; .44; 24; 20.6 OINTMENT TOPICAL at 09:28

## 2022-01-01 RX ADMIN — MORPHINE SULFATE 2 MG: 2 INJECTION, SOLUTION INTRAMUSCULAR; INTRAVENOUS at 17:39

## 2022-01-01 RX ADMIN — PROPOFOL 110 MG: 10 INJECTION, EMULSION INTRAVENOUS at 14:57

## 2022-01-01 RX ADMIN — IPRATROPIUM BROMIDE AND ALBUTEROL SULFATE 1 AMPULE: 2.5; .5 SOLUTION RESPIRATORY (INHALATION) at 16:51

## 2022-01-01 RX ADMIN — AMLODIPINE BESYLATE 2.5 MG: 2.5 TABLET ORAL at 11:22

## 2022-01-01 RX ADMIN — PANTOPRAZOLE SODIUM 40 MG: 40 TABLET, DELAYED RELEASE ORAL at 11:22

## 2022-01-01 RX ADMIN — IPRATROPIUM BROMIDE AND ALBUTEROL SULFATE 1 AMPULE: 2.5; .5 SOLUTION RESPIRATORY (INHALATION) at 11:37

## 2022-01-01 RX ADMIN — METOPROLOL TARTRATE 25 MG: 25 TABLET, FILM COATED ORAL at 11:22

## 2022-01-01 RX ADMIN — AMPICILLIN AND SULBACTAM 3000 MG: 1; 2 INJECTION, POWDER, FOR SOLUTION INTRAMUSCULAR; INTRAVENOUS at 13:14

## 2022-01-01 RX ADMIN — SODIUM CHLORIDE: 9 INJECTION, SOLUTION INTRAVENOUS at 11:01

## 2022-01-01 RX ADMIN — FENTANYL CITRATE 50 MCG: 50 INJECTION INTRAMUSCULAR; INTRAVENOUS at 17:59

## 2022-01-01 ASSESSMENT — PAIN SCALES - GENERAL
PAINLEVEL_OUTOF10: 0
PAINLEVEL_OUTOF10: 1
PAINLEVEL_OUTOF10: 0
PAINLEVEL_OUTOF10: 0
PAINLEVEL_OUTOF10: 7
PAINLEVEL_OUTOF10: 0
PAINLEVEL_OUTOF10: 0
PAINLEVEL_OUTOF10: 7
PAINLEVEL_OUTOF10: 0

## 2022-01-01 ASSESSMENT — PAIN SCALES - WONG BAKER
WONGBAKER_NUMERICALRESPONSE: 0

## 2022-01-01 ASSESSMENT — PAIN DESCRIPTION - PAIN TYPE: TYPE: ACUTE PAIN

## 2022-01-01 ASSESSMENT — PAIN DESCRIPTION - LOCATION
LOCATION: ABDOMEN
LOCATION: ABDOMEN

## 2022-01-01 ASSESSMENT — ENCOUNTER SYMPTOMS: SHORTNESS OF BREATH: 1

## 2022-01-01 ASSESSMENT — PAIN - FUNCTIONAL ASSESSMENT: PAIN_FUNCTIONAL_ASSESSMENT: 0-10

## 2022-04-22 DIAGNOSIS — E11.9 TYPE 2 DIABETES MELLITUS WITHOUT COMPLICATION, WITHOUT LONG-TERM CURRENT USE OF INSULIN (HCC): ICD-10-CM

## 2022-04-22 DIAGNOSIS — E03.9 ACQUIRED HYPOTHYROIDISM: ICD-10-CM

## 2022-04-22 PROBLEM — I49.5 SINUS NODE DYSFUNCTION (HCC): Status: RESOLVED | Noted: 2021-04-13 | Resolved: 2022-04-22

## 2022-04-22 PROBLEM — N18.30 CHRONIC RENAL DISEASE, STAGE III (HCC): Status: ACTIVE | Noted: 2022-01-01

## 2022-04-22 LAB
ALBUMIN SERPL-MCNC: 4 G/DL (ref 3.5–5.2)
ALP BLD-CCNC: 56 U/L (ref 40–129)
ALT SERPL-CCNC: 10 U/L (ref 0–40)
ANION GAP SERPL CALCULATED.3IONS-SCNC: 14 MMOL/L (ref 7–16)
AST SERPL-CCNC: 18 U/L (ref 0–39)
BASOPHILS ABSOLUTE: 0.07 E9/L (ref 0–0.2)
BASOPHILS RELATIVE PERCENT: 1.3 % (ref 0–2)
BILIRUB SERPL-MCNC: 0.3 MG/DL (ref 0–1.2)
BUN BLDV-MCNC: 31 MG/DL (ref 6–23)
CALCIUM SERPL-MCNC: 9.5 MG/DL (ref 8.6–10.2)
CHLORIDE BLD-SCNC: 102 MMOL/L (ref 98–107)
CHOLESTEROL, TOTAL: 190 MG/DL (ref 0–199)
CO2: 24 MMOL/L (ref 22–29)
CREAT SERPL-MCNC: 1.4 MG/DL (ref 0.7–1.2)
EOSINOPHILS ABSOLUTE: 0.17 E9/L (ref 0.05–0.5)
EOSINOPHILS RELATIVE PERCENT: 3.1 % (ref 0–6)
GFR AFRICAN AMERICAN: 59
GFR NON-AFRICAN AMERICAN: 49 ML/MIN/1.73
GLUCOSE BLD-MCNC: 133 MG/DL (ref 74–99)
HBA1C MFR BLD: 6.4 % (ref 4–5.6)
HCT VFR BLD CALC: 43.6 % (ref 37–54)
HDLC SERPL-MCNC: 35 MG/DL
HEMOGLOBIN: 13.4 G/DL (ref 12.5–16.5)
IMMATURE GRANULOCYTES #: 0.03 E9/L
IMMATURE GRANULOCYTES %: 0.5 % (ref 0–5)
LDL CHOLESTEROL CALCULATED: 103 MG/DL (ref 0–99)
LYMPHOCYTES ABSOLUTE: 1.24 E9/L (ref 1.5–4)
LYMPHOCYTES RELATIVE PERCENT: 22.3 % (ref 20–42)
MCH RBC QN AUTO: 29.7 PG (ref 26–35)
MCHC RBC AUTO-ENTMCNC: 30.7 % (ref 32–34.5)
MCV RBC AUTO: 96.7 FL (ref 80–99.9)
MONOCYTES ABSOLUTE: 0.37 E9/L (ref 0.1–0.95)
MONOCYTES RELATIVE PERCENT: 6.7 % (ref 2–12)
NEUTROPHILS ABSOLUTE: 3.67 E9/L (ref 1.8–7.3)
NEUTROPHILS RELATIVE PERCENT: 66.1 % (ref 43–80)
PDW BLD-RTO: 14.3 FL (ref 11.5–15)
PLATELET # BLD: 215 E9/L (ref 130–450)
PMV BLD AUTO: 10.6 FL (ref 7–12)
POTASSIUM SERPL-SCNC: 4.6 MMOL/L (ref 3.5–5)
RBC # BLD: 4.51 E12/L (ref 3.8–5.8)
SODIUM BLD-SCNC: 140 MMOL/L (ref 132–146)
TOTAL PROTEIN: 6.9 G/DL (ref 6.4–8.3)
TRIGL SERPL-MCNC: 258 MG/DL (ref 0–149)
TSH SERPL DL<=0.05 MIU/L-ACNC: 2.34 UIU/ML (ref 0.27–4.2)
VLDLC SERPL CALC-MCNC: 52 MG/DL
WBC # BLD: 5.6 E9/L (ref 4.5–11.5)

## 2022-06-12 ENCOUNTER — APPOINTMENT (OUTPATIENT)
Dept: GENERAL RADIOLOGY | Age: 77
End: 2022-06-12
Payer: MEDICARE

## 2022-06-12 ENCOUNTER — HOSPITAL ENCOUNTER (EMERGENCY)
Age: 77
Discharge: HOME | End: 2022-06-12
Attending: STUDENT IN AN ORGANIZED HEALTH CARE EDUCATION/TRAINING PROGRAM
Payer: MEDICARE

## 2022-06-12 VITALS
WEIGHT: 290 LBS | BODY MASS INDEX: 37.22 KG/M2 | HEIGHT: 74 IN | HEART RATE: 75 BPM | RESPIRATION RATE: 16 BRPM | DIASTOLIC BLOOD PRESSURE: 74 MMHG | TEMPERATURE: 97.3 F | OXYGEN SATURATION: 100 % | SYSTOLIC BLOOD PRESSURE: 138 MMHG

## 2022-06-12 DIAGNOSIS — R79.1 ELEVATED INR: ICD-10-CM

## 2022-06-12 DIAGNOSIS — M25.551 RIGHT HIP PAIN: Primary | ICD-10-CM

## 2022-06-12 LAB
ANION GAP SERPL CALCULATED.3IONS-SCNC: 15 MMOL/L (ref 7–16)
BASOPHILS ABSOLUTE: 0.05 E9/L (ref 0–0.2)
BASOPHILS RELATIVE PERCENT: 0.7 % (ref 0–2)
BUN BLDV-MCNC: 46 MG/DL (ref 6–23)
CALCIUM SERPL-MCNC: 9.8 MG/DL (ref 8.6–10.2)
CHLORIDE BLD-SCNC: 103 MMOL/L (ref 98–107)
CO2: 20 MMOL/L (ref 22–29)
CREAT SERPL-MCNC: 2.2 MG/DL (ref 0.7–1.2)
EOSINOPHILS ABSOLUTE: 0.11 E9/L (ref 0.05–0.5)
EOSINOPHILS RELATIVE PERCENT: 1.4 % (ref 0–6)
GFR AFRICAN AMERICAN: 35
GFR NON-AFRICAN AMERICAN: 29 ML/MIN/1.73
GLUCOSE BLD-MCNC: 167 MG/DL (ref 74–99)
HCT VFR BLD CALC: 40.6 % (ref 37–54)
HEMOGLOBIN: 13.2 G/DL (ref 12.5–16.5)
IMMATURE GRANULOCYTES #: 0.03 E9/L
IMMATURE GRANULOCYTES %: 0.4 % (ref 0–5)
INR BLD: 4
LYMPHOCYTES ABSOLUTE: 1.16 E9/L (ref 1.5–4)
LYMPHOCYTES RELATIVE PERCENT: 15.1 % (ref 20–42)
MCH RBC QN AUTO: 30 PG (ref 26–35)
MCHC RBC AUTO-ENTMCNC: 32.5 % (ref 32–34.5)
MCV RBC AUTO: 92.3 FL (ref 80–99.9)
MONOCYTES ABSOLUTE: 0.76 E9/L (ref 0.1–0.95)
MONOCYTES RELATIVE PERCENT: 9.9 % (ref 2–12)
NEUTROPHILS ABSOLUTE: 5.57 E9/L (ref 1.8–7.3)
NEUTROPHILS RELATIVE PERCENT: 72.5 % (ref 43–80)
PDW BLD-RTO: 14.1 FL (ref 11.5–15)
PLATELET # BLD: 208 E9/L (ref 130–450)
PMV BLD AUTO: 10.3 FL (ref 7–12)
POTASSIUM REFLEX MAGNESIUM: 4.6 MMOL/L (ref 3.5–5)
PROTHROMBIN TIME: 42.9 SEC (ref 9.3–12.4)
RBC # BLD: 4.4 E12/L (ref 3.8–5.8)
SODIUM BLD-SCNC: 138 MMOL/L (ref 132–146)
WBC # BLD: 7.7 E9/L (ref 4.5–11.5)

## 2022-06-12 PROCEDURE — 85610 PROTHROMBIN TIME: CPT

## 2022-06-12 PROCEDURE — 73502 X-RAY EXAM HIP UNI 2-3 VIEWS: CPT

## 2022-06-12 PROCEDURE — 99284 EMERGENCY DEPT VISIT MOD MDM: CPT

## 2022-06-12 PROCEDURE — 96374 THER/PROPH/DIAG INJ IV PUSH: CPT

## 2022-06-12 PROCEDURE — 85025 COMPLETE CBC W/AUTO DIFF WBC: CPT

## 2022-06-12 PROCEDURE — 2580000003 HC RX 258: Performed by: STUDENT IN AN ORGANIZED HEALTH CARE EDUCATION/TRAINING PROGRAM

## 2022-06-12 PROCEDURE — 6370000000 HC RX 637 (ALT 250 FOR IP): Performed by: STUDENT IN AN ORGANIZED HEALTH CARE EDUCATION/TRAINING PROGRAM

## 2022-06-12 PROCEDURE — 80048 BASIC METABOLIC PNL TOTAL CA: CPT

## 2022-06-12 PROCEDURE — 6360000002 HC RX W HCPCS: Performed by: STUDENT IN AN ORGANIZED HEALTH CARE EDUCATION/TRAINING PROGRAM

## 2022-06-12 PROCEDURE — 72170 X-RAY EXAM OF PELVIS: CPT

## 2022-06-12 RX ORDER — HYDROCODONE BITARTRATE AND ACETAMINOPHEN 5; 325 MG/1; MG/1
1 TABLET ORAL EVERY 6 HOURS PRN
Qty: 10 TABLET | Refills: 0 | Status: SHIPPED | OUTPATIENT
Start: 2022-06-12 | End: 2022-06-15

## 2022-06-12 RX ORDER — FENTANYL CITRATE 50 UG/ML
25 INJECTION, SOLUTION INTRAMUSCULAR; INTRAVENOUS ONCE
Status: COMPLETED | OUTPATIENT
Start: 2022-06-12 | End: 2022-06-12

## 2022-06-12 RX ORDER — 0.9 % SODIUM CHLORIDE 0.9 %
1000 INTRAVENOUS SOLUTION INTRAVENOUS ONCE
Status: COMPLETED | OUTPATIENT
Start: 2022-06-12 | End: 2022-06-12

## 2022-06-12 RX ORDER — HYDROCODONE BITARTRATE AND ACETAMINOPHEN 5; 325 MG/1; MG/1
1 TABLET ORAL ONCE
Status: COMPLETED | OUTPATIENT
Start: 2022-06-12 | End: 2022-06-12

## 2022-06-12 RX ADMIN — HYDROCODONE BITARTRATE AND ACETAMINOPHEN 1 TABLET: 5; 325 TABLET ORAL at 12:40

## 2022-06-12 RX ADMIN — FENTANYL CITRATE 25 MCG: 50 INJECTION, SOLUTION INTRAMUSCULAR; INTRAVENOUS at 12:29

## 2022-06-12 RX ADMIN — SODIUM CHLORIDE 1000 ML: 9 INJECTION, SOLUTION INTRAVENOUS at 11:38

## 2022-06-12 ASSESSMENT — ENCOUNTER SYMPTOMS
SHORTNESS OF BREATH: 0
CHEST TIGHTNESS: 0

## 2022-06-12 ASSESSMENT — PAIN SCALES - GENERAL
PAINLEVEL_OUTOF10: 8
PAINLEVEL_OUTOF10: 8

## 2022-06-12 NOTE — ED NOTES
Complete ambulated patient walked w the assistance of his walker. Denies any c/o dizziness or vertigo.  When asked states \"the pain is still there but it is tolerable\"     Marialuisa Bowen LPN  19/88/86 6957

## 2022-06-12 NOTE — ED NOTES
Pt states that he was at a wedding last night and was fatigued he fell in the parking lot and landed on his rt hip, he states that he is having rt hip pain, he is able to move it he states that it is painful, he also states that his rt knee Is also painful, he is alert times 3, resp are easy, lungs are clear a and p, abdomen is soft with bowel sounds times 4, he does have trace edema to his bilateral lower legs and his pedal pulses are plus 2 bilateral, his pedal pushes and pulls are strong      Cari Wilde RN  06/12/22 [id number]

## 2022-06-12 NOTE — ED NOTES
Pt states that he is unable to ambulate he states that he just feels so weak, will notify md Lee Ann Brady, RN  06/12/22 [id number]

## 2022-06-12 NOTE — ED PROVIDER NOTES
66 yo male with a PMH of atrial fibrillation on warfarin, stroke, DM, Factor 5 deficiency presents post fall. Patient states that he was at a wedding last night in [facility] [employer]. He was walking out to the parking lot and lost his balance and fell to the ground on his right side. He denies hitting his head. The fall was witnessed by his son who confirmed this. He denies any nausea, dizziness, weakness prior to the fall. He reports that his pain is about 8/10 in nature, with some radiation to the right knee. The pain is worse with movement and better at rest. He states that yesterday he was able to undress himself and get into bed. However this morning he was unable to due to worsening pain. The history is provided by the patient and a relative. Review of Systems   Respiratory: Negative for chest tightness and shortness of breath. Cardiovascular: Negative for chest pain and palpitations. Musculoskeletal: Positive for gait problem. Negative for joint swelling. Hip pain   Neurological: Positive for weakness. Negative for light-headedness. Physical Exam  Cardiovascular:      Rate and Rhythm: Normal rate and regular rhythm. Pulses: Normal pulses. Heart sounds: Normal heart sounds. No murmur heard. Pulmonary:      Effort: Pulmonary effort is normal. No respiratory distress. Breath sounds: Normal breath sounds. No wheezing or rales. Musculoskeletal:         General: Tenderness (Tenderness present on the lateral side of the hip.) present. Right hip: Bony tenderness present. Decreased range of motion. Decreased strength. Right lower leg: No edema. Left lower leg: No edema. Comments: Pain with external rotation and flexion of hip. No bruising noted. Able to bear weight but with pain.    Neurological:      Gait: Gait abnormal.          Procedures     MDM  Number of Diagnoses or Management Options  Elevated INR  Right hip pain  Diagnosis management comments: 68 yo male with a PMH of atrial fibrillation on warfarin, stroke, DM, Factor 5 deficiency presents post fall. Patient has pain with flexion and external rotation of hip otherwise resting comfortably. No bruising was noted. X-ray of pelvis and hip was remarkable for moderate osteoarthritic changes of the hips with no evidence of acute fracture or subluxation. Patient was initially not able to ambulate due to pain. He was treated with pain medication and was able to ambulate in the hallway with his walker. He was discharged in stable condition with close follow-up with orthopedics. ED Course as of 06/12/22 1422   Sun Jun 12, 2022   1105 ATTENDING PROVIDER ATTESTATION:     I have personally performed and/or participated in the history, exam, medical decision making, and procedures and agree with all pertinent clinical information unless otherwise noted. I have also reviewed and agree with the past medical, family and social history unless otherwise noted. I have discussed this patient in detail with the resident, and provided the instruction and education regarding the patient. My findings/plan: This is a 67year old male with history of a. Fib on anticoagulation, multiple CVAs who presents for evaluation of right hip pain. Notes that yesterday he was at a wedding and out and about a lot more than he typically is. He got out of the car, felt a little dizzy and then fell backwards. He hit his right side coming down. Notes that when he got home had a hard time walking and now is having pain to his right hip. Denies any numbness, tingling. He is on anticoagulation, but did not hit his head, no LOC. His son did witness the fall. On exam there is a hematoma noted to the posterior thigh on the right. He has decreased ROM secondary to pain and is tender to palpation along the right greater trochanter. Plan for labs, imaging, supportive care.        [BB]   1200 XR HIP RIGHT (2-3 VIEWS)  Moderate osteoarthritic changes of the hips with no evidence of acute  fracture or subluxation. [SD]      ED Course User Index  [BB] Ricky Harvey DO  [SD] Reyna Mckeon MD         ED Course as of 06/12/22 1422   Sun Jun 12, 2022   1105 ATTENDING PROVIDER ATTESTATION:     I have personally performed and/or participated in the history, exam, medical decision making, and procedures and agree with all pertinent clinical information unless otherwise noted. I have also reviewed and agree with the past medical, family and social history unless otherwise noted. I have discussed this patient in detail with the resident, and provided the instruction and education regarding the patient. My findings/plan: This is a 67year old male with history of a. Fib on anticoagulation, multiple CVAs who presents for evaluation of right hip pain. Notes that yesterday he was at a wedding and out and about a lot more than he typically is. He got out of the car, felt a little dizzy and then fell backwards. He hit his right side coming down. Notes that when he got home had a hard time walking and now is having pain to his right hip. Denies any numbness, tingling. He is on anticoagulation, but did not hit his head, no LOC. His son did witness the fall. On exam there is a hematoma noted to the posterior thigh on the right. He has decreased ROM secondary to pain and is tender to palpation along the right greater trochanter. Plan for labs, imaging, supportive care.        [BB]   1200 XR HIP RIGHT (2-3 VIEWS)  Moderate osteoarthritic changes of the hips with no evidence of acute  fracture or subluxation. [SD]      ED Course User Index  [BB] Ricky Harvey DO  [SD] Reyna Mckeon MD       --------------------------------------------- PAST HISTORY ---------------------------------------------  Past Medical History:  has a past medical history of At[facility] fibrillation ([facility] Cancer ([facility] Diabetes mellitus ([facility] 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[facility] Hypertension, Ischemic stroke ([facility] Pacemaker, Paraplegia ([facility] Sinus node dysfunction ([facility] Stroke (cerebrum) ([facility] and Thyroid disease. Past Surgical History:  has a past surgical history that includes eye surgery; Knee arthroscopy (right knee); pr colonoscopy flx dx w/collj spec when pfrmd (N/A, 3/20/2018); Colonoscopy; pr egd percutaneous placement gastrostomy tube (N/A, 3/29/2018); pacemaker placement; pr egd percutaneous placement gastrostomy tube (N/A, 9/12/2018); and Cardiac pacemaker placement (12/19/2014). Social History:  reports that he has never smoked. He has never used smokeless tobacco. He reports that he does not drink alcohol and does not use drugs. Family History: family history includes Heart Disease in his mother; Stroke in his father. The patients home medications have been reviewed. Allergies: Patient has no known allergies.     -------------------------------------------------- RESULTS -------------------------------------------------  Labs:  Results for orders placed or performed during the hospital encounter of 06/12/22   Protime-INR   Result Value Ref Range    Protime 42.9 (H) 9.3 - 12.4 sec    INR 4.0    CBC with Auto Differential   Result Value Ref Range    WBC 7.7 4.5 - 11.5 E9/L    RBC 4.40 3.80 - 5.80 E12/L    Hemoglobin 13.2 12.5 - 16.5 g/dL    Hematocrit 40.6 37.0 - 54.0 %    MCV 92.3 80.0 - 99.9 fL    MCH 30.0 26.0 - 35.0 pg    MCHC 32.5 32.0 - 34.5 %    RDW 14.1 11.5 - 15.0 fL    Platelets 928 720 - 955 E9/L    MPV 10.3 7.0 - 12.0 fL    Neutrophils % 72.5 43.0 - 80.0 %    Immature Granulocytes % 0.4 0.0 - 5.0 %    Lymphocytes % 15.1 (L) 20.0 - 42.0 %    Monocytes % 9.9 2.0 - 12.0 %    Eosinophils % 1.4 0.0 - 6.0 %    Basophils % 0.7 0.0 - 2.0 %    Neutrophils Absolute 5.57 1.80 - 7.30 E9/L    Immature Granulocytes # 0.03 E9/L    Lymphocytes Absolute 1.16 (L) 1.50 - 4.00 E9/L    Monocytes Absolute 0.76 0.10 - 0.95 E9/L    Eosinophils Absolute 0. 11 0.05 - 0.50 E9/L    Basophils Absolute 0.05 0.00 - 0.20 Z6/Q   Basic Metabolic Panel w/ Reflex to MG   Result Value Ref Range    Sodium 138 132 - 146 mmol/L    Potassium reflex Magnesium 4.6 3.5 - 5.0 mmol/L    Chloride 103 98 - 107 mmol/L    CO2 20 (L) 22 - 29 mmol/L    Anion Gap 15 7 - 16 mmol/L    Glucose 167 (H) 74 - 99 mg/dL    BUN 46 (H) 6 - 23 mg/dL    CREATININE 2.2 (H) 0.7 - 1.2 mg/dL    GFR Non-African American 29 >=60 mL/min/1.73    GFR African American 35     Calcium 9.8 8.6 - 10.2 mg/dL       Radiology:  XR HIP RIGHT (2-3 VIEWS)   Final Result   Moderate osteoarthritic changes of the hips with no evidence of acute   fracture or subluxation. RECOMMENDATION:   If clinical symptoms persist, I recommend a CT scan of the hips. XR PELVIS (1-2 VIEWS)   Final Result   Moderate osteoarthritic changes of the hips with no evidence of acute   fracture or subluxation. RECOMMENDATION:   If clinical symptoms persist, I recommend a CT scan of the hips.             ------------------------- NURSING NOTES AND VITALS REVIEWED ---------------------------  Date / Time Roomed:  6/12/2022 10:07 AM  ED Bed Assignment:  16/16    The nursing notes within the ED encounter and vital signs as below have been reviewed. /66   Pulse 73   Temp 97.9 °F (36.6 °C) (Infrared)   Resp 14   Ht 6' 2\" (1.88 m)   Wt 290 lb (131.5 kg)   SpO2 100%   BMI 37.23 kg/m²   Oxygen Saturation Interpretation: Normal      ------------------------------------------ PROGRESS NOTES ------------------------------------------  2:02 PM EDT  I have spoken with the patient and discussed todays results, in addition to providing specific details for the plan of care and counseling regarding the diagnosis and prognosis. Their questions are answered at this time and they are agreeable with the plan. I discussed at length with them reasons for immediate return here for re evaluation.  They will followup with their primary care physician and orthopedic physician by calling their office on Monday.      --------------------------------- ADDITIONAL PROVIDER NOTES ---------------------------------  At this time the patient is without objective evidence of an acute process requiring hospitalization or inpatient management. They have remained hemodynamically stable throughout their entire ED visit and are stable for discharge with outpatient follow-up. The plan has been discussed in detail and they are aware of the specific conditions for emergent return, as well as the importance of follow-up. New Prescriptions    HYDROCODONE-ACETAMINOPHEN (NORCO) 5-325 MG PER TABLET    Take 1 tablet by mouth every 6 hours as needed for Pain for up to 3 days. Intended supply: 3 days. Take lowest dose possible to manage pain       Diagnosis:  1. Right hip pain    2. Elevated INR New Problem       Disposition:  Patient's disposition: Discharge to home  Patient's condition is stable.      Charo Andrade MD  Resident  06/12/22 6354

## 2022-07-11 LAB
ALBUMIN SERPL-MCNC: 2.9 G/DL (ref 3.5–5.2)
ALP BLD-CCNC: 69 U/L (ref 40–129)
ALT SERPL-CCNC: 34 U/L (ref 0–40)
ANION GAP SERPL CALCULATED.3IONS-SCNC: 11 MMOL/L (ref 7–16)
AST SERPL-CCNC: 41 U/L (ref 0–39)
BASOPHILS ABSOLUTE: 0.04 E9/L (ref 0–0.2)
BASOPHILS RELATIVE PERCENT: 0.7 % (ref 0–2)
BILIRUB SERPL-MCNC: 0.4 MG/DL (ref 0–1.2)
BUN BLDV-MCNC: 19 MG/DL (ref 6–23)
CALCIUM SERPL-MCNC: 8.2 MG/DL (ref 8.6–10.2)
CHLORIDE BLD-SCNC: 107 MMOL/L (ref 98–107)
CHOLESTEROL, TOTAL: 169 MG/DL (ref 0–199)
CO2: 21 MMOL/L (ref 22–29)
CREAT SERPL-MCNC: 2 MG/DL (ref 0.7–1.2)
EOSINOPHILS ABSOLUTE: 0.17 E9/L (ref 0.05–0.5)
EOSINOPHILS RELATIVE PERCENT: 2.9 % (ref 0–6)
GFR AFRICAN AMERICAN: 39
GFR NON-AFRICAN AMERICAN: 33 ML/MIN/1.73
GLUCOSE BLD-MCNC: 180 MG/DL (ref 74–99)
HBA1C MFR BLD: 6.3 % (ref 4–5.6)
HCT VFR BLD CALC: 29.8 % (ref 37–54)
HDLC SERPL-MCNC: 32 MG/DL
HEMOGLOBIN: 9.5 G/DL (ref 12.5–16.5)
IMMATURE GRANULOCYTES #: 0.1 E9/L
IMMATURE GRANULOCYTES %: 1.7 % (ref 0–5)
INR BLD: 1.1
LDL CHOLESTEROL CALCULATED: 96 MG/DL (ref 0–99)
LYMPHOCYTES ABSOLUTE: 1.33 E9/L (ref 1.5–4)
LYMPHOCYTES RELATIVE PERCENT: 22.8 % (ref 20–42)
MCH RBC QN AUTO: 30.4 PG (ref 26–35)
MCHC RBC AUTO-ENTMCNC: 31.9 % (ref 32–34.5)
MCV RBC AUTO: 95.2 FL (ref 80–99.9)
MONOCYTES ABSOLUTE: 0.68 E9/L (ref 0.1–0.95)
MONOCYTES RELATIVE PERCENT: 11.7 % (ref 2–12)
NEUTROPHILS ABSOLUTE: 3.51 E9/L (ref 1.8–7.3)
NEUTROPHILS RELATIVE PERCENT: 60.2 % (ref 43–80)
PDW BLD-RTO: 14.7 FL (ref 11.5–15)
PLATELET # BLD: 219 E9/L (ref 130–450)
PMV BLD AUTO: 10.4 FL (ref 7–12)
POTASSIUM SERPL-SCNC: 3.2 MMOL/L (ref 3.5–5)
PROTHROMBIN TIME: 12.2 SEC (ref 9.3–12.4)
RBC # BLD: 3.13 E12/L (ref 3.8–5.8)
SODIUM BLD-SCNC: 139 MMOL/L (ref 132–146)
TOTAL PROTEIN: 5.9 G/DL (ref 6.4–8.3)
TRIGL SERPL-MCNC: 205 MG/DL (ref 0–149)
TSH SERPL DL<=0.05 MIU/L-ACNC: 0.99 UIU/ML (ref 0.27–4.2)
VITAMIN D 25-HYDROXY: 17 NG/ML (ref 30–100)
VLDLC SERPL CALC-MCNC: 41 MG/DL
WBC # BLD: 5.8 E9/L (ref 4.5–11.5)

## 2022-07-12 LAB
ANION GAP SERPL CALCULATED.3IONS-SCNC: 14 MMOL/L (ref 7–16)
BUN BLDV-MCNC: 23 MG/DL (ref 6–23)
CALCIUM SERPL-MCNC: 8.2 MG/DL (ref 8.6–10.2)
CHLORIDE BLD-SCNC: 105 MMOL/L (ref 98–107)
CO2: 21 MMOL/L (ref 22–29)
CREAT SERPL-MCNC: 2.4 MG/DL (ref 0.7–1.2)
GFR AFRICAN AMERICAN: 32
GFR NON-AFRICAN AMERICAN: 26 ML/MIN/1.73
GLUCOSE BLD-MCNC: 159 MG/DL (ref 74–99)
INR BLD: 1
POTASSIUM SERPL-SCNC: 3.3 MMOL/L (ref 3.5–5)
PROTHROMBIN TIME: 11.4 SEC (ref 9.3–12.4)
SODIUM BLD-SCNC: 140 MMOL/L (ref 132–146)

## 2022-07-13 LAB
INR BLD: 1.1
PROTHROMBIN TIME: 12.2 SEC (ref 9.3–12.4)

## 2022-07-14 LAB — C DIFF TOXIN/ANTIGEN: NORMAL

## 2022-07-15 LAB
INR BLD: 1.3
PROTHROMBIN TIME: 14.6 SEC (ref 9.3–12.4)

## 2022-07-18 LAB
ANION GAP SERPL CALCULATED.3IONS-SCNC: 11 MMOL/L (ref 7–16)
BUN BLDV-MCNC: 41 MG/DL (ref 6–23)
CALCIUM SERPL-MCNC: 8.6 MG/DL (ref 8.6–10.2)
CHLORIDE BLD-SCNC: 114 MMOL/L (ref 98–107)
CO2: 20 MMOL/L (ref 22–29)
CREAT SERPL-MCNC: 2.6 MG/DL (ref 0.7–1.2)
GFR AFRICAN AMERICAN: 29
GFR NON-AFRICAN AMERICAN: 24 ML/MIN/1.73
GLUCOSE BLD-MCNC: 119 MG/DL (ref 74–99)
HCT VFR BLD CALC: 27.6 % (ref 37–54)
HEMOGLOBIN: 8.7 G/DL (ref 12.5–16.5)
MCH RBC QN AUTO: 29.9 PG (ref 26–35)
MCHC RBC AUTO-ENTMCNC: 31.5 % (ref 32–34.5)
MCV RBC AUTO: 94.8 FL (ref 80–99.9)
PDW BLD-RTO: 14.7 FL (ref 11.5–15)
PLATELET # BLD: 311 E9/L (ref 130–450)
PMV BLD AUTO: 10.1 FL (ref 7–12)
POTASSIUM SERPL-SCNC: 4.3 MMOL/L (ref 3.5–5)
RBC # BLD: 2.91 E12/L (ref 3.8–5.8)
SODIUM BLD-SCNC: 145 MMOL/L (ref 132–146)
WBC # BLD: 5.7 E9/L (ref 4.5–11.5)

## 2022-07-19 LAB
INR BLD: 2
PROTHROMBIN TIME: 22 SEC (ref 9.3–12.4)

## 2022-07-20 LAB
ANION GAP SERPL CALCULATED.3IONS-SCNC: 13 MMOL/L (ref 7–16)
BASOPHILS ABSOLUTE: 0.05 E9/L (ref 0–0.2)
BASOPHILS RELATIVE PERCENT: 0.9 % (ref 0–2)
BUN BLDV-MCNC: 33 MG/DL (ref 6–23)
CALCIUM SERPL-MCNC: 8.7 MG/DL (ref 8.6–10.2)
CHLORIDE BLD-SCNC: 113 MMOL/L (ref 98–107)
CO2: 19 MMOL/L (ref 22–29)
CREAT SERPL-MCNC: 2.1 MG/DL (ref 0.7–1.2)
EOSINOPHILS ABSOLUTE: 0.17 E9/L (ref 0.05–0.5)
EOSINOPHILS RELATIVE PERCENT: 3 % (ref 0–6)
GFR AFRICAN AMERICAN: 37
GFR NON-AFRICAN AMERICAN: 31 ML/MIN/1.73
GLUCOSE BLD-MCNC: 136 MG/DL (ref 74–99)
HCT VFR BLD CALC: 28.7 % (ref 37–54)
HEMOGLOBIN: 9.1 G/DL (ref 12.5–16.5)
IMMATURE GRANULOCYTES #: 0.05 E9/L
IMMATURE GRANULOCYTES %: 0.9 % (ref 0–5)
LYMPHOCYTES ABSOLUTE: 1.34 E9/L (ref 1.5–4)
LYMPHOCYTES RELATIVE PERCENT: 23.8 % (ref 20–42)
MCH RBC QN AUTO: 29.8 PG (ref 26–35)
MCHC RBC AUTO-ENTMCNC: 31.7 % (ref 32–34.5)
MCV RBC AUTO: 94.1 FL (ref 80–99.9)
MONOCYTES ABSOLUTE: 0.54 E9/L (ref 0.1–0.95)
MONOCYTES RELATIVE PERCENT: 9.6 % (ref 2–12)
NEUTROPHILS ABSOLUTE: 3.49 E9/L (ref 1.8–7.3)
NEUTROPHILS RELATIVE PERCENT: 61.8 % (ref 43–80)
PDW BLD-RTO: 14.8 FL (ref 11.5–15)
PLATELET # BLD: 341 E9/L (ref 130–450)
PMV BLD AUTO: 10.1 FL (ref 7–12)
POTASSIUM SERPL-SCNC: 4.2 MMOL/L (ref 3.5–5)
RBC # BLD: 3.05 E12/L (ref 3.8–5.8)
SODIUM BLD-SCNC: 145 MMOL/L (ref 132–146)
WBC # BLD: 5.6 E9/L (ref 4.5–11.5)

## 2022-07-21 LAB
INR BLD: 2.4
PROTHROMBIN TIME: 25.9 SEC (ref 9.3–12.4)

## 2022-07-25 LAB
ANION GAP SERPL CALCULATED.3IONS-SCNC: 11 MMOL/L (ref 7–16)
BASOPHILS ABSOLUTE: 0.08 E9/L (ref 0–0.2)
BASOPHILS RELATIVE PERCENT: 1.2 % (ref 0–2)
BUN BLDV-MCNC: 35 MG/DL (ref 6–23)
CALCIUM SERPL-MCNC: 8.8 MG/DL (ref 8.6–10.2)
CHLORIDE BLD-SCNC: 113 MMOL/L (ref 98–107)
CO2: 22 MMOL/L (ref 22–29)
CREAT SERPL-MCNC: 2.3 MG/DL (ref 0.7–1.2)
EOSINOPHILS ABSOLUTE: 0.21 E9/L (ref 0.05–0.5)
EOSINOPHILS RELATIVE PERCENT: 3.1 % (ref 0–6)
GFR AFRICAN AMERICAN: 33
GFR NON-AFRICAN AMERICAN: 28 ML/MIN/1.73
GLUCOSE BLD-MCNC: 146 MG/DL (ref 74–99)
HCT VFR BLD CALC: 30.6 % (ref 37–54)
HEMOGLOBIN: 9.6 G/DL (ref 12.5–16.5)
IMMATURE GRANULOCYTES #: 0.05 E9/L
IMMATURE GRANULOCYTES %: 0.7 % (ref 0–5)
LYMPHOCYTES ABSOLUTE: 1.49 E9/L (ref 1.5–4)
LYMPHOCYTES RELATIVE PERCENT: 21.7 % (ref 20–42)
MCH RBC QN AUTO: 29.4 PG (ref 26–35)
MCHC RBC AUTO-ENTMCNC: 31.4 % (ref 32–34.5)
MCV RBC AUTO: 93.9 FL (ref 80–99.9)
MONOCYTES ABSOLUTE: 0.76 E9/L (ref 0.1–0.95)
MONOCYTES RELATIVE PERCENT: 11.1 % (ref 2–12)
NEUTROPHILS ABSOLUTE: 4.28 E9/L (ref 1.8–7.3)
NEUTROPHILS RELATIVE PERCENT: 62.2 % (ref 43–80)
PDW BLD-RTO: 14.5 FL (ref 11.5–15)
PLATELET # BLD: 355 E9/L (ref 130–450)
PMV BLD AUTO: 10.5 FL (ref 7–12)
POTASSIUM SERPL-SCNC: 4.4 MMOL/L (ref 3.5–5)
RBC # BLD: 3.26 E12/L (ref 3.8–5.8)
SODIUM BLD-SCNC: 146 MMOL/L (ref 132–146)
WBC # BLD: 6.9 E9/L (ref 4.5–11.5)

## 2022-07-26 LAB
INR BLD: 2.5
PROTHROMBIN TIME: 26.7 SEC (ref 9.3–12.4)

## 2022-07-27 LAB
ALBUMIN SERPL-MCNC: 2.8 G/DL (ref 3.5–5.2)
ALP BLD-CCNC: 86 U/L (ref 40–129)
ALT SERPL-CCNC: 8 U/L (ref 0–40)
ANION GAP SERPL CALCULATED.3IONS-SCNC: 12 MMOL/L (ref 7–16)
AST SERPL-CCNC: 24 U/L (ref 0–39)
BASOPHILS ABSOLUTE: 0.06 E9/L (ref 0–0.2)
BASOPHILS RELATIVE PERCENT: 0.9 % (ref 0–2)
BILIRUB SERPL-MCNC: 0.3 MG/DL (ref 0–1.2)
BUN BLDV-MCNC: 37 MG/DL (ref 6–23)
CALCIUM SERPL-MCNC: 8.7 MG/DL (ref 8.6–10.2)
CHLORIDE BLD-SCNC: 106 MMOL/L (ref 98–107)
CO2: 23 MMOL/L (ref 22–29)
CREAT SERPL-MCNC: 2.3 MG/DL (ref 0.7–1.2)
EOSINOPHILS ABSOLUTE: 0.2 E9/L (ref 0.05–0.5)
EOSINOPHILS RELATIVE PERCENT: 3 % (ref 0–6)
GFR AFRICAN AMERICAN: 33
GFR NON-AFRICAN AMERICAN: 28 ML/MIN/1.73
GLUCOSE BLD-MCNC: 152 MG/DL (ref 74–99)
HCT VFR BLD CALC: 29.4 % (ref 37–54)
HEMOGLOBIN: 9.5 G/DL (ref 12.5–16.5)
IMMATURE GRANULOCYTES #: 0.06 E9/L
IMMATURE GRANULOCYTES %: 0.9 % (ref 0–5)
LYMPHOCYTES ABSOLUTE: 1.47 E9/L (ref 1.5–4)
LYMPHOCYTES RELATIVE PERCENT: 22.1 % (ref 20–42)
MAGNESIUM: 1.7 MG/DL (ref 1.6–2.6)
MCH RBC QN AUTO: 30 PG (ref 26–35)
MCHC RBC AUTO-ENTMCNC: 32.3 % (ref 32–34.5)
MCV RBC AUTO: 92.7 FL (ref 80–99.9)
MONOCYTES ABSOLUTE: 0.76 E9/L (ref 0.1–0.95)
MONOCYTES RELATIVE PERCENT: 11.4 % (ref 2–12)
NEUTROPHILS ABSOLUTE: 4.11 E9/L (ref 1.8–7.3)
NEUTROPHILS RELATIVE PERCENT: 61.7 % (ref 43–80)
PDW BLD-RTO: 14.1 FL (ref 11.5–15)
PLATELET # BLD: 297 E9/L (ref 130–450)
PMV BLD AUTO: 10.7 FL (ref 7–12)
POTASSIUM SERPL-SCNC: 4.8 MMOL/L (ref 3.5–5)
RBC # BLD: 3.17 E12/L (ref 3.8–5.8)
SODIUM BLD-SCNC: 141 MMOL/L (ref 132–146)
TOTAL PROTEIN: 6.1 G/DL (ref 6.4–8.3)
URIC ACID, SERUM: 9.3 MG/DL (ref 3.4–7)
WBC # BLD: 6.7 E9/L (ref 4.5–11.5)

## 2022-07-29 ENCOUNTER — TELEPHONE (OUTPATIENT)
Dept: NON INVASIVE DIAGNOSTICS | Age: 77
End: 2022-07-29

## 2022-08-01 ENCOUNTER — APPOINTMENT (OUTPATIENT)
Dept: CT IMAGING | Age: 77
DRG: 682 | End: 2022-08-01
Payer: MEDICARE

## 2022-08-01 ENCOUNTER — APPOINTMENT (OUTPATIENT)
Dept: GENERAL RADIOLOGY | Age: 77
DRG: 682 | End: 2022-08-01
Payer: MEDICARE

## 2022-08-01 ENCOUNTER — HOSPITAL ENCOUNTER (INPATIENT)
Age: 77
LOS: 7 days | Discharge: NURSING, REHAB OR LONG-TERM CARE | DRG: 682 | End: 2022-08-08
Attending: STUDENT IN AN ORGANIZED HEALTH CARE EDUCATION/TRAINING PROGRAM | Admitting: INTERNAL MEDICINE
Payer: MEDICARE

## 2022-08-01 DIAGNOSIS — L97.419 ULCER OF RIGHT HEEL AND MIDFOOT, UNSPECIFIED ULCER STAGE (HCC): ICD-10-CM

## 2022-08-01 DIAGNOSIS — N17.9 AKI (ACUTE KIDNEY INJURY) (HCC): ICD-10-CM

## 2022-08-01 DIAGNOSIS — R55 SYNCOPE AND COLLAPSE: ICD-10-CM

## 2022-08-01 DIAGNOSIS — S09.90XA INJURY OF HEAD, INITIAL ENCOUNTER: ICD-10-CM
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
## 2022-08-01 DIAGNOSIS — J18.9 PNEUMONIA OF BOTH LOWER LOBES DUE TO INFECTIOUS ORGANISM: ICD-10-CM

## 2022-08-01 LAB
ALBUMIN SERPL-MCNC: 3.3 G/DL (ref 3.5–5.2)
ALBUMIN SERPL-MCNC: 3.5 G/DL (ref 3.5–5.2)
ALP BLD-CCNC: 89 U/L (ref 40–129)
ALP BLD-CCNC: 93 U/L (ref 40–129)
ALT SERPL-CCNC: 8 U/L (ref 0–40)
ALT SERPL-CCNC: 9 U/L (ref 0–40)
ANION GAP SERPL CALCULATED.3IONS-SCNC: 13 MMOL/L (ref 7–16)
ANION GAP SERPL CALCULATED.3IONS-SCNC: 14 MMOL/L (ref 7–16)
APTT: 34.8 SEC (ref 24.5–35.1)
AST SERPL-CCNC: 14 U/L (ref 0–39)
AST SERPL-CCNC: 14 U/L (ref 0–39)
BASOPHILS ABSOLUTE: 0.07 E9/L (ref 0–0.2)
BASOPHILS RELATIVE PERCENT: 0.7 % (ref 0–2)
BILIRUB SERPL-MCNC: 0.2 MG/DL (ref 0–1.2)
BILIRUB SERPL-MCNC: 0.3 MG/DL (ref 0–1.2)
BILIRUBIN DIRECT: <0.2 MG/DL (ref 0–0.3)
BILIRUBIN, INDIRECT: NORMAL MG/DL (ref 0–1)
BUN BLDV-MCNC: 46 MG/DL (ref 6–23)
BUN BLDV-MCNC: 50 MG/DL (ref 6–23)
C-REACTIVE PROTEIN: 7.2 MG/DL (ref 0–0.4)
CALCIUM SERPL-MCNC: 9 MG/DL (ref 8.6–10.2)
CALCIUM SERPL-MCNC: 9.4 MG/DL (ref 8.6–10.2)
CHLORIDE BLD-SCNC: 102 MMOL/L (ref 98–107)
CHLORIDE BLD-SCNC: 104 MMOL/L (ref 98–107)
CO2: 22 MMOL/L (ref 22–29)
CO2: 23 MMOL/L (ref 22–29)
CREAT SERPL-MCNC: 2.8 MG/DL (ref 0.7–1.2)
CREAT SERPL-MCNC: 3 MG/DL (ref 0.7–1.2)
EKG ATRIAL RATE: 70 BPM
EKG P AXIS: 59 DEGREES
EKG P-R INTERVAL: 178 MS
EKG Q-T INTERVAL: 400 MS
EKG QRS DURATION: 72 MS
EKG QTC CALCULATION (BAZETT): 432 MS
EKG R AXIS: -15 DEGREES
EKG T AXIS: 4 DEGREES
EKG VENTRICULAR RATE: 70 BPM
EOSINOPHILS ABSOLUTE: 0.15 E9/L (ref 0.05–0.5)
EOSINOPHILS RELATIVE PERCENT: 1.6 % (ref 0–6)
GFR AFRICAN AMERICAN: 25
GFR AFRICAN AMERICAN: 27
GFR NON-AFRICAN AMERICAN: 20 ML/MIN/1.73
GFR NON-AFRICAN AMERICAN: 22 ML/MIN/1.73
GLUCOSE BLD-MCNC: 166 MG/DL (ref 74–99)
GLUCOSE BLD-MCNC: 192 MG/DL (ref 74–99)
HCT VFR BLD CALC: 30.8 % (ref 37–54)
HCT VFR BLD CALC: 34.4 % (ref 37–54)
HEMOGLOBIN: 10.8 G/DL (ref 12.5–16.5)
HEMOGLOBIN: 9.9 G/DL (ref 12.5–16.5)
IMMATURE GRANULOCYTES #: 0.1 E9/L
IMMATURE GRANULOCYTES %: 1 % (ref 0–5)
INR BLD: 2.2
LACTIC ACID, SEPSIS: 1.1 MMOL/L (ref 0.5–1.9)
LYMPHOCYTES ABSOLUTE: 1.09 E9/L (ref 1.5–4)
LYMPHOCYTES RELATIVE PERCENT: 11.3 % (ref 20–42)
MCH RBC QN AUTO: 29.5 PG (ref 26–35)
MCH RBC QN AUTO: 30.1 PG (ref 26–35)
MCHC RBC AUTO-ENTMCNC: 31.4 % (ref 32–34.5)
MCHC RBC AUTO-ENTMCNC: 32.1 % (ref 32–34.5)
MCV RBC AUTO: 93.6 FL (ref 80–99.9)
MCV RBC AUTO: 94 FL (ref 80–99.9)
MONOCYTES ABSOLUTE: 0.75 E9/L (ref 0.1–0.95)
MONOCYTES RELATIVE PERCENT: 7.8 % (ref 2–12)
NEUTROPHILS ABSOLUTE: 7.46 E9/L (ref 1.8–7.3)
NEUTROPHILS RELATIVE PERCENT: 77.6 % (ref 43–80)
PDW BLD-RTO: 13.7 FL (ref 11.5–15)
PDW BLD-RTO: 13.7 FL (ref 11.5–15)
PLATELET # BLD: 276 E9/L (ref 130–450)
PLATELET # BLD: 325 E9/L (ref 130–450)
PMV BLD AUTO: 10 FL (ref 7–12)
PMV BLD AUTO: 10.7 FL (ref 7–12)
POTASSIUM REFLEX MAGNESIUM: 4.9 MMOL/L (ref 3.5–5)
POTASSIUM SERPL-SCNC: 4.7 MMOL/L (ref 3.5–5)
PRO-BNP: 611 PG/ML (ref 0–450)
PROTHROMBIN TIME: 23.3 SEC (ref 9.3–12.4)
RBC # BLD: 3.29 E12/L (ref 3.8–5.8)
RBC # BLD: 3.66 E12/L (ref 3.8–5.8)
SEDIMENTATION RATE, ERYTHROCYTE: 87 MM/HR (ref 0–15)
SODIUM BLD-SCNC: 137 MMOL/L (ref 132–146)
SODIUM BLD-SCNC: 141 MMOL/L (ref 132–146)
TOTAL PROTEIN: 6.9 G/DL (ref 6.4–8.3)
TOTAL PROTEIN: 7.2 G/DL (ref 6.4–8.3)
TROPONIN, HIGH SENSITIVITY: 56 NG/L (ref 0–11)
TROPONIN, HIGH SENSITIVITY: 60 NG/L (ref 0–11)
WBC # BLD: 7.7 E9/L (ref 4.5–11.5)
WBC # BLD: 9.6 E9/L (ref 4.5–11.5)

## 2022-08-01 PROCEDURE — 80076 HEPATIC FUNCTION PANEL: CPT

## 2022-08-01 PROCEDURE — 84484 ASSAY OF TROPONIN QUANT: CPT

## 2022-08-01 PROCEDURE — 71045 X-RAY EXAM CHEST 1 VIEW: CPT

## 2022-08-01 PROCEDURE — 83605 ASSAY OF LACTIC ACID: CPT

## 2022-08-01 PROCEDURE — 93005 ELECTROCARDIOGRAM TRACING: CPT | Performed by: STUDENT IN AN ORGANIZED HEALTH CARE EDUCATION/TRAINING PROGRAM

## 2022-08-01 PROCEDURE — 87186 SC STD MICRODIL/AGAR DIL: CPT

## 2022-08-01 PROCEDURE — 72125 CT NECK SPINE W/O DYE: CPT

## 2022-08-01 PROCEDURE — 99285 EMERGENCY DEPT VISIT HI MDM: CPT

## 2022-08-01 PROCEDURE — 85651 RBC SED RATE NONAUTOMATED: CPT

## 2022-08-01 PROCEDURE — 73630 X-RAY EXAM OF FOOT: CPT

## 2022-08-01 PROCEDURE — 2580000003 HC RX 258: Performed by: STUDENT IN AN ORGANIZED HEALTH CARE EDUCATION/TRAINING PROGRAM

## 2022-08-01 PROCEDURE — 2060000000 HC ICU INTERMEDIATE R&B

## 2022-08-01 PROCEDURE — 87070 CULTURE OTHR SPECIMN AEROBIC: CPT

## 2022-08-01 PROCEDURE — 87147 CULTURE TYPE IMMUNOLOGIC: CPT

## 2022-08-01 PROCEDURE — 87077 CULTURE AEROBIC IDENTIFY: CPT

## 2022-08-01 PROCEDURE — 2500000003 HC RX 250 WO HCPCS: Performed by: STUDENT IN AN ORGANIZED HEALTH CARE EDUCATION/TRAINING PROGRAM

## 2022-08-01 PROCEDURE — 70450 CT HEAD/BRAIN W/O DYE: CPT

## 2022-08-01 PROCEDURE — 6370000000 HC RX 637 (ALT 250 FOR IP): Performed by: INTERNAL MEDICINE

## 2022-08-01 PROCEDURE — 85025 COMPLETE CBC W/AUTO DIFF WBC: CPT

## 2022-08-01 PROCEDURE — 6360000002 HC RX W HCPCS: Performed by: STUDENT IN AN ORGANIZED HEALTH CARE EDUCATION/TRAINING PROGRAM

## 2022-08-01 PROCEDURE — 83880 ASSAY OF NATRIURETIC PEPTIDE: CPT

## 2022-08-01 PROCEDURE — 86140 C-REACTIVE PROTEIN: CPT

## 2022-08-01 PROCEDURE — 80048 BASIC METABOLIC PNL TOTAL CA: CPT

## 2022-08-01 PROCEDURE — 73610 X-RAY EXAM OF ANKLE: CPT

## 2022-08-01 PROCEDURE — 85610 PROTHROMBIN TIME: CPT

## 2022-08-01 PROCEDURE — 87040 BLOOD CULTURE FOR BACTERIA: CPT

## 2022-08-01 PROCEDURE — 36415 COLL VENOUS BLD VENIPUNCTURE: CPT

## 2022-08-01 PROCEDURE — 85730 THROMBOPLASTIN TIME PARTIAL: CPT

## 2022-08-01 PROCEDURE — 87205 SMEAR GRAM STAIN: CPT

## 2022-08-01 RX ORDER — PSEUDOEPHEDRINE HCL 30 MG
100 TABLET ORAL 2 TIMES DAILY PRN
Status: ON HOLD | COMMUNITY
Start: 2022-07-08 | End: 2022-08-08 | Stop reason: HOSPADM

## 2022-08-01 RX ORDER — NICOTINE POLACRILEX 4 MG
15 LOZENGE BUCCAL PRN
Status: ON HOLD | COMMUNITY
End: 2022-08-08 | Stop reason: HOSPADM

## 2022-08-01 RX ORDER — OXYCODONE HYDROCHLORIDE AND ACETAMINOPHEN 5; 325 MG/1; MG/1
1 TABLET ORAL EVERY 6 HOURS PRN
Status: DISCONTINUED | OUTPATIENT
Start: 2022-08-01 | End: 2022-08-08 | Stop reason: HOSPADM

## 2022-08-01 RX ORDER — FUROSEMIDE 20 MG/1
20 TABLET ORAL DAILY
Status: DISCONTINUED | OUTPATIENT
Start: 2022-08-01 | End: 2022-08-02

## 2022-08-01 RX ORDER — ASCORBIC ACID 500 MG
500 TABLET ORAL DAILY
Status: DISCONTINUED | OUTPATIENT
Start: 2022-08-01 | End: 2022-08-08 | Stop reason: HOSPADM

## 2022-08-01 RX ORDER — ASCORBIC ACID 500 MG
500 TABLET ORAL DAILY
COMMUNITY
End: 2022-08-15 | Stop reason: SDUPTHER

## 2022-08-01 RX ORDER — ERGOCALCIFEROL (VITAMIN D2) 1250 MCG
50000 CAPSULE ORAL WEEKLY
Status: ON HOLD | COMMUNITY
Start: 2022-08-01 | End: 2022-08-08 | Stop reason: HOSPADM

## 2022-08-01 RX ORDER — LANOLIN ALCOHOL/MO/W.PET/CERES
400 CREAM (GRAM) TOPICAL EVERY OTHER DAY
Status: DISCONTINUED | OUTPATIENT
Start: 2022-08-03 | End: 2022-08-08 | Stop reason: HOSPADM

## 2022-08-01 RX ORDER — GABAPENTIN 300 MG/1
300 CAPSULE ORAL NIGHTLY
Status: ON HOLD | COMMUNITY
Start: 2022-07-08 | End: 2022-08-08 | Stop reason: HOSPADM

## 2022-08-01 RX ORDER — IPRATROPIUM BROMIDE AND ALBUTEROL SULFATE 2.5; .5 MG/3ML; MG/3ML
1 SOLUTION RESPIRATORY (INHALATION) EVERY 4 HOURS PRN
Status: DISCONTINUED | OUTPATIENT
Start: 2022-08-01 | End: 2022-08-08 | Stop reason: HOSPADM

## 2022-08-01 RX ORDER — LEVOTHYROXINE SODIUM 0.05 MG/1
50 TABLET ORAL DAILY
Status: DISCONTINUED | OUTPATIENT
Start: 2022-08-02 | End: 2022-08-08 | Stop reason: HOSPADM

## 2022-08-01 RX ORDER — DOCUSATE SODIUM 100 MG/1
100 CAPSULE, LIQUID FILLED ORAL 2 TIMES DAILY PRN
Status: DISCONTINUED | OUTPATIENT
Start: 2022-08-01 | End: 2022-08-08 | Stop reason: HOSPADM

## 2022-08-01 RX ORDER — WARFARIN SODIUM 4 MG/1
8 TABLET ORAL DAILY
Status: DISCONTINUED | OUTPATIENT
Start: 2022-08-01 | End: 2022-08-08 | Stop reason: HOSPADM

## 2022-08-01 RX ORDER — AMLODIPINE BESYLATE 2.5 MG/1
2.5 TABLET ORAL DAILY
Status: DISCONTINUED | OUTPATIENT
Start: 2022-08-01 | End: 2022-08-02

## 2022-08-01 RX ORDER — GUAIFENESIN AND DEXTROMETHORPHAN HYDROBROMIDE 100; 10 MG/5ML; MG/5ML
10 SOLUTION ORAL EVERY 6 HOURS PRN
Status: ON HOLD | COMMUNITY
End: 2022-08-08 | Stop reason: HOSPADM

## 2022-08-01 RX ORDER — GUAIFENESIN/DEXTROMETHORPHAN 100-10MG/5
5 SYRUP ORAL EVERY 6 HOURS PRN
Status: DISCONTINUED | OUTPATIENT
Start: 2022-08-01 | End: 2022-08-08 | Stop reason: HOSPADM

## 2022-08-01 RX ORDER — ACETAMINOPHEN 325 MG/1
650 TABLET ORAL EVERY 4 HOURS PRN
Status: DISCONTINUED | OUTPATIENT
Start: 2022-08-01 | End: 2022-08-08 | Stop reason: HOSPADM

## 2022-08-01 RX ORDER — POLYETHYLENE GLYCOL 3350 17 G/17G
17 POWDER, FOR SOLUTION ORAL DAILY PRN
Status: ON HOLD | COMMUNITY
End: 2022-08-08 | Stop reason: HOSPADM

## 2022-08-01 RX ORDER — ATORVASTATIN CALCIUM 20 MG/1
20 TABLET, FILM COATED ORAL NIGHTLY
Status: DISCONTINUED | OUTPATIENT
Start: 2022-08-01 | End: 2022-08-08 | Stop reason: HOSPADM

## 2022-08-01 RX ORDER — ACETAMINOPHEN 325 MG/1
650 TABLET ORAL EVERY 4 HOURS PRN
COMMUNITY

## 2022-08-01 RX ORDER — OMEPRAZOLE 20 MG/1
20 CAPSULE, DELAYED RELEASE ORAL DAILY
Status: DISCONTINUED | OUTPATIENT
Start: 2022-08-02 | End: 2022-08-01 | Stop reason: CLARIF

## 2022-08-01 RX ORDER — POTASSIUM CHLORIDE 20 MEQ/1
TABLET, EXTENDED RELEASE ORAL
Status: ON HOLD | COMMUNITY
Start: 2022-05-11 | End: 2022-08-08 | Stop reason: HOSPADM

## 2022-08-01 RX ORDER — 0.9 % SODIUM CHLORIDE 0.9 %
250 INTRAVENOUS SOLUTION INTRAVENOUS ONCE
Status: COMPLETED | OUTPATIENT
Start: 2022-08-01 | End: 2022-08-01

## 2022-08-01 RX ORDER — GABAPENTIN 300 MG/1
300 CAPSULE ORAL DAILY
Status: DISCONTINUED | OUTPATIENT
Start: 2022-08-02 | End: 2022-08-08 | Stop reason: HOSPADM

## 2022-08-01 RX ORDER — ASCORBIC ACID 500 MG
500 TABLET ORAL 3 TIMES DAILY
Status: ON HOLD | COMMUNITY
End: 2022-08-08 | Stop reason: HOSPADM

## 2022-08-01 RX ORDER — GABAPENTIN 300 MG/1
300 CAPSULE ORAL NIGHTLY
Status: DISCONTINUED | OUTPATIENT
Start: 2022-08-01 | End: 2022-08-08 | Stop reason: HOSPADM

## 2022-08-01 RX ORDER — DOCUSATE SODIUM 100 MG/1
CAPSULE, LIQUID FILLED ORAL
COMMUNITY
Start: 2022-07-08 | End: 2022-09-21 | Stop reason: ALTCHOICE

## 2022-08-01 RX ORDER — ERGOCALCIFEROL 1.25 MG/1
50000 CAPSULE ORAL WEEKLY
Status: DISCONTINUED | OUTPATIENT
Start: 2022-08-08 | End: 2022-08-08 | Stop reason: HOSPADM

## 2022-08-01 RX ORDER — PANTOPRAZOLE SODIUM 40 MG/1
40 TABLET, DELAYED RELEASE ORAL DAILY
COMMUNITY

## 2022-08-01 RX ORDER — PANTOPRAZOLE SODIUM 40 MG/1
40 TABLET, DELAYED RELEASE ORAL
Status: DISCONTINUED | OUTPATIENT
Start: 2022-08-02 | End: 2022-08-08 | Stop reason: HOSPADM

## 2022-08-01 RX ORDER — POTASSIUM CHLORIDE 20 MEQ/1
20 TABLET, EXTENDED RELEASE ORAL EVERY OTHER DAY
Status: DISCONTINUED | OUTPATIENT
Start: 2022-08-02 | End: 2022-08-02

## 2022-08-01 RX ORDER — POTASSIUM CHLORIDE 20 MEQ/1
20 TABLET, EXTENDED RELEASE ORAL
Status: DISCONTINUED | OUTPATIENT
Start: 2022-08-02 | End: 2022-08-02

## 2022-08-01 RX ORDER — GUAIFENESIN DEXTROMETHORPHAN HYDROBROMIDE ORAL SOLUTION 10; 100 MG/5ML; MG/5ML
10 SOLUTION ORAL EVERY 6 HOURS PRN
Status: ON HOLD | COMMUNITY
End: 2022-08-08 | Stop reason: HOSPADM

## 2022-08-01 RX ORDER — LISINOPRIL 10 MG/1
10 TABLET ORAL DAILY
Status: DISCONTINUED | OUTPATIENT
Start: 2022-08-01 | End: 2022-08-02

## 2022-08-01 RX ORDER — GABAPENTIN 300 MG/1
300 CAPSULE ORAL DAILY
Status: ON HOLD | COMMUNITY
End: 2022-08-08 | Stop reason: HOSPADM

## 2022-08-01 RX ORDER — OXYCODONE HYDROCHLORIDE 5 MG/1
5 TABLET ORAL EVERY 6 HOURS PRN
Status: ON HOLD | COMMUNITY
End: 2022-08-08 | Stop reason: HOSPADM

## 2022-08-01 RX ORDER — IPRATROPIUM BROMIDE AND ALBUTEROL SULFATE 2.5; .5 MG/3ML; MG/3ML
3 SOLUTION RESPIRATORY (INHALATION) EVERY 4 HOURS PRN
Status: ON HOLD | COMMUNITY
End: 2022-08-08 | Stop reason: HOSPADM

## 2022-08-01 RX ADMIN — FUROSEMIDE 20 MG: 20 TABLET ORAL at 21:33

## 2022-08-01 RX ADMIN — OXYCODONE HYDROCHLORIDE AND ACETAMINOPHEN 500 MG: 500 TABLET ORAL at 21:34

## 2022-08-01 RX ADMIN — WARFARIN SODIUM 8 MG: 4 TABLET ORAL at 21:32

## 2022-08-01 RX ADMIN — AMLODIPINE BESYLATE 2.5 MG: 2.5 TABLET ORAL at 21:32

## 2022-08-01 RX ADMIN — CEFEPIME 2000 MG: 2 INJECTION, POWDER, FOR SOLUTION INTRAVENOUS at 15:09

## 2022-08-01 RX ADMIN — LISINOPRIL 10 MG: 10 TABLET ORAL at 21:32

## 2022-08-01 RX ADMIN — METOPROLOL TARTRATE 25 MG: 25 TABLET, FILM COATED ORAL at 21:33

## 2022-08-01 RX ADMIN — SODIUM CHLORIDE 250 ML: 9 INJECTION, SOLUTION INTRAVENOUS at 15:05

## 2022-08-01 RX ADMIN — DOXYCYCLINE 100 MG: 100 INJECTION, POWDER, LYOPHILIZED, FOR SOLUTION INTRAVENOUS at 15:13

## 2022-08-01 RX ADMIN — ATORVASTATIN CALCIUM 20 MG: 20 TABLET, FILM COATED ORAL at 21:33

## 2022-08-01 RX ADMIN — GABAPENTIN 300 MG: 300 CAPSULE ORAL at 21:33

## 2022-08-01 ASSESSMENT — ENCOUNTER SYMPTOMS
BACK PAIN: 0
VOMITING: 0
COUGH: 0
DIARRHEA: 0
NAUSEA: 0
EYE PAIN: 0
SORE THROAT: 0
SHORTNESS OF BREATH: 0
EYE DISCHARGE: 0
WHEEZING: 0
SINUS PRESSURE: 0
ABDOMINAL PAIN: 0

## 2022-08-01 ASSESSMENT — PAIN DESCRIPTION - ONSET
ONSET: GRADUAL
ONSET: GRADUAL

## 2022-08-01 ASSESSMENT — PAIN DESCRIPTION - FREQUENCY
FREQUENCY: CONTINUOUS
FREQUENCY: CONTINUOUS

## 2022-08-01 ASSESSMENT — PAIN DESCRIPTION - PAIN TYPE
TYPE: ACUTE PAIN
TYPE: ACUTE PAIN

## 2022-08-01 ASSESSMENT — LIFESTYLE VARIABLES
HOW OFTEN DO YOU HAVE A DRINK CONTAINING ALCOHOL: NEVER
HOW OFTEN DO YOU HAVE A DRINK CONTAINING ALCOHOL: NEVER
HOW MANY STANDARD DRINKS CONTAINING ALCOHOL DO YOU HAVE ON A TYPICAL DAY: PATIENT DOES NOT DRINK

## 2022-08-01 ASSESSMENT — PAIN SCALES - GENERAL
PAINLEVEL_OUTOF10: 8
PAINLEVEL_OUTOF10: 9

## 2022-08-01 ASSESSMENT — PAIN DESCRIPTION - LOCATION
LOCATION: FOOT
LOCATION: FOOT

## 2022-08-01 ASSESSMENT — PAIN DESCRIPTION - ORIENTATION
ORIENTATION: RIGHT
ORIENTATION: RIGHT

## 2022-08-01 ASSESSMENT — PAIN - FUNCTIONAL ASSESSMENT
PAIN_FUNCTIONAL_ASSESSMENT: NONE - DENIES PAIN
PAIN_FUNCTIONAL_ASSESSMENT: PREVENTS OR INTERFERES WITH ALL ACTIVE AND SOME PASSIVE ACTIVITIES
PAIN_FUNCTIONAL_ASSESSMENT: NONE - DENIES PAIN
PAIN_FUNCTIONAL_ASSESSMENT: PREVENTS OR INTERFERES WITH ALL ACTIVE AND SOME PASSIVE ACTIVITIES

## 2022-08-01 ASSESSMENT — PAIN DESCRIPTION - DESCRIPTORS
DESCRIPTORS: ACHING;DISCOMFORT;DULL
DESCRIPTORS: ACHING;DISCOMFORT;DULL

## 2022-08-01 NOTE — CONSULTS
Podiatry Consult H&P  8/1/2022   Baker Harm       Consulting Diagnosis: heel wound right foot  Consulting Physician: Dr. Ramon Díaz    Chief Complaint: Fall, Loss of consciousness, head injury    SUBJECTIVE: Kathy Holguin is a 67 y.o.noninsulin dependant diabetic male who presented to the [facility] ED after a fall with loss of consciousness and injury to head. Podiatrist consulted on 8/1/2022 for evaluation management of right heel unstageable decubitus ulcer. Patient son is at bedside and assisting with HPI. He recently underwent a nephrectomy secondary to cancer and was sent to a rehab facility for the last month around July 11. Geena Duran He does not recall the exact date of the ulcer beginning however states that on July 22 his son received a call stating that they had noticed the ulcer to the right heel. He states that he did not have this ulceration prior to entering the facility. He was treated in the facility with regular wound care and offloading with heel protectors. He denies any acute injuries however states that he was in a wheelchair in the facility and had been pivoting off of the right heel. Prior to facility he use the walker and was ambulatory. He states that he had a stroke x2 most recently in 2016 and 1 prior to that 2014 that is left him with neurologic deficits sensation to left side of the body without any muscular strength deficit. He also endorses a history of well-controlled non-insulin-dependent type 2 diabetes mellitus however does endorse some numbness to bilateral lower extremities particularly toes left greater than right. He denies any nausea, vomiting, diarrhea, fevers, chills, shortness of breath, calf pain, chest pain. X-rays were obtained in the ED and are pending 8/1/2022. Arterial studies ordered and pending 8/1/2022. Wound cultures were obtained and sent of the right heel 8/1/2022. Offloading heel protectors applied bilaterally.       Past Medical History:   Diagnosis Date    Atrial fibrillation ([facility]     Cancer ([facility]     kidney    Diabetes mellitus ([facility]     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[facility]     Hypertension     Ischemic stroke ([facility] 03/06/2018    Pacemaker     Paraplegia ([facility]     Sinus node dysfunction ([facility]     Stroke (cerebrum) ([facility] 02/27/2018    Thyroid disease         Past Surgical History:   Procedure Laterality Date    CARDIAC PACEMAKER PLACEMENT  12/19/2014    COLONOSCOPY      EYE SURGERY      KNEE ARTHROSCOPY  right knee    PACEMAKER PLACEMENT      VT COLONOSCOPY FLX DX W/COLLJ SPEC WHEN PFRMD N/A 3/20/2018    COLONOSCOPY DIAGNOSTIC performed by Liam Price MD at [address] EGD PERCUTANEOUS PLACEMENT GASTROSTOMY TUBE N/A 3/29/2018    PEG TUBE/PT. IN 5507 B performed by Liam Price MD at [address] EGD PERCUTANEOUS PLACEMENT GASTROSTOMY TUBE N/A 9/12/2018    EGD PEG TUBE PLACEMENT performed by Liam Price MD at [facility] ENDOSCOPY         Family History   Problem Relation Age of Onset    Heart Disease Mother     Stroke Father         Social History     Tobacco Use    Smoking status: Never    Smokeless tobacco: Never    Tobacco comments:     occassional cigar   Substance Use Topics    Alcohol use: No        Prior to Admission medications    Medication Sig Start Date End Date Taking? Authorizing Provider   nystatin-triamcinolone (MYCOLOG II) 938579-1.1 UNIT/GM-% cream APPLY TO AFFECTED AREA FOR 12 HOURS ON AND THEN 12 HOURS OFF.  USE AS NEEDED  Patient not taking: Reported on 4/22/2022 12/20/21   Historical Provider, MD   amLODIPine (NORVASC) 2.5 MG tablet Take 1 tablet by mouth daily 8/20/21 4/22/22  Lesley Wang DO   Magnesium Oxide 400 MG CAPS Take 1 capsule by mouth every other day 4/30/21   Lesley Wang DO   potassium chloride (KLOR-CON M) 20 MEQ TBCR extended release tablet Take 1 tablet by mouth daily 3/31/21 4/22/22  Lesley Wang DO   metoprolol tartrate (LOPRESSOR) 25 MG tablet Take 1 tablet by mouth 2 times daily 2/19/21   Lesley Kathleen,    lisinopril (PRINIVIL;ZESTRIL) 10 MG tablet Take 1 tablet by mouth daily 2/19/21   Lesley Wang, DO   levothyroxine (SYNTHROID) 50 MCG tablet Take 1 tablet by mouth Daily 2/19/21   Lesley Wang, DO   metFORMIN (GLUCOPHAGE) 1000 MG tablet Take 1 tablet by mouth 2 times daily 2/19/21   Lesley Wang, DO   lovastatin (MEVACOR) 20 MG tablet Take 1 tablet by mouth nightly 11/20/20 4/22/22  Lesley Wang, DO   warfarin (COUMADIN) 5 MG tablet Take 1.5 tablets by mouth daily 10/30/20   Lesley Wang, DO   omeprazole (PRILOSEC) 20 MG delayed release capsule Take 1 capsule by mouth daily 5/22/20 4/22/22  Lesley Wang, DO   furosemide (LASIX) 40 MG tablet Take 0.5 tablets by mouth daily 1/17/20   Lesley Wang DO   blood glucose test strips (EZ SMART BLOOD GLUCOSE TEST) strip 1 each by In Vitro route 2 times daily As needed. 2/11/19   Lesley Wang DO        Bee venom         OBJECTIVE:        Vitals:    08/01/22 1656   BP: 138/80   Pulse: 80   Resp: 18   Temp: 98.2 °F (36.8 °C)   SpO2: 98%                EXAM:        Pt is AAOx3    Vascular Exam:  DP and PT pulses faintly palpable bilaterally. CFT brisk less than 3 secs all pedal digits bilaterally. Periwound erythema right heel. No edema, no ecchymosis    Neuro Exam: Diminished tactile sensation bilaterally left worse than right. Dermatologic Exam: Well-hydrated skin with skin wrinkles noted  -Unstageable decubitus ulcer right heel with central well adhered stable eschar and periwound erythema, ring of ecchymosis. Deroofed periwound tissue reveals healthy granular wound bed with mild malodor, no crepitus or fluctuance, no undermining tunneling, no proximal streaking, no drainage. MSK: Muscle strength 5/5 Bilateral.  Reducible hallux malleus left and hammertoes digits 2 through 5 bilateral.  Ankle equinus bilateral.  Negative clonus, negative Babinski sign. Soft compressible posterior muscle compartments bilaterally.     No current facility-administered medications for this encounter. Lab Results   Component Value Date    WBC 9.6 08/01/2022    HCT 34.4 (L) 08/01/2022    HGB 10.8 (L) 08/01/2022     08/01/2022     08/01/2022    K 4.9 08/01/2022     08/01/2022    CO2 22 08/01/2022    BUN 50 (H) 08/01/2022    CREATININE 3.0 (H) 08/01/2022    GLUCOSE 192 (H) 08/01/2022    CRP 4.9 (H) 10/03/2018         ASSESSMENT:  -Unstageable diabetic decubitus ulcer right heel, POA, not acutely infected  -Peripheral arterial disease  -Non-insulin-dependent type 2 diabetes mellitus with peripheral neuropathy  -Stroke x2 with left sided sensory deficit  -Hallux malleus left, hammertoe digits 2 through 5 bilaterally      PLAN:  - Examined and evaluated  - All labs, imaging, and charts reviewed  - WBC: 9.6  - Wound cultures right heel obtained 8/1/2022: Pending  -ABX: Doxy/cefepime, managed by IM  - X-ray right foot/ankle 8/1/22: Pending  - Arterial studies 8/1/2022: Pending  -Dressings: Santyl, DSD. Daily changes.  -Heel protectors bilateral lower extremities. -We will hold any acute surgical invention retrospective pending x-ray findings. More than likely we will continue local wound care offloading dressings bilaterally.  -We will continue to follow     D/W: Dr. Reynaldo Melendrez    Thank you for involving podiatry in this patients care. Please do not hesitate to call with any questions or concerns.      Manny Aggarwal Podiatry PGY3  8/1/2022   5:09 PM 93

## 2022-08-01 NOTE — PLAN OF CARE
Problem: Skin/Tissue Integrity  Goal: Absence of new skin breakdown  Outcome: Progressing     Problem: Pain  Goal: Verbalizes/displays adequate comfort level or baseline comfort level  Outcome: Progressing  Flowsheets (Taken 8/1/2022 8270)  Verbalizes/displays adequate comfort level or baseline comfort level:   Encourage patient to monitor pain and request assistance   Assess pain using appropriate pain scale   Administer analgesics based on type and severity of pain and evaluate response   Implement non-pharmacological measures as appropriate and evaluate response     Problem: Discharge Planning  Goal: Discharge to home or other facility with appropriate resources  Outcome: Progressing     Problem: Safety - Adult  Goal: Free from fall injury  Outcome: Progressing     Problem: Chronic Conditions and Co-morbidities  Goal: Patient's chronic conditions and co-morbidity symptoms are monitored and maintained or improved  Outcome: Progressing     Problem: ABCDS Injury Assessment  Goal: Absence of physical injury  Outcome: Progressing

## 2022-08-01 NOTE — ED PROVIDER NOTES
69-year-old male presented to the ED for a fall, triage is mechanical fall, but patient tells me he lost consciousness and hit his head. He is on Coumadin for A. fib, he denied this to me, is somewhat confused and speaking to him some this limits the history. He is alert and oriented x2, knew he was at the hospital, and his name, did not know the year or month. Complaining of a small headache located the back of his head, associate with fall, sudden onset, persistent, worsened by nothing, improved by nothing, moderate severity. Review of Systems   Constitutional:  Negative for chills and fever. HENT:  Negative for ear pain, sinus pressure and sore throat. Eyes:  Negative for pain and discharge. Respiratory:  Negative for cough, shortness of breath and wheezing. Cardiovascular:  Negative for chest pain. Gastrointestinal:  Negative for abdominal pain, diarrhea, nausea and vomiting. Genitourinary:  Negative for dysuria and frequency. Musculoskeletal:  Negative for arthralgias and back pain. Skin:  Negative for rash and wound. Neurological:  Positive for syncope and headaches. Negative for weakness. Hematological:  Negative for adenopathy. Psychiatric/Behavioral:  Positive for confusion. All other systems reviewed and are negative. Physical Exam  Vitals and nursing note reviewed. Constitutional:       Appearance: He is well-developed. HENT:      Head: Normocephalic and atraumatic. Right Ear: External ear normal.      Left Ear: External ear normal.      Nose: Nose normal.      Mouth/Throat:      Mouth: Mucous membranes are moist.   Eyes:      Extraocular Movements: Extraocular movements intact. Conjunctiva/sclera: Conjunctivae normal.      Pupils: Pupils are equal, round, and reactive to light. Cardiovascular:      Rate and Rhythm: Normal rate and regular rhythm. Pulses: Normal pulses. Heart sounds: Normal heart sounds. No murmur heard.   Pulmonary: Effort: Pulmonary effort is normal. No respiratory distress. Breath sounds: Normal breath sounds. No wheezing or rales. Abdominal:      General: Bowel sounds are normal.      Palpations: Abdomen is soft. Tenderness: There is no abdominal tenderness. There is no guarding or rebound. Musculoskeletal:         General: No tenderness or deformity. Cervical back: Normal range of motion and neck supple. Skin:     General: Skin is warm and dry. Neurological:      Mental Status: He is alert. He is disoriented. Cranial Nerves: No cranial nerve deficit. Sensory: No sensory deficit. Motor: No weakness. 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 Rate: 70  Rhythm: Sinus  Interpretation: non-specific EKG  Comparison: stable as compared to patient's most recent EKG      [JG]   1417 Bilateral pneumonia seen on chest x-ray, will treat and admit for patient's altered mentation, syncope and fall [JG]   18 34-year-old male presented emergency department for fall, unwitnessed fall at [facility], is on Coumadin, per him he lost consciousness. Neurologically intact other than being mildly confused, was alert and oriented x2, he is on Coumadin head CT was ordered, was negative. Mild JUAN laboratory work, given fluids, appear to have pneumonia, culture sent, started on Doxy and cefepime, admitted to hospital for further work-up management of syncope and pneumonia.  [JG]   1551 XR ANKLE RIGHT (MIN 3 VIEWS) [JG]      ED Course User Index  [JG] Jenaro Berkowitz MD      34-year-old male presented emergency department for fall, unwitnessed fall at [address] [place] [address], is on Coumadin, per him he lost consciousness. Neurologically intact other than being mildly confused, was alert and oriented x2, he is on Coumadin head CT was ordered, was negative. 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 Rate: 70  Rhythm: Sinus  Interpretation: non-specific EKG  Comparison: stable as compared to patient's most recent EKG      [JG]   1417 Bilateral pneumonia seen on chest x-ray, will treat and admit for patient's altered mentation, syncope and fall [JG]   18 26-year-old male presented emergency department for fall, unwitnessed fall at [address] [place] [address], is on Coumadin, per him he lost consciousness. Neurologically intact other than being mildly confused, was alert and oriented x2, he is on Coumadin head CT was ordered, was negative. Mild JUAN laboratory work, given fluids, appear to have pneumonia, culture sent, started on Doxy and cefepime, admitted to hospital for further work-up management of syncope and pneumonia. [JG]   1551 XR ANKLE RIGHT (MIN 3 VIEWS) [JG]      ED Course User Index  [JG] Robert Scruggs MD         --------------------------------------------- PAST HISTORY ---------------------------------------------  Past Medical History:  has a past medical history of Atrial fibrillation ([facility] Cancer ([facility] Diabetes mellitus ([facility] 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[facility] Hypertension, Ischemic stroke ([facility] Pacemaker, Paraplegia ([address] Sinus node dysfunction ([address] Stroke (cerebrum) ([address] and Thyroid disease.     Past Surgical History:  has a past surgical history that includes eye surgery; Knee arthroscopy (right knee); pr colonoscopy flx dx w/collj spec when pfrmd (N/A, 3/20/2018); Colonoscopy; pr egd percutaneous placement gastrostomy tube (N/A, 3/29/2018); pacemaker placement; pr egd percutaneous placement gastrostomy tube (N/A, 9/12/2018); and Cardiac pacemaker placement (12/19/2014). Social History:  reports that he has never smoked. He has never used smokeless tobacco. He reports that he does not drink alcohol and does not use drugs. Family History: family history includes Heart Disease in his mother; Stroke in his father. The patients home medications have been reviewed. Allergies: Bee venom    -------------------------------------------------- RESULTS -------------------------------------------------    LABS:  Results for orders placed or performed during the hospital encounter of 08/01/22   Culture, Blood 1    Specimen: Blood   Result Value Ref Range    Blood Culture, Routine 24 Hours no growth    Culture, Blood 2    Specimen: Blood   Result Value Ref Range    Culture, Blood 2 24 Hours no growth    Gram stain    Specimen: Foot   Result Value Ref Range    Gram Stain Orderable       Gram stain performed from swab, interpret results with  caution. Swab specimens of sterile fluids are inferior to  aspirate specimens for organism recovery.   Rare Polymorphonuclear leukocytes  Few Epithelial cells  Abundant Gram positive cocci  Moderate Gram negative rods  Moderate Gram positive rods     CBC with Auto Differential   Result Value Ref Range    WBC 9.6 4.5 - 11.5 E9/L    RBC 3.66 (L) 3.80 - 5.80 E12/L    Hemoglobin 10.8 (L) 12.5 - 16.5 g/dL    Hematocrit 34.4 (L) 37.0 - 54.0 %    MCV 94.0 80.0 - 99.9 fL    MCH 29.5 26.0 - 35.0 pg    MCHC 31.4 (L) 32.0 - 34.5 %    RDW 13.7 11.5 - 15.0 fL    Platelets 590 665 - 305 E9/L    MPV 10.0 7.0 - 12.0 fL    Neutrophils % 77.6 43.0 - 80.0 %    Immature Granulocytes % 1.0 0.0 - 5.0 %    Lymphocytes % 11.3 (L) 20.0 - 42.0 % Monocytes % 7.8 2.0 - 12.0 %    Eosinophils % 1.6 0.0 - 6.0 %    Basophils % 0.7 0.0 - 2.0 %    Neutrophils Absolute 7.46 (H) 1.80 - 7.30 E9/L    Immature Granulocytes # 0.10 E9/L    Lymphocytes Absolute 1.09 (L) 1.50 - 4.00 E9/L    Monocytes Absolute 0.75 0.10 - 0.95 E9/L    Eosinophils Absolute 0.15 0.05 - 0.50 E9/L    Basophils Absolute 0.07 0.00 - 0.20 L2/X   Basic Metabolic Panel w/ Reflex to MG   Result Value Ref Range    Sodium 137 132 - 146 mmol/L    Potassium reflex Magnesium 4.9 3.5 - 5.0 mmol/L    Chloride 102 98 - 107 mmol/L    CO2 22 22 - 29 mmol/L    Anion Gap 13 7 - 16 mmol/L    Glucose 192 (H) 74 - 99 mg/dL    BUN 50 (H) 6 - 23 mg/dL    Creatinine 3.0 (H) 0.7 - 1.2 mg/dL    GFR Non-African American 20 >=60 mL/min/1.73    GFR African American 25     Calcium 9.4 8.6 - 10.2 mg/dL   Hepatic Function Panel   Result Value Ref Range    Total Protein 7.2 6.4 - 8.3 g/dL    Albumin 3.5 3.5 - 5.2 g/dL    Alkaline Phosphatase 93 40 - 129 U/L    ALT 9 0 - 40 U/L    AST 14 0 - 39 U/L    Total Bilirubin 0.2 0.0 - 1.2 mg/dL    Bilirubin, Direct <0.2 0.0 - 0.3 mg/dL    Bilirubin, Indirect see below 0.0 - 1.0 mg/dL   Troponin   Result Value Ref Range    Troponin, High Sensitivity 60 (H) 0 - 11 ng/L   Brain Natriuretic Peptide   Result Value Ref Range    Pro- (H) 0 - 450 pg/mL   Protime-INR   Result Value Ref Range    Protime 23.3 (H) 9.3 - 12.4 sec    INR 2.2    APTT   Result Value Ref Range    aPTT 34.8 24.5 - 35.1 sec   Lactate, Sepsis   Result Value Ref Range    Lactic Acid, Sepsis 1.1 0.5 - 1.9 mmol/L   Troponin   Result Value Ref Range    Troponin, High Sensitivity 56 (H) 0 - 11 ng/L   Sedimentation Rate   Result Value Ref Range    Sed Rate 87 (H) 0 - 15 mm/Hr   C-Reactive Protein   Result Value Ref Range    CRP 7.2 (H) 0.0 - 0.4 mg/dL   Protime-INR   Result Value Ref Range    Protime 23.9 (H) 9.3 - 12.4 sec    INR 2.2    Urinalysis with Microscopic   Result Value Ref Range    Color, UA Yellow Straw/Yellow Clarity, UA Clear Clear    Glucose, Ur Negative Negative mg/dL    Bilirubin Urine Negative Negative    Ketones, Urine Negative Negative mg/dL    Specific Gravity, UA 1.015 1.005 - 1.030    Blood, Urine Negative Negative    pH, UA 7.5 5.0 - 9.0    Protein, UA TRACE Negative mg/dL    Urobilinogen, Urine 0.2 <2.0 E.U./dL    Nitrite, Urine Negative Negative    Leukocyte Esterase, Urine LARGE (A) Negative    WBC, UA >20 (A) 0 - 5 /HPF    RBC, UA 2-5 0 - 2 /HPF    Epithelial Cells, UA NONE SEEN /HPF    Bacteria, UA MODERATE (A) None Seen /HPF   UREA NITROGEN, URINE   Result Value Ref Range    Urea Nitrogen, Ur 865 800 - 1666 mg/dL   Urine electrolytes   Result Value Ref Range    Sodium, Ur 58 Not Established mmol/L    Potassium, Ur 93.0 Not Established mmol/L    Chloride 25 Not Established mmol/L   Creatinine, urine, random   Result Value Ref Range    Creatinine, Ur 515 (H) 40 - 278 mg/dL   Basic Metabolic Panel   Result Value Ref Range    Sodium 139 132 - 146 mmol/L    Potassium 4.9 3.5 - 5.0 mmol/L    Chloride 103 98 - 107 mmol/L    CO2 19 (L) 22 - 29 mmol/L    Anion Gap 17 (H) 7 - 16 mmol/L    Glucose 170 (H) 74 - 99 mg/dL    BUN 55 (H) 6 - 23 mg/dL    Creatinine 3.3 (H) 0.7 - 1.2 mg/dL    GFR Non-African American 18 >=60 mL/min/1.73    GFR African American 22     Calcium 8.9 8.6 - 10.2 mg/dL   Magnesium   Result Value Ref Range    Magnesium 2.0 1.6 - 2.6 mg/dL   Phosphorus   Result Value Ref Range    Phosphorus 4.7 (H) 2.5 - 4.5 mg/dL   Protime-INR   Result Value Ref Range    Protime 27.6 (H) 9.3 - 12.4 sec    INR 2.6    Basic Metabolic Panel   Result Value Ref Range    Sodium 137 132 - 146 mmol/L    Potassium 4.3 3.5 - 5.0 mmol/L    Chloride 104 98 - 107 mmol/L    CO2 18 (L) 22 - 29 mmol/L    Anion Gap 15 7 - 16 mmol/L    Glucose 185 (H) 74 - 99 mg/dL    BUN 59 (H) 6 - 23 mg/dL    Creatinine 4.1 (H) 0.7 - 1.2 mg/dL    GFR Non-African American 14 >=60 mL/min/1.73    GFR African American 17     Calcium 8.7 8.6 - 10.2 mg/dL   CBC with Auto Differential   Result Value Ref Range    WBC 8.0 4.5 - 11.5 E9/L    RBC 2.95 (L) 3.80 - 5.80 E12/L    Hemoglobin 8.7 (L) 12.5 - 16.5 g/dL    Hematocrit 28.4 (L) 37.0 - 54.0 %    MCV 96.3 80.0 - 99.9 fL    MCH 29.5 26.0 - 35.0 pg    MCHC 30.6 (L) 32.0 - 34.5 %    RDW 14.5 11.5 - 15.0 fL    Platelets 369 075 - 853 E9/L    MPV 10.1 7.0 - 12.0 fL    Neutrophils % 66.2 43.0 - 80.0 %    Immature Granulocytes % 0.6 0.0 - 5.0 %    Lymphocytes % 20.8 20.0 - 42.0 %    Monocytes % 9.6 2.0 - 12.0 %    Eosinophils % 1.9 0.0 - 6.0 %    Basophils % 0.9 0.0 - 2.0 %    Neutrophils Absolute 5.31 1.80 - 7.30 E9/L    Immature Granulocytes # 0.05 E9/L    Lymphocytes Absolute 1.67 1.50 - 4.00 E9/L    Monocytes Absolute 0.77 0.10 - 0.95 E9/L    Eosinophils Absolute 0.15 0.05 - 0.50 E9/L    Basophils Absolute 0.07 0.00 - 0.20 E9/L   CK   Result Value Ref Range    Total  20 - 200 U/L   EKG 12 Lead   Result Value Ref Range    Ventricular Rate 70 BPM    Atrial Rate 70 BPM    P-R Interval 178 ms    QRS Duration 72 ms    Q-T Interval 400 ms    QTc Calculation (Bazett) 432 ms    P Axis 59 degrees    R Axis -15 degrees    T Axis 4 degrees       RADIOLOGY:  XR FOOT RIGHT (MIN 3 VIEWS)   Final Result   No acute osseous abnormality. XR ANKLE RIGHT (MIN 3 VIEWS)   Final Result   No acute abnormality of the ankle. CT Head WO Contrast   Final Result   1. There is no acute intracranial hemorrhage   2. Old left occipital lobe infarct. .         CT Cervical Spine WO Contrast   Final Result   1. There is no acute compression fracture or subluxation of the cervical   spine. 2. Multilevel degenerative disc and degenerative joint disease. 3. Development of posterior degenerative endplate spurs at the X1-7 level   causing bilateral neural foraminal narrowing. XR CHEST PORTABLE   Final Result   Bilateral lower lobe atelectasis/pneumonia, left worse than right. Small left pleural effusion. Stable enlargement of the cardiac silhouette. VL LOWER EXTREMITY ARTERIAL SEGMENTAL PRESSURES W PPG    (Results Pending)   US RETROPERITONEAL COMPLETE    (Results Pending)       ------------------------- NURSING NOTES AND VITALS REVIEWED ---------------------------  Date / Time Roomed:  8/1/2022 12:26 PM  ED Bed Assignment:  [id number]    The nursing notes within the ED encounter and vital signs as below have been reviewed. Patient Vitals for the past 24 hrs:   BP Temp Temp src Pulse Resp SpO2   08/03/22 0822 (!) 100/55 -- -- 72 18 97 %   08/03/22 0815 86/60 -- -- 73 18 --   08/03/22 0634 (!) 121/55 99 °F (37.2 °C) Oral 70 18 95 %   08/03/22 0115 (!) 92/45 99.3 °F (37.4 °C) Axillary 66 18 95 %   08/02/22 2016 (!) 97/52 100.4 °F (38 °C) Oral 72 20 95 %   08/02/22 1730 (!) 103/59 99.6 °F (37.6 °C) Oral 76 18 98 %       Oxygen Saturation Interpretation: Normal    ------------------------------------------ PROGRESS NOTES ------------------------------------------      Counseling:  I have spoken with the patient and discussed todays results, in addition to providing specific details for the plan of care and counseling regarding the diagnosis and prognosis. Their questions are answered at this time and they are agreeable with the plan of admission.    --------------------------------- ADDITIONAL PROVIDER NOTES ---------------------------------  Consultations:  Time: 1427. Spoke with Dr. Babs Councilman. Discussed case. They will admit the patient. This patient's ED course included: a personal history and physicial examination, re-evaluation prior to disposition, multiple bedside re-evaluations, IV medications, cardiac monitoring, and continuous pulse oximetry    This patient has remained hemodynamically stable during their ED course. Diagnosis:  1. Fall, initial encounter    2. Injury of head, initial encounter    3. Pneumonia of both lower lobes due to infectious organism    4.  JUAN (acute kidney injury) ([facility]    5. Syncope and collapse    6. Ulcer of right heel and midfoot, unspecified ulcer stage ([facility]        Disposition:  Patient's disposition: Admit to telemetry  Patient's condition is stable. Selam Spears MD  Resident  08/01/22 1440      ATTENDING PROVIDER ATTESTATION:     I have personally performed and/or participated in the history, exam, medical decision making, and procedures and agree with all pertinent clinical information unless otherwise noted. I have also reviewed and agree with the past medical, family and social history unless otherwise noted. I have discussed this patient in detail with the resident and provided the instruction and education regarding the evidence-based evaluation and treatment of Fall (Unwitnessed mechanical fall at [facility]; takes Coumadin; did hit head; denies LOC)    The patient is a 78-year-old male who presents the emergency department for a fall and syncope. Symptoms were sudden onset just prior to arrival, persistent, moderate severity, and nothing makes it better or worse. He is anticoagulated on Coumadin for atrial fibrillation. Patient found of pneumonia along with JUAN and has an ulcer of the right heel. He was started on antibiotics and admitted for further treatment.     Electronically signed by Hawa Metz DO on 8/3/22 at 9:32 AM EDT           Hawa Metz DO  08/03/22 [id number]

## 2022-08-01 NOTE — PROGRESS NOTES
Database initiated. Patient is Alert to self and place. He is from [employer] since he recently had a kidney removed July 5th at the  due to cancer. Since then  he has become very weak and now requires a wheelchair to get around and now has a open wound to his right heel. He is RA at baseline. Pharmacy tech to verify home medications.

## 2022-08-02 LAB
ANION GAP SERPL CALCULATED.3IONS-SCNC: 17 MMOL/L (ref 7–16)
BACTERIA: ABNORMAL /HPF
BILIRUBIN URINE: NEGATIVE
BLOOD, URINE: NEGATIVE
BUN BLDV-MCNC: 55 MG/DL (ref 6–23)
CALCIUM SERPL-MCNC: 8.9 MG/DL (ref 8.6–10.2)
CHLORIDE BLD-SCNC: 103 MMOL/L (ref 98–107)
CLARITY: CLEAR
CO2: 19 MMOL/L (ref 22–29)
COLOR: YELLOW
CREAT SERPL-MCNC: 3.3 MG/DL (ref 0.7–1.2)
EPITHELIAL CELLS, UA: ABNORMAL /HPF
GFR AFRICAN AMERICAN: 22
GFR NON-AFRICAN AMERICAN: 18 ML/MIN/1.73
GLUCOSE BLD-MCNC: 170 MG/DL (ref 74–99)
GLUCOSE URINE: NEGATIVE MG/DL
GRAM STAIN ORDERABLE: NORMAL
INR BLD: 2.2
KETONES, URINE: NEGATIVE MG/DL
LEUKOCYTE ESTERASE, URINE: ABNORMAL
MAGNESIUM: 2 MG/DL (ref 1.6–2.6)
NITRITE, URINE: NEGATIVE
PH UA: 7.5 (ref 5–9)
PHOSPHORUS: 4.7 MG/DL (ref 2.5–4.5)
POTASSIUM SERPL-SCNC: 4.9 MMOL/L (ref 3.5–5)
PROTEIN UA: ABNORMAL MG/DL
PROTHROMBIN TIME: 23.9 SEC (ref 9.3–12.4)
RBC UA: ABNORMAL /HPF (ref 0–2)
SODIUM BLD-SCNC: 139 MMOL/L (ref 132–146)
SPECIFIC GRAVITY UA: 1.01 (ref 1–1.03)
UROBILINOGEN, URINE: 0.2 E.U./DL
WBC UA: >20 /HPF (ref 0–5)

## 2022-08-02 PROCEDURE — 6370000000 HC RX 637 (ALT 250 FOR IP): Performed by: STUDENT IN AN ORGANIZED HEALTH CARE EDUCATION/TRAINING PROGRAM

## 2022-08-02 PROCEDURE — 97161 PT EVAL LOW COMPLEX 20 MIN: CPT

## 2022-08-02 PROCEDURE — 84100 ASSAY OF PHOSPHORUS: CPT

## 2022-08-02 PROCEDURE — 97530 THERAPEUTIC ACTIVITIES: CPT

## 2022-08-02 PROCEDURE — 6370000000 HC RX 637 (ALT 250 FOR IP): Performed by: INTERNAL MEDICINE

## 2022-08-02 PROCEDURE — 2060000000 HC ICU INTERMEDIATE R&B

## 2022-08-02 PROCEDURE — 83735 ASSAY OF MAGNESIUM: CPT

## 2022-08-02 PROCEDURE — 85610 PROTHROMBIN TIME: CPT

## 2022-08-02 PROCEDURE — 6360000002 HC RX W HCPCS: Performed by: INTERNAL MEDICINE

## 2022-08-02 PROCEDURE — 80048 BASIC METABOLIC PNL TOTAL CA: CPT

## 2022-08-02 PROCEDURE — 99222 1ST HOSP IP/OBS MODERATE 55: CPT | Performed by: INTERNAL MEDICINE

## 2022-08-02 PROCEDURE — 97165 OT EVAL LOW COMPLEX 30 MIN: CPT

## 2022-08-02 PROCEDURE — 81001 URINALYSIS AUTO W/SCOPE: CPT

## 2022-08-02 PROCEDURE — 36415 COLL VENOUS BLD VENIPUNCTURE: CPT

## 2022-08-02 PROCEDURE — 2580000003 HC RX 258: Performed by: NURSE PRACTITIONER

## 2022-08-02 PROCEDURE — 2580000003 HC RX 258: Performed by: INTERNAL MEDICINE

## 2022-08-02 RX ORDER — SODIUM CHLORIDE 9 MG/ML
INJECTION, SOLUTION INTRAVENOUS CONTINUOUS
Status: DISCONTINUED | OUTPATIENT
Start: 2022-08-02 | End: 2022-08-03

## 2022-08-02 RX ORDER — DOXYCYCLINE HYCLATE 100 MG/1
100 CAPSULE ORAL EVERY 12 HOURS SCHEDULED
Status: DISCONTINUED | OUTPATIENT
Start: 2022-08-02 | End: 2022-08-08 | Stop reason: ALTCHOICE

## 2022-08-02 RX ADMIN — ATORVASTATIN CALCIUM 20 MG: 20 TABLET, FILM COATED ORAL at 21:00

## 2022-08-02 RX ADMIN — SODIUM CHLORIDE: 9 INJECTION, SOLUTION INTRAVENOUS at 21:00

## 2022-08-02 RX ADMIN — OXYCODONE HYDROCHLORIDE AND ACETAMINOPHEN 500 MG: 500 TABLET ORAL at 08:08

## 2022-08-02 RX ADMIN — PANTOPRAZOLE SODIUM 40 MG: 40 TABLET, DELAYED RELEASE ORAL at 05:32

## 2022-08-02 RX ADMIN — COLLAGENASE SANTYL: 250 OINTMENT TOPICAL at 15:04

## 2022-08-02 RX ADMIN — CEFEPIME 2000 MG: 2 INJECTION, POWDER, FOR SOLUTION INTRAVENOUS at 02:53

## 2022-08-02 RX ADMIN — DOXYCYCLINE HYCLATE 100 MG: 100 CAPSULE ORAL at 08:08

## 2022-08-02 RX ADMIN — DOXYCYCLINE HYCLATE 100 MG: 100 CAPSULE ORAL at 21:01

## 2022-08-02 RX ADMIN — SODIUM CHLORIDE: 9 INJECTION, SOLUTION INTRAVENOUS at 08:13

## 2022-08-02 RX ADMIN — WARFARIN SODIUM 8 MG: 4 TABLET ORAL at 17:44

## 2022-08-02 RX ADMIN — CEFEPIME 2000 MG: 2 INJECTION, POWDER, FOR SOLUTION INTRAVENOUS at 15:59

## 2022-08-02 RX ADMIN — GABAPENTIN 300 MG: 300 CAPSULE ORAL at 08:08

## 2022-08-02 RX ADMIN — LEVOTHYROXINE SODIUM 50 MCG: 0.05 TABLET ORAL at 05:32

## 2022-08-02 RX ADMIN — GABAPENTIN 300 MG: 300 CAPSULE ORAL at 21:00

## 2022-08-02 ASSESSMENT — PAIN SCALES - GENERAL
PAINLEVEL_OUTOF10: 0
PAINLEVEL_OUTOF10: 0

## 2022-08-02 NOTE — PROGRESS NOTES
Podiatry Progress Note  8/2/2022   Omar Samuel       SUBJECTIVE: Lloyd Montero is a 67 y.o. male seen bedside for f/u of a right heel unstageable ulcer. Patient denies any new complaints today. No acute events overnight. Dressings changed this a.m. with Christofer ARIAS. Daily dressing changes going forward. Denies any constitutional symptoms this AM.        Past Medical History:   Diagnosis Date    Atrial fibrillation ([address]     Cancer ([facility]     kidney    Diabetes mellitus ([address]     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[address]     Hypertension     Ischemic stroke ([facility] [address] 03/06/2018    Pacemaker     Paraplegia ([facility]     Sinus node dysfunction ([facility] [address]     Stroke (cerebrum) ([facility] [address] 02/27/2018    Thyroid disease         Past Surgical History:   Procedure Laterality Date    CARDIAC PACEMAKER PLACEMENT  12/19/2014    COLONOSCOPY      EYE SURGERY      KNEE ARTHROSCOPY  right knee    PACEMAKER PLACEMENT      NM COLONOSCOPY FLX DX W/COLLJ SPEC WHEN PFRMD N/A 3/20/2018    COLONOSCOPY DIAGNOSTIC performed by Liam Price MD at [address] EGD PERCUTANEOUS PLACEMENT GASTROSTOMY TUBE N/A 3/29/2018    PEG TUBE/PT. IN 5507 B performed by Liam Price MD at [address] EGD PERCUTANEOUS PLACEMENT GASTROSTOMY TUBE N/A 9/12/2018    EGD PEG TUBE PLACEMENT performed by Liam Price MD at [facility] ENDOSCOPY         Family History   Problem Relation Age of Onset    Heart Disease Mother     Stroke Father         Social History     Tobacco Use    Smoking status: Never    Smokeless tobacco: Never    Tobacco comments:     occassional cigar   Substance Use Topics    Alcohol use: No        Prior to Admission medications    Medication Sig Start Date End Date Taking? Authorizing Provider   gabapentin (NEURONTIN) 300 MG capsule Take 300 mg by mouth in the morning. Yes Historical Provider, MD   ascorbic acid (VITAMIN C) 500 MG tablet Take 500 mg by mouth in the morning.    Yes Historical Provider, MD   Dextromethorphan-guaiFENesin  MG/5ML SYRP Take 10 mLs by mouth every 6 hours as needed for Cough   Yes Historical Provider, MD   oxyCODONE (ROXICODONE) 5 MG immediate release tablet Take 5 mg by mouth every 6 hours as needed for Pain. Yes Historical Provider, MD   ergocalciferol (ERGOCALCIFEROL) 1.25 MG (73899 UT) capsule Take 50,000 Units by mouth once a week Takes every monday 8/1/22  Yes Historical Provider, MD   docusate (COLACE, DULCOLAX) 100 MG CAPS Take 100 mg by mouth 2 times daily as needed 7/8/22 8/7/22 Yes Historical Provider, MD   gabapentin (NEURONTIN) 300 MG capsule Take 300 mg by mouth nightly. 7/8/22  Yes Historical Provider, MD   acetaminophen (TYLENOL) 325 MG tablet Take 650 mg by mouth every 4 hours as needed    Historical Provider, MD   ascorbic acid (VITAMIN C) 500 MG tablet Take 500 mg by mouth in the morning and 500 mg at noon and 500 mg before bedtime. Historical Provider, MD   docusate sodium (COLACE) 100 MG capsule TAKE ONE CAPSULE BY MOUTH TWICE DAILY AS NEEDED FOR CONSTIPATION 7/8/22   Historical Provider, MD   glucose (GLUTOSE) 40 % GEL Take 15 g by mouth as needed    Historical Provider, MD   ipratropium-albuterol (DUONEB) 0.5-2.5 (3) MG/3ML SOLN nebulizer solution Inhale 3 mLs into the lungs every 4 hours as needed    Historical Provider, MD   pantoprazole (PROTONIX) 40 MG tablet Take 40 mg by mouth every morning (before breakfast)    Historical Provider, MD   polyethylene glycol (GLYCOLAX) 17 GM/SCOOP powder Take 17 g by mouth daily as needed    Historical Provider, MD   potassium chloride (KLOR-CON M) 20 MEQ extended release tablet TAKE ONE TABLET BY MOUTH EVERY DAY 5/11/22   Historical Provider, MD   dextromethorphan-guaiFENesin (ROBITUSSIN-DM)  MG/5ML syrup Take 10 mLs by mouth every 6 hours as needed    Historical Provider, MD   oxyCODONE HCl 5 MG TABA Take 1 tablet by mouth every 6 hours as needed.     Historical Provider, MD   nystatin-triamcinolone [facility] II) 371377-3.2 UNIT/GM-% cream APPLY TO AFFECTED AREA FOR 12 HOURS ON AND THEN 12 HOURS OFF. USE AS NEEDED  Patient not taking: No sig reported 12/20/21   Historical Provider, MD   amLODIPine (NORVASC) 2.5 MG tablet Take 1 tablet by mouth daily 8/20/21 4/22/22  Lesley Wang DO   Magnesium Oxide 400 MG CAPS Take 1 capsule by mouth every other day 4/30/21   Lesley Wang DO   potassium chloride (KLOR-CON M) 20 MEQ TBCR extended release tablet Take 1 tablet by mouth daily 3/31/21 4/22/22  Lesley Wang DO   metoprolol tartrate (LOPRESSOR) 25 MG tablet Take 1 tablet by mouth 2 times daily 2/19/21   Lesley Wang, DO   lisinopril (PRINIVIL;ZESTRIL) 10 MG tablet Take 1 tablet by mouth daily 2/19/21   Lesley Wang, DO   levothyroxine (SYNTHROID) 50 MCG tablet Take 1 tablet by mouth Daily 2/19/21   Lesley Wang DO   metFORMIN (GLUCOPHAGE) 1000 MG tablet Take 1 tablet by mouth 2 times daily 2/19/21   Lesley Wang, DO   lovastatin (MEVACOR) 20 MG tablet Take 1 tablet by mouth nightly  Patient not taking: Reported on 8/1/2022 11/20/20 4/22/22  Lesley Wang DO   warfarin (COUMADIN) 5 MG tablet Take 1.5 tablets by mouth daily  Patient taking differently: Take 8 mg by mouth in the morning. 10/30/20   Lesley Wang DO   omeprazole (PRILOSEC) 20 MG delayed release capsule Take 1 capsule by mouth daily 5/22/20 4/22/22  Lesley Wang DO   furosemide (LASIX) 40 MG tablet Take 0.5 tablets by mouth daily 1/17/20   Lesley Wang DO   blood glucose test strips (EZ SMART BLOOD GLUCOSE TEST) strip 1 each by In Vitro route 2 times daily As needed. 2/11/19   Lesley Wang DO        Bee venom         OBJECTIVE:        Vitals:    08/02/22 0825   BP:    Pulse: 75   Resp:    Temp:    SpO2:             EXAM:        Pt is AAOx3, NAD     Vascular Exam:  DP and PT pulses faintly palpable bilaterally. CFT brisk less than 3 secs all pedal digits bilaterally. Periwound erythema right heel.   No edema, no ecchymosis     Neuro Exam: Diminished tactile sensation bilaterally left worse than right. Dermatologic Exam: Well-hydrated skin with skin wrinkles noted  -Unstageable decubitus ulcer right heel with central well adhered stable eschar and periwound erythema, ring of ecchymosis. Deroofed periwound tissue reveals healthy granular wound bed with mild malodor, no crepitus or fluctuance, no undermining tunneling, no proximal streaking, no drainage. MSK: Muscle strength 5/5 Bilateral.  Reducible hallux malleus left and hammertoes digits 2 through 5 bilateral.  Ankle equinus bilateral.  Negative clonus, negative Babinski sign. Soft compressible posterior muscle compartments bilaterally.     Current Facility-Administered Medications   Medication Dose Route Frequency Provider Last Rate Last Admin    0.9 % sodium chloride infusion   IntraVENous Continuous MELANIE Hancock - CNP 75 mL/hr at 08/02/22 0813 New Bag at 08/02/22 0813    doxycycline hyclate (VIBRAMYCIN) capsule 100 mg  100 mg Oral 2 times per day [facility] MD HELADIO   100 mg at 08/02/22 7922    collagenase ointment   Topical Daily Dru Mehta DPM        cefepime (MAXIPIME) 2000 mg IVPB minibag  2,000 mg IntraVENous Q12H [facility] MD HELADIO   Stopped at 08/02/22 1359    oxyCODONE-acetaminophen (PERCOCET) 5-325 MG per tablet 1 tablet  1 tablet Oral Q6H PRN [facility] MD HELADIO        acetaminophen (TYLENOL) tablet 650 mg  650 mg Oral Q4H PRN [facility] MD HELADIO        [START ON 8/3/2022] magnesium oxide (MAG-OX) tablet 400 mg  400 mg Oral Every Other Day [facility] MD HELADIO        ipratropium-albuterol (DUONEB) nebulizer solution 1 ampule  1 ampule Inhalation Q4H PRN [facility] MD HELADIO        warfarin (COUMADIN) tablet 8 mg  8 mg Oral Daily [facility] MD HELADIO   8 mg at 08/01/22 2132    gabapentin (NEURONTIN) capsule 300 mg  300 mg Oral Daily [facility] MD HELADIO   300 mg at 08/02/22 2139    gabapentin (NEURONTIN) capsule 300 mg  300 mg Oral Nightly [facility] MD HELADIO   300 mg at 08/01/22 8950 atorvastatin (LIPITOR) tablet 20 mg  20 mg Oral Nightly [facility] MD HELADIO   20 mg at 08/01/22 2133    metoprolol tartrate (LOPRESSOR) tablet 25 mg  25 mg Oral BID [facility] MD HELADIO   25 mg at 08/01/22 2133    guaiFENesin-dextromethorphan (ROBITUSSIN DM) 100-10 MG/5ML syrup 5 mL  5 mL Oral Q6H PRN [facility] MD HELADIO        docusate sodium (COLACE) capsule 100 mg  100 mg Oral BID PRN [facility] MD HELADIO        levothyroxine (SYNTHROID) tablet 50 mcg  50 mcg Oral Daily [facility] MD HELADIO   50 mcg at 08/02/22 0532    ascorbic acid (VITAMIN C) tablet 500 mg  500 mg Oral Daily [facility] MD HELADIO   500 mg at 08/02/22 0808    [START ON 8/8/2022] vitamin D (ERGOCALCIFEROL) capsule 50,000 Units  50,000 Units Oral Weekly [facility] MD HELADIO        pantoprazole (PROTONIX) tablet 40 mg  40 mg Oral QAM AC [facility] MD HELADIO   40 mg at 08/02/22 0532        Lab Results   Component Value Date    WBC 9.6 08/01/2022    HCT 34.4 (L) 08/01/2022    HGB 10.8 (L) 08/01/2022     08/01/2022     08/01/2022    K 4.9 08/01/2022     08/01/2022    CO2 22 08/01/2022    BUN 50 (H) 08/01/2022    CREATININE 3.0 (H) 08/01/2022    GLUCOSE 192 (H) 08/01/2022    CRP 7.2 (H) 08/01/2022       ASSESSMENT:  -Unstageable diabetic decubitus ulcer right heel, POA, not acutely infected  -Peripheral arterial disease  -Non-insulin-dependent type 2 diabetes mellitus with peripheral neuropathy  -Stroke x2 with left sided sensory deficit  -Hallux malleus left, hammertoe digits 2 through 5 bilaterally       PLAN:  - Examined and evaluated  - All labs, imaging, and charts reviewed   - Wound cultures right heel obtained 8/1/2022: Pending, Moderate GPC, GNR, GPR  -ABX: Doxy/cefepime, managed by IM  - X-ray right foot/ankle 8/1/22: Negative for osteomyelitis, or soft tissue gas, vascular Calcifications noted. - Arterial studies 8/1/2022: Pending  -Dressings: Santyl, DSD.   Daily changes.  -Heel protectors bilateral lower extremities.  -No indications for acute surgical invention from podiatry perspective. We will continue with local wound care going forward.   -We will continue to follow     D/W: Dr. Glenys Hung    Thank you for involving podiatry in this patients care. Please do not hesitate to call with any questions or concerns.      Cornelia Luu Podiatry PGY3  8/2/2022   12:15 PM

## 2022-08-02 NOTE — PROGRESS NOTES
Consult sent to Dr. Sixto Mobley via [employer].     Electronically signed by Harmony Carreno RN on 8/2/2022 at 11:09 AM

## 2022-08-02 NOTE — PROGRESS NOTES
Call placed to Dr. Soledad Pineda for holding parameters on norvasc and metoprolol. Patient's family questioning why nephrology not paged? Awaiting return call.     Electronically signed by Sindi David RN on 8/2/2022 at 10:57 AM

## 2022-08-02 NOTE — H&P
Garima Stewart is a 67 y.o. male presented to the ED for a fall, patient is confused and unable to remember exactly at triage is mechanical fall, but patient said  lost consciousness and hit his head. He is on Coumadin for A. fib,  is somewhat confused and this  limits the history. He is alert and oriented x2, knew he was at the hospital, and his name, did not know the year or month. Complaining of a small headache located the back of his head, associate with fall, sudden onset, persistent, worsened by nothing, improved by nothing, moderate severity. Ct scan head no acute event ,CXR bilateral pneumonia admitted for treatment     Past Medical History:   Diagnosis Date    Atrial fibrillation ([facility] [address]     Cancer ([facility] [address]     kidney    Diabetes mellitus ([address]     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[address]     Hypertension     Ischemic stroke ([facility] [address] 03/06/2018    Pacemaker     Paraplegia ([facility] [address]     Sinus node dysfunction ([facility] [address]     Stroke (cerebrum) ([facility] [address] 02/27/2018    Thyroid disease        Past Surgical History:   Procedure Laterality Date    CARDIAC PACEMAKER PLACEMENT  12/19/2014    COLONOSCOPY      EYE SURGERY      KNEE ARTHROSCOPY  right knee    PACEMAKER PLACEMENT      SD COLONOSCOPY FLX DX W/COLLJ SPEC WHEN PFRMD N/A 3/20/2018    COLONOSCOPY DIAGNOSTIC performed by Mike Seaman MD at [address] EGD PERCUTANEOUS PLACEMENT GASTROSTOMY TUBE N/A 3/29/2018    PEG TUBE/PT. IN 5507 B performed by Mike Seaman MD at [address] EGD PERCUTANEOUS PLACEMENT GASTROSTOMY TUBE N/A 9/12/2018    EGD PEG TUBE PLACEMENT performed by Mike Seaman MD at [facility] ENDOSCOPY       Family History   Problem Relation Age of Onset    Heart Disease Mother     Stroke Father        Prior to Admission medications    Medication Sig Start Date End Date Taking? Authorizing Provider   gabapentin (NEURONTIN) 300 MG capsule Take 300 mg by mouth in the morning.    Yes Historical Provider, MD   ascorbic acid (VITAMIN C) 500 MG tablet Take 500 mg by mouth in the morning. Yes Historical Provider, MD   Dextromethorphan-guaiFENesin  MG/5ML SYRP Take 10 mLs by mouth every 6 hours as needed for Cough   Yes Historical Provider, MD   oxyCODONE (ROXICODONE) 5 MG immediate release tablet Take 5 mg by mouth every 6 hours as needed for Pain. Yes Historical Provider, MD   ergocalciferol (ERGOCALCIFEROL) 1.25 MG (27910 UT) capsule Take 50,000 Units by mouth once a week Takes every monday 8/1/22  Yes Historical Provider, MD   docusate (COLACE, DULCOLAX) 100 MG CAPS Take 100 mg by mouth 2 times daily as needed 7/8/22 8/7/22 Yes Historical Provider, MD   gabapentin (NEURONTIN) 300 MG capsule Take 300 mg by mouth nightly. 7/8/22  Yes Historical Provider, MD   acetaminophen (TYLENOL) 325 MG tablet Take 650 mg by mouth every 4 hours as needed    Historical Provider, MD   ascorbic acid (VITAMIN C) 500 MG tablet Take 500 mg by mouth in the morning and 500 mg at noon and 500 mg before bedtime. Historical Provider, MD   docusate sodium (COLACE) 100 MG capsule TAKE ONE CAPSULE BY MOUTH TWICE DAILY AS NEEDED FOR CONSTIPATION 7/8/22   Historical Provider, MD   glucose (GLUTOSE) 40 % GEL Take 15 g by mouth as needed    Historical Provider, MD   ipratropium-albuterol (DUONEB) 0.5-2.5 (3) MG/3ML SOLN nebulizer solution Inhale 3 mLs into the lungs every 4 hours as needed    Historical Provider, MD   pantoprazole (PROTONIX) 40 MG tablet Take 40 mg by mouth every morning (before breakfast)    Historical Provider, MD   polyethylene glycol (GLYCOLAX) 17 GM/SCOOP powder Take 17 g by mouth daily as needed    Historical Provider, MD   potassium chloride (KLOR-CON M) 20 MEQ extended release tablet TAKE ONE TABLET BY MOUTH EVERY DAY 5/11/22   Historical Provider, MD   dextromethorphan-guaiFENesin (ROBITUSSIN-DM)  MG/5ML syrup Take 10 mLs by mouth every 6 hours as needed    Historical Provider, MD   oxyCODONE HCl 5 MG TABA Take 1 tablet by mouth every 6 hours as needed. Historical Provider, MD   nystatin-triamcinolone (MYCOLOG II) 582940-9.1 UNIT/GM-% cream APPLY TO AFFECTED AREA FOR 12 HOURS ON AND THEN 12 HOURS OFF. USE AS NEEDED  Patient not taking: No sig reported 12/20/21   Historical Provider, MD   amLODIPine (NORVASC) 2.5 MG tablet Take 1 tablet by mouth daily 8/20/21 4/22/22  Lesley Wang, DO   Magnesium Oxide 400 MG CAPS Take 1 capsule by mouth every other day 4/30/21   Lesley Wang DO   potassium chloride (KLOR-CON M) 20 MEQ TBCR extended release tablet Take 1 tablet by mouth daily 3/31/21 4/22/22  Lesley Wang DO   metoprolol tartrate (LOPRESSOR) 25 MG tablet Take 1 tablet by mouth 2 times daily 2/19/21   Lesley Wang, DO   lisinopril (PRINIVIL;ZESTRIL) 10 MG tablet Take 1 tablet by mouth daily 2/19/21   Lesley Wang, DO   levothyroxine (SYNTHROID) 50 MCG tablet Take 1 tablet by mouth Daily 2/19/21   Lesley Wang, DO   metFORMIN (GLUCOPHAGE) 1000 MG tablet Take 1 tablet by mouth 2 times daily 2/19/21   Lesley Wang, DO   lovastatin (MEVACOR) 20 MG tablet Take 1 tablet by mouth nightly  Patient not taking: Reported on 8/1/2022 11/20/20 4/22/22  Lesley Wang DO   warfarin (COUMADIN) 5 MG tablet Take 1.5 tablets by mouth daily  Patient taking differently: Take 8 mg by mouth in the morning. 10/30/20   Lesley Wang DO   omeprazole (PRILOSEC) 20 MG delayed release capsule Take 1 capsule by mouth daily 5/22/20 4/22/22  Lesley Wang DO   furosemide (LASIX) 40 MG tablet Take 0.5 tablets by mouth daily 1/17/20   Lesley Wang DO   blood glucose test strips (EZ SMART BLOOD GLUCOSE TEST) strip 1 each by In Vitro route 2 times daily As needed.  2/11/19   Lesley Wang DO        Allergies: Bee venom    Social History     Tobacco Use    Smoking status: Never    Smokeless tobacco: Never    Tobacco comments:     occassional cigar   Substance Use Topics    Alcohol use: No        Review of Systems:  Patient is confused limiting information   Respiratory: negative for cough and hemoptysis  Cardiovascular: negative for chest pain and dyspnea  Gastrointestinal: negative for abdominal pain, diarrhea, nausea and vomiting  Genitourinary:negative for dysuria and hematuria  Derm: negative for rash and skin lesion(s)  Neurological: negative for seizures and tremors  Endocrine: negative for diabetic symptoms including polydipsia and polyuria    Physical Exam:  Vitals:    08/01/22 2120   BP: 127/65   Pulse: 74   Resp: 18   Temp: 98.6 °F (37 °C)   SpO2: 98%      Skin:  Warm and dry. No rash or bruises  HEENT:  PERRLA, EOMI  Neck:  No JVD, No thyromegaly, No carotid bruit  Cardiac:  RRR, No gallop or murmur  Lungs:  CTA, Normal percussion  Abdomen: Normal bowel sounds, no HSM, non-tender  Extremities:  No clubbing, edema or cyanosis decubitus ulcer right heal eschar   Neurological:  Moves all extremities.     Labs:    CBC with Differential:    Lab Results   Component Value Date/Time    WBC 9.6 08/01/2022 01:21 PM    RBC 3.66 08/01/2022 01:21 PM    HGB 10.8 08/01/2022 01:21 PM    HCT 34.4 08/01/2022 01:21 PM     08/01/2022 01:21 PM    MCV 94.0 08/01/2022 01:21 PM    MCH 29.5 08/01/2022 01:21 PM    MCHC 31.4 08/01/2022 01:21 PM    RDW 13.7 08/01/2022 01:21 PM    SEGSPCT 80 01/04/2012 03:00 AM    BANDSPCT 1 11/27/2014 04:50 AM    LYMPHOPCT 11.3 08/01/2022 01:21 PM    MONOPCT 7.8 08/01/2022 01:21 PM    MYELOPCT 0.9 02/28/2018 05:21 AM    BASOPCT 0.7 08/01/2022 01:21 PM    MONOSABS 0.75 08/01/2022 01:21 PM    LYMPHSABS 1.09 08/01/2022 01:21 PM    EOSABS 0.15 08/01/2022 01:21 PM    BASOSABS 0.07 08/01/2022 01:21 PM     CMP:    Lab Results   Component Value Date/Time     08/01/2022 01:21 PM    K 4.9 08/01/2022 01:21 PM     08/01/2022 01:21 PM    CO2 22 08/01/2022 01:21 PM    BUN 50 08/01/2022 01:21 PM    CREATININE 3.0 08/01/2022 01:21 PM    GFRAA 25 08/01/2022 01:21 PM    LABGLOM 20 08/01/2022 01:21 PM    GLUCOSE 192 08/01/2022 01:21 PM    GLUCOSE 297 01/04/2012 03:00 AM    PROT 7.2 08/01/2022 01:21 PM    LABALBU 3.5 08/01/2022 01:21 PM    LABALBU 3.4 01/04/2012 03:00 AM    CALCIUM 9.4 08/01/2022 01:21 PM    BILITOT 0.2 08/01/2022 01:21 PM    ALKPHOS 93 08/01/2022 01:21 PM    AST 14 08/01/2022 01:21 PM    ALT 9 08/01/2022 01:21 PM      Imaging:Ct scan head :  1. There is no acute intracranial hemorrhage   2. Old left occipital lobe infarct. CXR :  Bilateral lower lobe atelectasis/pneumonia, left worse than right. Small left pleural effusion. Stable enlargement of the cardiac silhouette. Assessment and Plan:    Patient Active Problem List   Diagnosis    Bilateral pneumonia   JUAN on CKD stage III  S/P left nephrectomy for CA  Old CVA   HTN  A.Fib.   Fall   DM  Decubitus ulcer right heal

## 2022-08-02 NOTE — CARE COORDINATION
Met with patient and sister about diagnosis and discharge plan of care. Pt admit from [address] after mechanical fall and bilateral pneumonia. Left heel wound-wound care consult. Po antibiotics, therapies pending. Plan is to return to [facility].

## 2022-08-02 NOTE — PROGRESS NOTES
Patient has not voided since this morning. Bladder scan in room and only detecting 24 cc urine.     Electronically signed by Vanda Samuels RN on 8/2/2022 at 7:07 PM

## 2022-08-02 NOTE — PROGRESS NOTES
P Quality Flow/Interdisciplinary Rounds Progress Note        Quality Flow Rounds held on August 2, 2022    Disciplines Attending:  Bedside Nurse, , , and Nursing Unit Leadership    Gerry Riggs was admitted on 8/1/2022 12:26 PM    Anticipated Discharge Date:       Disposition:    Fish Score:  Fish Scale Score: 14    Readmission Risk              Risk of Unplanned Readmission:  [id number]           Discussed patient goal for the day, patient clinical progression, and barriers to discharge. The following Goal(s) of the Day/Commitment(s) have been identified:  PT/OT to see, check podiatry plan.       Karthik Powell RN  August 2, 2022

## 2022-08-02 NOTE — PLAN OF CARE
Problem: Discharge Planning  Goal: Discharge to home or other facility with appropriate resources  Outcome: Not Progressing     Problem: Skin/Tissue Integrity  Goal: Absence of new skin breakdown  Description: 1. Monitor for areas of redness and/or skin breakdown  2. Assess vascular access sites hourly  3. Every 4-6 hours minimum:  Change oxygen saturation probe site  4. Every 4-6 hours:  If on nasal continuous positive airway pressure, respiratory therapy assess nares and determine need for appliance change or resting period.   Outcome: Progressing     Problem: Pain  Goal: Verbalizes/displays adequate comfort level or baseline comfort level  Outcome: Progressing     Problem: Safety - Adult  Goal: Free from fall injury  Outcome: Progressing     Problem: Chronic Conditions and Co-morbidities  Goal: Patient's chronic conditions and co-morbidity symptoms are monitored and maintained or improved  Outcome: Progressing     Problem: ABCDS Injury Assessment  Goal: Absence of physical injury  Outcome: Progressing     Problem: Discharge Planning  Goal: Discharge to home or other facility with appropriate resources  Outcome: Not Progressing

## 2022-08-02 NOTE — CONSULTS
Department of Internal Medicine  Nephrology Nurse Practitioner Consult Note      Reason for Consult: JUAN  Requesting Physician:  Dr. Matthew Davis:  S/p fall    History Obtained From:  patient, electronic medical record    HISTORY OF PRESENT ILLNESS:  Briefly, Mr. Lin Shoemaker  is a 42-year-old male with a PMH of CKD stage IIIb, without proteinuria, baseline creatinine 2.0-2.1 mg/dL, secondary to nephrosclerosis, kidney cancer, s/p total left nephrectomy on 7/5/22 at [facility], hypertension, type II diabetes mellitus, HFpEF 55% with stage II DD, factor V Leyden deficiency, hyperlipidemia, sick sinus syndrome s/p pacemaker placement, CVA, hypothyroidism, PAF on chronic anticoagulation with warfarin, who was admitted on August 1, 2022 after presenting to the ER after sustaining a fall. He did lose consciousness and hit his head at that time. A CT of the head was obtained which was negative. His labs upon arrival to ER were significant for BUN 46 mg/dL and creatinine 2.8 mg/dL. His renal function continued to worsen yesterday afternoon with a BUN of 50 mg/dL and a creatinine of 3.0 mg/dL, which is the reason for this consultation. He received a 250 mL normal saline bolus in ER. He did receive Lasix 20 mg PO last night as well as lisinopril. His home medications include Lasix. He was recently taken off of lisinopril by Dr. Mansi Sanchez and his Lasix was increased to 40 mg PO BID.      Past Medical History:        Diagnosis Date    Atrial fibrillation ([address]     Cancer ([address]     kidney    Diabetes mellitus ([address]     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[address]     Hypertension     Ischemic stroke ([address] 03/06/2018    Pacemaker     Paraplegia ([address]     Sinus node dysfunction ([address]     Stroke (cerebrum) ([address] 02/27/2018    Thyroid disease      Past Surgical History:        Procedure Laterality Date    CARDIAC PACEMAKER PLACEMENT  12/19/2014    COLONOSCOPY      EYE SURGERY      KNEE ARTHROSCOPY  right knee    PACEMAKER PLACEMENT      SD F/u phone call; no answer. Trena Rees RN     COLONOSCOPY FLX DX W/COLLJ SPEC WHEN PFRMD N/A 3/20/2018    COLONOSCOPY DIAGNOSTIC performed by Jodie Gale MD at [address] EGD PERCUTANEOUS PLACEMENT GASTROSTOMY TUBE N/A 3/29/2018    PEG TUBE/PT. IN 5507 B performed by Jodie Gale MD at [address] EGD PERCUTANEOUS PLACEMENT GASTROSTOMY TUBE N/A 9/12/2018    EGD PEG TUBE PLACEMENT performed by Jodie Gale MD at [facility] ENDOSCOPY     Current Medications:    Current Facility-Administered Medications: 0.9 % sodium chloride infusion, , IntraVENous, Continuous  doxycycline hyclate (VIBRAMYCIN) capsule 100 mg, 100 mg, Oral, 2 times per day  collagenase ointment, , Topical, Daily  cefepime (MAXIPIME) 2000 mg IVPB minibag, 2,000 mg, IntraVENous, Q12H  oxyCODONE-acetaminophen (PERCOCET) 5-325 MG per tablet 1 tablet, 1 tablet, Oral, Q6H PRN  acetaminophen (TYLENOL) tablet 650 mg, 650 mg, Oral, Q4H PRN  [START ON 8/3/2022] magnesium oxide (MAG-OX) tablet 400 mg, 400 mg, Oral, Every Other Day  ipratropium-albuterol (DUONEB) nebulizer solution 1 ampule, 1 ampule, Inhalation, Q4H PRN  warfarin (COUMADIN) tablet 8 mg, 8 mg, Oral, Daily  gabapentin (NEURONTIN) capsule 300 mg, 300 mg, Oral, Daily  gabapentin (NEURONTIN) capsule 300 mg, 300 mg, Oral, Nightly  atorvastatin (LIPITOR) tablet 20 mg, 20 mg, Oral, Nightly  metoprolol tartrate (LOPRESSOR) tablet 25 mg, 25 mg, Oral, BID  guaiFENesin-dextromethorphan (ROBITUSSIN DM) 100-10 MG/5ML syrup 5 mL, 5 mL, Oral, Q6H PRN  docusate sodium (COLACE) capsule 100 mg, 100 mg, Oral, BID PRN  levothyroxine (SYNTHROID) tablet 50 mcg, 50 mcg, Oral, Daily  ascorbic acid (VITAMIN C) tablet 500 mg, 500 mg, Oral, Daily  [START ON 8/8/2022] vitamin D (ERGOCALCIFEROL) capsule 50,000 Units, 50,000 Units, Oral, Weekly  pantoprazole (PROTONIX) tablet 40 mg, 40 mg, Oral, QAM AC  Allergies:  Bee venom    Social History:    TOBACCO:   reports that he has never smoked.  He has never used smokeless tobacco.  ETOH:   reports no history of alcohol use.     Family History:       Problem Relation Age of Onset    Heart Disease Mother     Stroke Father      REVIEW OF SYSTEMS:    CONSTITUTIONAL:  negative for  fevers and chills  EYES:  negative for  double vision and blurred vision  HEENT:  negative for  epistaxis  RESPIRATORY:  negative for  dyspnea  CARDIOVASCULAR:  negative for  dyspnea  GASTROINTESTINAL:  negative for nausea, vomiting, diarrhea, and abdominal pain  GENITOURINARY:  negative  INTEGUMENT/BREAST:  negative  HEMATOLOGIC/LYMPHATIC:  negative  ALLERGIC/IMMUNOLOGIC:  negative  ENDOCRINE:  negative  MUSCULOSKELETAL:  positive for  muscle weakness  NEUROLOGICAL:  negative for headaches and dizziness  BEHAVIOR/PSYCH:  negative for poor appetite  PHYSICAL EXAM:      Vitals:    VITALS:  BP (!) 99/46   Pulse 75   Temp 98.2 °F (36.8 °C) (Oral)   Resp 17   Ht 6' 3\" (1.905 m)   Wt 266 lb 8.6 oz (120.9 kg)   SpO2 96%   BMI 33.31 kg/m²   24HR INTAKE/OUTPUT:    Intake/Output Summary (Last 24 hours) at 8/2/2022 1129  Last data filed at 8/2/2022 0758  Gross per 24 hour   Intake --   Output 300 ml   Net -300 ml       Constitutional:  Alert and oriented, NAD  HEENT:  Normocephalic, PERRL, dry mucous membranes  Respiratory:  CTA bilaterally  Cardiovascular/Edema:  RRR, S1,S2  Gastrointestinal:  Soft, rounded, nontender, nondistended  Neurologic:  Nonfocal, GREENBERG  Skin:  Warm, dry, wound to right heel  Other:  Trace edema BLE    DATA:    CBC:   Lab Results   Component Value Date/Time    WBC 9.6 08/01/2022 01:21 PM    RBC 3.66 08/01/2022 01:21 PM    HGB 10.8 08/01/2022 01:21 PM    HCT 34.4 08/01/2022 01:21 PM    MCV 94.0 08/01/2022 01:21 PM    MCH 29.5 08/01/2022 01:21 PM    MCHC 31.4 08/01/2022 01:21 PM    RDW 13.7 08/01/2022 01:21 PM     08/01/2022 01:21 PM    MPV 10.0 08/01/2022 01:21 PM     CMP:    Lab Results   Component Value Date/Time     08/01/2022 01:21 PM    K 4.9 08/01/2022 01:21 PM     08/01/2022 01:21 PM    CO2 22 08/01/2022 01:21 PM    BUN 50 08/01/2022 01:21 PM    CREATININE 3.0 08/01/2022 01:21 PM    GFRAA 25 08/01/2022 01:21 PM    LABGLOM 20 08/01/2022 01:21 PM    GLUCOSE 192 08/01/2022 01:21 PM    GLUCOSE 297 01/04/2012 03:00 AM    PROT 7.2 08/01/2022 01:21 PM    LABALBU 3.5 08/01/2022 01:21 PM    LABALBU 3.4 01/04/2012 03:00 AM    CALCIUM 9.4 08/01/2022 01:21 PM    BILITOT 0.2 08/01/2022 01:21 PM    ALKPHOS 93 08/01/2022 01:21 PM    AST 14 08/01/2022 01:21 PM    ALT 9 08/01/2022 01:21 PM     Magnesium:    Lab Results   Component Value Date/Time    MG 1.7 07/27/2022 04:59 AM     Phosphorus:    Lab Results   Component Value Date/Time    PHOS 2.9 04/18/2019 02:30 PM     Radiology Review:      CT head without IV contrast 8/1/22   1. There is no acute intracranial hemorrhage   2. Old left occipital lobe infarct. CXR 8/1/22   Bilateral lower lobe atelectasis/pneumonia, left worse than right. Small left pleural effusion. Stable enlargement of the cardiac silhouette. IMPRESSION/RECOMMENDATIONS:      Briefly, Mr. Marie Ramírez  is a 69-year-old male with a PMH of CKD stage IIIb, without proteinuria, baseline creatinine 2.0-2.1 mg/dL, secondary to nephrosclerosis, kidney cancer, s/p total left nephrectomy on 7/5/22 at [facility], hypertension, type II diabetes mellitus, HFpEF 55% with stage II DD, factor V Leyden deficiency, hyperlipidemia, sick sinus syndrome s/p pacemaker placement, CVA, hypothyroidism, PAF on chronic anticoagulation with warfarin, who was admitted on August 1, 2022 after presenting to the ER after sustaining a fall. He did lose consciousness and hit his head at that time. A CT of the head was obtained which was negative. His labs upon arrival to ER were significant for BUN 46 mg/dL and creatinine 2.8 mg/dL. His renal function continued to worsen yesterday afternoon with a BUN of 50 mg/dL and a creatinine of 3.0 mg/dL, which is the reason for this consultation.  He received a 250 mL normal saline bolus in ER. He did receive Lasix 20 mg PO last night as well as lisinopril. His home medications include Lasix. He was recently taken off of lisinopril by Dr. Anu Bai and his Lasix was increased to 40 mg PO BID. JUAN stage I on CKD, likely volume responsive pre-renal JUAN secondary to over diuresis in the setting of ACE inhibition. To continue with IV fluids and continue to hold Lasix. CKD stage IIIb, without proteinuria, baseline creatinine 2.0-2.1 mg/dL, secondary to nephrosclerosis  Left kidney cancer, s/p total left nephrectomy 7/5/22 at [facility]  HTN, on metoprolol  HFpEF 55% with stage II DD, pro-, Lasix on hold  -------------------------------------------------  S/p fall  Bilateral pneumonia, on doxycycline and cefepime  PAF, on metoprolol and warfarin  Sick sinus syndrome, s/p pacemaker placement   History of CVA    Plan:    Increase NS to 100 cc/hr  Obtain STAT BMP  Continue to hold Lasix  Continue to hold lisinoril  Strict I&Os  Monitor renal function  Obtain magnesium and phosphorus levels  Obtain urine indices  Obtain PVR    Thank you so much Dr. Severa Livings for allowing us to participate in the care of Mr. Lucy Ramirez.      Electronically signed by MELANIE Villarreal CNP on 8/2/2022 at 12:12 PM

## 2022-08-02 NOTE — PROGRESS NOTES
Occupational Therapy    OCCUPATIONAL THERAPY INITIAL EVALUATION     [address] [facility] [place], [address]                                                  Patient Name: Newton Etienne    MRN: [id number]    : 1945    Room: [address] [place] [address]      Evaluating OT: Reid Graham OTR/L   [id number]      Referring Reyna Starr MD    Specific Provider Orders/Date:OT eval and treat 2022      Diagnosis:  Syncope and collapse [R55]  Pneumonia [J18.9]  JUAN (acute kidney injury) ([facility] [N17.9]  Injury of head, initial encounter [W91.45WP]  Fall, initial encounter [W19. XXXA]  Pneumonia of both lower lobes due to infectious organism [J18.9]  Ulcer of right heel and midfoot, unspecified ulcer stage ([facility] [L97.419]     Pertinent Medical History: A fib, ischemic stroke, pacemaker    Precautions:  Fall Risk, alarm , right heel unstageable ulcer     Assessment of current deficits    [x] Functional mobility  [x]ADLs  [x] Strength               []Cognition    [x] Functional transfers   [x] IADLs         [x] Safety Awareness   [x]Endurance    [] Fine Coordination              [x] Balance      [] Vision/perception   []Sensation     []Gross Motor Coordination  [] ROM  [] Delirium                   [] Motor Control     OT PLAN OF CARE   OT POC based on physician orders, patient diagnosis and results of clinical assessment    Frequency/Duration  2-4 days/wk for 2 weeks PRN   Specific OT Treatment Interventions to include:   ADL retraining/adapted techniques and AE recommendations to increase functional independence within precautions                    Energy conservation techniques to improve tolerance for selfcare routine   Functional transfer/mobility training/DME recommendations for increased independence, safety and fall prevention         Patient/family education to increase safety and functional independence             Environmental modifications for safe mobility and completion of ADLs                             Therapeutic activity to improve functional performance during ADLs. Therapeutic exercise to improve tolerance and functional strength for ADLs    Balance retraining/tolerance tasks for facilitation of postural control with dynamic challenges during ADLs . Positioning to improve functional independence      Recommended Adaptive Equipment: TBD     SOCIAL:  patient from [facility]. Sister reports- patient primarily has been using w/c at the [facility] for mobility. Was using walker with therapy and assist.    Prior to July - patient lived alone, 1 story, ambulated with walker     Pain Level: heel pain ;   Cognition: A&O, pleasant,following simple commands and conversing    Memory:  forgetful    Sequencing:  fair   Problem solving:  fair   Judgement/safety:  fair     Functional Assessment:  AM-PAC Daily Activity Raw Score: 13/24   Initial Eval Status  Date: 8/2/22 Treatment Status  Date: STGs = LTGs  Time frame: 10-14 days   Feeding Set-up   Independent    Grooming Min A,seated   Supervision    UB Dressing Min A   Supervision    LB Dressing Dependent   Total assist with  donning L socks and R post op shoe   Mod A   Bathing Max A   Mod A    Toileting Dependent   Mod A    Bed Mobility  Max A x2  Supine <> sit   Mod a    Functional Transfers Max A x2  Partial Sit- stand from bed   R lateral lean   Mod A    Functional Mobility NT   Patient  unable to fully stand   Mod A with good tolerance    Balance Sitting:     Static:  CGA- EOB     Dynamic:Max A   Standing:  Max A x2- partial stand   SBA/supervision -standing   Mod A-standing    Activity Tolerance No SOB   Fatigued and overall decrease strength and endurance   Good  with ADL activity    Visual/  Perceptual Glasses: none by bedside         UE ROM/strength  UE AROM throughout UES   Fair+ strength   Tolerate UE therapeutic activity/exercise to increase strength for ADL/xfer activity      Hand Dominance right    Hearing: [facility]   Sensation:  No c/o numbness or tingling   Tone: WFL   Edema: none observed     Comments: Upon arrival patient lying in bed . At end of session, patient returned to bed  with call light and phone within reach, all lines and tubes intact. *ALARM ON , sister present     Overall patient demonstrated  decreased independence and safety during completion of ADL/functional transfer/mobility tasks. Pt would benefit from continued skilled OT to increase safety and independence with completion of ADL/IADL tasks for functional independence and quality of life. Comments/Treatment:      Patient practiced and was instructed on following during evaluation and OT session:          -Bed mobility - technique and safety         -Tranfers - hand placement, tech and safety         -Mobility - unable this date         -ADLs - tech, AE if appropriate/needed           - sitting JAY for balance and core strengthening         -Education:  , importance of OOB activity and participating in ADLs, safety awareness             Rehab Potential: good for established goals     Patient / Family Goal: none stated       Patient and/or family were instructed on functional diagnosis, prognosis/goals and OT plan of care. Demonstrated good  understanding.      Eval Complexity: Low    Time In: 1019  Time Out: 1045  Total Treatment Time: 12    Min Units   OT Eval Low 97165 x  1   OT Eval Medium 87856      OT Eval High 88326      OT Re-Eval [id number]       Therapeutic Ex 64252      Therapeutic Activities 86565  12  1   ADL/Self Care 24171       Orthotic Management 32007       Manual 46997     Neuro Re-Ed 32478       Non-Billable Time          Evaluation Time additionally includes thorough review of current medical information, gathering information on past medical history/social history and prior level of function, interpretation of standardized testing/informal observation of tasks, assessment of data and development of plan of care and goals.             So Held  OTR/L  OT [id number]

## 2022-08-02 NOTE — PROGRESS NOTES
Patient noted on multiple times to be choking during meals. Per the patient, this is not new since his stroke, however it does seem worse today. With his lunch meal, he coughed for almost 15 minutes and did require suctioning. Discussed aspiration pneumonia risk with patient and family. They have been educated on the risk.  The are willing to have patient seen by SLP for re-eval.    Electronically signed by Vanda Samuels RN on 8/2/2022 at 6:15 PM

## 2022-08-02 NOTE — PROGRESS NOTES
Consult heel ulcer. Dr. Negrito Franz following. Will defer  Isidro Blizzard.  Teto Gann, CNS, Wound Care

## 2022-08-02 NOTE — PROGRESS NOTES
Scooting:  dep assist x 2    Mod assist 1-2    Transfers Sit to stand:  max assist x 2 for partial stand   Stand to sit: max assist   Stand pivot:  NT    Mod assist x 1-2    Ambulation     NT   10 feet with  ww  with  mod assist x 1-2        Stair negotiation: ascended and descended NT   N/A   LE ROM  Decreased throughout hips, distally WFL      LE strength  3-/ 5 B hips, 4-/ 5 distally   Increase by 1-2/ 3 MMT grade    AM- PAC RAW score  7/ 24            Pt is alert and Oriented x 3      Balance:  SBA sitting EOB . Fall risk due to  weakness, poor activity tolerance   Endurance: decreased   Bed/Chair alarm:  yes      ASSESSMENT  Pt displays functional ability as noted in the objective portion of this evaluation. Conditions Requiring Skilled Therapeutic Intervention:    [x]Decreased strength     []Decreased ROM  [x]Decreased functional mobility  [x]Decreased balance   [x]Decreased endurance   []Decreased posture  []Decreased sensation  [x]Decreased coordination   []Decreased vision  [x]Decreased safety awareness   [x]Increased pain         Treatment/Education:    Pt in bed  upon arrival ; agreeable to PT. Pt's sister present and provided most information. Mobility as above. Pt required instructed for technique with all mobility. Needed assist with B LEs and trunk with bed mobility and hand placement with transfers. Max assist to achieve 3/4 stand; performed twice. Strong R later lean with stand . High fall risk due to extensive assist required with mobility     Pt educated on fall risk,  LE exercises        Patient response to education:   Pt verbalized understanding Pt demonstrated skill Pt requires further education in this area   x  Needs assist   x       Comments:  Pt left  in bed after session, with call light in reach. Rehab potential is Good for reaching above PT goals. Pts/ family goals   1. Increase strength     Patient and or family understand(s) diagnosis, prognosis, and plan of care. -  yes     PLAN  PT care will be provided in accordance with the objectives noted above. Whenever appropriate, clear delegation orders will be provided for nursing staff. Exercises and functional mobility practice will be used as well as appropriate assistive devices or modalities to obtain goals. Patient and family education will also be administered as needed. PLAN OF CARE:    Current Treatment Recommendations     [x] Strengthening to improve independence with functional mobility   [] ROM to improve independence with functional mobility   [x] Balance Training to improve static/dynamic balance and to reduce fall risk  [x] Endurance Training to improve activity tolerance during functional mobility   [] Transfer Training to improve safety and independence with all functional transfers   [x] Gait Training to improve gait mechanics, endurance and assess need for appropriate assistive device  [] Stair Training in preparation for safe discharge home and/or into the community   [x] Positioning to prevent skin breakdown and contractures  [x] Safety and Education Training   [x] Patient/Caregiver Education   [] HEP  [] Other     Frequency of treatments will be 2-5x/week x  7 -14 days. Time in: 1015   Time out:  1040       Evaluation Time includes thorough review of current medical information, gathering information on past medical history/social history and prior level of function, completion of standardized testing/informal observation of tasks, assessment of data and education on plan of care and goals.     CPT codes:  [x] Low Complexity PT evaluation 98977  [] Moderate Complexity PT evaluation 51780  [] High Complexity PT evaluation 86520  [] PT Re-evaluation 09400  [] Gait training 32272  minutes  [x] Therapeutic activities 85608 8 minutes  [] Therapeutic exercises 73291  minutes  [] Neuromuscular reeducation 70735  minutes       Mahi Vega number:  PT 2210

## 2022-08-03 ENCOUNTER — APPOINTMENT (OUTPATIENT)
Dept: ULTRASOUND IMAGING | Age: 77
DRG: 682 | End: 2022-08-03
Payer: MEDICARE

## 2022-08-03 ENCOUNTER — APPOINTMENT (OUTPATIENT)
Dept: INTERVENTIONAL RADIOLOGY/VASCULAR | Age: 77
DRG: 682 | End: 2022-08-03
Payer: MEDICARE

## 2022-08-03 LAB
ANION GAP SERPL CALCULATED.3IONS-SCNC: 13 MMOL/L (ref 7–16)
ANION GAP SERPL CALCULATED.3IONS-SCNC: 15 MMOL/L (ref 7–16)
BASOPHILS ABSOLUTE: 0.07 E9/L (ref 0–0.2)
BASOPHILS RELATIVE PERCENT: 0.9 % (ref 0–2)
BUN BLDV-MCNC: 59 MG/DL (ref 6–23)
BUN BLDV-MCNC: 63 MG/DL (ref 6–23)
CALCIUM SERPL-MCNC: 8.5 MG/DL (ref 8.6–10.2)
CALCIUM SERPL-MCNC: 8.7 MG/DL (ref 8.6–10.2)
CHLORIDE BLD-SCNC: 104 MMOL/L (ref 98–107)
CHLORIDE BLD-SCNC: 105 MMOL/L (ref 98–107)
CHLORIDE URINE RANDOM: 25 MMOL/L
CO2: 18 MMOL/L (ref 22–29)
CO2: 22 MMOL/L (ref 22–29)
CREAT SERPL-MCNC: 3.8 MG/DL (ref 0.7–1.2)
CREAT SERPL-MCNC: 4.1 MG/DL (ref 0.7–1.2)
CREATININE URINE: 515 MG/DL (ref 40–278)
EOSINOPHILS ABSOLUTE: 0.15 E9/L (ref 0.05–0.5)
EOSINOPHILS RELATIVE PERCENT: 1.9 % (ref 0–6)
GFR AFRICAN AMERICAN: 17
GFR AFRICAN AMERICAN: 19
GFR NON-AFRICAN AMERICAN: 14 ML/MIN/1.73
GFR NON-AFRICAN AMERICAN: 15 ML/MIN/1.73
GLUCOSE BLD-MCNC: 185 MG/DL (ref 74–99)
GLUCOSE BLD-MCNC: 376 MG/DL (ref 74–99)
HCT VFR BLD CALC: 28.4 % (ref 37–54)
HEMOGLOBIN: 8.7 G/DL (ref 12.5–16.5)
IMMATURE GRANULOCYTES #: 0.05 E9/L
IMMATURE GRANULOCYTES %: 0.6 % (ref 0–5)
INR BLD: 2.6
LACTIC ACID: 1.1 MMOL/L (ref 0.5–2.2)
LYMPHOCYTES ABSOLUTE: 1.67 E9/L (ref 1.5–4)
LYMPHOCYTES RELATIVE PERCENT: 20.8 % (ref 20–42)
MCH RBC QN AUTO: 29.5 PG (ref 26–35)
MCHC RBC AUTO-ENTMCNC: 30.6 % (ref 32–34.5)
MCV RBC AUTO: 96.3 FL (ref 80–99.9)
METER GLUCOSE: 204 MG/DL (ref 74–99)
MONOCYTES ABSOLUTE: 0.77 E9/L (ref 0.1–0.95)
MONOCYTES RELATIVE PERCENT: 9.6 % (ref 2–12)
NEUTROPHILS ABSOLUTE: 5.31 E9/L (ref 1.8–7.3)
NEUTROPHILS RELATIVE PERCENT: 66.2 % (ref 43–80)
PDW BLD-RTO: 14.5 FL (ref 11.5–15)
PH VENOUS: 7.3 (ref 7.35–7.45)
PLATELET # BLD: 256 E9/L (ref 130–450)
PMV BLD AUTO: 10.1 FL (ref 7–12)
POTASSIUM SERPL-SCNC: 4 MMOL/L (ref 3.5–5)
POTASSIUM SERPL-SCNC: 4.3 MMOL/L (ref 3.5–5)
POTASSIUM, UR: 93 MMOL/L
PROTHROMBIN TIME: 27.6 SEC (ref 9.3–12.4)
RBC # BLD: 2.95 E12/L (ref 3.8–5.8)
SODIUM BLD-SCNC: 137 MMOL/L (ref 132–146)
SODIUM BLD-SCNC: 140 MMOL/L (ref 132–146)
SODIUM URINE: 58 MMOL/L
TOTAL CK: 171 U/L (ref 20–200)
UREA NITROGEN, UR: 865 MG/DL (ref 800–1666)
WBC # BLD: 8 E9/L (ref 4.5–11.5)

## 2022-08-03 PROCEDURE — 2500000003 HC RX 250 WO HCPCS: Performed by: NURSE PRACTITIONER

## 2022-08-03 PROCEDURE — 82436 ASSAY OF URINE CHLORIDE: CPT

## 2022-08-03 PROCEDURE — 82800 BLOOD PH: CPT

## 2022-08-03 PROCEDURE — 92526 ORAL FUNCTION THERAPY: CPT

## 2022-08-03 PROCEDURE — 93923 UPR/LXTR ART STDY 3+ LVLS: CPT

## 2022-08-03 PROCEDURE — 2060000000 HC ICU INTERMEDIATE R&B

## 2022-08-03 PROCEDURE — 99232 SBSQ HOSP IP/OBS MODERATE 35: CPT | Performed by: INTERNAL MEDICINE

## 2022-08-03 PROCEDURE — 85610 PROTHROMBIN TIME: CPT

## 2022-08-03 PROCEDURE — 36415 COLL VENOUS BLD VENIPUNCTURE: CPT

## 2022-08-03 PROCEDURE — 84133 ASSAY OF URINE POTASSIUM: CPT

## 2022-08-03 PROCEDURE — 2580000003 HC RX 258: Performed by: NURSE PRACTITIONER

## 2022-08-03 PROCEDURE — 76770 US EXAM ABDO BACK WALL COMP: CPT

## 2022-08-03 PROCEDURE — 84540 ASSAY OF URINE/UREA-N: CPT

## 2022-08-03 PROCEDURE — 80048 BASIC METABOLIC PNL TOTAL CA: CPT

## 2022-08-03 PROCEDURE — 92610 EVALUATE SWALLOWING FUNCTION: CPT

## 2022-08-03 PROCEDURE — 82550 ASSAY OF CK (CPK): CPT

## 2022-08-03 PROCEDURE — 82570 ASSAY OF URINE CREATININE: CPT

## 2022-08-03 PROCEDURE — 85025 COMPLETE CBC W/AUTO DIFF WBC: CPT

## 2022-08-03 PROCEDURE — 83605 ASSAY OF LACTIC ACID: CPT

## 2022-08-03 PROCEDURE — 84300 ASSAY OF URINE SODIUM: CPT

## 2022-08-03 PROCEDURE — 2580000003 HC RX 258: Performed by: INTERNAL MEDICINE

## 2022-08-03 PROCEDURE — 6360000002 HC RX W HCPCS: Performed by: INTERNAL MEDICINE

## 2022-08-03 PROCEDURE — 82962 GLUCOSE BLOOD TEST: CPT

## 2022-08-03 PROCEDURE — 6370000000 HC RX 637 (ALT 250 FOR IP): Performed by: INTERNAL MEDICINE

## 2022-08-03 PROCEDURE — 51798 US URINE CAPACITY MEASURE: CPT

## 2022-08-03 RX ADMIN — SODIUM BICARBONATE: 84 INJECTION, SOLUTION INTRAVENOUS at 12:00

## 2022-08-03 RX ADMIN — GABAPENTIN 300 MG: 300 CAPSULE ORAL at 07:54

## 2022-08-03 RX ADMIN — DOXYCYCLINE HYCLATE 100 MG: 100 CAPSULE ORAL at 19:39

## 2022-08-03 RX ADMIN — GABAPENTIN 300 MG: 300 CAPSULE ORAL at 19:39

## 2022-08-03 RX ADMIN — OXYCODONE HYDROCHLORIDE AND ACETAMINOPHEN 500 MG: 500 TABLET ORAL at 07:55

## 2022-08-03 RX ADMIN — CEFEPIME 2000 MG: 2 INJECTION, POWDER, FOR SOLUTION INTRAVENOUS at 16:00

## 2022-08-03 RX ADMIN — COLLAGENASE SANTYL: 250 OINTMENT TOPICAL at 07:54

## 2022-08-03 RX ADMIN — PANTOPRAZOLE SODIUM 40 MG: 40 TABLET, DELAYED RELEASE ORAL at 05:52

## 2022-08-03 RX ADMIN — DOXYCYCLINE HYCLATE 100 MG: 100 CAPSULE ORAL at 07:54

## 2022-08-03 RX ADMIN — LEVOTHYROXINE SODIUM 50 MCG: 0.05 TABLET ORAL at 05:52

## 2022-08-03 RX ADMIN — ATORVASTATIN CALCIUM 20 MG: 20 TABLET, FILM COATED ORAL at 19:39

## 2022-08-03 RX ADMIN — SODIUM BICARBONATE: 84 INJECTION, SOLUTION INTRAVENOUS at 22:35

## 2022-08-03 RX ADMIN — CEFEPIME 2000 MG: 2 INJECTION, POWDER, FOR SOLUTION INTRAVENOUS at 02:51

## 2022-08-03 RX ADMIN — WARFARIN SODIUM 8 MG: 4 TABLET ORAL at 18:36

## 2022-08-03 RX ADMIN — SODIUM BICARBONATE: 84 INJECTION, SOLUTION INTRAVENOUS at 11:58

## 2022-08-03 RX ADMIN — Medication 400 MG: at 07:55

## 2022-08-03 RX ADMIN — METOPROLOL TARTRATE 25 MG: 25 TABLET, FILM COATED ORAL at 07:55

## 2022-08-03 RX ADMIN — METOPROLOL TARTRATE 25 MG: 25 TABLET, FILM COATED ORAL at 19:39

## 2022-08-03 ASSESSMENT — PAIN SCALES - GENERAL
PAINLEVEL_OUTOF10: 0

## 2022-08-03 NOTE — PLAN OF CARE
Problem: Pain  Goal: Verbalizes/displays adequate comfort level or baseline comfort level  8/3/2022 0603 by Marvin Diaz RN  Outcome: Progressing    Problem: Safety - Adult  Goal: Free from fall injury  8/3/2022 0603 by Marvin Diaz RN  Outcome: Progressing      Problem: ABCDS Injury Assessment  Goal: Absence of physical injury  8/3/2022 0603 by Marvin Diaz RN  Outcome: Progressing

## 2022-08-03 NOTE — DISCHARGE INSTR - COC
Continuity of Care Form    Patient Name: Natalie Garcia   :  1945  MRN:  [id number]    Admit date:  2022  Discharge date:  22      Code Status Order: Prior   Advance Directives:     Admitting Physician:  Amaury Gallo MD  PCP: Shanae Muir DO    Discharging [facility] Unit/Room#: 0606/0606-A  Discharging Unit Phone Number: [phone]    Emergency Contact:   Extended Emergency Contact Information  Primary Emergency Contact: Whitley Fischer  Address: [address] Phone: [phone]  Mobile Phone: [phone]  Relation: Child  Secondary Emergency Contact: Alanna Wheeler  Address: [address] [place] [address]           Montez Guardado [address] Phone: [phone]  Mobile Phone: [phone]  Relation: Child    Past Surgical History:  Past Surgical History:   Procedure Laterality Date    CARDIAC PACEMAKER PLACEMENT  2014    COLONOSCOPY      EYE SURGERY      KNEE ARTHROSCOPY  right knee    PACEMAKER PLACEMENT      MD COLONOSCOPY FLX DX W/COLLJ SPEC WHEN PFRMD N/A 3/20/2018    COLONOSCOPY DIAGNOSTIC performed by Kathy Akbar MD at [address] EGD PERCUTANEOUS PLACEMENT GASTROSTOMY TUBE N/A 3/29/2018    PEG TUBE/PT.  IN [facility] performed by Kathy Akbar MD at [address] EGD PERCUTANEOUS PLACEMENT GASTROSTOMY TUBE N/A 2018    EGD PEG TUBE PLACEMENT performed by Kathy Akbar MD at [address] [place] [address]       Immunization History:   Immunization History   Administered Date(s) Administered    COVID-19, PFIZER PURPLE top, DILUTE for use, (age 15 y+), 30mcg/0.3mL 2021, 2021, 10/04/2021    Influenza Virus Vaccine 2012, 2014, 10/22/2021    Influenza, High Dose (Fluzone 65 yrs and older) 2019, 2019, 10/13/2020    Influenza, High-dose, Quadv, 65 yrs +, IM (Fluzone) 10/13/2020, 10/22/2021, 10/22/2021    Pneumococcal Conjugate 13-valent Sherrie Van) 09/30/2019    Pneumococcal Polysaccharide (Zheqekuki84) 01/04/2012, 11/19/2019       Active Problems:  Patient Active Problem List   Diagnosis Code    Hypertension I10    Hyperlipidemia with target LDL less than 100 E78.5    Impaired mobility and ADLs Z74.09, Z78.9    Cardiac pacemaker in situ Z95.0    Cerebrovascular accident (CVA) determined by clinical assessment ([facility] I63.9    Left renal mass N28.89    Class 2 severe obesity due to excess calories with serious comorbidity and body mass index (BMI) of 35.0 to 35.9 in adult [facility]) E66.01, Z68.35    Acquired hypothyroidism E03.9    H/O deep venous thrombosis Z86.718    Type 2 diabetes mellitus without complication, without long-term current use of insulin (HCC) E11.9    Stage 3 chronic kidney disease (HCC) N18.30    Chronic renal disease, stage III ([facility] [580700] N18.30    Pneumonia J18.9       Isolation/Infection:   Isolation            No Isolation          Patient Infection Status       None to display            Nurse Assessment:  Last Vital Signs: BP (!) 100/55   Pulse 72   Temp 99 °F (37.2 °C) (Oral)   Resp 18   Ht 6' 3\" (1.905 m)   Wt 266 lb 8.6 oz (120.9 kg)   SpO2 97%   BMI 33.31 kg/m²     Last documented pain score (0-10 scale): Pain Level: 0  Last Weight:   Wt Readings from Last 1 Encounters:   08/02/22 266 lb 8.6 oz (120.9 kg)     Mental Status:  {IP PT MENTAL STATUS:12005}    IV Access:  { ARINA IV ACCESS:387094106}    Nursing Mobility/ADLs:  Walking   {[facility] DME UFWF:106110172}  Transfer  {[facility] DME UCTD:982765169}  Bathing  {[facility] DME IAJH:859258453}  Dressing  {[facility] DME KQTX:196551694}  Toileting  {[facility] DME EKEY:621539076}  Feeding  {[facility] DME QFED:032025178}  Med Admin  {[facility] DME SGGV:674492956}  Med Delivery   {[facility] MED Delivery:278696821}    Wound Care Documentation and Therapy:  Wound 10/01/18 Sacrum (Active)   Number of days: 1401       Wound 08/01/22 Heel Right center is black with a darkened area around the heel. No drainage.  Foul Bearin}  Other Medical Equipment (for information only, NOT a DME order):  {EQUIPMENT:178500232}  Other Treatments: Right foot treatment    Patient's personal belongings (please select all that are sent with patient):  {CHP DME Belongings:257767670}    RN SIGNATURE:  Electronically signed by Allan Sherman RN on 22 at 4:41 PM EDT    CASE MANAGEMENT/SOCIAL WORK SECTION    Inpatient Status Date: 2022    Readmission Risk Assessment Score:  Readmission Risk              Risk of Unplanned Readmission:  [id number]           Discharging to Facility/ Agency   Name: Yuliya Capps. [address]   [place], [address] [facility] [address]  Phone: [phone]  Fax: [phone]    Dialysis Facility (if applicable)   Name:  Address:  Dialysis Schedule:  Phone:  Fax:    / signature: Electronically signed by Antonio Kulkarni RN on 8/3/2022 at 10:23 AM      PHYSICIAN SECTION    Prognosis: Good    Condition at Discharge: Stable    Rehab Potential (if transferring to Rehab): Fair    Recommended Labs or Other Treatments After Discharge: ***    Physician Certification: I certify the above information and transfer of Bronwyn Carolina  is necessary for the continuing treatment of the diagnosis listed and that he requires [facility] for less 30 days.      Update Admission H&P: {P DME Changes in XDAKR:674080985}    PHYSICIAN SIGNATURE:  Hetal Diego MD

## 2022-08-03 NOTE — PROGRESS NOTES
Speech Language Pathology      NAME:  Indu Friend  :  1945  DATE: 8/3/2022  ROOM:  [facility]    Order received. Chart reviewed. Pt unavailable at this time due to:  [] HOLD per RN, Pt   [] Off unit for testing/ procedure    [] With medical staff   [] Declined intervention  [x] Sleeping/ Lethargic -unable to awaken patient for PO intake. Not appropriate at this time. [] Other:     Will re-attempt later this date as able. Thank you. Syncope and collapse [R55]  Pneumonia [J18.9]  JUAN (acute kidney injury) ([facility] [N17.9]  Injury of head, initial encounter [F71.82WF]  Fall, initial encounter [W19. XXXA]  Pneumonia of both lower lobes due to infectious organism [J18.9]  Ulcer of right heel and midfoot, unspecified ulcer stage [facility]) [L97419]    Lamar ARCHULETA  [address] [address]  Speech Language Pathologist

## 2022-08-03 NOTE — PLAN OF CARE
Problem: Skin/Tissue Integrity  Goal: Absence of new skin breakdown  8/3/2022 0801 by Stephanie Young RN  Outcome: Progressing  8/2/2022 1830 by Rod Hill LPN  Outcome: Progressing     Problem: Pain  Goal: Verbalizes/displays adequate comfort level or baseline comfort level  8/3/2022 0801 by Stephanie Young RN  Outcome: Progressing  8/3/2022 0603 by Ashly Díaz RN  Outcome: Progressing  8/2/2022 1830 by Rod Hill LPN  Outcome: Progressing     Problem: Discharge Planning  Goal: Discharge to home or other facility with appropriate resources  8/3/2022 0801 by Stephanie Young RN  Outcome: Progressing  8/2/2022 1830 by Rod Hill LPN  Outcome: Not Progressing     Problem: Safety - Adult  Goal: Free from fall injury  8/3/2022 0801 by Stephanie Young RN  Outcome: Progressing  8/3/2022 0603 by Ashly Díaz RN  Outcome: Progressing  8/2/2022 1830 by Rod Hill LPN  Outcome: Progressing     Problem: Chronic Conditions and Co-morbidities  Goal: Patient's chronic conditions and co-morbidity symptoms are monitored and maintained or improved  8/3/2022 0801 by Stephanie Young RN  Outcome: Progressing  8/2/2022 1830 by Rod Hill LPN  Outcome: Progressing     Problem: ABCDS Injury Assessment  Goal: Absence of physical injury  8/3/2022 0801 by Stephanie Young RN  Outcome: Progressing  8/3/2022 0603 by Ashly Díaz RN  Outcome: Progressing  8/2/2022 1830 by Rod Hill LPN  Outcome: Progressing     Problem: Discharge Planning  Goal: Discharge to home or other facility with appropriate resources  8/3/2022 0801 by Stephanie Young RN  Outcome: Progressing  8/2/2022 1830 by Rod Hill LPN  Outcome: Not Progressing

## 2022-08-03 NOTE — PROGRESS NOTES
Department of Internal Medicine  Nephrology Progress Note    Events reviewed. SUBJECTIVE:  We are following Mr. Carina Gonsalves for JUAN on CKD. He is lethargic.     PHYSICAL EXAM:      Vitals:    VITALS:  BP (!) 100/55   Pulse 72   Temp 99 °F (37.2 °C) (Oral)   Resp 18   Ht 6' 3\" (1.905 m)   Wt 266 lb 8.6 oz (120.9 kg)   SpO2 97%   BMI 33.31 kg/m²   24HR INTAKE/OUTPUT:    Intake/Output Summary (Last 24 hours) at 8/3/2022 1117  Last data filed at 8/3/2022 0738  Gross per 24 hour   Intake 120 ml   Output --   Net 120 ml       Constitutional:  Lethargic  HEENT:  Normocephalic, PERRL, dry mucous membranes  Respiratory:  CTA bilaterally  Cardiovascular/Edema:  RRR, S1,S2  Gastrointestinal:  Soft, rounded, nontender, nondistended  Neurologic:  Nonfocal, GREENBERG  Skin:  Warm, dry, wound to right heel  Other:  Trace edema BLE    Scheduled Meds:   doxycycline hyclate  100 mg Oral 2 times per day    collagenase   Topical Daily    cefepime  2,000 mg IntraVENous Q12H    magnesium oxide  400 mg Oral Every Other Day    warfarin  8 mg Oral Daily    gabapentin  300 mg Oral Daily    gabapentin  300 mg Oral Nightly    atorvastatin  20 mg Oral Nightly    metoprolol tartrate  25 mg Oral BID    levothyroxine  50 mcg Oral Daily    ascorbic acid  500 mg Oral Daily    [START ON 8/8/2022] vitamin D  50,000 Units Oral Weekly    pantoprazole  40 mg Oral QAM AC     Continuous Infusions:   IV infusion builder       PRN Meds:.oxyCODONE-acetaminophen, acetaminophen, ipratropium-albuterol, guaiFENesin-dextromethorphan, docusate sodium    DATA:    CBC:   Lab Results   Component Value Date/Time    WBC 8.0 08/03/2022 02:54 AM    RBC 2.95 08/03/2022 02:54 AM    HGB 8.7 08/03/2022 02:54 AM    HCT 28.4 08/03/2022 02:54 AM    MCV 96.3 08/03/2022 02:54 AM    MCH 29.5 08/03/2022 02:54 AM    MCHC 30.6 08/03/2022 02:54 AM    RDW 14.5 08/03/2022 02:54 AM     08/03/2022 02:54 AM    MPV 10.1 08/03/2022 02:54 AM     CMP:    Lab Results   Component Value Date/Time     08/03/2022 02:54 AM    K 4.3 08/03/2022 02:54 AM    K 4.9 08/01/2022 01:21 PM     08/03/2022 02:54 AM    CO2 18 08/03/2022 02:54 AM    BUN 59 08/03/2022 02:54 AM    CREATININE 4.1 08/03/2022 02:54 AM    GFRAA 17 08/03/2022 02:54 AM    LABGLOM 14 08/03/2022 02:54 AM    GLUCOSE 185 08/03/2022 02:54 AM    GLUCOSE 297 01/04/2012 03:00 AM    PROT 7.2 08/01/2022 01:21 PM    LABALBU 3.5 08/01/2022 01:21 PM    LABALBU 3.4 01/04/2012 03:00 AM    CALCIUM 8.7 08/03/2022 02:54 AM    BILITOT 0.2 08/01/2022 01:21 PM    ALKPHOS 93 08/01/2022 01:21 PM    AST 14 08/01/2022 01:21 PM    ALT 9 08/01/2022 01:21 PM     Magnesium:    Lab Results   Component Value Date/Time    MG 2.0 08/02/2022 03:10 PM     Phosphorus:    Lab Results   Component Value Date/Time    PHOS 4.7 08/02/2022 03:10 PM     Radiology Review:      CT head without IV contrast 8/1/22   1. There is no acute intracranial hemorrhage   2. Old left occipital lobe infarct. CXR 8/1/22   Bilateral lower lobe atelectasis/pneumonia, left worse than right. Small left pleural effusion. Stable enlargement of the cardiac silhouette. BRIEF SUMMARY OF INITIAL CONSULT:    Briefly, Mr. Ankush Nicolas  is a 60-year-old male with a PMH of CKD stage IIIb, without proteinuria, baseline creatinine 2.0-2.1 mg/dL, secondary to nephrosclerosis, kidney cancer, s/p total left nephrectomy on 7/5/22 at [facility], hypertension, type II diabetes mellitus, HFpEF 55% with stage II DD, factor V Leyden deficiency, hyperlipidemia, sick sinus syndrome s/p pacemaker placement, CVA, hypothyroidism, PAF on chronic anticoagulation with warfarin, who was admitted on August 1, 2022 after presenting to the ER after sustaining a fall. He did lose consciousness and hit his head at that time. A CT of the head was obtained which was negative. His labs upon arrival to ER were significant for BUN 46 mg/dL and creatinine 2.8 mg/dL.  His renal function continued to worsen yesterday afternoon with a BUN of 50 mg/dL and a creatinine of 3.0 mg/dL, which is the reason for this consultation. He received a 250 mL normal saline bolus in ER. He did receive Lasix 20 mg PO last night as well as lisinopril. His home medications include Lasix. He was recently taken off of lisinopril by Dr. Melvin Maher and his Lasix was increased to 40 mg PO BID. IMPRESSION/RECOMMENDATIONS:      JUAN stage I on CKD, likely volume responsive pre-renal JUAN secondary to over diuresis in the setting of ACE inhibition with a component of ischemic ATN. FeUrea 11.67%. PVR 0. Renal function continues to worsen despite IV fluid administration. We will continue with IV fluids and continue to hold Lasix for now. May need dialysis if renal function continues to worsen. CKD stage IIIb, without proteinuria, baseline creatinine 2.0-2.1 mg/dL, secondary to nephrosclerosis  Low bicarbonate level with high anion gap, likely consistent with HAGMA secondary to uremia. To obtain venous pH and start on bicarbonate drip.     Left kidney cancer, s/p total left nephrectomy 7/5/22 at [facility]  HTN, now with hypotension, likely secondary to intravascular volume depletion   HFpEF 55% with stage II DD, pro-, Lasix on hold  -------------------------------------------------  S/p fall  Bilateral pneumonia, on doxycycline and cefepime  PAF, on metoprolol and warfarin  Sick sinus syndrome, s/p pacemaker placement   History of CVA  Microcytic anemia, to obtain iron studies  Nutrition, poor oral intake     Plan:    Change IV fluids to D5 1/2NS + 75 mEq sodium bicarbonate at 100 cc/hr  Continue to hold Lasix  Continue to hold lisinoril  Strict I&Os  Insert alessandra  Continue to monitor renal function, BMP 4 PM  Obtain iron studies   Obtain venous pH  Obtain kidney ultrasound  Obtain lactic acid level  Monitor BP closely every hour and call Dr. Swapnil uDarte if systolic <90, may need transfer to ICU    Case and plan discussed with Dr. Swapnil Duarte      Electronically signed by Jesus Stuart, APRN - CNP on 8/3/2022 at 11:17 AM    MD: Agree with above. Family(daughter) in room. Explained about current condition. CT and MRI reviewed. Possibly APCKD. May need dialysis. Continue to monitor.

## 2022-08-03 NOTE — PROGRESS NOTES
Patients daughter came to nurses station stating \" I think my dad has had another stroke\". Went immediately into room. Respirations easy on room air, lungs clear. Abdomen remains rounded, audible. Incontinent of urine. Follows commands. Speech clear. A&Ox4. Neuros WNL. Denies pain or discomfort. Placed a call to Dr Harmony Fairchild. Explained daughters concerns. No new orders at this time. Daughter updated with plan of care. Daughter voices understanding, thanked this RN.

## 2022-08-03 NOTE — PROGRESS NOTES
Admit Date: 8/1/2022    Subjective:     Awake tired NAD  Renal consult noted     Objective:     Patient Vitals for the past 8 hrs:   BP Temp Temp src Pulse Resp SpO2   08/03/22 0115 (!) 92/45 99.3 °F (37.4 °C) Axillary 66 18 95 %     I/O last 3 completed shifts:  In: -   Out: 300 [Urine:300]  No intake/output data recorded. HEENT: Normal  NECK: Thyroid normal. No carotid bruit. No lymphphadenopathy. CVS: RRR  RS: Clear. No wheeze. No rhonchi. ABD: Soft. Non tender. No mass. Normal BS.obese   EXT: No edema. Non tender. Pulses present.    NEURO: moving all extremities       Scheduled Meds:   doxycycline hyclate  100 mg Oral 2 times per day    collagenase   Topical Daily    cefepime  2,000 mg IntraVENous Q12H    magnesium oxide  400 mg Oral Every Other Day    warfarin  8 mg Oral Daily    gabapentin  300 mg Oral Daily    gabapentin  300 mg Oral Nightly    atorvastatin  20 mg Oral Nightly    metoprolol tartrate  25 mg Oral BID    levothyroxine  50 mcg Oral Daily    ascorbic acid  500 mg Oral Daily    [START ON 8/8/2022] vitamin D  50,000 Units Oral Weekly    pantoprazole  40 mg Oral QAM AC     Continuous Infusions:   sodium chloride 100 mL/hr at 08/02/22 2100       CBC with Differential:    Lab Results   Component Value Date/Time    WBC 8.0 08/03/2022 02:54 AM    RBC 2.95 08/03/2022 02:54 AM    HGB 8.7 08/03/2022 02:54 AM    HCT 28.4 08/03/2022 02:54 AM     08/03/2022 02:54 AM    MCV 96.3 08/03/2022 02:54 AM    MCH 29.5 08/03/2022 02:54 AM    MCHC 30.6 08/03/2022 02:54 AM    RDW 14.5 08/03/2022 02:54 AM    SEGSPCT 80 01/04/2012 03:00 AM    BANDSPCT 1 11/27/2014 04:50 AM    LYMPHOPCT 20.8 08/03/2022 02:54 AM    MONOPCT 9.6 08/03/2022 02:54 AM    MYELOPCT 0.9 02/28/2018 05:21 AM    BASOPCT 0.9 08/03/2022 02:54 AM    MONOSABS 0.77 08/03/2022 02:54 AM    LYMPHSABS 1.67 08/03/2022 02:54 AM    EOSABS 0.15 08/03/2022 02:54 AM    BASOSABS 0.07 08/03/2022 02:54 AM     CMP:    Lab Results   Component Value Date/Time  08/03/2022 02:54 AM    K 4.3 08/03/2022 02:54 AM    K 4.9 08/01/2022 01:21 PM     08/03/2022 02:54 AM    CO2 18 08/03/2022 02:54 AM    BUN 59 08/03/2022 02:54 AM    CREATININE 4.1 08/03/2022 02:54 AM    GFRAA 17 08/03/2022 02:54 AM    LABGLOM 14 08/03/2022 02:54 AM    PROT 7.2 08/01/2022 01:21 PM    LABALBU 3.5 08/01/2022 01:21 PM    LABALBU 3.4 01/04/2012 03:00 AM    CALCIUM 8.7 08/03/2022 02:54 AM    BILITOT 0.2 08/01/2022 01:21 PM    ALKPHOS 93 08/01/2022 01:21 PM    AST 14 08/01/2022 01:21 PM    ALT 9 08/01/2022 01:21 PM     PT/INR:    Lab Results   Component Value Date/Time    PROTIME 27.6 08/03/2022 02:54 AM    PROTIME 12.2 12/30/2011 10:20 PM    INR 2.6 08/03/2022 02:54 AM       Assessment:     Principal Problem:    Bilateral pneumonia  JUAN on CKD stage III  S/P left nephrectomy for CA  Old CVA  HTN  A.Fib.   Fall  DM  Decubitus ulcer right heal       Plan:   Continue ATB ,lasix lisinopril on hold ,fluid per renal ,monitor

## 2022-08-03 NOTE — PROGRESS NOTES
SPEECH/LANGUAGE PATHOLOGY  CLINICAL ASSESSMENT OF SWALLOWING FUNCTION   and PLAN OF CARE    PATIENT NAME:  Lloyd Montero  (male)     MRN:  [id number]    :  1945  (67 y.o.)  STATUS:  Inpatient: Room -A    TODAY'S DATE:  8/3/2022  REFERRING PROVIDER:   Irene Amezquita MD  SPECIFIC PROVIDER ORDER: SLP eval and treat  SLP swallowing-dysphagia evaluation and treatment Date of order:  22  REASON FOR REFERRAL: assess swallow fx   EVALUATING THERAPIST: JAY Saravia                 RESULTS:    DYSPHAGIA DIAGNOSIS:   Clinical indicators of suspected oropharyngeal phase dysphagia       DIET RECOMMENDATIONS:  Regular consistency solids (IDDSI level 7) with  nectar consistency (mildly thick - IDDSI level 2) liquids    Per RN and family report, patient chokes frequently on thin liquid. This was not observed during bedside evaluation. Therefore, rec NTL with f/u during meals to assess toleration. Also discussed rec of video swallow study with family. They are concerned with aspiration but also mentioned comfort care. Daughter reported she will speak with other family members then let the Dr know their decision.       FEEDING RECOMMENDATIONS:     Assistance level:  Full assistance is needed during all oral intake      Compensatory strategies recommended: Small bites/sips and Alternate solids and liquids      Discussed recommendations with nursing and/or faxed report to referring provider: Yes    SPEECH THERAPY  PLAN OF CARE   The dysphagia POC is established based on physician order, dysphagia diagnosis and results of clinical assessment     Skilled SLP intervention for dysphagia management on acute care 3-5 x per week until goals met, pt plateaus in function and/or discharged from hospital  Meal time assessment for 1-2 sessions to provide diet modification and compensatory strategy implementation due to staff report of dysphagia symptoms during meals    Conditions Requiring Skilled Therapeutic Intervention for dysphagia:    Patient is performing below functional baseline d/t  current acute condition, Multiple diagnoses, multiple medications, and increased dependency upon caregivers. Reduced oral control of bolus   Inability to self feed increases risk aspiration pneumonia (Joan et al.,1998.)  History of dysphagia/altered consistency      Specific dysphagia interventions to include:     Compensatory strategy training   Meal time assessment for 1-2 sessions to provide diet modification and compensatory strategy implementation due to staff report of dysphagia symptoms during meals    Specific instructions for next treatment:  ongoing PO analysis to upgrade diet and evaluate tolerance of current PO recommendation, initiate instruction of compensatory strategies, and MBSS to be completed  Patient Treatment Goals:    Short Term Goals:  Pt will participate in MBSS to fully assess oropharyngeal swallow function and to assist in determining the least restrictive PO diet to maintain adequate nutrition/hydration   Pt will implement identified compensatory swallowing strategies on 90% of opportunities or greater to improve airway protection and swallow function. Pt will participate in meal time assessment for 1-2 sessions to provide diet modification and compensatory strategy implementation due to staff report of dysphagia symptoms during meals    Long Term Goals:   Pt will maintain adequate nutrition/hydration via PO intake of the least restrictive oral diet with implementation of safe swallow/ compensatory strategies and decrease signs/symptoms of aspiration to less than 1 x/day. Pt will improve oropharyngeal swallow function to ensure airway protection during PO intake to maintain adequate nutrition/hydration and decrease signs/symptoms of aspiration to less than 1 x/day. A Video Swallow Study (MBSS) is recommended and requires a physician order      Patient/family Goal:    Did not state.   Will further assess during treatment. Plan of care discussed with Patient and Family   The Patient and Family understand(s) the diagnosis, prognosis and plan of care     Rehabilitation Potential/Prognosis: fair                    ADMITTING DIAGNOSIS: Syncope and collapse [R55]  Pneumonia [J18.9]  JUAN (acute kidney injury) ([facility] [address] [N17.9]  Injury of head, initial encounter [Y65.63KF]  Fall, initial encounter [W19. XXXA]  Pneumonia of both lower lobes due to infectious organism [J18.9]  Ulcer of right heel and midfoot, unspecified ulcer stage [facility]) [L97.419]    VISIT DIAGNOSIS:   Visit Diagnoses         Codes    Fall, initial encounter    -  Primary W19. XXXA    Injury of head, initial encounter     S09.90XA    JUAN (acute kidney injury) ([facility] [address]     N17.9    Syncope and collapse     R55    Ulcer of right heel and midfoot, unspecified ulcer stage ([facility] [address]     L97.419             PATIENT REPORT/COMPLAINT: patient not able to accurately report- per family he has always choked on liquids since his stroke. RN cleared patient for participation in assessment     yes     PRIOR LEVEL OF SWALLOW FUNCTION:    PAST HISTORY OF DYSPHAGIA?: yes, patient was treated for dysphagia at facility after stroke and was just recently d/c. Current Diet Order:  ADULT DIET;  Regular    PROCEDURE:  Consistencies Administered During the Evaluation   Liquids: thin liquid and nectar thick liquid   Solids:  pureed foods and solid foods      Method of Intake:   cup, straw, spoon  Fed by clinician      Position:   Seated, upright    CLINICAL ASSESSMENT:  Oral Stage:       Decreased bolus formation resulting in suspected premature pharyngeal spillage  Oral residuals were noted :  throughout the oral cavity      Pharyngeal Stage:    Multiple swallows were noted after presentation of all consistencies administered    Cognition:   Follows 1 - step directions appropriate for this assessment and Needs frequent verbal cues to maintain adequate alertness    Oral Peripheral Examination   Could not test    Current Respiratory Status    room air     Parameters of Speech Production  Respiration:  Adequate for speech production  Quality:   Within functional limits  Intensity: Within functional limits    Volitional Swallow: present     Volitional Cough:   not able to elicit     Pain: No pain reported. EDUCATION:   The Speech Language Pathologist (SLP) completed education regarding results of evaluation and that intervention is warranted at this time. Learner: Patient and Family  Education: Reviewed results and recommendations of this evaluation, Reviewed diet and strategies, Reviewed signs, symptoms and risks of aspiration, and Reviewed recommendations for follow-up  Evaluation of Education:  Verbalizes understanding    This plan may be re-evaluated and revised as warranted. Evaluation Time includes thorough review of current medical information, gathering information on past medical history/social history and prior level of function, completion of standardized testing/informal observation of tasks, assessment of data and education on plan of care and goals. [x]The admitting diagnosis and active problem list, have been reviewed prior to initiation of this evaluation.         ACTIVE PROBLEM LIST:   Patient Active Problem List   Diagnosis    Hypertension    Hyperlipidemia with target LDL less than 100    Impaired mobility and ADLs    Cardiac pacemaker in situ    Cerebrovascular accident (CVA) determined by clinical assessment ([facility] [address]    Left renal mass    Class 2 severe obesity due to excess calories with serious comorbidity and body mass index (BMI) of 35.0 to 35.9 in adult [facility])    Acquired hypothyroidism    H/O deep venous thrombosis    Type 2 diabetes mellitus without complication, without long-term current use of insulin (HCC)    Stage 3 chronic kidney disease ([address]    Chronic renal disease, stage III ([facility] [address] [670173]    Pneumonia         CPT code:  [address]  [facility] swallow eval  52911  dysphagia tx      Tracy ARCHULETA  [id number] [facility] [id number]  Speech Language Pathologist

## 2022-08-03 NOTE — PROGRESS NOTES
Podiatry Progress Note  8/3/2022   Tanisha Samuel       SUBJECTIVE: Newton Etienne is a 67 y.o. male seen bedside for f/u of a right heel unstageable ulcer. Patient denies any new complaints today. No acute events overnight. Continue daily dressing changes Christofer ARIAS. Wound stable today. Daily dressing changes. Arterial studies still pending, more than likely require vascular consult. Denies any constitutional symptoms this AM.        Past Medical History:   Diagnosis Date    Atrial fibrillation ([facility] [address]     Cancer ([facility] [address]     kidney    Diabetes mellitus ([facility] [address]     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[facility] [address]     Hypertension     Ischemic stroke ([facility] [address] 03/06/2018    Pacemaker     Paraplegia ([facility] [address]     Sinus node dysfunction ([facility] [address]     Stroke (cerebrum) ([facility] [address] 02/27/2018    Thyroid disease         Past Surgical History:   Procedure Laterality Date    CARDIAC PACEMAKER PLACEMENT  12/19/2014    COLONOSCOPY      EYE SURGERY      KNEE ARTHROSCOPY  right knee    PACEMAKER PLACEMENT      AK COLONOSCOPY FLX DX W/COLLJ SPEC WHEN PFRMD N/A 3/20/2018    COLONOSCOPY DIAGNOSTIC performed by Lulu Souza MD at [address] EGD PERCUTANEOUS PLACEMENT GASTROSTOMY TUBE N/A 3/29/2018    PEG TUBE/PT. IN 5507 B performed by Lulu Souza MD at [address] EGD PERCUTANEOUS PLACEMENT GASTROSTOMY TUBE N/A 9/12/2018    EGD PEG TUBE PLACEMENT performed by Lulu Souza MD at [facility] ENDOSCOPY         Family History   Problem Relation Age of Onset    Heart Disease Mother     Stroke Father         Social History     Tobacco Use    Smoking status: Never    Smokeless tobacco: Never    Tobacco comments:     occassional cigar   Substance Use Topics    Alcohol use: No        Prior to Admission medications    Medication Sig Start Date End Date Taking? Authorizing Provider   gabapentin (NEURONTIN) 300 MG capsule Take 300 mg by mouth in the morning.    Yes Historical Provider, MD   ascorbic acid (VITAMIN C) 500 MG tablet Take 500 mg by mouth in the morning. Yes Historical Provider, MD   Dextromethorphan-guaiFENesin  MG/5ML SYRP Take 10 mLs by mouth every 6 hours as needed for Cough   Yes Historical Provider, MD   oxyCODONE (ROXICODONE) 5 MG immediate release tablet Take 5 mg by mouth every 6 hours as needed for Pain. Yes Historical Provider, MD   ergocalciferol (ERGOCALCIFEROL) 1.25 MG (67589 UT) capsule Take 50,000 Units by mouth once a week Takes every monday 8/1/22  Yes Historical Provider, MD   docusate (COLACE, DULCOLAX) 100 MG CAPS Take 100 mg by mouth 2 times daily as needed 7/8/22 8/7/22 Yes Historical Provider, MD   gabapentin (NEURONTIN) 300 MG capsule Take 300 mg by mouth nightly. 7/8/22  Yes Historical Provider, MD   acetaminophen (TYLENOL) 325 MG tablet Take 650 mg by mouth every 4 hours as needed    Historical Provider, MD   ascorbic acid (VITAMIN C) 500 MG tablet Take 500 mg by mouth in the morning and 500 mg at noon and 500 mg before bedtime. Historical Provider, MD   docusate sodium (COLACE) 100 MG capsule TAKE ONE CAPSULE BY MOUTH TWICE DAILY AS NEEDED FOR CONSTIPATION 7/8/22   Historical Provider, MD   glucose (GLUTOSE) 40 % GEL Take 15 g by mouth as needed    Historical Provider, MD   ipratropium-albuterol (DUONEB) 0.5-2.5 (3) MG/3ML SOLN nebulizer solution Inhale 3 mLs into the lungs every 4 hours as needed    Historical Provider, MD   pantoprazole (PROTONIX) 40 MG tablet Take 40 mg by mouth every morning (before breakfast)    Historical Provider, MD   polyethylene glycol (GLYCOLAX) 17 GM/SCOOP powder Take 17 g by mouth daily as needed    Historical Provider, MD   potassium chloride (KLOR-CON M) 20 MEQ extended release tablet TAKE ONE TABLET BY MOUTH EVERY DAY 5/11/22   Historical Provider, MD   dextromethorphan-guaiFENesin (ROBITUSSIN-DM)  MG/5ML syrup Take 10 mLs by mouth every 6 hours as needed    Historical Provider, MD   oxyCODONE HCl 5 MG TABA Take 1 tablet by mouth every 6 hours as needed.     Historical Periwound erythema right heel. No edema, no ecchymosis     Neuro Exam: Diminished tactile sensation bilaterally left worse than right. Dermatologic Exam: Well-hydrated skin with skin wrinkles noted  -Unstageable decubitus ulcer right heel with increasing size and width of dry stable eschar, less granular tissue today. No malodor today, no crepitus or fluctuance, no undermining tunneling, no proximal streaking, no drainage. MSK: Muscle strength 5/5 Bilateral.  Reducible hallux malleus left and hammertoes digits 2 through 5 bilateral.  Ankle equinus bilateral.  Negative clonus, negative Babinski sign. Soft compressible posterior muscle compartments bilaterally.     Current Facility-Administered Medications   Medication Dose Route Frequency Provider Last Rate Last Admin    sodium bicarbonate 75 mEq in dextrose 5 % and 0.45 % NaCl 1,000 mL infusion   IntraVENous Continuous MELANIE Kauffman -  mL/hr at 08/03/22 1200 New Bag at 08/03/22 1200    doxycycline hyclate (VIBRAMYCIN) capsule 100 mg  100 mg Oral 2 times per day [facility] MD HELADIO   100 mg at 08/03/22 0754    collagenase ointment   Topical Daily Elvira Hartman DPM   Given at 08/03/22 0754    cefepime (MAXIPIME) 2000 mg IVPB minibag  2,000 mg IntraVENous Q12H [facility] MD HELADIO   Stopped at 08/03/22 2997    oxyCODONE-acetaminophen (PERCOCET) 5-325 MG per tablet 1 tablet  1 tablet Oral Q6H PRN [facility] MD HELADIO        acetaminophen (TYLENOL) tablet 650 mg  650 mg Oral Q4H PRN [facility] MD HELADIO        magnesium oxide (MAG-OX) tablet 400 mg  400 mg Oral Every Other Day [facility] MD HELADIO   400 mg at 08/03/22 0755    ipratropium-albuterol (DUONEB) nebulizer solution 1 ampule  1 ampule Inhalation Q4H PRN [facility] MD HELADIO        warfarin (COUMADIN) tablet 8 mg  8 mg Oral Daily [facility] MD HELADIO   8 mg at 08/02/22 1744    gabapentin (NEURONTIN) capsule 300 mg  300 mg Oral Daily [facility] MD HELADIO   300 mg at 08/03/22 9994    gabapentin (NEURONTIN) capsule 300 mg  300 mg Oral Nightly Marco Red MD   300 mg at 08/02/22 2100    atorvastatin (LIPITOR) tablet 20 mg  20 mg Oral Nightly Marco Red MD   20 mg at 08/02/22 2100    metoprolol tartrate (LOPRESSOR) tablet 25 mg  25 mg Oral BID Marco Red MD   25 mg at 08/03/22 0755    guaiFENesin-dextromethorphan (ROBITUSSIN DM) 100-10 MG/5ML syrup 5 mL  5 mL Oral Q6H PRN Marco Red MD        docusate sodium (COLACE) capsule 100 mg  100 mg Oral BID PRN Marco Red MD        levothyroxine (SYNTHROID) tablet 50 mcg  50 mcg Oral Daily Marco Red MD   50 mcg at 08/03/22 1657    ascorbic acid (VITAMIN C) tablet 500 mg  500 mg Oral Daily Marco Red MD   500 mg at 08/03/22 0752    [START ON 8/8/2022] vitamin D (ERGOCALCIFEROL) capsule 50,000 Units  50,000 Units Oral Weekly Marco Red MD        pantoprazole (PROTONIX) tablet 40 mg  40 mg Oral QAM AC Marco Red MD   40 mg at 08/03/22 4915        Lab Results   Component Value Date    WBC 8.0 08/03/2022    HCT 28.4 (L) 08/03/2022    HGB 8.7 (L) 08/03/2022     08/03/2022     08/03/2022    K 4.3 08/03/2022     08/03/2022    CO2 18 (L) 08/03/2022    BUN 59 (H) 08/03/2022    CREATININE 4.1 (H) 08/03/2022    GLUCOSE 185 (H) 08/03/2022    CRP 7.2 (H) 08/01/2022       ASSESSMENT:  -Unstageable diabetic decubitus ulcer right heel, POA, not acutely infected  -Peripheral arterial disease  -Non-insulin-dependent type 2 diabetes mellitus with peripheral neuropathy  -Stroke x2 with left sided sensory deficit  -Hallux malleus left, hammertoe digits 2 through 5 bilaterally       PLAN:  - Examined and evaluated  - All labs, imaging, and charts reviewed   -WBC 8.0  - Wound cultures right heel obtained 8/1/2022: GNR, mixed GPO  -ABX: Doxy/cefepime, managed by IM  - X-ray right foot/ankle 8/1/22: Negative for osteomyelitis, or soft tissue gas, vascular Calcifications noted.   - Arterial studies 8/1/2022: Pending, with appearance of wound patient will likely require vascular surgery consult  -Dressings: JUANA Beaulieu. Daily changes.  -Heel protectors bilateral lower extremities.  -No indications for acute surgical invention from podiatry perspective. We will continue with local wound care going forward.   -We will continue to follow     D/W: Dr. Ricky Zhong    Thank you for involving podiatry in this patients care. Please do not hesitate to call with any questions or concerns.      [address] [employer] Podiatry PGY3  8/3/2022   2:12 PM

## 2022-08-04 LAB
ANION GAP SERPL CALCULATED.3IONS-SCNC: 12 MMOL/L (ref 7–16)
ANION GAP SERPL CALCULATED.3IONS-SCNC: 13 MMOL/L (ref 7–16)
BASOPHILS ABSOLUTE: 0.06 E9/L (ref 0–0.2)
BASOPHILS RELATIVE PERCENT: 0.8 % (ref 0–2)
BUN BLDV-MCNC: 55 MG/DL (ref 6–23)
BUN BLDV-MCNC: 60 MG/DL (ref 6–23)
CALCIUM SERPL-MCNC: 8.6 MG/DL (ref 8.6–10.2)
CALCIUM SERPL-MCNC: 8.7 MG/DL (ref 8.6–10.2)
CHLORIDE BLD-SCNC: 105 MMOL/L (ref 98–107)
CHLORIDE BLD-SCNC: 107 MMOL/L (ref 98–107)
CO2: 21 MMOL/L (ref 22–29)
CO2: 21 MMOL/L (ref 22–29)
CREAT SERPL-MCNC: 3.1 MG/DL (ref 0.7–1.2)
CREAT SERPL-MCNC: 3.6 MG/DL (ref 0.7–1.2)
EOSINOPHILS ABSOLUTE: 0.27 E9/L (ref 0.05–0.5)
EOSINOPHILS RELATIVE PERCENT: 3.7 % (ref 0–6)
FERRITIN: 178 NG/ML
FOLATE: 8.7 NG/ML (ref 4.8–24.2)
GFR AFRICAN AMERICAN: 20
GFR AFRICAN AMERICAN: 24
GFR NON-AFRICAN AMERICAN: 16 ML/MIN/1.73
GFR NON-AFRICAN AMERICAN: 20 ML/MIN/1.73
GLUCOSE BLD-MCNC: 212 MG/DL (ref 74–99)
GLUCOSE BLD-MCNC: 280 MG/DL (ref 74–99)
HCT VFR BLD CALC: 29.7 % (ref 37–54)
HEMOGLOBIN: 9.2 G/DL (ref 12.5–16.5)
IMMATURE GRANULOCYTES #: 0.05 E9/L
IMMATURE GRANULOCYTES %: 0.7 % (ref 0–5)
INR BLD: 2.5
IRON SATURATION: 18 % (ref 20–55)
IRON: 31 MCG/DL (ref 59–158)
LYMPHOCYTES ABSOLUTE: 1.14 E9/L (ref 1.5–4)
LYMPHOCYTES RELATIVE PERCENT: 15.8 % (ref 20–42)
MCH RBC QN AUTO: 29.8 PG (ref 26–35)
MCHC RBC AUTO-ENTMCNC: 31 % (ref 32–34.5)
MCV RBC AUTO: 96.1 FL (ref 80–99.9)
MONOCYTES ABSOLUTE: 0.62 E9/L (ref 0.1–0.95)
MONOCYTES RELATIVE PERCENT: 8.6 % (ref 2–12)
NEUTROPHILS ABSOLUTE: 5.08 E9/L (ref 1.8–7.3)
NEUTROPHILS RELATIVE PERCENT: 70.4 % (ref 43–80)
PDW BLD-RTO: 13.9 FL (ref 11.5–15)
PLATELET # BLD: 239 E9/L (ref 130–450)
PMV BLD AUTO: 10.4 FL (ref 7–12)
POTASSIUM SERPL-SCNC: 3.8 MMOL/L (ref 3.5–5)
POTASSIUM SERPL-SCNC: 3.9 MMOL/L (ref 3.5–5)
PROTHROMBIN TIME: 26.8 SEC (ref 9.3–12.4)
RBC # BLD: 3.09 E12/L (ref 3.8–5.8)
SODIUM BLD-SCNC: 138 MMOL/L (ref 132–146)
SODIUM BLD-SCNC: 141 MMOL/L (ref 132–146)
TOTAL IRON BINDING CAPACITY: 173 MCG/DL (ref 250–450)
VITAMIN B-12: 270 PG/ML (ref 211–946)
WBC # BLD: 7.2 E9/L (ref 4.5–11.5)

## 2022-08-04 PROCEDURE — 6360000002 HC RX W HCPCS: Performed by: INTERNAL MEDICINE

## 2022-08-04 PROCEDURE — 83550 IRON BINDING TEST: CPT

## 2022-08-04 PROCEDURE — 85025 COMPLETE CBC W/AUTO DIFF WBC: CPT

## 2022-08-04 PROCEDURE — 92526 ORAL FUNCTION THERAPY: CPT

## 2022-08-04 PROCEDURE — 83540 ASSAY OF IRON: CPT

## 2022-08-04 PROCEDURE — 82607 VITAMIN B-12: CPT

## 2022-08-04 PROCEDURE — 82728 ASSAY OF FERRITIN: CPT

## 2022-08-04 PROCEDURE — 80048 BASIC METABOLIC PNL TOTAL CA: CPT

## 2022-08-04 PROCEDURE — 6370000000 HC RX 637 (ALT 250 FOR IP): Performed by: INTERNAL MEDICINE

## 2022-08-04 PROCEDURE — 85610 PROTHROMBIN TIME: CPT

## 2022-08-04 PROCEDURE — 97530 THERAPEUTIC ACTIVITIES: CPT

## 2022-08-04 PROCEDURE — 36415 COLL VENOUS BLD VENIPUNCTURE: CPT

## 2022-08-04 PROCEDURE — 2580000003 HC RX 258: Performed by: INTERNAL MEDICINE

## 2022-08-04 PROCEDURE — 2580000003 HC RX 258: Performed by: NURSE PRACTITIONER

## 2022-08-04 PROCEDURE — 2500000003 HC RX 250 WO HCPCS: Performed by: NURSE PRACTITIONER

## 2022-08-04 PROCEDURE — 99232 SBSQ HOSP IP/OBS MODERATE 35: CPT | Performed by: INTERNAL MEDICINE

## 2022-08-04 PROCEDURE — 82746 ASSAY OF FOLIC ACID SERUM: CPT

## 2022-08-04 PROCEDURE — 2060000000 HC ICU INTERMEDIATE R&B

## 2022-08-04 RX ADMIN — CEFEPIME 2000 MG: 2 INJECTION, POWDER, FOR SOLUTION INTRAVENOUS at 02:53

## 2022-08-04 RX ADMIN — METOPROLOL TARTRATE 25 MG: 25 TABLET, FILM COATED ORAL at 20:44

## 2022-08-04 RX ADMIN — CEFEPIME 1000 MG: 1 INJECTION, POWDER, FOR SOLUTION INTRAMUSCULAR; INTRAVENOUS at 15:43

## 2022-08-04 RX ADMIN — ATORVASTATIN CALCIUM 20 MG: 20 TABLET, FILM COATED ORAL at 20:44

## 2022-08-04 RX ADMIN — GABAPENTIN 300 MG: 300 CAPSULE ORAL at 20:44

## 2022-08-04 RX ADMIN — GABAPENTIN 300 MG: 300 CAPSULE ORAL at 08:30

## 2022-08-04 RX ADMIN — METOPROLOL TARTRATE 25 MG: 25 TABLET, FILM COATED ORAL at 08:31

## 2022-08-04 RX ADMIN — PANTOPRAZOLE SODIUM 40 MG: 40 TABLET, DELAYED RELEASE ORAL at 06:13

## 2022-08-04 RX ADMIN — SODIUM BICARBONATE: 84 INJECTION, SOLUTION INTRAVENOUS at 18:00

## 2022-08-04 RX ADMIN — DOXYCYCLINE HYCLATE 100 MG: 100 CAPSULE ORAL at 20:44

## 2022-08-04 RX ADMIN — SODIUM BICARBONATE: 84 INJECTION, SOLUTION INTRAVENOUS at 08:57

## 2022-08-04 RX ADMIN — DOXYCYCLINE HYCLATE 100 MG: 100 CAPSULE ORAL at 08:30

## 2022-08-04 RX ADMIN — LEVOTHYROXINE SODIUM 50 MCG: 0.05 TABLET ORAL at 06:13

## 2022-08-04 RX ADMIN — WARFARIN SODIUM 8 MG: 4 TABLET ORAL at 17:39

## 2022-08-04 RX ADMIN — OXYCODONE HYDROCHLORIDE AND ACETAMINOPHEN 500 MG: 500 TABLET ORAL at 08:31

## 2022-08-04 ASSESSMENT — PAIN SCALES - GENERAL
PAINLEVEL_OUTOF10: 0
PAINLEVEL_OUTOF10: 0

## 2022-08-04 NOTE — PROGRESS NOTES
Dr. Thu Rodriguez MD,    Your patient is on a medication that requires a renal dose adjustment. Renal Function Assessment:    Date Body Weight IBW  Adjusted BW SCr  CrCl Dialysis status   8/4/2022 266 lb 8.6 oz (120.9 kg)  Ideal body weight: 84.5 kg (186 lb 4.6 oz)  Adjusted ideal body weight: 99.1 kg (218 lb 6.2 oz) Serum creatinine: 3.6 mg/dL (H) 08/04/22 0411  Estimated creatinine clearance: 24 mL/min (A) N/a       Pharmacy has renally dose-adjusted the following medication(s):    Date Original Order Renally Adjusted Order   8/4/2022 CEFEPIME 2 GRAMS Q12H CEFEPIME 1 GRAM Q12H       These changes were made per protocol according to the [facility] Renal Function-Based Dose Adjustments Policy    *Please note this dose may need readjusted if your patient's renal function significantly improves. Please contact pharmacy with any questions regarding these changes.     Dontae Mahan, [facility]  8/4/2022  8:36 AM

## 2022-08-04 NOTE — PROGRESS NOTES
SPEECH LANGUAGE PATHOLOGY  DAILY PROGRESS NOTE        PATIENT NAME:  Ellis Mercado      :  1945          TODAY'S DATE:  2022 ROOM:  [facility]    Patient seen in room for skilled dysphagia tx. Patient received a regular texture snack w/ nectar thick liquids via cup. Patient oral stage exhibited mild oral stasis after 1st swl that cleared w/ 2nd swl and/or liquid wash, fair bolus formation;manipulation, adequate mastication time, and fair AP tongue propulsion. Patient pharyngeal stage exhibited no overt s/s of pen/aspiration w/ solids. Wet vocals x 1 noted w/ nectar which cleared w/ a cued throat clear. SLP cont to recommend MBSS to further assess oropharyngeal function. No family present during session.        CPT code(s) 94880  dysphagia tx  Total minutes :  15 minutes      Ayo Bradley M.S., CCC-SLP  Speech-Language Pathologist  [phone] [phone]

## 2022-08-04 NOTE — PROGRESS NOTES
Podiatry Progress Note  8/4/2022   Radha Samuel       SUBJECTIVE: Marrigary Friend is a 67 y.o. male seen bedside for f/u of a right heel unstageable ulcer. Patient denies any new complaints today. No acute events overnight. Arterial studies poor. Vascular outpatient consult set up with wound care center on 8/9/22 @ 0900. QOD changes santyl dsd. Denies any constitutional symptoms this AM.        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[address]     Hypertension     Ischemic stroke ([facility] 03/06/2018    Pacemaker     Paraplegia ([address]     Sinus node dysfunction ([address]     Stroke (cerebrum) ([facility] 02/27/2018    Thyroid disease         Past Surgical History:   Procedure Laterality Date    CARDIAC PACEMAKER PLACEMENT  12/19/2014    COLONOSCOPY      EYE SURGERY      KNEE ARTHROSCOPY  right knee    PACEMAKER PLACEMENT      [place] COLONOSCOPY FLX DX W/COLLJ SPEC WHEN PFRMD N/A 3/20/2018    COLONOSCOPY DIAGNOSTIC performed by Ramesh Marvin MD at [address] EGD PERCUTANEOUS PLACEMENT GASTROSTOMY TUBE N/A 3/29/2018    PEG TUBE/PT. IN 5507 B performed by Ramesh Marvin MD at [address] EGD PERCUTANEOUS PLACEMENT GASTROSTOMY TUBE N/A 9/12/2018    EGD PEG TUBE PLACEMENT performed by Ramesh Marvin MD at [facility] ENDOSCOPY         Family History   Problem Relation Age of Onset    Heart Disease Mother     Stroke Father         Social History     Tobacco Use    Smoking status: Never    Smokeless tobacco: Never    Tobacco comments:     occassional cigar   Substance Use Topics    Alcohol use: No        Prior to Admission medications    Medication Sig Start Date End Date Taking? Authorizing Provider   gabapentin (NEURONTIN) 300 MG capsule Take 300 mg by mouth in the morning. Yes Historical Provider, MD   ascorbic acid (VITAMIN C) 500 MG tablet Take 500 mg by mouth in the morning.    Yes Historical Provider, MD   Dextromethorphan-guaiFENesin  MG/5ML SYRP Take 10 mLs by mouth every 6 hours as needed for Cough   Yes Historical Provider, MD   oxyCODONE (ROXICODONE) 5 MG immediate release tablet Take 5 mg by mouth every 6 hours as needed for Pain. Yes Historical Provider, MD   ergocalciferol (ERGOCALCIFEROL) 1.25 MG (10392 UT) capsule Take 50,000 Units by mouth once a week Takes every monday 8/1/22  Yes Historical Provider, MD   docusate (COLACE, DULCOLAX) 100 MG CAPS Take 100 mg by mouth 2 times daily as needed 7/8/22 8/7/22 Yes Historical Provider, MD   gabapentin (NEURONTIN) 300 MG capsule Take 300 mg by mouth nightly. 7/8/22  Yes Historical Provider, MD   acetaminophen (TYLENOL) 325 MG tablet Take 650 mg by mouth every 4 hours as needed    Historical Provider, MD   ascorbic acid (VITAMIN C) 500 MG tablet Take 500 mg by mouth in the morning and 500 mg at noon and 500 mg before bedtime. Historical Provider, MD   docusate sodium (COLACE) 100 MG capsule TAKE ONE CAPSULE BY MOUTH TWICE DAILY AS NEEDED FOR CONSTIPATION 7/8/22   Historical Provider, MD   glucose (GLUTOSE) 40 % GEL Take 15 g by mouth as needed    Historical Provider, MD   ipratropium-albuterol (DUONEB) 0.5-2.5 (3) MG/3ML SOLN nebulizer solution Inhale 3 mLs into the lungs every 4 hours as needed    Historical Provider, MD   pantoprazole (PROTONIX) 40 MG tablet Take 40 mg by mouth every morning (before breakfast)    Historical Provider, MD   polyethylene glycol (GLYCOLAX) 17 GM/SCOOP powder Take 17 g by mouth daily as needed    Historical Provider, MD   potassium chloride (KLOR-CON M) 20 MEQ extended release tablet TAKE ONE TABLET BY MOUTH EVERY DAY 5/11/22   Historical Provider, MD   dextromethorphan-guaiFENesin (ROBITUSSIN-DM)  MG/5ML syrup Take 10 mLs by mouth every 6 hours as needed    Historical Provider, MD   oxyCODONE HCl 5 MG TABA Take 1 tablet by mouth every 6 hours as needed.     Historical Provider, MD   nystatin-triamcinolone [facility] II) 627717-1.1 UNIT/GM-% cream APPLY TO AFFECTED AREA FOR 12 HOURS ON AND THEN 12 HOURS OFF. USE AS NEEDED  Patient not taking: No sig reported 12/20/21   Historical Provider, MD   amLODIPine (NORVASC) 2.5 MG tablet Take 1 tablet by mouth daily 8/20/21 4/22/22  Lesley Wang DO   Magnesium Oxide 400 MG CAPS Take 1 capsule by mouth every other day 4/30/21   Lesley Wang DO   potassium chloride (KLOR-CON M) 20 MEQ TBCR extended release tablet Take 1 tablet by mouth daily 3/31/21 4/22/22  Lesley Wang DO   metoprolol tartrate (LOPRESSOR) 25 MG tablet Take 1 tablet by mouth 2 times daily 2/19/21   Lesley Wang, DO   lisinopril (PRINIVIL;ZESTRIL) 10 MG tablet Take 1 tablet by mouth daily 2/19/21   Lesley Wang, DO   levothyroxine (SYNTHROID) 50 MCG tablet Take 1 tablet by mouth Daily 2/19/21   Lesley Wang DO   metFORMIN (GLUCOPHAGE) 1000 MG tablet Take 1 tablet by mouth 2 times daily 2/19/21   Lesley Wang, DO   lovastatin (MEVACOR) 20 MG tablet Take 1 tablet by mouth nightly  Patient not taking: Reported on 8/1/2022 11/20/20 4/22/22  Lesley Wang DO   warfarin (COUMADIN) 5 MG tablet Take 1.5 tablets by mouth daily  Patient taking differently: Take 8 mg by mouth in the morning. 10/30/20   Lesley Wang DO   omeprazole (PRILOSEC) 20 MG delayed release capsule Take 1 capsule by mouth daily 5/22/20 4/22/22  Lesley Wang DO   furosemide (LASIX) 40 MG tablet Take 0.5 tablets by mouth daily 1/17/20   Lesley Wang DO   blood glucose test strips (EZ SMART BLOOD GLUCOSE TEST) strip 1 each by In Vitro route 2 times daily As needed. 2/11/19   Lesley Wang DO        Bee venom         OBJECTIVE:        Vitals:    08/04/22 1000   BP: (!) 129/54   Pulse: 61   Resp: 19   Temp: 99.4 °F (37.4 °C)   SpO2: 96%            EXAM:        Pt is AAOx3, NAD     Vascular Exam:  DP and PT pulses faintly palpable bilaterally. CFT brisk less than 3 secs all pedal digits bilaterally. Periwound erythema right heel.   No edema, no ecchymosis     Neuro Exam: Diminished tactile sensation bilaterally left worse than right. Dermatologic Exam: Well-hydrated skin with skin wrinkles noted  -Unstageable decubitus ulcer right heel with increasing size and width of dry stable eschar, less granular tissue today. No malodor today, no crepitus or fluctuance, no undermining tunneling, no proximal streaking, no drainage. MSK: Muscle strength 5/5 Bilateral.  Reducible hallux malleus left and hammertoes digits 2 through 5 bilateral.  Ankle equinus bilateral.  Negative clonus, negative Babinski sign. Soft compressible posterior muscle compartments bilaterally.     Current Facility-Administered Medications   Medication Dose Route Frequency Provider Last Rate Last Admin    cefepime (MAXIPIME) 1000 mg IVPB minibag  1,000 mg IntraVENous Q12H Amaury Gallo MD        sodium bicarbonate 75 mEq in dextrose 5 % and 0.45 % NaCl 1,000 mL infusion   IntraVENous Continuous MELANIE Cifuentes -  mL/hr at 08/04/22 0857 New Bag at 08/04/22 0857    doxycycline hyclate (VIBRAMYCIN) capsule 100 mg  100 mg Oral 2 times per day Amaury Gallo MD   100 mg at 08/04/22 0830    collagenase ointment   Topical Daily Raiza Rodrigues DPM   Given at 08/03/22 0754    oxyCODONE-acetaminophen (PERCOCET) 5-325 MG per tablet 1 tablet  1 tablet Oral Q6H PRN Amaury Gallo MD        acetaminophen (TYLENOL) tablet 650 mg  650 mg Oral Q4H PRN Amaury Gallo MD        magnesium oxide (MAG-OX) tablet 400 mg  400 mg Oral Every Other Day Amaury Gallo MD   400 mg at 08/03/22 0755    ipratropium-albuterol (DUONEB) nebulizer solution 1 ampule  1 ampule Inhalation Q4H PRN Amaury Gallo MD        warfarin (COUMADIN) tablet 8 mg  8 mg Oral Daily Amaury Gallo MD   8 mg at 08/03/22 1836    gabapentin (NEURONTIN) capsule 300 mg  300 mg Oral Daily Amaury Gallo MD   300 mg at 08/04/22 0830    gabapentin (NEURONTIN) capsule 300 mg  300 mg Oral Nightly Amaury Gallo MD 300 mg at 08/03/22 1939    atorvastatin (LIPITOR) tablet 20 mg  20 mg Oral Nightly Hetal Diego MD   20 mg at 08/03/22 1939    metoprolol tartrate (LOPRESSOR) tablet 25 mg  25 mg Oral BID Hetal Diego MD   25 mg at 08/04/22 0831    guaiFENesin-dextromethorphan (ROBITUSSIN DM) 100-10 MG/5ML syrup 5 mL  5 mL Oral Q6H PRN Hetal Diego MD        docusate sodium (COLACE) capsule 100 mg  100 mg Oral BID PRN eHtal Diego MD        levothyroxine (SYNTHROID) tablet 50 mcg  50 mcg Oral Daily Hetal Diego MD   50 mcg at 08/04/22 3770    ascorbic acid (VITAMIN C) tablet 500 mg  500 mg Oral Daily Hetal Diego MD   500 mg at 08/04/22 0831    [START ON 8/8/2022] vitamin D (ERGOCALCIFEROL) capsule 50,000 Units  50,000 Units Oral Weekly Hetal Diego MD        pantoprazole (PROTONIX) tablet 40 mg  40 mg Oral QAM AC Hetal Diego MD   40 mg at 08/04/22 6601        Lab Results   Component Value Date    WBC 7.2 08/04/2022    HCT 29.7 (L) 08/04/2022    HGB 9.2 (L) 08/04/2022     08/04/2022     08/04/2022    K 3.9 08/04/2022     08/04/2022    CO2 21 (L) 08/04/2022    BUN 60 (H) 08/04/2022    CREATININE 3.6 (H) 08/04/2022    GLUCOSE 212 (H) 08/04/2022    CRP 7.2 (H) 08/01/2022       ASSESSMENT:  -Unstageable diabetic decubitus ulcer right heel, POA, not acutely infected  -Peripheral arterial disease  -Non-insulin-dependent type 2 diabetes mellitus with peripheral neuropathy  -Stroke x2 with left sided sensory deficit  -Hallux malleus left, hammertoe digits 2 through 5 bilaterally       PLAN:  - Examined and evaluated  - All labs, imaging, and charts reviewed   -WBC 8.0  - Wound cultures right heel obtained 8/1/2022: GNR, mixed GPO  -ABX: Doxy/cefepime, managed by IM  - X-ray right foot/ankle 8/1/22: Negative for osteomyelitis, or soft tissue gas, vascular Calcifications noted. - Arterial studies 8/1/2022: Poor.  - Discussed with vascular service.  Outpatient consult set up with wound care center with Dr Murray Amor on [address] [place] [address], [address] [place] [address] on 8/9/22 @ 0900  -Dressings: LARISSA BeaulieuD.  QOD changes.  -Heel protectors bilateral lower extremities.  -No indications for acute surgical invention from podiatry perspective. We will continue with local wound care going forward.   -We will continue to follow     D/W: Dr. Baljeet Hunt    Thank you for involving podiatry in this patients care. Please do not hesitate to call with any questions or concerns.      Jan Chacon Podiatry PGY3  8/4/2022   10:14 AM

## 2022-08-04 NOTE — PROGRESS NOTES
Admit Date: 8/1/2022    Subjective:     Awake feels fine     Objective:     Patient Vitals for the past 8 hrs:   BP Temp Temp src Pulse Resp SpO2   08/04/22 0915 133/79 98.5 °F (36.9 °C) Oral 61 18 97 %   08/04/22 0831 132/72 -- -- 64 -- --   08/04/22 0800 132/72 98.7 °F (37.1 °C) Oral 64 19 98 %   08/04/22 0715 135/66 98.6 °F (37 °C) Oral 63 19 98 %   08/04/22 0654 (!) 151/68 99 °F (37.2 °C) Oral 66 17 96 %   08/04/22 0630 (!) 147/65 -- -- 67 -- --   08/04/22 0530 (!) 131/52 -- -- 68 -- --   08/04/22 0429 (!) 133/54 -- -- -- -- --   08/04/22 0345 (!) 147/60 -- -- 65 -- --   08/04/22 0230 (!) 148/66 -- -- 66 -- --     I/O last 3 completed shifts: In: 2031.7 [P.O.:120; I.V.:1861.7; IV Piggyback:50]  Out: 1400 [Urine:1400]  I/O this shift:  In: 240 [P.O.:240]  Out: -     HEENT: Normal  NECK: Thyroid normal. No carotid bruit. No lymphphadenopathy. CVS: RRR  RS: Clear. No wheeze. No rhonchi. ABD: Soft. Non tender. No mass. Normal BS.obese   EXT: No edema. Non tender. Pulses present.    NEURO: moving all extremities       Scheduled Meds:   cefepime  1,000 mg IntraVENous Q12H    doxycycline hyclate  100 mg Oral 2 times per day    collagenase   Topical Daily    magnesium oxide  400 mg Oral Every Other Day    warfarin  8 mg Oral Daily    gabapentin  300 mg Oral Daily    gabapentin  300 mg Oral Nightly    atorvastatin  20 mg Oral Nightly    metoprolol tartrate  25 mg Oral BID    levothyroxine  50 mcg Oral Daily    ascorbic acid  500 mg Oral Daily    [START ON 8/8/2022] vitamin D  50,000 Units Oral Weekly    pantoprazole  40 mg Oral QAM AC     Continuous Infusions:   IV infusion builder 100 mL/hr at 08/04/22 0857       CBC with Differential:    Lab Results   Component Value Date/Time    WBC 7.2 08/04/2022 04:11 AM    RBC 3.09 08/04/2022 04:11 AM    HGB 9.2 08/04/2022 04:11 AM    HCT 29.7 08/04/2022 04:11 AM     08/04/2022 04:11 AM    MCV 96.1 08/04/2022 04:11 AM    MCH 29.8 08/04/2022 04:11 AM    MCHC 31.0 08/04/2022 04:11 AM    RDW 13.9 08/04/2022 04:11 AM    SEGSPCT 80 01/04/2012 03:00 AM    BANDSPCT 1 11/27/2014 04:50 AM    LYMPHOPCT 15.8 08/04/2022 04:11 AM    MONOPCT 8.6 08/04/2022 04:11 AM    MYELOPCT 0.9 02/28/2018 05:21 AM    BASOPCT 0.8 08/04/2022 04:11 AM    MONOSABS 0.62 08/04/2022 04:11 AM    LYMPHSABS 1.14 08/04/2022 04:11 AM    EOSABS 0.27 08/04/2022 04:11 AM    BASOSABS 0.06 08/04/2022 04:11 AM     CMP:    Lab Results   Component Value Date/Time     08/04/2022 04:11 AM    K 3.9 08/04/2022 04:11 AM    K 4.9 08/01/2022 01:21 PM     08/04/2022 04:11 AM    CO2 21 08/04/2022 04:11 AM    BUN 60 08/04/2022 04:11 AM    CREATININE 3.6 08/04/2022 04:11 AM    GFRAA 20 08/04/2022 04:11 AM    LABGLOM 16 08/04/2022 04:11 AM    PROT 7.2 08/01/2022 01:21 PM    LABALBU 3.5 08/01/2022 01:21 PM    LABALBU 3.4 01/04/2012 03:00 AM    CALCIUM 8.6 08/04/2022 04:11 AM    BILITOT 0.2 08/01/2022 01:21 PM    ALKPHOS 93 08/01/2022 01:21 PM    AST 14 08/01/2022 01:21 PM    ALT 9 08/01/2022 01:21 PM     PT/INR:    Lab Results   Component Value Date/Time    PROTIME 26.8 08/04/2022 04:11 AM    PROTIME 12.2 12/30/2011 10:20 PM    INR 2.5 08/04/2022 04:11 AM     US noted    Assessment:     Principal Problem:   Bilateral pneumonia  JUAN on CKD stage III  S/P left nephrectomy for CA  Old CVA  HTN  A.Fib.   Fall  DM  Decubitus ulcer right heal   PVD      Plan:   Continue ATB ,fluid per renal ,podiatry following

## 2022-08-04 NOTE — PROGRESS NOTES
Department of Internal Medicine  Nephrology Progress Note    Events reviewed. SUBJECTIVE:  We are following Mr. Lobito Baird for JUAN on CKD. He looks much better today and states he feels better.     PHYSICAL EXAM:      Vitals:    VITALS:  BP (!) 129/54   Pulse 61   Temp 99.4 °F (37.4 °C) (Oral)   Resp 19   Ht 6' 3\" (1.905 m)   Wt 266 lb 8.6 oz (120.9 kg)   SpO2 96%   BMI 33.31 kg/m²   24HR INTAKE/OUTPUT:    Intake/Output Summary (Last 24 hours) at 8/4/2022 1027  Last data filed at 8/4/2022 0845  Gross per 24 hour   Intake 2151.67 ml   Output 1400 ml   Net 751.67 ml       Constitutional:  Alert and oriented  HEENT:  Normocephalic, PERRL, dry mucous membranes  Respiratory:  CTA bilaterally  Cardiovascular/Edema:  RRR, S1,S2  Gastrointestinal:  Soft, rounded, nontender, nondistended  Neurologic:  Nonfocal, GREENBERG  Skin:  Warm, dry, wound to right heel  Other:  Trace edema BLE    Scheduled Meds:   cefepime  1,000 mg IntraVENous Q12H    doxycycline hyclate  100 mg Oral 2 times per day    collagenase   Topical Daily    magnesium oxide  400 mg Oral Every Other Day    warfarin  8 mg Oral Daily    gabapentin  300 mg Oral Daily    gabapentin  300 mg Oral Nightly    atorvastatin  20 mg Oral Nightly    metoprolol tartrate  25 mg Oral BID    levothyroxine  50 mcg Oral Daily    ascorbic acid  500 mg Oral Daily    [START ON 8/8/2022] vitamin D  50,000 Units Oral Weekly    pantoprazole  40 mg Oral QAM AC     Continuous Infusions:   IV infusion builder 100 mL/hr at 08/04/22 0857     PRN Meds:.oxyCODONE-acetaminophen, acetaminophen, ipratropium-albuterol, guaiFENesin-dextromethorphan, docusate sodium    DATA:    CBC:   Lab Results   Component Value Date/Time    WBC 7.2 08/04/2022 04:11 AM    RBC 3.09 08/04/2022 04:11 AM    HGB 9.2 08/04/2022 04:11 AM    HCT 29.7 08/04/2022 04:11 AM    MCV 96.1 08/04/2022 04:11 AM    MCH 29.8 08/04/2022 04:11 AM    MCHC 31.0 08/04/2022 04:11 AM    RDW 13.9 08/04/2022 04:11 AM     08/04/2022 04:11 AM    MPV 10.4 08/04/2022 04:11 AM     CMP:    Lab Results   Component Value Date/Time     08/04/2022 04:11 AM    K 3.9 08/04/2022 04:11 AM    K 4.9 08/01/2022 01:21 PM     08/04/2022 04:11 AM    CO2 21 08/04/2022 04:11 AM    BUN 60 08/04/2022 04:11 AM    CREATININE 3.6 08/04/2022 04:11 AM    GFRAA 20 08/04/2022 04:11 AM    LABGLOM 16 08/04/2022 04:11 AM    GLUCOSE 212 08/04/2022 04:11 AM    GLUCOSE 297 01/04/2012 03:00 AM    PROT 7.2 08/01/2022 01:21 PM    LABALBU 3.5 08/01/2022 01:21 PM    LABALBU 3.4 01/04/2012 03:00 AM    CALCIUM 8.6 08/04/2022 04:11 AM    BILITOT 0.2 08/01/2022 01:21 PM    ALKPHOS 93 08/01/2022 01:21 PM    AST 14 08/01/2022 01:21 PM    ALT 9 08/01/2022 01:21 PM     Magnesium:    Lab Results   Component Value Date/Time    MG 2.0 08/02/2022 03:10 PM     Phosphorus:    Lab Results   Component Value Date/Time    PHOS 4.7 08/02/2022 03:10 PM     Radiology Review:      CT head without IV contrast 8/1/22   1. There is no acute intracranial hemorrhage   2. Old left occipital lobe infarct. Kidney ultrasound complete 8/3/22   Normal appearing right kidney. Left kidney is surgically absent. No   evidence of right renal obstruction, mass or calculus. CXR 8/1/22   Bilateral lower lobe atelectasis/pneumonia, left worse than right. Small left pleural effusion. Stable enlargement of the cardiac silhouette.          BRIEF SUMMARY OF INITIAL CONSULT:    Briefly, Mr. Lucy Ramirez  is a 39-year-old male with a PMH of CKD stage IIIb, without proteinuria, baseline creatinine 2.0-2.1 mg/dL, secondary to nephrosclerosis, kidney cancer, s/p total left nephrectomy on 7/5/22 at [facility], hypertension, type II diabetes mellitus, HFpEF 55% with stage II DD, factor V Leyden deficiency, hyperlipidemia, sick sinus syndrome s/p pacemaker placement, CVA, hypothyroidism, PAF on chronic anticoagulation with warfarin, who was admitted on August 1, 2022 after presenting to the ER after sustaining a fall. He did lose consciousness and hit his head at that time. A CT of the head was obtained which was negative. His labs upon arrival to ER were significant for BUN 46 mg/dL and creatinine 2.8 mg/dL. His renal function continued to worsen yesterday afternoon with a BUN of 50 mg/dL and a creatinine of 3.0 mg/dL, which is the reason for this consultation. He received a 250 mL normal saline bolus in ER. He did receive Lasix 20 mg PO last night as well as lisinopril. His home medications include Lasix. He was recently taken off of lisinopril by Dr. Cali Hendrix and his Lasix was increased to 40 mg PO BID. IMPRESSION/RECOMMENDATIONS:      JUAN stage I on CKD, likely volume responsive pre-renal JUAN secondary to over diuresis in the setting of ACE inhibition with a component of ischemic ATN. FeUrea 11.67%. PVR 0. Kidney ultrasound with normal appearing right kidney. Renal function has improved today. We will continue with IV fluids. CKD stage IIIb, without proteinuria, baseline creatinine 2.0-2.1 mg/dL, secondary to nephrosclerosis  Acidemia (venous pH 7.30), with HAGMA secondary to uremia. Improving with bicarbonate drip. Left kidney cancer, s/p total left nephrectomy 7/5/22 at [facility]  HTN, now with hypotension, likely secondary to intravascular volume depletion. BP has improved.    HFpEF 55% with stage II DD, pro-, Lasix on hold  -------------------------------------------------  S/p fall  Bilateral pneumonia, on doxycycline and cefepime  PAF, on metoprolol and warfarin  Sick sinus syndrome, s/p pacemaker placement   History of CVA  Microcytic anemia, awaiting iron studies  Dysphagia  Nutrition, honey thick liquids    Plan:    Continue D5 1/2NS + 75 mEq sodium bicarbonate at 100 cc/hr  Continue to hold Lasix  Continue to hold lisinoril  Strict I&Os  Continue to monitor renal function, BMP 4 PM  Continue to monitor H&H  Continue to monitor bicarbonate level  Awaiting iron studies       Electronically signed by MELANIE Osborne CNP on 8/4/2022 at 10:27 AM

## 2022-08-04 NOTE — PLAN OF CARE
Problem: Skin/Tissue Integrity  Goal: Absence of new skin breakdown  Outcome: Progressing     Problem: Pain  Goal: Verbalizes/displays adequate comfort level or baseline comfort level  Outcome: Progressing     Problem: Discharge Planning  Goal: Discharge to home or other facility with appropriate resources  Outcome: Progressing     Problem: Safety - Adult  Goal: Free from fall injury  Outcome: Progressing     Problem: Chronic Conditions and Co-morbidities  Goal: Patient's chronic conditions and co-morbidity symptoms are monitored and maintained or improved  Outcome: Progressing     Problem: ABCDS Injury Assessment  Goal: Absence of physical injury  Outcome: Progressing

## 2022-08-04 NOTE — PROGRESS NOTES
Spoke with patient and his sister regarding swallow study, sister states that patient has coughed like this for years, so therefore family and pt will probably not opt for swallow study.

## 2022-08-04 NOTE — CONSULTS
Comprehensive Nutrition Assessment    Type and Reason for Visit:  Initial, Consult, Wound    Nutrition Recommendations/Plan:   Continue current diet, recommend add Low Na to diet order, monitor for recommendations per SLP  Will add Magic Cup BID, monitor tolerance     Malnutrition Assessment:  Malnutrition Status: At risk for malnutrition (Comment) (08/04/22 1154)    Context:  Chronic Illness     Findings of the 6 clinical characteristics of malnutrition:  Energy Intake:  Mild decrease in energy intake (Comment)  Weight Loss:  Mild weight loss (specify amount and time period) (-9.8% X 5 months, difficulty to accurately assess wt loss d/t CKD)     Body Fat Loss:  No significant body fat loss     Muscle Mass Loss:  No significant muscle mass loss    Fluid Accumulation:  No significant fluid accumulation     Strength:  Not Performed    Nutrition Assessment:    Pt from SNF s/p fall w/ b/l PNA, JUAN on CKD. Hx DM, CVA, Renal CA s/p L nephrectomy (7/5/22). Pt w/ dysphagia w/ SLP following. Increased nutrient needs r/t wound, will add ONS & monitor. Nutrition Related Findings:    A&O, I&Os WDL, BLE +1 edema, abd rounded, +BS, flatus/nausea, abnormal renal labs, Phos 4.7 Wound Type: Diabetic Ulcer, Unstageable       Current Nutrition Intake & Therapies:    Average Meal Intake: 51-75%  Average Supplements Intake: None Ordered  ADULT DIET; Regular; Mildly Thick (Nectar)    Anthropometric Measures:  Height: 6' 3\" (190.5 cm)  Ideal Body Weight (IBW): 196 lbs (89 kg)    Admission Body Weight: 266 lb (120.7 kg) (8/2 bed)  Current Body Weight: 266 lb (120.7 kg) (8/2 bed), 135.7 % IBW. Weight Source: Bed Scale  Current BMI (kg/m2): 33.2  Usual Body Weight: 295 lb (133.8 kg) (3/2022 actual per EMR OV)  % Weight Change (Calculated): -9.8  Weight Adjustment For: No Adjustment                 BMI Categories: Obese Class 1 (BMI 30.0-34. 9)    Estimated Daily Nutrient Needs:  Energy Requirements Based On: Kcal/kg  Weight Used for Energy Requirements: Admission  Energy (kcal/day):   Weight Used for Protein Requirements: Ideal  Protein (g/day): 105-120 (1.2-1.4 as tolerated, monitor renal labs)  Method Used for Fluid Requirements: Standard Renal  Fluid (ml/day): per renal    Nutrition Diagnosis:   Increased nutrient needs related to increase demand for energy/nutrients as evidenced by wounds    Nutrition Interventions:   Food and/or Nutrient Delivery: Start Oral Nutrition Supplement, Continue Current Diet  Nutrition Education/Counseling: No recommendation at this time  Coordination of Nutrition Care: Continue to monitor while inpatient       Goals:     Goals: PO intake 75% or greater, by next RD assessment       Nutrition Monitoring and Evaluation:      Food/Nutrient Intake Outcomes: Food and Nutrient Intake, Supplement Intake  Physical Signs/Symptoms Outcomes: Biochemical Data, Chewing or Swallowing, GI Status, Nausea or Vomiting, Fluid Status or Edema, Nutrition Focused Physical Findings, Skin, Weight    Discharge Planning:     Too soon to determine     Roasnne Busby RD, LD  Contact: [phone]

## 2022-08-04 NOTE — CARE COORDINATION
Transition of Jyothi 1233 PTA for rehab-per pt and son Dominguez Nam [phone], plan is to return on discharge. Per [facility] Financial liaison, pt is not a bedhold, no precert required- [facility] will accept back on discharge pending bed availability, will need negative covid test day of discharge. Reached out to [facility] today to inquire about eligibility, especially if patient will need dialysis-liaison will follow peripherally. Patient remains on sodium bicarb infusion, Cefepime Q12 for heel ulcers/skin infection. Transportation form and envelope in soft chart. Discharge pending kidney response to treatment plan.      Rosalba LEDEZMAN, RN  [facility]

## 2022-08-04 NOTE — PROGRESS NOTES
Occupational Therapy  OT BEDSIDE TREATMENT NOTE      Date:2022  Patient Name: Tavon Polk  MRN: [id number]  : 1945  Room: [address] [place], [place] [address]     Evaluating OT: Torito Duenas OTR/L   [id number]       Referring Yg Hadley MD    Specific Provider Orders/Date:OT eval and treat 2022       Diagnosis:  Syncope and collapse [R55]  Pneumonia [J18.9]  JUAN (acute kidney injury) ([facility] [address] [N17.9]  Injury of head, initial encounter [I76.61OE]  Fall, initial encounter [W19. XXXA]  Pneumonia of both lower lobes due to infectious organism [J18.9]  Ulcer of right heel and midfoot, unspecified ulcer stage ([facility] [address] [L97.419]     Pertinent Medical History: A fib, ischemic stroke, pacemaker    Precautions:  Fall Risk, alarm , right heel unstageable ulcer      Assessment of current deficits   [x] Functional mobility            [x]ADLs           [x] Strength                  []Cognition   [x] Functional transfers          [x] IADLs         [x] Safety Awareness   [x]Endurance   [] Fine Coordination                         [x] Balance      [] Vision/perception   []Sensation      []Gross Motor Coordination             [] ROM           [] Delirium                   [] Motor Control     OT PLAN OF CARE   OT POC based on physician orders, patient diagnosis and results of clinical assessment     Frequency/Duration  2-4 days/wk for 2 weeks PRN  Specific OT Treatment Interventions to include:   ADL retraining/adapted techniques and AE recommendations to increase functional independence within precautions                    Energy conservation techniques to improve tolerance for selfcare routine  Functional transfer/mobility training/DME recommendations for increased independence, safety and fall prevention         Patient/family education to increase safety and functional independence             Environmental modifications for safe mobility and completion of ADLs                             Therapeutic activity to improve functional performance during ADLs. Therapeutic exercise to improve tolerance and functional strength for ADLs   Balance retraining/tolerance tasks for facilitation of postural control with dynamic challenges during ADLs . Positioning to improve functional independence        Recommended Adaptive Equipment: TBD     SOCIAL:  patient from [facility]. Sister reports- patient primarily has been using w/c at the [facility] for mobility. Was using walker with therapy and assist.    Prior to July - patient lived alone, 1 story, ambulated with walker      Pain Level: heel pain ;  Cognition: A&O, pleasant,following simple commands and conversing               Memory:  forgetful               Sequencing:  fair              Problem solving:  fair              Judgement/safety:  fair                Functional Assessment:  AM-PAC Daily Activity Raw Score: 13/24    Initial Eval Status  Date: 8/2/22 Treatment Status  Date: 8/4/22 STGs = LTGs  Time frame: 10-14 days   Feeding Set-up   Independent    Grooming Min A,seated   Supervision    UB Dressing Min A  min A, patient confused, took off gown and was told he could put it back on so we could sit up, he stated it \"fell off\", patient assisted with donning gown. Supervision    LB Dressing Dependent  Total assist with  donning L socks and R post op shoe dependent  Mod A   Bathing Max A   Mod A    Toileting Dependent Dependent, incontinent of bowel and use of bedpan on EOB  Mod A    Bed Mobility  Max A x2  Supine <> sit Supine to sit max A  Mod a    Functional Transfers Max A x2  Partial Sit- stand from bed  R lateral lean Sit <> stand max A x2  SPT to chair without device max A x2  Mod A    Functional Mobility NT  Patient  unable to fully stand   Mod A with good tolerance   Balance Sitting:    Static:  CGA- EOB     Dynamic:Max A   Standing:  Max A x2- partial stand Sitting balance SBA  Standing balance max A x2  SBA/supervision -standing  Mod A-standing    Activity

## 2022-08-05 ENCOUNTER — APPOINTMENT (OUTPATIENT)
Dept: GENERAL RADIOLOGY | Age: 77
DRG: 682 | End: 2022-08-05
Payer: MEDICARE

## 2022-08-05 LAB
ANION GAP SERPL CALCULATED.3IONS-SCNC: 10 MMOL/L (ref 7–16)
ANION GAP SERPL CALCULATED.3IONS-SCNC: 11 MMOL/L (ref 7–16)
BASOPHILS ABSOLUTE: 0.05 E9/L (ref 0–0.2)
BASOPHILS RELATIVE PERCENT: 0.8 % (ref 0–2)
BUN BLDV-MCNC: 46 MG/DL (ref 6–23)
BUN BLDV-MCNC: 51 MG/DL (ref 6–23)
CALCIUM SERPL-MCNC: 8.3 MG/DL (ref 8.6–10.2)
CALCIUM SERPL-MCNC: 8.8 MG/DL (ref 8.6–10.2)
CHLORIDE BLD-SCNC: 106 MMOL/L (ref 98–107)
CHLORIDE BLD-SCNC: 108 MMOL/L (ref 98–107)
CO2: 24 MMOL/L (ref 22–29)
CO2: 26 MMOL/L (ref 22–29)
CREAT SERPL-MCNC: 2.5 MG/DL (ref 0.7–1.2)
CREAT SERPL-MCNC: 2.8 MG/DL (ref 0.7–1.2)
EOSINOPHILS ABSOLUTE: 0.25 E9/L (ref 0.05–0.5)
EOSINOPHILS RELATIVE PERCENT: 3.9 % (ref 0–6)
GFR AFRICAN AMERICAN: 27
GFR AFRICAN AMERICAN: 30
GFR NON-AFRICAN AMERICAN: 22 ML/MIN/1.73
GFR NON-AFRICAN AMERICAN: 25 ML/MIN/1.73
GLUCOSE BLD-MCNC: 219 MG/DL (ref 74–99)
GLUCOSE BLD-MCNC: 220 MG/DL (ref 74–99)
HCT VFR BLD CALC: 27 % (ref 37–54)
HEMOGLOBIN: 8.7 G/DL (ref 12.5–16.5)
IMMATURE GRANULOCYTES #: 0.03 E9/L
IMMATURE GRANULOCYTES %: 0.5 % (ref 0–5)
LYMPHOCYTES ABSOLUTE: 1.09 E9/L (ref 1.5–4)
LYMPHOCYTES RELATIVE PERCENT: 17.2 % (ref 20–42)
MCH RBC QN AUTO: 29.6 PG (ref 26–35)
MCHC RBC AUTO-ENTMCNC: 32.2 % (ref 32–34.5)
MCV RBC AUTO: 91.8 FL (ref 80–99.9)
METER GLUCOSE: 252 MG/DL (ref 74–99)
METER GLUCOSE: 319 MG/DL (ref 74–99)
MONOCYTES ABSOLUTE: 0.52 E9/L (ref 0.1–0.95)
MONOCYTES RELATIVE PERCENT: 8.2 % (ref 2–12)
NEUTROPHILS ABSOLUTE: 4.4 E9/L (ref 1.8–7.3)
NEUTROPHILS RELATIVE PERCENT: 69.4 % (ref 43–80)
PDW BLD-RTO: 13.7 FL (ref 11.5–15)
PLATELET # BLD: 231 E9/L (ref 130–450)
PMV BLD AUTO: 10.4 FL (ref 7–12)
POTASSIUM SERPL-SCNC: 3.5 MMOL/L (ref 3.5–5)
POTASSIUM SERPL-SCNC: 3.6 MMOL/L (ref 3.5–5)
RBC # BLD: 2.94 E12/L (ref 3.8–5.8)
SODIUM BLD-SCNC: 142 MMOL/L (ref 132–146)
SODIUM BLD-SCNC: 143 MMOL/L (ref 132–146)
WBC # BLD: 6.3 E9/L (ref 4.5–11.5)

## 2022-08-05 PROCEDURE — 85025 COMPLETE CBC W/AUTO DIFF WBC: CPT

## 2022-08-05 PROCEDURE — 99232 SBSQ HOSP IP/OBS MODERATE 35: CPT | Performed by: INTERNAL MEDICINE

## 2022-08-05 PROCEDURE — 71046 X-RAY EXAM CHEST 2 VIEWS: CPT

## 2022-08-05 PROCEDURE — 6370000000 HC RX 637 (ALT 250 FOR IP): Performed by: INTERNAL MEDICINE

## 2022-08-05 PROCEDURE — 82962 GLUCOSE BLOOD TEST: CPT

## 2022-08-05 PROCEDURE — 6360000002 HC RX W HCPCS: Performed by: INTERNAL MEDICINE

## 2022-08-05 PROCEDURE — 92526 ORAL FUNCTION THERAPY: CPT | Performed by: SPEECH-LANGUAGE PATHOLOGIST

## 2022-08-05 PROCEDURE — 2500000003 HC RX 250 WO HCPCS: Performed by: NURSE PRACTITIONER

## 2022-08-05 PROCEDURE — 36415 COLL VENOUS BLD VENIPUNCTURE: CPT

## 2022-08-05 PROCEDURE — 80048 BASIC METABOLIC PNL TOTAL CA: CPT

## 2022-08-05 PROCEDURE — 2060000000 HC ICU INTERMEDIATE R&B

## 2022-08-05 PROCEDURE — 2580000003 HC RX 258: Performed by: NURSE PRACTITIONER

## 2022-08-05 PROCEDURE — 2580000003 HC RX 258: Performed by: INTERNAL MEDICINE

## 2022-08-05 RX ADMIN — GABAPENTIN 300 MG: 300 CAPSULE ORAL at 09:59

## 2022-08-05 RX ADMIN — SODIUM BICARBONATE: 84 INJECTION, SOLUTION INTRAVENOUS at 03:57

## 2022-08-05 RX ADMIN — ATORVASTATIN CALCIUM 20 MG: 20 TABLET, FILM COATED ORAL at 19:58

## 2022-08-05 RX ADMIN — DOXYCYCLINE HYCLATE 100 MG: 100 CAPSULE ORAL at 09:59

## 2022-08-05 RX ADMIN — Medication 400 MG: at 10:02

## 2022-08-05 RX ADMIN — OXYCODONE HYDROCHLORIDE AND ACETAMINOPHEN 500 MG: 500 TABLET ORAL at 10:00

## 2022-08-05 RX ADMIN — LEVOTHYROXINE SODIUM 50 MCG: 0.05 TABLET ORAL at 05:01

## 2022-08-05 RX ADMIN — METOPROLOL TARTRATE 25 MG: 25 TABLET, FILM COATED ORAL at 19:59

## 2022-08-05 RX ADMIN — WARFARIN SODIUM 8 MG: 4 TABLET ORAL at 17:57

## 2022-08-05 RX ADMIN — DOXYCYCLINE HYCLATE 100 MG: 100 CAPSULE ORAL at 19:58

## 2022-08-05 RX ADMIN — PANTOPRAZOLE SODIUM 40 MG: 40 TABLET, DELAYED RELEASE ORAL at 05:01

## 2022-08-05 RX ADMIN — GABAPENTIN 300 MG: 300 CAPSULE ORAL at 19:59

## 2022-08-05 RX ADMIN — CEFEPIME 1000 MG: 1 INJECTION, POWDER, FOR SOLUTION INTRAMUSCULAR; INTRAVENOUS at 02:35

## 2022-08-05 RX ADMIN — CEFEPIME 1000 MG: 1 INJECTION, POWDER, FOR SOLUTION INTRAMUSCULAR; INTRAVENOUS at 14:44

## 2022-08-05 RX ADMIN — METOPROLOL TARTRATE 25 MG: 25 TABLET, FILM COATED ORAL at 10:00

## 2022-08-05 ASSESSMENT — PAIN SCALES - GENERAL: PAINLEVEL_OUTOF10: 0

## 2022-08-05 NOTE — PLAN OF CARE
Problem: Skin/Tissue Integrity  Goal: Absence of new skin breakdown  Description: 1. Monitor for areas of redness and/or skin breakdown  2. Assess vascular access sites hourly  3. Every 4-6 hours minimum:  Change oxygen saturation probe site  4. Every 4-6 hours:  If on nasal continuous positive airway pressure, respiratory therapy assess nares and determine need for appliance change or resting period.   Outcome: Progressing     Problem: Pain  Goal: Verbalizes/displays adequate comfort level or baseline comfort level  Outcome: Progressing     Problem: Safety - Adult  Goal: Free from fall injury  Outcome: Progressing     Problem: Chronic Conditions and Co-morbidities  Goal: Patient's chronic conditions and co-morbidity symptoms are monitored and maintained or improved  Outcome: Progressing     Problem: ABCDS Injury Assessment  Goal: Absence of physical injury  Outcome: Progressing     Problem: Nutrition Deficit:  Goal: Optimize nutritional status  Outcome: Progressing     Problem: Discharge Planning  Goal: Discharge to home or other facility with appropriate resources  Outcome: Not Progressing

## 2022-08-05 NOTE — PROGRESS NOTES
Physician Progress Note      PATIENTDiandrew Bean  CSN #:                  [id number]  :                       1945  ADMIT DATE:       2022 12:26 PM  [address] DATE:  RESPONDING  PROVIDER #:        Arben Noe MD          QUERY TEXT:    Pt admitted with pneumonia. If possible, please document in the progress notes   and discharge summary if you are evaluating and/or treating any of the   following:      Note: CAP and HCAP indicate where the pneumonia was acquired, not a specific   type. The medical record reflects the following:  Risk Factors: chokes with thin liquid per family  Clinical Indicators: admitted with pneumonia. Per speech \"Per RN and family   report, patient chokes frequently on thin liquid. This was not observed during   bedside evaluation. Therefore, rec NTL with f/u during meals to assess   toleration. .. SLP cont to recommend MBSS to further assess oropharyngeal   function. \"  Treatment: Cefepime IV, Doxycycline po    Thank you,  Safia Mota RN, CCDS  Clinical Documentation Improvement Specialist  [email or web address]  Options provided:  -- Gram negative pneumonia  -- Gram positive pneumonia  -- Aspiration pneumonia  -- Other - I will add my own diagnosis  -- Disagree - Not applicable / Not valid  -- Disagree - Clinically unable to determine / Unknown  -- Refer to Clinical Documentation Reviewer    PROVIDER RESPONSE TEXT:    Unknown organism    Query created byAlena Lutz on 2022 3:45 PM      Electronically signed by:  Arben Noe MD 2022 6:07 AM

## 2022-08-05 NOTE — PROGRESS NOTES
SPEECH LANGUAGE PATHOLOGY  DAILY PROGRESS NOTE        PATIENT NAME:  Sylvester Biggs      :  1945          TODAY'S DATE:  2022 [facility]        SWALLOWING    Pt seen at bedside for follow up dysphagia management. Family present. Pt/family report toleration of current diet and would like to remain on thickened liquids. Politely declining a video swallow evaluation at this time as he has had multiple PEG tubes placed and removed and does not wish to continue to assess swallow status at this time. Pt agreed to a dysphagia exercise program and it was initiated. Pt completed with fair ability with mod verbal cues. Pt encouraged to self complete as able. DYSPHAGIA DIAGNOSIS:   Clinical indicators of suspected oropharyngeal phase dysphagia                           DIET RECOMMENDATIONS:  Regular consistency solids (IDDSI level 7) with nectar consistency (mildly thick - IDDSI level 2) liquids                   FEEDING RECOMMENDATIONS:                        Assistance level:  Full assistance is needed during all oral intake                           Compensatory strategies recommended: Small bites/sips and Alternate solids and liquids          Education- Pt educated on results and POC. Pt trained on compensatory strategies for safe swallow with good outcome. Questions answered. Will continue SP intervention as per previously established POC. Wiliam ARCHULETA CCC/SLP [id number]  Speech Pathologist              CPT code(s) 29542  dysphagia tx  Total minutes :  15 minutes

## 2022-08-05 NOTE — PROGRESS NOTES
08/05/2022 05:05 AM     CMP:    Lab Results   Component Value Date/Time     08/05/2022 05:05 AM    K 3.6 08/05/2022 05:05 AM    K 4.9 08/01/2022 01:21 PM     08/05/2022 05:05 AM    CO2 24 08/05/2022 05:05 AM    BUN 51 08/05/2022 05:05 AM    CREATININE 2.8 08/05/2022 05:05 AM    GFRAA 27 08/05/2022 05:05 AM    LABGLOM 22 08/05/2022 05:05 AM    PROT 7.2 08/01/2022 01:21 PM    LABALBU 3.5 08/01/2022 01:21 PM    LABALBU 3.4 01/04/2012 03:00 AM    CALCIUM 8.3 08/05/2022 05:05 AM    BILITOT 0.2 08/01/2022 01:21 PM    ALKPHOS 93 08/01/2022 01:21 PM    AST 14 08/01/2022 01:21 PM    ALT 9 08/01/2022 01:21 PM     PT/INR:    Lab Results   Component Value Date/Time    PROTIME 26.8 08/04/2022 04:11 AM    PROTIME 12.2 12/30/2011 10:20 PM    INR 2.5 08/04/2022 04:11 AM       Assessment:     Principal Problem:   Bilateral pneumonia  JUAN on CKD stage III  S/P left nephrectomy for CA  Old CVA  HTN  A.Fib.   Fall  DM  Decubitus ulcer right heal   PVD      Plan:   Fluid per renal ,hold diuretic ,continue ATB  recheck CXR

## 2022-08-05 NOTE — PROGRESS NOTES
Patient with large BM this evening. BM appeared to be clear and mucous like. Patient with no c/o belly pain or tenderness, though the abdomen appears more distended today in comparison to 2 days ago when also under the care of this RN.     Electronically signed by Sincere Muir RN on 8/5/2022 at 6:25 PM

## 2022-08-05 NOTE — PROGRESS NOTES
Department of Internal Medicine  Nephrology Progress Note    Events reviewed. SUBJECTIVE:  We are following Mr. Haleigh Reina for JUAN on CKD. He denies any complaints.     PHYSICAL EXAM:      Vitals:    VITALS:  BP (!) 171/68   Pulse 60   Temp 97.9 °F (36.6 °C) (Oral)   Resp 16   Ht 6' 3\" (1.905 m)   Wt 266 lb 8.6 oz (120.9 kg)   SpO2 95%   BMI 33.31 kg/m²   24HR INTAKE/OUTPUT:    Intake/Output Summary (Last 24 hours) at 8/5/2022 1009  Last data filed at 8/5/2022 0743  Gross per 24 hour   Intake 175.89 ml   Output 1550 ml   Net -1374.11 ml       Constitutional:  Alert and oriented  HEENT:  Normocephalic, PERRL  Respiratory:  CTA bilaterally  Cardiovascular/Edema:  RRR, S1,S2  Gastrointestinal:  Soft, rounded, nontender, nondistended  Neurologic:  Nonfocal, GREENBERG  Skin:  Warm, dry, wound to right heel  Other:  Trace edema BLE    Scheduled Meds:   cefepime  1,000 mg IntraVENous Q12H    doxycycline hyclate  100 mg Oral 2 times per day    collagenase   Topical Daily    magnesium oxide  400 mg Oral Every Other Day    warfarin  8 mg Oral Daily    gabapentin  300 mg Oral Daily    gabapentin  300 mg Oral Nightly    atorvastatin  20 mg Oral Nightly    metoprolol tartrate  25 mg Oral BID    levothyroxine  50 mcg Oral Daily    ascorbic acid  500 mg Oral Daily    [START ON 8/8/2022] vitamin D  50,000 Units Oral Weekly    pantoprazole  40 mg Oral QAM AC     Continuous Infusions:      PRN Meds:.oxyCODONE-acetaminophen, acetaminophen, ipratropium-albuterol, guaiFENesin-dextromethorphan, docusate sodium    DATA:    CBC:   Lab Results   Component Value Date/Time    WBC 6.3 08/05/2022 05:05 AM    RBC 2.94 08/05/2022 05:05 AM    HGB 8.7 08/05/2022 05:05 AM    HCT 27.0 08/05/2022 05:05 AM    MCV 91.8 08/05/2022 05:05 AM    MCH 29.6 08/05/2022 05:05 AM    MCHC 32.2 08/05/2022 05:05 AM    RDW 13.7 08/05/2022 05:05 AM     08/05/2022 05:05 AM    MPV 10.4 08/05/2022 05:05 AM     CMP:    Lab Results   Component Value Date/Time  08/05/2022 05:05 AM    K 3.6 08/05/2022 05:05 AM    K 4.9 08/01/2022 01:21 PM     08/05/2022 05:05 AM    CO2 24 08/05/2022 05:05 AM    BUN 51 08/05/2022 05:05 AM    CREATININE 2.8 08/05/2022 05:05 AM    GFRAA 27 08/05/2022 05:05 AM    LABGLOM 22 08/05/2022 05:05 AM    GLUCOSE 220 08/05/2022 05:05 AM    GLUCOSE 297 01/04/2012 03:00 AM    PROT 7.2 08/01/2022 01:21 PM    LABALBU 3.5 08/01/2022 01:21 PM    LABALBU 3.4 01/04/2012 03:00 AM    CALCIUM 8.3 08/05/2022 05:05 AM    BILITOT 0.2 08/01/2022 01:21 PM    ALKPHOS 93 08/01/2022 01:21 PM    AST 14 08/01/2022 01:21 PM    ALT 9 08/01/2022 01:21 PM     Magnesium:    Lab Results   Component Value Date/Time    MG 2.0 08/02/2022 03:10 PM     Phosphorus:    Lab Results   Component Value Date/Time    PHOS 4.7 08/02/2022 03:10 PM     Radiology Review:      CT head without IV contrast 8/1/22   1. There is no acute intracranial hemorrhage   2. Old left occipital lobe infarct. Kidney ultrasound complete 8/3/22   Normal appearing right kidney. Left kidney is surgically absent. No   evidence of right renal obstruction, mass or calculus. CXR 8/1/22   Bilateral lower lobe atelectasis/pneumonia, left worse than right. Small left pleural effusion. Stable enlargement of the cardiac silhouette. BRIEF SUMMARY OF INITIAL CONSULT:    Briefly, Mr. Eddie Toro  is a 58-year-old male with a PMH of CKD stage IIIb, without proteinuria, baseline creatinine 2.0-2.1 mg/dL, secondary to nephrosclerosis, kidney cancer, s/p total left nephrectomy on 7/5/22 at [facility], hypertension, type II diabetes mellitus, HFpEF 55% with stage II DD, factor V Leyden deficiency, hyperlipidemia, sick sinus syndrome s/p pacemaker placement, CVA, hypothyroidism, PAF on chronic anticoagulation with warfarin, who was admitted on August 1, 2022 after presenting to the ER after sustaining a fall. He did lose consciousness and hit his head at that time.  A CT of the head was obtained which was negative. His labs upon arrival to ER were significant for BUN 46 mg/dL and creatinine 2.8 mg/dL. His renal function continued to worsen yesterday afternoon with a BUN of 50 mg/dL and a creatinine of 3.0 mg/dL, which is the reason for this consultation. He received a 250 mL normal saline bolus in ER. He did receive Lasix 20 mg PO last night as well as lisinopril. His home medications include Lasix. He was recently taken off of lisinopril by Dr. Daysi Caputo and his Lasix was increased to 40 mg PO BID. IMPRESSION/RECOMMENDATIONS:      JUAN stage I on CKD, likely volume responsive pre-renal JUAN secondary to over diuresis in the setting of ACE inhibition with a component of ischemic ATN. FeUrea 11.67%. PVR 0. Kidney ultrasound with normal appearing right kidney. Renal function continues to improve with IV fluid administration. CKD stage IIIb, without proteinuria, baseline creatinine 2.0-2.1 mg/dL, secondary to nephrosclerosis  Acidemia (venous pH 7.30), with HAGMA secondary to uremia. Improved with bicarbonate drip. Left kidney cancer, s/p total left nephrectomy 7/5/22 at [facility]  HTN, no longer hypotensive, on metoprolol  HFpEF 55% with stage II DD, pro-, Lasix on hold  -------------------------------------------------  S/p fall  Bilateral pneumonia, on doxycycline and cefepime  PAF, on metoprolol and warfarin  Sick sinus syndrome, s/p pacemaker placement   History of CVA  Microcytic anemia, with iron deficiency, iron 31, iron saturation 18%, and ferritin 178. IV iron contraindicated due to active infection.    Dysphagia  Nutrition, honey thick liquids    Plan:    Stop IV fluids  Continue to hold Lasix  Continue to hold lisinoril  Strict I&Os  Continue to monitor renal function, BMP 4 PM  Continue to monitor H&H  Continue to monitor bicarbonate level  Better glucose control       Electronically signed by MELANIE Stroud CNP on 8/5/2022 at 10:09 AM

## 2022-08-05 NOTE — PLAN OF CARE
Problem: Skin/Tissue Integrity  Goal: Absence of new skin breakdown  Description: 1. Monitor for areas of redness and/or skin breakdown  2. Assess vascular access sites hourly  3. Every 4-6 hours minimum:  Change oxygen saturation probe site  4. Every 4-6 hours:  If on nasal continuous positive airway pressure, respiratory therapy assess nares and determine need for appliance change or resting period.   8/4/2022 2258 by Gabrielle Watts RN  Outcome: Progressing  8/4/2022 0911 by Vernell Faustin RN  Outcome: Progressing     Problem: Pain  Goal: Verbalizes/displays adequate comfort level or baseline comfort level  8/4/2022 2258 by Gabrielle Watts RN  Outcome: Progressing  Flowsheets (Taken 8/4/2022 1400 by Vernell Faustin RN)  Verbalizes/displays adequate comfort level or baseline comfort level:   Encourage patient to monitor pain and request assistance   Assess pain using appropriate pain scale  8/4/2022 0911 by Vernell Faustin RN  Outcome: Progressing     Problem: Discharge Planning  Goal: Discharge to home or other facility with appropriate resources  8/4/2022 2258 by Gabrielle Watts RN  Outcome: Progressing  8/4/2022 0911 by Vernell Faustin RN  Outcome: Progressing     Problem: Safety - Adult  Goal: Free from fall injury  8/4/2022 2258 by Gabrielle Watts RN  Outcome: Progressing  Flowsheets (Taken 8/4/2022 1621 by Vernell Faustin RN)  Free From Fall Injury: Instruct family/caregiver on patient safety  8/4/2022 0911 by Vernell Faustin RN  Outcome: Progressing  Flowsheets (Taken 8/4/2022 0800)  Free From Fall Injury: James Francisco family/caregiver on patient safety     Problem: Chronic Conditions and Co-morbidities  Goal: Patient's chronic conditions and co-morbidity symptoms are monitored and maintained or improved  8/4/2022 2258 by Gabrielle Watts RN  Outcome: Progressing  8/4/2022 0911 by Vernell Faustin RN  Outcome: Progressing     Problem: ABCDS Injury Assessment  Goal: Absence of physical injury  Recent Flowsheet Documentation  Taken 8/4/2022 1621 by Allan Sherman RN  Absence of Physical Injury: Implement safety measures based on patient assessment  8/4/2022 0911 by Allan Sherman RN  Outcome: Progressing  Flowsheets (Taken 8/4/2022 0800)  Absence of Physical Injury: Implement safety measures based on patient assessment

## 2022-08-05 NOTE — CARE COORDINATION
Social work / Discharge Planning:        [facility] will accept back pending bed availability. [facility] also following. No precert needed for return to [facility] but would need negative covid result on day of discharge.     Electronically signed by ROHIT Whiting on 8/5/2022 at 8:37 AM

## 2022-08-05 NOTE — PROGRESS NOTES
Podiatry Progress Note  8/5/2022   Vijay Severe Pirone       SUBJECTIVE: Noah Banda is a 67 y.o. male seen bedside for f/u of a right heel unstageable ulcer. Patient denies any new complaints today. No acute events overnight. Arterial studies poor. Vascular outpatient consult set up with [facility] wound care center on 8/9/22 @ 0900 with Dr. Lacey Nicolas. QOD changes santyl dsd. Denies any constitutional symptoms this AM.      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[address]     Hypertension     Ischemic stroke ([address] 03/06/2018    Pacemaker     Paraplegia ([address]     Sinus node dysfunction ([address]     Stroke (cerebrum) ([address] 02/27/2018    Thyroid disease         Past Surgical History:   Procedure Laterality Date    CARDIAC PACEMAKER PLACEMENT  12/19/2014    COLONOSCOPY      EYE SURGERY      KNEE ARTHROSCOPY  right knee    PACEMAKER PLACEMENT      ME COLONOSCOPY FLX DX W/COLLJ SPEC WHEN PFRMD N/A 3/20/2018    COLONOSCOPY DIAGNOSTIC performed by Jodie Gale MD at [address] EGD PERCUTANEOUS PLACEMENT GASTROSTOMY TUBE N/A 3/29/2018    PEG TUBE/PT. IN 5507 B performed by Jodie Gale MD at [address] EGD PERCUTANEOUS PLACEMENT GASTROSTOMY TUBE N/A 9/12/2018    EGD PEG TUBE PLACEMENT performed by Jodie Gale MD at [facility] ENDOSCOPY         Family History   Problem Relation Age of Onset    Heart Disease Mother     Stroke Father         Social History     Tobacco Use    Smoking status: Never    Smokeless tobacco: Never    Tobacco comments:     occassional cigar   Substance Use Topics    Alcohol use: No        Prior to Admission medications    Medication Sig Start Date End Date Taking? Authorizing Provider   gabapentin (NEURONTIN) 300 MG capsule Take 300 mg by mouth in the morning.    Yes Historical Provider, MD   ascorbic acid (VITAMIN C) 500 MG tablet Take 500 mg by mouth in the morning. Yes Historical Provider, MD   Dextromethorphan-guaiFENesin  MG/5ML SYRP Take 10 mLs by mouth every 6 hours as needed for Cough   Yes Historical Provider, MD   oxyCODONE (ROXICODONE) 5 MG immediate release tablet Take 5 mg by mouth every 6 hours as needed for Pain. Yes Historical Provider, MD   ergocalciferol (ERGOCALCIFEROL) 1.25 MG (53662 UT) capsule Take 50,000 Units by mouth once a week Takes every monday 8/1/22  Yes Historical Provider, MD   docusate (COLACE, DULCOLAX) 100 MG CAPS Take 100 mg by mouth 2 times daily as needed 7/8/22 8/7/22 Yes Historical Provider, MD   gabapentin (NEURONTIN) 300 MG capsule Take 300 mg by mouth nightly. 7/8/22  Yes Historical Provider, MD   acetaminophen (TYLENOL) 325 MG tablet Take 650 mg by mouth every 4 hours as needed    Historical Provider, MD   ascorbic acid (VITAMIN C) 500 MG tablet Take 500 mg by mouth in the morning and 500 mg at noon and 500 mg before bedtime. Historical Provider, MD   docusate sodium (COLACE) 100 MG capsule TAKE ONE CAPSULE BY MOUTH TWICE DAILY AS NEEDED FOR CONSTIPATION 7/8/22   Historical Provider, MD   glucose (GLUTOSE) 40 % GEL Take 15 g by mouth as needed    Historical Provider, MD   ipratropium-albuterol (DUONEB) 0.5-2.5 (3) MG/3ML SOLN nebulizer solution Inhale 3 mLs into the lungs every 4 hours as needed    Historical Provider, MD   pantoprazole (PROTONIX) 40 MG tablet Take 40 mg by mouth every morning (before breakfast)    Historical Provider, MD   polyethylene glycol (GLYCOLAX) 17 GM/SCOOP powder Take 17 g by mouth daily as needed    Historical Provider, MD   potassium chloride (KLOR-CON M) 20 MEQ extended release tablet TAKE ONE TABLET BY MOUTH EVERY DAY 5/11/22   Historical Provider, MD   dextromethorphan-guaiFENesin (ROBITUSSIN-DM)  MG/5ML syrup Take 10 mLs by mouth every 6 hours as needed    Historical Provider, MD   oxyCODONE HCl 5 MG TABA Take 1 tablet by mouth every 6 hours as needed.     Historical Provider, MD   nystatin-triamcinolone (MYCOLOG II) 962363-4.1 UNIT/GM-% cream APPLY TO AFFECTED AREA FOR 12 HOURS ON AND THEN 12 HOURS OFF. USE AS NEEDED  Patient not taking: No sig reported 12/20/21   Historical Provider, MD   amLODIPine (NORVASC) 2.5 MG tablet Take 1 tablet by mouth daily 8/20/21 4/22/22  Lesley Wang, DO   Magnesium Oxide 400 MG CAPS Take 1 capsule by mouth every other day 4/30/21   Lesley Wang, DO   potassium chloride (KLOR-CON M) 20 MEQ TBCR extended release tablet Take 1 tablet by mouth daily 3/31/21 4/22/22  Lesley Wang, DO   metoprolol tartrate (LOPRESSOR) 25 MG tablet Take 1 tablet by mouth 2 times daily 2/19/21   Lesley Wang, DO   lisinopril (PRINIVIL;ZESTRIL) 10 MG tablet Take 1 tablet by mouth daily 2/19/21   Lesley Wang, DO   levothyroxine (SYNTHROID) 50 MCG tablet Take 1 tablet by mouth Daily 2/19/21   Lesley Wang, DO   metFORMIN (GLUCOPHAGE) 1000 MG tablet Take 1 tablet by mouth 2 times daily 2/19/21   Lesley Wang, DO   lovastatin (MEVACOR) 20 MG tablet Take 1 tablet by mouth nightly  Patient not taking: Reported on 8/1/2022 11/20/20 4/22/22  Lesley Wang DO   warfarin (COUMADIN) 5 MG tablet Take 1.5 tablets by mouth daily  Patient taking differently: Take 8 mg by mouth in the morning. 10/30/20   Lesley Wang DO   omeprazole (PRILOSEC) 20 MG delayed release capsule Take 1 capsule by mouth daily 5/22/20 4/22/22  Lesley Wang DO   furosemide (LASIX) 40 MG tablet Take 0.5 tablets by mouth daily 1/17/20   Lesley Wang DO   blood glucose test strips (EZ SMART BLOOD GLUCOSE TEST) strip 1 each by In Vitro route 2 times daily As needed. 2/11/19   Lesley Wang DO        Bee venom         OBJECTIVE:        Vitals:    08/05/22 0701   BP: (!) 171/68   Pulse: 60   Resp: 16   Temp: 97.9 °F (36.6 °C)   SpO2: 95%            EXAM:        Pt is AAOx3, NAD     Vascular Exam:  DP and PT pulses faintly palpable bilaterally. CFT brisk less than 3 secs all pedal digits bilaterally.   Periwound 2044    metoprolol tartrate (LOPRESSOR) tablet 25 mg  25 mg Oral BID [employer], MD   25 mg at 08/04/22 2044    guaiFENesin-dextromethorphan (ROBITUSSIN DM) 100-10 MG/5ML syrup 5 mL  5 mL Oral Q6H PRN Eddie Hensley MD        docusate sodium (COLACE) capsule 100 mg  100 mg Oral BID PRN Eddie Hensley MD        levothyroxine (SYNTHROID) tablet 50 mcg  50 mcg Oral Daily [employer], MD   50 mcg at 08/05/22 0501    ascorbic acid (VITAMIN C) tablet 500 mg  500 mg Oral Daily [employer], MD   500 mg at 08/04/22 0831    [START ON 8/8/2022] vitamin D (ERGOCALCIFEROL) capsule 50,000 Units  50,000 Units Oral Weekly Eddie Hensley MD        pantoprazole (PROTONIX) tablet 40 mg  40 mg Oral QAM AC [employer], MD   40 mg at 08/05/22 0501        Lab Results   Component Value Date    WBC 6.3 08/05/2022    HCT 27.0 (L) 08/05/2022    HGB 8.7 (L) 08/05/2022     08/05/2022     08/05/2022    K 3.6 08/05/2022     (H) 08/05/2022    CO2 24 08/05/2022    BUN 51 (H) 08/05/2022    CREATININE 2.8 (H) 08/05/2022    GLUCOSE 220 (H) 08/05/2022    CRP 7.2 (H) 08/01/2022       ASSESSMENT:  -Unstageable diabetic decubitus ulcer right heel, POA, not acutely infected  -Peripheral arterial disease  -Non-insulin-dependent type 2 diabetes mellitus with peripheral neuropathy  -Stroke x2 with left sided sensory deficit  -Hallux malleus left, hammertoe digits 2 through 5 bilaterally       PLAN:  - Examined and evaluated  - All labs, imaging, and charts reviewed   -WBC 6.3  - Wound cultures right heel obtained 8/1/2022: GNR, Staph aureus  -ABX: Doxy/cefepime, managed by IM  - X-ray right foot/ankle 8/1/22: Negative for osteomyelitis, or soft tissue gas, vascular Calcifications noted. - Arterial studies 8/1/2022: Poor.  - Discussed with vascular service.  Outpatient consult set up with wound care center with Dr Cuco Coyle on [facility], [address] [place] [address] on 8/9/22 @ 0900  -Dressings: JUANA Beaulieu.  QOD changes. Changed 8/5/22.   -Heel protectors bilateral lower extremities.  -No indications for acute surgical invention from podiatry perspective. We will continue with local wound care going forward.   -We will continue to follow     D/W: Dr. Evita Cameron    Thank you for involving podiatry in this patients care. Please do not hesitate to call with any questions or concerns.      [address] [facility] PGY3  8/5/2022   10:03 AM

## 2022-08-06 LAB
ANION GAP SERPL CALCULATED.3IONS-SCNC: 10 MMOL/L (ref 7–16)
BASOPHILS ABSOLUTE: 0.05 E9/L (ref 0–0.2)
BASOPHILS RELATIVE PERCENT: 0.7 % (ref 0–2)
BLOOD CULTURE, ROUTINE: NORMAL
BUN BLDV-MCNC: 42 MG/DL (ref 6–23)
CALCIUM SERPL-MCNC: 8.6 MG/DL (ref 8.6–10.2)
CHLORIDE BLD-SCNC: 106 MMOL/L (ref 98–107)
CO2: 24 MMOL/L (ref 22–29)
CREAT SERPL-MCNC: 2.3 MG/DL (ref 0.7–1.2)
CULTURE, BLOOD 2: NORMAL
EOSINOPHILS ABSOLUTE: 0.27 E9/L (ref 0.05–0.5)
EOSINOPHILS RELATIVE PERCENT: 3.7 % (ref 0–6)
GFR AFRICAN AMERICAN: 33
GFR NON-AFRICAN AMERICAN: 28 ML/MIN/1.73
GLUCOSE BLD-MCNC: 164 MG/DL (ref 74–99)
HCT VFR BLD CALC: 28.5 % (ref 37–54)
HEMOGLOBIN: 9 G/DL (ref 12.5–16.5)
IMMATURE GRANULOCYTES #: 0.03 E9/L
IMMATURE GRANULOCYTES %: 0.4 % (ref 0–5)
LYMPHOCYTES ABSOLUTE: 1.19 E9/L (ref 1.5–4)
LYMPHOCYTES RELATIVE PERCENT: 16.1 % (ref 20–42)
MCH RBC QN AUTO: 29 PG (ref 26–35)
MCHC RBC AUTO-ENTMCNC: 31.6 % (ref 32–34.5)
MCV RBC AUTO: 91.9 FL (ref 80–99.9)
MONOCYTES ABSOLUTE: 0.53 E9/L (ref 0.1–0.95)
MONOCYTES RELATIVE PERCENT: 7.2 % (ref 2–12)
NEUTROPHILS ABSOLUTE: 5.3 E9/L (ref 1.8–7.3)
NEUTROPHILS RELATIVE PERCENT: 71.9 % (ref 43–80)
ORGANISM: ABNORMAL
PDW BLD-RTO: 13.7 FL (ref 11.5–15)
PLATELET # BLD: 238 E9/L (ref 130–450)
PMV BLD AUTO: 10 FL (ref 7–12)
POTASSIUM SERPL-SCNC: 3.5 MMOL/L (ref 3.5–5)
RBC # BLD: 3.1 E12/L (ref 3.8–5.8)
SODIUM BLD-SCNC: 140 MMOL/L (ref 132–146)
WBC # BLD: 7.4 E9/L (ref 4.5–11.5)
WOUND/ABSCESS: ABNORMAL
WOUND/ABSCESS: ABNORMAL

## 2022-08-06 PROCEDURE — 85025 COMPLETE CBC W/AUTO DIFF WBC: CPT

## 2022-08-06 PROCEDURE — 2060000000 HC ICU INTERMEDIATE R&B

## 2022-08-06 PROCEDURE — 6360000002 HC RX W HCPCS: Performed by: INTERNAL MEDICINE

## 2022-08-06 PROCEDURE — 2580000003 HC RX 258: Performed by: INTERNAL MEDICINE

## 2022-08-06 PROCEDURE — 6370000000 HC RX 637 (ALT 250 FOR IP): Performed by: INTERNAL MEDICINE

## 2022-08-06 PROCEDURE — 80048 BASIC METABOLIC PNL TOTAL CA: CPT

## 2022-08-06 PROCEDURE — 36415 COLL VENOUS BLD VENIPUNCTURE: CPT

## 2022-08-06 RX ADMIN — GABAPENTIN 300 MG: 300 CAPSULE ORAL at 20:07

## 2022-08-06 RX ADMIN — CEFEPIME 1000 MG: 1 INJECTION, POWDER, FOR SOLUTION INTRAMUSCULAR; INTRAVENOUS at 15:45

## 2022-08-06 RX ADMIN — GABAPENTIN 300 MG: 300 CAPSULE ORAL at 08:54

## 2022-08-06 RX ADMIN — METOPROLOL TARTRATE 25 MG: 25 TABLET, FILM COATED ORAL at 20:06

## 2022-08-06 RX ADMIN — METOPROLOL TARTRATE 25 MG: 25 TABLET, FILM COATED ORAL at 08:54

## 2022-08-06 RX ADMIN — DOXYCYCLINE HYCLATE 100 MG: 100 CAPSULE ORAL at 08:54

## 2022-08-06 RX ADMIN — LEVOTHYROXINE SODIUM 50 MCG: 0.05 TABLET ORAL at 06:11

## 2022-08-06 RX ADMIN — OXYCODONE HYDROCHLORIDE AND ACETAMINOPHEN 500 MG: 500 TABLET ORAL at 08:54

## 2022-08-06 RX ADMIN — COLLAGENASE SANTYL: 250 OINTMENT TOPICAL at 09:06

## 2022-08-06 RX ADMIN — DOXYCYCLINE HYCLATE 100 MG: 100 CAPSULE ORAL at 20:06

## 2022-08-06 RX ADMIN — ACETAMINOPHEN 650 MG: 325 TABLET ORAL at 20:10

## 2022-08-06 RX ADMIN — WARFARIN SODIUM 8 MG: 4 TABLET ORAL at 19:15

## 2022-08-06 RX ADMIN — PANTOPRAZOLE SODIUM 40 MG: 40 TABLET, DELAYED RELEASE ORAL at 06:12

## 2022-08-06 RX ADMIN — CEFEPIME 1000 MG: 1 INJECTION, POWDER, FOR SOLUTION INTRAMUSCULAR; INTRAVENOUS at 02:36

## 2022-08-06 RX ADMIN — ATORVASTATIN CALCIUM 20 MG: 20 TABLET, FILM COATED ORAL at 20:07

## 2022-08-06 ASSESSMENT — PAIN DESCRIPTION - ORIENTATION: ORIENTATION: RIGHT

## 2022-08-06 ASSESSMENT — PAIN DESCRIPTION - LOCATION: LOCATION: FOOT

## 2022-08-06 ASSESSMENT — PAIN SCALES - GENERAL
PAINLEVEL_OUTOF10: 3
PAINLEVEL_OUTOF10: 3

## 2022-08-06 NOTE — PROGRESS NOTES
Department of Internal Medicine  Nephrology Progress Note    Events reviewed. SUBJECTIVE:  We are following Mr. Rowan Wong for JUAN on CKD. He denies any complaints.     PHYSICAL EXAM:      Vitals:    VITALS:  /68   Pulse 65   Temp 98.9 °F (37.2 °C) (Oral)   Resp 16   Ht 6' 3\" (1.905 m)   Wt 266 lb 8.6 oz (120.9 kg)   SpO2 100%   BMI 33.31 kg/m²   24HR INTAKE/OUTPUT:    Intake/Output Summary (Last 24 hours) at 8/6/2022 0828  Last data filed at 8/6/2022 0620  Gross per 24 hour   Intake 204.81 ml   Output 1375 ml   Net -1170.19 ml         Constitutional:  Alert and oriented  HEENT:  Normocephalic, PERRL  Respiratory:  CTA bilaterally  Cardiovascular/Edema:  RRR, S1,S2  Gastrointestinal:  Soft, rounded, nontender, nondistended  Neurologic:  Nonfocal, GREENBERG  Skin:  Warm, dry, wound to right heel  Other:  Trace edema BLE    Scheduled Meds:   cefepime  1,000 mg IntraVENous Q12H    doxycycline hyclate  100 mg Oral 2 times per day    collagenase   Topical Daily    magnesium oxide  400 mg Oral Every Other Day    warfarin  8 mg Oral Daily    gabapentin  300 mg Oral Daily    gabapentin  300 mg Oral Nightly    atorvastatin  20 mg Oral Nightly    metoprolol tartrate  25 mg Oral BID    levothyroxine  50 mcg Oral Daily    ascorbic acid  500 mg Oral Daily    [START ON 8/8/2022] vitamin D  50,000 Units Oral Weekly    pantoprazole  40 mg Oral QAM AC     Continuous Infusions:      PRN Meds:.oxyCODONE-acetaminophen, acetaminophen, ipratropium-albuterol, guaiFENesin-dextromethorphan, docusate sodium    DATA:    CBC:   Lab Results   Component Value Date/Time    WBC 7.4 08/06/2022 02:35 AM    RBC 3.10 08/06/2022 02:35 AM    HGB 9.0 08/06/2022 02:35 AM    HCT 28.5 08/06/2022 02:35 AM    MCV 91.9 08/06/2022 02:35 AM    MCH 29.0 08/06/2022 02:35 AM    MCHC 31.6 08/06/2022 02:35 AM    RDW 13.7 08/06/2022 02:35 AM     08/06/2022 02:35 AM    MPV 10.0 08/06/2022 02:35 AM     CMP:    Lab Results   Component Value Date/Time which was negative. His labs upon arrival to ER were significant for BUN 46 mg/dL and creatinine 2.8 mg/dL. His renal function continued to worsen yesterday afternoon with a BUN of 50 mg/dL and a creatinine of 3.0 mg/dL, which is the reason for this consultation. He received a 250 mL normal saline bolus in ER. He did receive Lasix 20 mg PO last night as well as lisinopril. His home medications include Lasix. He was recently taken off of lisinopril by Dr. Cali Hendrix and his Lasix was increased to 40 mg PO BID. IMPRESSION/RECOMMENDATIONS:      JUAN stage I on CKD, Creatinine is now 2.3     CKD stage IIIb, without proteinuria, baseline creatinine 2.0-2.1 mg/dL, secondary to nephrosclerosis  Acidemia (venous pH 7.30), with HAGMA secondary to uremia. Improved with bicarbonate drip. Left kidney cancer, s/p total left nephrectomy 7/5/22 at [facility]  HTN, no longer hypotensive, on metoprolol  HFpEF 55% with stage II DD, pro-, Lasix on hold  -------------------------------------------------  S/p fall  Bilateral pneumonia, on doxycycline and cefepime  PAF, on metoprolol and warfarin  Sick sinus syndrome, s/p pacemaker placement   History of CVA  Microcytic anemia, with iron deficiency, iron 31, iron saturation 18%, and ferritin 178. IV iron contraindicated due to active infection.    Dysphagia  Nutrition, honey thick liquids    Plan:      Strict I&Os  Continue to monitor renal function, BMP 4 PM  Continue to monitor H&H  Continue to monitor bicarbonate level  Better glucose control       Electronically signed by Xiomara Stephens MD on 8/6/2022 at 8:28 AM

## 2022-08-06 NOTE — PROGRESS NOTES
Subjective:  Patient was seen on the floor earlier this morning  Family at bedside  Admission details noted  Data reviewed in detail with them  He is feeling fairly comfortable  Not needing any supplemental oxygen  Tolerating medications    Objective:    BP (!) 154/70   Pulse 65   Temp 98.4 °F (36.9 °C) (Oral)   Resp 16   Ht 6' 3\" (1.905 m)   Wt 266 lb 8.6 oz (120.9 kg)   SpO2 98%   BMI 33.31 kg/m²   Somewhat obese  Awake alert oriented  No acute distress  Oral mucosa moist  Neck supple without adenopathy  Heart:  RRR, no murmurs, gallops, or rubs.   Lungs: Breath sounds somewhat diminished  Abd: bowel sounds present, nontender, nondistended, no masses  Extrem: 2+ edema bilaterally noted  Right lower extremity heavily wrapped  Romero intact    Data reviewed    Assessment:  Bilateral pneumonia  Acute kidney injury  Recent nephrectomy for cancer  History of A. fib on warfarin  Patient Active Problem List   Diagnosis    Hypertension    Hyperlipidemia with target LDL less than 100    Impaired mobility and ADLs    Cardiac pacemaker in situ    Cerebrovascular accident (CVA) determined by clinical assessment ([facility]    Left renal mass    Class 2 severe obesity due to excess calories with serious comorbidity and body mass index (BMI) of 35.0 to 35.9 in [facility])    Acquired hypothyroidism    H/O deep venous thrombosis    Type 2 diabetes mellitus without complication, without long-term current use of insulin (HCC)    Stage 3 chronic kidney disease (HCC)    Chronic renal disease, stage III ([address] [[id number]]    Pneumonia       Plan:  Continue current medical management with cefepime and doxycycline  Monitor labs including INR  Questions answered to their satisfaction        Joellen Izaguirre MD  12:52 PM  8/6/2022

## 2022-08-06 NOTE — PLAN OF CARE
2309 by Little Garcia RN  Outcome: Progressing  8/5/2022 1849 by Heike Martinez LPN  Outcome: Not Progressing

## 2022-08-06 NOTE — PROGRESS NOTES
Podiatry Progress Note  8/6/2022   Refugio Samuel       SUBJECTIVE: Raul Martin is a 67 y.o. male seen bedside for f/u of a right heel unstageable ulcer. Patient denies any acute events overnight. Vascular outpatient consult set up with [address] [place] [address] wound care center on 8/9/22 @ 0900 with Dr. Harsha Durbin. QOD changes santyl dsd. Pt denies any NVFC. Pt has no other pedal questions or complaints at this time. 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[address]     Hypertension     Ischemic stroke ([address] 03/06/2018    Pacemaker     Paraplegia ([address]     Sinus node dysfunction ([address]     Stroke (cerebrum) ([facility] 02/27/2018    Thyroid disease         Past Surgical History:   Procedure Laterality Date    CARDIAC PACEMAKER PLACEMENT  12/19/2014    COLONOSCOPY      EYE SURGERY      KNEE ARTHROSCOPY  right knee    PACEMAKER PLACEMENT      [place] COLONOSCOPY FLX DX W/COLLJ SPEC WHEN PFRMD N/A 3/20/2018    COLONOSCOPY DIAGNOSTIC performed by Mat Duong MD at [address] EGD PERCUTANEOUS PLACEMENT GASTROSTOMY TUBE N/A 3/29/2018    PEG TUBE/PT. IN 5507 B performed by Mat Duong MD at [address] EGD PERCUTANEOUS PLACEMENT GASTROSTOMY TUBE N/A 9/12/2018    EGD PEG TUBE PLACEMENT performed by Mat Duong MD at [facility] ENDOSCOPY         Family History   Problem Relation Age of Onset    Heart Disease Mother     Stroke Father         Social History     Tobacco Use    Smoking status: Never    Smokeless tobacco: Never    Tobacco comments:     occassional cigar   Substance Use Topics    Alcohol use: No        Prior to Admission medications    Medication Sig Start Date End Date Taking? Authorizing Provider   gabapentin (NEURONTIN) 300 MG capsule Take 300 mg by mouth in the morning. Yes Historical Provider, MD   ascorbic acid (VITAMIN C) 500 MG tablet Take 500 mg by mouth in the morning. Yes Historical Provider, MD   Dextromethorphan-guaiFENesin  MG/5ML SYRP Take 10 mLs by mouth every 6 hours as needed for Cough   Yes Historical Provider, MD   oxyCODONE (ROXICODONE) 5 MG immediate release tablet Take 5 mg by mouth every 6 hours as needed for Pain. Yes Historical Provider, MD   ergocalciferol (ERGOCALCIFEROL) 1.25 MG (68771 UT) capsule Take 50,000 Units by mouth once a week Takes every monday 8/1/22  Yes Historical Provider, MD   docusate (COLACE, DULCOLAX) 100 MG CAPS Take 100 mg by mouth 2 times daily as needed 7/8/22 8/7/22 Yes Historical Provider, MD   gabapentin (NEURONTIN) 300 MG capsule Take 300 mg by mouth nightly. 7/8/22  Yes Historical Provider, MD   acetaminophen (TYLENOL) 325 MG tablet Take 650 mg by mouth every 4 hours as needed    Historical Provider, MD   ascorbic acid (VITAMIN C) 500 MG tablet Take 500 mg by mouth in the morning and 500 mg at noon and 500 mg before bedtime. Historical Provider, MD   docusate sodium (COLACE) 100 MG capsule TAKE ONE CAPSULE BY MOUTH TWICE DAILY AS NEEDED FOR CONSTIPATION 7/8/22   Historical Provider, MD   glucose (GLUTOSE) 40 % GEL Take 15 g by mouth as needed    Historical Provider, MD   ipratropium-albuterol (DUONEB) 0.5-2.5 (3) MG/3ML SOLN nebulizer solution Inhale 3 mLs into the lungs every 4 hours as needed    Historical Provider, MD   pantoprazole (PROTONIX) 40 MG tablet Take 40 mg by mouth every morning (before breakfast)    Historical Provider, MD   polyethylene glycol (GLYCOLAX) 17 GM/SCOOP powder Take 17 g by mouth daily as needed    Historical Provider, MD   potassium chloride (KLOR-CON M) 20 MEQ extended release tablet TAKE ONE TABLET BY MOUTH EVERY DAY 5/11/22   Historical Provider, MD   dextromethorphan-guaiFENesin (ROBITUSSIN-DM)  MG/5ML syrup Take 10 mLs by mouth every 6 hours as needed    Historical Provider, MD   oxyCODONE HCl 5 MG TABA Take 1 tablet by mouth every 6 hours as needed.     Historical Provider, MD nystatin-triamcinolone (MYCOLOG II) 286217-8.1 UNIT/GM-% cream APPLY TO AFFECTED AREA FOR 12 HOURS ON AND THEN 12 HOURS OFF. USE AS NEEDED  Patient not taking: No sig reported 12/20/21   Historical Provider, MD   amLODIPine (NORVASC) 2.5 MG tablet Take 1 tablet by mouth daily 8/20/21 4/22/22  Lesley Wang, DO   Magnesium Oxide 400 MG CAPS Take 1 capsule by mouth every other day 4/30/21   Lesley Wang, DO   potassium chloride (KLOR-CON M) 20 MEQ TBCR extended release tablet Take 1 tablet by mouth daily 3/31/21 4/22/22  Lesley Wang, DO   metoprolol tartrate (LOPRESSOR) 25 MG tablet Take 1 tablet by mouth 2 times daily 2/19/21   Lesleygood [facility], DO   lisinopril (PRINIVIL;ZESTRIL) 10 MG tablet Take 1 tablet by mouth daily 2/19/21   Lesleygood [facility], DO   levothyroxine (SYNTHROID) 50 MCG tablet Take 1 tablet by mouth Daily 2/19/21   Lesley Wang, DO   metFORMIN (GLUCOPHAGE) 1000 MG tablet Take 1 tablet by mouth 2 times daily 2/19/21   Lesley [facility], DO   lovastatin (MEVACOR) 20 MG tablet Take 1 tablet by mouth nightly  Patient not taking: Reported on 8/1/2022 11/20/20 4/22/22  Lesley Wang DO   warfarin (COUMADIN) 5 MG tablet Take 1.5 tablets by mouth daily  Patient taking differently: Take 8 mg by mouth in the morning. 10/30/20   Lesley Wang DO   omeprazole (PRILOSEC) 20 MG delayed release capsule Take 1 capsule by mouth daily 5/22/20 4/22/22  Lesley Wang DO   furosemide (LASIX) 40 MG tablet Take 0.5 tablets by mouth daily 1/17/20   Lesley Wang DO   blood glucose test strips (EZ SMART BLOOD GLUCOSE TEST) strip 1 each by In Vitro route 2 times daily As needed. 2/11/19   Lesley Wang DO        Bee venom         OBJECTIVE:        Vitals:    08/06/22 0854   BP: (!) 154/70   Pulse: 65   Resp:    Temp:    SpO2:         Dressing remains CDI. Pt able to wiggle digits. Posterior compartments soft and compressible.      Previous exam findings:     EXAM:        Pt is AAOx3, NAD     Vascular Exam:  DP and PT pulses faintly palpable bilaterally. CFT brisk less than 3 secs all pedal digits bilaterally. Periwound erythema right heel. No edema, no ecchymosis     Neuro Exam: Diminished tactile sensation bilaterally left worse than right. Dermatologic Exam: Well-hydrated skin with skin wrinkles noted  -Unstageable decubitus ulcer right heel with increasing size and width of dry stable eschar, less granular tissue today. No malodor today, no crepitus or fluctuance, no undermining tunneling, no proximal streaking, no drainage. MSK: Muscle strength 5/5 Bilateral.  Reducible hallux malleus left and hammertoes digits 2 through 5 bilateral.  Ankle equinus bilateral.  Negative clonus, negative Babinski sign. Soft compressible posterior muscle compartments bilaterally.     Current Facility-Administered Medications   Medication Dose Route Frequency Provider Last Rate Last Admin    cefepime (MAXIPIME) 1000 mg IVPB minibag  1,000 mg IntraVENous Q12H Bennie Teixeira MD   Stopped at 08/06/22 0239    doxycycline hyclate (VIBRAMYCIN) capsule 100 mg  100 mg Oral 2 times per day Bennie Teixeira MD   100 mg at 08/06/22 0854    collagenase ointment   Topical Daily Micky Wilson DPM   Given at 08/06/22 1639    oxyCODONE-acetaminophen (PERCOCET) 5-325 MG per tablet 1 tablet  1 tablet Oral Q6H PRN Bennie Teixeira MD        acetaminophen (TYLENOL) tablet 650 mg  650 mg Oral Q4H PRN Bennie Teixeira MD        magnesium oxide (MAG-OX) tablet 400 mg  400 mg Oral Every Other Day Bennie Teixeira MD   400 mg at 08/05/22 1002    ipratropium-albuterol (DUONEB) nebulizer solution 1 ampule  1 ampule Inhalation Q4H PRN Bennie Teixeira MD        warfarin (COUMADIN) tablet 8 mg  8 mg Oral Daily Bennie Teixeira MD   8 mg at 08/05/22 1757    gabapentin (NEURONTIN) capsule 300 mg  300 mg Oral Daily Bennie Teixeira MD   300 mg at 08/06/22 0854    gabapentin (NEURONTIN) capsule 300 mg  300 mg Oral Nightly Bennie Teixeira MD   300 mg at 08/05/22 0942 atorvastatin (LIPITOR) tablet 20 mg  20 mg Oral Nightly Peggy Singleton MD   20 mg at 08/05/22 1958    metoprolol tartrate (LOPRESSOR) tablet 25 mg  25 mg Oral BID Peggy Singleton MD   25 mg at 08/06/22 0854    guaiFENesin-dextromethorphan (ROBITUSSIN DM) 100-10 MG/5ML syrup 5 mL  5 mL Oral Q6H PRN Peggy Singleton MD        docusate sodium (COLACE) capsule 100 mg  100 mg Oral BID PRN Peggy Singleton MD        levothyroxine (SYNTHROID) tablet 50 mcg  50 mcg Oral Daily Peggy Singleton MD   50 mcg at 08/06/22 0408    ascorbic acid (VITAMIN C) tablet 500 mg  500 mg Oral Daily Peggy Singleton MD   500 mg at 08/06/22 0854    [START ON 8/8/2022] vitamin D (ERGOCALCIFEROL) capsule 50,000 Units  50,000 Units Oral Weekly Peggy Singleton MD        pantoprazole (PROTONIX) tablet 40 mg  40 mg Oral QAM AC Peggy Singleton MD   40 mg at 08/06/22 0612        Lab Results   Component Value Date    WBC 7.4 08/06/2022    HCT 28.5 (L) 08/06/2022    HGB 9.0 (L) 08/06/2022     08/06/2022     08/06/2022    K 3.5 08/06/2022     08/06/2022    CO2 24 08/06/2022    BUN 42 (H) 08/06/2022    CREATININE 2.3 (H) 08/06/2022    GLUCOSE 164 (H) 08/06/2022    CRP 7.2 (H) 08/01/2022       ASSESSMENT:  -Unstageable diabetic decubitus ulcer right heel, POA, not acutely infected  -Peripheral arterial disease  -Non-insulin-dependent type 2 diabetes mellitus with peripheral neuropathy  -Stroke x2 with left sided sensory deficit  -Hallux malleus left, hammertoe digits 2 through 5 bilaterally       PLAN:  - Examined and evaluated  - All labs, imaging, and charts reviewed   -WBC: 7.4  - Wound cultures right heel obtained 8/1/2022: GNR, Staph aureus  -ABX: Doxy/cefepime, managed by IM  - X-ray right foot/ankle 8/1/22: Negative for osteomyelitis, or soft tissue gas, vascular Calcifications noted. - Arterial studies 8/1/2022: Poor.  - Discussed with vascular service.  Outpatient consult set up with wound care center with Dr Lacey Nicolas on [address] [facility], [address] [place] [address] on 8/9/22 @ 0900  -Dressings: JUANA Beaulieu.  QOD changes. Changed 8/5/22.   -Heel protectors bilateral lower extremities.  -No indications for acute surgical invention from podiatry perspective. We will continue with local wound care going forward.   -We will continue to follow     D/W: Dr. Eleno Andino    Thank you for involving podiatry in this patients care. Please do not hesitate to call with any questions or concerns.      Katey Mcguire DPM, PGY-2  8/6/2022   12:56 PM

## 2022-08-07 ENCOUNTER — APPOINTMENT (OUTPATIENT)
Dept: ULTRASOUND IMAGING | Age: 77
DRG: 682 | End: 2022-08-07
Payer: MEDICARE

## 2022-08-07 LAB
ANION GAP SERPL CALCULATED.3IONS-SCNC: 11 MMOL/L (ref 7–16)
BASOPHILS ABSOLUTE: 0.04 E9/L (ref 0–0.2)
BASOPHILS RELATIVE PERCENT: 0.5 % (ref 0–2)
BUN BLDV-MCNC: 41 MG/DL (ref 6–23)
CALCIUM SERPL-MCNC: 9.3 MG/DL (ref 8.6–10.2)
CHLORIDE BLD-SCNC: 110 MMOL/L (ref 98–107)
CO2: 24 MMOL/L (ref 22–29)
CREAT SERPL-MCNC: 2.3 MG/DL (ref 0.7–1.2)
EOSINOPHILS ABSOLUTE: 0.32 E9/L (ref 0.05–0.5)
EOSINOPHILS RELATIVE PERCENT: 3.7 % (ref 0–6)
GFR AFRICAN AMERICAN: 33
GFR NON-AFRICAN AMERICAN: 28 ML/MIN/1.73
GLUCOSE BLD-MCNC: 180 MG/DL (ref 74–99)
HCT VFR BLD CALC: 30.5 % (ref 37–54)
HEMOGLOBIN: 9.4 G/DL (ref 12.5–16.5)
IMMATURE GRANULOCYTES #: 0.04 E9/L
IMMATURE GRANULOCYTES %: 0.5 % (ref 0–5)
LYMPHOCYTES ABSOLUTE: 1.36 E9/L (ref 1.5–4)
LYMPHOCYTES RELATIVE PERCENT: 15.8 % (ref 20–42)
MCH RBC QN AUTO: 29.1 PG (ref 26–35)
MCHC RBC AUTO-ENTMCNC: 30.8 % (ref 32–34.5)
MCV RBC AUTO: 94.4 FL (ref 80–99.9)
MONOCYTES ABSOLUTE: 0.6 E9/L (ref 0.1–0.95)
MONOCYTES RELATIVE PERCENT: 7 % (ref 2–12)
NEUTROPHILS ABSOLUTE: 6.24 E9/L (ref 1.8–7.3)
NEUTROPHILS RELATIVE PERCENT: 72.5 % (ref 43–80)
PDW BLD-RTO: 13.6 FL (ref 11.5–15)
PLATELET # BLD: 244 E9/L (ref 130–450)
PMV BLD AUTO: 10.1 FL (ref 7–12)
POTASSIUM SERPL-SCNC: 3.3 MMOL/L (ref 3.5–5)
RBC # BLD: 3.23 E12/L (ref 3.8–5.8)
SODIUM BLD-SCNC: 145 MMOL/L (ref 132–146)
WBC # BLD: 8.6 E9/L (ref 4.5–11.5)

## 2022-08-07 PROCEDURE — 80048 BASIC METABOLIC PNL TOTAL CA: CPT

## 2022-08-07 PROCEDURE — 6370000000 HC RX 637 (ALT 250 FOR IP): Performed by: INTERNAL MEDICINE

## 2022-08-07 PROCEDURE — 2580000003 HC RX 258: Performed by: INTERNAL MEDICINE

## 2022-08-07 PROCEDURE — 36415 COLL VENOUS BLD VENIPUNCTURE: CPT

## 2022-08-07 PROCEDURE — 85025 COMPLETE CBC W/AUTO DIFF WBC: CPT

## 2022-08-07 PROCEDURE — 76705 ECHO EXAM OF ABDOMEN: CPT

## 2022-08-07 PROCEDURE — 2060000000 HC ICU INTERMEDIATE R&B

## 2022-08-07 PROCEDURE — 6360000002 HC RX W HCPCS: Performed by: INTERNAL MEDICINE

## 2022-08-07 RX ADMIN — Medication 400 MG: at 07:50

## 2022-08-07 RX ADMIN — GABAPENTIN 300 MG: 300 CAPSULE ORAL at 07:50

## 2022-08-07 RX ADMIN — CEFEPIME 1000 MG: 1 INJECTION, POWDER, FOR SOLUTION INTRAMUSCULAR; INTRAVENOUS at 04:08

## 2022-08-07 RX ADMIN — ATORVASTATIN CALCIUM 20 MG: 20 TABLET, FILM COATED ORAL at 20:11

## 2022-08-07 RX ADMIN — CEFEPIME 1000 MG: 1 INJECTION, POWDER, FOR SOLUTION INTRAMUSCULAR; INTRAVENOUS at 14:15

## 2022-08-07 RX ADMIN — OXYCODONE HYDROCHLORIDE AND ACETAMINOPHEN 500 MG: 500 TABLET ORAL at 07:50

## 2022-08-07 RX ADMIN — COLLAGENASE SANTYL: 250 OINTMENT TOPICAL at 07:50

## 2022-08-07 RX ADMIN — GABAPENTIN 300 MG: 300 CAPSULE ORAL at 20:11

## 2022-08-07 RX ADMIN — WARFARIN SODIUM 8 MG: 4 TABLET ORAL at 16:12

## 2022-08-07 RX ADMIN — METOPROLOL TARTRATE 25 MG: 25 TABLET, FILM COATED ORAL at 20:12

## 2022-08-07 RX ADMIN — DOXYCYCLINE HYCLATE 100 MG: 100 CAPSULE ORAL at 20:11

## 2022-08-07 RX ADMIN — LEVOTHYROXINE SODIUM 50 MCG: 0.05 TABLET ORAL at 05:27

## 2022-08-07 RX ADMIN — PANTOPRAZOLE SODIUM 40 MG: 40 TABLET, DELAYED RELEASE ORAL at 05:27

## 2022-08-07 RX ADMIN — METOPROLOL TARTRATE 25 MG: 25 TABLET, FILM COATED ORAL at 07:50

## 2022-08-07 RX ADMIN — DOXYCYCLINE HYCLATE 100 MG: 100 CAPSULE ORAL at 07:50

## 2022-08-07 ASSESSMENT — PAIN SCALES - GENERAL: PAINLEVEL_OUTOF10: 0

## 2022-08-07 NOTE — PROGRESS NOTES
Patient pulled out secondary IV overnight and I went to assess his last IV access and it was leaking. I tried twice. Consult placed to IV team he has poor venous access.

## 2022-08-07 NOTE — PROGRESS NOTES
Podiatry Progress Note  8/7/2022   Key Samuel       SUBJECTIVE: Paula Montgomery is a 67 y.o. male seen bedside with family present for f/u of a right heel unstageable ulcer. Patient denies any acute events overnight. Vascular outpatient consult set up with [address] [place] [address] wound care center on 8/9/22 @ 0900 with Dr. Murray Amor. QOD changes santyl dsd. Pt denies any NVFC. Pt has no other pedal questions or complaints at this time. Past Medical History:   Diagnosis Date    Atrial fibrillation ([address]     Cancer ([address]     kidney    Diabetes mellitus ([address]     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[address]     Hypertension     Ischemic stroke ([address] 03/06/2018    Pacemaker     Paraplegia ([address]     Sinus node dysfunction ([address]     Stroke (cerebrum) ([facility] 02/27/2018    Thyroid disease         Past Surgical History:   Procedure Laterality Date    CARDIAC PACEMAKER PLACEMENT  12/19/2014    COLONOSCOPY      EYE SURGERY      KNEE ARTHROSCOPY  right knee    PACEMAKER PLACEMENT      [place] COLONOSCOPY FLX DX W/COLLJ SPEC WHEN PFRMD N/A 3/20/2018    COLONOSCOPY DIAGNOSTIC performed by Abdulkadir Antunez MD at [address] EGD PERCUTANEOUS PLACEMENT GASTROSTOMY TUBE N/A 3/29/2018    PEG TUBE/PT. IN 5507 B performed by Abdulkadir Antunez MD at [address] EGD PERCUTANEOUS PLACEMENT GASTROSTOMY TUBE N/A 9/12/2018    EGD PEG TUBE PLACEMENT performed by Abdulkadir Antunez MD at [facility] ENDOSCOPY         Family History   Problem Relation Age of Onset    Heart Disease Mother     Stroke Father         Social History     Tobacco Use    Smoking status: Never    Smokeless tobacco: Never    Tobacco comments:     occassional cigar   Substance Use Topics    Alcohol use: No        Prior to Admission medications    Medication Sig Start Date End Date Taking? Authorizing Provider   gabapentin (NEURONTIN) 300 MG capsule Take 300 mg by mouth in the morning.    Yes Historical Provider, MD   ascorbic acid (VITAMIN C) 500 MG tablet Take 500 mg by mouth in the morning. Yes Historical Provider, MD   Dextromethorphan-guaiFENesin  MG/5ML SYRP Take 10 mLs by mouth every 6 hours as needed for Cough   Yes Historical Provider, MD   oxyCODONE (ROXICODONE) 5 MG immediate release tablet Take 5 mg by mouth every 6 hours as needed for Pain. Yes Historical Provider, MD   ergocalciferol (ERGOCALCIFEROL) 1.25 MG (61522 UT) capsule Take 50,000 Units by mouth once a week Takes every monday 8/1/22  Yes Historical Provider, MD   docusate (COLACE, DULCOLAX) 100 MG CAPS Take 100 mg by mouth 2 times daily as needed 7/8/22 8/7/22 Yes Historical Provider, MD   gabapentin (NEURONTIN) 300 MG capsule Take 300 mg by mouth nightly. 7/8/22  Yes Historical Provider, MD   acetaminophen (TYLENOL) 325 MG tablet Take 650 mg by mouth every 4 hours as needed    Historical Provider, MD   ascorbic acid (VITAMIN C) 500 MG tablet Take 500 mg by mouth in the morning and 500 mg at noon and 500 mg before bedtime. Historical Provider, MD   docusate sodium (COLACE) 100 MG capsule TAKE ONE CAPSULE BY MOUTH TWICE DAILY AS NEEDED FOR CONSTIPATION 7/8/22   Historical Provider, MD   glucose (GLUTOSE) 40 % GEL Take 15 g by mouth as needed    Historical Provider, MD   ipratropium-albuterol (DUONEB) 0.5-2.5 (3) MG/3ML SOLN nebulizer solution Inhale 3 mLs into the lungs every 4 hours as needed    Historical Provider, MD   pantoprazole (PROTONIX) 40 MG tablet Take 40 mg by mouth every morning (before breakfast)    Historical Provider, MD   polyethylene glycol (GLYCOLAX) 17 GM/SCOOP powder Take 17 g by mouth daily as needed    Historical Provider, MD   potassium chloride (KLOR-CON M) 20 MEQ extended release tablet TAKE ONE TABLET BY MOUTH EVERY DAY 5/11/22   Historical Provider, MD   dextromethorphan-guaiFENesin (ROBITUSSIN-DM)  MG/5ML syrup Take 10 mLs by mouth every 6 hours as needed    Historical Provider, MD   oxyCODONE HCl 5 MG TABA Take 1 tablet by mouth every 6 hours as needed.     Historical Provider, MD   nystatin-triamcinolone (MYCOLOG II) 299622-1.1 UNIT/GM-% cream APPLY TO AFFECTED AREA FOR 12 HOURS ON AND THEN 12 HOURS OFF. USE AS NEEDED  Patient not taking: No sig reported 12/20/21   Historical Provider, MD   amLODIPine (NORVASC) 2.5 MG tablet Take 1 tablet by mouth daily 8/20/21 4/22/22  Lesley Wang, DO   Magnesium Oxide 400 MG CAPS Take 1 capsule by mouth every other day 4/30/21   Lesley Wang, DO   potassium chloride (KLOR-CON M) 20 MEQ TBCR extended release tablet Take 1 tablet by mouth daily 3/31/21 4/22/22  Lesley Wang, DO   metoprolol tartrate (LOPRESSOR) 25 MG tablet Take 1 tablet by mouth 2 times daily 2/19/21   Lesley Wang, DO   lisinopril (PRINIVIL;ZESTRIL) 10 MG tablet Take 1 tablet by mouth daily 2/19/21   Lesley Wang, DO   levothyroxine (SYNTHROID) 50 MCG tablet Take 1 tablet by mouth Daily 2/19/21   Lesley Wang, DO   metFORMIN (GLUCOPHAGE) 1000 MG tablet Take 1 tablet by mouth 2 times daily 2/19/21   Lesley Wang, DO   lovastatin (MEVACOR) 20 MG tablet Take 1 tablet by mouth nightly  Patient not taking: Reported on 8/1/2022 11/20/20 4/22/22  Lesley Wang DO   warfarin (COUMADIN) 5 MG tablet Take 1.5 tablets by mouth daily  Patient taking differently: Take 8 mg by mouth in the morning. 10/30/20   Lesley Wang DO   omeprazole (PRILOSEC) 20 MG delayed release capsule Take 1 capsule by mouth daily 5/22/20 4/22/22  Lesley Wang DO   furosemide (LASIX) 40 MG tablet Take 0.5 tablets by mouth daily 1/17/20   Lesley Wang DO   blood glucose test strips (EZ SMART BLOOD GLUCOSE TEST) strip 1 each by In Vitro route 2 times daily As needed. 2/11/19   Lesley Wang DO        Bee venom         OBJECTIVE:        Vitals:    08/07/22 0718   BP: (!) 176/80   Pulse: 71   Resp: 18   Temp: 97.9 °F (36.6 °C)   SpO2: 100%        Dressing remains CDI. Pt able to wiggle digits. Posterior compartments soft and compressible.      Previous exam findings:     EXAM:        Pt is AAOx3, NAD Vascular Exam:  DP and PT pulses faintly palpable bilaterally. CFT brisk less than 3 secs all pedal digits bilaterally. Periwound erythema right heel. No edema, no ecchymosis     Neuro Exam: Diminished tactile sensation bilaterally left worse than right. Dermatologic Exam: Well-hydrated skin with skin wrinkles noted  -Unstageable decubitus ulcer right heel with increasing size and width of dry stable eschar, less granular tissue today. No malodor today, no crepitus or fluctuance, no undermining tunneling, no proximal streaking, no drainage. MSK: Muscle strength 5/5 Bilateral.  Reducible hallux malleus left and hammertoes digits 2 through 5 bilateral.  Ankle equinus bilateral.  Negative clonus, negative Babinski sign. Soft compressible posterior muscle compartments bilaterally.     Current Facility-Administered Medications   Medication Dose Route Frequency Provider Last Rate Last Admin    cefepime (MAXIPIME) 1000 mg IVPB minibag  1,000 mg IntraVENous Q12H Tiffanie Lynn MD   Stopped at 08/07/22 3562    doxycycline hyclate (VIBRAMYCIN) capsule 100 mg  100 mg Oral 2 times per day Tiffanie Lynn MD   100 mg at 08/07/22 0750    collagenase ointment   Topical Daily Geannie Lines, DPM   Given at 08/07/22 0750    oxyCODONE-acetaminophen (PERCOCET) 5-325 MG per tablet 1 tablet  1 tablet Oral Q6H PRN Tiffanie Lynn MD        acetaminophen (TYLENOL) tablet 650 mg  650 mg Oral Q4H PRN Tiffanie Lynn MD   650 mg at 08/06/22 2010    magnesium oxide (MAG-OX) tablet 400 mg  400 mg Oral Every Other Day Tiffanie Lynn MD   400 mg at 08/07/22 0750    ipratropium-albuterol (DUONEB) nebulizer solution 1 ampule  1 ampule Inhalation Q4H PRN Tiffanie Lynn MD        warfarin (COUMADIN) tablet 8 mg  8 mg Oral Daily Tiffanie Lynn MD   8 mg at 08/06/22 1915    gabapentin (NEURONTIN) capsule 300 mg  300 mg Oral Daily Tiffanie Lynn MD   300 mg at 08/07/22 0750    gabapentin (NEURONTIN) capsule 300 mg  300 mg Oral Outpatient consult set up with wound care center with Dr Raiza Sutherland on [address] [place] [address], [address] [place] [address] on 8/9/22 @ 0900  -Dressings: Santyl, DSD.  QOD changes. Changed 8/6/22.   -Heel protectors bilateral lower extremities at all times  -No indications for acute surgical invention from podiatry perspective. We will continue with local wound care going forward.   -We will continue to follow     D/W: Dr. Nicole Hood    Thank you for involving podiatry in this patients care. Please do not hesitate to call with any questions or concerns.      Mehul Bond DPM, PGY-2  8/7/2022   1:24 PM

## 2022-08-07 NOTE — PLAN OF CARE
Problem: Skin/Tissue Integrity  Goal: Absence of new skin breakdown  Description: 1. Monitor for areas of redness and/or skin breakdown  2. Assess vascular access sites hourly  3. Every 4-6 hours minimum:  Change oxygen saturation probe site  4. Every 4-6 hours:  If on nasal continuous positive airway pressure, respiratory therapy assess nares and determine need for appliance change or resting period.   Outcome: Progressing     Problem: Pain  Goal: Verbalizes/displays adequate comfort level or baseline comfort level  Outcome: Progressing     Problem: Discharge Planning  Goal: Discharge to home or other facility with appropriate resources  Outcome: Progressing     Problem: Safety - Adult  Goal: Free from fall injury  Outcome: Progressing     Problem: Chronic Conditions and Co-morbidities  Goal: Patient's chronic conditions and co-morbidity symptoms are monitored and maintained or improved  Outcome: Progressing     Problem: ABCDS Injury Assessment  Goal: Absence of physical injury  Outcome: Progressing     Problem: Nutrition Deficit:  Goal: Optimize nutritional status  Outcome: Progressing

## 2022-08-07 NOTE — PROGRESS NOTES
Subjective:  Patient was seen on the floor earlier this morning  Family at bedside  Admission details noted  Data reviewed in detail with them  He is feeling fairly comfortable  Not needing any supplemental oxygen  Tolerating medications    Objective:    BP (!) 176/80   Pulse 71   Temp 97.9 °F (36.6 °C) (Oral)   Resp 18   Ht 6' 3\" (1.905 m)   Wt 261 lb 4.8 oz (118.5 kg)   SpO2 100%   BMI 32.66 kg/m²   Somewhat obese  Awake alert oriented  No acute distress  Oral mucosa moist  Neck supple without adenopathy  Heart:  RRR, no murmurs, gallops, or rubs.   Lungs: Breath sounds somewhat diminished  Abd: bowel sounds present, nontender,   Distended with no masses  Extrem: 2+ edema bilaterally noted  Right lower extremity heavily wrapped  Romero intact    Data reviewed    Assessment:  Suspected ascites  Bilateral pneumonia  Acute kidney injury  Recent nephrectomy for cancer  History of A. fib on warfarin  Patient Active Problem List   Diagnosis    Hypertension    Hyperlipidemia with target LDL less than 100    Impaired mobility and ADLs    Cardiac pacemaker in situ    Cerebrovascular accident (CVA) determined by clinical assessment ([facility]    Left renal mass    Class 2 severe obesity due to excess calories with serious comorbidity and body mass index (BMI) of 35.0 to 35.9 in [facility])    Acquired hypothyroidism    H/O deep venous thrombosis    Type 2 diabetes mellitus without complication, without long-term current use of insulin (HCC)    Stage 3 chronic kidney disease (HCC)    Chronic renal disease, stage III ([address] [124181]    Pneumonia       Plan:  Check ultrasound of the abdomen to look for ascites  May need paracentesis  Patient is on warfarin  Continue current medical management with cefepime and doxycycline  Monitor labs including INR  Questions answered to their Clau Munroe MD  11:55 AM  8/7/2022

## 2022-08-07 NOTE — PROGRESS NOTES
Department of Internal Medicine  Nephrology Progress Note    Events reviewed. SUBJECTIVE:  We are following Mr. Malik Knutson for JUAN on CKD. He denies any complaints.     PHYSICAL EXAM:      Vitals:    VITALS:  BP (!) 176/80   Pulse 71   Temp 97.9 °F (36.6 °C) (Oral)   Resp 18   Ht 6' 3\" (1.905 m)   Wt 261 lb 4.8 oz (118.5 kg)   SpO2 100%   BMI 32.66 kg/m²   24HR INTAKE/OUTPUT:    Intake/Output Summary (Last 24 hours) at 8/7/2022 1243  Last data filed at 8/7/2022 0605  Gross per 24 hour   Intake --   Output 1350 ml   Net -1350 ml         Constitutional:  Alert and oriented  HEENT:  Normocephalic, PERRL  Respiratory:  CTA bilaterally  Cardiovascular/Edema:  RRR, S1,S2  Gastrointestinal:  Soft, rounded, nontender, nondistended  Neurologic:  Nonfocal, GREENBERG  Skin:  Warm, dry, wound to right heel  Other:  Trace edema BLE    Scheduled Meds:   cefepime  1,000 mg IntraVENous Q12H    doxycycline hyclate  100 mg Oral 2 times per day    collagenase   Topical Daily    magnesium oxide  400 mg Oral Every Other Day    warfarin  8 mg Oral Daily    gabapentin  300 mg Oral Daily    gabapentin  300 mg Oral Nightly    atorvastatin  20 mg Oral Nightly    metoprolol tartrate  25 mg Oral BID    levothyroxine  50 mcg Oral Daily    ascorbic acid  500 mg Oral Daily    [START ON 8/8/2022] vitamin D  50,000 Units Oral Weekly    pantoprazole  40 mg Oral QAM AC     Continuous Infusions:      PRN Meds:.oxyCODONE-acetaminophen, acetaminophen, ipratropium-albuterol, guaiFENesin-dextromethorphan, docusate sodium    DATA:    CBC:   Lab Results   Component Value Date/Time    WBC 8.6 08/07/2022 03:40 AM    RBC 3.23 08/07/2022 03:40 AM    HGB 9.4 08/07/2022 03:40 AM    HCT 30.5 08/07/2022 03:40 AM    MCV 94.4 08/07/2022 03:40 AM    MCH 29.1 08/07/2022 03:40 AM    MCHC 30.8 08/07/2022 03:40 AM    RDW 13.6 08/07/2022 03:40 AM     08/07/2022 03:40 AM    MPV 10.1 08/07/2022 03:40 AM     CMP:    Lab Results   Component Value Date/Time    NA 145 08/07/2022 03:40 AM    K 3.3 08/07/2022 03:40 AM    K 4.9 08/01/2022 01:21 PM     08/07/2022 03:40 AM    CO2 24 08/07/2022 03:40 AM    BUN 41 08/07/2022 03:40 AM    CREATININE 2.3 08/07/2022 03:40 AM    GFRAA 33 08/07/2022 03:40 AM    LABGLOM 28 08/07/2022 03:40 AM    GLUCOSE 180 08/07/2022 03:40 AM    GLUCOSE 297 01/04/2012 03:00 AM    PROT 7.2 08/01/2022 01:21 PM    LABALBU 3.5 08/01/2022 01:21 PM    LABALBU 3.4 01/04/2012 03:00 AM    CALCIUM 9.3 08/07/2022 03:40 AM    BILITOT 0.2 08/01/2022 01:21 PM    ALKPHOS 93 08/01/2022 01:21 PM    AST 14 08/01/2022 01:21 PM    ALT 9 08/01/2022 01:21 PM     Magnesium:    Lab Results   Component Value Date/Time    MG 2.0 08/02/2022 03:10 PM     Phosphorus:    Lab Results   Component Value Date/Time    PHOS 4.7 08/02/2022 03:10 PM     Radiology Review:      CT head without IV contrast 8/1/22   1. There is no acute intracranial hemorrhage   2. Old left occipital lobe infarct. Kidney ultrasound complete 8/3/22   Normal appearing right kidney. Left kidney is surgically absent. No   evidence of right renal obstruction, mass or calculus. CXR 8/1/22   Bilateral lower lobe atelectasis/pneumonia, left worse than right. Small left pleural effusion. Stable enlargement of the cardiac silhouette. BRIEF SUMMARY OF INITIAL CONSULT:    Briefly, Mr. Malik Knutson  is a 67-year-old male with a PMH of CKD stage IIIb, without proteinuria, baseline creatinine 2.0-2.1 mg/dL, secondary to nephrosclerosis, kidney cancer, s/p total left nephrectomy on 7/5/22 at [facility], hypertension, type II diabetes mellitus, HFpEF 55% with stage II DD, factor V Leyden deficiency, hyperlipidemia, sick sinus syndrome s/p pacemaker placement, CVA, hypothyroidism, PAF on chronic anticoagulation with warfarin, who was admitted on August 1, 2022 after presenting to the ER after sustaining a fall. He did lose consciousness and hit his head at that time.  A CT of the head was obtained which was negative. His labs upon arrival to ER were significant for BUN 46 mg/dL and creatinine 2.8 mg/dL. His renal function continued to worsen yesterday afternoon with a BUN of 50 mg/dL and a creatinine of 3.0 mg/dL, which is the reason for this consultation. He received a 250 mL normal saline bolus in ER. He did receive Lasix 20 mg PO last night as well as lisinopril. His home medications include Lasix. He was recently taken off of lisinopril by Dr. Daysi Caputo and his Lasix was increased to 40 mg PO BID. IMPRESSION/RECOMMENDATIONS:      JUAN stage I on CKD, Creatinine is now 2.3     CKD stage IIIb, without proteinuria, baseline creatinine 2.0-2.1 mg/dL, secondary to nephrosclerosis  Acidemia (venous pH 7.30), with HAGMA secondary to uremia. Improved with bicarbonate drip. Left kidney cancer, s/p total left nephrectomy 7/5/22 at [facility]  HTN, no longer hypotensive, on metoprolol  HFpEF 55% with stage II DD, pro-, Lasix on hold  -------------------------------------------------  S/p fall  Bilateral pneumonia, on doxycycline and cefepime  PAF, on metoprolol and warfarin  Sick sinus syndrome, s/p pacemaker placement   History of CVA  Microcytic anemia, with iron deficiency, iron 31, iron saturation 18%, and ferritin 178. IV iron contraindicated due to active infection. Dysphagia  Nutrition, honey thick liquids    Plan:      Strict I&Os  Continue to monitor renal function, BMP 4 PM  Continue to monitor H&H  Continue to monitor bicarbonate level. Increase K in diet. Renal function back to baseline.   Better glucose control       Electronically signed by Hi Gonsalez MD on 8/7/2022 at 12:43 PM

## 2022-08-07 NOTE — PROGRESS NOTES
Noticed pressure injury on patient's penis just under his urinary catheter. His catheter was kind of pulling so I removed the securing device-gave it more slack and added a new securing device. Area was reddened/indented and patient c/o of pain & soreness there.

## 2022-08-08 VITALS
TEMPERATURE: 98.8 F | WEIGHT: 250 LBS | HEART RATE: 64 BPM | RESPIRATION RATE: 16 BRPM | HEIGHT: 75 IN | OXYGEN SATURATION: 100 % | BODY MASS INDEX: 31.08 KG/M2 | DIASTOLIC BLOOD PRESSURE: 60 MMHG | SYSTOLIC BLOOD PRESSURE: 141 MMHG

## 2022-08-08 LAB
ANION GAP SERPL CALCULATED.3IONS-SCNC: 12 MMOL/L (ref 7–16)
BASOPHILS ABSOLUTE: 0.04 E9/L (ref 0–0.2)
BASOPHILS RELATIVE PERCENT: 0.4 % (ref 0–2)
BUN BLDV-MCNC: 35 MG/DL (ref 6–23)
CALCIUM SERPL-MCNC: 9 MG/DL (ref 8.6–10.2)
CHLORIDE BLD-SCNC: 109 MMOL/L (ref 98–107)
CO2: 21 MMOL/L (ref 22–29)
CREAT SERPL-MCNC: 2 MG/DL (ref 0.7–1.2)
EOSINOPHILS ABSOLUTE: 0.34 E9/L (ref 0.05–0.5)
EOSINOPHILS RELATIVE PERCENT: 3.7 % (ref 0–6)
GFR AFRICAN AMERICAN: 39
GFR NON-AFRICAN AMERICAN: 32 ML/MIN/1.73
GLUCOSE BLD-MCNC: 200 MG/DL (ref 74–99)
HCT VFR BLD CALC: 29.3 % (ref 37–54)
HEMOGLOBIN: 9.2 G/DL (ref 12.5–16.5)
IMMATURE GRANULOCYTES #: 0.04 E9/L
IMMATURE GRANULOCYTES %: 0.4 % (ref 0–5)
LYMPHOCYTES ABSOLUTE: 1.21 E9/L (ref 1.5–4)
LYMPHOCYTES RELATIVE PERCENT: 13.3 % (ref 20–42)
MCH RBC QN AUTO: 29.6 PG (ref 26–35)
MCHC RBC AUTO-ENTMCNC: 31.4 % (ref 32–34.5)
MCV RBC AUTO: 94.2 FL (ref 80–99.9)
METER GLUCOSE: 191 MG/DL (ref 74–99)
METER GLUCOSE: 206 MG/DL (ref 74–99)
MONOCYTES ABSOLUTE: 0.64 E9/L (ref 0.1–0.95)
MONOCYTES RELATIVE PERCENT: 7 % (ref 2–12)
NEUTROPHILS ABSOLUTE: 6.86 E9/L (ref 1.8–7.3)
NEUTROPHILS RELATIVE PERCENT: 75.2 % (ref 43–80)
PDW BLD-RTO: 13.7 FL (ref 11.5–15)
PLATELET # BLD: 257 E9/L (ref 130–450)
PMV BLD AUTO: 10.1 FL (ref 7–12)
POTASSIUM SERPL-SCNC: 3.2 MMOL/L (ref 3.5–5)
RBC # BLD: 3.11 E12/L (ref 3.8–5.8)
SARS-COV-2, NAAT: NOT DETECTED
SODIUM BLD-SCNC: 142 MMOL/L (ref 132–146)
WBC # BLD: 9.1 E9/L (ref 4.5–11.5)

## 2022-08-08 PROCEDURE — 99239 HOSP IP/OBS DSCHRG MGMT >30: CPT | Performed by: INTERNAL MEDICINE

## 2022-08-08 PROCEDURE — 85025 COMPLETE CBC W/AUTO DIFF WBC: CPT

## 2022-08-08 PROCEDURE — 80048 BASIC METABOLIC PNL TOTAL CA: CPT

## 2022-08-08 PROCEDURE — 6360000002 HC RX W HCPCS: Performed by: INTERNAL MEDICINE

## 2022-08-08 PROCEDURE — 36415 COLL VENOUS BLD VENIPUNCTURE: CPT

## 2022-08-08 PROCEDURE — 82962 GLUCOSE BLOOD TEST: CPT

## 2022-08-08 PROCEDURE — 87635 SARS-COV-2 COVID-19 AMP PRB: CPT

## 2022-08-08 PROCEDURE — 97530 THERAPEUTIC ACTIVITIES: CPT

## 2022-08-08 PROCEDURE — 2580000003 HC RX 258: Performed by: INTERNAL MEDICINE

## 2022-08-08 PROCEDURE — 6370000000 HC RX 637 (ALT 250 FOR IP): Performed by: INTERNAL MEDICINE

## 2022-08-08 PROCEDURE — 92526 ORAL FUNCTION THERAPY: CPT | Performed by: SPEECH-LANGUAGE PATHOLOGIST

## 2022-08-08 RX ORDER — AMOXICILLIN AND CLAVULANATE POTASSIUM 875; 125 MG/1; MG/1
1 TABLET, FILM COATED ORAL EVERY 12 HOURS SCHEDULED
Status: DISCONTINUED | OUTPATIENT
Start: 2022-08-08 | End: 2022-08-08 | Stop reason: HOSPADM

## 2022-08-08 RX ORDER — AMOXICILLIN AND CLAVULANATE POTASSIUM 875; 125 MG/1; MG/1
1 TABLET, FILM COATED ORAL EVERY 12 HOURS SCHEDULED
Qty: 10 TABLET | Refills: 0 | DISCHARGE
Start: 2022-08-08 | End: 2022-08-13

## 2022-08-08 RX ORDER — ERGOCALCIFEROL 1.25 MG/1
50000 CAPSULE ORAL WEEKLY
Qty: 5 CAPSULE | DISCHARGE
Start: 2022-08-15 | End: 2022-08-15

## 2022-08-08 RX ORDER — ATORVASTATIN CALCIUM 20 MG/1
20 TABLET, FILM COATED ORAL NIGHTLY
Qty: 30 TABLET | Refills: 3 | DISCHARGE
Start: 2022-08-08

## 2022-08-08 RX ORDER — GABAPENTIN 300 MG/1
300 CAPSULE ORAL DAILY
Qty: 90 CAPSULE | Refills: 3 | DISCHARGE
Start: 2022-08-09 | End: 2022-08-15

## 2022-08-08 RX ORDER — GUAIFENESIN/DEXTROMETHORPHAN 100-10MG/5
5 SYRUP ORAL EVERY 6 HOURS PRN
Qty: 120 ML | DISCHARGE
Start: 2022-08-08 | End: 2022-08-18

## 2022-08-08 RX ORDER — ASCORBIC ACID 500 MG
500 TABLET ORAL DAILY
Qty: 30 TABLET | Refills: 3 | Status: ON HOLD | OUTPATIENT
Start: 2022-08-09 | End: 2022-10-07 | Stop reason: HOSPADM

## 2022-08-08 RX ORDER — POTASSIUM CHLORIDE 20 MEQ/1
40 TABLET, EXTENDED RELEASE ORAL ONCE
Qty: 60 TABLET | Refills: 3 | Status: SHIPPED | OUTPATIENT
Start: 2022-08-08 | End: 2022-09-21 | Stop reason: ALTCHOICE

## 2022-08-08 RX ORDER — GABAPENTIN 300 MG/1
300 CAPSULE ORAL NIGHTLY
Qty: 90 CAPSULE | Refills: 3 | DISCHARGE
Start: 2022-08-08 | End: 2022-08-15

## 2022-08-08 RX ORDER — WARFARIN SODIUM 5 MG/1
8 TABLET ORAL DAILY
DISCHARGE
Start: 2022-08-08 | End: 2022-09-21 | Stop reason: ALTCHOICE

## 2022-08-08 RX ORDER — LANOLIN ALCOHOL/MO/W.PET/CERES
400 CREAM (GRAM) TOPICAL EVERY OTHER DAY
Qty: 30 TABLET | Status: ON HOLD | DISCHARGE
Start: 2022-08-09 | End: 2022-10-07 | Stop reason: HOSPADM

## 2022-08-08 RX ORDER — LEVOTHYROXINE SODIUM 0.05 MG/1
50 TABLET ORAL DAILY
Qty: 30 TABLET | Refills: 3 | Status: SHIPPED | OUTPATIENT
Start: 2022-08-09

## 2022-08-08 RX ORDER — POTASSIUM CHLORIDE 20 MEQ/1
40 TABLET, EXTENDED RELEASE ORAL ONCE
Status: COMPLETED | OUTPATIENT
Start: 2022-08-08 | End: 2022-08-08

## 2022-08-08 RX ADMIN — OXYCODONE HYDROCHLORIDE AND ACETAMINOPHEN 500 MG: 500 TABLET ORAL at 09:23

## 2022-08-08 RX ADMIN — ERGOCALCIFEROL 50000 UNITS: 1.25 CAPSULE ORAL at 09:22

## 2022-08-08 RX ADMIN — COLLAGENASE SANTYL: 250 OINTMENT TOPICAL at 03:32

## 2022-08-08 RX ADMIN — METOPROLOL TARTRATE 25 MG: 25 TABLET, FILM COATED ORAL at 09:23

## 2022-08-08 RX ADMIN — PANTOPRAZOLE SODIUM 40 MG: 40 TABLET, DELAYED RELEASE ORAL at 05:54

## 2022-08-08 RX ADMIN — POTASSIUM CHLORIDE 40 MEQ: 20 TABLET, EXTENDED RELEASE ORAL at 10:00

## 2022-08-08 RX ADMIN — LEVOTHYROXINE SODIUM 50 MCG: 0.05 TABLET ORAL at 05:54

## 2022-08-08 RX ADMIN — DOXYCYCLINE HYCLATE 100 MG: 100 CAPSULE ORAL at 09:22

## 2022-08-08 RX ADMIN — CEFEPIME 1000 MG: 1 INJECTION, POWDER, FOR SOLUTION INTRAMUSCULAR; INTRAVENOUS at 03:13

## 2022-08-08 RX ADMIN — GABAPENTIN 300 MG: 300 CAPSULE ORAL at 09:22

## 2022-08-08 ASSESSMENT — PAIN SCALES - GENERAL: PAINLEVEL_OUTOF10: 0

## 2022-08-08 NOTE — PROGRESS NOTES
Department of Internal Medicine  Nephrology Progress Note    Events reviewed. SUBJECTIVE:  We are following MrBella Arias for JUAN on CKD. He denies any complaints. He is sitting up in the chair.      PHYSICAL EXAM:      Vitals:    VITALS:  BP (!) 148/66   Pulse 58   Temp 98.6 °F (37 °C) (Oral)   Resp 16   Ht 6' 3\" (1.905 m)   Wt 250 lb (113.4 kg)   SpO2 99%   BMI 31.25 kg/m²   24HR INTAKE/OUTPUT:    Intake/Output Summary (Last 24 hours) at 8/8/2022 1238  Last data filed at 8/8/2022 0601  Gross per 24 hour   Intake 140 ml   Output 600 ml   Net -460 ml       Constitutional:  Alert and oriented  HEENT:  Normocephalic, PERRL  Respiratory:  CTA bilaterally  Cardiovascular/Edema:  RRR, S1,S2  Gastrointestinal:  Soft, rounded, nontender, nondistended  Neurologic:  Nonfocal, GREENBERG  Skin:  Warm, dry, wound to right heel  Other:  Trace edema BLE    Scheduled Meds:   potassium chloride  40 mEq Oral Once    amoxicillin-clavulanate  1 tablet Oral 2 times per day    collagenase   Topical Daily    magnesium oxide  400 mg Oral Every Other Day    warfarin  8 mg Oral Daily    gabapentin  300 mg Oral Daily    gabapentin  300 mg Oral Nightly    atorvastatin  20 mg Oral Nightly    metoprolol tartrate  25 mg Oral BID    levothyroxine  50 mcg Oral Daily    ascorbic acid  500 mg Oral Daily    vitamin D  50,000 Units Oral Weekly    pantoprazole  40 mg Oral QAM AC     Continuous Infusions:      PRN Meds:.oxyCODONE-acetaminophen, acetaminophen, ipratropium-albuterol, guaiFENesin-dextromethorphan, docusate sodium    DATA:    CBC:   Lab Results   Component Value Date/Time    WBC 9.1 08/08/2022 04:10 AM    RBC 3.11 08/08/2022 04:10 AM    HGB 9.2 08/08/2022 04:10 AM    HCT 29.3 08/08/2022 04:10 AM    MCV 94.2 08/08/2022 04:10 AM    MCH 29.6 08/08/2022 04:10 AM    MCHC 31.4 08/08/2022 04:10 AM    RDW 13.7 08/08/2022 04:10 AM     08/08/2022 04:10 AM    MPV 10.1 08/08/2022 04:10 AM     CMP:    Lab Results   Component Value Date/Time  08/08/2022 04:10 AM    K 3.2 08/08/2022 04:10 AM    K 4.9 08/01/2022 01:21 PM     08/08/2022 04:10 AM    CO2 21 08/08/2022 04:10 AM    BUN 35 08/08/2022 04:10 AM    CREATININE 2.0 08/08/2022 04:10 AM    GFRAA 39 08/08/2022 04:10 AM    LABGLOM 32 08/08/2022 04:10 AM    GLUCOSE 200 08/08/2022 04:10 AM    GLUCOSE 297 01/04/2012 03:00 AM    PROT 7.2 08/01/2022 01:21 PM    LABALBU 3.5 08/01/2022 01:21 PM    LABALBU 3.4 01/04/2012 03:00 AM    CALCIUM 9.0 08/08/2022 04:10 AM    BILITOT 0.2 08/01/2022 01:21 PM    ALKPHOS 93 08/01/2022 01:21 PM    AST 14 08/01/2022 01:21 PM    ALT 9 08/01/2022 01:21 PM     Magnesium:    Lab Results   Component Value Date/Time    MG 2.0 08/02/2022 03:10 PM     Phosphorus:    Lab Results   Component Value Date/Time    PHOS 4.7 08/02/2022 03:10 PM     Radiology Review:      CT head without IV contrast 8/1/22   1. There is no acute intracranial hemorrhage   2. Old left occipital lobe infarct. Kidney ultrasound complete 8/3/22   Normal appearing right kidney. Left kidney is surgically absent. No   evidence of right renal obstruction, mass or calculus. CXR 8/1/22   Bilateral lower lobe atelectasis/pneumonia, left worse than right. Small left pleural effusion. Stable enlargement of the cardiac silhouette. BRIEF SUMMARY OF INITIAL CONSULT:    Briefly, Mr. Santiago Tijerina  is a 78-year-old male with a PMH of CKD stage IIIb, without proteinuria, baseline creatinine 2.0-2.1 mg/dL, secondary to nephrosclerosis, kidney cancer, s/p total left nephrectomy on 7/5/22 at [facility], hypertension, type II diabetes mellitus, HFpEF 55% with stage II DD, factor V Leyden deficiency, hyperlipidemia, sick sinus syndrome s/p pacemaker placement, CVA, hypothyroidism, PAF on chronic anticoagulation with warfarin, who was admitted on August 1, 2022 after presenting to the ER after sustaining a fall. He did lose consciousness and hit his head at that time.  A CT of the head was obtained which was negative. His labs upon arrival to ER were significant for BUN 46 mg/dL and creatinine 2.8 mg/dL. His renal function continued to worsen yesterday afternoon with a BUN of 50 mg/dL and a creatinine of 3.0 mg/dL, which is the reason for this consultation. He received a 250 mL normal saline bolus in ER. He did receive Lasix 20 mg PO last night as well as lisinopril. His home medications include Lasix. He was recently taken off of lisinopril by Dr. Jaja Damian and his Lasix was increased to 40 mg PO BID. IMPRESSION/RECOMMENDATIONS:      JUAN stage I on CKD, likely volume responsive pre-renal JUAN secondary to over diuresis in the setting of ACE inhibition with a component of ischemic ATN. FeUrea 11.67%. PVR 0. Kidney ultrasound with normal appearing right kidney. Renal function improved with IV fluid administration and is now at baseline. CKD stage IIIb, without proteinuria, baseline creatinine 2.0-2.1 mg/dL, secondary to nephrosclerosis  Hypokalemia, likely secondary to poor oral intake. To replace. Acidemia (venous pH 7.30), with HAGMA secondary to uremia. Improved with bicarbonate drip. Left kidney cancer, s/p total left nephrectomy 7/5/22 at [facility]  HTN, no longer hypotensive, on metoprolol  HFpEF 55% with stage II DD, pro-, Lasix on hold  -------------------------------------------------  S/p fall  Bilateral pneumonia, s/p doxycycline and cefepime. Now on Augmentin  PAF, on metoprolol and warfarin  Sick sinus syndrome, s/p pacemaker placement   History of CVA  Microcytic anemia, with iron deficiency, iron 31, iron saturation 18%, and ferritin 178. IV iron contraindicated due to active infection.    Dysphagia  Nutrition, honey thick liquids    Plan:      Replace potassium   Strict I&Os  Continue to monitor renal function  Continue to monitor H&H  Continue to monitor bicarbonate level  Better glucose control   OK to discharge from renal point of view  Follow-up in our office in 2 weeks  CMP in 1 week      Electronically signed by MELANIE Cifuentes CNP on 8/8/2022 at 12:38 PM

## 2022-08-08 NOTE — PROGRESS NOTES
Podiatry Progress Note  8/8/2022   Efrain Samuel       SUBJECTIVE: Emile Durbin is a 67 y.o. male seen bedside with family present for f/u of a right heel unstageable ulcer. Dressings changes a.m., patient denies any acute events overnight. Vascular outpatient consult set up with [facility] wound care center on 8/9/22 @ 0900 with Dr. Ortega Doyle. Denies any nausea, vomiting, diarrhea, fevers, chills, shortness of breath, calf pain, chest pain. Pt has no other pedal questions or complaints at this time. Past Medical History:   Diagnosis Date    Atrial fibrillation ([address]     Cancer ([address]     kidney    Diabetes mellitus ([address]     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[address]     Hypertension     Ischemic stroke ([address] 03/06/2018    Pacemaker     Paraplegia ([address]     Sinus node dysfunction ([address]     Stroke (cerebrum) [address] 02/27/2018    Thyroid disease         Past Surgical History:   Procedure Laterality Date    CARDIAC PACEMAKER PLACEMENT  12/19/2014    COLONOSCOPY      EYE SURGERY      KNEE ARTHROSCOPY  right knee    PACEMAKER PLACEMENT      WA COLONOSCOPY FLX DX W/COLLJ SPEC WHEN PFRMD N/A 3/20/2018    COLONOSCOPY DIAGNOSTIC performed by Solomon Roach MD at [address] EGD PERCUTANEOUS PLACEMENT GASTROSTOMY TUBE N/A 3/29/2018    PEG TUBE/PT. IN 5507 B performed by Solomon Roach MD at [address] EGD PERCUTANEOUS PLACEMENT GASTROSTOMY TUBE N/A 9/12/2018    EGD PEG TUBE PLACEMENT performed by Solomon Roach MD at [facility] ENDOSCOPY         Family History   Problem Relation Age of Onset    Heart Disease Mother     Stroke Father         Social History     Tobacco Use    Smoking status: Never    Smokeless tobacco: Never    Tobacco comments:     occassional cigar   Substance Use Topics    Alcohol use: No        Prior to Admission medications    Medication Sig Start Date End Date Taking? Authorizing Provider   gabapentin (NEURONTIN) 300 MG capsule Take 300 mg by mouth in the morning.    Yes Historical Provider, MD   ascorbic acid (VITAMIN C) 500 MG tablet Take 500 mg by mouth in the morning. Yes Historical Provider, MD   Dextromethorphan-guaiFENesin  MG/5ML SYRP Take 10 mLs by mouth every 6 hours as needed for Cough   Yes Historical Provider, MD   oxyCODONE (ROXICODONE) 5 MG immediate release tablet Take 5 mg by mouth every 6 hours as needed for Pain. Yes Historical Provider, MD   ergocalciferol (ERGOCALCIFEROL) 1.25 MG (19733 UT) capsule Take 50,000 Units by mouth once a week Takes every monday 8/1/22  Yes Historical Provider, MD   gabapentin (NEURONTIN) 300 MG capsule Take 300 mg by mouth nightly. 7/8/22  Yes Historical Provider, MD   acetaminophen (TYLENOL) 325 MG tablet Take 650 mg by mouth every 4 hours as needed    Historical Provider, MD   ascorbic acid (VITAMIN C) 500 MG tablet Take 500 mg by mouth in the morning and 500 mg at noon and 500 mg before bedtime. Historical Provider, MD   docusate sodium (COLACE) 100 MG capsule TAKE ONE CAPSULE BY MOUTH TWICE DAILY AS NEEDED FOR CONSTIPATION 7/8/22   Historical Provider, MD   glucose (GLUTOSE) 40 % GEL Take 15 g by mouth as needed    Historical Provider, MD   ipratropium-albuterol (DUONEB) 0.5-2.5 (3) MG/3ML SOLN nebulizer solution Inhale 3 mLs into the lungs every 4 hours as needed    Historical Provider, MD   pantoprazole (PROTONIX) 40 MG tablet Take 40 mg by mouth every morning (before breakfast)    Historical Provider, MD   polyethylene glycol (GLYCOLAX) 17 GM/SCOOP powder Take 17 g by mouth daily as needed    Historical Provider, MD   potassium chloride (KLOR-CON M) 20 MEQ extended release tablet TAKE ONE TABLET BY MOUTH EVERY DAY 5/11/22   Historical Provider, MD   dextromethorphan-guaiFENesin (ROBITUSSIN-DM)  MG/5ML syrup Take 10 mLs by mouth every 6 hours as needed    Historical Provider, MD   oxyCODONE HCl 5 MG TABA Take 1 tablet by mouth every 6 hours as needed.     Historical Provider, MD   nystatin-triamcinolone (MYCOLOG II) 844803-4.1 UNIT/GM-% cream APPLY TO AFFECTED AREA FOR 12 HOURS ON AND THEN 12 HOURS OFF. USE AS NEEDED  Patient not taking: No sig reported 12/20/21   Historical Provider, MD   amLODIPine (NORVASC) 2.5 MG tablet Take 1 tablet by mouth daily 8/20/21 4/22/22  Lesley Wang, DO   Magnesium Oxide 400 MG CAPS Take 1 capsule by mouth every other day 4/30/21   Lesley Wang, DO   potassium chloride (KLOR-CON M) 20 MEQ TBCR extended release tablet Take 1 tablet by mouth daily 3/31/21 4/22/22  Lesley Wang DO   metoprolol tartrate (LOPRESSOR) 25 MG tablet Take 1 tablet by mouth 2 times daily 2/19/21   Lesley Wang, DO   lisinopril (PRINIVIL;ZESTRIL) 10 MG tablet Take 1 tablet by mouth daily 2/19/21   Lesley Wang, DO   levothyroxine (SYNTHROID) 50 MCG tablet Take 1 tablet by mouth Daily 2/19/21   Lesley Wang, DO   metFORMIN (GLUCOPHAGE) 1000 MG tablet Take 1 tablet by mouth 2 times daily 2/19/21   Lesley Wang, DO   lovastatin (MEVACOR) 20 MG tablet Take 1 tablet by mouth nightly  Patient not taking: Reported on 8/1/2022 11/20/20 4/22/22  Lesley Wang DO   warfarin (COUMADIN) 5 MG tablet Take 1.5 tablets by mouth daily  Patient taking differently: Take 8 mg by mouth in the morning. 10/30/20   Lesley Wang DO   omeprazole (PRILOSEC) 20 MG delayed release capsule Take 1 capsule by mouth daily 5/22/20 4/22/22  Lesley Wang DO   furosemide (LASIX) 40 MG tablet Take 0.5 tablets by mouth daily 1/17/20   Lesley Wang DO   blood glucose test strips (EZ SMART BLOOD GLUCOSE TEST) strip 1 each by In Vitro route 2 times daily As needed. 2/11/19   Lesley Wang DO        Bee venom         OBJECTIVE:        Vitals:    08/08/22 0652   BP: (!) 148/66   Pulse: 58   Resp: 16   Temp: 98.6 °F (37 °C)   SpO2: 99%            EXAM:        Pt is AAOx3, NAD     Vascular Exam:  DP and PT pulses faintly palpable bilaterally. CFT brisk less than 3 secs all pedal digits bilaterally. Periwound erythema right heel.   No edema, no ecchymosis     Neuro Exam: Diminished tactile sensation bilaterally left worse than right. Dermatologic Exam: Well-hydrated skin with skin wrinkles noted  -Unstageable decubitus ulcer right heel dry stable eschar, mild periwound granular tissue. No malodor, no crepitus or fluctuance, no undermining tunneling, no proximal streaking, no drainage. Improving granularity to periwound     MSK: Muscle strength 5/5 Bilateral.  Reducible hallux malleus left and hammertoes digits 2 through 5 bilateral.  Bilateral ankle gastrocsoleus equinus. Negative clonus, negative Babinski sign.   Soft compressible posterior muscle apartments bilaterally    Current Facility-Administered Medications   Medication Dose Route Frequency Provider Last Rate Last Admin    potassium chloride (KLOR-CON M) extended release tablet 40 mEq  40 mEq Oral Once [employer], MD        amoxicillin-clavulanate (AUGMENTIN) 875-125 MG per tablet 1 tablet  1 tablet Oral 2 times per day Eddie Hensley MD        collagenase ointment   Topical Daily Jay Recio DPM   Given at 08/08/22 0332    oxyCODONE-acetaminophen (PERCOCET) 5-325 MG per tablet 1 tablet  1 tablet Oral Q6H PRN [employer], MD        acetaminophen (TYLENOL) tablet 650 mg  650 mg Oral Q4H PRN [employer], MD   650 mg at 08/06/22 2010    magnesium oxide (MAG-OX) tablet 400 mg  400 mg Oral Every Other Day [employer], MD   400 mg at 08/07/22 0750    ipratropium-albuterol (DUONEB) nebulizer solution 1 ampule  1 ampule Inhalation Q4H PRN [employer], MD        warfarin (COUMADIN) tablet 8 mg  8 mg Oral Daily [employer], MD   8 mg at 08/07/22 1612    gabapentin (NEURONTIN) capsule 300 mg  300 mg Oral Daily [employer], MD   300 mg at 08/08/22 5796    gabapentin (NEURONTIN) capsule 300 mg  300 mg Oral Nightly [employer], MD   300 mg at 08/07/22 2011    atorvastatin (LIPITOR) tablet 20 mg  20 mg Oral Nightly [employer], MD   20 mg at 08/07/22 2011 metoprolol tartrate (LOPRESSOR) tablet 25 mg  25 mg Oral BID Lety Bran MD   25 mg at 08/08/22 9368    guaiFENesin-dextromethorphan (ROBITUSSIN DM) 100-10 MG/5ML syrup 5 mL  5 mL Oral Q6H PRN Lety Bran MD        docusate sodium (COLACE) capsule 100 mg  100 mg Oral BID PRN Lety Bran MD        levothyroxine (SYNTHROID) tablet 50 mcg  50 mcg Oral Daily Lety Bran MD   50 mcg at 08/08/22 0692    ascorbic acid (VITAMIN C) tablet 500 mg  500 mg Oral Daily Lety Bran MD   500 mg at 08/08/22 2099    vitamin D (ERGOCALCIFEROL) capsule 50,000 Units  50,000 Units Oral Weekly Lety Bran MD   50,000 Units at 08/08/22 9092    pantoprazole (PROTONIX) tablet 40 mg  40 mg Oral QAM AC Lety Bran MD   40 mg at 08/08/22 0554        Lab Results   Component Value Date    WBC 9.1 08/08/2022    HCT 29.3 (L) 08/08/2022    HGB 9.2 (L) 08/08/2022     08/08/2022     08/08/2022    K 3.2 (L) 08/08/2022     (H) 08/08/2022    CO2 21 (L) 08/08/2022    BUN 35 (H) 08/08/2022    CREATININE 2.0 (H) 08/08/2022    GLUCOSE 200 (H) 08/08/2022    CRP 7.2 (H) 08/01/2022       ASSESSMENT:  -Unstageable diabetic decubitus ulcer right heel, POA, not acutely infected  -Peripheral arterial disease  -Non-insulin-dependent type 2 diabetes mellitus with peripheral neuropathy  -Stroke x2 with left sided sensory deficit  -Hallux malleus left, hammertoe digits 2 through 5 bilaterally       PLAN:  - Examined and evaluated  - All labs, imaging, and charts reviewed   -WBC: 9.1  - Wound cultures right heel obtained 8/1/2022: GNR, Staph aureus  -ABX: Oral antibiotics Augmentin, managed by IM  - X-ray right foot/ankle 8/1/22: Negative for osteomyelitis, or soft tissue gas, vascular Calcifications noted. - Arterial studies 8/1/2022: Poor.  - Discussed with vascular service.  Outpatient consult set up with wound care center with Dr Tam Gutiérrez on [facility], [address] [place] [address] on 8/9/22 @ 0900  -Dressings: JUANA Beaulieu.  QOD changes. Changed 8/8/22. Next change 8/10/2022 if remains in house  -Heel protectors bilateral lower extremities at all times  -No indications for acute surgical invention from podiatry perspective. We will continue with local wound care going forward.   -We will continue to follow     D/W: Dr. Patty Nyhan    Thank you for involving podiatry in this patients care. Please do not hesitate to call with any questions or concerns.        Reina Alamo Podiatry PGY3  8/8/2022   1:14 PM

## 2022-08-08 NOTE — PROGRESS NOTES
Admit Date: 8/1/2022    Subjective:     Week end event reviewed   Awake comfortable NAD    Objective:     Patient Vitals for the past 8 hrs:   BP Temp Temp src Pulse Resp SpO2 Weight   08/08/22 0437 -- -- -- -- -- -- 250 lb (113.4 kg)   08/08/22 0330 (!) 144/70 98.1 °F (36.7 °C) Oral 88 14 98 % --     I/O last 3 completed shifts:  In: -   Out: 1350 [[id number]]  I/O this shift:  In: 140 [P.O.:140]  Out: 600 [Urine:600]    HEENT: Normal  NECK: Thyroid normal. No carotid bruit. No lymphphadenopathy. CVS: RRR  RS: Clear. No wheeze. No rhonchi. ABD: Soft. Non tender. No mass. Normal BS.obese   EXT: trace edema. Non tender. Pulses present.    NEURO: no focal deficit       Scheduled Meds:   cefepime  1,000 mg IntraVENous Q12H    doxycycline hyclate  100 mg Oral 2 times per day    collagenase   Topical Daily    magnesium oxide  400 mg Oral Every Other Day    warfarin  8 mg Oral Daily    gabapentin  300 mg Oral Daily    gabapentin  300 mg Oral Nightly    atorvastatin  20 mg Oral Nightly    metoprolol tartrate  25 mg Oral BID    levothyroxine  50 mcg Oral Daily    ascorbic acid  500 mg Oral Daily    vitamin D  50,000 Units Oral Weekly    pantoprazole  40 mg Oral QAM AC     Continuous Infusions:    CBC with Differential:    Lab Results   Component Value Date/Time    WBC 9.1 08/08/2022 04:10 AM    RBC 3.11 08/08/2022 04:10 AM    HGB 9.2 08/08/2022 04:10 AM    HCT 29.3 08/08/2022 04:10 AM     08/08/2022 04:10 AM    MCV 94.2 08/08/2022 04:10 AM    MCH 29.6 08/08/2022 04:10 AM    MCHC 31.4 08/08/2022 04:10 AM    RDW 13.7 08/08/2022 04:10 AM    SEGSPCT 80 01/04/2012 03:00 AM    BANDSPCT 1 11/27/2014 04:50 AM    LYMPHOPCT 13.3 08/08/2022 04:10 AM    MONOPCT 7.0 08/08/2022 04:10 AM    MYELOPCT 0.9 02/28/2018 05:21 AM    BASOPCT 0.4 08/08/2022 04:10 AM    MONOSABS 0.64 08/08/2022 04:10 AM    LYMPHSABS 1.21 08/08/2022 04:10 AM    EOSABS 0.34 08/08/2022 04:10 AM    BASOSABS 0.04 08/08/2022 04:10 AM     CMP:    Lab Results Component Value Date/Time     08/08/2022 04:10 AM    K 3.2 08/08/2022 04:10 AM    K 4.9 08/01/2022 01:21 PM     08/08/2022 04:10 AM    CO2 21 08/08/2022 04:10 AM    BUN 35 08/08/2022 04:10 AM    CREATININE 2.0 08/08/2022 04:10 AM    GFRAA 39 08/08/2022 04:10 AM    LABGLOM 32 08/08/2022 04:10 AM    PROT 7.2 08/01/2022 01:21 PM    LABALBU 3.5 08/01/2022 01:21 PM    LABALBU 3.4 01/04/2012 03:00 AM    CALCIUM 9.0 08/08/2022 04:10 AM    BILITOT 0.2 08/01/2022 01:21 PM    ALKPHOS 93 08/01/2022 01:21 PM    AST 14 08/01/2022 01:21 PM    ALT 9 08/01/2022 01:21 PM     PT/INR:    Lab Results   Component Value Date/Time    PROTIME 26.8 08/04/2022 04:11 AM    PROTIME 12.2 12/30/2011 10:20 PM    INR 2.5 08/04/2022 04:11 AM     US  noted     Assessment:     Principal Problem:   Bilateral pneumonia  JUAN on CKD stage III  Hypokalemia   S/P left nephrectomy for CA  Old CVA  HTN  A.Fib.   Fall  DM  Decubitus ulcer right heal   PVD      Plan:   K+ supplement ,switch to oral ATB ,discharge planing

## 2022-08-08 NOTE — PROGRESS NOTES
Occupational Therapy  OT BEDSIDE TREATMENT NOTE      Date:2022  Patient Name: Raul Martin  MRN: [id number]  : 1945  Room: [address] [place], [place] [address]     Evaluating OT: Kilo Price OTR/L   [id number]       Referring Joey Galvan MD    Specific Provider Orders/Date:OT eval and treat 2022       Diagnosis:  Syncope and collapse [R55]  Pneumonia [J18.9]  JUAN (acute kidney injury) ([facility] [address] [N17.9]  Injury of head, initial encounter [F48.72PR]  Fall, initial encounter [W19. XXXA]  Pneumonia of both lower lobes due to infectious organism [J18.9]  Ulcer of right heel and midfoot, unspecified ulcer stage ([facility] [address] [L97.419]     Pertinent Medical History: A fib, ischemic stroke, pacemaker    Precautions:  Fall Risk, alarm , right heel unstageable ulcer      Assessment of current deficits   [x] Functional mobility            [x]ADLs           [x] Strength                  []Cognition   [x] Functional transfers          [x] IADLs         [x] Safety Awareness   [x]Endurance   [] Fine Coordination                         [x] Balance      [] Vision/perception   []Sensation      []Gross Motor Coordination             [] ROM           [] Delirium                   [] Motor Control     OT PLAN OF CARE   OT POC based on physician orders, patient diagnosis and results of clinical assessment     Frequency/Duration  2-4 days/wk for 2 weeks PRN  Specific OT Treatment Interventions to include:   ADL retraining/adapted techniques and AE recommendations to increase functional independence within precautions                    Energy conservation techniques to improve tolerance for selfcare routine  Functional transfer/mobility training/DME recommendations for increased independence, safety and fall prevention         Patient/family education to increase safety and functional independence             Environmental modifications for safe mobility and completion of ADLs                             Therapeutic activity to improve functional performance during ADLs. Therapeutic exercise to improve tolerance and functional strength for ADLs   Balance retraining/tolerance tasks for facilitation of postural control with dynamic challenges during ADLs . Positioning to improve functional independence        Recommended Adaptive Equipment: continue to assess      SOCIAL:  patient from [facility]. Sister reports- patient primarily has been using w/c at the [facility] for mobility. Was using walker with therapy and assist.    Prior to July - patient lived alone, 1 story, ambulated with walker      Pain Level: Pt did not report pain   Cognition: Awake and alert. Cues for safety judgement/awareness. Functional Assessment:  AM-PAC Daily Activity Raw Score: 15/24    Initial Eval Status  Date: 8/2/22 Treatment Status  Date: 8/8/22 STGs = LTGs  Time frame: 10-14 days   Feeding Set-up   Independent    Grooming Min A,seated   Supervision    UB Dressing Min A Min  A seated. Supervision    LB Dressing Dependent  Total assist with  donning L socks and R post op shoe Max A  Mod A   Bathing Max A   Mod A    Toileting Dependent Incontinent of bowel when standing. Dependent for toilet hygiene. Catheter. Mod A    Bed Mobility  Max A x2  Supine <> sit Sit to supine min A for LE's. Mod a    Functional Transfers Max A x2  Partial Sit- stand from bed  R lateral lean Sit to stand from chair mod A x 2. Mod A    Functional Mobility NT  Patient  unable to fully stand Mod A pivot chair to bed surface. Mod A of another for walker management. Mod A with good tolerance   Balance Sitting:    Static:  CGA- EOB     Dynamic:Max A   Standing: Max A x2- partial stand Sitting balance SBA  Standing balance mod A while receiving assist with ADL. Pt using walker for support during static standing.     SBA/supervision -standing  Mod A-standing    Activity Tolerance No SOB  Fatigued and overall decrease strength and endurance poor + Good  with ADL activity         UE ROM/strength UE AROM throughout UES  Fair+ strength  fair use of UE's for support while standing with walker. Tolerate UE therapeutic activity/exercise to increase strength for ADL/xfer activity      Comments:  pt sitting in the chair and reporting fatigue while seated. Requesting to return to the bed. Instructed with safety for transfers including hand and foot placement. Pt returned to bed and heel protector boot applied to R foot. Positioning completed. Education/treatment:  ADL retraining with facilitation of movement to increase self care skills. Therapeutic activity to address balance and endurance for ADL and transfers. Pt education of walker safety and transfer safety. Pt has made  progress towards set goals.        Time In: 1:38  Time Out: 1:55     Min Units   Therapeutic Ex 48045     Therapeutic Activities [id number]   ADL/Self Care 13930 7    Orthotic Management 19754     Neuro Re-Ed 56534     Non-Billable Time     TOTAL TIMED TREATMENT [address] [facility] ABBIE/L [id number]

## 2022-08-08 NOTE — DISCHARGE INSTR - DIET

## 2022-08-08 NOTE — PROGRESS NOTES
Transport arranged with [facility] for return to [facility] with ETA 5PM.

## 2022-08-09 LAB
ALBUMIN SERPL-MCNC: 2.9 G/DL (ref 3.5–5.2)
ALP BLD-CCNC: 88 U/L (ref 40–129)
ALT SERPL-CCNC: 5 U/L (ref 0–40)
ANION GAP SERPL CALCULATED.3IONS-SCNC: 12 MMOL/L (ref 7–16)
AST SERPL-CCNC: 9 U/L (ref 0–39)
BILIRUB SERPL-MCNC: <0.2 MG/DL (ref 0–1.2)
BUN BLDV-MCNC: 34 MG/DL (ref 6–23)
CALCIUM SERPL-MCNC: 8.8 MG/DL (ref 8.6–10.2)
CHLORIDE BLD-SCNC: 107 MMOL/L (ref 98–107)
CO2: 24 MMOL/L (ref 22–29)
CREAT SERPL-MCNC: 2 MG/DL (ref 0.7–1.2)
GFR AFRICAN AMERICAN: 39
GFR NON-AFRICAN AMERICAN: 32 ML/MIN/1.73
GLUCOSE BLD-MCNC: 163 MG/DL (ref 74–99)
HBA1C MFR BLD: 7.3 % (ref 4–5.6)
HCT VFR BLD CALC: 29.8 % (ref 37–54)
HEMOGLOBIN: 9.3 G/DL (ref 12.5–16.5)
INR BLD: 3.4
MCH RBC QN AUTO: 29.3 PG (ref 26–35)
MCHC RBC AUTO-ENTMCNC: 31.2 % (ref 32–34.5)
MCV RBC AUTO: 94 FL (ref 80–99.9)
PDW BLD-RTO: 13.4 FL (ref 11.5–15)
PLATELET # BLD: 249 E9/L (ref 130–450)
PMV BLD AUTO: 10.7 FL (ref 7–12)
POTASSIUM SERPL-SCNC: 3.1 MMOL/L (ref 3.5–5)
PROTHROMBIN TIME: 36.9 SEC (ref 9.3–12.4)
RBC # BLD: 3.17 E12/L (ref 3.8–5.8)
SODIUM BLD-SCNC: 143 MMOL/L (ref 132–146)
TOTAL PROTEIN: 6.1 G/DL (ref 6.4–8.3)
WBC # BLD: 8.3 E9/L (ref 4.5–11.5)

## 2022-08-09 NOTE — DISCHARGE SUMMARY
Discharge Summary    Date: 8/9/2022  Patient Name: Jennifer Nicolas    YOB: 1945     Age: 67 y.o. Admit Date: 8/1/2022  Discharge Date: 8/8/2022  Discharge Condition: [address]    Admission Diagnosis  Syncope and collapse [R55]; Pneumonia [J18.9]; JUAN (acute kidney injury) ([facility] [address] [N17.9]; Injury of head, initial encounter [S09.90XA]; Fall, initial encounter [W19. [place]; Pneumonia of both lower lobes due to infectious organism [J18.9]; Ulcer of right heel and midfoot, unspecified ulcer stage [facility]) [L97.419]      Discharge Diagnosis  Principal Problem:    Pneumonia  Resolved Problems:    * No resolved hospital problems. [facility] AND [facility] Stay  Narrative of Hospital Course:  Admitted from ER after a fall noted to have pneumonia was in JUAN on CKD as well as right heal ulcer treated with IV ATB ,fluid seen by renal and podiatry ,continued medical management slowly improved stabilized transferred back to [facility]     Consultants:  IP CONSULT TO INTERNAL MEDICINE  IP CONSULT TO PODIATRY  IP CONSULT TO NEPHROLOGY  IP CONSULT TO IV TEAM  IP CONSULT TO DIETITIAN  IP CONSULT TO IV TEAM  IP CONSULT TO IV TEAM    Surgeries/procedures Performed:      Treatments:            Discharge Plan/Disposition:  To [facility]/Incidental Findings Requiring Follow Up:    Patient Instructions:    Diet: Regular Diet    Activity:Activity as Tolerated  For number of days (if applicable): Other Instructions:    Provider Follow-Up:   No follow-ups on file. Significant Diagnostic Studies:    Recent Labs:  Admission on 08/01/2022, Discharged on 08/08/2022  No results displayed because visit has over 200 results. ------------    Radiology last 7 days:  XR CHEST (2 VW)    Result Date: 8/5/2022  Persistent bilateral lower lobe atelectasis/pneumonia left greater than right. US ABDOMEN LIMITED Specify organ? LIVER    Result Date: 8/7/2022  No significant ascites is seen.      US RETROPERITONEAL COMPLETE    Result Date: 8/3/2022  Normal appearing right kidney. Left kidney is surgically absent. No evidence of right renal obstruction, mass or calculus. VL LOWER EXTREMITY ARTERIAL SEGMENTAL PRESSURES W PPG    Result Date: 8/3/2022  Absence of right foot digit waveforms with monophasic dorsalis pedis waveform noted suggestive of significant peripheral artery disease in the right foot. Normal bilateral ABIs. Pending Labs     Order Current Status    Lactate, Sepsis Collected (08/01/22 1618)        Discharge Medications    Discharge Medication List as of 8/8/2022  4:42 PM    START taking these medications    amoxicillin-clavulanate (AUGMENTIN) 875-125 MG per tablet  Take 1 tablet by mouth in the morning and 1 tablet before bedtime. Do all this for 10 doses. , Disp-10 tablet, R-0  DC to SNF    atorvastatin (LIPITOR) 20 MG tablet  Take 1 tablet by mouth nightly, Disp-30 tablet, R-3  DC to SNF    collagenase 250 UNIT/GM ointment  Apply topically daily. , DC to SNF    magnesium oxide (MAG-OX) 400 (240 Mg) MG tablet  Take 1 tablet by mouth every other day, Disp-30 tablet  DC to SNF          Discharge Medication List as of 8/8/2022  4:42 PM    CONTINUE these medications which have CHANGED    guaiFENesin-dextromethorphan (ROBITUSSIN DM) 100-10 MG/5ML syrup  Take 5 mLs by mouth every 6 hours as needed for Cough, Disp-120 mL  DC to SNF    Ergocalciferol (VITAMIN D) 55196 units CAPS  Take 50,000 Units by mouth once a week, Disp-5 capsule  DC to SNF    !! gabapentin (NEURONTIN) 300 MG capsule  Take 1 capsule by mouth in the morning for 6 days. , Disp-90 capsule, R-3  DC to SNF    !! gabapentin (NEURONTIN) 300 MG capsule  Take 1 capsule by mouth nightly for 7 days. , Disp-90 capsule, R-3  DC to SNF    levothyroxine (SYNTHROID) 50 MCG tablet  Take 1 tablet by mouth in the morning., Disp-30 tablet, R-3  Normal    potassium chloride (KLOR-CON M) 20 MEQ extended release tablet  Take 2 tablets by mouth once for 1 dose, Disp-60 tablet, (3) MG/3ML SOLN nebulizer solution  Comments:  Reason for Stopping:    polyethylene glycol (GLYCOLAX) 17 GM/SCOOP powder  Comments:  Reason for Stopping:    dextromethorphan-guaiFENesin (ROBITUSSIN-DM)  MG/5ML syrup  Comments:  Reason for Stopping:    oxyCODONE HCl 5 MG TABA  Comments:  Reason for Stopping:    nystatin-triamcinolone (MYCOLOG II) 969474-6.1 UNIT/GM-% cream  Comments:  Reason for Stopping:    potassium chloride (KLOR-CON M) 20 MEQ TBCR extended release tablet  Comments:  Reason for Stopping:    lovastatin (MEVACOR) 20 MG tablet  Comments:  Reason for Stopping:    blood glucose test strips (EZ SMART BLOOD GLUCOSE TEST) strip  Comments:  Reason for Stopping:          Time Spent on Discharge:  30 minutes were spent in patient examination, evaluation, counseling as well as medication reconciliation, prescriptions for required medications, discharge plan, and follow up.     Electronically signed by Tova Srinivasan MD on 8/9/22 at 6:50 AM EDT

## 2022-08-10 LAB
INR BLD: 3.7
PROTHROMBIN TIME: 40.6 SEC (ref 9.3–12.4)

## 2022-08-10 NOTE — DISCHARGE INSTRUCTIONS
Visit Discharge/Physician Orders    Discharge condition: Stable    Assessment of pain at discharge: minimal    Anesthetic used: 4% external lidocaine solution    Discharge to: ECF    Left via:Private automobile    Accompanied by: accompanied by child    ECF/HHA: [facility] *Note NEW orders*    Dressing Orders: To right heel wound cleanse with normal saline solution, apply alginate Ag, ABD, and kerlix. CHANGE DAILY    Treatment Orders:    BMP to be done at facility, order given. Please fax results to [facility] @ [phone]  [facility] prescription given, obtain ASAP to keep pressure off wound. Keep pressure off of wound  FOLLOW NUTRITIOUS DIET. CHOOSE FOODS HIGH IN PROTEIN -CHICKEN- FISH-AND EGGS,  CHOOSE FOODS HIGH IN VITAMIN C.   MULTIVITAMIN DAILY. [facility] followup visit __________Follow with Dr. Corey Ruiz in the office ___________________  (Please note your next appointment above and if you are unable to keep, kindly give a 24 hour notice. Thank you.)    Physician signature:__________________________      If you experience any of the following, please call the [facility] [address] during business hours:    * Increase in Pain  * Temperature over 101  * Increase in drainage from your wound  * Drainage with a foul odor  * Bleeding  * Increase in swelling  * Need for compression bandage changes due to slippage, breakthrough drainage. If you need medical attention outside of the business hours of the [address] [place] [address] Road please contact your PCP or go to the nearest emergency room.        Wound Care Documentation and Therapy:  Wound 08/15/22 Heel Right #1 (Active)   Wound Image   08/15/22 1342   Wound Etiology Pressure Unstageable 08/15/22 1342   Wound Length (cm) 4.6 cm 08/15/22 1342   Wound Width (cm) 8.5 cm 08/15/22 1342   Wound Depth (cm) 0.2 cm 08/15/22 1342   Wound Surface Area (cm^2) 39.1 cm^2 08/15/22 1342   Wound Volume (cm^3) 7.82 cm^3 08/15/22 1342   Post-Procedure Length (cm) 4.8 cm 08/15/22 1402 Post-Procedure Width (cm) 8.6 cm 08/15/22 1402   Post-Procedure Depth (cm) 0.3 cm 08/15/22 1402   Post-Procedure Surface Area (cm^2) 41.28 cm^2 08/15/22 1402   Post-Procedure Volume (cm^3) 12.384 cm^3 08/15/22 1402   Wound Assessment Devitalized tissue;Eschar dry;Eschar moist;Fibrin 08/15/22 1342   Drainage Amount Moderate 08/15/22 1342   Drainage Description Thin;Yellow 08/15/22 1342   Odor Mild 08/15/22 1342   Tory-wound Assessment Hyperkeratosis (callous) 08/15/22 1342   Margins Attached edges 08/15/22 1342   Wound Thickness Description not for Pressure Injury Full thickness 08/15/22 1342   Number of days: 0

## 2022-08-11 LAB
INR BLD: 3.1
PROTHROMBIN TIME: 33.2 SEC (ref 9.3–12.4)

## 2022-08-12 LAB
INR BLD: 2.6
PROTHROMBIN TIME: 28 SEC (ref 9.3–12.4)

## 2022-08-14 LAB — C DIFF TOXIN/ANTIGEN: NORMAL

## 2022-08-15 ENCOUNTER — HOSPITAL ENCOUNTER (OUTPATIENT)
Dept: WOUND CARE | Age: 77
Discharge: HOME | End: 2022-08-15
Payer: MEDICARE

## 2022-08-15 VITALS
RESPIRATION RATE: 18 BRPM | SYSTOLIC BLOOD PRESSURE: 132 MMHG | HEIGHT: 75 IN | HEART RATE: 60 BPM | DIASTOLIC BLOOD PRESSURE: 7 MMHG | WEIGHT: 250 LBS | TEMPERATURE: 97 F | BODY MASS INDEX: 31.08 KG/M2

## 2022-08-15 DIAGNOSIS — L97.412 ULCER OF RIGHT HEEL AND MIDFOOT WITH FAT LAYER EXPOSED (HCC): ICD-10-CM

## 2022-08-15 DIAGNOSIS — Z90.5 STATUS POST NEPHRECTOMY: ICD-10-CM

## 2022-08-15 DIAGNOSIS — I70.234 ATHEROSCLEROSIS OF NATIVE ARTERY OF RIGHT LOWER EXTREMITY WITH ULCERATION OF HEEL (HCC): ICD-10-CM

## 2022-08-15 DIAGNOSIS — I96 NECROTIC ESCHAR (HCC): ICD-10-CM

## 2022-08-15 DIAGNOSIS — N18.32 STAGE 3B CHRONIC KIDNEY DISEASE (HCC): Primary | ICD-10-CM

## 2022-08-15 DIAGNOSIS — N18.32 CHRONIC RENAL FAILURE, STAGE 3B (HCC): ICD-10-CM

## 2022-08-15 PROCEDURE — 11042 DBRDMT SUBQ TIS 1ST 20SQCM/<: CPT

## 2022-08-15 PROCEDURE — 6370000000 HC RX 637 (ALT 250 FOR IP): Performed by: SURGERY

## 2022-08-15 PROCEDURE — 99213 OFFICE O/P EST LOW 20 MIN: CPT

## 2022-08-15 PROCEDURE — 11042 DBRDMT SUBQ TIS 1ST 20SQCM/<: CPT | Performed by: SURGERY

## 2022-08-15 PROCEDURE — 99204 OFFICE O/P NEW MOD 45 MIN: CPT | Performed by: SURGERY

## 2022-08-15 RX ORDER — BACITRACIN, NEOMYCIN, POLYMYXIN B 400; 3.5; 5 [USP'U]/G; MG/G; [USP'U]/G
OINTMENT TOPICAL ONCE
Status: CANCELLED | OUTPATIENT
Start: 2022-08-15 | End: 2022-08-15

## 2022-08-15 RX ORDER — BETAMETHASONE DIPROPIONATE 0.05 %
OINTMENT (GRAM) TOPICAL ONCE
Status: CANCELLED | OUTPATIENT
Start: 2022-08-15 | End: 2022-08-15

## 2022-08-15 RX ORDER — BACITRACIN ZINC AND POLYMYXIN B SULFATE 500; 1000 [USP'U]/G; [USP'U]/G
OINTMENT TOPICAL ONCE
Status: CANCELLED | OUTPATIENT
Start: 2022-08-15 | End: 2022-08-15

## 2022-08-15 RX ORDER — CLOBETASOL PROPIONATE 0.5 MG/G
OINTMENT TOPICAL ONCE
Status: CANCELLED | OUTPATIENT
Start: 2022-08-15 | End: 2022-08-15

## 2022-08-15 RX ORDER — LIDOCAINE HYDROCHLORIDE 20 MG/ML
JELLY TOPICAL ONCE
Status: CANCELLED | OUTPATIENT
Start: 2022-08-15 | End: 2022-08-15

## 2022-08-15 RX ORDER — LIDOCAINE HYDROCHLORIDE 40 MG/ML
SOLUTION TOPICAL ONCE
Status: CANCELLED | OUTPATIENT
Start: 2022-08-15 | End: 2022-08-15

## 2022-08-15 RX ORDER — LIDOCAINE 50 MG/G
OINTMENT TOPICAL ONCE
Status: CANCELLED | OUTPATIENT
Start: 2022-08-15 | End: 2022-08-15

## 2022-08-15 RX ORDER — LIDOCAINE HYDROCHLORIDE 40 MG/ML
SOLUTION TOPICAL ONCE
Status: COMPLETED | OUTPATIENT
Start: 2022-08-15 | End: 2022-08-15

## 2022-08-15 RX ORDER — LIDOCAINE 40 MG/G
CREAM TOPICAL ONCE
Status: CANCELLED | OUTPATIENT
Start: 2022-08-15 | End: 2022-08-15

## 2022-08-15 RX ORDER — GINSENG 100 MG
CAPSULE ORAL ONCE
Status: CANCELLED | OUTPATIENT
Start: 2022-08-15 | End: 2022-08-15

## 2022-08-15 RX ORDER — GENTAMICIN SULFATE 1 MG/G
OINTMENT TOPICAL ONCE
Status: CANCELLED | OUTPATIENT
Start: 2022-08-15 | End: 2022-08-15

## 2022-08-15 RX ADMIN — LIDOCAINE HYDROCHLORIDE 5 ML: 40 SOLUTION TOPICAL at 13:44

## 2022-08-15 NOTE — PROGRESS NOTES
Wound Healing Center /Hyperbarics   History and Physical/Consultation  Vascular    Referring Physician : Ninoska Montenegro DO  [address] [place] [address] RECORD NUMBER:  [id number]  AGE: 67 y.o. GENDER: male  : 1945  EPISODE DATE:  8/15/2022  Subjective:     Chief Complaint   Patient presents with    Wound Check     Right heel          HISTORY of PRESENT ILLNESS HPI     Theone Hill is a 67 y.o. male who presents today for wound/ulcer evaluation. History of Wound Context:  The patient has had a wound of right heel, noted at the nursing home facility, few weeks ago, post patient being hospitalized there after left nephrectomy for malignancy at the [facility] clinic in July which was first noted approximately a few weeks ago. This has been treated by the facility. On their initial visit to the wound healing center, 08/15/22, the patient has noted that the wound has not been improving. The patient has not had similar previous wounds in the past.     The patient was referred to Dr. Fidencio David by Dr. Desiree Le, whom he sees in this private office and not at the wound care center [facility] no records were available today of his podiatrist    Patient came to the wound care center with his son, Mariano Beasley. Patient has chronic kidney disease, follows up with Dr. Qasim Sevilla, stage IIIb with a creatinine of 2,, underwent left nephrectomy at the [facility] clinic last month    Patient also has underlying coronary artery disease, cardiac arrhythmia post pacemaker consider long-term anticoagulant Coumadin, but according to the notes reviewed, has not seen cardiology for last 3 years    Patient was able to walk short distances with help of walker at home prior to surgery, currently undergoing rehab at the facility        Pt is currently not on abx.       Wound/Ulcer Pain Timing/Severity: constant  Quality of pain: dull, aching  Severity:  2 / 10   Modifying Factors: None  Associated Signs/Symptoms: drainage and pain    Ulcer Identification:  Ulcer Type: arterial, diabetic, pressure, and non-healing/non-surgical  Contributing Factors: diabetes, shear force, and arterial insufficiency    Diabetic/Pressure/Non Pressure Ulcers only:  Ulcer: Diabetic ulcer, fat layer exposed    If patient has diabetic lower extremity wounds  Villegas Classification of diabetic lower extremity wounds:    Grade Description   []  0 No open wound   []  1 Superficial ulcer involving the full skin thickness   []  2 Deep ulcer involves ligament, tendon, joint capsule, or fascia  No bone involvement or abscess presence   []  3 Deep Ulcer with abcess formation and/or osteomyelitis   []  4 Localized gangrene   []  5 Extensive gangrene of the foot     Wound: Patient does have a right heel eschar with fat layer exposed, without cellulitis or purulent drainage, please look at the wound care notes for detailed measurements    Other pertinent information:  Previous records indicated, the following  1. Status post pacemaker insertion, last seen by EP cardiologist 3 years ago  2. Last lab work done, 9 August, BUN 34 creatinine 2.0 GFR 32, PT INR 3.4 on Coumadin therapy  3.   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 Cb Pratt and associates, to home patient was referred by Dr. Jan Durham  Patient was recommended BMP to be done at the facility, fax results to our office to the wound care, then discussed with the patient nephrologist Dr. Cindra Romberg regarding contrast studies  All their questions were answered        PAST MEDICAL HISTORY      Diagnosis Date    Atherosclerosis of native artery of right lower extremity with ulceration of heel ([address] 8/15/2022    Atrial fibrillation ([address]     Cancer ([address]     kidney    Chronic renal failure, stage 3b ([address] 8/15/2022    Diabetes mellitus ([address]     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[address]     Hypertension     Ischemic stroke ([address] 2018    Pacemaker     Paraplegia ([address]     Sinus node dysfunction ([address]     Stroke (cerebrum) ([address] 2018    Thyroid disease     Ulcer of right heel and midfoot with fat layer exposed ([address] 8/15/2022     Past Surgical History:   Procedure Laterality Date    CARDIAC PACEMAKER PLACEMENT  2014    COLONOSCOPY      EYE SURGERY      KNEE ARTHROSCOPY  right knee    PACEMAKER PLACEMENT      NE COLONOSCOPY FLX DX W/COLLJ SPEC WHEN PFRMD N/A 3/20/2018    COLONOSCOPY DIAGNOSTIC performed by Becky Benítez MD at [address] PERCUTANEOUS PLACEMENT GASTROSTOMY TUBE N/A 3/29/2018    PEG TUBE/PT.  IN [address] performed by Becky Benítez MD at [address] EGD PERCUTANEOUS PLACEMENT GASTROSTOMY TUBE N/A 2018    EGD PEG TUBE PLACEMENT performed by Becky Benítez MD at [facility] ENDOSCOPY     Family History   Problem Relation Age of Onset    Heart Disease Mother     Stroke Father      Social History     Tobacco Use    Smoking status: Former     Types: Cigars     Quit date:      Years since quittin.6    Smokeless tobacco: Never   Vaping Use    Vaping Use: Never used   Substance Use Topics    Alcohol use: No    Drug use: No     Allergies   Allergen Reactions    Bee Venom Anaphylaxis     Current Outpatient Medications on File Prior to Encounter   Medication Sig Dispense Refill    guaiFENesin-dextromethorphan (ROBITUSSIN DM) 100-10 MG/5ML syrup Take 5 mLs by mouth every 6 hours as needed for Cough (Patient not taking: Reported on 8/15/2022) 120 mL     gabapentin (NEURONTIN) 300 MG capsule Take 1 capsule by mouth nightly for 7 days. 90 capsule 3    levothyroxine (SYNTHROID) 50 MCG tablet Take 1 tablet by mouth in the morning. 30 tablet 3    potassium chloride (KLOR-CON M) 20 MEQ extended release tablet Take 2 tablets by mouth once for 1 dose 60 tablet 3    warfarin (COUMADIN) 5 MG tablet Take 1.5 tablets by mouth in the morning. (Patient taking differently: Take 4 mg by mouth in the morning.)      atorvastatin (LIPITOR) 20 MG tablet Take 1 tablet by mouth nightly 30 tablet 3    collagenase 250 UNIT/GM ointment Apply topically daily. magnesium oxide (MAG-OX) 400 (240 Mg) MG tablet Take 1 tablet by mouth every other day 30 tablet     ascorbic acid (VITAMIN C) 500 MG tablet Take 1 tablet by mouth in the morning.  30 tablet 3    acetaminophen (TYLENOL) 325 MG tablet Take 650 mg by mouth every 4 hours as needed      docusate sodium (COLACE) 100 MG capsule TAKE ONE CAPSULE BY MOUTH TWICE DAILY AS NEEDED FOR CONSTIPATION (Patient not taking: Reported on 8/15/2022)      pantoprazole (PROTONIX) 40 MG tablet Take 40 mg by mouth every morning (before breakfast)      amLODIPine (NORVASC) 2.5 MG tablet Take 1 tablet by mouth daily 90 tablet 1    [DISCONTINUED] potassium chloride (KLOR-CON M) 20 MEQ TBCR extended release tablet Take 1 tablet by mouth daily 90 tablet 1    metoprolol tartrate (LOPRESSOR) 25 MG tablet Take 1 tablet by mouth 2 times daily 180 tablet 1    lisinopril (PRINIVIL;ZESTRIL) 10 MG tablet Take 1 tablet by mouth daily 90 tablet 1    metFORMIN (GLUCOPHAGE) 1000 MG tablet Take 1 tablet by mouth 2 times daily 180 tablet 1    [DISCONTINUED] lovastatin (MEVACOR) 20 MG tablet Take 1 tablet by mouth nightly (Patient not taking: Reported on 8/1/2022) 90 tablet 1    omeprazole (PRILOSEC) 20 MG delayed release capsule Take 1 capsule by mouth daily 90 capsule 1    furosemide (LASIX) 40 MG tablet Take 0.5 tablets by mouth daily 45 tablet 2    [DISCONTINUED] blood glucose test strips (EZ SMART BLOOD GLUCOSE TEST) strip 1 each by In Vitro route 2 times daily As needed. 100 each 3     No current facility-administered medications on file prior to encounter.        REVIEW OF SYSTEMS   ROS : All others Negative if blank [], Positive if [x]  General Urinary   [] Fevers [] Hematuria   [] Chills [] Dysuria   [] Weight Loss Vascular   Skin [] Claudication   [x] Tissue Loss [] Rest Pain   Eyes Neurologic   [] Wears Glasses/Contacts [] Stroke/TIA   [] Vision Changes [] Focal weakness   Respiratory [] Slurred Speech    [] Shortness of breath ENT   Cardiovascular [] Difficulty swallowing   [] Chest Pain Gastrointestinal   [] Shortness of breath with exertion [] Abdominal Pain   Right heel eschar with a fat layer exposed, with diminished dorsalis with absent dorsalis pedis pulse, coronary artery disease, cardiac arrhythmia, status post pacemaker insertion, hypertension, chronic kidney disease stage IIIb with a GFR of 32, post left nephrectomy for malignancy [] Melena       [] Hematochezia               Objective:    BP (!) 132/7   Pulse 60   Temp 97 °F (36.1 °C) (Temporal)   Resp 18   Ht 6' 3\" (1.905 m)   Wt 250 lb (113.4 kg)   BMI 31.25 kg/m²   Wt Readings from Last 3 Encounters:   08/15/22 250 lb (113.4 kg)   08/08/22 250 lb (113.4 kg)   06/12/22 290 lb (131.5 kg)       PHYSICAL EXAM  CONSTITUTIONAL:   Awake, alert, cooperative  PSYCHIATRIC :  Oriented to time, place and person      normal insight to disease process  ENT:  External ears and nose without lesions    Hearing deficits is not noted  NECK: Supple, symmetrical, trachea midline    Thyroid goiter not appreciated    Carotid bruit is not noted bilaterally  LUNGS:  No increased work of breathing                  Clear to auscultation bilaterally   CARDIOVASCULAR:  regular rate and rhythm   ABDOMEN:  soft, non-distended, non-tender    Hernias is not noted   Aorta is not palpable   Lymphatics : Cervical lymphadenopathy is not noted     Femoral lymphadenopathy is not noted  SKIN:   Skin color is normal   Texture and turgor is  normal   Induration is not noted  EXTREMITIES:   R UE Edema is not noted  L UE Edema is not noted  R LE Edema is not noted   L LE Edema is not noted   R femoral 2-3 L femoral 2-3   R dorsalis pedis 0 L dorsalis pedis 1   R posterior tibial 0 L posterior tibial 1     Assessment:     Problem List Items Addressed This Visit          Medium    Atherosclerosis of native artery of right lower extremity with ulceration of heel (HCC)    Relevant Orders    DME Order for (Specify) as OP    Chronic renal failure, stage 3b ([address]    Relevant Orders    DME Order for (Specify) as OP    Status post nephrectomy    Ulcer of right heel and midfoot with fat layer exposed ([address]    Relevant Orders    DME Order for (Specify) as OP     Procedure Note  Indications:  Based on my examination of this patient's wound(s)/ulcer(s) today, debridement is required to promote healing and evaluate the wound base. Performed by: Lena Mederos MD    Consent obtained:  Yes    Time out taken:  Yes    Pain Control:       Debridement:Excisional Debridement    Using curette the wound(s)/ulcer(s) was/were sharply debrided down through and including the removal of epidermis, dermis, and subcutaneous tissue.         Devitalized Tissue Debrided:  fibrin, slough, and necrotic/eschar    Pre Debridement Measurements:  Are located in the [place]  Documentation Flow Sheet    Wound/Ulcer #: 1    Post Debridement Measurements:  Wound/Ulcer Descriptions are Pre Debridement except measurements:    Wound 08/15/22 Heel Right #1 (Active)   Wound Image   08/15/22 1342   Wound Etiology Pressure Unstageable 08/15/22 1342   Wound Length (cm) 4.6 cm 08/15/22 1342   Wound Width (cm) 8.5 cm 08/15/22 1342   Wound Depth (cm) 0.2 cm 08/15/22 1342   Wound Surface Area (cm^2) 39.1 cm^2 08/15/22 1342   Wound Volume (cm^3) 7.82 cm^3 08/15/22 1342   Post-Procedure Length (cm) 4.8 cm 08/15/22 1402   Post-Procedure Width (cm) 8.6 cm 08/15/22 1402   Post-Procedure Depth (cm) 0.3 cm 08/15/22 1402   Post-Procedure Surface Area (cm^2) 41.28 cm^2 08/15/22 1402   Post-Procedure Volume (cm^3) 12.384 cm^3 08/15/22 1402   Wound Assessment Devitalized tissue;Eschar dry;Eschar moist;Fibrin 08/15/22 1342   Drainage Amount Moderate 08/15/22 1342   Drainage Description Thin;Yellow 08/15/22 1342   Odor Mild 08/15/22 1342   Tory-wound Assessment Hyperkeratosis (callous) 08/15/22 1342   Margins Attached edges 08/15/22 1342   Wound Thickness Description not for Pressure Injury Full thickness 08/15/22 1342   Number of days: 0       Percent of Wound/Ulcer Debrided: 30%    Total Surface Area Debrided:  10 sq cm     Estimated Blood Loss:  Minimal    Hemostasis Achieved:  by pressure    Procedural Pain:  3  / 10     Post Procedural Pain:  2 / 10     Response to treatment:  Well tolerated by patient. A culture was not done. Plan:     Pt is not a smoker     In my professional opinion and based off the information that is available at this time this patient has appropriate indication for HBO Therapy: No    Treatment Note please see attached Discharge Instructions    Written patient dismissal instructions given to patient and signed by patient or POA.          Discharge Instructions         Visit Discharge/Physician Orders    Discharge condition: Stable    Assessment of pain at discharge: minimal    Anesthetic used: 4% external lidocaine solution    Discharge to: ECF    Left via:Private automobile    Accompanied by: accompanied by

## 2022-08-15 NOTE — DISCHARGE INSTR - COC
Continuity of Care Form    Patient Name: Halina Nazario   :  1945  MRN:  [id number]    Admit date:  8/15/2022  Discharge date:  ***    Code Status Order: Prior   Advance Directives:     Admitting Physician:  No admitting provider for patient encounter. PCP: Kayleigh Garcia DO    Discharging Nurse: [facility] Unit/Room#: No information available for this encounter. Discharging Unit Phone Number: ***    Emergency Contact:   Extended Emergency Contact Information  Primary Emergency Contact: Richard Small  Address: [address] Phone: [phone]  Mobile Phone: [phone]  Relation: Child  Secondary Emergency Contact: Trisha Samuel  Address: [address] [place] [address]           Montez Guardado [address] [place] [address] Phone: [phone]  Mobile Phone: [phone]  Relation: Child    Past Surgical History:  Past Surgical History:   Procedure Laterality Date    CARDIAC PACEMAKER PLACEMENT  2014    COLONOSCOPY      EYE SURGERY      KNEE ARTHROSCOPY  right knee    PACEMAKER PLACEMENT      AR COLONOSCOPY FLX DX W/COLLJ SPEC WHEN PFRMD N/A 3/20/2018    COLONOSCOPY DIAGNOSTIC performed by Carlton Prado MD at [address] E[address] PERCUTANEOUS PLACEMENT GASTROSTOMY TUBE N/A 3/29/2018    PEG TUBE/PT.  IN [facility] B performed by Carlton Prado MD at [address] EGD PERCUTANEOUS PLACEMENT GASTROSTOMY TUBE N/A 2018    EGD PEG TUBE PLACEMENT performed by Carlton Prado MD at [address] [place] [address]       Immunization History:   Immunization History   Administered Date(s) Administered    COVID-19, PFIZER PURPLE top, DILUTE for use, (age 15 y+), 30mcg/0.3mL 2021, 2021, 10/04/2021    Influenza Virus Vaccine 2012, 2014, 10/22/2021    Influenza, High Dose (Fluzone 65 yrs and older) 2019, 2019, 10/13/2020    Influenza, High-dose, Quadv, 65 yrs +, IM (Fluzone) 10/13/2020, 10/22/2021, 10/22/2021    Pneumococcal Conjugate 13-valent (Osxvsvk51) 09/30/2019    Pneumococcal Polysaccharide (Wntsaoizv99) 01/04/2012, 11/19/2019       Active Problems:  Patient Active Problem List   Diagnosis Code    Hypertension I10    Hyperlipidemia with target LDL less than 100 E78.5    Impaired mobility and ADLs Z74.09, Z78.9    Cardiac pacemaker in situ Z95.0    Cerebrovascular accident (CVA) determined by clinical assessment ([facility] I63.9    Left renal mass N28.89    Class 2 severe obesity due to excess calories with serious comorbidity and body mass index (BMI) of 35.0 to 35.9 in adult [facility]) E66.01, Z68.35    Acquired hypothyroidism E03.9    H/O deep venous thrombosis Z86.718    Type 2 diabetes mellitus without complication, without long-term current use of insulin (HCC) E11.9    Stage 3 chronic kidney disease (HCC) N18.30    Pneumonia J18.9    Ulcer of right heel and midfoot with fat layer exposed ([address] L97.412    Atherosclerosis of native artery of right lower extremity with ulceration of heel ([facility]) I70.234    Status post nephrectomy Z90.5    Chronic renal failure, stage 3b ([address] N18.32       Isolation/Infection:   Isolation            No Isolation          Patient Infection Status       Infection Onset Added Last Indicated Last Indicated By Review Planned Expiration Resolved Resolved By    MRSA 08/01/22 08/06/22 08/01/22 Culture, Wound        Wound foot 8/1/22            Nurse Assessment:  Last Vital Signs: BP (!) 132/7   Pulse 60   Temp 97 °F (36.1 °C) (Temporal)   Resp 18   Ht 6' 3\" (1.905 m)   Wt 250 lb (113.4 kg)   BMI 31.25 kg/m²     Last documented pain score (0-10 scale):    Last Weight:   Wt Readings from Last 1 Encounters:   08/15/22 250 lb (113.4 kg)     Mental Status:  {IP PT MENTAL STATUS:20030}    IV Access:  { ARINA IV ACCESS:427004702}    Nursing Mobility/ADLs:  Walking   {CH[facility] DME AFTE:264004893}  Transfer  {CHP DME NTBX:029020825}  Bathing  {CHP DME YAWQ:112767099}  Dressing  {CHP DME DGYP:331363304}  Toileting  {[facility] OVZN:014882353}  Feeding  {[facility] BCUW:535525973}  Med Admin  {[facility] PRCL:825247137}  Med Delivery   {[facility] MED Delivery:198258482}    Wound Care Documentation and Therapy:  Wound 08/15/22 Heel Right #1 (Active)   Wound Image   08/15/22 1342   Wound Etiology Pressure Unstageable 08/15/22 1342   Wound Length (cm) 4.6 cm 08/15/22 1342   Wound Width (cm) 8.5 cm 08/15/22 1342   Wound Depth (cm) 0.2 cm 08/15/22 1342   Wound Surface Area (cm^2) 39.1 cm^2 08/15/22 1342   Wound Volume (cm^3) 7.82 cm^3 08/15/22 1342   Post-Procedure Length (cm) 4.8 cm 08/15/22 1402   Post-Procedure Width (cm) 8.6 cm 08/15/22 1402   Post-Procedure Depth (cm) 0.3 cm 08/15/22 1402   Post-Procedure Surface Area (cm^2) 41.28 cm^2 08/15/22 1402   Post-Procedure Volume (cm^3) 12.384 cm^3 08/15/22 1402   Wound Assessment Devitalized tissue;Eschar dry;Eschar moist;Fibrin 08/15/22 1342   Drainage Amount Moderate 08/15/22 1342   Drainage Description Thin;Yellow 08/15/22 1342   Odor Mild 08/15/22 1342   Tory-wound Assessment Hyperkeratosis (callous) 08/15/22 1342   Margins Attached edges 08/15/22 1342   Wound Thickness Description not for Pressure Injury Full thickness 08/15/22 1342   Number of days: 0        Elimination:  Continence: Bowel: {YES / GW:48770}  Bladder: {YES / CK:32867}  Urinary Catheter: {Urinary Catheter:165578736}   Colostomy/Ileostomy/Ileal Conduit: {YES / WQ:50999}       Date of Last BM: ***  No intake or output data in the 24 hours ending 08/15/22 1414  No intake/output data recorded.     Safety Concerns:     [address] Safety Concerns:693789924}    Impairments/Disabilities:      [address] SANTA Impairments/Disabilities:607398437}    Nutrition Therapy:  Current Nutrition Therapy:   [address] Diet List:902233300}    Routes of Feeding: {CHP DME Other Feedings:428596783}  Liquids: {Slp liquid thickness:62441}  Daily Fluid Restriction: {CHP DME Yes amt example:491102383}  Last Modified Barium Swallow with

## 2022-08-16 LAB
BACTERIA: NORMAL /HPF
BASOPHILS ABSOLUTE: 0.05 E9/L (ref 0–0.2)
BASOPHILS RELATIVE PERCENT: 0.7 % (ref 0–2)
BILIRUBIN URINE: NEGATIVE
BLOOD, URINE: NORMAL
CLARITY: CLEAR
COLOR: YELLOW
CREATININE URINE: 127 MG/DL (ref 40–278)
EOSINOPHILS ABSOLUTE: 0.39 E9/L (ref 0.05–0.5)
EOSINOPHILS RELATIVE PERCENT: 5.2 % (ref 0–6)
GLUCOSE URINE: NEGATIVE MG/DL
HCT VFR BLD CALC: 27.7 % (ref 37–54)
HEMOGLOBIN: 8.9 G/DL (ref 12.5–16.5)
IMMATURE GRANULOCYTES #: 0.09 E9/L
IMMATURE GRANULOCYTES %: 1.2 % (ref 0–5)
INR BLD: 1.4
KETONES, URINE: NEGATIVE MG/DL
LEUKOCYTE ESTERASE, URINE: NEGATIVE
LYMPHOCYTES ABSOLUTE: 1.44 E9/L (ref 1.5–4)
LYMPHOCYTES RELATIVE PERCENT: 19.1 % (ref 20–42)
MAGNESIUM: 1.5 MG/DL (ref 1.6–2.6)
MCH RBC QN AUTO: 29.6 PG (ref 26–35)
MCHC RBC AUTO-ENTMCNC: 32.1 % (ref 32–34.5)
MCV RBC AUTO: 92 FL (ref 80–99.9)
MONOCYTES ABSOLUTE: 0.59 E9/L (ref 0.1–0.95)
MONOCYTES RELATIVE PERCENT: 7.8 % (ref 2–12)
NEUTROPHILS ABSOLUTE: 4.96 E9/L (ref 1.8–7.3)
NEUTROPHILS RELATIVE PERCENT: 66 % (ref 43–80)
NITRITE, URINE: NEGATIVE
PARATHYROID HORMONE INTACT: 52 PG/ML (ref 15–65)
PDW BLD-RTO: 14.1 FL (ref 11.5–15)
PH UA: 6 (ref 5–9)
PHOSPHORUS: 3.9 MG/DL (ref 2.5–4.5)
PLATELET # BLD: 292 E9/L (ref 130–450)
PMV BLD AUTO: 10.6 FL (ref 7–12)
PROTEIN PROTEIN: 29 MG/DL (ref 0–12)
PROTEIN UA: NORMAL MG/DL
PROTEIN/CREAT RATIO: 0.2
PROTEIN/CREAT RATIO: 0.2 (ref 0–0.2)
PROTHROMBIN TIME: 15.6 SEC (ref 9.3–12.4)
RBC # BLD: 3.01 E12/L (ref 3.8–5.8)
RBC UA: NORMAL /HPF (ref 0–2)
SPECIFIC GRAVITY UA: 1.01 (ref 1–1.03)
URIC ACID, SERUM: 11.7 MG/DL (ref 3.4–7)
UROBILINOGEN, URINE: 0.2 E.U./DL
WBC # BLD: 7.5 E9/L (ref 4.5–11.5)
WBC UA: NORMAL /HPF (ref 0–5)

## 2022-08-17 LAB
ALBUMIN SERPL-MCNC: 2.8 G/DL (ref 3.5–5.2)
ALP BLD-CCNC: 79 U/L (ref 40–129)
ALT SERPL-CCNC: 11 U/L (ref 0–40)
ANION GAP SERPL CALCULATED.3IONS-SCNC: 14 MMOL/L (ref 7–16)
AST SERPL-CCNC: 33 U/L (ref 0–39)
BILIRUB SERPL-MCNC: 0.2 MG/DL (ref 0–1.2)
BUN BLDV-MCNC: 45 MG/DL (ref 6–23)
CALCIUM SERPL-MCNC: 8.7 MG/DL (ref 8.6–10.2)
CHLORIDE BLD-SCNC: 105 MMOL/L (ref 98–107)
CO2: 22 MMOL/L (ref 22–29)
CREAT SERPL-MCNC: 2.8 MG/DL (ref 0.7–1.2)
GFR AFRICAN AMERICAN: 27
GFR NON-AFRICAN AMERICAN: 22 ML/MIN/1.73
GLUCOSE BLD-MCNC: 125 MG/DL (ref 74–99)
POTASSIUM SERPL-SCNC: 3.5 MMOL/L (ref 3.5–5)
SODIUM BLD-SCNC: 141 MMOL/L (ref 132–146)
TOTAL PROTEIN: 6.4 G/DL (ref 6.4–8.3)

## 2022-08-19 LAB — VITAMIN D 1,25-DIHYDROXY: 44.3 PG/ML (ref 19.9–79.3)

## 2022-08-22 LAB
ALBUMIN SERPL-MCNC: 3 G/DL (ref 3.5–5.2)
ALP BLD-CCNC: 69 U/L (ref 40–129)
ALT SERPL-CCNC: 7 U/L (ref 0–40)
ANION GAP SERPL CALCULATED.3IONS-SCNC: 12 MMOL/L (ref 7–16)
AST SERPL-CCNC: 20 U/L (ref 0–39)
BASOPHILS ABSOLUTE: 0.08 E9/L (ref 0–0.2)
BASOPHILS RELATIVE PERCENT: 1.1 % (ref 0–2)
BILIRUB SERPL-MCNC: 0.2 MG/DL (ref 0–1.2)
BUN BLDV-MCNC: 42 MG/DL (ref 6–23)
CALCIUM SERPL-MCNC: 8.8 MG/DL (ref 8.6–10.2)
CHLORIDE BLD-SCNC: 108 MMOL/L (ref 98–107)
CO2: 22 MMOL/L (ref 22–29)
CREAT SERPL-MCNC: 2.4 MG/DL (ref 0.7–1.2)
EOSINOPHILS ABSOLUTE: 0.47 E9/L (ref 0.05–0.5)
EOSINOPHILS RELATIVE PERCENT: 6.2 % (ref 0–6)
GFR AFRICAN AMERICAN: 32
GFR NON-AFRICAN AMERICAN: 26 ML/MIN/1.73
GLUCOSE BLD-MCNC: 117 MG/DL (ref 74–99)
HCT VFR BLD CALC: 29.8 % (ref 37–54)
HEMOGLOBIN: 9.3 G/DL (ref 12.5–16.5)
IMMATURE GRANULOCYTES #: 0.05 E9/L
IMMATURE GRANULOCYTES %: 0.7 % (ref 0–5)
LYMPHOCYTES ABSOLUTE: 1.71 E9/L (ref 1.5–4)
LYMPHOCYTES RELATIVE PERCENT: 22.6 % (ref 20–42)
MCH RBC QN AUTO: 29 PG (ref 26–35)
MCHC RBC AUTO-ENTMCNC: 31.2 % (ref 32–34.5)
MCV RBC AUTO: 92.8 FL (ref 80–99.9)
MONOCYTES ABSOLUTE: 0.6 E9/L (ref 0.1–0.95)
MONOCYTES RELATIVE PERCENT: 7.9 % (ref 2–12)
NEUTROPHILS ABSOLUTE: 4.66 E9/L (ref 1.8–7.3)
NEUTROPHILS RELATIVE PERCENT: 61.5 % (ref 43–80)
PDW BLD-RTO: 14.2 FL (ref 11.5–15)
PLATELET # BLD: 261 E9/L (ref 130–450)
PMV BLD AUTO: 10.5 FL (ref 7–12)
POTASSIUM SERPL-SCNC: 4.1 MMOL/L (ref 3.5–5)
RBC # BLD: 3.21 E12/L (ref 3.8–5.8)
SODIUM BLD-SCNC: 142 MMOL/L (ref 132–146)
TOTAL PROTEIN: 6.3 G/DL (ref 6.4–8.3)
WBC # BLD: 7.6 E9/L (ref 4.5–11.5)

## 2022-08-23 LAB
INR BLD: 1.4
PROTHROMBIN TIME: 15.6 SEC (ref 9.3–12.4)

## 2022-08-24 ENCOUNTER — HOSPITAL ENCOUNTER (OUTPATIENT)
Dept: WOUND CARE | Age: 77
Discharge: HOME | End: 2022-08-24
Payer: MEDICARE

## 2022-08-24 VITALS
HEART RATE: 64 BPM | DIASTOLIC BLOOD PRESSURE: 62 MMHG | SYSTOLIC BLOOD PRESSURE: 120 MMHG | RESPIRATION RATE: 16 BRPM | TEMPERATURE: 97 F

## 2022-08-24 DIAGNOSIS — L97.412 ULCER OF RIGHT HEEL AND MIDFOOT WITH FAT LAYER EXPOSED (HCC): Primary | ICD-10-CM

## 2022-08-24 DIAGNOSIS — L97.422 DIABETIC ULCER OF LEFT HEEL ASSOCIATED WITH TYPE 2 DIABETES MELLITUS, WITH FAT LAYER EXPOSED (HCC): ICD-10-CM

## 2022-08-24 DIAGNOSIS — I70.234 ATHEROSCLEROSIS OF NATIVE ARTERY OF RIGHT LOWER EXTREMITY WITH ULCERATION OF HEEL (HCC): ICD-10-CM

## 2022-08-24 DIAGNOSIS — E11.621 DIABETIC ULCER OF LEFT HEEL ASSOCIATED WITH TYPE 2 DIABETES MELLITUS, WITH FAT LAYER EXPOSED (HCC): ICD-10-CM

## 2022-08-24 PROCEDURE — 87077 CULTURE AEROBIC IDENTIFY: CPT

## 2022-08-24 PROCEDURE — 87070 CULTURE OTHR SPECIMN AEROBIC: CPT

## 2022-08-24 PROCEDURE — 87186 SC STD MICRODIL/AGAR DIL: CPT

## 2022-08-24 PROCEDURE — 87075 CULTR BACTERIA EXCEPT BLOOD: CPT

## 2022-08-24 PROCEDURE — 11042 DBRDMT SUBQ TIS 1ST 20SQCM/<: CPT

## 2022-08-24 PROCEDURE — 6370000000 HC RX 637 (ALT 250 FOR IP): Performed by: SURGERY

## 2022-08-24 RX ORDER — BACITRACIN ZINC AND POLYMYXIN B SULFATE 500; 1000 [USP'U]/G; [USP'U]/G
OINTMENT TOPICAL ONCE
Status: CANCELLED | OUTPATIENT
Start: 2022-08-24 | End: 2022-08-24

## 2022-08-24 RX ORDER — LIDOCAINE HYDROCHLORIDE 40 MG/ML
SOLUTION TOPICAL ONCE
Status: COMPLETED | OUTPATIENT
Start: 2022-08-24 | End: 2022-08-24

## 2022-08-24 RX ORDER — GINSENG 100 MG
CAPSULE ORAL ONCE
Status: CANCELLED | OUTPATIENT
Start: 2022-08-24 | End: 2022-08-24

## 2022-08-24 RX ORDER — CHOLECALCIFEROL (VITAMIN D3) 1250 MCG
50000 CAPSULE ORAL WEEKLY
Status: ON HOLD | COMMUNITY
End: 2022-10-07 | Stop reason: HOSPADM

## 2022-08-24 RX ORDER — LIDOCAINE 40 MG/G
CREAM TOPICAL ONCE
Status: CANCELLED | OUTPATIENT
Start: 2022-08-24 | End: 2022-08-24

## 2022-08-24 RX ORDER — GENTAMICIN SULFATE 1 MG/G
OINTMENT TOPICAL ONCE
Status: CANCELLED | OUTPATIENT
Start: 2022-08-24 | End: 2022-08-24

## 2022-08-24 RX ORDER — CLOBETASOL PROPIONATE 0.5 MG/G
OINTMENT TOPICAL ONCE
Status: CANCELLED | OUTPATIENT
Start: 2022-08-24 | End: 2022-08-24

## 2022-08-24 RX ORDER — LOPERAMIDE HYDROCHLORIDE 2 MG/1
2 CAPSULE ORAL DAILY PRN
Status: ON HOLD | COMMUNITY
End: 2022-10-07 | Stop reason: HOSPADM

## 2022-08-24 RX ORDER — LIDOCAINE HYDROCHLORIDE 20 MG/ML
JELLY TOPICAL ONCE
Status: CANCELLED | OUTPATIENT
Start: 2022-08-24 | End: 2022-08-24

## 2022-08-24 RX ORDER — M-VIT,TX,IRON,MINS/CALC/FOLIC 27MG-0.4MG
1 TABLET ORAL DAILY
Status: ON HOLD | COMMUNITY
End: 2022-10-07 | Stop reason: HOSPADM

## 2022-08-24 RX ORDER — BETAMETHASONE DIPROPIONATE 0.05 %
OINTMENT (GRAM) TOPICAL ONCE
Status: CANCELLED | OUTPATIENT
Start: 2022-08-24 | End: 2022-08-24

## 2022-08-24 RX ORDER — LIDOCAINE HYDROCHLORIDE 40 MG/ML
SOLUTION TOPICAL ONCE
Status: CANCELLED | OUTPATIENT
Start: 2022-08-24 | End: 2022-08-24

## 2022-08-24 RX ORDER — LIDOCAINE 50 MG/G
OINTMENT TOPICAL ONCE
Status: CANCELLED | OUTPATIENT
Start: 2022-08-24 | End: 2022-08-24

## 2022-08-24 RX ORDER — BACITRACIN, NEOMYCIN, POLYMYXIN B 400; 3.5; 5 [USP'U]/G; MG/G; [USP'U]/G
OINTMENT TOPICAL ONCE
Status: CANCELLED | OUTPATIENT
Start: 2022-08-24 | End: 2022-08-24

## 2022-08-24 RX ADMIN — LIDOCAINE HYDROCHLORIDE 4 ML: 40 SOLUTION TOPICAL at 10:38

## 2022-08-24 ASSESSMENT — PAIN DESCRIPTION - FREQUENCY: FREQUENCY: INTERMITTENT

## 2022-08-24 ASSESSMENT — PAIN DESCRIPTION - DESCRIPTORS: DESCRIPTORS: BURNING

## 2022-08-24 ASSESSMENT — PAIN DESCRIPTION - ORIENTATION: ORIENTATION: RIGHT

## 2022-08-24 ASSESSMENT — PAIN DESCRIPTION - LOCATION: LOCATION: FOOT

## 2022-08-24 ASSESSMENT — PAIN DESCRIPTION - PAIN TYPE: TYPE: CHRONIC PAIN

## 2022-08-24 ASSESSMENT — PAIN SCALES - GENERAL: PAINLEVEL_OUTOF10: 8

## 2022-08-24 ASSESSMENT — PAIN - FUNCTIONAL ASSESSMENT: PAIN_FUNCTIONAL_ASSESSMENT: ACTIVITIES ARE NOT PREVENTED

## 2022-08-24 ASSESSMENT — PAIN DESCRIPTION - ONSET: ONSET: PROGRESSIVE

## 2022-08-24 NOTE — DISCHARGE INSTRUCTIONS
Visit Discharge/Physician Orders     Discharge condition: Stable     Assessment of pain at discharge: minimal     Anesthetic used: 4% external lidocaine solution     Discharge to: ECF     Left via:Private automobile     Accompanied by: accompanied by child     TIFFANYF/HHA: [facility] *Note NEW orders*     Dressing Orders: To right heel wound cleanse with normal saline solution, apply santyl ABD, and kerlix. CHANGE DAILY     Treatment Orders:       Prevalon Boot prescription given, obtain ASAP to keep pressure off wound. Keep pressure off of wound  FOLLOW NUTRITIOUS DIET. CHOOSE FOODS HIGH IN PROTEIN -CHICKEN- FISH-AND EGGS,  CHOOSE FOODS HIGH IN VITAMIN C.   MULTIVITAMIN DAILY. [facility] followup visit __________2weeks________________  (Please note your next appointment above and if you are unable to keep, kindly give a 24 hour notice. Thank you.)     Physician signature:__________________________        If you experience any of the following, please call the [facility] during business hours:     * Increase in Pain  * Temperature over 101  * Increase in drainage from your wound  * Drainage with a foul odor  * Bleeding  * Increase in swelling  * Need for compression bandage changes due to slippage, breakthrough drainage. If you need medical attention outside of the business hours of the [facility] please contact your PCP or go to the nearest emergency room.

## 2022-08-24 NOTE — PROGRESS NOTES
[facility]aling Center  History and Physical/Consultation  Podiatry    Ref[facility] Physician : [facility] [place]  MEDICAL RECORD NUMBER:  [id number]  AGE: 67 y.o. GENDER: male  : 1945  EPISODE DATE:  2022  Subjective:     Chief Complaint   Patient presents with    Wound Check     Wound right heel         HISTORY of PRESENT ILLNESS HPI     Lloyd Montero is a 67 y.o. male who presents today for wound/ulcer evaluation. History of Wound Context:  The patient has had a wound of left heel diabetic  which was first noted approximately couple months. This has been treated antibiotic . On their initial visit to the wound healing center, 22 ,  the patient has noted that the wound has not been improving. The patient has had similar previous wounds in the past.      Pt is not on abx at time of initial visit.       Wound/Ulcer Pain Timing/Severity: constant  Quality of pain: sharp  Severity:  3 / 10   Modifying Factors: Pain worsens with walking  Associated Signs/Symptoms: edema, erythema, and drainage    Ulcer Identification:  Ulcer Type: diabetic  Contributing Factors: edema, venous stasis, lymphedema, diabetes, poor glucose control, and chronic pressure    Diabetic/Pressure/Non Pressure Ulcers onl y:  Ulcer: Diabetic ulcer, fat layer exposed    If patient has diabetic lower extremity wounds  V[place]legas Classification of diabetic lower extremity wounds:    Grade Description   []  0 No open wound   []  1 Superficial ulcer involving the full skin thickness   [x]  2 Deep ulcer involves ligament, tendon, joint capsule, or fascia  No bone involvement or abscess presence   []  3 Deep Ulcer with abcess formation and/or osteomyelitis   []  4 Localized gangrene   []  5 Extensive gangrene of the foot     Wound: N/A        PAST MEDICAL HISTORY      Diagnosis Date    Atherosclerosis of native artery of right lower extremity with ulceration of heel ([facility] 8/15/2022    Atrial fibrillation ([facility] [address]     Cancer [facility])     kidney    Chronic renal failure, stage 3b ([facility] [address] 8/15/2022    Diabetes mellitus ([facility] [address]     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[facility] [address]     Hypertension     Ischemic stroke ([facility] [address] 2018    Pacemaker     Paraplegia ([facility] [address]     Sinus node dysfunction ([facility] [address]     Stroke (cerebrum) ([facility] [address] 2018    Thyroid disease     Ulcer of right heel and midfoot with fat layer exposed ([facility] [address] 8/15/2022     Past Surgical History:   Procedure Laterality Date    CARDIAC PACEMAKER PLACEMENT  2014    COLONOSCOPY      EYE SURGERY      KNEE ARTHROSCOPY  right knee    PACEMAKER PLACEMENT      CA COLONOSCOPY FLX DX W/COLLJ SPEC WHEN PFRMD N/A 3/20/2018    COLONOSCOPY DIAGNOSTIC performed by Charlotte Little MD at [address] EGD PERCUTANEOUS PLACEMENT GASTROSTOMY TUBE N/A 3/29/2018    PEG TUBE/PT. IN 5507 B performed by Charlotte Little MD at [address] EGD PERCUTANEOUS PLACEMENT GASTROSTOMY TUBE N/A 2018    EGD PEG TUBE PLACEMENT performed by Charlotte Little MD at [facility] ENDOSCOPY     Family History   Problem Relation Age of Onset    Heart Disease Mother     Stroke Father      Social History     Tobacco Use    Smoking status: Former     Types: Cigars     Quit date:      Years since quittin.6    Smokeless tobacco: Never   Vaping Use    Vaping Use: Never used   Substance Use Topics    Alcohol use: No    Drug use: No     Allergies   Allergen Reactions    Bee Venom Anaphylaxis     Current Outpatient Medications on File Prior to Encounter   Medication Sig Dispense Refill    Cholecalciferol (VITAMIN D3) 125 MCG (5000 UT) TABS Take 50,000 Units by mouth once a week      loperamide (IMODIUM) 2 MG capsule Take 2 mg by mouth 4 times daily as needed for Diarrhea      Multiple Vitamins-Minerals (THERAPEUTIC MULTIVITAMIN-MINERALS) tablet Take 1 tablet by mouth daily      gabapentin (NEURONTIN) 300 MG capsule Take 1 capsule by mouth nightly for 7 days.  90 capsule 3    levothyroxine (SYNTHROID) 50 MCG tablet Take 1 tablet by mouth in the morning. 30 tablet 3    potassium chloride (KLOR-CON M) 20 MEQ extended release tablet Take 2 tablets by mouth once for 1 dose 60 tablet 3    warfarin (COUMADIN) 5 MG tablet Take 1.5 tablets by mouth in the morning. (Patient taking differently: Take 4 mg by mouth daily)      atorvastatin (LIPITOR) 20 MG tablet Take 1 tablet by mouth nightly 30 tablet 3    magnesium oxide (MAG-OX) 400 (240 Mg) MG tablet Take 1 tablet by mouth every other day 30 tablet     ascorbic acid (VITAMIN C) 500 MG tablet Take 1 tablet by mouth in the morning. 30 tablet 3    acetaminophen (TYLENOL) 325 MG tablet Take 650 mg by mouth every 4 hours as needed      docusate sodium (COLACE) 100 MG capsule TAKE ONE CAPSULE BY MOUTH TWICE DAILY AS NEEDED FOR CONSTIPATION (Patient not taking: No sig reported)      pantoprazole (PROTONIX) 40 MG tablet Take 40 mg by mouth every morning (before breakfast)      amLODIPine (NORVASC) 2.5 MG tablet Take 1 tablet by mouth daily 90 tablet 1    [DISCONTINUED] potassium chloride (KLOR-CON M) 20 MEQ TBCR extended release tablet Take 1 tablet by mouth daily 90 tablet 1    metoprolol tartrate (LOPRESSOR) 25 MG tablet Take 1 tablet by mouth 2 times daily 180 tablet 1    lisinopril (PRINIVIL;ZESTRIL) 10 MG tablet Take 1 tablet by mouth daily 90 tablet 1    metFORMIN (GLUCOPHAGE) 1000 MG tablet Take 1 tablet by mouth 2 times daily 180 tablet 1    [DISCONTINUED] lovastatin (MEVACOR) 20 MG tablet Take 1 tablet by mouth nightly (Patient not taking: Reported on 8/1/2022) 90 tablet 1    omeprazole (PRILOSEC) 20 MG delayed release capsule Take 1 capsule by mouth daily 90 capsule 1    furosemide (LASIX) 40 MG tablet Take 0.5 tablets by mouth daily 45 tablet 2    [DISCONTINUED] blood glucose test strips (EZ SMART BLOOD GLUCOSE TEST) strip 1 each by In Vitro route 2 times daily As needed. 100 each 3     No current facility-administered medications on file prior to encounter.        REVIEW OF SYSTEMS   ROS : All others Negative if blank [], Positive if [x]  General Vascular   [] Fevers [] Claudication   [] Chills [] Rest Pain   Skin Neurologic   [x] Tissue Loss [x] Lower extremity neuropathy     Objective:    /62   Pulse 64   Temp 97 °F (36.1 °C) (Temporal)   Resp 16   Wt Readings from Last 3 Encounters:   08/15/22 250 lb (113.4 kg)   08/08/22 250 lb (113.4 kg)   06/12/22 290 lb (131.5 kg)       PHYSICAL EXAM   CONSTITUTIONAL:   Awake, alert, cooperative   PSYCHIATRIC :  Oriented to time, place and person      normal insight to disease process  EXTREMITIES:   R LE Open wounds are not noted   Skin color is normal   Edema is  noted   Sensation intact to light touch   Palpation of the foot does cause pain   5/5 strength DF/PF  L LE Open wounds are noted   Skin color is abnormal with ulcer   Edema is  noted   Sensation deficit noted - protective   Palpation of the foot does cause pain   4/5 strength DF/PF  R dorsalis pedis 1 L dorsalis pedis 1   R posterior tibial 1 L posterior tibial 1     Assessment:     Problem List Items Addressed This Visit       Ulcer of right heel and midfoot with fat layer exposed ([address] - Primary    Relevant Orders    Initiate Outpatient Wound Care Protocol    Atherosclerosis of native artery of right lower extremity with ulceration of heel ([address]    Relevant Orders    Initiate Outpatient Wound Care Protocol       Pre Debridement Measurements:  Are located in the [facility]  Documentation Flow Sheet  Post Debridement Measurements:  Wound/Ulcer Descriptions are Pre Debridement except measurements:     Wound 08/15/22 Heel Right #1 (Active)   Wound Image   08/15/22 1342   Wound Etiology Pressure Unstageable 08/15/22 1342   Dressing Status New dressing applied 08/24/22 1110   Wound Cleansed Cleansed with saline 08/24/22 1110   Dressing/Treatment ABD;Hydrating gel;Roll gauze 08/24/22 1110   Offloading for Diabetic Foot Ulcers Post op shoe 08/24/22 1110   Wound Length (cm) 5 cm 08/24/22 1036   Wound Width (cm) 8 cm 08/24/22 1036   Wound Depth (cm) 0.1 cm 08/24/22 1036   Wound Surface Area (cm^2) 40 cm^2 08/24/22 1036   Change in Wound Size % (l*w) -2.3 08/24/22 1036   Wound Volume (cm^3) 4 cm^3 08/24/22 1036   Wound Healing % 49 08/24/22 1036   Post-Procedure Length (cm) 5 cm 08/24/22 1110   Post-Procedure Width (cm) 8 cm 08/24/22 1110   Post-Procedure Depth (cm) 0.3 cm 08/24/22 1110   Post-Procedure Surface Area (cm^2) 40 cm^2 08/24/22 1110   Post-Procedure Volume (cm^3) 12 cm^3 08/24/22 1110   Wound Assessment Eschar dry;Pink/red 08/24/22 1036   Drainage Amount Scant 08/24/22 1036   Drainage Description Serosanguinous 08/24/22 1036   Odor None 08/24/22 1036   Tory-wound Assessment Intact 08/24/22 1036   Margins Attached edges 08/15/22 1342   Wound Thickness Description not for Pressure Injury Full thickness 08/15/22 1342   Number of days: 8          Procedure Note  Indications:  Based on my examination of this patient's wound(s)/ulcer(s) today, debridement is required to promote healing and evaluate the wound base. Performed by: Allan Ko DPM    Consent obtained:  Yes    Time out taken:  Yes    Pain Control: Anesthetic  Anesthetic: 4% Lidocaine Liquid Topical     Debridement:Excisional Debridement    Using #15 blade scalpel the wound(s)/ulcer(s) was/were sharply debrided down through and including the removal of subcutaneous tissue. Devitalized Tissue Debrided:  fibrin, biofilm, slough, and necrotic/eschar to stimulate bleeding to promote healing, post debridement good bleeding base and wound edges noted    Wound/Ulcer #: 1    Percent of Wound/Ulcer Debrided: 50%    Total Surface Area Debrided:  20 sq cm     Estimated Blood Loss:  Minimal  Hemostasis Achieved:  by pressure    Procedural Pain:  2  / 10   Post Procedural Pain:  3 / 10     Response to treatment:  Well tolerated by patient. A culture was done.       Plan:     Pt is not a smoker   - Discussed relationship of smoking and negative affects on wound healing   - Emphasized importance of tobacco avoidace/cessation   - Script for nicotine patch given to patient   - Information regarding support groups for smoking cessation given    In my professional opinion and based off the information that is available at this time this patient has appropriate indication for HBO Therapy: Maybe    Treatment Note please see attached Discharge Instructions    Written patient dismissal instructions given to patient and signed by patient or POA. Discharge Instructions         Visit Discharge/Physician Orders     Discharge condition: Stable     Assessment of pain at discharge: minimal     Anesthetic used: 4% external lidocaine solution     Discharge to: ECF     Left via:Private automobile     Accompanied by: accompanied by child     [facility] *[facility]*     Dressing Orders: To right heel wound cleanse with normal saline solution, apply santyl ABD, and kerlix. CHANGE DAILY     Treatment Orders:       Prevalon Boot prescription given, obtain ASAP to keep pressure off wound. Keep pressure off of wound  FOLLOW NUTRITIOUS DIET. CHOOSE FOODS HIGH IN PROTEIN -CHICKEN- FISH-AND EGGS,  CHOOSE FOODS HIGH IN VITAMIN C.   MULTIVITAMIN DAILY. [facility] followup visit __________2weeks________________  (Please note your next appointment above and if you are unable to keep, kindly give a 24 hour notice. Thank you.)     Physician signature:__________________________        If you experience any of the following, please call the [facility] during business hours:     * Increase in Pain  * Temperature over 101  * Increase in drainage from your wound  * Drainage with a foul odor  * Bleeding  * Increase in swelling  * Need for compression bandage changes due to slippage, breakthrough drainage.      If you need medical attention outside of the business hours of the [facility] please contact your PCP or go to the nearest emergency room.        Electronically signed by Madisyn Márquez DPM on 8/24/2022 at 11:26 AM

## 2022-08-24 NOTE — PLAN OF CARE
Problem: Chronic Conditions and Co-morbidities  Goal: Patient's chronic conditions and co-morbidity symptoms are monitored and maintained or improved  Outcome: Progressing     Problem: Pain  Goal: Verbalizes/displays adequate comfort level or baseline comfort level  Outcome: Progressing  Flowsheets (Taken 8/24/2022 1026 by Bipin Larson RN)  Verbalizes/displays adequate comfort level or baseline comfort level: Encourage patient to monitor pain and request assistance     Problem: Cognitive:  Goal: Knowledge of wound care  Description: Knowledge of wound care  Outcome: Progressing  Goal: Understands risk factors for wounds  Description: Understands risk factors for wounds  Outcome: Progressing     Problem: Wound:  Goal: Will show signs of wound healing; wound closure and no evidence of infection  Description: Will show signs of wound healing; wound closure and no evidence of infection  Outcome: Progressing

## 2022-08-27 LAB — ANAEROBIC CULTURE: NORMAL

## 2022-08-29 LAB
ALBUMIN SERPL-MCNC: 3 G/DL (ref 3.5–5.2)
ALP BLD-CCNC: 73 U/L (ref 40–129)
ALT SERPL-CCNC: 5 U/L (ref 0–40)
ANION GAP SERPL CALCULATED.3IONS-SCNC: 15 MMOL/L (ref 7–16)
AST SERPL-CCNC: 10 U/L (ref 0–39)
BILIRUB SERPL-MCNC: 0.3 MG/DL (ref 0–1.2)
BUN BLDV-MCNC: 35 MG/DL (ref 6–23)
CALCIUM SERPL-MCNC: 9.2 MG/DL (ref 8.6–10.2)
CHLORIDE BLD-SCNC: 105 MMOL/L (ref 98–107)
CO2: 22 MMOL/L (ref 22–29)
CREAT SERPL-MCNC: 2.4 MG/DL (ref 0.7–1.2)
GFR AFRICAN AMERICAN: 32
GFR NON-AFRICAN AMERICAN: 26 ML/MIN/1.73
GLUCOSE BLD-MCNC: 121 MG/DL (ref 74–99)
HCT VFR BLD CALC: 30.8 % (ref 37–54)
HEMOGLOBIN: 9.4 G/DL (ref 12.5–16.5)
MCH RBC QN AUTO: 28.8 PG (ref 26–35)
MCHC RBC AUTO-ENTMCNC: 30.5 % (ref 32–34.5)
MCV RBC AUTO: 94.5 FL (ref 80–99.9)
ORGANISM: ABNORMAL
PDW BLD-RTO: 14.2 FL (ref 11.5–15)
PLATELET # BLD: 233 E9/L (ref 130–450)
PMV BLD AUTO: 10.7 FL (ref 7–12)
POTASSIUM SERPL-SCNC: 4.6 MMOL/L (ref 3.5–5)
RBC # BLD: 3.26 E12/L (ref 3.8–5.8)
SODIUM BLD-SCNC: 142 MMOL/L (ref 132–146)
TOTAL PROTEIN: 6.6 G/DL (ref 6.4–8.3)
WBC # BLD: 7.2 E9/L (ref 4.5–11.5)
WOUND/ABSCESS: ABNORMAL

## 2022-08-30 LAB
INR BLD: 1.3
PROTHROMBIN TIME: 14.4 SEC (ref 9.3–12.4)

## 2022-09-01 LAB
ANION GAP SERPL CALCULATED.3IONS-SCNC: 13 MMOL/L (ref 7–16)
BUN BLDV-MCNC: 40 MG/DL (ref 6–23)
CALCIUM SERPL-MCNC: 9.2 MG/DL (ref 8.6–10.2)
CHLORIDE BLD-SCNC: 101 MMOL/L (ref 98–107)
CO2: 23 MMOL/L (ref 22–29)
CREAT SERPL-MCNC: 2.2 MG/DL (ref 0.7–1.2)
GFR AFRICAN AMERICAN: 35
GFR NON-AFRICAN AMERICAN: 29 ML/MIN/1.73
GLUCOSE BLD-MCNC: 127 MG/DL (ref 74–99)
POTASSIUM SERPL-SCNC: 4.2 MMOL/L (ref 3.5–5)
SODIUM BLD-SCNC: 137 MMOL/L (ref 132–146)

## 2022-09-06 LAB
INR BLD: 1.5
PROTHROMBIN TIME: 16.6 SEC (ref 9.3–12.4)

## 2022-09-06 NOTE — DISCHARGE INSTRUCTIONS
Visit Discharge/Physician Orders     Discharge condition: Stable     Assessment of pain at discharge: minimal     Anesthetic used: 4% external lidocaine solution     Discharge to: ECF     Left via:Private automobile     Accompanied by: accompanied by child     [facility]F/[facility]: [facility] *Note NEW orders*     Dressing Orders: To right heel wound cleanse with normal saline solution, apply santyl ABD, and kerlix. CHANGE DAILY     Treatment Orders:   HBO Concult      Prevalon Boot prescription given, obtain ASAP to keep pressure off wound. Keep pressure off of wound  FOLLOW NUTRITIOUS DIET. CHOOSE FOODS HIGH IN PROTEIN -CHICKEN- FISH-AND EGGS,  CHOOSE FOODS HIGH IN VITAMIN C.   MULTIVITAMIN DAILY. [facility] followup visit __________1weeks_____debilipara___________  (Please note your next appointment above and if you are unable to keep, kindly give a 24 hour notice. Thank you.)     Physician signature:__________________________        If you experience any of the following, please call the [facility] during business hours:     * Increase in Pain  * Temperature over 101  * Increase in drainage from your wound  * Drainage with a foul odor  * Bleeding  * Increase in swelling  * Need for compression bandage changes due to slippage, breakthrough drainage. If you need medical attention outside of the business hours of the [facility] [employer] please contact your PCP or go to the nearest emergency room.

## 2022-09-07 ENCOUNTER — HOSPITAL ENCOUNTER (OUTPATIENT)
Dept: WOUND CARE | Age: 77
Discharge: HOME | End: 2022-09-07
Payer: MEDICARE

## 2022-09-07 VITALS
SYSTOLIC BLOOD PRESSURE: 118 MMHG | HEART RATE: 62 BPM | WEIGHT: 250 LBS | BODY MASS INDEX: 31.25 KG/M2 | TEMPERATURE: 95.7 F | RESPIRATION RATE: 18 BRPM | DIASTOLIC BLOOD PRESSURE: 62 MMHG

## 2022-09-07 DIAGNOSIS — L97.422 DIABETIC ULCER OF LEFT HEEL ASSOCIATED WITH TYPE 2 DIABETES MELLITUS, WITH FAT LAYER EXPOSED (HCC): ICD-10-CM

## 2022-09-07 DIAGNOSIS — L97.412 ULCER OF RIGHT HEEL AND MIDFOOT WITH FAT LAYER EXPOSED (HCC): Primary | ICD-10-CM

## 2022-09-07 DIAGNOSIS — E11.621 DIABETIC ULCER OF LEFT HEEL ASSOCIATED WITH TYPE 2 DIABETES MELLITUS, WITH FAT LAYER EXPOSED (HCC): ICD-10-CM

## 2022-09-07 DIAGNOSIS — I70.234 ATHEROSCLEROSIS OF NATIVE ARTERY OF RIGHT LOWER EXTREMITY WITH ULCERATION OF HEEL (HCC): ICD-10-CM

## 2022-09-07 PROCEDURE — 6370000000 HC RX 637 (ALT 250 FOR IP): Performed by: SURGERY

## 2022-09-07 PROCEDURE — 99213 OFFICE O/P EST LOW 20 MIN: CPT

## 2022-09-07 RX ORDER — LIDOCAINE 40 MG/G
CREAM TOPICAL ONCE
Status: CANCELLED | OUTPATIENT
Start: 2022-09-07 | End: 2022-09-07

## 2022-09-07 RX ORDER — GINSENG 100 MG
CAPSULE ORAL ONCE
Status: CANCELLED | OUTPATIENT
Start: 2022-09-07 | End: 2022-09-07

## 2022-09-07 RX ORDER — BETAMETHASONE DIPROPIONATE 0.05 %
OINTMENT (GRAM) TOPICAL ONCE
Status: CANCELLED | OUTPATIENT
Start: 2022-09-07 | End: 2022-09-07

## 2022-09-07 RX ORDER — CLOBETASOL PROPIONATE 0.5 MG/G
OINTMENT TOPICAL ONCE
Status: CANCELLED | OUTPATIENT
Start: 2022-09-07 | End: 2022-09-07

## 2022-09-07 RX ORDER — LIDOCAINE HYDROCHLORIDE 40 MG/ML
SOLUTION TOPICAL ONCE
Status: COMPLETED | OUTPATIENT
Start: 2022-09-07 | End: 2022-09-07

## 2022-09-07 RX ORDER — LIDOCAINE HYDROCHLORIDE 20 MG/ML
JELLY TOPICAL ONCE
Status: CANCELLED | OUTPATIENT
Start: 2022-09-07 | End: 2022-09-07

## 2022-09-07 RX ORDER — BACITRACIN, NEOMYCIN, POLYMYXIN B 400; 3.5; 5 [USP'U]/G; MG/G; [USP'U]/G
OINTMENT TOPICAL ONCE
Status: CANCELLED | OUTPATIENT
Start: 2022-09-07 | End: 2022-09-07

## 2022-09-07 RX ORDER — GENTAMICIN SULFATE 1 MG/G
OINTMENT TOPICAL ONCE
Status: CANCELLED | OUTPATIENT
Start: 2022-09-07 | End: 2022-09-07

## 2022-09-07 RX ORDER — LIDOCAINE HYDROCHLORIDE 40 MG/ML
SOLUTION TOPICAL ONCE
Status: CANCELLED | OUTPATIENT
Start: 2022-09-07 | End: 2022-09-07

## 2022-09-07 RX ORDER — BACITRACIN ZINC AND POLYMYXIN B SULFATE 500; 1000 [USP'U]/G; [USP'U]/G
OINTMENT TOPICAL ONCE
Status: CANCELLED | OUTPATIENT
Start: 2022-09-07 | End: 2022-09-07

## 2022-09-07 RX ORDER — LIDOCAINE 50 MG/G
OINTMENT TOPICAL ONCE
Status: CANCELLED | OUTPATIENT
Start: 2022-09-07 | End: 2022-09-07

## 2022-09-07 RX ADMIN — LIDOCAINE HYDROCHLORIDE 10 ML: 40 SOLUTION TOPICAL at 10:11

## 2022-09-07 ASSESSMENT — PAIN SCALES - GENERAL: PAINLEVEL_OUTOF10: 7

## 2022-09-07 ASSESSMENT — PAIN - FUNCTIONAL ASSESSMENT: PAIN_FUNCTIONAL_ASSESSMENT: PREVENTS OR INTERFERES SOME ACTIVE ACTIVITIES AND ADLS

## 2022-09-07 ASSESSMENT — PAIN DESCRIPTION - ORIENTATION: ORIENTATION: RIGHT

## 2022-09-07 ASSESSMENT — PAIN DESCRIPTION - LOCATION: LOCATION: FOOT

## 2022-09-07 ASSESSMENT — PAIN DESCRIPTION - DESCRIPTORS: DESCRIPTORS: BURNING

## 2022-09-07 NOTE — PLAN OF CARE
Problem: Chronic Conditions and Co-morbidities  Goal: Patient's chronic conditions and co-morbidity symptoms are monitored and maintained or improved  Outcome: Progressing     Problem: Pain  Goal: Verbalizes/displays adequate comfort level or baseline comfort level  Outcome: Progressing     Problem: Cognitive:  Goal: Knowledge of wound care  Description: Knowledge of wound care  Outcome: Progressing  Goal: Understands risk factors for wounds  Description: Understands risk factors for wounds  Outcome: Progressing     Problem: Wound:  Goal: Will show signs of wound healing; wound closure and no evidence of infection  Description: Will show signs of wound healing; wound closure and no evidence of infection  Outcome: Progressing

## 2022-09-07 NOTE — PROGRESS NOTES
Wound Healing Center  History and Physical/Consultation  Podiatry    Referring Physician : Gomez Alejandre DO  [facility] MEDICAL/DENTAL FACILITY AT [place]  MEDICAL RECORD NUMBER:  [id number]  AGE: 67 y.o. GENDER: male  : 1945  EPISODE DATE:  2022  Subjective:     Chief Complaint   Patient presents with    Wound Check     Right and Left foot         HISTORY of PRESENT ILLNESS HPI     Cornell Brown is a 67 y.o. male who presents today for wound/ulcer evaluation. History of Wound Context:  The patient has had a wound of right heel diabetic  which was first noted approximately couple months. This has been treated antibiotic . On their initial visit to the wound healing center, 22 ,  the patient has noted that the wound has not been improving. The patient has had similar previous wounds in the past.      Pt is not on abx at time of initial visit. Wound/Ulcer Pain Timing/Severity: constant  Quality of pain: sharp  Severity:  3 / 10   Modifying Factors: Pain worsens with walking  Associated Signs/Symptoms: edema, erythema, and drainage    Ulcer Identification:  Ulcer Type: diabetic  Contributing Factors: edema, venous stasis, lymphedema, diabetes, poor glucose control, and chronic pressure    Diabetic/Pressure/Non Pressure Ulcers onl y:  Ulcer: Diabetic ulcer, fat layer exposed    If patient has diabetic lower extremity wounds  Villegas Classification of diabetic lower extremity wounds:    Grade Description   []  0 No open wound   []  1 Superficial ulcer involving the full skin thickness   [x]  2 Deep ulcer involves ligament, tendon, joint capsule, or fascia  No bone involvement or abscess presence   []  3 Deep Ulcer with abcess formation and/or osteomyelitis   []  4 Localized gangrene   []  5 Extensive gangrene of the foot     Wound: N/A        22  A referal to Dr Kay Correa was made due the increased right leg and foot rest pain and increase of eschar noted to wound and size.   Patient has been compliant with all offloading and treatment instructions, check to see if further conservative or possible surgical intervention candidate to increase healing and blood flow. PAST MEDICAL HISTORY      Diagnosis Date    Atherosclerosis of native artery of right lower extremity with ulceration of heel ([facility] 8/15/2022    Atrial fibrillation (HCC)     Cancer (HCC)     kidney    Chronic renal failure, stage 3b ([facility] [address] 8/15/2022    Diabetes mellitus ([facility] [address]     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[facility] [address]     Hypertension     Ischemic stroke ([facility] [address] 2018    Pacemaker     Paraplegia ([facility] [address]     Sinus node dysfunction ([facility] [address]     Stroke (cerebrum) ([facility] [address] 2018    Thyroid disease     Ulcer of right heel and midfoot with fat layer exposed ([facility] [address] 8/15/2022     Past Surgical History:   Procedure Laterality Date    CARDIAC PACEMAKER PLACEMENT  2014    COLONOSCOPY      EYE SURGERY      KNEE ARTHROSCOPY  right knee    PACEMAKER PLACEMENT      KS COLONOSCOPY FLX DX W/COLLJ SPEC WHEN PFRMD N/A 3/20/2018    COLONOSCOPY DIAGNOSTIC performed by Kemar Jensen MD at [address] [place] [address] EGD PERCUTANEOUS PLACEMENT GASTROSTOMY TUBE N/A 3/29/2018    PEG TUBE/PT.  IN 5507 B performed by Kemar Jensen MD at [address] [place] [address] EGD PERCUTANEOUS PLACEMENT GASTROSTOMY TUBE N/A 2018    EGD PEG TUBE PLACEMENT performed by Kemar Jensen MD at [facility] ENDOSCOPY     Family History   Problem Relation Age of Onset    Heart Disease Mother     Stroke Father      Social History     Tobacco Use    Smoking status: Former     Types: Cigars     Quit date:      Years since quittin.6    Smokeless tobacco: Never   Vaping Use    Vaping Use: Never used   Substance Use Topics    Alcohol use: No    Drug use: No     Allergies   Allergen Reactions    Bee Venom Anaphylaxis     Current Outpatient Medications on File Prior to Encounter   Medication Sig Dispense Refill    Cholecalciferol (VITAMIN D3) 125 MCG (5000 UT) TABS Take 50,000 Units by mouth once a week      loperamide (IMODIUM) 2 MG capsule Take 2 mg by mouth 4 times daily as needed for Diarrhea      Multiple Vitamins-Minerals (THERAPEUTIC MULTIVITAMIN-MINERALS) tablet Take 1 tablet by mouth daily      gabapentin (NEURONTIN) 300 MG capsule Take 1 capsule by mouth nightly for 7 days. 90 capsule 3    levothyroxine (SYNTHROID) 50 MCG tablet Take 1 tablet by mouth in the morning. 30 tablet 3    potassium chloride (KLOR-CON M) 20 MEQ extended release tablet Take 2 tablets by mouth once for 1 dose 60 tablet 3    warfarin (COUMADIN) 5 MG tablet Take 1.5 tablets by mouth in the morning. (Patient taking differently: Take 4 mg by mouth daily)      atorvastatin (LIPITOR) 20 MG tablet Take 1 tablet by mouth nightly 30 tablet 3    magnesium oxide (MAG-OX) 400 (240 Mg) MG tablet Take 1 tablet by mouth every other day 30 tablet     ascorbic acid (VITAMIN C) 500 MG tablet Take 1 tablet by mouth in the morning.  30 tablet 3    acetaminophen (TYLENOL) 325 MG tablet Take 650 mg by mouth every 4 hours as needed      docusate sodium (COLACE) 100 MG capsule TAKE ONE CAPSULE BY MOUTH TWICE DAILY AS NEEDED FOR CONSTIPATION (Patient not taking: No sig reported)      pantoprazole (PROTONIX) 40 MG tablet Take 40 mg by mouth every morning (before breakfast)      amLODIPine (NORVASC) 2.5 MG tablet Take 1 tablet by mouth daily 90 tablet 1    [DISCONTINUED] potassium chloride (KLOR-CON M) 20 MEQ TBCR extended release tablet Take 1 tablet by mouth daily 90 tablet 1    metoprolol tartrate (LOPRESSOR) 25 MG tablet Take 1 tablet by mouth 2 times daily 180 tablet 1    lisinopril (PRINIVIL;ZESTRIL) 10 MG tablet Take 1 tablet by mouth daily 90 tablet 1    metFORMIN (GLUCOPHAGE) 1000 MG tablet Take 1 tablet by mouth 2 times daily 180 tablet 1    [DISCONTINUED] lovastatin (MEVACOR) 20 MG tablet Take 1 tablet by mouth nightly (Patient not taking: Reported on 8/1/2022) 90 tablet 1    omeprazole (PRILOSEC) 20 MG delayed release capsule Take 1 capsule by mouth daily 90 capsule 1    furosemide (LASIX) 40 MG tablet Take 0.5 tablets by mouth daily 45 tablet 2    [DISCONTINUED] blood glucose test strips (EZ SMART BLOOD GLUCOSE TEST) strip 1 each by In Vitro route 2 times daily As needed. 100 each 3     No current facility-administered medications on file prior to encounter.        REVIEW OF SYSTEMS   ROS : All others Negative if blank [], Positive if [x]  General Vascular   [] Fevers [] Claudication   [] Chills [] Rest Pain   Skin Neurologic   [x] Tissue Loss [x] Lower extremity neuropathy     Objective:    /62   Pulse 62   Temp (!) 95.7 °F (35.4 °C) (Tympanic)   Resp 18   Wt 250 lb (113.4 kg)   BMI 31.25 kg/m²   Wt Readings from Last 3 Encounters:   09/07/22 250 lb (113.4 kg)   08/15/22 250 lb (113.4 kg)   08/08/22 250 lb (113.4 kg)       PHYSICAL EXAM   CONSTITUTIONAL:   Awake, alert, cooperative   PSYCHIATRIC :  Oriented to time, place and person      normal insight to disease process  EXTREMITIES:   R LE Open wounds are noted   Skin color is normal   Edema is  noted   Sensation deficient noted- protective    Palpation of the foot does cause pain   4/5 strength DF/PF  L LE Open wounds are not noted   Skin color is abnormal with ulcer   Edema is  noted   Sensation deficit noted - protective   Palpation of the foot does cause pain   4/5 strength DF/PF  R dorsalis pedis 1 L dorsalis pedis 1   R posterior tibial 1 L posterior tibial 1     Assessment:     Problem List Items Addressed This Visit       Ulcer of right heel and midfoot with fat layer exposed ([address] - Primary    Relevant Orders    Initiate Outpatient Wound Care Protocol    Atherosclerosis of native artery of right lower extremity with ulceration of heel ([address]    Relevant Orders    Initiate Outpatient Wound Care Protocol    Diabetic ulcer of left heel associated with type 2 diabetes mellitus, with fat layer exposed ([address]    Relevant Orders    Initiate Outpatient Wound Care Protocol       Pre Debridement Measurements:  Are located in the [place]  Documentation Flow Sheet  Post Debridement Measurements:  Wound/Ulcer Descriptions are Pre Debridement except measurements:     Wound 08/15/22 Heel Right #1 (Active)   Wound Image   08/15/22 1342   Wound Etiology Pressure Unstageable 08/15/22 1342   Dressing Status New dressing applied 09/07/22 1052   Wound Cleansed Cleansed with saline 09/07/22 1052   Dressing/Treatment Hydrating gel;ABD;Roll gauze 09/07/22 1052   Offloading for Diabetic Foot Ulcers Post op shoe 09/07/22 1052   Wound Length (cm) 5.8 cm 09/07/22 1007   Wound Width (cm) 7.2 cm 09/07/22 1007   Wound Depth (cm) 0.1 cm 09/07/22 1007   Wound Surface Area (cm^2) 41.76 cm^2 09/07/22 1007   Change in Wound Size % (l*w) -6.8 09/07/22 1007   Wound Volume (cm^3) 4.176 cm^3 09/07/22 1007   Wound Healing % 47 09/07/22 1007   Post-Procedure Length (cm) 5 cm 08/24/22 1110   Post-Procedure Width (cm) 8 cm 08/24/22 1110   Post-Procedure Depth (cm) 0.3 cm 08/24/22 1110   Post-Procedure Surface Area (cm^2) 40 cm^2 08/24/22 1110   Post-Procedure Volume (cm^3) 12 cm^3 08/24/22 1110   Wound Assessment Eschar dry;Pink/red 09/07/22 1007   Drainage Amount Small 09/07/22 1007   Drainage Description Serosanguinous 09/07/22 1007   Odor None 09/07/22 1007   Tory-wound Assessment Dry/flaky; Intact 09/07/22 1007   Margins Attached edges 08/15/22 1342   Wound Thickness Description not for Pressure Injury Full thickness 08/15/22 1342   Number of days: 22          Procedure Note  Indications:  Based on my examination of this patient's wound(s)/ulcer(s) today, debridement is required to promote healing and evaluate the wound base.     Performed by: Max Mccullough DPM    Consent obtained:  Yes    Time out taken:  Yes    Pain Control: Anesthetic  Anesthetic: 4% Lidocaine Liquid Topical     Debridement:Excisional Debridement    Using #15 blade scalpel the wound(s)/ulcer(s) was/were sharply debrided down through and including the removal of subcutaneous tissue. Devitalized Tissue Debrided:  fibrin, biofilm, slough, and necrotic/eschar to stimulate bleeding to promote healing, post debridement good bleeding base and wound edges noted    Wound/Ulcer #: 1    Percent of Wound/Ulcer Debrided: 0%    Total Surface Area Debrided:  41 sq cm     Estimated Blood Loss:  Minimal  Hemostasis Achieved:  by pressure    Procedural Pain:  2  / 10   Post Procedural Pain:  2 / 10     Response to treatment:  Well tolerated by patient. Plan:     Pt is not a smoker   - Discussed relationship of smoking and negative affects on wound healing   - Emphasized importance of tobacco avoidace/cessation   - Script for nicotine patch given to patient   - Information regarding support groups for smoking cessation given    In my professional opinion and based off the information that is available at this time this patient has appropriate indication for HBO Therapy: Maybe    Treatment Note please see attached Discharge Instructions    Written patient dismissal instructions given to patient and signed by patient or POA. Discharge Instructions         Visit Discharge/Physician Orders     Discharge condition: Stable     Assessment of pain at discharge: minimal     Anesthetic used: 4% external lidocaine solution     Discharge to: ECF     Left via:Private automobile     Accompanied by: accompanied by child     ECF/[facility]: [facility] *Note NEW orders*     Dressing Orders: To right heel wound cleanse with normal saline solution, apply santyl ABD, and kerlix. CHANGE DAILY     Treatment Orders:   HBO Concult      Prevalon Boot prescription given, obtain ASAP to keep pressure off wound. Keep pressure off of wound  FOLLOW NUTRITIOUS DIET. CHOOSE FOODS HIGH IN PROTEIN -CHICKEN- FISH-AND EGGS,  CHOOSE FOODS HIGH IN VITAMIN C.   MULTIVITAMIN DAILY.         [facility] followup visit __________1weeks_____reinier___________  (Please note your next appointment above and if you are unable to keep, kindly give a 24 hour notice. Thank you.)     Physician signature:__________________________        If you experience any of the following, please call the [employer] [employer] during business hours:     * Increase in Pain  * Temperature over 101  * Increase in drainage from your wound  * Drainage with a foul odor  * Bleeding  * Increase in swelling  * Need for compression bandage changes due to slippage, breakthrough drainage. If you need medical attention outside of the business hours of the [facility] [place]note 2[place] [employer] please contact your PCP or go to the nearest emergency room.       Electronically signed by Nancy Menjivar DPM on 9/7/2022 at 11:00 AM

## 2022-09-13 ENCOUNTER — HOSPITAL ENCOUNTER (OUTPATIENT)
Dept: WOUND CARE | Age: 77
Discharge: HOME | End: 2022-09-13
Payer: MEDICARE

## 2022-09-13 VITALS
RESPIRATION RATE: 20 BRPM | TEMPERATURE: 96.1 F | DIASTOLIC BLOOD PRESSURE: 62 MMHG | WEIGHT: 250 LBS | BODY MASS INDEX: 31.08 KG/M2 | HEIGHT: 75 IN | SYSTOLIC BLOOD PRESSURE: 142 MMHG | HEART RATE: 82 BPM

## 2022-09-13 DIAGNOSIS — I70.209 FEMORAL-POPLITEAL ATHEROSCLEROSIS (HCC): ICD-10-CM

## 2022-09-13 DIAGNOSIS — I70.234 ATHEROSCLEROSIS OF NATIVE ARTERY OF RIGHT LOWER EXTREMITY WITH ULCERATION OF HEEL (HCC): ICD-10-CM

## 2022-09-13 DIAGNOSIS — N18.32 CHRONIC RENAL FAILURE, STAGE 3B (HCC): ICD-10-CM

## 2022-09-13 DIAGNOSIS — L97.422 DIABETIC ULCER OF LEFT HEEL ASSOCIATED WITH TYPE 2 DIABETES MELLITUS, WITH FAT LAYER EXPOSED (HCC): ICD-10-CM

## 2022-09-13 DIAGNOSIS — E11.621 DIABETIC ULCER OF LEFT HEEL ASSOCIATED WITH TYPE 2 DIABETES MELLITUS, WITH FAT LAYER EXPOSED (HCC): ICD-10-CM

## 2022-09-13 DIAGNOSIS — L97.412 ULCER OF RIGHT HEEL AND MIDFOOT WITH FAT LAYER EXPOSED (HCC): Primary | ICD-10-CM

## 2022-09-13 DIAGNOSIS — Z86.718 H/O DEEP VENOUS THROMBOSIS: ICD-10-CM

## 2022-09-13 DIAGNOSIS — I96 NECROTIC ESCHAR (HCC): ICD-10-CM

## 2022-09-13 PROBLEM — J18.9 PNEUMONIA: Status: RESOLVED | Noted: 2022-08-01 | Resolved: 2022-09-13

## 2022-09-13 LAB
INR BLD: 1.6
PROTHROMBIN TIME: 17.7 SEC (ref 9.3–12.4)

## 2022-09-13 PROCEDURE — 11042 DBRDMT SUBQ TIS 1ST 20SQCM/<: CPT

## 2022-09-13 PROCEDURE — 6370000000 HC RX 637 (ALT 250 FOR IP): Performed by: SURGERY

## 2022-09-13 PROCEDURE — 99214 OFFICE O/P EST MOD 30 MIN: CPT | Performed by: SURGERY

## 2022-09-13 PROCEDURE — 99214 OFFICE O/P EST MOD 30 MIN: CPT

## 2022-09-13 PROCEDURE — 11042 DBRDMT SUBQ TIS 1ST 20SQCM/<: CPT | Performed by: SURGERY

## 2022-09-13 RX ORDER — LIDOCAINE 50 MG/G
OINTMENT TOPICAL ONCE
OUTPATIENT
Start: 2022-09-13 | End: 2022-09-13

## 2022-09-13 RX ORDER — LIDOCAINE 40 MG/G
CREAM TOPICAL ONCE
OUTPATIENT
Start: 2022-09-13 | End: 2022-09-13

## 2022-09-13 RX ORDER — BACITRACIN, NEOMYCIN, POLYMYXIN B 400; 3.5; 5 [USP'U]/G; MG/G; [USP'U]/G
OINTMENT TOPICAL ONCE
OUTPATIENT
Start: 2022-09-13 | End: 2022-09-13

## 2022-09-13 RX ORDER — BETAMETHASONE DIPROPIONATE 0.05 %
OINTMENT (GRAM) TOPICAL ONCE
OUTPATIENT
Start: 2022-09-13 | End: 2022-09-13

## 2022-09-13 RX ORDER — GINSENG 100 MG
CAPSULE ORAL ONCE
OUTPATIENT
Start: 2022-09-13 | End: 2022-09-13

## 2022-09-13 RX ORDER — GENTAMICIN SULFATE 1 MG/G
OINTMENT TOPICAL ONCE
OUTPATIENT
Start: 2022-09-13 | End: 2022-09-13

## 2022-09-13 RX ORDER — LIDOCAINE HYDROCHLORIDE 40 MG/ML
SOLUTION TOPICAL ONCE
Status: COMPLETED | OUTPATIENT
Start: 2022-09-13 | End: 2022-09-13

## 2022-09-13 RX ORDER — LIDOCAINE HYDROCHLORIDE 20 MG/ML
JELLY TOPICAL ONCE
OUTPATIENT
Start: 2022-09-13 | End: 2022-09-13

## 2022-09-13 RX ORDER — LIDOCAINE HYDROCHLORIDE 40 MG/ML
SOLUTION TOPICAL ONCE
OUTPATIENT
Start: 2022-09-13 | End: 2022-09-13

## 2022-09-13 RX ORDER — CLOBETASOL PROPIONATE 0.5 MG/G
OINTMENT TOPICAL ONCE
OUTPATIENT
Start: 2022-09-13 | End: 2022-09-13

## 2022-09-13 RX ORDER — BACITRACIN ZINC AND POLYMYXIN B SULFATE 500; 1000 [USP'U]/G; [USP'U]/G
OINTMENT TOPICAL ONCE
OUTPATIENT
Start: 2022-09-13 | End: 2022-09-13

## 2022-09-13 RX ADMIN — LIDOCAINE HYDROCHLORIDE 15 ML: 40 SOLUTION TOPICAL at 09:23

## 2022-09-13 NOTE — PLAN OF CARE
Problem: Chronic Conditions and Co-morbidities  Goal: Patient's chronic conditions and co-morbidity symptoms are monitored and maintained or improved  Outcome: Progressing     Problem: Cognitive:  Goal: Knowledge of wound care  Description: Knowledge of wound care  Outcome: Progressing  Goal: Understands risk factors for wounds  Description: Understands risk factors for wounds  Outcome: Progressing     Problem: Wound:  Goal: Will show signs of wound healing; wound closure and no evidence of infection  Description: Will show signs of wound healing; wound closure and no evidence of infection  Outcome: Progressing     Problem: Pain  Goal: Verbalizes/displays adequate comfort level or baseline comfort level  Outcome: Progressing

## 2022-09-13 NOTE — PROGRESS NOTES
Wound Healing Center Followup Visit Note    Referring Physician : Figueroa Noguera DO  [address] [facility] RECORD NUMBER:  [id number]  AGE: 67 y.o. GENDER: male  : 1945  EPISODE DATE:  2022    Subjective:     Chief Complaint   Patient presents with    Wound Check     Right heel      HISTORY of PRESENT ILLNESS HPI   Toño Pruitt is a 67 y.o. male who presents today in regards to follow up evaluation and treatment of wound/ulcer. That patient's past medical, family and social hx were reviewed and changes were made if present. History of Wound Context:  The patient has had a wound of right heel, noted at the nursing home facility, few weeks ago, post patient being hospitalized there after left nephrectomy for malignancy at the [facility] clinic in July which was first noted approximately a few weeks ago. This has been treated by the facility. On their initial visit to the wound healing center, 08/15/22, the patient has noted that the wound has not been improving.   The patient has not had similar previous wounds in the past.      The patient was referred to Dr. Marie Crabtree by Dr. Nicol Jama, whom he sees in this private office and not at the wound care center [facility] no records were available today of his podiatrist     Patient came to the wound care center with his son, Cyril Castañeda.,    Patient has chronic kidney disease, follows up with Dr. Juan Alves, stage IIIb with a creatinine of 2,, underwent left nephrectomy at the [facility] clinic last month     Patient also has underlying coronary artery disease, cardiac arrhythmia post pacemaker consider long-term anticoagulant Coumadin, but according to the notes reviewed, has not seen cardiology for last 3 years     Patient was able to walk short distances with help of walker at home prior to surgery, currently undergoing rehab at the facility           Pt is currently not on abx.       2022  Patient unable to, last few weeks after the lab work, was following up with his podiatrist at the [facility], who referred the patient back to the wound care center at the main campus because of worsening of the right heel  Patient came to the wound care, with his son Nathan Hopkins telephone #[phone] works as a superintendent at the school system  Patient denies any history of fever or chills  Patient on anticoagulation with Coumadin, not sure why, probably because of history of DVT, history of strokes etc., no evidence of atrial fibrillation, patient does have a pacemaker  Today because of the worsening of the right heel ulcer due to pressure necrosis, because of compromised arterial blood flow as documented on the recent lower extremity arterial Doppler study    Discussed with patient and his son, Nathan Hopkins, telephone #[phone], works as a superintendent in at the [employer], informed him, careful review, all the ankle-brachial index was done recently indicated significant femoral-popliteal mainly tibial and small vessel disease, with adequate flow to the ankle but metatarsal pulse well recording was not done, the pulse volume recordings over the toes of the right foot are markedly diminished, also the ankle Doppler tracing on the right side is monophasic, and on the left, biphasic, overall satisfactory blood flow to the left foot but not to the right foot  Inform ideally patient would benefit from improvement of blood flow to the right foot to promote wound healing, that may require angiography, if a suitable lesion is identified at the time of angiogram, angioplasty, atherectomy, can be done by Dr. Fausto Gupta and associates, as the patient was referred by Dr. Jaswinder Russell to Dr. Fausto Gupta  Discussed the patient and his son, that have personally discussed with his nephrologist Dr. Masha Eugene, to limit the contrast, less than 50 cc, hydrate the patient and peripherally be admitted to monitor renal function next day  Patient and son understand, in spite of the above, the kidney may develop acute kidney injury to the point that he may require hemodialysis  The risks of the angiogram procedure itself were also discussed  While the patient and son, agree that they would proceed with angiogram, as they do understand, nothing is done, disclosed in the proximal amputation of the leg, they were clearly explained, if any intervention planned, in spite of that, may not be help to heal the right ankle ulcer and may still end up with possibility of an amputation  They clearly understand the implications and agree  Informed them that I will make arrangements through my office for the angiogram to be done by Dr. Garth Bassett and associates, will bridge with Lovenox pending the angiogram as was done prior to his nephrectomy in [place]  All their questions were answered         Wound/Ulcer Pain Timing/Severity: constant  Quality of pain: dull, aching  Severity:  2 / 10   Modifying Factors: None  Associated Signs/Symptoms: drainage and pain     Ulcer Identification:  Ulcer Type: arterial, diabetic, pressure, and non-healing/non-surgical  Contributing Factors: diabetes, shear force, and arterial insufficiency     Diabetic/Pressure/Non Pressure Ulcers only:  Ulcer: Diabetic ulcer, fat layer exposed     If patient has diabetic lower extremity wounds  Villegas Classification of diabetic lower extremity wounds:     Grade Description   []  0 No open wound   []  1 Superficial ulcer involving the full skin thickness   []  2 Deep ulcer involves ligament, tendon, joint capsule, or fascia  No bone involvement or abscess presence   []  3 Deep Ulcer with abcess formation and/or osteomyelitis   []  4 Localized gangrene   []  5 Extensive gangrene of the foot      Wound: Patient does have a right heel eschar with fat layer exposed, without cellulitis or purulent drainage, please look at the wound care notes for detailed measurements     Other pertinent information:  Previous records indicated, the following  1. Status post pacemaker insertion, last seen by EP cardiologist 3 years ago  2. Last lab work done, 9 August, BUN 34 creatinine 2.0 GFR 32, PT INR 3.4 on Coumadin therapy  3.   Status post left nephrectomy done in July at the [facility] for malignancy        8/15/2022  Discussed with patient and his son, Rio Live, telephone #[phone], works as a superintendent in at [employer], informed him, careful review, all the ankle-brachial index was done recently indicated significant femoral-popliteal mainly tibial and small vessel disease, with adequate flow to the ankle myeloma but metatarsal pulse well recording was not done, the pulse volume recordings over the toes of the right foot are markedly diminished, also the ankle Doppler tracing on the right side is monophasic, and on the left, biphasic, overall satisfactory blood flow to the left foot but not to the right foot  Inform ideally patient would benefit from improvement of blood flow to the right foot to promote wound healing, that may require angiography, if a suitable lesion is identified at the time of angiogram, angioplasty, atherectomy, can be done by Dr. Fidencio David and associates, to home patient was referred by Dr. Desiree Le  Patient was recommended BMP to be done at the facility, fax results to our office to the wound care, then discussed with the patient nephrologist Dr. 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[address]     Femoral-popliteal atherosclerosis ([address] 9/13/2022    Hypertension     Ischemic stroke ([address] 03/06/2018    Pacemaker     Paraplegia ([address]     Sinus node dysfunction [facility])     Stroke (cerebrum) ([facility] 2018    Thyroid disease     Ulcer of right heel and midfoot with fat layer exposed ([facility] 8/15/2022     Past Surgical History:   Procedure Laterality Date    CARDIAC PACEMAKER PLACEMENT  2014    COLONOSCOPY      EYE SURGERY      KNEE ARTHROSCOPY  right knee    PACEMAKER PLACEMENT      VT COLONOSCOPY FLX DX W/COLLJ SPEC WHEN PFRMD N/A 3/20/2018    COLONOSCOPY DIAGNOSTIC performed by Ramesh Marvin MD at [address] EGD PERCUTANEOUS PLACEMENT GASTROSTOMY TUBE N/A 3/29/2018    PEG TUBE/PT. IN 5507 B performed by Ramesh Marvin MD at [address] EGD PERCUTANEOUS PLACEMENT GASTROSTOMY TUBE N/A 2018    EGD PEG TUBE PLACEMENT performed by Ramesh Marvin MD at [facility] ENDOSCOPY     Family History   Problem Relation Age of Onset    Heart Disease Mother     Stroke Father      Social History     Tobacco Use    Smoking status: Former     Types: Cigars     Quit date:      Years since quittin.7    Smokeless tobacco: Never   Vaping Use    Vaping Use: Never used   Substance Use Topics    Alcohol use: No    Drug use: No     Allergies   Allergen Reactions    Bee Venom Anaphylaxis     Current Outpatient Medications on File Prior to Encounter   Medication Sig Dispense Refill    collagenase 250 UNIT/GM ointment Apply topically daily Apply topically daily. Cholecalciferol (VITAMIN D3) 125 MCG (5000 UT) TABS Take 50,000 Units by mouth once a week      loperamide (IMODIUM) 2 MG capsule Take 2 mg by mouth 4 times daily as needed for Diarrhea      Multiple Vitamins-Minerals (THERAPEUTIC MULTIVITAMIN-MINERALS) tablet Take 1 tablet by mouth daily      gabapentin (NEURONTIN) 300 MG capsule Take 1 capsule by mouth nightly for 7 days. 90 capsule 3    levothyroxine (SYNTHROID) 50 MCG tablet Take 1 tablet by mouth in the morning.  30 tablet 3    potassium chloride (KLOR-CON M) 20 MEQ extended release tablet Take 2 tablets by mouth once for 1 dose 60 tablet 3 warfarin (COUMADIN) 5 MG tablet Take 1.5 tablets by mouth in the morning. (Patient taking differently: Take 4 mg by mouth daily)      atorvastatin (LIPITOR) 20 MG tablet Take 1 tablet by mouth nightly 30 tablet 3    magnesium oxide (MAG-OX) 400 (240 Mg) MG tablet Take 1 tablet by mouth every other day 30 tablet     ascorbic acid (VITAMIN C) 500 MG tablet Take 1 tablet by mouth in the morning. 30 tablet 3    acetaminophen (TYLENOL) 325 MG tablet Take 650 mg by mouth every 4 hours as needed      docusate sodium (COLACE) 100 MG capsule TAKE ONE CAPSULE BY MOUTH TWICE DAILY AS NEEDED FOR CONSTIPATION (Patient not taking: No sig reported)      pantoprazole (PROTONIX) 40 MG tablet Take 40 mg by mouth every morning (before breakfast)      amLODIPine (NORVASC) 2.5 MG tablet Take 1 tablet by mouth daily 90 tablet 1    [DISCONTINUED] potassium chloride (KLOR-CON M) 20 MEQ TBCR extended release tablet Take 1 tablet by mouth daily 90 tablet 1    metoprolol tartrate (LOPRESSOR) 25 MG tablet Take 1 tablet by mouth 2 times daily 180 tablet 1    lisinopril (PRINIVIL;ZESTRIL) 10 MG tablet Take 1 tablet by mouth daily 90 tablet 1    metFORMIN (GLUCOPHAGE) 1000 MG tablet Take 1 tablet by mouth 2 times daily 180 tablet 1    [DISCONTINUED] lovastatin (MEVACOR) 20 MG tablet Take 1 tablet by mouth nightly (Patient not taking: Reported on 8/1/2022) 90 tablet 1    omeprazole (PRILOSEC) 20 MG delayed release capsule Take 1 capsule by mouth daily 90 capsule 1    furosemide (LASIX) 40 MG tablet Take 0.5 tablets by mouth daily 45 tablet 2    [DISCONTINUED] blood glucose test strips (EZ SMART BLOOD GLUCOSE TEST) strip 1 each by In Vitro route 2 times daily As needed. 100 each 3     No current facility-administered medications on file prior to encounter.        REVIEW OF SYSTEMS See HPI    Objective:    BP (!) 142/62   Pulse 82   Temp (!) 96.1 °F (35.6 °C) (Temporal)   Resp 20   Ht 6' 3\" (1.905 m)   Wt 250 lb (113.4 kg)   BMI 31.25 kg/m² Wt Readings from Last 3 Encounters:   09/13/22 250 lb (113.4 kg)   09/07/22 250 lb (113.4 kg)   08/15/22 250 lb (113.4 kg)     PHYSICAL EXAM  CONSTITUTIONAL:   Awake, alert, cooperative   EYES:  lids and lashes normal   ENT: external ears and nose without lesions   NECK:  supple, symmetrical, trachea midline   SKIN:  Open wound Present    Assessment:     Problem List Items Addressed This Visit          Medium    Atherosclerosis of native artery of right lower extremity with ulceration of heel (HCC)    Relevant Orders    Initiate Outpatient Wound Care Protocol    Chronic renal failure, stage 3b (HCC)    Femoral-popliteal atherosclerosis (HCC)    Necrotic eschar (HCC)    Ulcer of right heel and midfoot with fat layer exposed ([address] - Primary    Relevant Orders    Initiate Outpatient Wound Care Protocol       Unprioritized    Diabetic ulcer of left heel associated with type 2 diabetes mellitus, with fat layer exposed ([facility]    Relevant Orders    Initiate Outpatient Wound Care Protocol    H/O deep venous thrombosis       Pre Debridement Measurements:  Are located in the [facility]  Documentation Flow Sheet  Post Debridement Measurements:  Wound/Ulcer Descriptions are Pre Debridement except measurements:    Wound 08/15/22 Heel Right #1 (Active)   Wound Image   09/13/22 0912   Wound Etiology Pressure Unstageable 08/15/22 1342   Dressing Status New dressing applied 09/07/22 1052   Wound Cleansed Cleansed with saline 09/07/22 1052   Dressing/Treatment Hydrating gel;ABD;Roll gauze 09/07/22 1052   Offloading for Diabetic Foot Ulcers Post op shoe 09/07/22 1052   Wound Length (cm) 7.1 cm 09/13/22 0912   Wound Width (cm) 7.5 cm 09/13/22 0912   Wound Depth (cm) 0.1 cm 09/13/22 0912   Wound Surface Area (cm^2) 53.25 cm^2 09/13/22 0912   Change in Wound Size % (l*w) -36.19 09/13/22 0912   Wound Volume (cm^3) 5.325 cm^3 09/13/22 0912   Wound Healing % 32 09/13/22 0912   Post-Procedure Length (cm) 7.3 cm 09/13/22 0942 Post-Procedure Width (cm) 7.5 cm 09/13/22 0942   Post-Procedure Depth (cm) 0.5 cm 09/13/22 0942   Post-Procedure Surface Area (cm^2) 54.75 cm^2 09/13/22 0942   Post-Procedure Volume (cm^3) 27.375 cm^3 09/13/22 0942   Wound Assessment Eschar dry;Fibrin 09/13/22 0912   Drainage Amount Moderate 09/13/22 0912   Drainage Description Yellow 09/13/22 0912   Odor None 09/13/22 0912   Tory-wound Assessment Maceration; Hyperkeratosis (callous) 09/13/22 0912   Margins Attached edges 08/15/22 1342   Wound Thickness Description not for Pressure Injury Full thickness 08/15/22 1342   Number of days: 28          Procedure Note  Indications:  Based on my examination of this patient's wound(s)/ulcer(s) today, debridement is required to promote healing and evaluate the wound base. Performed by: Noris Guadarrama MD    Consent obtained:  Yes    Time out taken:  Yes    Pain Control: Anesthetic  Anesthetic: 4% Lidocaine Liquid Topical     Debridement:Excisional Debridement    Using curette, scissors, and forceps the wound(s)/ulcer(s) was/were sharply debrided down through and including the removal of epidermis, dermis, and subcutaneous tissue. Devitalized Tissue Debrided:  fibrin, slough, and necrotic/eschar to stimulate bleeding to promote healing, post debridement good bleeding base and wound edges noted    Wound/Ulcer #: 1    Percent of Wound/Ulcer Debrided: 30%    Total Surface Area Debrided:  15 sq cm     Estimated Blood Loss:  Minimal  Hemostasis Achieved:  by pressure    Procedural Pain:  4  / 10   Post Procedural Pain:  3 / 10     Response to treatment:  Well tolerated by patient. Plan:   Treatment Note please see attached Discharge Instructions    Written patient dismissal instructions given to patient and signed by patient or POA.          Discharge Instructions         Visit Discharge/Physician Orders     Discharge condition: Stable     Assessment of pain at discharge: minimal     Anesthetic used: 4% external lidocaine solution     Discharge to: ECF     Left via:Private automobile     Accompanied by: accompanied by child     ECF/HHA: [facility] Deshpande *Note NEW orders*     Dressing Orders: To right heel wound cleanse with normal saline solution, apply santyl ABD, and kerlix. Pad anterior foot with foam CHANGE DAILY     Treatment Orders:  Angiogram to be scheduled with Dr. Brooke Epperson [facility]      Prevalon Boot prescription given, obtain ASAP to keep pressure off wound. TO WEAR PREVALON BOOT AT ALL TIMES EXCEPT DURING THERAPY     Keep pressure off of wound    FOLLOW NUTRITIOUS DIET. CHOOSE FOODS HIGH IN PROTEIN -CHICKEN- FISH-AND EGGS,  CHOOSE FOODS HIGH IN VITAMIN C.   MULTIVITAMIN DAILY. [facility] followup visit __________1weeks_____kollipara___________  (Please note your next appointment above and if you are unable to keep, kindly give a 24 hour notice. Thank you.)     Physician signature:__________________________        If you experience any of the following, please call the [address] [place] [address] [employer] during business hours:     * Increase in Pain  * Temperature over 101  * Increase in drainage from your wound  * Drainage with a foul odor  * Bleeding  * Increase in swelling  * Need for compression bandage changes due to slippage, breakthrough drainage. If you need medical attention outside of the business hours of the [address] [place] [address] [employer] please contact your PCP or go to the nearest emergency room.                       Electronically signed by Brenna Baird MD on 9/13/2022 at 10:58 AM

## 2022-09-13 NOTE — DISCHARGE INSTRUCTIONS
Visit Discharge/Physician Orders     Discharge condition: Stable     Assessment of pain at discharge: minimal     Anesthetic used: 4% external lidocaine solution     Discharge to: ECF     Left via:Private automobile     Accompanied by: accompanied by child     TIFFANYF/HHA: [facility] *Note NEW orders*     Dressing Orders: To right heel wound cleanse with normal saline solution, apply santyl ABD, and kerlix. Pad anterior foot with foam CHANGE DAILY     Treatment Orders:  Angiogram to be scheduled with Dr. Liseth Tellez [facility]      Prevalon Boot prescription given, obtain ASAP to keep pressure off wound. TO WEAR PREVALON BOOT AT ALL TIMES EXCEPT DURING THERAPY     Keep pressure off of wound    FOLLOW NUTRITIOUS DIET. CHOOSE FOODS HIGH IN PROTEIN -CHICKEN- FISH-AND EGGS,  CHOOSE FOODS HIGH IN VITAMIN C.   MULTIVITAMIN DAILY. [facility] followup visit __________1weeks_____kollipara___________  (Please note your next appointment above and if you are unable to keep, kindly give a 24 hour notice. Thank you.)     Physician signature:__________________________        If you experience any of the following, please call the [address] during business hours:     * Increase in Pain  * Temperature over 101  * Increase in drainage from your wound  * Drainage with a foul odor  * Bleeding  * Increase in swelling  * Need for compression bandage changes due to slippage, breakthrough drainage. If you need medical attention outside of the business hours of the [address] please contact your PCP or go to the nearest emergency room.

## 2022-09-13 NOTE — FLOWSHEET NOTE
Mr Shaan Lam presented to the [facility] with his right anterior ankle purple red discolored due to pressure. Was encouraged to be out of his heel offloading shoe and in bed more with heel free hanging. Patient stated he is in his wheelchair most of the day. Patient states he has a prevalon boot for in bed and was encouraged to wear it.

## 2022-09-14 ENCOUNTER — TELEPHONE (OUTPATIENT)
Dept: VASCULAR SURGERY | Age: 77
End: 2022-09-14

## 2022-09-14 NOTE — TELEPHONE ENCOUNTER
Scheduled an abdominal aortogram possible intervention with Dr. Tejas Leon 9/26/22 at 10:30 a.m  Jacquelin at [facility] notified and instructed to have the pt report to [facility] 1st floor registration at 6:15 a.m for pre-hydration, be NPO after midnight the night before except heart and/or BP meds in a.m with sips of water, hold metformin day of and 2 days after, hold Lasix day of, hold Coumadin x 3 days and start Lovenox 60 mg SQ QD except day of angiogram.

## 2022-09-20 LAB
INR BLD: 1.8
PROTHROMBIN TIME: 19.2 SEC (ref 9.3–12.4)

## 2022-09-20 NOTE — DISCHARGE INSTRUCTIONS
Visit Discharge/Physician Orders     Discharge condition: Stable     Assessment of pain at discharge: minimal     Anesthetic used: 4% external lidocaine solution     Discharge to: ECF     Left via:Private automobile     Accompanied by: accompanied by child     ECF/HHA: Wendy Dumont *Note NEW orders*     Dressing Orders: To right heel wound cleanse with normal saline solution, apply santyl ABD, and kerlix. Pad anterior foot with foam CHANGE DAILY     Treatment Orders:  Angiogram to be scheduled with Dr. Jorge Alberto [place] [facility]      Prevalon Boot prescription given, obtain ASAP to keep pressure off wound. TO WEAR PREVALON BOOT AT ALL TIMES EXCEPT DURING THERAPY     Keep pressure off of wound     FOLLOW NUTRITIOUS DIET. CHOOSE FOODS HIGH IN PROTEIN -CHICKEN- FISH-AND EGGS,  CHOOSE FOODS HIGH IN VITAMIN C.   MULTIVITAMIN DAILY. [facility] followup visit __________1weeks_____kollipara___________  (Please note your next appointment above and if you are unable to keep, kindly give a 24 hour notice. Thank you.)     Physician signature:__________________________        If you experience any of the following, please call the [address] during business hours:     * Increase in Pain  * Temperature over 101  * Increase in drainage from your wound  * Drainage with a foul odor  * Bleeding  * Increase in swelling  * Need for compression bandage changes due to slippage, breakthrough drainage. If you need medical attention outside of the business hours of the [address] please contact your PCP or go to the nearest emergency room.

## 2022-09-21 ENCOUNTER — HOSPITAL ENCOUNTER (OUTPATIENT)
Dept: WOUND CARE | Age: 77
Discharge: HOME | End: 2022-09-21

## 2022-09-21 ENCOUNTER — HOSPITAL ENCOUNTER (INPATIENT)
Age: 77
LOS: 8 days | Discharge: ANOTHER HOSPITAL | DRG: 641 | End: 2022-09-29
Attending: EMERGENCY MEDICINE | Admitting: INTERNAL MEDICINE
Payer: MEDICARE

## 2022-09-21 ENCOUNTER — APPOINTMENT (OUTPATIENT)
Dept: CT IMAGING | Age: 77
DRG: 641 | End: 2022-09-21
Payer: MEDICARE

## 2022-09-21 ENCOUNTER — APPOINTMENT (OUTPATIENT)
Dept: GENERAL RADIOLOGY | Age: 77
DRG: 641 | End: 2022-09-21
Payer: MEDICARE

## 2022-09-21 DIAGNOSIS — E87.5 HYPERKALEMIA: Primary | ICD-10-CM

## 2022-09-21 DIAGNOSIS — R42 DIZZINESS: ICD-10-CM

## 2022-09-21 DIAGNOSIS — N17.9 ACUTE KIDNEY INJURY SUPERIMPOSED ON CHRONIC KIDNEY DISEASE (HCC): ICD-10-CM

## 2022-09-21 DIAGNOSIS — N18.9 ACUTE KIDNEY INJURY SUPERIMPOSED ON CHRONIC KIDNEY DISEASE (HCC): ICD-10-CM

## 2022-09-21 DIAGNOSIS — R77.8 ELEVATED TROPONIN: ICD-10-CM

## 2022-09-21 LAB
ALBUMIN SERPL-MCNC: 3.5 G/DL (ref 3.5–5.2)
ALP BLD-CCNC: 97 U/L (ref 40–129)
ALT SERPL-CCNC: 7 U/L (ref 0–40)
ANION GAP SERPL CALCULATED.3IONS-SCNC: 11 MMOL/L (ref 7–16)
AST SERPL-CCNC: 21 U/L (ref 0–39)
BASOPHILS ABSOLUTE: 0.05 E9/L (ref 0–0.2)
BASOPHILS RELATIVE PERCENT: 0.5 % (ref 0–2)
BILIRUB SERPL-MCNC: 0.3 MG/DL (ref 0–1.2)
BUN BLDV-MCNC: 57 MG/DL (ref 6–23)
CALCIUM SERPL-MCNC: 9.7 MG/DL (ref 8.6–10.2)
CHLORIDE BLD-SCNC: 103 MMOL/L (ref 98–107)
CHP ED QC CHECK: YES
CO2: 20 MMOL/L (ref 22–29)
CREAT SERPL-MCNC: 2.8 MG/DL (ref 0.7–1.2)
EOSINOPHILS ABSOLUTE: 0.28 E9/L (ref 0.05–0.5)
EOSINOPHILS RELATIVE PERCENT: 3 % (ref 0–6)
GFR AFRICAN AMERICAN: 27
GFR NON-AFRICAN AMERICAN: 22 ML/MIN/1.73
GLUCOSE BLD-MCNC: 117 MG/DL
GLUCOSE BLD-MCNC: 125 MG/DL (ref 74–99)
HCT VFR BLD CALC: 32.3 % (ref 37–54)
HEMOGLOBIN: 10 G/DL (ref 12.5–16.5)
IMMATURE GRANULOCYTES #: 0.04 E9/L
IMMATURE GRANULOCYTES %: 0.4 % (ref 0–5)
INR BLD: 2.1
LYMPHOCYTES ABSOLUTE: 1.07 E9/L (ref 1.5–4)
LYMPHOCYTES RELATIVE PERCENT: 11.6 % (ref 20–42)
MCH RBC QN AUTO: 28.2 PG (ref 26–35)
MCHC RBC AUTO-ENTMCNC: 31 % (ref 32–34.5)
MCV RBC AUTO: 91 FL (ref 80–99.9)
METER GLUCOSE: 117 MG/DL (ref 74–99)
METER GLUCOSE: 167 MG/DL (ref 74–99)
MONOCYTES ABSOLUTE: 0.69 E9/L (ref 0.1–0.95)
MONOCYTES RELATIVE PERCENT: 7.5 % (ref 2–12)
NEUTROPHILS ABSOLUTE: 7.13 E9/L (ref 1.8–7.3)
NEUTROPHILS RELATIVE PERCENT: 77 % (ref 43–80)
PDW BLD-RTO: 14.2 FL (ref 11.5–15)
PLATELET # BLD: 322 E9/L (ref 130–450)
PMV BLD AUTO: 10.1 FL (ref 7–12)
POTASSIUM REFLEX MAGNESIUM: 6.2 MMOL/L (ref 3.5–5)
PRO-BNP: 807 PG/ML (ref 0–450)
PROTHROMBIN TIME: 23.4 SEC (ref 9.3–12.4)
RBC # BLD: 3.55 E12/L (ref 3.8–5.8)
SODIUM BLD-SCNC: 134 MMOL/L (ref 132–146)
TOTAL PROTEIN: 7.5 G/DL (ref 6.4–8.3)
TROPONIN, HIGH SENSITIVITY: 154 NG/L (ref 0–11)
TROPONIN, HIGH SENSITIVITY: 167 NG/L (ref 0–11)
WBC # BLD: 9.3 E9/L (ref 4.5–11.5)

## 2022-09-21 PROCEDURE — 85610 PROTHROMBIN TIME: CPT

## 2022-09-21 PROCEDURE — 96375 TX/PRO/DX INJ NEW DRUG ADDON: CPT

## 2022-09-21 PROCEDURE — 2500000003 HC RX 250 WO HCPCS: Performed by: STUDENT IN AN ORGANIZED HEALTH CARE EDUCATION/TRAINING PROGRAM

## 2022-09-21 PROCEDURE — 93005 ELECTROCARDIOGRAM TRACING: CPT | Performed by: STUDENT IN AN ORGANIZED HEALTH CARE EDUCATION/TRAINING PROGRAM

## 2022-09-21 PROCEDURE — 6370000000 HC RX 637 (ALT 250 FOR IP): Performed by: STUDENT IN AN ORGANIZED HEALTH CARE EDUCATION/TRAINING PROGRAM

## 2022-09-21 PROCEDURE — 94640 AIRWAY INHALATION TREATMENT: CPT

## 2022-09-21 PROCEDURE — 2580000003 HC RX 258: Performed by: STUDENT IN AN ORGANIZED HEALTH CARE EDUCATION/TRAINING PROGRAM

## 2022-09-21 PROCEDURE — 84484 ASSAY OF TROPONIN QUANT: CPT

## 2022-09-21 PROCEDURE — 80053 COMPREHEN METABOLIC PANEL: CPT

## 2022-09-21 PROCEDURE — 72125 CT NECK SPINE W/O DYE: CPT

## 2022-09-21 PROCEDURE — 94664 DEMO&/EVAL PT USE INHALER: CPT

## 2022-09-21 PROCEDURE — 2060000000 HC ICU INTERMEDIATE R&B

## 2022-09-21 PROCEDURE — 99285 EMERGENCY DEPT VISIT HI MDM: CPT

## 2022-09-21 PROCEDURE — 6360000002 HC RX W HCPCS: Performed by: STUDENT IN AN ORGANIZED HEALTH CARE EDUCATION/TRAINING PROGRAM

## 2022-09-21 PROCEDURE — 85025 COMPLETE CBC W/AUTO DIFF WBC: CPT

## 2022-09-21 PROCEDURE — 82962 GLUCOSE BLOOD TEST: CPT

## 2022-09-21 PROCEDURE — 2580000003 HC RX 258: Performed by: INTERNAL MEDICINE

## 2022-09-21 PROCEDURE — 6370000000 HC RX 637 (ALT 250 FOR IP): Performed by: INTERNAL MEDICINE

## 2022-09-21 PROCEDURE — 83880 ASSAY OF NATRIURETIC PEPTIDE: CPT

## 2022-09-21 PROCEDURE — 71045 X-RAY EXAM CHEST 1 VIEW: CPT

## 2022-09-21 PROCEDURE — 96374 THER/PROPH/DIAG INJ IV PUSH: CPT

## 2022-09-21 PROCEDURE — 70450 CT HEAD/BRAIN W/O DYE: CPT

## 2022-09-21 RX ORDER — WARFARIN SODIUM 6 MG/1
6 TABLET ORAL DAILY
Status: DISCONTINUED | OUTPATIENT
Start: 2022-09-21 | End: 2022-09-23

## 2022-09-21 RX ORDER — ASCORBIC ACID 500 MG
500 TABLET ORAL DAILY
Status: DISCONTINUED | OUTPATIENT
Start: 2022-09-22 | End: 2022-09-29 | Stop reason: HOSPADM

## 2022-09-21 RX ORDER — SODIUM CHLORIDE 9 MG/ML
INJECTION, SOLUTION INTRAVENOUS PRN
OUTPATIENT
Start: 2022-09-21

## 2022-09-21 RX ORDER — SODIUM CHLORIDE 9 MG/ML
INJECTION, SOLUTION INTRAVENOUS CONTINUOUS
OUTPATIENT
Start: 2022-09-21

## 2022-09-21 RX ORDER — DEXTROSE MONOHYDRATE 100 MG/ML
INJECTION, SOLUTION INTRAVENOUS CONTINUOUS PRN
Status: DISCONTINUED | OUTPATIENT
Start: 2022-09-21 | End: 2022-09-29 | Stop reason: HOSPADM

## 2022-09-21 RX ORDER — ATORVASTATIN CALCIUM 20 MG/1
20 TABLET, FILM COATED ORAL NIGHTLY
Status: DISCONTINUED | OUTPATIENT
Start: 2022-09-21 | End: 2022-09-29 | Stop reason: HOSPADM

## 2022-09-21 RX ORDER — MECLIZINE HYDROCHLORIDE 25 MG/1
25 TABLET ORAL 3 TIMES DAILY
Status: ON HOLD | COMMUNITY
End: 2022-10-07 | Stop reason: HOSPADM

## 2022-09-21 RX ORDER — POLYETHYLENE GLYCOL 3350 17 G/17G
17 POWDER, FOR SOLUTION ORAL DAILY PRN
Status: DISCONTINUED | OUTPATIENT
Start: 2022-09-21 | End: 2022-09-29 | Stop reason: HOSPADM

## 2022-09-21 RX ORDER — SODIUM CHLORIDE 9 MG/ML
INJECTION, SOLUTION INTRAVENOUS CONTINUOUS
Status: DISCONTINUED | OUTPATIENT
Start: 2022-09-21 | End: 2022-09-24

## 2022-09-21 RX ORDER — PANTOPRAZOLE SODIUM 40 MG/1
40 TABLET, DELAYED RELEASE ORAL DAILY
Status: DISCONTINUED | OUTPATIENT
Start: 2022-09-22 | End: 2022-09-29 | Stop reason: HOSPADM

## 2022-09-21 RX ORDER — NICOTINE POLACRILEX 4 MG
15 LOZENGE BUCCAL PRN
Status: ON HOLD | COMMUNITY
End: 2022-10-07 | Stop reason: HOSPADM

## 2022-09-21 RX ORDER — ERGOCALCIFEROL 1.25 MG/1
50000 CAPSULE ORAL WEEKLY
Status: DISCONTINUED | OUTPATIENT
Start: 2022-09-23 | End: 2022-09-29 | Stop reason: HOSPADM

## 2022-09-21 RX ORDER — SODIUM CHLORIDE 0.9 % (FLUSH) 0.9 %
5-40 SYRINGE (ML) INJECTION EVERY 12 HOURS SCHEDULED
OUTPATIENT
Start: 2022-09-21

## 2022-09-21 RX ORDER — 0.9 % SODIUM CHLORIDE 0.9 %
500 INTRAVENOUS SOLUTION INTRAVENOUS ONCE
Status: COMPLETED | OUTPATIENT
Start: 2022-09-21 | End: 2022-09-21

## 2022-09-21 RX ORDER — GLUCAGON 1 MG
1 KIT INJECTION PRN
Status: ON HOLD | COMMUNITY
End: 2022-10-07 | Stop reason: HOSPADM

## 2022-09-21 RX ORDER — PHENAZOPYRIDINE HYDROCHLORIDE 100 MG/1
200 TABLET, FILM COATED ORAL DAILY
Status: DISCONTINUED | OUTPATIENT
Start: 2022-09-22 | End: 2022-09-29 | Stop reason: HOSPADM

## 2022-09-21 RX ORDER — POTASSIUM CHLORIDE 20 MEQ/1
20 TABLET, EXTENDED RELEASE ORAL DAILY
Status: ON HOLD | COMMUNITY
End: 2022-10-07 | Stop reason: HOSPADM

## 2022-09-21 RX ORDER — GUAIFENESIN DEXTROMETHORPHAN HYDROBROMIDE ORAL SOLUTION 10; 100 MG/5ML; MG/5ML
5 SOLUTION ORAL EVERY 6 HOURS PRN
Status: ON HOLD | COMMUNITY
End: 2022-10-07 | Stop reason: HOSPADM

## 2022-09-21 RX ORDER — PHENAZOPYRIDINE HYDROCHLORIDE 200 MG/1
200 TABLET, FILM COATED ORAL DAILY
Status: ON HOLD | COMMUNITY
End: 2022-10-07 | Stop reason: HOSPADM

## 2022-09-21 RX ORDER — LANOLIN ALCOHOL/MO/W.PET/CERES
400 CREAM (GRAM) TOPICAL EVERY OTHER DAY
Status: DISCONTINUED | OUTPATIENT
Start: 2022-09-22 | End: 2022-09-29 | Stop reason: HOSPADM

## 2022-09-21 RX ORDER — IPRATROPIUM BROMIDE AND ALBUTEROL SULFATE 2.5; .5 MG/3ML; MG/3ML
1 SOLUTION RESPIRATORY (INHALATION) EVERY 4 HOURS PRN
Status: DISCONTINUED | OUTPATIENT
Start: 2022-09-21 | End: 2022-09-29 | Stop reason: HOSPADM

## 2022-09-21 RX ORDER — MECLIZINE HCL 12.5 MG/1
25 TABLET ORAL 3 TIMES DAILY
Status: DISCONTINUED | OUTPATIENT
Start: 2022-09-21 | End: 2022-09-29 | Stop reason: HOSPADM

## 2022-09-21 RX ORDER — ENOXAPARIN SODIUM 100 MG/ML
60 INJECTION SUBCUTANEOUS DAILY
COMMUNITY
Start: 2022-09-23 | End: 2022-09-26

## 2022-09-21 RX ORDER — WARFARIN SODIUM 6 MG/1
6 TABLET ORAL DAILY
COMMUNITY

## 2022-09-21 RX ORDER — SODIUM CHLORIDE 0.9 % (FLUSH) 0.9 %
5-40 SYRINGE (ML) INJECTION PRN
OUTPATIENT
Start: 2022-09-21

## 2022-09-21 RX ORDER — CALCIUM GLUCONATE 94 MG/ML
1000 INJECTION, SOLUTION INTRAVENOUS ONCE
Status: COMPLETED | OUTPATIENT
Start: 2022-09-21 | End: 2022-09-21

## 2022-09-21 RX ORDER — NICOTINE POLACRILEX 4 MG
15 LOZENGE BUCCAL PRN
Status: DISCONTINUED | OUTPATIENT
Start: 2022-09-21 | End: 2022-09-21 | Stop reason: CLARIF

## 2022-09-21 RX ORDER — POLYETHYLENE GLYCOL 3350 17 G/17G
17 POWDER, FOR SOLUTION ORAL DAILY PRN
Status: ON HOLD | COMMUNITY
End: 2022-10-07 | Stop reason: HOSPADM

## 2022-09-21 RX ORDER — ACETAMINOPHEN 325 MG/1
650 TABLET ORAL EVERY 4 HOURS PRN
Status: DISCONTINUED | OUTPATIENT
Start: 2022-09-21 | End: 2022-09-29 | Stop reason: HOSPADM

## 2022-09-21 RX ORDER — LEVOTHYROXINE SODIUM 0.05 MG/1
50 TABLET ORAL DAILY
Status: DISCONTINUED | OUTPATIENT
Start: 2022-09-22 | End: 2022-09-29 | Stop reason: HOSPADM

## 2022-09-21 RX ORDER — GLUCAGON 1 MG
1 KIT INJECTION PRN
Status: DISCONTINUED | OUTPATIENT
Start: 2022-09-21 | End: 2022-09-21 | Stop reason: SDUPTHER

## 2022-09-21 RX ORDER — IPRATROPIUM BROMIDE AND ALBUTEROL SULFATE 2.5; .5 MG/3ML; MG/3ML
1 SOLUTION RESPIRATORY (INHALATION) EVERY 4 HOURS PRN
COMMUNITY

## 2022-09-21 RX ORDER — LOPERAMIDE HYDROCHLORIDE 2 MG/1
2 CAPSULE ORAL DAILY PRN
Status: DISCONTINUED | OUTPATIENT
Start: 2022-09-21 | End: 2022-09-29 | Stop reason: HOSPADM

## 2022-09-21 RX ORDER — GUAIFENESIN/DEXTROMETHORPHAN 100-10MG/5
5 SYRUP ORAL EVERY 6 HOURS PRN
Status: DISCONTINUED | OUTPATIENT
Start: 2022-09-21 | End: 2022-09-22

## 2022-09-21 RX ORDER — M-VIT,TX,IRON,MINS/CALC/FOLIC 27MG-0.4MG
1 TABLET ORAL DAILY
Status: DISCONTINUED | OUTPATIENT
Start: 2022-09-22 | End: 2022-09-29 | Stop reason: HOSPADM

## 2022-09-21 RX ADMIN — DEXTROSE MONOHYDRATE 250 ML: 100 INJECTION, SOLUTION INTRAVENOUS at 13:05

## 2022-09-21 RX ADMIN — CALCIUM GLUCONATE 1000 MG: 98 INJECTION, SOLUTION INTRAVENOUS at 13:03

## 2022-09-21 RX ADMIN — METOPROLOL TARTRATE 25 MG: 25 TABLET, FILM COATED ORAL at 20:32

## 2022-09-21 RX ADMIN — METFORMIN HYDROCHLORIDE 1000 MG: 1000 TABLET ORAL at 20:32

## 2022-09-21 RX ADMIN — SODIUM CHLORIDE: 9 INJECTION, SOLUTION INTRAVENOUS at 17:46

## 2022-09-21 RX ADMIN — ATORVASTATIN CALCIUM 20 MG: 20 TABLET, FILM COATED ORAL at 20:32

## 2022-09-21 RX ADMIN — MECLIZINE 25 MG: 12.5 TABLET ORAL at 17:49

## 2022-09-21 RX ADMIN — SODIUM BICARBONATE 50 MEQ: 84 INJECTION, SOLUTION INTRAVENOUS at 13:03

## 2022-09-21 RX ADMIN — INSULIN HUMAN 10 UNITS: 100 INJECTION, SOLUTION PARENTERAL at 13:03

## 2022-09-21 RX ADMIN — MECLIZINE 25 MG: 12.5 TABLET ORAL at 20:32

## 2022-09-21 RX ADMIN — ALBUTEROL SULFATE 10 MG: 2.5 SOLUTION RESPIRATORY (INHALATION) at 13:00

## 2022-09-21 RX ADMIN — SODIUM CHLORIDE 500 ML: 9 INJECTION, SOLUTION INTRAVENOUS at 12:41

## 2022-09-21 RX ADMIN — WARFARIN SODIUM 6 MG: 6 TABLET ORAL at 17:49

## 2022-09-21 ASSESSMENT — ENCOUNTER SYMPTOMS
VOMITING: 0
COUGH: 0
SHORTNESS OF BREATH: 0
PHOTOPHOBIA: 0
DIARRHEA: 0
VOICE CHANGE: 0
ABDOMINAL PAIN: 0
TROUBLE SWALLOWING: 0
NAUSEA: 0

## 2022-09-21 ASSESSMENT — PAIN SCALES - GENERAL: PAINLEVEL_OUTOF10: 0

## 2022-09-21 NOTE — ED NOTES
Wounds on bilateral heels. Right worse then left. Pts daughter at bedside and states patient is wheelchair bound right now due to the wounds on heels.       Rufina Simpson RN  09/21/22 2232

## 2022-09-21 NOTE — PROGRESS NOTES
Database initiated. Patient is A&O from [facility] States he non ambulatory but can stand turn and sit into his wheelchair. He is RA at baseline and wears hard sole black surgical shoes. He does have wounds to his heels. Pharmacy tech to verify home medications.

## 2022-09-21 NOTE — ED PROVIDER NOTES
HPI   Patient is a 25-year-old male with past medical history of hypertension, diabetes, thyroid disease, A. fib on Coumadin, paraplegia status postischemic stroke, PVD, CKD, chronic foot wounds and factor V deficiency presenting to the emergency department due to dizziness and fall. Patient was brought in by ambulance from nursing facility for evaluation. He is coming from [facility]. Patient has been there for almost a month apparently. Patient states that he is transferring from his wheelchair when he fell and hit his head. He does have a small abrasion to his upper forehead but no other obvious injuries or deformities anywhere else. Patient is denying any dizziness on initial evaluation in the emergency department. He states that his symptoms are completely resolved. He is also denying any other symptoms including fever, chills, nausea, vomiting, chest pain, shortness of breath, abdominal pain, lightheadedness, syncope, urinary symptoms, abdominal pain, constipation or diarrhea. His symptoms are moderate with no remitting or exacerbating factors. Patient states that he has had similar symptoms in the past and has needed evaluation in the hospital.  Last hospitalization from 8/1-8/8 evaluation of syncope. Patient found to have pneumonia and JUAN on CKD. Also treated with IV antibiotics due to right heel ulcer and seen by podiatry during that visit. Once stabilized, he was transferred back to SNF. On initial evaluation, patient is nontoxic-appearing and in no acute distress. No resting nystagmus noted. He is awake alert and oriented x3 and following commands. Review of Systems   Constitutional:  Negative for chills and fever. HENT:  Negative for trouble swallowing and voice change. Eyes:  Negative for photophobia and visual disturbance. Respiratory:  Negative for cough and shortness of breath. Cardiovascular:  Negative for chest pain and palpitations.    Gastrointestinal:  Negative for abdominal pain, diarrhea, nausea and vomiting. Genitourinary:  Negative for dysuria. Musculoskeletal:  Negative for arthralgias. Skin:  Negative for rash and wound. Neurological:  Positive for dizziness. Negative for headaches. Psychiatric/Behavioral:  Negative for behavioral problems and confusion. Physical Exam  Constitutional:       General: He is not in acute distress. Appearance: Normal appearance. He is not ill-appearing. HENT:      Head: Normocephalic. Comments: Abrasion on the forehead. No active bleeding or drainage. Right Ear: External ear normal.      Left Ear: External ear normal.      Nose: Nose normal.      Mouth/Throat:      Mouth: Mucous membranes are moist.      Pharynx: Oropharynx is clear. Eyes:      Conjunctiva/sclera: Conjunctivae normal.      Pupils: Pupils are equal, round, and reactive to light. Comments: No resting nystagmus. Cardiovascular:      Rate and Rhythm: Normal rate and regular rhythm. Pulses: Normal pulses. Heart sounds: Normal heart sounds. Pulmonary:      Effort: Pulmonary effort is normal. No respiratory distress. Breath sounds: Normal breath sounds. No wheezing or rales. Abdominal:      General: Abdomen is flat. Palpations: Abdomen is soft. Tenderness: There is no abdominal tenderness. There is no guarding or rebound. Musculoskeletal:         General: Normal range of motion. Cervical back: Normal range of motion and neck supple. Right lower leg: No edema. Left lower leg: No edema. Comments: Bilateral chronic foot wounds. Dressed and short boot present bilaterally. Skin:     General: Skin is warm and dry. Capillary Refill: Capillary refill takes less than 2 seconds. Comments: Chronic skin changes. Dry, flaky skin. No erythema or signs of cellulitic changes. Neurological:      General: No focal deficit present.       Mental Status: He is alert and oriented to person, place, and time. Cranial Nerves: No cranial nerve deficit. Coordination: Coordination normal.   Psychiatric:         Mood and Affect: Mood normal.         Behavior: Behavior normal.        Procedures   EKG: This EKG is signed and interpreted by me. Normal Sinus rhythm with ventricular rate of 74 bpm.  Left axis deviation. Low voltage QRS. Q waves in the anteroseptal leads suggesting infarct. First-degree AV block with TX interval of 252 MS. QTc not prolonged. No evidence of acute ST elevation MI. No significant changes compared to previous EKG on 8/1/2022. MDM   Patient is a 54-year-old male with past medical history of hypertension, diabetes, thyroid disease, A. fib on Coumadin, paraplegia status postischemic stroke, PVD, CKD, chronic foot wounds and factor V deficiency presenting to the emergency department due to dizziness and fall. On initial evaluation, patient is nontoxic-appearing, afebrile, hemodynamically stable in no acute distress. His symptoms have resolved since coming to the emergency department. Patient no longer complaining of dizziness. No resting nystagmus. Work-up in the emergency department unremarkable for hyperkalemia with potassium of 6.2. Patient treated with insulin/dextrose, calcium gluconate, sodium bicarb and albuterol in the emergency department. Also noted to have JUAN on CKD. Given 500 cc bolus of IV fluids in the ED. Troponin also elevated at 54 with a repeat of 167. Patient denying any active chest pain while in the ED. EKG sinus with no ischemic changes. Troponin elevation likely type II in the setting of CKD. Imaging studies negative for traumatic injury including CT head and cervical spine. CT chest unremarkable. Plan to admit the patient for further work-up and evaluation. Spoke with Dr. Bull Wheeler who accepted patient for admission. Patient and family were updated on work-up, results and plan and are agreeable.   Patient remained stable in the ED.      ED Course as of 09/21/22 1440   Wed Sep 21, 2022   1108 EKG: This EKG is signed and interpreted by me. Normal Sinus rhythm with ventricular rate of 74 bpm.  Left axis deviation. Low voltage QRS. Q waves in the anteroseptal leads suggesting infarct. First-degree AV block with NH interval of 252 MS. QTc not prolonged. No evidence of acute ST elevation MI. No significant changes compared to previous EKG on 8/1/2022. [PP]   0002 CT head and cervical spine unremarkable. [PP]   1242 Work-up and results were discussed with the patient and his daughter who is at bedside. They are agreeable to admission for further work-up and evaluation. Questions were answered at that time. [PP]   1320 Patient accepted for admission by Dr. Che Nunes. [PP]      ED Course User Index  [PP] Leocadia Spatz, DO        --------------------------------------------- PAST HISTORY ---------------------------------------------  Past Medical History:  has a past medical history of Atherosclerosis of native artery of right lower extremity with ulceration of heel ([address] Atrial fibrillation ([address] Cancer ([address] Chronic renal failure, stage 3b ([address] Diabetes mellitus ([address] 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[address] Femoral-popliteal atherosclerosis ([address] Hypertension, Ischemic stroke ([address] Pacemaker, Paraplegia ([address] Sinus node dysfunction ([address] Stroke (cerebrum) ([address] Thyroid disease, and Ulcer of right heel and midfoot with fat layer exposed ([address] Past Surgical History:  has a past surgical history that includes eye surgery; Knee arthroscopy (right knee); pr colonoscopy flx dx w/collj spec when pfrmd (N/A, 3/20/2018); Colonoscopy; pr egd percutaneous placement gastrostomy tube (N/A, 3/29/2018); pacemaker placement; pr egd percutaneous placement gastrostomy tube (N/A, 9/12/2018); and Cardiac pacemaker placement (12/19/2014). Social History:  reports that he quit smoking about 20 years ago. His smoking use included cigars.  He has never used smokeless tobacco. He reports that he does not drink alcohol and does not use drugs. Family History: family history includes Heart Disease in his mother; Stroke in his father. The patients home medications have been reviewed.     Allergies: Bee venom    -------------------------------------------------- RESULTS -------------------------------------------------    LABS:  Results for orders placed or performed during the hospital encounter of 09/21/22   CBC with Auto Differential   Result Value Ref Range    WBC 9.3 4.5 - 11.5 E9/L    RBC 3.55 (L) 3.80 - 5.80 E12/L    Hemoglobin 10.0 (L) 12.5 - 16.5 g/dL    Hematocrit 32.3 (L) 37.0 - 54.0 %    MCV 91.0 80.0 - 99.9 fL    MCH 28.2 26.0 - 35.0 pg    MCHC 31.0 (L) 32.0 - 34.5 %    RDW 14.2 11.5 - 15.0 fL    Platelets 633 857 - 790 E9/L    MPV 10.1 7.0 - 12.0 fL    Neutrophils % 77.0 43.0 - 80.0 %    Immature Granulocytes % 0.4 0.0 - 5.0 %    Lymphocytes % 11.6 (L) 20.0 - 42.0 %    Monocytes % 7.5 2.0 - 12.0 %    Eosinophils % 3.0 0.0 - 6.0 %    Basophils % 0.5 0.0 - 2.0 %    Neutrophils Absolute 7.13 1.80 - 7.30 E9/L    Immature Granulocytes # 0.04 E9/L    Lymphocytes Absolute 1.07 (L) 1.50 - 4.00 E9/L    Monocytes Absolute 0.69 0.10 - 0.95 E9/L    Eosinophils Absolute 0.28 0.05 - 0.50 E9/L    Basophils Absolute 0.05 0.00 - 0.20 E9/L   Comprehensive Metabolic Panel w/ Reflex to MG   Result Value Ref Range    Sodium 134 132 - 146 mmol/L    Potassium reflex Magnesium 6.2 (H) 3.5 - 5.0 mmol/L    Chloride 103 98 - 107 mmol/L    CO2 20 (L) 22 - 29 mmol/L    Anion Gap 11 7 - 16 mmol/L    Glucose 125 (H) 74 - 99 mg/dL    BUN 57 (H) 6 - 23 mg/dL    Creatinine 2.8 (H) 0.7 - 1.2 mg/dL    GFR Non-African American 22 >=60 mL/min/1.73    GFR African American 27     Calcium 9.7 8.6 - 10.2 mg/dL    Total Protein 7.5 6.4 - 8.3 g/dL    Albumin 3.5 3.5 - 5.2 g/dL    Total Bilirubin 0.3 0.0 - 1.2 mg/dL    Alkaline Phosphatase 97 40 - 129 U/L    ALT 7 0 - 40 U/L    AST 21 0 - 39 U/L   Troponin   Result Value Ref Range    Troponin, High Sensitivity 154 (H) 0 - 11 ng/L   Protime-INR   Result Value Ref Range    Protime 23.4 (H) 9.3 - 12.4 sec    INR 2.1    Troponin   Result Value Ref Range    Troponin, High Sensitivity 167 (H) 0 - 11 ng/L   Brain Natriuretic Peptide   Result Value Ref Range    Pro- (H) 0 - 450 pg/mL   POCT Glucose   Result Value Ref Range    Glucose 117 mg/dL    QC OK? yes    EKG 12 Lead   Result Value Ref Range    Ventricular Rate 74 BPM    Atrial Rate 74 BPM    P-R Interval 252 ms    QRS Duration 70 ms    Q-T Interval 364 ms    QTc Calculation (Bazett) 404 ms    P Axis 55 degrees    R Axis 8 degrees    T Axis 50 degrees       RADIOLOGY:  XR CHEST PORTABLE   Final Result   No acute process         CT Head WO Contrast   Final Result   1. There is no acute intracranial abnormality. Specifically, there is no   intracranial hemorrhage. 2. Atrophy and periventricular leukomalacia,   3. Old left occipital lobe infarct         CT Cervical Spine WO Contrast   Final Result   1. There is no acute compression fracture or subluxation of the cervical   spine. 2. Advanced multilevel degenerative disc and degenerative joint disease.             ------------------------- NURSING NOTES AND VITALS REVIEWED ---------------------------  Date / Time Roomed:  9/21/2022 10:15 AM  ED Bed Assignment:  13/13    The nursing notes within the ED encounter and vital signs as below have been reviewed.      Patient Vitals for the past 24 hrs:   BP Temp Temp src Pulse Resp SpO2 Weight   09/21/22 1401 (!) 121/56 98.2 °F (36.8 °C) Oral 94 18 96 % --   09/21/22 1241 (!) 105/56 -- -- 70 18 96 % --   09/21/22 1121 (!) 109/54 -- -- 73 18 96 % --   09/21/22 1022 (!) 142/64 97.8 °F (36.6 °C) Oral 78 18 97 % 250 lb (113.4 kg)       Oxygen Saturation Interpretation: Normal    ------------------------------------------ PROGRESS NOTES ------------------------------------------  Re-evaluation(s):  See ED course    Counseling:  I have spoken with the patient and discussed todays results, in addition to providing specific details for the plan of care and counseling regarding the diagnosis and prognosis. Their questions are answered at this time and they are agreeable with the plan of admission.    --------------------------------- ADDITIONAL PROVIDER NOTES ---------------------------------  Consultations:  Time: 1320. Spoke with Dr. Antonia Little. Discussed case. They will admit the patient. This patient's ED course included: This patient has remained hemodynamically stable during their ED course. Diagnosis:  1. Hyperkalemia    2. Acute kidney injury superimposed on chronic kidney disease (HCC)    3. Elevated troponin    4. Dizziness        Disposition:  Patient's disposition: Admit to telemetry  Patient's condition is stable.            Arleen Jennings,   Resident  09/21/22 5775

## 2022-09-22 ENCOUNTER — APPOINTMENT (OUTPATIENT)
Dept: GENERAL RADIOLOGY | Age: 77
DRG: 641 | End: 2022-09-22
Payer: MEDICARE

## 2022-09-22 LAB
ANION GAP SERPL CALCULATED.3IONS-SCNC: 11 MMOL/L (ref 7–16)
ANION GAP SERPL CALCULATED.3IONS-SCNC: 14 MMOL/L (ref 7–16)
ANTISTREPTOLYSIN-O: 86 IU/ML (ref 0–200)
BUN BLDV-MCNC: 49 MG/DL (ref 6–23)
BUN BLDV-MCNC: 53 MG/DL (ref 6–23)
C-REACTIVE PROTEIN: 8.5 MG/DL (ref 0–0.4)
CALCIUM SERPL-MCNC: 9.3 MG/DL (ref 8.6–10.2)
CALCIUM SERPL-MCNC: 9.3 MG/DL (ref 8.6–10.2)
CHLORIDE BLD-SCNC: 103 MMOL/L (ref 98–107)
CHLORIDE BLD-SCNC: 104 MMOL/L (ref 98–107)
CO2: 19 MMOL/L (ref 22–29)
CO2: 20 MMOL/L (ref 22–29)
CREAT SERPL-MCNC: 2.4 MG/DL (ref 0.7–1.2)
CREAT SERPL-MCNC: 2.5 MG/DL (ref 0.7–1.2)
EKG ATRIAL RATE: 74 BPM
EKG P AXIS: 55 DEGREES
EKG P-R INTERVAL: 252 MS
EKG Q-T INTERVAL: 364 MS
EKG QRS DURATION: 70 MS
EKG QTC CALCULATION (BAZETT): 404 MS
EKG R AXIS: 8 DEGREES
EKG T AXIS: 50 DEGREES
EKG VENTRICULAR RATE: 74 BPM
GFR AFRICAN AMERICAN: 30
GFR AFRICAN AMERICAN: 32
GFR NON-AFRICAN AMERICAN: 25 ML/MIN/1.73
GFR NON-AFRICAN AMERICAN: 26 ML/MIN/1.73
GLUCOSE BLD-MCNC: 122 MG/DL (ref 74–99)
GLUCOSE BLD-MCNC: 149 MG/DL (ref 74–99)
METER GLUCOSE: 119 MG/DL (ref 74–99)
METER GLUCOSE: 124 MG/DL (ref 74–99)
METER GLUCOSE: 131 MG/DL (ref 74–99)
POTASSIUM SERPL-SCNC: 5.3 MMOL/L (ref 3.5–5)
POTASSIUM SERPL-SCNC: 5.8 MMOL/L (ref 3.5–5)
SEDIMENTATION RATE, ERYTHROCYTE: 93 MM/HR (ref 0–15)
SODIUM BLD-SCNC: 135 MMOL/L (ref 132–146)
SODIUM BLD-SCNC: 136 MMOL/L (ref 132–146)

## 2022-09-22 PROCEDURE — 87077 CULTURE AEROBIC IDENTIFY: CPT

## 2022-09-22 PROCEDURE — 82962 GLUCOSE BLOOD TEST: CPT

## 2022-09-22 PROCEDURE — 97166 OT EVAL MOD COMPLEX 45 MIN: CPT

## 2022-09-22 PROCEDURE — 85651 RBC SED RATE NONAUTOMATED: CPT

## 2022-09-22 PROCEDURE — 86060 ANTISTREPTOLYSIN O TITER: CPT

## 2022-09-22 PROCEDURE — 86140 C-REACTIVE PROTEIN: CPT

## 2022-09-22 PROCEDURE — 73630 X-RAY EXAM OF FOOT: CPT

## 2022-09-22 PROCEDURE — 87186 SC STD MICRODIL/AGAR DIL: CPT

## 2022-09-22 PROCEDURE — 6370000000 HC RX 637 (ALT 250 FOR IP): Performed by: SPECIALIST

## 2022-09-22 PROCEDURE — 6370000000 HC RX 637 (ALT 250 FOR IP): Performed by: INTERNAL MEDICINE

## 2022-09-22 PROCEDURE — 2060000000 HC ICU INTERMEDIATE R&B

## 2022-09-22 PROCEDURE — 2500000003 HC RX 250 WO HCPCS: Performed by: INTERNAL MEDICINE

## 2022-09-22 PROCEDURE — 80048 BASIC METABOLIC PNL TOTAL CA: CPT

## 2022-09-22 PROCEDURE — 36415 COLL VENOUS BLD VENIPUNCTURE: CPT

## 2022-09-22 PROCEDURE — 99221 1ST HOSP IP/OBS SF/LOW 40: CPT | Performed by: PHYSICIAN ASSISTANT

## 2022-09-22 PROCEDURE — 2580000003 HC RX 258: Performed by: INTERNAL MEDICINE

## 2022-09-22 PROCEDURE — 2580000003 HC RX 258: Performed by: SPECIALIST

## 2022-09-22 PROCEDURE — 6360000002 HC RX W HCPCS: Performed by: SPECIALIST

## 2022-09-22 PROCEDURE — 87070 CULTURE OTHR SPECIMN AEROBIC: CPT

## 2022-09-22 PROCEDURE — 99223 1ST HOSP IP/OBS HIGH 75: CPT | Performed by: INTERNAL MEDICINE

## 2022-09-22 RX ORDER — ACETYLCYSTEINE 200 MG/ML
600 SOLUTION ORAL; RESPIRATORY (INHALATION) 2 TIMES DAILY
Status: DISCONTINUED | OUTPATIENT
Start: 2022-09-25 | End: 2022-09-23

## 2022-09-22 RX ORDER — LINEZOLID 600 MG/1
600 TABLET, FILM COATED ORAL EVERY 12 HOURS SCHEDULED
Status: DISCONTINUED | OUTPATIENT
Start: 2022-09-22 | End: 2022-09-29 | Stop reason: HOSPADM

## 2022-09-22 RX ORDER — ENOXAPARIN SODIUM 100 MG/ML
60 INJECTION SUBCUTANEOUS DAILY
Status: DISCONTINUED | OUTPATIENT
Start: 2022-09-23 | End: 2022-09-29 | Stop reason: HOSPADM

## 2022-09-22 RX ORDER — ACETYLCYSTEINE 200 MG/ML
600 SOLUTION ORAL; RESPIRATORY (INHALATION) 2 TIMES DAILY
Status: DISCONTINUED | OUTPATIENT
Start: 2022-09-22 | End: 2022-09-22

## 2022-09-22 RX ADMIN — Medication 400 MG: at 08:41

## 2022-09-22 RX ADMIN — AMPICILLIN SODIUM AND SULBACTAM SODIUM 3000 MG: 2; 1 INJECTION, POWDER, FOR SOLUTION INTRAMUSCULAR; INTRAVENOUS at 20:57

## 2022-09-22 RX ADMIN — MULTIPLE VITAMINS W/ MINERALS TAB 1 TABLET: TAB at 08:41

## 2022-09-22 RX ADMIN — MICONAZOLE NITRATE: 20 POWDER TOPICAL at 12:52

## 2022-09-22 RX ADMIN — MICONAZOLE NITRATE: 20 POWDER TOPICAL at 20:49

## 2022-09-22 RX ADMIN — ATORVASTATIN CALCIUM 20 MG: 20 TABLET, FILM COATED ORAL at 20:50

## 2022-09-22 RX ADMIN — LINEZOLID 600 MG: 600 TABLET, FILM COATED ORAL at 20:50

## 2022-09-22 RX ADMIN — METFORMIN HYDROCHLORIDE 1000 MG: 1000 TABLET ORAL at 08:41

## 2022-09-22 RX ADMIN — MECLIZINE 25 MG: 12.5 TABLET ORAL at 13:24

## 2022-09-22 RX ADMIN — SODIUM CHLORIDE: 9 INJECTION, SOLUTION INTRAVENOUS at 11:58

## 2022-09-22 RX ADMIN — MECLIZINE 25 MG: 12.5 TABLET ORAL at 08:41

## 2022-09-22 RX ADMIN — GUAIFENESIN AND DEXTROMETHORPHAN 5 ML: 100; 10 SYRUP ORAL at 08:41

## 2022-09-22 RX ADMIN — PHENAZOPYRIDINE 200 MG: 100 TABLET ORAL at 08:41

## 2022-09-22 RX ADMIN — MECLIZINE 25 MG: 12.5 TABLET ORAL at 20:52

## 2022-09-22 RX ADMIN — WARFARIN SODIUM 6 MG: 6 TABLET ORAL at 17:41

## 2022-09-22 RX ADMIN — PANTOPRAZOLE SODIUM 40 MG: 40 TABLET, DELAYED RELEASE ORAL at 08:41

## 2022-09-22 RX ADMIN — METOPROLOL TARTRATE 25 MG: 25 TABLET, FILM COATED ORAL at 08:41

## 2022-09-22 RX ADMIN — METFORMIN HYDROCHLORIDE 1000 MG: 1000 TABLET ORAL at 20:50

## 2022-09-22 RX ADMIN — LEVOTHYROXINE SODIUM 50 MCG: 0.05 TABLET ORAL at 06:18

## 2022-09-22 RX ADMIN — SODIUM ZIRCONIUM CYCLOSILICATE 10 G: 10 POWDER, FOR SUSPENSION ORAL at 20:52

## 2022-09-22 RX ADMIN — AMPICILLIN SODIUM AND SULBACTAM SODIUM 3000 MG: 2; 1 INJECTION, POWDER, FOR SOLUTION INTRAMUSCULAR; INTRAVENOUS at 15:05

## 2022-09-22 RX ADMIN — METOPROLOL TARTRATE 25 MG: 25 TABLET, FILM COATED ORAL at 20:50

## 2022-09-22 RX ADMIN — OXYCODONE HYDROCHLORIDE AND ACETAMINOPHEN 500 MG: 500 TABLET ORAL at 08:41

## 2022-09-22 ASSESSMENT — PAIN SCALES - GENERAL: PAINLEVEL_OUTOF10: 0

## 2022-09-22 NOTE — CONSULTS
Department of Internal Medicine  Nephrology Attending Consult Note      Reason for Consult:  Acute kidney injury. Hyperkalemia. Requesting Physician:  Dr Ontiveros Foots:  Swelling and cellulitis of right leg    History Obtained From:  patient, electronic medical record    HISTORY OF PRESENT ILLNESS:  CKD stage 4. DM type 2. HTN. CHF.  PVD. Admitted for peripheral angiogram.    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[address]     Femoral-popliteal atherosclerosis ([address] 9/13/2022    Hypertension     Ischemic stroke ([address] 03/06/2018    Pacemaker     Paraplegia ([address]     Sinus node dysfunction ([address]     Stroke (cerebrum) ([address] 02/27/2018    Thyroid disease     Ulcer of right heel and midfoot with fat layer exposed ([address] 8/15/2022     Past Surgical History:        Procedure Laterality Date    CARDIAC PACEMAKER PLACEMENT  12/19/2014    COLONOSCOPY      EYE SURGERY      KNEE ARTHROSCOPY  right knee    PACEMAKER PLACEMENT      IL COLONOSCOPY FLX DX W/COLLJ SPEC WHEN PFRMD N/A 3/20/2018    COLONOSCOPY DIAGNOSTIC performed by Baron Taylor MD at [address] EGD PERCUTANEOUS PLACEMENT GASTROSTOMY TUBE N/A 3/29/2018    PEG TUBE/PT.  IN 5507 B performed by Baron Taylor MD at [address] EGD PERCUTANEOUS PLACEMENT GASTROSTOMY TUBE N/A 9/12/2018    EGD PEG TUBE PLACEMENT performed by Baron Taylor MD at [address] [place] [address]     Current Medications:    Current Facility-Administered Medications: miconazole (MICOTIN) 2 % powder, , Topical, BID  [START ON 9/25/2022] acetylcysteine (MUCOMYST) 20 % solution 600 mg, 600 mg, Oral, BID  ampicillin-sulbactam (UNASYN) 3000 mg in 100 mL NS IVPB minibag, 3,000 mg, IntraVENous, Q6H  linezolid (Rush Orin) tablet 600 mg, 600 mg, Oral, 2 times per day  [START ON 9/23/2022] enoxaparin (LOVENOX) injection 60 mg, 60 mg, SubCUTAneous, Daily  glucose chewable tablet 16 g, 4 tablet, Oral, PRN  dextrose bolus 10% 125 mL, 125 mL, IntraVENous, PRN **OR** dextrose bolus 10% 250 mL, 250 mL, IntraVENous, PRN  glucagon (rDNA) injection 1 mg, 1 mg, SubCUTAneous, PRN  dextrose 10 % infusion, , IntraVENous, Continuous PRN  acetaminophen (TYLENOL) tablet 650 mg, 650 mg, Oral, Q4H PRN  ascorbic acid (VITAMIN C) tablet 500 mg, 500 mg, Oral, Daily  atorvastatin (LIPITOR) tablet 20 mg, 20 mg, Oral, Nightly  [START ON 9/23/2022] vitamin D (ERGOCALCIFEROL) capsule 50,000 Units, 50,000 Units, Oral, Weekly  ipratropium-albuterol (DUONEB) nebulizer solution 3 mL, 1 vial, Inhalation, Q4H PRN  levothyroxine (SYNTHROID) tablet 50 mcg, 50 mcg, Oral, Daily  loperamide (IMODIUM) capsule 2 mg, 2 mg, Oral, Daily PRN  magnesium oxide (MAG-OX) tablet 400 mg, 400 mg, Oral, Every Other Day  meclizine (ANTIVERT) tablet 25 mg, 25 mg, Oral, TID  metFORMIN (GLUCOPHAGE) tablet 1,000 mg, 1,000 mg, Oral, BID  metoprolol tartrate (LOPRESSOR) tablet 25 mg, 25 mg, Oral, BID  therapeutic multivitamin-minerals 1 tablet, 1 tablet, Oral, Daily  pantoprazole (PROTONIX) tablet 40 mg, 40 mg, Oral, Daily  polyethylene glycol (GLYCOLAX) packet 17 g, 17 g, Oral, Daily PRN  phenazopyridine (PYRIDIUM) tablet 200 mg, 200 mg, Oral, Daily  warfarin (COUMADIN) tablet 6 mg, 6 mg, Oral, Daily  0.9 % sodium chloride infusion, , IntraVENous, Continuous  collagenase ointment, , Topical, Daily  Allergies:  Bee venom    Social History:    TOBACCO:   reports that he quit smoking about 20 years ago. His smoking use included cigars. He has never used smokeless tobacco.  ETOH:   reports no history of alcohol use. DRUGS:   reports no history of drug use.     Family History:       Problem Relation Age of Onset    Heart Disease Mother     Stroke Father      REVIEW OF SYSTEMS:    CONSTITUTIONAL: positive for  fatigue and malaise  EYES:  negative  HEENT:  negative  RESPIRATORY:  negative  CARDIOVASCULAR:  negative  GASTROINTESTINAL:  negative  GENITOURINARY:  negative  MUSCULOSKELETAL:  positive for  myalgias and arthralgias  NEUROLOGICAL:  negative  PHYSICAL EXAM:      Vitals:    VITALS:  BP (!) 139/54   Pulse 66   Temp 97.7 °F (36.5 °C) (Axillary)   Resp 16   Ht 6' 2\" (1.88 m)   Wt 264 lb 6.4 oz (119.9 kg)   SpO2 100%   BMI 33.95 kg/m²   24HR INTAKE/OUTPUT:    Intake/Output Summary (Last 24 hours) at 9/22/2022 1843  Last data filed at 9/22/2022 0830  Gross per 24 hour   Intake --   Output 1450 ml   Net -1450 ml       Constitutional:  Well built. Obese. Mild distress. HEENT:  BEERLA. JVD not seen. Respiratory:  Breath sounds normal. No rales or rhonchi  Cardiovascular/Edema:  Heart sounds normal. No murmur. Gastrointestinal:  Bowel sounds normal. No oranomgaly  Neurologic:  A/O x 3. No focal deficit. Skin:  Dry skin. Normal turgor. Other:  Non healing ulcer in right foot.     DATA:    CBC:   Lab Results   Component Value Date/Time    WBC 9.3 09/21/2022 10:44 AM    RBC 3.55 09/21/2022 10:44 AM    HGB 10.0 09/21/2022 10:44 AM    HCT 32.3 09/21/2022 10:44 AM    MCV 91.0 09/21/2022 10:44 AM    MCH 28.2 09/21/2022 10:44 AM    MCHC 31.0 09/21/2022 10:44 AM    RDW 14.2 09/21/2022 10:44 AM     09/21/2022 10:44 AM    MPV 10.1 09/21/2022 10:44 AM     CMP:    Lab Results   Component Value Date/Time     09/22/2022 04:30 PM    K 5.8 09/22/2022 04:30 PM    K 6.2 09/21/2022 10:44 AM     09/22/2022 04:30 PM    CO2 20 09/22/2022 04:30 PM    BUN 49 09/22/2022 04:30 PM    CREATININE 2.4 09/22/2022 04:30 PM    GFRAA 32 09/22/2022 04:30 PM    LABGLOM 26 09/22/2022 04:30 PM    GLUCOSE 149 09/22/2022 04:30 PM    GLUCOSE 297 01/04/2012 03:00 AM    PROT 7.5 09/21/2022 10:44 AM    LABALBU 3.5 09/21/2022 10:44 AM    LABALBU 3.4 01/04/2012 03:00 AM    CALCIUM 9.3 09/22/2022 04:30 PM    BILITOT 0.3 09/21/2022 10:44 AM    ALKPHOS 97 09/21/2022 10:44 AM    AST 21 09/21/2022 10:44 AM    ALT 7 09/21/2022 10:44 AM       IMPRESSION/RECOMMENDATIONS:      CKD stage 4. Creatinine is at base line. Hyperkalemia. CKD stage 4. Risk assessment for angiogram.  -  Very high risk for GABRIELA. Almost 60%  - Continue to monitor renal function.  - Continue IV fluid. - reevaluate case with vascular surgeon on day to day bases. - Start Gera Book  Discussed with RN.

## 2022-09-22 NOTE — PROGRESS NOTES
OCCUPATIONAL THERAPY INITIAL EVALUATION     Basilia Crain [address]       Date:2022                                                  Patient Name: Gia Davila  MRN: [id number]  : 1945  Room: [address] [place] [address]    Evaluating OT: Collette Haber, [address] [place], OTR/L [id number]  Referring Timothy Tong MD  Specific Provider Orders: OT eval and treat   Recommended Adaptive Equipment:  TBD     Diagnosis: JUAN (s/p fall)   Surgery: none   Pertinent Medical History: CA, DM, HTN, CVA, thyroid disease, a-fib, pacemaker   Precautions:  Fall Risk, contact, NWB RLE (per perfect serve  DPM)    Assessment of current deficits   [x] Functional mobility  [x]ADLs  [x] Strength               [x]Cognition   [x] Functional transfers   [x] IADLs         [x] Safety Awareness   [x]Endurance   [] Fine Coordination              [x] Balance      [] Vision/perception   [x]Sensation    []Gross Motor Coordination  [] ROM  [] Delirium                   [] Motor Control     OT PLAN OF CARE   OT POC based on physician orders, patient diagnosis and results of clinical assessment    Frequency/Duration: 2-3 days/wk for 2 weeks PRN   Specific OT Treatment to include:   * Instruction/training on adapted ADL techniques and AE recommendations to increase functional independence within precautions       * Training on energy conservation strategies, correct breathing pattern and techniques to improve independence/tolerance for self-care routine  * Functional transfer/mobility training/DME recommendations for increased independence, safety, and fall prevention  * Patient/Family education to increase follow through with safety techniques and functional independence  * Recommendation of environmental modifications for increased safety with functional transfers/mobility and ADLs  * Therapeutic exercise to improve motor endurance, ROM, and functional strength for ADLs/functional transfers  * Therapeutic activities to facilitate/challenge dynamic balance, stand tolerance for increased safety and independence with ADLs  * Neuro-muscular re-education: facilitation of righting/equilibrium reactions, midline orientation, scapular stability/mobility, normalization of muscle tone, and facilitation of volitional active controled movement    Home Living: Pt presents from a [facility]. Required assist with ADLs and 2 person assist for functional transfers. Pt reports not ambulating prior. Pain Level: R foot  Cognition: A&O: 3/4; Follows 1 step directions, with repetition and increased time   Memory:  fair    Sequencing:  fair    Problem solving:  fair    Judgement/safety:  fair     Functional Assessment:  AM-PAC Daily Activity Raw Score: 14/24   Initial Eval Status  Date: 9/22/22 Treatment Status  Date: STGs=LTGs  Time Frame: 10-14 days   Feeding SUP (bed level)   Set-up   Grooming SBA   Set-up   UB Dressing SBA  Set-up   LB Dressing dep (assist with socks)  Mod A    Bathing Max A (simulated)  Mod A    Toileting Max A  Mod A   Bed Mobility  Log roll: Mod A  Supine to sit: Mod A   Sit to supine: Min A   Log roll SBA  Supine to sit: SBA   Sit to supine: SBA   Functional Transfers Sit to stand: attempted, unable to complete safely and fully with 1 assist   Stand to sit:NT  Commode: NT  Mod A   Functional Mobility NT  Did not complete prior   Balance Sitting: SBA  Standing: NT     Activity Tolerance fair     Visual/  Perceptual Glasses: no              UE ROM: RUE:  WFL  LUE:  WFL  Strength: RUE: grossly 4/5 LUE: grossly 4/5   Strength: B WFL  Fine Motor Coordination:  WFL     Hearing: WFL  Sensation:  No c/o numbness/tingling   Tone:  WFL  Edema: BLEs                            Comments:Cleared by RN to see pt. Upon arrival, patient supine in bed and agreeable to OT session. At end of session, patient supine in bed with call light and phone within reach, all lines and tubes intact.  Pt would benefit from continued OT to increase functional independence and quality of life. Treatment: . Pt required vc's and physical assist for proper technique/safety with hand placement/body mechanics/posture for bed mobility/ADLs/functional tranfers. Pt required vc's for sequencing/initiation of ADLs/functional transfers. Pt able to  sit EOB ~5 mins to increase core strength/balance/activity tolerance for ease with ADLs. Pt educated on NWB status. Pt required increased time to complete ADLs/functional transfers due to rest breaks and physical assist.Pt appeared to have tolerated session well and appears motivated/cooperative/pleasant . Pt instructed on use of call light for assistance and fall prevention. Pt demo'ing fair understanding of education provided. Continue to educate. Eval Complexity: moderate  History: Expanded chart review of medical records and additional review of physical, cognitive, or psychosocial history related to current functional performance  Exam: 3+ performance deficits  Assistance/Modification: mod/max assistance or modifications required to perform tasks. May have comorbidities that affect occupational performance. Rehab Potential: Good for established goals, pt. assisted in establishment of goals. LTG: maximize independence with ADLs to return to PLOF    Patient  instructed on diagnosis, prognosis/goals and plan of care. Demonstrated fair understanding. [] Malnutrition indicators have been identified and nursing has been notified to ensure a dietitian consult is ordered. Evaluation time includes thorough review of current medical information, gathering information on past medical & social history & PLOF, completion of standardized testing, informal observation of tasks, consultation with other medical professions/disciplines, assessment of data & development of POC/goals.      Time In: 2:50       Time Out: 3:05     Total treatment time: 0       Treatment Charges: Mins Units   OT Eval Low [place] X    OT Eval High [id number]     OT Re-Eval [id number]     Ther Ex  [phone]       Manual Therapy [address] [facility] [id number]       ADL/Home Mgt [id number]     Neuro Re-ed [id number]       Group Therapy        Orthotic manage/training  [id number]       Non-Billable Time           Catherine Nicholas OTR/L [id number]

## 2022-09-22 NOTE — CONSULTS
[address] [place] [address] [facility]  Consultation Note     Admit Date: 9/21/2022 10:15 AM    Reason for Consult:   Right foot    Attending Physician:  Alicja Celestin MD    HISTORY OF PRESENT ILLNESS:             The history is obtained from extensive review of available past medical records. The patient is a 67 y.o. male who is previously known to the ID service. The patient was sent to the ED at [facility] from [facility] on 9/21/2022 with dizziness and a fall. He has a history of decubitus ulcer of the right heel and is being followed at the wound clinic. He had been admitted to the hospital in August 2022 and was treated with Doxycycline and Cefepime by internal medicine. Her wound culture grew MRSA, Proteus mirabilis, Streptococcus agalactiae and Corynebacterium. Wound cultures were taken. 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 Admitted to [facility] from [facility] with empyema. Previously been treated with Zosyn, followed by Cefepime for a total intermittent Pseudomonas aeruginosa. Treated with Meropenem. Seen by ID. Meropenem was continued. Thoracentesis only revealed bloody fluid. Empyema was less likely. He was discharged to [facility] to finish IV antibiotics. September 2018. Admitted to [facility] for recurrent pneumonia and fever. Treated with Vancomycin and Zosyn. Seen by Dr. Ej Salazar. Zosyn was continued for couple of days. This was changed to Cefepime. Cultures grew Pseudomonas. Past Surgical History:        Procedure Laterality Date    CARDIAC PACEMAKER PLACEMENT  12/19/2014    COLONOSCOPY      EYE SURGERY      KNEE ARTHROSCOPY  right knee    PACEMAKER PLACEMENT      PA COLONOSCOPY FLX DX W/COLLJ SPEC WHEN PFRMD N/A 3/20/2018    COLONOSCOPY DIAGNOSTIC performed by Domitila Crespo MD at [address] EGD PERCUTANEOUS PLACEMENT GASTROSTOMY TUBE N/A 3/29/2018    PEG TUBE/PT. IN [address] performed by Domitila Crespo MD at [address] EGD PERCUTANEOUS PLACEMENT GASTROSTOMY TUBE N/A 9/12/2018    EGD PEG TUBE PLACEMENT performed by Domitila Crespo MD at [address] [place] [address]     Current Medications:   Scheduled Meds:   miconazole   Topical BID    [START ON 9/25/2022] acetylcysteine  600 mg Oral BID    ascorbic acid  500 mg Oral Daily    atorvastatin  20 mg Oral Nightly    [START ON 9/23/2022] vitamin D  50,000 Units Oral Weekly    levothyroxine  50 mcg Oral Daily    magnesium oxide  400 mg Oral Every Other Day    meclizine  25 mg Oral TID    metFORMIN  1,000 mg Oral BID    metoprolol tartrate  25 mg Oral BID    therapeutic multivitamin-minerals  1 tablet Oral Daily    pantoprazole  40 mg Oral Daily    phenazopyridine  200 mg Oral Daily    warfarin  6 mg Oral Daily    collagenase   Topical Daily     Continuous Infusions:   dextrose      sodium chloride 100 mL/hr at 09/22/22 1158     PRN Meds:glucose, dextrose bolus **OR** dextrose bolus, glucagon (rDNA), dextrose, acetaminophen, guaiFENesin-dextromethorphan, ipratropium-albuterol, loperamide, polyethylene glycol    Allergies:  Bee venom    Social History:   Social History     Socioeconomic History    Marital status:       Spouse name: None    Number of children: 3    Years of education: None    Highest education level: None   Tobacco Use    Smoking status: Former     Types: Cigars     Quit date: 2002 Years since quittin.7    Smokeless tobacco: Never   Vaping Use    Vaping Use: Never used   Substance and Sexual Activity    Alcohol use: No    Drug use: No      The patient resides at [facility]. She retired as a .    Family History:       Problem Relation Age of Onset    Heart Disease Mother     Stroke Father    . Otherwise non-pertinent to the chief complaint. REVIEW OF SYSTEMS:    Constitutional: Negative for fevers, chills, diaphoresis  Neurologic: Negative   Psychiatric: Negative  Rheumatologic: Negative   Endocrine: Negative  Hematologic: Negative  Immunologic: Negative  ENT: Negative  Respiratory: Negative   Cardiovascular: Negative  GI: Negative  : Negative  Musculoskeletal: As in the HPI. He admits to having pain in the right heel. Skin: No rashes. PHYSICAL EXAM:    Vitals:   /66   Pulse 65   Temp 97.3 °F (36.3 °C) (Temporal)   Resp 14   Ht 6' 2\" (1.88 m)   Wt 264 lb 6.4 oz (119.9 kg)   SpO2 100%   BMI 33.95 kg/m²   Constitutional: The patient is awake, alert, and oriented. Skin: Warm and dry. No rashes were noted. HEENT: Eyes show round, and reactive pupils. No jaundice. Moist mucous membranes, no ulcerations, no thrush. Neck: Supple to movements. No lymphadenopathy. Chest: No use of accessory muscles to breathe. Symmetrical expansion. Auscultation reveals no wheezing, crackles, or rhonchi. Cardiovascular: S1 and S2 are rhythmic and regular. No murmurs appreciated. Abdomen: Positive bowel sounds to auscultation. Benign to palpation. No masses felt. No hepatosplenomegaly. Laparoscopic surgical wounds healed. Extremities: No edema. No unstageable, necrotic eschar over the heel. The edges show some drainage and there is an odor. Some surrounding erythema noted. Stage II decubitus ulcer on the dorsum of the foot.   Lines: Peripheral.      CBC+dif:  Recent Labs     22  1044   WBC 9.3   HGB 10.0*   HCT 32.3*   MCV 91.0      NEUTROABS 7.13     Lab Results   Component Value Date    CRP 7.2 (H) 08/01/2022    CRP 4.9 (H) 10/03/2018    CRP 25.2 (H) 03/01/2018      No results found for: CRPHS  Lab Results   Component Value Date    SEDRATE 87 (H) 08/01/2022    SEDRATE 77 (H) 10/03/2018    SEDRATE 62 (H) 03/01/2018     Lab Results   Component Value Date    ALT 7 09/21/2022    AST 21 09/21/2022    ALKPHOS 97 09/21/2022    BILITOT 0.3 09/21/2022     Lab Results   Component Value Date/Time     09/22/2022 03:20 AM    K 5.3 09/22/2022 03:20 AM    K 6.2 09/21/2022 10:44 AM     09/22/2022 03:20 AM    CO2 19 09/22/2022 03:20 AM    BUN 53 09/22/2022 03:20 AM    CREATININE 2.5 09/22/2022 03:20 AM    GFRAA 30 09/22/2022 03:20 AM    LABGLOM 25 09/22/2022 03:20 AM    GLUCOSE 122 09/22/2022 03:20 AM    GLUCOSE 297 01/04/2012 03:00 AM    PROT 7.5 09/21/2022 10:44 AM    LABALBU 3.5 09/21/2022 10:44 AM    LABALBU 3.4 01/04/2012 03:00 AM    CALCIUM 9.3 09/22/2022 03:20 AM    BILITOT 0.3 09/21/2022 10:44 AM    ALKPHOS 97 09/21/2022 10:44 AM    AST 21 09/21/2022 10:44 AM    ALT 7 09/21/2022 10:44 AM       Lab Results   Component Value Date/Time    PROTIME 23.4 09/21/2022 10:44 AM    PROTIME 12.2 12/30/2011 10:20 PM    INR 2.1 09/21/2022 10:44 AM       Lab Results   Component Value Date/Time    TSH 0.993 07/11/2022 05:25 AM       Lab Results   Component Value Date/Time    COLORU Yellow 08/16/2022 12:00 AM    PHUR 6.0 08/16/2022 12:00 AM    LABCAST MODERATE 10/01/2018 11:15 AM    WBCUA NONE 08/16/2022 12:00 AM    WBCUA 0-1 12/30/2011 11:15 PM    RBCUA 0-1 08/16/2022 12:00 AM    RBCUA 1-3 12/30/2011 11:15 PM    YEAST FEW 10/01/2018 11:15 AM    BACTERIA NONE SEEN 08/16/2022 12:00 AM    CLARITYU Clear 08/16/2022 12:00 AM    SPECGRAV 1.015 08/16/2022 12:00 AM    LEUKOCYTESUR Negative 08/16/2022 12:00 AM    UROBILINOGEN 0.2 08/16/2022 12:00 AM    BILIRUBINUR Negative 08/16/2022 12:00 AM    BILIRUBINUR NEGATIVE 12/30/2011 11:15 PM    BLOODU TRACE-INTACT 08/16/2022 12:00 AM    GLUCOSEU Negative 08/16/2022 12:00 AM    GLUCOSEU >=1000 12/30/2011 11:15 PM       Lab Results   Component Value Date/Time    HCO3 35.7 10/03/2018 04:28 PM    BE 9.3 10/03/2018 04:28 PM    O2SAT 97.7 10/03/2018 04:28 PM    PH 7.403 10/03/2018 04:28 PM    PCO2 58.5 10/03/2018 04:28 PM    PO2 105.5 10/03/2018 04:28 PM     Radiology:  Reviewed    Microbiology:  Pending  No results for input(s): BC in the last 72 hours. No results for input(s): ORG in the last 72 hours. No results for input(s): Yahir Stade in the last 72 hours. No results for input(s): STREPNEUMAGU in the last 72 hours. No results for input(s): LP1UAG in the last 72 hours. No results for input(s): ASO in the last 72 hours. No results for input(s): CULTRESP in the last 72 hours. No results for input(s): PROCAL in the last 72 hours. Assessment:  Right heel decubitus ulcer  Right heel wound infection with cellulitis. Previously treated with Doxycycline and Cefepime during an admission in August 2022. Cultures back then grew Proteus mirabilis, MRSA, Corynebacterium and Streptococcus agalactiae    Plan:    Start Unasyn and Linezolid  Check cultures, baseline ESR, CRP, ASO titer  Will follow with you    Thank you for having us see this patient in consultation. I will be discussing this case with the treating physicians. Spoke with nursing.     Soco Mcnamara MD  1:17 PM  9/22/2022

## 2022-09-22 NOTE — PROGRESS NOTES
Dr Tal Alberts,  Please consider decreasing the Metformin to 500 mg po bid for for CrCl = 34 ml/min. GFR is 25 (normally use is contraindicated), but patient has been on it at home. Continuation of therapy suggests a max of 500 mg bid. Thank Rosalio Marinelli Pharm. D 9/22/2022 7:24 AM

## 2022-09-22 NOTE — PROGRESS NOTES
Dr. Sotomayor Favor notified that CT cannot do contrast due to patients elevated creatinine, new orders received

## 2022-09-22 NOTE — CONSULTS
Vascular Surgery Inpatient Consultation Note      Reason for Consultation:  PVD, R heel wound    HISTORY OF PRESENT ILLNESS:                The patient is a 67 y.o. male who is admitted to the hospital for treatment of fall. He recently established with Dr Clemente Barrett in the [address] for a nonhealing wound to his R heel. The patient has had a wound of right heel, noted at the nursing [place] facility in 8/2022, post patient being hospitalized there after left nephrectomy for malignancy at the [facility] in July. The patient has not had similar previous wounds in the past. He is scheduled for outpatient angiography on 9/26 with Dr Frederick Zuniga. His wound has been gradually worsening since it began. He has no fever or chills and overall states he feels well besides some intermittent sharp, stabbing pain to his R heel. He had a wound to the L heel which developed at the same time but has since healed. Vascular surgery is consulted for evaluation and treatment. IMPRESSION:  PVD, right heel wound    RECOMMENDATIONS:  Continue daily dressing changes per podiatry. At this point he has no signs of acute infection; WBC count 9.3, afebrile. Will continue with plan for arteriogram with possible intervention on 9/26 w Dr Frederick Zuniga. The procedure as well as risks, benefits, alternatives discussed and patient and his daughter want to proceed. He will need to be off of his coumadin for 3 days prior and will need lovenox bridge. Will also consult nephrology for renal optimization prior to procedure.     Will discuss with Dr Frederick Zuniga    Patient Active Problem List   Diagnosis Code    Hypertension I10    Hyperlipidemia with target LDL less than 100 E78.5    Impaired mobility and ADLs Z74.09, Z78.9    Cardiac pacemaker in situ Z95.0    Cerebrovascular accident (CVA) determined by clinical assessment ([facility] I63.9    Left renal mass N28.89    Class 2 severe obesity due to excess calories with serious comorbidity and body mass index (BMI) of 35.0 to 35.9 in adult [facility]) E66.01, Z68.35    Acquired hypothyroidism E03.9    H/O deep venous thrombosis Z86.718    Type 2 diabetes mellitus without complication, without long-term current use of insulin (HCC) E11.9    Stage 3 chronic kidney disease (HCC) N18.30    Ulcer of right heel and midfoot with fat layer exposed ([address] L97.412    Atherosclerosis of native artery of right lower extremity with ulceration of heel (HCC) I70.234    Status post nephrectomy Z90.5    Chronic renal failure, stage 3b (HCC) N18.32    Necrotic eschar (HCC) I96    Diabetic ulcer of left heel associated with type 2 diabetes mellitus, with fat layer exposed ([address] E11.621, L97.422    Femoral-popliteal atherosclerosis (HCC) I70.209    Hyperkalemia E87.5       Past Medical History:   Diagnosis Date    Atherosclerosis of native artery of right lower extremity with ulceration of heel ([address] 8/15/2022    Atrial fibrillation (HCC)     Cancer (HCC)     kidney    Chronic renal failure, stage 3b ([address] 8/15/2022    Diabetes mellitus ([address]     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[address]     Femoral-popliteal atherosclerosis ([address] 9/13/2022    Hypertension     Ischemic stroke ([address] 03/06/2018    Pacemaker     Paraplegia ([address]     Sinus node dysfunction ([address]     Stroke (cerebrum) ([address] 02/27/2018    Thyroid disease     Ulcer of right heel and midfoot with fat layer exposed ([address] 8/15/2022        Past Surgical History:   Procedure Laterality Date    CARDIAC PACEMAKER PLACEMENT  12/19/2014    COLONOSCOPY      EYE SURGERY      KNEE ARTHROSCOPY  right knee    PACEMAKER PLACEMENT      TX COLONOSCOPY FLX DX W/COLLJ SPEC WHEN PFRMD N/A 3/20/2018    COLONOSCOPY DIAGNOSTIC performed by Tawanna Cortez MD at [address] PERCUTANEOUS PLACEMENT GASTROSTOMY TUBE N/A 3/29/2018    PEG TUBE/PT.  IN 5507 B performed by Tawanna Cortez MD at [address] EGD PERCUTANEOUS PLACEMENT GASTROSTOMY TUBE N/A 9/12/2018    EGD PEG TUBE PLACEMENT performed by Sadiq Shaw MD at [address] [facility]       Current Medications:    dextrose      sodium chloride 100 mL/hr at 22 1025      glucose, dextrose bolus **OR** dextrose bolus, glucagon (rDNA), dextrose, acetaminophen, guaiFENesin-dextromethorphan, ipratropium-albuterol, loperamide, polyethylene glycol    miconazole   Topical BID    ascorbic acid  500 mg Oral Daily    atorvastatin  20 mg Oral Nightly    [START ON 2022] vitamin D  50,000 Units Oral Weekly    levothyroxine  50 mcg Oral Daily    magnesium oxide  400 mg Oral Every Other Day    meclizine  25 mg Oral TID    metFORMIN  1,000 mg Oral BID    metoprolol tartrate  25 mg Oral BID    therapeutic multivitamin-minerals  1 tablet Oral Daily    pantoprazole  40 mg Oral Daily    phenazopyridine  200 mg Oral Daily    warfarin  6 mg Oral Daily    collagenase   Topical Daily        Allergies:  Bee venom    Social History     Socioeconomic History    Marital status:       Spouse name: Not on file    Number of children: 3    Years of education: Not on file    Highest education level: Not on file   Occupational History    Not on file   Tobacco Use    Smoking status: Former     Types: Cigars     Quit date:      Years since quittin.7    Smokeless tobacco: Never   Vaping Use    Vaping Use: Never used   Substance and Sexual Activity    Alcohol use: No    Drug use: No    Sexual activity: Not on file   Other Topics Concern    Not on file   Social History Narrative    Not on file     Social Determinants of Health     Financial Resource Strain: Not on file   Food Insecurity: Not on file   Transportation Needs: Not on file   Physical Activity: Not on file   Stress: Not on file   Social Connections: Not on file   Intimate Partner Violence: Not on file   Housing Stability: Not on file        Family History   Problem Relation Age of Onset    Heart Disease Mother     Stroke Father        REVIEW OF SYSTEMS:      Eyes:      Blurred vision:  No [x]/Yes []               Diplopia:   No [x]/Yes []               Vision loss:       No [x]/Yes []   Ears, nose, throat:             Hearing loss:    No [x]/Yes []      Vertigo:   No [x]/Yes []                       Swallowing problem:  No [x]/Yes []               Nose bleeds:   No [x]/Yes []      Voice hoarseness:  No [x]/Yes []  Respiratory:             Cough:   No [x]/Yes []      Pleuritic chest pain:  No [x]/Yes []                        Dyspnea:   No [x]/Yes []      Wheezing:   No [x]/Yes []  Cardiovascular:             Angina:   No [x]/Yes []      Palpitations:   No [x]/Yes []          Claudication:    No [x]/Yes []      Leg swelling:   No [x]/Yes []  Gastrointestinal:             Nausea or vomiting:  No [x]/Yes []               Abdominal pain:  No [x]/Yes []                     Intestinal bleeding: No [x]/Yes []  Musculoskeletal:             Leg pain:   No [x]/Yes []      Back pain:   No [x]/Yes []                    Weakness:   No [x]/Yes []  Neurologic:             Numbness:   No [x]/Yes []      Paralysis:   No [x]/Yes []                       Headaches:   No [x]/Yes []  Hematologic, lymphatic:   Anemia:   No [x]/Yes []              Bleeding or bruising:  No [x]/Yes []              Fevers or chills: No [x]/Yes []  Endocrine:             Temp intolerance:   No [x]/Yes []                       Polydipsia, polyuria:  No [x]/Yes []  Skin:              Rash:    No [x]/Yes []      Ulcers:   No []/Yes [x]              Abnorm pigment: No [x]/Yes []  :              Frequency/urgency:  No [x]/Yes []      Hematuria:    No [x]/Yes []                      Incontinence:    No [x]/Yes []    PHYSICAL EXAM:  Vitals:    09/22/22 0636   BP: 130/66   Pulse: 65   Resp: 14   Temp: 97.3 °F (36.3 °C)   SpO2: 100%     General Appearance: alert and oriented to person, place and time, in no acute distress, well developed and well- nourished  Neurologic: no cranial nerve deficit, speech normal  Head: normocephalic and atraumatic  Eyes: extraocular eye movements intact, conjunctivae normal  ENT: external ear and ear canal normal bilaterally, nose without deformity  Pulmonary/Chest: normal air movement, no respiratory distress  Cardiovascular: normal rate, regular rhythm  Abdomen: non-distended, no masses  Musculoskeletal: no joint deformity or tenderness  Extremities: slight leg edema bilaterally, wrap in place to R foot, media reviewed. + motor and sensation  Skin: warm and dry, no rash or erythema    PULSE EXAM      Right      Left   Brachial     Radial 2 2   Femoral 3 3   Popliteal biphasic    Dorsalis Pedis wrap biphasic   Posterior Tibial monophasic 1, multiphasic   (3=normal, 2=diminished, 1=barely palpable, 4=widened)    LABS:    Lab Results   Component Value Date    WBC 9.3 09/21/2022    HGB 10.0 (L) 09/21/2022    HCT 32.3 (L) 09/21/2022     09/21/2022    PROTIME 23.4 (H) 09/21/2022    INR 2.1 09/21/2022    K 5.3 (H) 09/22/2022    BUN 53 (H) 09/22/2022    CREATININE 2.5 (H) 09/22/2022       RADIOLOGY:    ABIs reviewed      As above will plan Agram in the future.   Nephro consulted    Gita

## 2022-09-22 NOTE — H&P
Brendan Mejía is a 67 y.o. male with past medical history of hypertension, diabetes, thyroid disease, A. fib on Coumadin, paraplegia status postischemic stroke, PVD, CKD, chronic foot wounds and factor V deficiency presenting to the emergency department due to dizziness and fall. Patient was brought in by ambulance from nursing facility for evaluation. He is coming from [facility]. Patient has been there for almost a month apparently. Patient states that he is transferring from his wheelchair when he fell and hit his head. He does have a small abrasion to his upper forehead but no other obvious injuries or deformities anywhere else. Patient is denying any dizziness on initial evaluation in the emergency department. He states that his symptoms are completely resolved. He is also denying any other symptoms including fever, chills, nausea, vomiting, chest pain, shortness of breath, abdominal pain, lightheadedness, syncope, urinary symptoms, abdominal pain, constipation or diarrhea. Patient states that he has had similar symptoms in the past and has needed evaluation in the hospital.   He is awake alert and oriented x3 and following commands. found with hyperkalemia and JUAN started on treatment and admitted for management     Past Medical History:   Diagnosis Date    Atherosclerosis of native artery of right lower extremity with ulceration of heel ([address] 8/15/2022    Atrial fibrillation (HCC)     Cancer (HCC)     kidney    Chronic renal failure, stage 3b ([address] 8/15/2022    Diabetes mellitus ([address]     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[address]     Femoral-popliteal atherosclerosis ([address] 9/13/2022    Hypertension     Ischemic stroke ([address] 03/06/2018    Pacemaker     Paraplegia ([address]     Sinus node dysfunction ([address]     Stroke (cerebrum) ([address] 02/27/2018    Thyroid disease     Ulcer of right heel and midfoot with fat layer exposed ([address] 8/15/2022       Past Surgical History:   Procedure Laterality Date    CARDIAC PACEMAKER PLACEMENT  12/19/2014    COLONOSCOPY      EYE SURGERY      KNEE ARTHROSCOPY  right knee    PACEMAKER PLACEMENT      CO COLONOSCOPY FLX DX W/COLLJ SPEC WHEN PFRMD N/A 3/20/2018    COLONOSCOPY DIAGNOSTIC performed by Quang Campoverde MD at [address] PERCUTANEOUS PLACEMENT GASTROSTOMY TUBE N/A 3/29/2018    PEG TUBE/PT. IN 5507 B performed by Quang Campoverde MD at [address] EGD PERCUTANEOUS PLACEMENT GASTROSTOMY TUBE N/A 9/12/2018    EGD PEG TUBE PLACEMENT performed by Quang Campoverde MD at [facility] ENDOSCOPY       Family History   Problem Relation Age of Onset    Heart Disease Mother     Stroke Father        Prior to Admission medications    Medication Sig Start Date End Date Taking?  Authorizing Provider   meclizine (ANTIVERT) 25 MG tablet Take 25 mg by mouth three times daily   Yes Historical Provider, MD   warfarin (COUMADIN) 6 MG tablet Take 6 mg by mouth daily   Yes Historical Provider, MD   Glucagon HCl (GLUCAGON EMERGENCY) 1 MG/ML SOLR Inject 1 mg as directed as needed (HYPOGLYCEMIA)   Yes Historical Provider, MD   glucose (GLUTOSE) 40 % GEL Take 15 g by mouth as needed (HYPOGLYCEMIA)   Yes Historical Provider, MD   ipratropium-albuterol (DUONEB) 0.5-2.5 (3) MG/3ML SOLN nebulizer solution Inhale 1 vial into the lungs every 4 hours as needed (Jazmyn Esters)   Yes Historical Provider, MD   potassium chloride (KLOR-CON M) 20 MEQ extended release tablet Take 20 mEq by mouth daily   Yes Historical Provider, MD   enoxaparin (LOVENOX) 60 MG/0.6ML Inject 60 mg into the skin daily 9/23/22 9/26/22 Yes Historical Provider, MD   polyethylene glycol (GLYCOLAX) 17 g packet Take 17 g by mouth daily as needed for Constipation   Yes Historical Provider, MD   phenazopyridine (PYRIDIUM) 200 MG tablet Take 200 mg by mouth daily   Yes Historical Provider, MD   dextromethorphan-guaiFENesin (ROBITUSSIN-DM)  MG/5ML syrup Take 5 mLs by mouth every 6 hours as needed for Cough   Yes Historical Provider, MD   Cholecalciferol (VITAMIN D3) 1.25 MG (30151 UT) CAPS Take 50,000 Units by mouth once a week **FRIDAYS**    Historical Provider, MD   loperamide (IMODIUM) 2 MG capsule Take 2 mg by mouth daily as needed for Diarrhea    Historical Provider, MD   Multiple Vitamins-Minerals (THERAPEUTIC MULTIVITAMIN-MINERALS) tablet Take 1 tablet by mouth daily    Historical Provider, MD   gabapentin (NEURONTIN) 300 MG capsule Take 1 capsule by mouth nightly for 7 days. 8/8/22 8/15/22  Mile Jackson MD   levothyroxine (SYNTHROID) 50 MCG tablet Take 1 tablet by mouth in the morning. 8/9/22   Mile Jackson MD   atorvastatin (LIPITOR) 20 MG tablet Take 1 tablet by mouth nightly 8/8/22   Mile Jackson MD   magnesium oxide (MAG-OX) 400 (240 Mg) MG tablet Take 1 tablet by mouth every other day 8/9/22   Mile Jackson MD   ascorbic acid (VITAMIN C) 500 MG tablet Take 1 tablet by mouth in the morning.  8/9/22   Mile Jackson MD   acetaminophen (TYLENOL) 325 MG tablet Take 650 mg by mouth every 4 hours as needed (DISCOMFORT;PAIN/ELEV TEMP >100.4F)    Historical Provider, MD   pantoprazole (PROTONIX) 40 MG tablet Take 40 mg by mouth daily    Historical Provider, MD   amLODIPine (NORVASC) 2.5 MG tablet Take 1 tablet by mouth daily 8/20/21 8/15/22  Lesley Wang DO   potassium chloride (KLOR-CON M) 20 MEQ TBCR extended release tablet Take 1 tablet by mouth daily 3/31/21 8/8/22  Lesley Wang DO   metoprolol tartrate (LOPRESSOR) 25 MG tablet Take 1 tablet by mouth 2 times daily 2/19/21   Lesley Wang DO   metFORMIN (GLUCOPHAGE) 1000 MG tablet Take 1 tablet by mouth 2 times daily 2/19/21   Lseley Wang DO   lovastatin (MEVACOR) 20 MG tablet Take 1 tablet by mouth nightly  Patient not taking: Reported on 8/1/2022 11/20/20 8/8/22  Lesley Wang DO   omeprazole (PRILOSEC) 20 MG delayed release capsule Take 1 capsule by mouth daily 5/22/20 4/22/22  Lesley Wang DO   furosemide (LASIX) 40 MG tablet Take 0.5 tablets by 10:44 AM    BASOSABS 0.05 09/21/2022 10:44 AM     CMP:    Lab Results   Component Value Date/Time     09/22/2022 03:20 AM    K 5.3 09/22/2022 03:20 AM    K 6.2 09/21/2022 10:44 AM     09/22/2022 03:20 AM    CO2 19 09/22/2022 03:20 AM    BUN 53 09/22/2022 03:20 AM    CREATININE 2.5 09/22/2022 03:20 AM    GFRAA 30 09/22/2022 03:20 AM    LABGLOM 25 09/22/2022 03:20 AM    GLUCOSE 122 09/22/2022 03:20 AM    GLUCOSE 297 01/04/2012 03:00 AM    PROT 7.5 09/21/2022 10:44 AM    LABALBU 3.5 09/21/2022 10:44 AM    LABALBU 3.4 01/04/2012 03:00 AM    CALCIUM 9.3 09/22/2022 03:20 AM    BILITOT 0.3 09/21/2022 10:44 AM    ALKPHOS 97 09/21/2022 10:44 AM    AST 21 09/21/2022 10:44 AM    ALT 7 09/21/2022 10:44 AM      Imaging:Ct scan head :  1. There is no acute intracranial abnormality. Specifically, there is no   intracranial hemorrhage. 2. Atrophy and periventricular leukomalacia,   3.  Old left occipital lobe infarct     CXR : clear       Assessment and Plan:    Patient Active Problem List   Diagnosis    JUAN on CKD  Hyperkalemia due to above and supplement   HTN  Obesity  DM  Impaired mobility   Fungal dermatitis   Remote CVA   PVD   Hypothyroid   Heal ulcers

## 2022-09-22 NOTE — PLAN OF CARE
Problem: Discharge Planning  Goal: Discharge to home or other facility with appropriate resources  Outcome: Progressing     Problem: Chronic Conditions and Co-morbidities  Goal: Patient's chronic conditions and co-morbidity symptoms are monitored and maintained or improved  Outcome: Progressing     Problem: Safety - Adult  Goal: Free from fall injury  Outcome: Adequate for Discharge     Problem: Pain  Goal: Verbalizes/displays adequate comfort level or baseline comfort level  Outcome: Adequate for Discharge

## 2022-09-22 NOTE — CARE COORDINATION
Social Work/Discharge Planning:  Met with patient and his daughter Vivi Felix and completed initial assessment. Explained Social Work role and discussed transition of care/discharge planning. Patient admitted from [facility] and family plans for patient to return to facility at discharge. Vivi Felix states her father is scheduled for a procedure on Monday at [place] [address] with [facility] and provided her with an update. Lia Reed states patient will have an angiogram on Monday 9/26. Patient does not need a pre-cert, but will need a therapy evaluation. Patient will need a negative covid result on day of discharge. Will continue to follow and assist with discharge planning.  Electronically signed by ROHIT Rosales on 9/22/2022 at 11:27 AM

## 2022-09-22 NOTE — PROGRESS NOTES
Department of Podiatry   Progress Note      HISTORY OF PRESENT ILLNESS:                67 y.o. male followed by podiatry for chronic painful Right heel wound with eschar. Pt states has been present for months. Known by podiatry services. No current nausea, vomiting, fevers, chills, but has some residual dizziness. No acute overnight events    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[address]     Femoral-popliteal atherosclerosis ([address] 9/13/2022    Hypertension     Ischemic stroke ([address] 03/06/2018    Pacemaker     Paraplegia ([address]     Sinus node dysfunction ([address]     Stroke (cerebrum) ([address] 02/27/2018    Thyroid disease     Ulcer of right heel and midfoot with fat layer exposed ([address] 8/15/2022       Past Surgical History:        Procedure Laterality Date    CARDIAC PACEMAKER PLACEMENT  12/19/2014    COLONOSCOPY      EYE SURGERY      KNEE ARTHROSCOPY  right knee    PACEMAKER PLACEMENT      NE COLONOSCOPY FLX DX W/COLLJ SPEC WHEN PFRMD N/A 3/20/2018    COLONOSCOPY DIAGNOSTIC performed by El aKtz MD at [address] E[address] PERCUTANEOUS PLACEMENT GASTROSTOMY TUBE N/A 3/29/2018    PEG TUBE/PT.  IN 5507 B performed by El Katz MD at [address] EGD PERCUTANEOUS PLACEMENT GASTROSTOMY TUBE N/A 9/12/2018    EGD PEG TUBE PLACEMENT performed by El Katz MD at [address] [place] [address]       Medications Prior to Admission:    Medications Prior to Admission: meclizine (ANTIVERT) 25 MG tablet, Take 25 mg by mouth three times daily  warfarin (COUMADIN) 6 MG tablet, Take 6 mg by mouth daily  Glucagon HCl (GLUCAGON EMERGENCY) 1 MG/ML SOLR, Inject 1 mg as directed as needed (HYPOGLYCEMIA)  glucose (GLUTOSE) 40 % GEL, Take 15 g by mouth as needed reports that he quit smoking about 20 years ago. His smoking use included cigars. He has never used smokeless tobacco.  ETOH:   reports no history of alcohol use. DRUGS:   Social History     Substance and Sexual Activity   Drug Use No       Family History:       Problem Relation Age of Onset    Heart Disease Mother     Stroke Father          REVIEW OF SYSTEMS:    All pertinent review of systems both positive and negative discussed in HPI      LOWER EXTREMITY EXAMINATION     VASCULAR:  DP and PT pulses weakly palpable 1+ to the bilateral LE. 1-2+ pitting edema appreciated as well. NEUROLOGIC:  Protective sensation diminished to distal aspect of bilateral lower extremities. DERM:  Right pressure heel ulceration with overlying eschar noted. Fat layer exposed. No clinical signs of infection. Does not probe deep at this time. Measures roughly 8 cm x 9 cm x unknown depth. No malodor. Little drainage. Superficial wound noted to patient's anterior Right ankle as well. Not clinically infected    MUSCULOSKELETAL: Tenderness to palpation of the right heel wound. Negative TTP of bilateral calves at this time.  MSK strength testing deferred this afternoon        CONSULTS:  IP CONSULT TO INTERNAL MEDICINE  IP CONSULT TO PODIATRY  IP CONSULT TO IV TEAM  IP CONSULT TO VASCULAR SURGERY  IP CONSULT TO INFECTIOUS DISEASES    MEDICATION:  Scheduled Meds:   miconazole   Topical BID    ascorbic acid  500 mg Oral Daily    atorvastatin  20 mg Oral Nightly    [START ON 9/23/2022] vitamin D  50,000 Units Oral Weekly    levothyroxine  50 mcg Oral Daily    magnesium oxide  400 mg Oral Every Other Day    meclizine  25 mg Oral TID    metFORMIN  1,000 mg Oral BID    metoprolol tartrate  25 mg Oral BID    therapeutic multivitamin-minerals  1 tablet Oral Daily    pantoprazole  40 mg Oral Daily    phenazopyridine  200 mg Oral Daily    warfarin  6 mg Oral Daily    collagenase   Topical Daily     Continuous Infusions:   dextrose sodium chloride 50 mL/hr at 09/21/22 1746     PRN Meds:.glucose, dextrose bolus **OR** dextrose bolus, glucagon (rDNA), dextrose, acetaminophen, guaiFENesin-dextromethorphan, ipratropium-albuterol, loperamide, polyethylene glycol    RADIOLOGY:  XR CHEST PORTABLE   Final Result   No acute process         CT Head WO Contrast   Final Result   1. There is no acute intracranial abnormality. Specifically, there is no   intracranial hemorrhage. 2. Atrophy and periventricular leukomalacia,   3. Old left occipital lobe infarct         CT Cervical Spine WO Contrast   Final Result   1. There is no acute compression fracture or subluxation of the cervical   spine. 2. Advanced multilevel degenerative disc and degenerative joint disease. CT ANKLE RIGHT W WO CONTRAST    (Results Pending)   CT FOOT RIGHT W WO CONTRAST    (Results Pending)       Vitals:    /66   Pulse 65   Temp 97.3 °F (36.3 °C) (Temporal)   Resp 14   Ht 6' 2\" (1.88 m)   Wt 264 lb 6.4 oz (119.9 kg)   SpO2 100%   BMI 33.95 kg/m²     LABS:   Recent Labs     09/21/22  1044   WBC 9.3   HGB 10.0*   HCT 32.3*          Recent Labs     09/22/22  0320      K 5.3*      CO2 19*   BUN 53*   CREATININE 2.5*       Recent Labs     09/20/22  0428 09/21/22  1044   PROT  --  7.5   INR 1.8 2.1         ASSESSMENTS:   Principal Problem:  - Chronic Right heel diabetic ulceration with eschar   - Anterior right ankle diabetic  wound  - Type II diabetic     Hyperkalemia  - Recent fall with dizziness (chief complaint)         PLAN:  - Patient seen and evaluated bedside  - Charts and labs reviewed   - Cultures : from 8/24 grew Proteus mirabilis, MRSA, and Corynebacterium.  - Prevalon offloading boots ordered but not yet in room  - Dressing: Santyl DSD applied to patient's right heel. - Podiatry will monitor patient while in house  - Discussed patient with Dr. Jason Russell  - Will continue to follow patient while they are in-house.         Thank you for the opportunity to take part in the patient's care. Please do not hesitate to call for any questions or concerns.

## 2022-09-22 NOTE — DISCHARGE INSTR - COC
Continuity of Care Form    Patient Name: Jimbo Encarnacion   :  1945  MRN:  [id number]    Admit date:  2022  Discharge date:  ***    Code Status Order: DNR-CCA   Advance Directives:     Admitting Physician:  Segundo Sanford MD  PCP: Judson Romero DO    Discharging Nurse: [facility] Unit/Room#: 3047/2881-J  Discharging Unit Phone Number: ***    Emergency Contact:   Extended Emergency Contact Information  Primary Emergency Contact: Sherryle Cera  Address: [address] Phone: [phone]  Mobile Phone: [phone]  Relation: Child  Secondary Emergency Contact: Mort Cowden  Address: [address] [place], [place] [address] Phone: [phone]  Mobile Phone: [phone]  Relation: Child    Past Surgical History:  Past Surgical History:   Procedure Laterality Date    CARDIAC PACEMAKER PLACEMENT  2014    COLONOSCOPY      EYE SURGERY      KNEE ARTHROSCOPY  right knee    PACEMAKER PLACEMENT      MO COLONOSCOPY FLX DX W/COLLJ SPEC WHEN PFRMD N/A 3/20/2018    COLONOSCOPY DIAGNOSTIC performed by Baron Taylor MD at [address] EGD PERCUTANEOUS PLACEMENT GASTROSTOMY TUBE N/A 3/29/2018    PEG TUBE/PT.   B performed by Baron Taylor MD at [address] EGD PERCUTANEOUS PLACEMENT GASTROSTOMY TUBE N/A 2018    EGD PEG TUBE PLACEMENT performed by Baron Taylor MD at [address] [place] [address]       Immunization History:   Immunization History   Administered Date(s) Administered    COVID-19, PFIZER PURPLE top, DILUTE for use, (age 15 y+), 30mcg/0.3mL 2021, 2021, 10/04/2021    Influenza Virus Vaccine 2012, 2014, 10/22/2021    Influenza, FLUZONE (age 72 y+), High Dose, 0.7mL 10/13/2020, 10/22/2021, 10/22/2021    Influenza, High Dose (Fluzone 65 yrs and older) 2019, 2019, 10/13/2020    Pneumococcal Conjugate 13-valent (Savannah Dalal) 09/30/2019    Pneumococcal Polysaccharide ([id number]) 01/04/2012, 11/19/2019       Active Problems:  Patient Active Problem List   Diagnosis Code    Hypertension I10    Hyperlipidemia with target LDL less than 100 E78.5    Impaired mobility and ADLs Z74.09, Z78.9    Cardiac pacemaker in situ Z95.0    Cerebrovascular accident (CVA) determined by clinical assessment ([address] I63.9    Left renal mass N28.89    Class 2 severe obesity due to excess calories with serious comorbidity and body mass index (BMI) of 35.0 to 35.9 in adult [facility]) E66.01, Z68.35    Acquired hypothyroidism E03.9    H/O deep venous thrombosis Z86.718    Type 2 diabetes mellitus without complication, without long-term current use of insulin (HCC) E11.9    Stage 3 chronic kidney disease (HCC) N18.30    Ulcer of right heel and midfoot with fat layer exposed ([address] L97.412    Atherosclerosis of native artery of right lower extremity with ulceration of heel ([address] I70.234    Status post nephrectomy Z90.5    Chronic renal failure, stage 3b ([address] N18.32    Necrotic eschar ([address] I96    Diabetic ulcer of left heel associated with type 2 diabetes mellitus, with fat layer exposed ([address] E11.621, L97.422    Femoral-popliteal atherosclerosis (HCC) I70.209    Hyperkalemia E87.5       Isolation/Infection:   Isolation            Contact          Patient Infection Status       Infection Onset Added Last Indicated Last Indicated By Review Planned Expiration Resolved Resolved By    MRSA 08/01/22 08/06/22 08/24/22 Culture, Wound        Wound right foot 8/24/22  Wound foot 8/1/22            Nurse Assessment:  Last Vital Signs: /66   Pulse 65   Temp 97.3 °F (36.3 °C) (Temporal)   Resp 14   Ht 6' 2\" (1.88 m)   Wt 264 lb 6.4 oz (119.9 kg)   SpO2 100%   BMI 33.95 kg/m²     Last documented pain score (0-10 scale): Pain Level: 0  Last Weight:   Wt Readings from Last 1 Encounters:   09/21/22 264 lb 6.4 oz (119.9 kg)     Mental Status:  {IP PT MENTAL STATUS:20030}    IV Access:  { ARINA IV ACCESS:397667188}    Nursing Mobility/ADLs:  Walking   {P DME VJOJ:992414107}  Transfer  {P DME YKZM:871690119}  Bathing  {CHP DME KUFT:583672987}  Dressing  {CHP DME IZHF:070872057}  Toileting  {P DME DIVH:286281399}  Feeding  {[facility] DME VCRY:781085163}  Med Admin  {P DME VXHQ:926224411}  Med Delivery   { ARINA MED Delivery:319607302}    Wound Care Documentation and Therapy:  Wound 08/15/22 Heel Right #1 (Active)   Dressing Status Clean;Dry; Intact 09/22/22 0830   Dressing/Treatment Dry dressing;Roll gauze; Pharmaceutical agent (see MAR) 09/22/22 0830   Dressing Change Due 09/22/22 09/22/22 0830   Wound Assessment Other (Comment) 09/22/22 0830   Number of days: 37       Wound 09/21/22 Buttocks Left open area (Active)   Dressing/Treatment Open to air 09/22/22 0830   Wound Length (cm) 1 cm 09/21/22 1522   Wound Width (cm) 1 cm 09/21/22 1522   Wound Depth (cm) 0.01 cm 09/21/22 1522   Wound Surface Area (cm^2) 1 cm^2 09/21/22 1522   Wound Volume (cm^3) 0.01 cm^3 09/21/22 1522   Wound Assessment Pink/red 09/22/22 0830   Drainage Amount None 09/22/22 0830   Odor None 09/22/22 0830   Tory-wound Assessment Fragile 09/22/22 0830   Number of days: 0       Wound 09/21/22 Knee Anterior; Left Abrasion (Active)   Wound Length (cm) 1 cm 09/21/22 1522   Wound Width (cm) 1 cm 09/21/22 1522   Wound Depth (cm) 0.1 cm 09/21/22 1522   Wound Surface Area (cm^2) 1 cm^2 09/21/22 1522   Wound Volume (cm^3) 0.1 cm^3 09/21/22 1522   Wound Assessment Pink/red 09/21/22 2000   Drainage Amount None 09/21/22 2000   Odor None 09/21/22 2000   Number of days: 0       Wound 09/21/22 Perineum scrotum (Active)   Dressing Status Other (Comment) 09/21/22 2000   Dressing/Treatment Open to air 09/22/22 0830   Wound Length (cm) 15 cm 09/22/22 0830   Wound Width (cm) 1 cm 09/22/22 0830   Wound Depth (cm) 0.1 cm 09/22/22 0830   Wound Surface Area (cm^2) 15 cm^2 09/22/22 0830   Change in Wound Size % (l*w) -4334 09/22/22 0830   Wound Volume (cm^3) 1.5 cm^3 09/22/22 0830   Wound Healing % -2900 09/22/22 0830   Wound Assessment Pink/red 09/22/22 0830   Drainage Amount Moderate 09/22/22 0830   Drainage Description Serosanguinous;Purulent 09/22/22 0830   Odor Mild 09/22/22 0830   Tory-wound Assessment Intact;Fragile;Blanchable erythema 09/22/22 0830   Number of days: 0       Wound 09/22/22 Coccyx (Active)   Dressing/Treatment Open to air 09/22/22 0830   Wound Length (cm) 2.5 cm 09/22/22 0830   Wound Width (cm) 3 cm 09/22/22 0830   Wound Depth (cm) 0.1 cm 09/22/22 0830   Wound Surface Area (cm^2) 7.5 cm^2 09/22/22 0830   Wound Volume (cm^3) 0.75 cm^3 09/22/22 0830   Wound Assessment Pink/red 09/22/22 0830   Drainage Amount None 09/22/22 0830   Odor None 09/22/22 0830   Tory-wound Assessment Blanchable erythema 09/22/22 0830   Number of days: 0        Elimination:  Continence: Bowel: {YES / IM:65372}  Bladder: {YES / JK:82529}  Urinary Catheter: {Urinary Catheter:435204174}   Colostomy/Ileostomy/Ileal Conduit: {YES / IS:65001}       Date of Last BM: ***    Intake/Output Summary (Last 24 hours) at 9/22/2022 1121  Last data filed at 9/22/2022 0830  Gross per 24 hour   Intake 750 ml   Output 1450 ml   Net -700 ml     I/O last 3 completed shifts:   In: 750 [I.V.:750]  Out: -     Safety Concerns:     [address] Safety Concerns:607221714}    Impairments/Disabilities:      [address] Impairments/Disabilities:234824832}    Nutrition Therapy:  Current Nutrition Therapy:   [address] Diet List:870215003}    Routes of Feeding: {CHP DME Other Feedings:787768431}  Liquids: {Slp liquid thickness:43441}  Daily Fluid Restriction: {CHP DME Yes amt example:911140391}  Last Modified Barium Swallow with Video (Video Swallowing Test): {Done Not Done [facility]:284251133}    Treatments at the Time of Hospital Discharge:   Respiratory Treatments: ***  Oxygen Therapy:  {Therapy; copd oxygen:12742}  Ventilator:    {MH CC Vent SUYZ:796520934}    Rehab Therapies: {THERAPEUTIC INTERVENTION:1213095854}  Weight Bearing Status/Restrictions: [address] CC Weight Bearin}  Other Medical Equipment (for information only, NOT a DME order):  {EQUIPMENT:532521976}  Other Treatments: ***    Patient's personal belongings (please select all that are sent with patient):  {CHP DME Belongings:471313014}    RN SIGNATURE:  {Esignature:060609645}    CASE MANAGEMENT/SOCIAL WORK SECTION    Inpatient Status Date: 2022    Readmission Risk Assessment Score:  Readmission Risk              Risk of Unplanned Readmission:  24           Discharging to Facility/ Agency   Name: [facility]  Address: [address]  Phone: [phone]  Fax: [phone]    Dialysis Facility (if applicable)   Name:  Address:  Dialysis Schedule:  Phone:  Fax:    / signature: Electronically signed by ROHIT Bahena on 22 at 11:21 AM EDT    PHYSICIAN SECTION    Prognosis: {Prognosis:5437058739}    Condition at Discharge: [address]ts Patient Condition:721567279}    Rehab Potential (if transferring to Rehab): {Prognosis:6524700888}    Recommended Labs or Other Treatments After Discharge: ***    Physician Certification: I certify the above information and transfer of Brendan Mejía  is necessary for the continuing treatment of the diagnosis listed and that he requires [facility] for less 30 days.      Update Admission H&P: {CHP DME Changes in EXQVN:350340992}    PHYSICIAN SIGNATURE:  Lalo Ramírez MD.

## 2022-09-22 NOTE — PROGRESS NOTES
Dr MORGAN notified of consult via secure text. HILARIO notified of consult via the office.  Marybeth crespo

## 2022-09-23 ENCOUNTER — APPOINTMENT (OUTPATIENT)
Dept: CT IMAGING | Age: 77
DRG: 641 | End: 2022-09-23
Payer: MEDICARE

## 2022-09-23 LAB
ANION GAP SERPL CALCULATED.3IONS-SCNC: 8 MMOL/L (ref 7–16)
BUN BLDV-MCNC: 43 MG/DL (ref 6–23)
CALCIUM SERPL-MCNC: 9.3 MG/DL (ref 8.6–10.2)
CHLORIDE BLD-SCNC: 109 MMOL/L (ref 98–107)
CO2: 22 MMOL/L (ref 22–29)
CREAT SERPL-MCNC: 2.2 MG/DL (ref 0.7–1.2)
GFR AFRICAN AMERICAN: 35
GFR NON-AFRICAN AMERICAN: 29 ML/MIN/1.73
GLUCOSE BLD-MCNC: 123 MG/DL (ref 74–99)
METER GLUCOSE: 106 MG/DL (ref 74–99)
METER GLUCOSE: 128 MG/DL (ref 74–99)
METER GLUCOSE: 158 MG/DL (ref 74–99)
POTASSIUM SERPL-SCNC: 5 MMOL/L (ref 3.5–5)
SODIUM BLD-SCNC: 139 MMOL/L (ref 132–146)

## 2022-09-23 PROCEDURE — 92610 EVALUATE SWALLOWING FUNCTION: CPT | Performed by: SPEECH-LANGUAGE PATHOLOGIST

## 2022-09-23 PROCEDURE — 99233 SBSQ HOSP IP/OBS HIGH 50: CPT | Performed by: INTERNAL MEDICINE

## 2022-09-23 PROCEDURE — 73700 CT LOWER EXTREMITY W/O DYE: CPT

## 2022-09-23 PROCEDURE — 6360000002 HC RX W HCPCS: Performed by: PHYSICIAN ASSISTANT

## 2022-09-23 PROCEDURE — 92526 ORAL FUNCTION THERAPY: CPT | Performed by: SPEECH-LANGUAGE PATHOLOGIST

## 2022-09-23 PROCEDURE — 6370000000 HC RX 637 (ALT 250 FOR IP): Performed by: SPECIALIST

## 2022-09-23 PROCEDURE — 2580000003 HC RX 258: Performed by: SPECIALIST

## 2022-09-23 PROCEDURE — 2060000000 HC ICU INTERMEDIATE R&B

## 2022-09-23 PROCEDURE — 6370000000 HC RX 637 (ALT 250 FOR IP): Performed by: INTERNAL MEDICINE

## 2022-09-23 PROCEDURE — 97530 THERAPEUTIC ACTIVITIES: CPT

## 2022-09-23 PROCEDURE — 97161 PT EVAL LOW COMPLEX 20 MIN: CPT

## 2022-09-23 PROCEDURE — 80048 BASIC METABOLIC PNL TOTAL CA: CPT

## 2022-09-23 PROCEDURE — 6360000002 HC RX W HCPCS: Performed by: SPECIALIST

## 2022-09-23 PROCEDURE — 36415 COLL VENOUS BLD VENIPUNCTURE: CPT

## 2022-09-23 PROCEDURE — 2580000003 HC RX 258: Performed by: INTERNAL MEDICINE

## 2022-09-23 PROCEDURE — 82962 GLUCOSE BLOOD TEST: CPT

## 2022-09-23 RX ORDER — INSULIN LISPRO 100 [IU]/ML
0-4 INJECTION, SOLUTION INTRAVENOUS; SUBCUTANEOUS
Status: DISCONTINUED | OUTPATIENT
Start: 2022-09-23 | End: 2022-09-29 | Stop reason: HOSPADM

## 2022-09-23 RX ORDER — INSULIN LISPRO 100 [IU]/ML
0-4 INJECTION, SOLUTION INTRAVENOUS; SUBCUTANEOUS NIGHTLY
Status: DISCONTINUED | OUTPATIENT
Start: 2022-09-23 | End: 2022-09-29 | Stop reason: HOSPADM

## 2022-09-23 RX ADMIN — AMPICILLIN SODIUM AND SULBACTAM SODIUM 3000 MG: 2; 1 INJECTION, POWDER, FOR SOLUTION INTRAMUSCULAR; INTRAVENOUS at 14:44

## 2022-09-23 RX ADMIN — LINEZOLID 600 MG: 600 TABLET, FILM COATED ORAL at 08:47

## 2022-09-23 RX ADMIN — MECLIZINE 25 MG: 12.5 TABLET ORAL at 08:47

## 2022-09-23 RX ADMIN — AMPICILLIN SODIUM AND SULBACTAM SODIUM 3000 MG: 2; 1 INJECTION, POWDER, FOR SOLUTION INTRAMUSCULAR; INTRAVENOUS at 19:34

## 2022-09-23 RX ADMIN — SODIUM CHLORIDE: 9 INJECTION, SOLUTION INTRAVENOUS at 14:44

## 2022-09-23 RX ADMIN — PANTOPRAZOLE SODIUM 40 MG: 40 TABLET, DELAYED RELEASE ORAL at 08:47

## 2022-09-23 RX ADMIN — MECLIZINE 25 MG: 12.5 TABLET ORAL at 19:27

## 2022-09-23 RX ADMIN — AMPICILLIN SODIUM AND SULBACTAM SODIUM 3000 MG: 2; 1 INJECTION, POWDER, FOR SOLUTION INTRAMUSCULAR; INTRAVENOUS at 08:54

## 2022-09-23 RX ADMIN — OXYCODONE HYDROCHLORIDE AND ACETAMINOPHEN 500 MG: 500 TABLET ORAL at 08:47

## 2022-09-23 RX ADMIN — SODIUM ZIRCONIUM CYCLOSILICATE 10 G: 10 POWDER, FOR SUSPENSION ORAL at 19:27

## 2022-09-23 RX ADMIN — MULTIPLE VITAMINS W/ MINERALS TAB 1 TABLET: TAB at 08:46

## 2022-09-23 RX ADMIN — MECLIZINE 25 MG: 12.5 TABLET ORAL at 14:46

## 2022-09-23 RX ADMIN — ERGOCALCIFEROL 50000 UNITS: 1.25 CAPSULE ORAL at 08:46

## 2022-09-23 RX ADMIN — LINEZOLID 600 MG: 600 TABLET, FILM COATED ORAL at 19:27

## 2022-09-23 RX ADMIN — LEVOTHYROXINE SODIUM 50 MCG: 0.05 TABLET ORAL at 05:00

## 2022-09-23 RX ADMIN — MICONAZOLE NITRATE: 20 POWDER TOPICAL at 08:48

## 2022-09-23 RX ADMIN — ATORVASTATIN CALCIUM 20 MG: 20 TABLET, FILM COATED ORAL at 19:27

## 2022-09-23 RX ADMIN — METOPROLOL TARTRATE 25 MG: 25 TABLET, FILM COATED ORAL at 19:27

## 2022-09-23 RX ADMIN — METOPROLOL TARTRATE 25 MG: 25 TABLET, FILM COATED ORAL at 08:47

## 2022-09-23 RX ADMIN — ENOXAPARIN SODIUM 60 MG: 100 INJECTION SUBCUTANEOUS at 08:46

## 2022-09-23 RX ADMIN — SODIUM ZIRCONIUM CYCLOSILICATE 10 G: 10 POWDER, FOR SUSPENSION ORAL at 08:47

## 2022-09-23 RX ADMIN — MICONAZOLE NITRATE: 20 POWDER TOPICAL at 19:28

## 2022-09-23 RX ADMIN — AMPICILLIN SODIUM AND SULBACTAM SODIUM 3000 MG: 2; 1 INJECTION, POWDER, FOR SOLUTION INTRAMUSCULAR; INTRAVENOUS at 02:49

## 2022-09-23 RX ADMIN — PHENAZOPYRIDINE 200 MG: 100 TABLET ORAL at 08:46

## 2022-09-23 ASSESSMENT — PAIN SCALES - GENERAL
PAINLEVEL_OUTOF10: 0
PAINLEVEL_OUTOF10: 0

## 2022-09-23 NOTE — CARE COORDINATION
Social Work/Discharge Planning:  Patient admitted from [facility] and plans to return at discharge. He will need a negative covid on day of discharge. Patient for aortogram Monday at [facility].  Transportation form in soft chart. Will continue to follow.   Electronically signed by ROHIT Bentley on 9/23/2022 at 11:50 AM

## 2022-09-23 NOTE — PROGRESS NOTES
[address] [place] [address] [facility]  GLADYSIDA  Progress Note    SUBJECTIVE:  Chief Complaint   Patient presents with    Edmond Zakiyar over to pick something up and felt like the room was spinning. -LOC, +head, +thinners. Abrasion forehead. Dizziness     The patient is doing well. Tolerating antibiotics. He admits to having pain in his left foot. Review of systems:  As stated above in the chief complaint, otherwise negative. Medications:  Scheduled Meds:   insulin lispro  0-4 Units SubCUTAneous TID WC    insulin lispro  0-4 Units SubCUTAneous Nightly    miconazole   Topical BID    [START ON 2022] acetylcysteine  600 mg Oral BID    ampicillin-sulbactam  3,000 mg IntraVENous Q6H    linezolid  600 mg Oral 2 times per day    enoxaparin  60 mg SubCUTAneous Daily    sodium zirconium cyclosilicate  10 g Oral BID    ascorbic acid  500 mg Oral Daily    atorvastatin  20 mg Oral Nightly    vitamin D  50,000 Units Oral Weekly    levothyroxine  50 mcg Oral Daily    magnesium oxide  400 mg Oral Every Other Day    meclizine  25 mg Oral TID    metoprolol tartrate  25 mg Oral BID    therapeutic multivitamin-minerals  1 tablet Oral Daily    pantoprazole  40 mg Oral Daily    phenazopyridine  200 mg Oral Daily    warfarin  6 mg Oral Daily    collagenase   Topical Daily     Continuous Infusions:   dextrose      sodium chloride 100 mL/hr at 22 1158     PRN Meds:glucose, dextrose bolus **OR** dextrose bolus, glucagon (rDNA), dextrose, acetaminophen, ipratropium-albuterol, loperamide, polyethylene glycol    OBJECTIVE:  BP (!) 175/82   Pulse 72   Temp 98 °F (36.7 °C) (Oral)   Resp 18   Ht 6' 2\" (1.88 m)   Wt 277 lb 8 oz (125.9 kg)   SpO2 96%   BMI 35.63 kg/m²   Temp  Av.8 °F (36.6 °C)  Min: 97.4 °F (36.3 °C)  Max: 98 °F (36.7 °C)  Constitutional: The patient is lying in bed. Asleep but arousable. No distress. Skin: Warm and dry. No rashes were noted. HEENT: Round and reactive pupils.   Moist mucous membranes. No ulcerations or thrush. Neck: Supple to movements. Chest: No use of accessory muscles to breathe. Symmetrical expansion. No wheezing, crackles or rhonchi. Cardiovascular: S1 and S2 are rhythmic and regular. No murmurs appreciated. Abdomen: Positive bowel sounds to auscultation. Benign to palpation. No masses felt. No hepatosplenomegaly. Extremities: Feet are wrapped. Unstageable necrotic eschar over the left heel. Ulcer dorsum of the left foot. There is no longer an odor today.   Lines: peripheral    Laboratory and Tests Review:  Lab Results   Component Value Date    WBC 9.3 09/21/2022    WBC 7.2 08/29/2022    WBC 7.6 08/22/2022    HGB 10.0 (L) 09/21/2022    HCT 32.3 (L) 09/21/2022    MCV 91.0 09/21/2022     09/21/2022     Lab Results   Component Value Date    NEUTROABS 7.13 09/21/2022    NEUTROABS 4.66 08/22/2022    NEUTROABS 4.96 08/16/2022     No results found for: [facility]  Lab Results   Component Value Date    ALT 7 09/21/2022    AST 21 09/21/2022    ALKPHOS 97 09/21/2022    BILITOT 0.3 09/21/2022     Lab Results   Component Value Date/Time     09/23/2022 04:55 AM    K 5.0 09/23/2022 04:55 AM    K 6.2 09/21/2022 10:44 AM     09/23/2022 04:55 AM    CO2 22 09/23/2022 04:55 AM    BUN 43 09/23/2022 04:55 AM    CREATININE 2.2 09/23/2022 04:55 AM    CREATININE 2.4 09/22/2022 04:30 PM    CREATININE 2.5 09/22/2022 03:20 AM    GFRAA 35 09/23/2022 04:55 AM    LABGLOM 29 09/23/2022 04:55 AM    GLUCOSE 123 09/23/2022 04:55 AM    GLUCOSE 297 01/04/2012 03:00 AM    PROT 7.5 09/21/2022 10:44 AM    LABALBU 3.5 09/21/2022 10:44 AM    LABALBU 3.4 01/04/2012 03:00 AM    CALCIUM 9.3 09/23/2022 04:55 AM    BILITOT 0.3 09/21/2022 10:44 AM    ALKPHOS 97 09/21/2022 10:44 AM    AST 21 09/21/2022 10:44 AM    ALT 7 09/21/2022 10:44 AM     Lab Results   Component Value Date    CRP 8.5 (H) 09/22/2022    CRP 7.2 (H) 08/01/2022    CRP 4.9 (H) 10/03/2018     Lab Results   Component Value Date SEDRATE 93 (H) 09/22/2022    SEDRATE 87 (H) 08/01/2022    SEDRATE 77 (H) 10/03/2018     Radiology:      Microbiology:  Pending    ASSESSMENT:  Right heel decubitus ulcer  Right heel wound infection with cellulitis.   Previous cultures in August grew Proteus mirabilis, MRSA, Streptococcus agalactiae and Corynebacterium    PLAN:  Continue Unasyn and Linezolid  The patient is going down to the OR today  Check final cultures  We will follow with you    Nursing not available to discuss the case with    Chuck Mayorga MD  12:21 PM  9/23/2022

## 2022-09-23 NOTE — PROGRESS NOTES
Admit Date: 9/21/2022    Subjective:     Awake feels fine  All consults reviewed     Objective:     Patient Vitals for the past 8 hrs:   BP Temp Temp src Pulse Resp SpO2 Weight   09/23/22 0318 -- -- -- -- -- -- 277 lb 8 oz (125.9 kg)   09/23/22 0015 (!) 141/83 97.4 °F (36.3 °C) Temporal 62 18 97 % --     I/O last 3 completed shifts: In: 750 [I.V.:750]  Out: 2050 [Urine:2050]  I/O this shift:  In: -   Out: 850 [Urine:850]    HEENT: Normal  NECK: Thyroid normal. No carotid bruit. No lymphphadenopathy. CVS: RRR  RS: Clear. No wheeze. No rhonchi. Good airflow bilaterally. ABD: Soft. Non tender. No mass. Normal BS.obese   EXT: No edema. Non tender.  Dressing feet   NEURO: no focal deficit       Scheduled Meds:   miconazole   Topical BID    [START ON 9/25/2022] acetylcysteine  600 mg Oral BID    ampicillin-sulbactam  3,000 mg IntraVENous Q6H    linezolid  600 mg Oral 2 times per day    enoxaparin  60 mg SubCUTAneous Daily    sodium zirconium cyclosilicate  10 g Oral BID    ascorbic acid  500 mg Oral Daily    atorvastatin  20 mg Oral Nightly    vitamin D  50,000 Units Oral Weekly    levothyroxine  50 mcg Oral Daily    magnesium oxide  400 mg Oral Every Other Day    meclizine  25 mg Oral TID    metFORMIN  1,000 mg Oral BID    metoprolol tartrate  25 mg Oral BID    therapeutic multivitamin-minerals  1 tablet Oral Daily    pantoprazole  40 mg Oral Daily    phenazopyridine  200 mg Oral Daily    warfarin  6 mg Oral Daily    collagenase   Topical Daily     Continuous Infusions:   dextrose      sodium chloride 100 mL/hr at 09/22/22 1158       CBC with Differential:    Lab Results   Component Value Date/Time    WBC 9.3 09/21/2022 10:44 AM    RBC 3.55 09/21/2022 10:44 AM    HGB 10.0 09/21/2022 10:44 AM    HCT 32.3 09/21/2022 10:44 AM     09/21/2022 10:44 AM    MCV 91.0 09/21/2022 10:44 AM    MCH 28.2 09/21/2022 10:44 AM    MCHC 31.0 09/21/2022 10:44 AM    RDW 14.2 09/21/2022 10:44 AM    SEGSPCT 80 01/04/2012 03:00 AM BANDSPCT 1 11/27/2014 04:50 AM    LYMPHOPCT 11.6 09/21/2022 10:44 AM    MONOPCT 7.5 09/21/2022 10:44 AM    MYELOPCT 0.9 02/28/2018 05:21 AM    BASOPCT 0.5 09/21/2022 10:44 AM    MONOSABS 0.69 09/21/2022 10:44 AM    LYMPHSABS 1.07 09/21/2022 10:44 AM    EOSABS 0.28 09/21/2022 10:44 AM    BASOSABS 0.05 09/21/2022 10:44 AM     CMP:    Lab Results   Component Value Date/Time     09/23/2022 04:55 AM    K 5.0 09/23/2022 04:55 AM    K 6.2 09/21/2022 10:44 AM     09/23/2022 04:55 AM    CO2 22 09/23/2022 04:55 AM    BUN 43 09/23/2022 04:55 AM    CREATININE 2.2 09/23/2022 04:55 AM    GFRAA 35 09/23/2022 04:55 AM    LABGLOM 29 09/23/2022 04:55 AM    PROT 7.5 09/21/2022 10:44 AM    LABALBU 3.5 09/21/2022 10:44 AM    LABALBU 3.4 01/04/2012 03:00 AM    CALCIUM 9.3 09/23/2022 04:55 AM    BILITOT 0.3 09/21/2022 10:44 AM    ALKPHOS 97 09/21/2022 10:44 AM    AST 21 09/21/2022 10:44 AM    ALT 7 09/21/2022 10:44 AM     PT/INR:    Lab Results   Component Value Date/Time    PROTIME 23.4 09/21/2022 10:44 AM    PROTIME 12.2 12/30/2011 10:20 PM    INR 2.1 09/21/2022 10:44 AM       Assessment:     Principal Problem:    JUAN on CKD improved   Hyperkalemia due to above and supplement resolved   HTN  Obesity  DM  Impaired mobility   Fungal dermatitis   Remote CVA   PVD   Hypothyroid   Heal ulcers infected       Plan:   Continue ATB per ID ,IV fluid ,started on Lokelma  per renal ,monitor renal function   Will hold metformin and start sliding scale

## 2022-09-23 NOTE — PROGRESS NOTES
SPEECH/LANGUAGE PATHOLOGY  CLINICAL ASSESSMENT OF SWALLOWING FUNCTION   and PLAN OF CARE    PATIENT NAME:  Maryjo Brunner  (male)     MRN:  [id number]    :  1945  (67 y.o.)  STATUS:  Inpatient: Room 0436/0436-A    TODAY'S DATE:  2022  REFERRING PROVIDER:  [address]    SLP swallowing-dysphagia evaluation and treatment  Start:  22,   End:  22,   ONE TIME,   Standing Count:  1 Occurrences,   R         Maren Mancia MD    REASON FOR REFERRAL: hx of dysphagia   EVALUATING THERAPIST: JAY Cortez                 RESULTS:    DYSPHAGIA DIAGNOSIS:   Clinical indicators of pharyngeal phase dysphagia with thin liquids    Inconsistent throat clearing observed with large bolus thin liquids. Pt with hx of dysphagia and nectar thick liquids were recommended  and family reported improved swallowing with use on last admit. However, family did not want to further assess with a video swallow eval at that time. Pt is at [facility] and admitted to this facility on thin liquids. Chest radiograph on admit was clear. Pt currently on room air. DIET RECOMMENDATIONS:  Regular consistency solids (IDDSI level 7) with SMALL SIPS thin liquids (IDDSI level 0) as tolerated    If decline in respiratory status, pt may benefit from reinitiating  thickened liquids as that time.      MEDICATION ADMINISTRATION, and Per patient choice/nursing judgement     FEEDING RECOMMENDATIONS:     Assistance level:  Stand by assistance is needed during all oral intake      Compensatory strategies recommended: Small bites/sips      Discussed recommendations with nursing and/or faxed report to referring provider: Yes    SPEECH THERAPY  PLAN OF CARE   The dysphagia POC is established based on physician order, dysphagia diagnosis and results of clinical assessment     Meal time assessment for 1-2 sessions to provide diet modification and compensatory strategy implementation due to need to ensure proper implementation of compensatory strategies during PO intake     Conditions Requiring Skilled Therapeutic Intervention for dysphagia:    Patient is performing below functional baseline d/t  current acute condition, Multiple diagnoses, multiple medications, and increased dependency upon caregivers. Specific dysphagia interventions to include:     Meal time assessment for 1-2 sessions to provide diet modification and compensatory strategy implementation due to need to ensure proper implementation of compensatory strategies during PO intake     Specific instructions for next treatment:  initiate instruction of compensatory strategies  Patient Treatment Goals:    Short Term Goals:  Pt will participate in meal time assessment for 1-2 sessions to provide diet modification and compensatory strategy implementation due to need to ensure proper implementation of compensatory strategies during PO intake     Long Term Goals:   Pt will maintain adequate nutrition/hydration via PO intake of the least restrictive oral diet with implementation of safe swallow/ compensatory strategies and decrease signs/symptoms of aspiration to less than 1 x/day. Patient/family Goal:    Did not state. Will further assess during treatment.     Plan of care discussed with Patient   The Patient did not demonstrate complete understanding of the diagnosis, prognosis and plan of care     Rehabilitation Potential/Prognosis: fair                    ADMITTING DIAGNOSIS: Hyperkalemia [E87.5]  Dizziness [R42]  Elevated troponin [R77.8]  Acute kidney injury superimposed on chronic kidney disease ([facility] [N17.9, N18.9]    VISIT DIAGNOSIS:   Visit Diagnoses         Codes    Acute kidney injury superimposed on chronic kidney disease ([facility]     N17.9, N18.9    Elevated troponin     R77.8    Dizziness     R42             PATIENT REPORT/COMPLAINT: denies difficulty swallowing  RN cleared patient for participation in assessment     yes     PRIOR LEVEL OF SWALLOW FUNCTION:    PAST HISTORY OF DYSPHAGIA?: yes    Home diet: Regular consistency solids (IDDSI level 7) with  thin liquids (IDDSI level 0)  Current Diet Order:  ADULT DIET; Regular; 4 carb choices (60 gm/meal); Low Sodium (2 gm)  Diet NPO Exceptions are: Sips of Water with Meds    PROCEDURE:  Consistencies Administered During the Evaluation   Liquids: thin liquid   Solids:  pureed foods and solid foods      Method of Intake:   cup, straw, spoon  Self fed      Position:   Seated, upright    CLINICAL ASSESSMENT:  Oral Stage: The oral stage of swallowing was within functional limits for consistencies administered      Pharyngeal Stage:    Throat clearing present after presentation of large bolus thin liquid    Cognition:   Follows 1 - step directions appropriate for this assessment and Confusion noted    Oral Peripheral Examination   Adequate lingual/labial strength     Current Respiratory Status    room air     Parameters of Speech Production  Respiration:  Adequate for speech production  Quality:   Within functional limits  Intensity: Within functional limits    Volitional Swallow: present     Volitional Cough:   present     Pain: No pain reported. EDUCATION:   The Speech Language Pathologist (SLP) completed education regarding results of evaluation and that intervention is warranted at this time. Learner: Patient  Education: Reviewed results and recommendations of this evaluation  Evaluation of Education:  Needs further instruction    This plan may be re-evaluated and revised as warranted. Evaluation Time includes thorough review of current medical information, gathering information on past medical history/social history and prior level of function, completion of standardized testing/informal observation of tasks, assessment of data and education on plan of care and goals. INTERVENTION    Pt/family educated on above results and plan of care.  Pt/family trained on compensatory strategies for safe swallow and signs and symptoms of aspiration (throat clearing, coughing/choking, wet vocal quality. , etc.) and encouraged to notify staff if observed. Handouts provided as warranted. Pt/family encouraged to engage in question/answer session. All questions answered and pt/family verbalized understanding of above. [x]The admitting diagnosis and active problem list, have been reviewed prior to initiation of this evaluation. ACTIVE PROBLEM LIST:   Patient Active Problem List   Diagnosis    Hypertension    Hyperlipidemia with target LDL less than 100    Impaired mobility and ADLs    Cardiac pacemaker in situ    Cerebrovascular accident (CVA) determined by clinical assessment ([facility]    Left renal mass    Class 2 severe obesity due to excess calories with serious comorbidity and body mass index (BMI) of 35.0 to 35.9 in [facility])    Acquired hypothyroidism    H/O deep venous thrombosis    Type 2 diabetes mellitus without complication, without long-term current use of insulin (HCC)    Stage 3 chronic kidney disease (HCC)    Ulcer of right heel and midfoot with fat layer exposed ([facility] [facility]    Atherosclerosis of native artery of right lower extremity with ulceration of heel (HCC)    Status post nephrectomy    Chronic renal failure, stage 3b (HCC)    Necrotic eschar (HCC)    Diabetic ulcer of left heel associated with type 2 diabetes mellitus, with fat layer exposed ([address]    Femoral-popliteal atherosclerosis (HCC)    Hyperkalemia         CPT code:  09955  bedside swallow eval, 93683 dysphagia therapy    Teodora PERALTA. ERICK/SLP [id number]  Speech-Language Pathologist

## 2022-09-23 NOTE — PROGRESS NOTES
Per cath lab at [facility] ([facility]) they will call Monday morning with time for arteriogram.

## 2022-09-23 NOTE — PROGRESS NOTES
Department of Podiatry   Progress Note      HISTORY OF PRESENT ILLNESS:                67 y.o. male followed by podiatry for chronic painful Right heel wound with eschar. Pt states has been present for months. Known by podiatry services. No current nausea, vomiting, fevers, chills, but has some residual dizziness. No acute overnight events. 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[address]     Femoral-popliteal atherosclerosis ([address] 9/13/2022    Hypertension     Ischemic stroke ([address] 03/06/2018    Pacemaker     Paraplegia ([address]     Sinus node dysfunction ([address]     Stroke (cerebrum) ([address] 02/27/2018    Thyroid disease     Ulcer of right heel and midfoot with fat layer exposed ([address] 8/15/2022       Past Surgical History:        Procedure Laterality Date    CARDIAC PACEMAKER PLACEMENT  12/19/2014    COLONOSCOPY      EYE SURGERY      KNEE ARTHROSCOPY  right knee    PACEMAKER PLACEMENT      TN COLONOSCOPY FLX DX W/COLLJ SPEC WHEN PFRMD N/A 3/20/2018    COLONOSCOPY DIAGNOSTIC performed by Corey Lamas MD at [address] EGD PERCUTANEOUS PLACEMENT GASTROSTOMY TUBE N/A 3/29/2018    PEG TUBE/PT.  IN 5507 B performed by Corey Lamas MD at [address] EGD PERCUTANEOUS PLACEMENT GASTROSTOMY TUBE N/A 9/12/2018    EGD PEG TUBE PLACEMENT performed by Corey Lamas MD at [address] [place] [address]       Medications Prior to Admission:    Medications Prior to Admission: meclizine (ANTIVERT) 25 MG tablet, Take 25 mg by mouth three times daily  warfarin (COUMADIN) 6 MG tablet, Take 6 mg by mouth daily  Glucagon HCl (GLUCAGON EMERGENCY) 1 MG/ML SOLR, Inject 1 mg as directed as needed (HYPOGLYCEMIA)  glucose (GLUTOSE) 40 % GEL, Take 15 g by mouth as needed (HYPOGLYCEMIA)  ipratropium-albuterol (DUONEB) 0.5-2.5 (3) MG/3ML SOLN nebulizer solution, Inhale 1 vial into the lungs every 4 hours as needed (COUGHINGAND DYSPNEA)  potassium chloride (KLOR-CON M) 20 MEQ extended release tablet, Take 20 mEq by mouth daily  enoxaparin (LOVENOX) 60 MG/0.6ML, Inject 60 mg into the skin daily  polyethylene glycol (GLYCOLAX) 17 g packet, Take 17 g by mouth daily as needed for Constipation  phenazopyridine (PYRIDIUM) 200 MG tablet, Take 200 mg by mouth daily  dextromethorphan-guaiFENesin (ROBITUSSIN-DM)  MG/5ML syrup, Take 5 mLs by mouth every 6 hours as needed for Cough  Cholecalciferol (VITAMIN D3) 1.25 MG (59693 UT) CAPS, Take 50,000 Units by mouth once a week **FRIDAYS**  loperamide (IMODIUM) 2 MG capsule, Take 2 mg by mouth daily as needed for Diarrhea  Multiple Vitamins-Minerals (THERAPEUTIC MULTIVITAMIN-MINERALS) tablet, Take 1 tablet by mouth daily  gabapentin (NEURONTIN) 300 MG capsule, Take 1 capsule by mouth nightly for 7 days. levothyroxine (SYNTHROID) 50 MCG tablet, Take 1 tablet by mouth in the morning. atorvastatin (LIPITOR) 20 MG tablet, Take 1 tablet by mouth nightly  magnesium oxide (MAG-OX) 400 (240 Mg) MG tablet, Take 1 tablet by mouth every other day  ascorbic acid (VITAMIN C) 500 MG tablet, Take 1 tablet by mouth in the morning.   acetaminophen (TYLENOL) 325 MG tablet, Take 650 mg by mouth every 4 hours as needed (DISCOMFORT;PAIN/ELEV TEMP >100.4F)  pantoprazole (PROTONIX) 40 MG tablet, Take 40 mg by mouth daily  amLODIPine (NORVASC) 2.5 MG tablet, Take 1 tablet by mouth daily  metoprolol tartrate (LOPRESSOR) 25 MG tablet, Take 1 tablet by mouth 2 times daily  metFORMIN (GLUCOPHAGE) 1000 MG tablet, Take 1 tablet by mouth 2 times daily  omeprazole (PRILOSEC) 20 MG delayed release capsule, Take 1 capsule by mouth daily  furosemide (LASIX) 40 MG tablet, Take 0.5 tablets by mouth daily    Allergies:  Bee venom    Social History:   TOBACCO:   reports that he quit smoking about 20 years ago. His smoking use included cigars. He has never used smokeless tobacco.  ETOH:   reports no history of alcohol use. DRUGS:   Social History     Substance and Sexual Activity   Drug Use No       Family History:       Problem Relation Age of Onset    Heart Disease Mother     Stroke Father          REVIEW OF SYSTEMS:    All pertinent review of systems both positive and negative discussed in HPI      LOWER EXTREMITY EXAMINATION     VASCULAR:  DP and PT pulses weakly palpable 1+ to the bilateral LE. 1-2+ pitting edema appreciated as well. NEUROLOGIC:  Protective sensation diminished to distal aspect of bilateral lower extremities. DERM:  Right pressure heel ulceration with overlying eschar noted. Fat layer exposed. No clinical signs of infection. Does not probe deep at this time. Measures roughly 8 cm x 9 cm x unknown depth. No malodor. Little drainage. Superficial wound noted to patient's anterior Right ankle as well. Not clinically infected    MUSCULOSKELETAL: Tenderness to palpation of the right heel wound. Negative TTP of bilateral calves at this time.  MSK strength testing deferred this afternoon        CONSULTS:  IP CONSULT TO INTERNAL MEDICINE  IP CONSULT TO PODIATRY  IP CONSULT TO IV TEAM  IP CONSULT TO VASCULAR SURGERY  IP CONSULT TO INFECTIOUS DISEASES  IP CONSULT TO NEPHROLOGY    MEDICATION:  Scheduled Meds:   insulin lispro  0-4 Units SubCUTAneous TID     insulin lispro  0-4 Units SubCUTAneous Nightly    miconazole   Topical BID    [START ON 9/25/2022] acetylcysteine  600 mg Oral BID    ampicillin-sulbactam  3,000 mg IntraVENous Q6H    linezolid  600 mg Oral 2 times per day    enoxaparin  60 mg SubCUTAneous Daily    sodium zirconium cyclosilicate  10 g Oral BID    ascorbic acid  500 mg Oral Daily    atorvastatin  20 mg Oral Nightly    vitamin D  50,000 Units Oral Weekly    levothyroxine  50 mcg Oral Daily    magnesium oxide  400 mg Oral Every Other Day    meclizine  25 mg Oral TID    metoprolol tartrate  25 mg Oral BID    therapeutic multivitamin-minerals  1 tablet Oral Daily    pantoprazole  40 mg Oral Daily    phenazopyridine  200 mg Oral Daily    warfarin  6 mg Oral Daily    collagenase   Topical Daily     Continuous Infusions:   dextrose      sodium chloride 100 mL/hr at 09/22/22 1158     PRN Meds:.glucose, dextrose bolus **OR** dextrose bolus, glucagon (rDNA), dextrose, acetaminophen, ipratropium-albuterol, loperamide, polyethylene glycol    RADIOLOGY:  XR FOOT RIGHT (MIN 3 VIEWS)   Final Result   1. Surface irregularity and edema about the right calcaneus. No definite   evidence of osseous destruction or erosion at time. 2.  Minimal right large toe metatarsal phalangeal joint osteoarthritis. Calcaneal enthesophytes. XR CHEST PORTABLE   Final Result   No acute process         CT Head WO Contrast   Final Result   1. There is no acute intracranial abnormality. Specifically, there is no   intracranial hemorrhage. 2. Atrophy and periventricular leukomalacia,   3. Old left occipital lobe infarct         CT Cervical Spine WO Contrast   Final Result   1. There is no acute compression fracture or subluxation of the cervical   spine. 2. Advanced multilevel degenerative disc and degenerative joint disease.          CT ANKLE RIGHT WO CONTRAST    (Results Pending)   CT FOOT RIGHT WO CONTRAST    (Results Pending)       Vitals:    BP (!) 175/82   Pulse 72   Temp 98 °F (36.7 °C) (Oral)   Resp 18   Ht 6' 2\" (1.88 m)   Wt 277 lb 8 oz (125.9 kg)   SpO2 96%   BMI 35.63 kg/m²     LABS:   Recent Labs     09/21/22  1044   WBC 9.3   HGB 10.0*   HCT 32.3*          Recent Labs     09/23/22  0455      K 5.0   *   CO2 22   BUN 43*   CREATININE 2.2*       Recent Labs     09/21/22  1044   PROT 7.5   INR 2.1         ASSESSMENTS:   Principal Problem:  - Chronic Right heel diabetic ulceration with eschar   - Anterior right diabetic ankle wound  - Type II diabetic     Hyperkalemia  - Recent fall with dizziness (chief complaint)         PLAN:  - Patient seen and evaluated bedside  - Charts and labs reviewed   - Cultures : from 8/24 grew Proteus mirabilis, MRSA, and Corynebacterium.  - Prevalon offloading boots applied to the bilateral lower extremities  - Dressing: Santyl DSD applied to patient's right heel. Dressings changed this morning. Daily dressing changes   CT ordered and pending  - Podiatry will monitor patient while in house  - Discussed patient with Dr. Kerrie Boast  - Will continue to follow patient while they are in-house. Thank you for the opportunity to take part in the patient's care. Please do not hesitate to call for any questions or concerns.

## 2022-09-23 NOTE — PROGRESS NOTES
Physical Therapy  Facility/Department: [address] INTERMEDIATE 1  Physical Therapy Initial Assessment    Name: Noel Tinoco  : 1945  MRN: [id number]  Date of Service: 2022      Patient Diagnosis(es): The primary encounter diagnosis was Hyperkalemia. Diagnoses of Acute kidney injury superimposed on chronic kidney disease ([address] Elevated troponin, and Dizziness were also pertinent to this visit. Past Medical History:  has a past medical history of Atherosclerosis of native artery of right lower extremity with ulceration of heel ([address] Atrial fibrillation ([address] Cancer ([address] Chronic renal failure, stage 3b ([address] Diabetes mellitus ([address] 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[address] Femoral-popliteal atherosclerosis ([address] Hypertension, Ischemic stroke ([address] Pacemaker, Paraplegia ([address] Sinus node dysfunction ([address] Stroke (cerebrum) ([address] Thyroid disease, and Ulcer of right heel and midfoot with fat layer exposed ([address] Past Surgical History:  has a past surgical history that includes eye surgery; Knee arthroscopy (right knee); pr colonoscopy flx dx w/collj spec when pfrmd (N/A, 3/20/2018); Colonoscopy; pr egd percutaneous placement gastrostomy tube (N/A, 3/29/2018); pacemaker placement; pr egd percutaneous placement gastrostomy tube (N/A, 2018); and Cardiac pacemaker placement (2014). Evaluating Therapist: Tunde Stewart PT    Room #:  6743/7727-S  Diagnosis:  Hyperkalemia [E87.5]  Dizziness [R42]  Elevated troponin [R77.8]  Acute kidney injury superimposed on chronic kidney disease (HCC) [N17.9, N18.9]  PMHx/PSHx:  CA, Afib, CVA  Precautions:  falls, alarm, Per OT note PT NWB R foot (no info in chart) nursing to clarify WB, contact isolation    Social:  Pt admitted from [place] [address]. Was transferring to wheelchair with assist of 2. Has post op shoe for L and offloading shoe for R. Initial Evaluation  Date: 22 Treatment      Short Term/ Long Term   Goals   Was pt agreeable to Eval/treatment?  yes     Does pt have pain? Bed Mobility  Rolling: min assist  Supine to sit: min assist  Sit to supine: NT  Scooting: min assist  SBA   Transfers Sit to stand: max assist of 2  Stand to sit: max assist of 2  Scoot bed to chair: max assist of 2. Difficulty maintaining NWB  Mod assist of 2   Ambulation    NT  N/A   Stair Negotiation  Ascended and descended  NT   N/A   LE strength     3/5    4-/5   balance      Good sitting fair standing     AM-PAC Raw score               10/24         Pt is alert and Oriented   LE ROM: WFL  Edema: none  Endurance: fair  Chair alarm: yes     ASSESSMENT:    Pt displays functional ability as noted in the objective portion of this evaluation. Patient education  Pt educated on PT objectives    Patient response to education:   Pt verbalized understanding Pt demonstrated skill Pt requires further education in this area   yes           Comments:  Pt with difficulty maintaining NWB. Pt unable to stand and pivot and maintain NWB. Pt instructed on scooting bed to chair. Conditions Requiring Skilled Therapeutic Intervention:    [x]Decreased strength     []Decreased ROM  [x]Decreased functional mobility  [x]Decreased balance   [x]Decreased endurance   []Decreased posture  []Decreased sensation  []Decreased coordination   []Decreased vision  [x]Decreased safety awareness   []Increased pain       Patient and or family understand(s) diagnosis, prognosis, and plan of care.     Prognosis is good for reaching above PT goals    PHYSICAL THERAPY PLAN OF CARE:    PT POC is established based on physician order and patient diagnosis     Referring provider/PT Order: Lamin Alfaro MD/ PT eval and treat      Current Treatment Recommendations:     [x] Strengthening to improve independence with functional mobility   [] ROM to improve independence with functional mobility   [x] Balance Training to improve static/dynamic balance and to reduce fall risk  [x] Endurance Training to improve activity tolerance during functional mobility   [x] Transfer Training to improve safety and independence with all functional transfers   [] Gait Training to improve gait mechanics, endurance and assess need for appropriate assistive device  [] Stair Training in preparation for safe discharge home and/or into the community   [] Positioning to prevent skin breakdown and contractures  [x] Safety and Education Training   [x] Patient/Caregiver Education   [] HEP  [] Other     PT long term treatment goals are located in above grid    Frequency of treatments: 2-5x/week x 5 days. Time in  815  Time out  0830      Evaluation Time includes thorough review of current medical information, gathering information on past medical history/social history and prior level of function, completion of standardized testing/informal observation of tasks, assessment of data and education on plan of care and goals.       CPT codes:  [x] Low Complexity PT evaluation 82399  [] Moderate Complexity PT evaluation 26926  [] High Complexity PT evaluation 63009  [] PT Re-evaluation 72166  [] Gait training 13867 minutes  [] Manual therapy 24862 minutes  [] Therapeutic activities 38734 minutes  [] Therapeutic exercises 84090 minutes  [] Neuromuscular reeducation 00821 minutes     [facility] PT [id number]

## 2022-09-23 NOTE — PLAN OF CARE
Problem: Skin/Tissue Integrity  Goal: Absence of new skin breakdown  Description: 1. Monitor for areas of redness and/or skin breakdown  2. Assess vascular access sites hourly  3. Every 4-6 hours minimum:  Change oxygen saturation probe site  4. Every 4-6 hours:  If on nasal continuous positive airway pressure, respiratory therapy assess nares and determine need for appliance change or resting period.   Outcome: Progressing     Problem: Discharge Planning  Goal: Discharge to home or other facility with appropriate resources  9/23/2022 0150 by Mikael Infante RN  Outcome: Progressing  9/22/2022 1601 by Lexie Walker RN  Outcome: Progressing     Problem: Safety - Adult  Goal: Free from fall injury  9/23/2022 0150 by Mikael Infante RN  Outcome: Progressing  9/22/2022 1601 by Lexie Walker RN  Outcome: Adequate for Discharge     Problem: Pain  Goal: Verbalizes/displays adequate comfort level or baseline comfort level  9/23/2022 0150 by Mikael Infnate RN  Outcome: Progressing  9/22/2022 1601 by Lexie Walker RN  Outcome: Adequate for Discharge     Problem: Chronic Conditions and Co-morbidities  Goal: Patient's chronic conditions and co-morbidity symptoms are monitored and maintained or improved  9/23/2022 0150 by Mikael Infante RN  Outcome: Progressing  9/22/2022 1601 by Lexie Walker RN  Outcome: Progressing     Problem: ABCDS Injury Assessment  Goal: Absence of physical injury  Outcome: Progressing

## 2022-09-23 NOTE — PROGRESS NOTES
[address] TREATMENT NOTE      Date:2022  Patient Name: Blaine Wynn  MRN: [id number]  : 1945  Room: [address] [place] [address]     Evaluating OT: Andrea Francisco, OTR/L [id number]  Referring Jose Antonio Olivia MD  Specific Provider Orders: OT eval and treat   Recommended Adaptive Equipment:  TBD      Diagnosis: JUAN (s/p fall)   Surgery: none   Pertinent Medical History: CA, DM, HTN, CVA, thyroid disease, a-fib, pacemaker   Precautions:  Fall Risk, contact, NWB RLE (per perfect serve  DPM)     Assessment of current deficits   [x] Functional mobility           [x]ADLs           [x] Strength                  [x]Cognition   [x] Functional transfers         [x] IADLs         [x] Safety Awareness   [x]Endurance   [] Fine Coordination                         [x] Balance      [] Vision/perception   [x]Sensation     []Gross Motor Coordination             [] ROM           [] Delirium                   [] Motor Control      OT PLAN OF CARE   OT POC based on physician orders, patient diagnosis and results of clinical assessment     Frequency/Duration: 2-3 days/wk for 2 weeks PRN   Specific OT Treatment to include:   * Instruction/training on adapted ADL techniques and AE recommendations to increase functional independence within precautions       * Training on energy conservation strategies, correct breathing pattern and techniques to improve independence/tolerance for self-care routine  * Functional transfer/mobility training/DME recommendations for increased independence, safety, and fall prevention  * Patient/Family education to increase follow through with safety techniques and functional independence  * Recommendation of environmental modifications for increased safety with functional transfers/mobility and ADLs  * Therapeutic exercise to improve motor endurance, ROM, and functional strength for ADLs/functional transfers  * Therapeutic activities to facilitate/challenge dynamic balance, stand tolerance for increased safety and independence with ADLs  * Neuro-muscular re-education: facilitation of righting/equilibrium reactions, midline orientation, scapular stability/mobility, normalization of muscle tone, and facilitation of volitional active controled movement     Home Living: Pt presents from a [facility]. Required assist with ADLs and 2 person assist for functional transfers. Pt reports not ambulating prior. Pain Level: no complaints of pain, impaired sensation in LE  Cognition: A&O: 3/4; Follows 1 step directions, with repetition and increased time             Memory:  fair              Sequencing:  fair              Problem solving:  fair              Judgement/safety:  fair      Functional Assessment:  AM-PAC Daily Activity Raw Score: 14/24    Initial Eval Status  Date: 9/22/22 Treatment Status  Date: 9/23/22 STGs=LTGs  Time Frame: 10-14 days   Feeding SUP (bed level)    Set-up   Grooming SBA    Set-up   UB Dressing SBA   Set-up   LB Dressing dep (assist with socks)  max A to don sock and surgical/offloading shoes Mod A    Bathing Max A (simulated)   Mod A    Toileting Max A  aparicio Mod A   Bed Mobility  Log roll: Mod A  Supine to sit: Mod A   Sit to supine: Min A  Supine to sit min A  Log roll SBA  Supine to sit: SBA   Sit to supine: SBA   Functional Transfers Sit to stand: attempted, unable to complete safely and fully with 1 assist   Stand to sit:NT  Commode: NT Sit <> stand max A x2, patient does not maintain NWB, patient was sat back down. Scoot pivot transfer with arm raised on chair from bed to chair max A x2. Maintaining of NWB questionable with scoot pivot despite v/c  Mod A   Functional Mobility NT NT  Did not complete prior   Balance Sitting: SBA  Standing: NT  sit balance SBA  Standing balance max A x2     Activity Tolerance fair Fair-        Comments:  patient agreeable to session.  Difficulty maintaining NWB demonstrated and patient quick to fatigue, however good effort demonstrated. Patient in chair with call light in reach and alarm on    Exercise: patient educated on chair pushups while in chair to improve strength for functional transfers. Patient unable to lift fully from chair surface while maintaining NWB but able to engage UEs appropriately. Patient encouraged to continue throughout the day. Education/treatment: ADL and functional transfer/activity performed to increase safety and independence during self care tasks. Education provided on safety awareness, adl reeducation, functional transfer training    Pt has made progress towards set goals.      Time In: 8:24  Time Out: 8:41     Min Units   Therapeutic Ex 32479     Therapeutic Activities 06690 11 2   ADL/Self Care [id number]     Orthotic Management [id number]     Neuro Re-Ed [address]     Non-Billable Time     TOTAL TIMED TREATMENT 17 [address] [place]/L [id number]

## 2022-09-23 NOTE — PROGRESS NOTES
Department of Internal Medicine  Nephrology Progress Note      Events reviewed. SUBJECTIVE: We are following MrBella Burnette for CKD and hyperkalemia. He has no complaints today. PHYSICAL EXAM:      Vitals:    VITALS:  BP (!) 175/82   Pulse 72   Temp 98 °F (36.7 °C) (Oral)   Resp 18   Ht 6' 2\" (1.88 m)   Wt 277 lb 8 oz (125.9 kg)   SpO2 96%   BMI 35.63 kg/m²   24HR INTAKE/OUTPUT:    Intake/Output Summary (Last 24 hours) at 9/23/2022 1435  Last data filed at 9/23/2022 0458  Gross per 24 hour   Intake --   Output 1450 ml   Net -1450 ml         Constitutional:  Well built. Obese. Mild distress. HEENT:  BEERLA. JVD not seen. Respiratory:  Breath sounds normal. No rales or rhonchi  Cardiovascular/Edema:  Heart sounds normal. No murmur. Gastrointestinal:  Bowel sounds normal. No oranomgaly  Neurologic:  A/O x 3. No focal deficit. Skin:  Dry skin. Normal turgor. Other:  Non healing ulcer in right foot.     Scheduled Meds:   insulin lispro  0-4 Units SubCUTAneous TID WC    insulin lispro  0-4 Units SubCUTAneous Nightly    miconazole   Topical BID    [START ON 9/25/2022] acetylcysteine  600 mg Oral BID    ampicillin-sulbactam  3,000 mg IntraVENous Q6H    linezolid  600 mg Oral 2 times per day    enoxaparin  60 mg SubCUTAneous Daily    sodium zirconium cyclosilicate  10 g Oral BID    ascorbic acid  500 mg Oral Daily    atorvastatin  20 mg Oral Nightly    vitamin D  50,000 Units Oral Weekly    levothyroxine  50 mcg Oral Daily    magnesium oxide  400 mg Oral Every Other Day    meclizine  25 mg Oral TID    metoprolol tartrate  25 mg Oral BID    therapeutic multivitamin-minerals  1 tablet Oral Daily    pantoprazole  40 mg Oral Daily    phenazopyridine  200 mg Oral Daily    warfarin  6 mg Oral Daily    collagenase   Topical Daily     Continuous Infusions:   dextrose      sodium chloride 100 mL/hr at 09/23/22 1444     PRN Meds:.glucose, dextrose bolus **OR** dextrose bolus, glucagon (rDNA), dextrose, acetaminophen, ipratropium-albuterol, loperamide, polyethylene glycol      DATA:    CBC:   Lab Results   Component Value Date/Time    WBC 9.3 09/21/2022 10:44 AM    RBC 3.55 09/21/2022 10:44 AM    HGB 10.0 09/21/2022 10:44 AM    HCT 32.3 09/21/2022 10:44 AM    MCV 91.0 09/21/2022 10:44 AM    MCH 28.2 09/21/2022 10:44 AM    MCHC 31.0 09/21/2022 10:44 AM    RDW 14.2 09/21/2022 10:44 AM     09/21/2022 10:44 AM    MPV 10.1 09/21/2022 10:44 AM     CMP:    Lab Results   Component Value Date/Time     09/23/2022 04:55 AM    K 5.0 09/23/2022 04:55 AM    K 6.2 09/21/2022 10:44 AM     09/23/2022 04:55 AM    CO2 22 09/23/2022 04:55 AM    BUN 43 09/23/2022 04:55 AM    CREATININE 2.2 09/23/2022 04:55 AM    GFRAA 35 09/23/2022 04:55 AM    LABGLOM 29 09/23/2022 04:55 AM    GLUCOSE 123 09/23/2022 04:55 AM    GLUCOSE 297 01/04/2012 03:00 AM    PROT 7.5 09/21/2022 10:44 AM    LABALBU 3.5 09/21/2022 10:44 AM    LABALBU 3.4 01/04/2012 03:00 AM    CALCIUM 9.3 09/23/2022 04:55 AM    BILITOT 0.3 09/21/2022 10:44 AM    ALKPHOS 97 09/21/2022 10:44 AM    AST 21 09/21/2022 10:44 AM    ALT 7 09/21/2022 10:44 AM     Magnesium:    Lab Results   Component Value Date/Time    MG 1.5 08/16/2022 04:22 AM     Phosphorus:    Lab Results   Component Value Date/Time    PHOS 3.9 08/16/2022 04:22 AM           BRIEF SUMMARY OF INITIAL CONSULT:    Briefly, Mr. Fidel Garcia  is a 79-year-old male with a PMH of CKD stage IIIb, without proteinuria, baseline creatinine 2.0-2.1 mg/dL, secondary to nephrosclerosis, kidney cancer, s/p total left nephrectomy on 7/5/22 at [facility], hypertension, type II diabetes mellitus, HFpEF 55% with stage II DD, PAF, factor V Leyden deficiency, hyperlipidemia, sick sinus syndrome s/p pacemaker placement, CVA, hypothyroidism, PAF on chronic anticoagulation with warfarin, who was admitted on September 21, 2022 after a fall at home, his labs were significant for potassium 6.2 mmol/L, BUN 57 mg/dL, creatinine 2.8 mg/dL, reason for this consult.  His home medications include lasix, lisinopril    IMPRESSION/RECOMMENDATIONS:         JUAN on CKD, volume responsive pre-renal JUAN, 2/2 to overt diuresis, decrease oral intake in the setting of ACE inhibition   CKD stage IV, without proteinuria, baseline creatinine 2.1-2.4 mg/dL, secondary to nephrosclerosis and solitary kidney   Hyperkalemia, 2/2 ACE inhibition, decrease GFR and on potassium supplementation,  improved with lokelma  Left kidney cancer, s/p total left nephrectomy 7/5/22 at [facility]  HTN, on amlodipine, metoprolol at home  HFpEF 55% with stage II DD, pro-, Lasix on hold  -------------------------------------------------  S/p fall  Nonhealing right heel wound, arteriogram with possible intervention on 9/26 with [facility], ID following  PAF, on metoprolol and warfarin  Sick sinus syndrome, s/p pacemaker placement   Type 2 DM, on metformin at home, on hold, SSI for now  HLD, on lovastatin  History of CVA     Plan:     Decrease NS to 75 cc/hr  Continue to hold Lasix  Continue to hold metformin  Strict I&Os  Continue lokelma BID x 4 doses  Continue to monitor renal function  Continue to monitor potassium  Obtain magnesium and phosphorus levels

## 2022-09-24 LAB
ANION GAP SERPL CALCULATED.3IONS-SCNC: 9 MMOL/L (ref 7–16)
BUN BLDV-MCNC: 30 MG/DL (ref 6–23)
CALCIUM SERPL-MCNC: 9.1 MG/DL (ref 8.6–10.2)
CHLORIDE BLD-SCNC: 107 MMOL/L (ref 98–107)
CO2: 22 MMOL/L (ref 22–29)
CREAT SERPL-MCNC: 2.1 MG/DL (ref 0.7–1.2)
GFR AFRICAN AMERICAN: 37
GFR NON-AFRICAN AMERICAN: 31 ML/MIN/1.73
GLUCOSE BLD-MCNC: 129 MG/DL (ref 74–99)
HCT VFR BLD CALC: 31.1 % (ref 37–54)
HEMOGLOBIN: 9.7 G/DL (ref 12.5–16.5)
MAGNESIUM: 1.5 MG/DL (ref 1.6–2.6)
MAGNESIUM: <0.2 MG/DL (ref 1.6–2.6)
MCH RBC QN AUTO: 28.7 PG (ref 26–35)
MCHC RBC AUTO-ENTMCNC: 31.2 % (ref 32–34.5)
MCV RBC AUTO: 92 FL (ref 80–99.9)
METER GLUCOSE: 109 MG/DL (ref 74–99)
METER GLUCOSE: 118 MG/DL (ref 74–99)
METER GLUCOSE: 118 MG/DL (ref 74–99)
METER GLUCOSE: 128 MG/DL (ref 74–99)
PDW BLD-RTO: 14.2 FL (ref 11.5–15)
PHOSPHORUS: 3.2 MG/DL (ref 2.5–4.5)
PLATELET # BLD: 280 E9/L (ref 130–450)
PMV BLD AUTO: 9.7 FL (ref 7–12)
POTASSIUM SERPL-SCNC: 4.3 MMOL/L (ref 3.5–5)
RBC # BLD: 3.38 E12/L (ref 3.8–5.8)
SODIUM BLD-SCNC: 138 MMOL/L (ref 132–146)
WBC # BLD: 6.6 E9/L (ref 4.5–11.5)

## 2022-09-24 PROCEDURE — 6360000002 HC RX W HCPCS: Performed by: SPECIALIST

## 2022-09-24 PROCEDURE — 83735 ASSAY OF MAGNESIUM: CPT

## 2022-09-24 PROCEDURE — 6370000000 HC RX 637 (ALT 250 FOR IP): Performed by: INTERNAL MEDICINE

## 2022-09-24 PROCEDURE — 6370000000 HC RX 637 (ALT 250 FOR IP): Performed by: SPECIALIST

## 2022-09-24 PROCEDURE — 99233 SBSQ HOSP IP/OBS HIGH 50: CPT | Performed by: INTERNAL MEDICINE

## 2022-09-24 PROCEDURE — 2060000000 HC ICU INTERMEDIATE R&B

## 2022-09-24 PROCEDURE — 2580000003 HC RX 258: Performed by: INTERNAL MEDICINE

## 2022-09-24 PROCEDURE — 6360000002 HC RX W HCPCS: Performed by: INTERNAL MEDICINE

## 2022-09-24 PROCEDURE — 84100 ASSAY OF PHOSPHORUS: CPT

## 2022-09-24 PROCEDURE — 2580000003 HC RX 258: Performed by: SPECIALIST

## 2022-09-24 PROCEDURE — 80048 BASIC METABOLIC PNL TOTAL CA: CPT

## 2022-09-24 PROCEDURE — 6360000002 HC RX W HCPCS: Performed by: PHYSICIAN ASSISTANT

## 2022-09-24 PROCEDURE — 36415 COLL VENOUS BLD VENIPUNCTURE: CPT

## 2022-09-24 PROCEDURE — 85027 COMPLETE CBC AUTOMATED: CPT

## 2022-09-24 PROCEDURE — 82962 GLUCOSE BLOOD TEST: CPT

## 2022-09-24 RX ORDER — SODIUM CHLORIDE 0.9 % (FLUSH) 0.9 %
5-40 SYRINGE (ML) INJECTION 2 TIMES DAILY
Status: DISCONTINUED | OUTPATIENT
Start: 2022-09-24 | End: 2022-09-29 | Stop reason: HOSPADM

## 2022-09-24 RX ORDER — MAGNESIUM SULFATE 1 G/100ML
1000 INJECTION INTRAVENOUS ONCE
Status: COMPLETED | OUTPATIENT
Start: 2022-09-24 | End: 2022-09-24

## 2022-09-24 RX ADMIN — AMPICILLIN SODIUM AND SULBACTAM SODIUM 3000 MG: 2; 1 INJECTION, POWDER, FOR SOLUTION INTRAMUSCULAR; INTRAVENOUS at 14:14

## 2022-09-24 RX ADMIN — PANTOPRAZOLE SODIUM 40 MG: 40 TABLET, DELAYED RELEASE ORAL at 08:52

## 2022-09-24 RX ADMIN — ENOXAPARIN SODIUM 60 MG: 100 INJECTION SUBCUTANEOUS at 08:52

## 2022-09-24 RX ADMIN — MICONAZOLE NITRATE: 20 POWDER TOPICAL at 21:11

## 2022-09-24 RX ADMIN — AMPICILLIN SODIUM AND SULBACTAM SODIUM 3000 MG: 2; 1 INJECTION, POWDER, FOR SOLUTION INTRAMUSCULAR; INTRAVENOUS at 08:56

## 2022-09-24 RX ADMIN — SODIUM ZIRCONIUM CYCLOSILICATE 10 G: 10 POWDER, FOR SUSPENSION ORAL at 12:07

## 2022-09-24 RX ADMIN — ATORVASTATIN CALCIUM 20 MG: 20 TABLET, FILM COATED ORAL at 21:12

## 2022-09-24 RX ADMIN — OXYCODONE HYDROCHLORIDE AND ACETAMINOPHEN 500 MG: 500 TABLET ORAL at 08:52

## 2022-09-24 RX ADMIN — LEVOTHYROXINE SODIUM 50 MCG: 0.05 TABLET ORAL at 06:14

## 2022-09-24 RX ADMIN — MULTIPLE VITAMINS W/ MINERALS TAB 1 TABLET: TAB at 08:52

## 2022-09-24 RX ADMIN — LINEZOLID 600 MG: 600 TABLET, FILM COATED ORAL at 08:52

## 2022-09-24 RX ADMIN — PHENAZOPYRIDINE 200 MG: 100 TABLET ORAL at 08:52

## 2022-09-24 RX ADMIN — AMPICILLIN SODIUM AND SULBACTAM SODIUM 3000 MG: 2; 1 INJECTION, POWDER, FOR SOLUTION INTRAMUSCULAR; INTRAVENOUS at 04:17

## 2022-09-24 RX ADMIN — MECLIZINE 25 MG: 12.5 TABLET ORAL at 08:53

## 2022-09-24 RX ADMIN — AMPICILLIN SODIUM AND SULBACTAM SODIUM 3000 MG: 2; 1 INJECTION, POWDER, FOR SOLUTION INTRAMUSCULAR; INTRAVENOUS at 21:11

## 2022-09-24 RX ADMIN — LINEZOLID 600 MG: 600 TABLET, FILM COATED ORAL at 21:12

## 2022-09-24 RX ADMIN — METOPROLOL TARTRATE 25 MG: 25 TABLET, FILM COATED ORAL at 08:52

## 2022-09-24 RX ADMIN — MICONAZOLE NITRATE: 20 POWDER TOPICAL at 08:52

## 2022-09-24 RX ADMIN — MAGNESIUM SULFATE HEPTAHYDRATE 1000 MG: 10 INJECTION, SOLUTION INTRAVENOUS at 10:46

## 2022-09-24 RX ADMIN — MECLIZINE 25 MG: 12.5 TABLET ORAL at 21:12

## 2022-09-24 RX ADMIN — MECLIZINE 25 MG: 12.5 TABLET ORAL at 14:12

## 2022-09-24 RX ADMIN — SODIUM CHLORIDE, PRESERVATIVE FREE 10 ML: 5 INJECTION INTRAVENOUS at 21:13

## 2022-09-24 RX ADMIN — Medication 400 MG: at 08:52

## 2022-09-24 RX ADMIN — METOPROLOL TARTRATE 25 MG: 25 TABLET, FILM COATED ORAL at 21:12

## 2022-09-24 ASSESSMENT — PAIN SCALES - GENERAL
PAINLEVEL_OUTOF10: 0

## 2022-09-24 NOTE — PROGRESS NOTES
[address] [place] [address] [facility]  [facility]  Progress Note    SUBJECTIVE:  Chief Complaint   Patient presents with    Lisandro Began over to pick something up and felt like the room was spinning. -LOC, +head, +thinners. Abrasion forehead. Dizziness     The patient is doing well. No nausea vomiting or diarrhea. Nursing in the room    Review of systems:  As stated above in the chief complaint, otherwise negative. Medications:  Scheduled Meds:   magnesium sulfate  1,000 mg IntraVENous Once    insulin lispro  0-4 Units SubCUTAneous TID WC    insulin lispro  0-4 Units SubCUTAneous Nightly    miconazole   Topical BID    ampicillin-sulbactam  3,000 mg IntraVENous Q6H    linezolid  600 mg Oral 2 times per day    enoxaparin  60 mg SubCUTAneous Daily    sodium zirconium cyclosilicate  10 g Oral BID    ascorbic acid  500 mg Oral Daily    atorvastatin  20 mg Oral Nightly    vitamin D  50,000 Units Oral Weekly    levothyroxine  50 mcg Oral Daily    magnesium oxide  400 mg Oral Every Other Day    meclizine  25 mg Oral TID    metoprolol tartrate  25 mg Oral BID    therapeutic multivitamin-minerals  1 tablet Oral Daily    pantoprazole  40 mg Oral Daily    phenazopyridine  200 mg Oral Daily    collagenase   Topical Daily     Continuous Infusions:   dextrose      sodium chloride 75 mL/hr at 22     PRN Meds:glucose, dextrose bolus **OR** dextrose bolus, glucagon (rDNA), dextrose, acetaminophen, ipratropium-albuterol, loperamide, polyethylene glycol    OBJECTIVE:  BP (!) 174/64   Pulse 61   Temp 98 °F (36.7 °C) (Oral)   Resp 16   Ht 6' 2\" (1.88 m)   Wt 270 lb 3.2 oz (122.6 kg)   SpO2 100%   BMI 34.69 kg/m²   Temp  Av.9 °F (36.6 °C)  Min: 97.7 °F (36.5 °C)  Max: 98 °F (36.7 °C)  Constitutional: The patient is lying in bed. Asleep but arousable. No distress. Skin: Warm and dry. No rashes were noted. HEENT: Round and reactive pupils. Moist mucous membranes. No ulcerations or thrush.   Neck: Supple to movements. Chest: No use of accessory muscles to breathe. Symmetrical expansion. No wheezing, crackles or rhonchi. Cardiovascular: S1 and S2 are rhythmic and regular. No murmurs appreciated. Abdomen: Positive bowel sounds to auscultation. Benign to palpation. No masses felt. No hepatosplenomegaly. Extremities: Feet are wrapped. Unstageable necrotic eschar over the left heel. Ulcer dorsum of the left foot. No odor.   Lines: peripheral    Laboratory and Tests Review:  Lab Results   Component Value Date    WBC 9.3 09/21/2022    WBC 7.2 08/29/2022    WBC 7.6 08/22/2022    HGB 10.0 (L) 09/21/2022    HCT 32.3 (L) 09/21/2022    MCV 91.0 09/21/2022     09/21/2022     Lab Results   Component Value Date    NEUTROABS 7.13 09/21/2022    NEUTROABS 4.66 08/22/2022    NEUTROABS 4.96 08/16/2022     No results found for: [facility]  Lab Results   Component Value Date    ALT 7 09/21/2022    AST 21 09/21/2022    ALKPHOS 97 09/21/2022    BILITOT 0.3 09/21/2022     Lab Results   Component Value Date/Time     09/23/2022 04:55 AM    K 5.0 09/23/2022 04:55 AM    K 6.2 09/21/2022 10:44 AM     09/23/2022 04:55 AM    CO2 22 09/23/2022 04:55 AM    BUN 43 09/23/2022 04:55 AM    CREATININE 2.2 09/23/2022 04:55 AM    CREATININE 2.4 09/22/2022 04:30 PM    CREATININE 2.5 09/22/2022 03:20 AM    GFRAA 35 09/23/2022 04:55 AM    LABGLOM 29 09/23/2022 04:55 AM    GLUCOSE 123 09/23/2022 04:55 AM    GLUCOSE 297 01/04/2012 03:00 AM    PROT 7.5 09/21/2022 10:44 AM    LABALBU 3.5 09/21/2022 10:44 AM    LABALBU 3.4 01/04/2012 03:00 AM    CALCIUM 9.3 09/23/2022 04:55 AM    BILITOT 0.3 09/21/2022 10:44 AM    ALKPHOS 97 09/21/2022 10:44 AM    AST 21 09/21/2022 10:44 AM    ALT 7 09/21/2022 10:44 AM     Lab Results   Component Value Date    CRP 8.5 (H) 09/22/2022    CRP 7.2 (H) 08/01/2022    CRP 4.9 (H) 10/03/2018     Lab Results   Component Value Date    SEDRATE 93 (H) 09/22/2022    SEDRATE 87 (H) 08/01/2022    SEDRATE 77 (H) 10/03/2018     Radiology:      Microbiology:  Wound culture 9/22/2022: Pending    ASSESSMENT:  Right heel decubitus ulcer  Right heel wound infection with cellulitis. Previous cultures in August grew Proteus mirabilis, MRSA, Streptococcus agalactiae and Corynebacterium    PLAN:  Continue Unasyn and Linezolid  The patient is going down to the OR in day or so - pending vasc studies ? Check final cultures  We will follow with you    Nursing not available to discuss the case with    MELANIE Zavaleta  10:48 AM  9/24/2022    Patient seen and examined. I had a face to face encounter with the patient. Agree with exam.  Assessment and plan as outlined above and directed by me. Addition and corrections were done as deemed appropriate. My exam and plan include: The patient seems to be tolerating antibiotics well. They will be going to the OR today. He may need a PICC for IV antibiotics.     Roxanne West MD  9/24/2022  12:30 PM

## 2022-09-24 NOTE — PROGRESS NOTES
Department of Internal Medicine  Nephrology Progress Note      Events reviewed. SUBJECTIVE: We are following Mr. Amaya Sellers for CKD and hyperkalemia. He has no complaints today. PHYSICAL EXAM:      Vitals:    VITALS:  BP (!) 174/64   Pulse 61   Temp 98 °F (36.7 °C) (Oral)   Resp 16   Ht 6' 2\" (1.88 m)   Wt 270 lb 3.2 oz (122.6 kg)   SpO2 100%   BMI 34.69 kg/m²   24HR INTAKE/OUTPUT:    Intake/Output Summary (Last 24 hours) at 9/24/2022 1247  Last data filed at 9/24/2022 0421  Gross per 24 hour   Intake 1065 ml   Output 1825 ml   Net -760 ml       Constitutional:  Well built. Obese. Mild distress. HEENT:  BEERLA. JVD not seen. Respiratory:  Breath sounds normal. No rales or rhonchi  Cardiovascular/Edema:  Heart sounds normal. No murmur. Gastrointestinal:  Bowel sounds normal. No oranomgaly  Neurologic:  A/O x 3. No focal deficit. Skin:  Dry skin. Normal turgor. Other:  Non healing ulcer in right foot.     Scheduled Meds:   insulin lispro  0-4 Units SubCUTAneous TID WC    insulin lispro  0-4 Units SubCUTAneous Nightly    miconazole   Topical BID    ampicillin-sulbactam  3,000 mg IntraVENous Q6H    linezolid  600 mg Oral 2 times per day    enoxaparin  60 mg SubCUTAneous Daily    ascorbic acid  500 mg Oral Daily    atorvastatin  20 mg Oral Nightly    vitamin D  50,000 Units Oral Weekly    levothyroxine  50 mcg Oral Daily    magnesium oxide  400 mg Oral Every Other Day    meclizine  25 mg Oral TID    metoprolol tartrate  25 mg Oral BID    therapeutic multivitamin-minerals  1 tablet Oral Daily    pantoprazole  40 mg Oral Daily    phenazopyridine  200 mg Oral Daily    collagenase   Topical Daily     Continuous Infusions:   dextrose      sodium chloride 75 mL/hr at 09/23/22 2022     PRN Meds:.glucose, dextrose bolus **OR** dextrose bolus, glucagon (rDNA), dextrose, acetaminophen, ipratropium-albuterol, loperamide, polyethylene glycol      DATA:    CBC:   Lab Results   Component Value Date/Time    WBC 6.6 09/24/2022 11:03 AM    RBC 3.38 09/24/2022 11:03 AM    HGB 9.7 09/24/2022 11:03 AM    HCT 31.1 09/24/2022 11:03 AM    MCV 92.0 09/24/2022 11:03 AM    MCH 28.7 09/24/2022 11:03 AM    MCHC 31.2 09/24/2022 11:03 AM    RDW 14.2 09/24/2022 11:03 AM     09/24/2022 11:03 AM    MPV 9.7 09/24/2022 11:03 AM     CMP:    Lab Results   Component Value Date/Time     09/24/2022 11:03 AM    K 4.3 09/24/2022 11:03 AM    K 6.2 09/21/2022 10:44 AM     09/24/2022 11:03 AM    CO2 22 09/24/2022 11:03 AM    BUN 30 09/24/2022 11:03 AM    CREATININE 2.1 09/24/2022 11:03 AM    GFRAA 37 09/24/2022 11:03 AM    LABGLOM 31 09/24/2022 11:03 AM    GLUCOSE 129 09/24/2022 11:03 AM    GLUCOSE 297 01/04/2012 03:00 AM    PROT 7.5 09/21/2022 10:44 AM    LABALBU 3.5 09/21/2022 10:44 AM    LABALBU 3.4 01/04/2012 03:00 AM    CALCIUM 9.1 09/24/2022 11:03 AM    BILITOT 0.3 09/21/2022 10:44 AM    ALKPHOS 97 09/21/2022 10:44 AM    AST 21 09/21/2022 10:44 AM    ALT 7 09/21/2022 10:44 AM     Magnesium:    Lab Results   Component Value Date/Time    MG 1.5 09/24/2022 04:45 AM     Phosphorus:    Lab Results   Component Value Date/Time    PHOS 3.2 09/24/2022 03:00 AM           BRIEF SUMMARY OF INITIAL CONSULT:    Briefly, Mr. Carolann Haynes  is a 79-year-old male with a PMH of CKD stage IIIb, without proteinuria, baseline creatinine 2.0-2.1 mg/dL, secondary to nephrosclerosis, kidney cancer, s/p total left nephrectomy on 7/5/22 at [facility], hypertension, type II diabetes mellitus, HFpEF 55% with stage II DD, PAF, factor V Leyden deficiency, hyperlipidemia, sick sinus syndrome s/p pacemaker placement, CVA, hypothyroidism, PAF on chronic anticoagulation with warfarin, who was admitted on September 21, 2022 after a fall at home, his labs were significant for potassium 6.2 mmol/L, BUN 57 mg/dL, creatinine 2.8 mg/dL, reason for this consult.  His home medications include lasix, lisinopril    Resolved:    Hyperkalemia, 2/2 ACE inhibition, decrease GFR and on

## 2022-09-24 NOTE — PROGRESS NOTES
Department of Podiatry   Progress Note      HISTORY OF PRESENT ILLNESS:                67 y.o. male  seen bedside for chronic painful Right heel wound with eschar. Pt states has been present for months. Known by podiatry services. No current nausea, vomiting, fevers, chills, but has some residual dizziness. No acute overnight events. Pt has no pedal questions or complaints. 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[address]     Femoral-popliteal atherosclerosis ([address] 9/13/2022    Hypertension     Ischemic stroke ([address] 03/06/2018    Pacemaker     Paraplegia ([address]     Sinus node dysfunction ([address]     Stroke (cerebrum) ([address] 02/27/2018    Thyroid disease     Ulcer of right heel and midfoot with fat layer exposed ([address] 8/15/2022       Past Surgical History:        Procedure Laterality Date    CARDIAC PACEMAKER PLACEMENT  12/19/2014    COLONOSCOPY      EYE SURGERY      KNEE ARTHROSCOPY  right knee    PACEMAKER PLACEMENT      [place] COLONOSCOPY FLX DX W/COLLJ SPEC WHEN PFRMD N/A 3/20/2018    COLONOSCOPY DIAGNOSTIC performed by Bryan Rodriguez MD at [address] EGD PERCUTANEOUS PLACEMENT GASTROSTOMY TUBE N/A 3/29/2018    PEG TUBE/PT.  IN 5507 B performed by Bryan oRdriguez MD at [address] EGD PERCUTANEOUS PLACEMENT GASTROSTOMY TUBE N/A 9/12/2018    EGD PEG TUBE PLACEMENT performed by Bryan Rodriguez MD at [address] [place] [address]       Medications Prior to Admission:    Medications Prior to Admission: meclizine (ANTIVERT) 25 MG tablet, Take 25 mg by mouth three times daily  warfarin (COUMADIN) 6 MG tablet, Take 6 mg by mouth daily  Glucagon HCl (GLUCAGON EMERGENCY) 1 MG/ML SOLR, Inject 1 mg as directed as needed (HYPOGLYCEMIA)  glucose (GLUTOSE) 40 % GEL, Take 15 g by mouth as needed (HYPOGLYCEMIA)  ipratropium-albuterol (DUONEB) 0.5-2.5 (3) MG/3ML SOLN nebulizer solution, Inhale 1 vial into the lungs every 4 hours as needed (COUGHINGAND DYSPNEA)  potassium chloride (KLOR-CON M) 20 MEQ extended release tablet, Take 20 mEq by mouth daily  enoxaparin (LOVENOX) 60 MG/0.6ML, Inject 60 mg into the skin daily  polyethylene glycol (GLYCOLAX) 17 g packet, Take 17 g by mouth daily as needed for Constipation  phenazopyridine (PYRIDIUM) 200 MG tablet, Take 200 mg by mouth daily  dextromethorphan-guaiFENesin (ROBITUSSIN-DM)  MG/5ML syrup, Take 5 mLs by mouth every 6 hours as needed for Cough  Cholecalciferol (VITAMIN D3) 1.25 MG (51584 UT) CAPS, Take 50,000 Units by mouth once a week **FRIDAYS**  loperamide (IMODIUM) 2 MG capsule, Take 2 mg by mouth daily as needed for Diarrhea  Multiple Vitamins-Minerals (THERAPEUTIC MULTIVITAMIN-MINERALS) tablet, Take 1 tablet by mouth daily  gabapentin (NEURONTIN) 300 MG capsule, Take 1 capsule by mouth nightly for 7 days. levothyroxine (SYNTHROID) 50 MCG tablet, Take 1 tablet by mouth in the morning. atorvastatin (LIPITOR) 20 MG tablet, Take 1 tablet by mouth nightly  magnesium oxide (MAG-OX) 400 (240 Mg) MG tablet, Take 1 tablet by mouth every other day  ascorbic acid (VITAMIN C) 500 MG tablet, Take 1 tablet by mouth in the morning.   acetaminophen (TYLENOL) 325 MG tablet, Take 650 mg by mouth every 4 hours as needed (DISCOMFORT;PAIN/ELEV TEMP >100.4F)  pantoprazole (PROTONIX) 40 MG tablet, Take 40 mg by mouth daily  amLODIPine (NORVASC) 2.5 MG tablet, Take 1 tablet by mouth daily  metoprolol tartrate (LOPRESSOR) 25 MG tablet, Take 1 tablet by mouth 2 times daily  metFORMIN (GLUCOPHAGE) 1000 MG tablet, Take 1 tablet by mouth 2 times daily  omeprazole (PRILOSEC) 20 MG delayed release capsule, Take 1 capsule by mouth daily  furosemide (LASIX) 40 MG tablet, Take 0.5 tablets by mouth daily    Allergies:  Bee venom    Social History: Temp 97.6 °F (36.4 °C) (Oral)   Resp 16   Ht 6' 2\" (1.88 m)   Wt 270 lb 3.2 oz (122.6 kg)   SpO2 100%   BMI 34.69 kg/m²     LABS:   Recent Labs     09/24/22  1103   WBC 6.6   HGB 9.7*   HCT 31.1*        Recent Labs     09/24/22  0300 09/24/22  1103   NA  --  138   K  --  4.3   CL  --  107   CO2  --  22   PHOS 3.2  --    BUN  --  30*   CREATININE  --  2.1*     No results for input(s): PROT, INR, APTT in the last 72 hours. ASSESSMENTS:   Principal Problem:  - Chronic Right heel diabetic ulceration with eschar   - Anterior right diabetic ankle wound  - Type II diabetic     Hyperkalemia  - Recent fall with dizziness (chief complaint)         PLAN:  - Patient seen and evaluated bedside  - Charts and labs reviewed   - Cultures : from 8/24 grew Proteus mirabilis, MRSA, and Corynebacterium.  - Prevalon offloading boots applied to the bilateral lower extremities  - Dressing: Santyl DSD applied to patient's right heel. QOD dressing changes. Dressing changes today. Next change Monday 9/26  CT right foot- No CT evidence of osteomyelitis. - Podiatry will monitor patient while in house  - Discussed patient with Dr. Chacho Clement  - Will continue to follow patient while they are in-house. Thank you for the opportunity to take part in the patient's care. Please do not hesitate to call for any questions or concerns.

## 2022-09-24 NOTE — PROGRESS NOTES
Admit Date: 9/21/2022    Subjective:     Feels fine no complaint     Objective:     Patient Vitals for the past 8 hrs:   BP Temp Temp src Pulse Resp SpO2 Weight   09/24/22 0824 (!) 174/64 98 °F (36.7 °C) Oral 61 16 100 % --   09/24/22 0645 (!) 146/77 97.7 °F (36.5 °C) Oral 59 16 100 % --   09/24/22 0300 (!) 166/68 97.8 °F (36.6 °C) Oral 63 18 98 % 270 lb 3.2 oz (122.6 kg)     I/O last 3 completed shifts: In: 6515 [P.O.:340; I.V.:525; IV Piggyback:200]  Out: 9195 [Urine:2675]  No intake/output data recorded. HEENT: Normal  NECK: Thyroid normal. No carotid bruit. No lymphphadenopathy. CVS: RRR  RS: Clear. No wheeze. No rhonchi. Good airflow bilaterally. ABD: Soft. Non tender. No mass. Normal BS.obese   EXT: No edema. Non tender. dressing feet   NEURO: no focal deficit       Scheduled Meds:   magnesium sulfate  1,000 mg IntraVENous Once    insulin lispro  0-4 Units SubCUTAneous TID WC    insulin lispro  0-4 Units SubCUTAneous Nightly    miconazole   Topical BID    ampicillin-sulbactam  3,000 mg IntraVENous Q6H    linezolid  600 mg Oral 2 times per day    enoxaparin  60 mg SubCUTAneous Daily    sodium zirconium cyclosilicate  10 g Oral BID    ascorbic acid  500 mg Oral Daily    atorvastatin  20 mg Oral Nightly    vitamin D  50,000 Units Oral Weekly    levothyroxine  50 mcg Oral Daily    magnesium oxide  400 mg Oral Every Other Day    meclizine  25 mg Oral TID    metoprolol tartrate  25 mg Oral BID    therapeutic multivitamin-minerals  1 tablet Oral Daily    pantoprazole  40 mg Oral Daily    phenazopyridine  200 mg Oral Daily    collagenase   Topical Daily     Continuous Infusions:   dextrose      sodium chloride 75 mL/hr at 09/23/22 2022       CBC with Differential:    Lab Results   Component Value Date/Time    WBC 9.3 09/21/2022 10:44 AM    RBC 3.55 09/21/2022 10:44 AM    HGB 10.0 09/21/2022 10:44 AM    HCT 32.3 09/21/2022 10:44 AM     09/21/2022 10:44 AM    MCV 91.0 09/21/2022 10:44 AM    MCH 28.2 09/21/2022 10:44 AM    MCHC 31.0 09/21/2022 10:44 AM    RDW 14.2 09/21/2022 10:44 AM    SEGSPCT 80 01/04/2012 03:00 AM    BANDSPCT 1 11/27/2014 04:50 AM    LYMPHOPCT 11.6 09/21/2022 10:44 AM    MONOPCT 7.5 09/21/2022 10:44 AM    MYELOPCT 0.9 02/28/2018 05:21 AM    BASOPCT 0.5 09/21/2022 10:44 AM    MONOSABS 0.69 09/21/2022 10:44 AM    LYMPHSABS 1.07 09/21/2022 10:44 AM    EOSABS 0.28 09/21/2022 10:44 AM    BASOSABS 0.05 09/21/2022 10:44 AM     CMP:    Lab Results   Component Value Date/Time     09/23/2022 04:55 AM    K 5.0 09/23/2022 04:55 AM    K 6.2 09/21/2022 10:44 AM     09/23/2022 04:55 AM    CO2 22 09/23/2022 04:55 AM    BUN 43 09/23/2022 04:55 AM    CREATININE 2.2 09/23/2022 04:55 AM    GFRAA 35 09/23/2022 04:55 AM    LABGLOM 29 09/23/2022 04:55 AM    PROT 7.5 09/21/2022 10:44 AM    LABALBU 3.5 09/21/2022 10:44 AM    LABALBU 3.4 01/04/2012 03:00 AM    CALCIUM 9.3 09/23/2022 04:55 AM    BILITOT 0.3 09/21/2022 10:44 AM    ALKPHOS 97 09/21/2022 10:44 AM    AST 21 09/21/2022 10:44 AM    ALT 7 09/21/2022 10:44 AM     PT/INR:    Lab Results   Component Value Date/Time    PROTIME 23.4 09/21/2022 10:44 AM    PROTIME 12.2 12/30/2011 10:20 PM    INR 2.1 09/21/2022 10:44 AM       Assessment:     Principal Problem:    JUAN on CKD improved   Hyperkalemia due to above and supplement resolved   HTN  Obesity  DM  Impaired mobility   Fungal dermatitis   Remote CVA   PVD   Hypothyroid   Hypomagnesemia       Plan:   Supplement Mg+ ,monitor renal function,ATB per ID for arteriogram on Monday

## 2022-09-24 NOTE — PLAN OF CARE
Problem: Skin/Tissue Integrity  Goal: Absence of new skin breakdown  Description: 1. Monitor for areas of redness and/or skin breakdown  2. Assess vascular access sites hourly  3. Every 4-6 hours minimum:  Change oxygen saturation probe site  4. Every 4-6 hours:  If on nasal continuous positive airway pressure, respiratory therapy assess nares and determine need for appliance change or resting period.   Outcome: Progressing     Problem: Discharge Planning  Goal: Discharge to home or other facility with appropriate resources  Outcome: Progressing     Problem: Safety - Adult  Goal: Free from fall injury  Outcome: Progressing  Flowsheets (Taken 9/23/2022 2020)  Free From Fall Injury: Instruct family/caregiver on patient safety     Problem: Pain  Goal: Verbalizes/displays adequate comfort level or baseline comfort level  Outcome: Progressing  Flowsheets (Taken 9/23/2022 1918)  Verbalizes/displays adequate comfort level or baseline comfort level:   Encourage patient to monitor pain and request assistance   Assess pain using appropriate pain scale     Problem: Chronic Conditions and Co-morbidities  Goal: Patient's chronic conditions and co-morbidity symptoms are monitored and maintained or improved  Outcome: Progressing     Problem: ABCDS Injury Assessment  Goal: Absence of physical injury  Outcome: Progressing  Flowsheets (Taken 9/23/2022 2020)  Absence of Physical Injury: Implement safety measures based on patient assessment

## 2022-09-25 LAB
ANION GAP SERPL CALCULATED.3IONS-SCNC: 10 MMOL/L (ref 7–16)
BUN BLDV-MCNC: 28 MG/DL (ref 6–23)
CALCIUM SERPL-MCNC: 9.2 MG/DL (ref 8.6–10.2)
CHLORIDE BLD-SCNC: 106 MMOL/L (ref 98–107)
CO2: 22 MMOL/L (ref 22–29)
CREAT SERPL-MCNC: 2.1 MG/DL (ref 0.7–1.2)
GFR AFRICAN AMERICAN: 37
GFR NON-AFRICAN AMERICAN: 31 ML/MIN/1.73
GLUCOSE BLD-MCNC: 122 MG/DL (ref 74–99)
INR BLD: 2
MAGNESIUM: 1.7 MG/DL (ref 1.6–2.6)
METER GLUCOSE: 133 MG/DL (ref 74–99)
METER GLUCOSE: 135 MG/DL (ref 74–99)
METER GLUCOSE: 157 MG/DL (ref 74–99)
METER GLUCOSE: 157 MG/DL (ref 74–99)
POTASSIUM SERPL-SCNC: 3.8 MMOL/L (ref 3.5–5)
PROTHROMBIN TIME: 21.6 SEC (ref 9.3–12.4)
SODIUM BLD-SCNC: 138 MMOL/L (ref 132–146)

## 2022-09-25 PROCEDURE — 6370000000 HC RX 637 (ALT 250 FOR IP): Performed by: INTERNAL MEDICINE

## 2022-09-25 PROCEDURE — 2580000003 HC RX 258: Performed by: SPECIALIST

## 2022-09-25 PROCEDURE — 83735 ASSAY OF MAGNESIUM: CPT

## 2022-09-25 PROCEDURE — 85610 PROTHROMBIN TIME: CPT

## 2022-09-25 PROCEDURE — 2060000000 HC ICU INTERMEDIATE R&B

## 2022-09-25 PROCEDURE — 2580000003 HC RX 258: Performed by: INTERNAL MEDICINE

## 2022-09-25 PROCEDURE — 80048 BASIC METABOLIC PNL TOTAL CA: CPT

## 2022-09-25 PROCEDURE — 6360000002 HC RX W HCPCS: Performed by: PHYSICIAN ASSISTANT

## 2022-09-25 PROCEDURE — 99232 SBSQ HOSP IP/OBS MODERATE 35: CPT | Performed by: INTERNAL MEDICINE

## 2022-09-25 PROCEDURE — 6360000002 HC RX W HCPCS: Performed by: SPECIALIST

## 2022-09-25 PROCEDURE — 82962 GLUCOSE BLOOD TEST: CPT

## 2022-09-25 PROCEDURE — 6370000000 HC RX 637 (ALT 250 FOR IP): Performed by: SPECIALIST

## 2022-09-25 PROCEDURE — 36415 COLL VENOUS BLD VENIPUNCTURE: CPT

## 2022-09-25 PROCEDURE — 6360000002 HC RX W HCPCS: Performed by: INTERNAL MEDICINE

## 2022-09-25 RX ORDER — AMLODIPINE BESYLATE 5 MG/1
5 TABLET ORAL DAILY
Status: DISCONTINUED | OUTPATIENT
Start: 2022-09-25 | End: 2022-09-29 | Stop reason: HOSPADM

## 2022-09-25 RX ORDER — MAGNESIUM SULFATE 1 G/100ML
1000 INJECTION INTRAVENOUS ONCE
Status: COMPLETED | OUTPATIENT
Start: 2022-09-25 | End: 2022-09-25

## 2022-09-25 RX ADMIN — LEVOTHYROXINE SODIUM 50 MCG: 0.05 TABLET ORAL at 06:24

## 2022-09-25 RX ADMIN — MAGNESIUM SULFATE HEPTAHYDRATE 1000 MG: 10 INJECTION, SOLUTION INTRAVENOUS at 09:35

## 2022-09-25 RX ADMIN — MICONAZOLE NITRATE: 20 POWDER TOPICAL at 21:19

## 2022-09-25 RX ADMIN — AMPICILLIN SODIUM AND SULBACTAM SODIUM 3000 MG: 2; 1 INJECTION, POWDER, FOR SOLUTION INTRAMUSCULAR; INTRAVENOUS at 21:21

## 2022-09-25 RX ADMIN — MICONAZOLE NITRATE: 20 POWDER TOPICAL at 08:51

## 2022-09-25 RX ADMIN — MECLIZINE 25 MG: 12.5 TABLET ORAL at 08:51

## 2022-09-25 RX ADMIN — OXYCODONE HYDROCHLORIDE AND ACETAMINOPHEN 500 MG: 500 TABLET ORAL at 08:51

## 2022-09-25 RX ADMIN — MECLIZINE 25 MG: 12.5 TABLET ORAL at 21:19

## 2022-09-25 RX ADMIN — MULTIPLE VITAMINS W/ MINERALS TAB 1 TABLET: TAB at 08:51

## 2022-09-25 RX ADMIN — MECLIZINE 25 MG: 12.5 TABLET ORAL at 14:58

## 2022-09-25 RX ADMIN — METOPROLOL TARTRATE 25 MG: 25 TABLET, FILM COATED ORAL at 08:51

## 2022-09-25 RX ADMIN — PANTOPRAZOLE SODIUM 40 MG: 40 TABLET, DELAYED RELEASE ORAL at 08:51

## 2022-09-25 RX ADMIN — LINEZOLID 600 MG: 600 TABLET, FILM COATED ORAL at 08:51

## 2022-09-25 RX ADMIN — AMLODIPINE BESYLATE 5 MG: 5 TABLET ORAL at 18:06

## 2022-09-25 RX ADMIN — METOPROLOL TARTRATE 25 MG: 25 TABLET, FILM COATED ORAL at 21:19

## 2022-09-25 RX ADMIN — AMPICILLIN SODIUM AND SULBACTAM SODIUM 3000 MG: 2; 1 INJECTION, POWDER, FOR SOLUTION INTRAMUSCULAR; INTRAVENOUS at 08:54

## 2022-09-25 RX ADMIN — ATORVASTATIN CALCIUM 20 MG: 20 TABLET, FILM COATED ORAL at 21:19

## 2022-09-25 RX ADMIN — SODIUM CHLORIDE, PRESERVATIVE FREE 10 ML: 5 INJECTION INTRAVENOUS at 08:51

## 2022-09-25 RX ADMIN — ENOXAPARIN SODIUM 60 MG: 100 INJECTION SUBCUTANEOUS at 08:50

## 2022-09-25 RX ADMIN — AMPICILLIN SODIUM AND SULBACTAM SODIUM 3000 MG: 2; 1 INJECTION, POWDER, FOR SOLUTION INTRAMUSCULAR; INTRAVENOUS at 03:44

## 2022-09-25 RX ADMIN — SODIUM CHLORIDE, PRESERVATIVE FREE 10 ML: 5 INJECTION INTRAVENOUS at 21:19

## 2022-09-25 RX ADMIN — AMPICILLIN SODIUM AND SULBACTAM SODIUM 3000 MG: 2; 1 INJECTION, POWDER, FOR SOLUTION INTRAMUSCULAR; INTRAVENOUS at 14:58

## 2022-09-25 RX ADMIN — LINEZOLID 600 MG: 600 TABLET, FILM COATED ORAL at 21:19

## 2022-09-25 RX ADMIN — PHENAZOPYRIDINE 200 MG: 100 TABLET ORAL at 08:51

## 2022-09-25 ASSESSMENT — PAIN SCALES - GENERAL
PAINLEVEL_OUTOF10: 0

## 2022-09-25 NOTE — PROGRESS NOTES
Department of Podiatry   Progress Note      HISTORY OF PRESENT ILLNESS:      Pt is a 67 y.o. male  seen bedside for chronic painful Right heel wound with eschar. No current nausea, vomiting, fevers, chills, but has some residual dizziness. No acute overnight events. Pt has no pedal questions or complaints. 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[address]     Femoral-popliteal atherosclerosis ([address] 9/13/2022    Hypertension     Ischemic stroke ([address] 03/06/2018    Pacemaker     Paraplegia ([address]     Sinus node dysfunction ([address]     Stroke (cerebrum) ([address] 02/27/2018    Thyroid disease     Ulcer of right heel and midfoot with fat layer exposed ([address] 8/15/2022       Past Surgical History:        Procedure Laterality Date    CARDIAC PACEMAKER PLACEMENT  12/19/2014    COLONOSCOPY      EYE SURGERY      KNEE ARTHROSCOPY  right knee    PACEMAKER PLACEMENT      AK COLONOSCOPY FLX DX W/COLLJ SPEC WHEN PFRMD N/A 3/20/2018    COLONOSCOPY DIAGNOSTIC performed by Carissa Stephen MD at [address] EGD PERCUTANEOUS PLACEMENT GASTROSTOMY TUBE N/A 3/29/2018    PEG TUBE/PT.  IN 5507 B performed by Carissa Stephen MD at [address] EGD PERCUTANEOUS PLACEMENT GASTROSTOMY TUBE N/A 9/12/2018    EGD PEG TUBE PLACEMENT performed by Carissa Stephen MD at [address] [place] [address]       Medications Prior to Admission:    Medications Prior to Admission: meclizine (ANTIVERT) 25 MG tablet, Take 25 mg by mouth three times daily  warfarin (COUMADIN) 6 MG tablet, Take 6 mg by mouth daily  Glucagon HCl (GLUCAGON EMERGENCY) 1 MG/ML SOLR, Inject 1 mg as directed as needed (HYPOGLYCEMIA)  glucose (GLUTOSE) 40 % GEL, Take 15 g by mouth as needed (HYPOGLYCEMIA)  ipratropium-albuterol (DUONEB) 0.5-2.5 (3) MG/3ML SOLN nebulizer solution, Inhale 1 vial into the lungs every 4 hours as needed (COUGHINGAND DYSPNEA)  potassium chloride (KLOR-CON M) 20 MEQ extended release tablet, Take 20 mEq by mouth daily  enoxaparin (LOVENOX) 60 MG/0.6ML, Inject 60 mg into the skin daily  polyethylene glycol (GLYCOLAX) 17 g packet, Take 17 g by mouth daily as needed for Constipation  phenazopyridine (PYRIDIUM) 200 MG tablet, Take 200 mg by mouth daily  dextromethorphan-guaiFENesin (ROBITUSSIN-DM)  MG/5ML syrup, Take 5 mLs by mouth every 6 hours as needed for Cough  Cholecalciferol (VITAMIN D3) 1.25 MG (30906 UT) CAPS, Take 50,000 Units by mouth once a week **FRIDAYS**  loperamide (IMODIUM) 2 MG capsule, Take 2 mg by mouth daily as needed for Diarrhea  Multiple Vitamins-Minerals (THERAPEUTIC MULTIVITAMIN-MINERALS) tablet, Take 1 tablet by mouth daily  gabapentin (NEURONTIN) 300 MG capsule, Take 1 capsule by mouth nightly for 7 days. levothyroxine (SYNTHROID) 50 MCG tablet, Take 1 tablet by mouth in the morning. atorvastatin (LIPITOR) 20 MG tablet, Take 1 tablet by mouth nightly  magnesium oxide (MAG-OX) 400 (240 Mg) MG tablet, Take 1 tablet by mouth every other day  ascorbic acid (VITAMIN C) 500 MG tablet, Take 1 tablet by mouth in the morning. acetaminophen (TYLENOL) 325 MG tablet, Take 650 mg by mouth every 4 hours as needed (DISCOMFORT;PAIN/ELEV TEMP >100.4F)  pantoprazole (PROTONIX) 40 MG tablet, Take 40 mg by mouth daily  amLODIPine (NORVASC) 2.5 MG tablet, Take 1 tablet by mouth daily  metoprolol tartrate (LOPRESSOR) 25 MG tablet, Take 1 tablet by mouth 2 times daily  metFORMIN (GLUCOPHAGE) 1000 MG tablet, Take 1 tablet by mouth 2 times daily  omeprazole (PRILOSEC) 20 MG delayed release capsule, Take 1 capsule by mouth daily  furosemide (LASIX) 40 MG tablet, Take 0.5 tablets by mouth daily    Allergies:  Bee venom    Social History:   TOBACCO:   reports that he quit smoking about 20 years ago.  His smoking use included cigars. He has never used smokeless tobacco.  ETOH:   reports no history of alcohol use. DRUGS:   Social History     Substance and Sexual Activity   Drug Use No       Family History:       Problem Relation Age of Onset    Heart Disease Mother     Stroke Father          REVIEW OF SYSTEMS:    All pertinent review of systems both positive and negative discussed in HPI      LOWER EXTREMITY EXAMINATION     VASCULAR:  DP and PT pulses weakly palpable 1+ to the bilateral LE. 1-2+ pitting edema appreciated as well. NEUROLOGIC:  Protective sensation diminished to distal aspect of bilateral lower extremities. DERM:  Right pressure heel ulceration with overlying eschar noted. Fat layer exposed. No clinical signs of infection. Does not probe deep at this time. Measures roughly 8 cm x 9 cm x unknown depth. No malodor. Little drainage. Superficial wound noted to patient's anterior Right ankle as well. Not clinically infected    MUSCULOSKELETAL: Tenderness to palpation of the right heel wound. Negative TTP of bilateral calves at this time.  MSK strength testing deferred this afternoon        CONSULTS:  IP CONSULT TO INTERNAL MEDICINE  IP CONSULT TO PODIATRY  IP CONSULT TO IV TEAM  IP CONSULT TO VASCULAR SURGERY  IP CONSULT TO INFECTIOUS DISEASES  IP CONSULT TO NEPHROLOGY    MEDICATION:  Scheduled Meds:   sodium chloride flush  5-40 mL IntraVENous BID    insulin lispro  0-4 Units SubCUTAneous TID WC    insulin lispro  0-4 Units SubCUTAneous Nightly    miconazole   Topical BID    ampicillin-sulbactam  3,000 mg IntraVENous Q6H    linezolid  600 mg Oral 2 times per day    enoxaparin  60 mg SubCUTAneous Daily    ascorbic acid  500 mg Oral Daily    atorvastatin  20 mg Oral Nightly    vitamin D  50,000 Units Oral Weekly    levothyroxine  50 mcg Oral Daily    magnesium oxide  400 mg Oral Every Other Day    meclizine  25 mg Oral TID    metoprolol tartrate  25 mg Oral BID    therapeutic multivitamin-minerals 1 tablet Oral Daily    pantoprazole  40 mg Oral Daily    phenazopyridine  200 mg Oral Daily    collagenase   Topical Daily     Continuous Infusions:   dextrose       PRN Meds:.glucose, dextrose bolus **OR** dextrose bolus, glucagon (rDNA), dextrose, acetaminophen, ipratropium-albuterol, loperamide, polyethylene glycol    RADIOLOGY:  CT ANKLE RIGHT WO CONTRAST   Final Result   This report is for right foot and right ankle. No CT evidence of osteomyelitis. MRI could better evaluate for osteomyelitis   if clinically indicated. Mild generalized subcutaneous edema/skin thickening. No drainable abscess,   allowing for limitations of noncontrast technique. Other incidental findings as above. CT FOOT RIGHT WO CONTRAST   Final Result   This report is for right foot and right ankle. No CT evidence of osteomyelitis. MRI could better evaluate for osteomyelitis   if clinically indicated. Mild generalized subcutaneous edema/skin thickening. No drainable abscess,   allowing for limitations of noncontrast technique. Other incidental findings as above. XR FOOT RIGHT (MIN 3 VIEWS)   Final Result   1. Surface irregularity and edema about the right calcaneus. No definite   evidence of osseous destruction or erosion at time. 2.  Minimal right large toe metatarsal phalangeal joint osteoarthritis. Calcaneal enthesophytes. XR CHEST PORTABLE   Final Result   No acute process         CT Head WO Contrast   Final Result   1. There is no acute intracranial abnormality. Specifically, there is no   intracranial hemorrhage. 2. Atrophy and periventricular leukomalacia,   3. Old left occipital lobe infarct         CT Cervical Spine WO Contrast   Final Result   1. There is no acute compression fracture or subluxation of the cervical   spine. 2. Advanced multilevel degenerative disc and degenerative joint disease.              Vitals:    BP (!) 167/68   Pulse 60   Temp 97.8 °F (36.6 °C)   Resp 18   Ht 6' 2\" (1.88 m)   Wt 250 lb (113.4 kg)   SpO2 97%   BMI 32.10 kg/m²     LABS:   Recent Labs     09/24/22  1103   WBC 6.6   HGB 9.7*   HCT 31.1*        Recent Labs     09/24/22  0300 09/24/22  1103 09/25/22  0248   NA  --    < > 138   K  --    < > 3.8   CL  --    < > 106   CO2  --    < > 22   PHOS 3.2  --   --    BUN  --    < > 28*   CREATININE  --    < > 2.1*    < > = values in this interval not displayed. No results for input(s): PROT, INR, APTT in the last 72 hours. 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  - Prevalon offloading boots applied to the bilateral lower extremities  - Dressing: Santyl DSD applied to patient's right heel. QOD dressing changes. Dressing changes today. Next change Monday 9/26  CT right foot- No CT evidence of osteomyelitis. - Podiatry will monitor patient while in house  - Discussed patient with Dr. Светлана Louis  - Will continue to follow patient while they are in-house. Thank you for the opportunity to take part in the patient's care. Please do not hesitate to call for any questions or concerns.

## 2022-09-25 NOTE — PROGRESS NOTES
Set up physician ambulance to transport patient to [facility] (1-) cath lab for arteriogram 9/26. Cath lab to call unit in AM for time. Staff to call physicians tomorrow 9/26 when to  patient.

## 2022-09-25 NOTE — PROGRESS NOTES
Department of Internal Medicine  Nephrology Progress Note      Events reviewed. SUBJECTIVE: We are following Mr. Kymberly Gonzalez for CKD and hyperkalemia. He has no complaints today. PHYSICAL EXAM:      Vitals:    VITALS:  BP (!) 167/68   Pulse 60   Temp 97.8 °F (36.6 °C)   Resp 18   Ht 6' 2\" (1.88 m)   Wt 250 lb (113.4 kg)   SpO2 97%   BMI 32.10 kg/m²   24HR INTAKE/OUTPUT:    Intake/Output Summary (Last 24 hours) at 9/25/2022 0809  Last data filed at 9/25/2022 5426  Gross per 24 hour   Intake --   Output 1950 ml   Net -1950 ml         Constitutional:  Well built. Obese. Mild distress. HEENT:  BEERLA. JVD not seen. Respiratory:  Breath sounds normal. No rales or rhonchi  Cardiovascular/Edema:  Heart sounds normal. No murmur. Gastrointestinal:  Bowel sounds normal. No oranomgaly  Neurologic:  A/O x 3. No focal deficit. Skin:  Dry skin. Normal turgor. Other:  Non healing ulcer in right foot.     Scheduled Meds:   sodium chloride flush  5-40 mL IntraVENous BID    insulin lispro  0-4 Units SubCUTAneous TID WC    insulin lispro  0-4 Units SubCUTAneous Nightly    miconazole   Topical BID    ampicillin-sulbactam  3,000 mg IntraVENous Q6H    linezolid  600 mg Oral 2 times per day    enoxaparin  60 mg SubCUTAneous Daily    ascorbic acid  500 mg Oral Daily    atorvastatin  20 mg Oral Nightly    vitamin D  50,000 Units Oral Weekly    levothyroxine  50 mcg Oral Daily    magnesium oxide  400 mg Oral Every Other Day    meclizine  25 mg Oral TID    metoprolol tartrate  25 mg Oral BID    therapeutic multivitamin-minerals  1 tablet Oral Daily    pantoprazole  40 mg Oral Daily    phenazopyridine  200 mg Oral Daily    collagenase   Topical Daily     Continuous Infusions:   dextrose       PRN Meds:.glucose, dextrose bolus **OR** dextrose bolus, glucagon (rDNA), dextrose, acetaminophen, ipratropium-albuterol, loperamide, polyethylene glycol      DATA:    CBC:   Lab Results   Component Value Date/Time    WBC 6.6 09/24/2022 11:03 AM    RBC 3.38 09/24/2022 11:03 AM    HGB 9.7 09/24/2022 11:03 AM    HCT 31.1 09/24/2022 11:03 AM    MCV 92.0 09/24/2022 11:03 AM    MCH 28.7 09/24/2022 11:03 AM    MCHC 31.2 09/24/2022 11:03 AM    RDW 14.2 09/24/2022 11:03 AM     09/24/2022 11:03 AM    MPV 9.7 09/24/2022 11:03 AM     CMP:    Lab Results   Component Value Date/Time     09/25/2022 02:48 AM    K 3.8 09/25/2022 02:48 AM    K 6.2 09/21/2022 10:44 AM     09/25/2022 02:48 AM    CO2 22 09/25/2022 02:48 AM    BUN 28 09/25/2022 02:48 AM    CREATININE 2.1 09/25/2022 02:48 AM    GFRAA 37 09/25/2022 02:48 AM    LABGLOM 31 09/25/2022 02:48 AM    GLUCOSE 122 09/25/2022 02:48 AM    GLUCOSE 297 01/04/2012 03:00 AM    PROT 7.5 09/21/2022 10:44 AM    LABALBU 3.5 09/21/2022 10:44 AM    LABALBU 3.4 01/04/2012 03:00 AM    CALCIUM 9.2 09/25/2022 02:48 AM    BILITOT 0.3 09/21/2022 10:44 AM    ALKPHOS 97 09/21/2022 10:44 AM    AST 21 09/21/2022 10:44 AM    ALT 7 09/21/2022 10:44 AM     Magnesium:    Lab Results   Component Value Date/Time    MG 1.7 09/25/2022 02:48 AM     Phosphorus:    Lab Results   Component Value Date/Time    PHOS 3.2 09/24/2022 03:00 AM           BRIEF SUMMARY OF INITIAL CONSULT:    Briefly, Mr. Noble Bird  is a 67-year-old male with a PMH of CKD stage IIIb, without proteinuria, baseline creatinine 2.0-2.1 mg/dL, secondary to nephrosclerosis, kidney cancer, s/p total left nephrectomy on 7/5/22 at [facility], hypertension, type II diabetes mellitus, HFpEF 55% with stage II DD, PAF, factor V Leyden deficiency, hyperlipidemia, sick sinus syndrome s/p pacemaker placement, CVA, hypothyroidism, PAF on chronic anticoagulation with warfarin, who was admitted on September 21, 2022 after a fall at home, his labs were significant for potassium 6.2 mmol/L, BUN 57 mg/dL, creatinine 2.8 mg/dL, reason for this consult.  His home medications include lasix, lisinopril    Resolved:    Hyperkalemia, 2/2 ACE inhibition, decrease GFR and on potassium supplementation,  improved with lokelma  JUAN on CKD, volume responsive pre-renal JUAN, 2/2 to overt diuresis, decrease oral intake in the setting of ACE inhibition. Resolved, renal function has returned to baseline, last creatinine 2.1 mg/dL. We will discontinue IV fluids. IMPRESSION/RECOMMENDATIONS:        CKD stage IV, without proteinuria, baseline creatinine 2.1-2.4 mg/dL, secondary to nephrosclerosis and solitary kidney. Renal function stable at baseline.    Left kidney cancer, s/p total left nephrectomy 7/5/22 at [facility]  HTN, on metoprolol   HFpEF 55% with stage II DD, pro-, Lasix on hold  Hypomagnesemia, 2/2 diuretics, levels improved, to replace  -------------------------------------------------  S/p fall  Nonhealing right heel wound, arteriogram with possible intervention on 9/26 with Vascular, ID following, on linezolid and ampicillin-sulbactam  PAF, on metoprolol and warfarin  Sick sinus syndrome, s/p pacemaker placement   Type 2 DM, on metformin at home, on hold, SSI for now  HLD, on atorvastatin  Hypothyroidism, on levothyroxine  History of CVA  Normocytic anemia, ferritin 178, iron saturation 18%, folate 8.7, B12: 270     Plan:       Replace magnesium  Continue to hold Lasix  Continue to hold metformin  Continue to monitor renal function  Continue to monitor magnesium levels

## 2022-09-25 NOTE — PROGRESS NOTES
Admit Date: 9/21/2022    Subjective:     Awake doing fine no complaint     Objective:     Patient Vitals for the past 8 hrs:   BP Temp Temp src Pulse Resp SpO2 Weight   09/25/22 0845 137/62 97.7 °F (36.5 °C) Oral 62 18 98 % --   09/25/22 0602 -- -- -- -- -- -- 250 lb (113.4 kg)     I/O last 3 completed shifts: In: 8487 [P.O.:340; I.V.:525; IV Piggyback:200]  Out: 1245 [Urine:3175]  No intake/output data recorded. HEENT: Normal  NECK: Thyroid normal. No carotid bruit. No lymphphadenopathy. CVS: RRR  RS: Clear. No wheeze. No rhonchi. Good airflow bilaterally. ABD: Soft. Non tender. No mass. Normal BS. EXT: No edema.  Dressing feet   NEURO: no focal deficit       Scheduled Meds:   magnesium sulfate  1,000 mg IntraVENous Once    sodium chloride flush  5-40 mL IntraVENous BID    insulin lispro  0-4 Units SubCUTAneous TID WC    insulin lispro  0-4 Units SubCUTAneous Nightly    miconazole   Topical BID    ampicillin-sulbactam  3,000 mg IntraVENous Q6H    linezolid  600 mg Oral 2 times per day    enoxaparin  60 mg SubCUTAneous Daily    ascorbic acid  500 mg Oral Daily    atorvastatin  20 mg Oral Nightly    vitamin D  50,000 Units Oral Weekly    levothyroxine  50 mcg Oral Daily    magnesium oxide  400 mg Oral Every Other Day    meclizine  25 mg Oral TID    metoprolol tartrate  25 mg Oral BID    therapeutic multivitamin-minerals  1 tablet Oral Daily    pantoprazole  40 mg Oral Daily    phenazopyridine  200 mg Oral Daily    collagenase   Topical Daily     Continuous Infusions:   dextrose         CBC with Differential:    Lab Results   Component Value Date/Time    WBC 6.6 09/24/2022 11:03 AM    RBC 3.38 09/24/2022 11:03 AM    HGB 9.7 09/24/2022 11:03 AM    HCT 31.1 09/24/2022 11:03 AM     09/24/2022 11:03 AM    MCV 92.0 09/24/2022 11:03 AM    MCH 28.7 09/24/2022 11:03 AM    MCHC 31.2 09/24/2022 11:03 AM    RDW 14.2 09/24/2022 11:03 AM    SEGSPCT 80 01/04/2012 03:00 AM    BANDSPCT 1 11/27/2014 04:50 AM    LYMPHOPCT 11.6 09/21/2022 10:44 AM    MONOPCT 7.5 09/21/2022 10:44 AM    MYELOPCT 0.9 02/28/2018 05:21 AM    BASOPCT 0.5 09/21/2022 10:44 AM    MONOSABS 0.69 09/21/2022 10:44 AM    LYMPHSABS 1.07 09/21/2022 10:44 AM    EOSABS 0.28 09/21/2022 10:44 AM    BASOSABS 0.05 09/21/2022 10:44 AM     CMP:    Lab Results   Component Value Date/Time     09/25/2022 02:48 AM    K 3.8 09/25/2022 02:48 AM    K 6.2 09/21/2022 10:44 AM     09/25/2022 02:48 AM    CO2 22 09/25/2022 02:48 AM    BUN 28 09/25/2022 02:48 AM    CREATININE 2.1 09/25/2022 02:48 AM    GFRAA 37 09/25/2022 02:48 AM    LABGLOM 31 09/25/2022 02:48 AM    PROT 7.5 09/21/2022 10:44 AM    LABALBU 3.5 09/21/2022 10:44 AM    LABALBU 3.4 01/04/2012 03:00 AM    CALCIUM 9.2 09/25/2022 02:48 AM    BILITOT 0.3 09/21/2022 10:44 AM    ALKPHOS 97 09/21/2022 10:44 AM    AST 21 09/21/2022 10:44 AM    ALT 7 09/21/2022 10:44 AM     PT/INR:    Lab Results   Component Value Date/Time    PROTIME 23.4 09/21/2022 10:44 AM    PROTIME 12.2 12/30/2011 10:20 PM    INR 2.1 09/21/2022 10:44 AM       Assessment:     Principal Problem:   JUAN on CKD improved   Hyperkalemia due to above and supplement resolved   Right heal wound infection   HTN  Obesity  DM  Impaired mobility   Fungal dermatitis   Remote CVA   PVD   Hypothyroid   Hypomagnesemia       Plan:   Continue ATB per ID ,wound care ,await arteriogram

## 2022-09-26 ENCOUNTER — HOSPITAL ENCOUNTER (OUTPATIENT)
Dept: CARDIAC CATH/INVASIVE PROCEDURES | Age: 77
Discharge: HOME | End: 2022-09-26

## 2022-09-26 LAB
ANION GAP SERPL CALCULATED.3IONS-SCNC: 11 MMOL/L (ref 7–16)
BUN BLDV-MCNC: 24 MG/DL (ref 6–23)
CALCIUM SERPL-MCNC: 9.2 MG/DL (ref 8.6–10.2)
CHLORIDE BLD-SCNC: 106 MMOL/L (ref 98–107)
CO2: 22 MMOL/L (ref 22–29)
CREAT SERPL-MCNC: 2 MG/DL (ref 0.7–1.2)
GFR AFRICAN AMERICAN: 39
GFR NON-AFRICAN AMERICAN: 32 ML/MIN/1.73
GLUCOSE BLD-MCNC: 132 MG/DL (ref 74–99)
HCT VFR BLD CALC: 29.6 % (ref 37–54)
HEMOGLOBIN: 9.1 G/DL (ref 12.5–16.5)
MAGNESIUM: 1.8 MG/DL (ref 1.6–2.6)
MCH RBC QN AUTO: 27.7 PG (ref 26–35)
MCHC RBC AUTO-ENTMCNC: 30.7 % (ref 32–34.5)
MCV RBC AUTO: 90.2 FL (ref 80–99.9)
METER GLUCOSE: 105 MG/DL (ref 74–99)
METER GLUCOSE: 120 MG/DL (ref 74–99)
METER GLUCOSE: 128 MG/DL (ref 74–99)
METER GLUCOSE: 146 MG/DL (ref 74–99)
PDW BLD-RTO: 14.1 FL (ref 11.5–15)
PLATELET # BLD: 282 E9/L (ref 130–450)
PMV BLD AUTO: 9.8 FL (ref 7–12)
POTASSIUM SERPL-SCNC: 3.5 MMOL/L (ref 3.5–5)
RBC # BLD: 3.28 E12/L (ref 3.8–5.8)
SODIUM BLD-SCNC: 139 MMOL/L (ref 132–146)
WBC # BLD: 6.8 E9/L (ref 4.5–11.5)

## 2022-09-26 PROCEDURE — 99232 SBSQ HOSP IP/OBS MODERATE 35: CPT | Performed by: PHYSICIAN ASSISTANT

## 2022-09-26 PROCEDURE — 2580000003 HC RX 258: Performed by: INTERNAL MEDICINE

## 2022-09-26 PROCEDURE — 6370000000 HC RX 637 (ALT 250 FOR IP): Performed by: SPECIALIST

## 2022-09-26 PROCEDURE — 6360000002 HC RX W HCPCS: Performed by: SPECIALIST

## 2022-09-26 PROCEDURE — 80048 BASIC METABOLIC PNL TOTAL CA: CPT

## 2022-09-26 PROCEDURE — 97535 SELF CARE MNGMENT TRAINING: CPT

## 2022-09-26 PROCEDURE — 85027 COMPLETE CBC AUTOMATED: CPT

## 2022-09-26 PROCEDURE — 97530 THERAPEUTIC ACTIVITIES: CPT

## 2022-09-26 PROCEDURE — 2580000003 HC RX 258: Performed by: SPECIALIST

## 2022-09-26 PROCEDURE — 2060000000 HC ICU INTERMEDIATE R&B

## 2022-09-26 PROCEDURE — 99232 SBSQ HOSP IP/OBS MODERATE 35: CPT | Performed by: INTERNAL MEDICINE

## 2022-09-26 PROCEDURE — 83735 ASSAY OF MAGNESIUM: CPT

## 2022-09-26 PROCEDURE — 36415 COLL VENOUS BLD VENIPUNCTURE: CPT

## 2022-09-26 PROCEDURE — 6370000000 HC RX 637 (ALT 250 FOR IP): Performed by: INTERNAL MEDICINE

## 2022-09-26 PROCEDURE — 82962 GLUCOSE BLOOD TEST: CPT

## 2022-09-26 RX ADMIN — MICONAZOLE NITRATE: 2 OINTMENT TOPICAL at 14:07

## 2022-09-26 RX ADMIN — MECLIZINE 25 MG: 12.5 TABLET ORAL at 08:35

## 2022-09-26 RX ADMIN — SODIUM CHLORIDE, PRESERVATIVE FREE 10 ML: 5 INJECTION INTRAVENOUS at 21:30

## 2022-09-26 RX ADMIN — AMLODIPINE BESYLATE 5 MG: 5 TABLET ORAL at 08:35

## 2022-09-26 RX ADMIN — MICONAZOLE NITRATE: 20 POWDER TOPICAL at 08:33

## 2022-09-26 RX ADMIN — SODIUM CHLORIDE, PRESERVATIVE FREE 10 ML: 5 INJECTION INTRAVENOUS at 08:33

## 2022-09-26 RX ADMIN — LINEZOLID 600 MG: 600 TABLET, FILM COATED ORAL at 21:24

## 2022-09-26 RX ADMIN — METOPROLOL TARTRATE 25 MG: 25 TABLET, FILM COATED ORAL at 21:24

## 2022-09-26 RX ADMIN — AMPICILLIN SODIUM AND SULBACTAM SODIUM 3000 MG: 2; 1 INJECTION, POWDER, FOR SOLUTION INTRAMUSCULAR; INTRAVENOUS at 15:23

## 2022-09-26 RX ADMIN — AMPICILLIN SODIUM AND SULBACTAM SODIUM 3000 MG: 2; 1 INJECTION, POWDER, FOR SOLUTION INTRAMUSCULAR; INTRAVENOUS at 21:28

## 2022-09-26 RX ADMIN — PHENAZOPYRIDINE 200 MG: 100 TABLET ORAL at 08:35

## 2022-09-26 RX ADMIN — MICONAZOLE NITRATE: 2 OINTMENT TOPICAL at 21:30

## 2022-09-26 RX ADMIN — MECLIZINE 25 MG: 12.5 TABLET ORAL at 14:07

## 2022-09-26 RX ADMIN — MULTIPLE VITAMINS W/ MINERALS TAB 1 TABLET: TAB at 08:35

## 2022-09-26 RX ADMIN — MECLIZINE 25 MG: 12.5 TABLET ORAL at 21:24

## 2022-09-26 RX ADMIN — LEVOTHYROXINE SODIUM 50 MCG: 0.05 TABLET ORAL at 07:01

## 2022-09-26 RX ADMIN — MICONAZOLE NITRATE: 20 POWDER TOPICAL at 21:30

## 2022-09-26 RX ADMIN — AMPICILLIN SODIUM AND SULBACTAM SODIUM 3000 MG: 2; 1 INJECTION, POWDER, FOR SOLUTION INTRAMUSCULAR; INTRAVENOUS at 08:32

## 2022-09-26 RX ADMIN — Medication 400 MG: at 08:35

## 2022-09-26 RX ADMIN — OXYCODONE HYDROCHLORIDE AND ACETAMINOPHEN 500 MG: 500 TABLET ORAL at 08:35

## 2022-09-26 RX ADMIN — METOPROLOL TARTRATE 25 MG: 25 TABLET, FILM COATED ORAL at 08:35

## 2022-09-26 RX ADMIN — AMPICILLIN SODIUM AND SULBACTAM SODIUM 3000 MG: 2; 1 INJECTION, POWDER, FOR SOLUTION INTRAMUSCULAR; INTRAVENOUS at 03:43

## 2022-09-26 RX ADMIN — PANTOPRAZOLE SODIUM 40 MG: 40 TABLET, DELAYED RELEASE ORAL at 08:35

## 2022-09-26 RX ADMIN — ATORVASTATIN CALCIUM 20 MG: 20 TABLET, FILM COATED ORAL at 21:24

## 2022-09-26 RX ADMIN — LINEZOLID 600 MG: 600 TABLET, FILM COATED ORAL at 08:35

## 2022-09-26 ASSESSMENT — PAIN SCALES - GENERAL
PAINLEVEL_OUTOF10: 0

## 2022-09-26 NOTE — PROGRESS NOTES
Department of Podiatry   Progress Note      HISTORY OF PRESENT ILLNESS:     67 y.o. male  seen bedside this morning for chronic painful Right heel wound with eschar. No current nausea, vomiting, fevers, chills, but has some residual dizziness. No acute overnight events. Pt has no pedal questions or complaints. 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[address]     Femoral-popliteal atherosclerosis ([address] 9/13/2022    Hypertension     Ischemic stroke ([address] 03/06/2018    Pacemaker     Paraplegia ([address]     Sinus node dysfunction ([address]     Stroke (cerebrum) ([address] 02/27/2018    Thyroid disease     Ulcer of right heel and midfoot with fat layer exposed ([address] 8/15/2022       Past Surgical History:        Procedure Laterality Date    CARDIAC PACEMAKER PLACEMENT  12/19/2014    COLONOSCOPY      EYE SURGERY      KNEE ARTHROSCOPY  right knee    PACEMAKER PLACEMENT      MO COLONOSCOPY FLX DX W/COLLJ SPEC WHEN PFRMD N/A 3/20/2018    COLONOSCOPY DIAGNOSTIC performed by Sadiq Shaw MD at [address] EGD PERCUTANEOUS PLACEMENT GASTROSTOMY TUBE N/A 3/29/2018    PEG TUBE/PT.  IN 5507 B performed by Sadiq Shaw MD at [address] EGD PERCUTANEOUS PLACEMENT GASTROSTOMY TUBE N/A 9/12/2018    EGD PEG TUBE PLACEMENT performed by Sadiq Shaw MD at [address] [place] [address]       Medications Prior to Admission:    Medications Prior to Admission: meclizine (ANTIVERT) 25 MG tablet, Take 25 mg by mouth three times daily  warfarin (COUMADIN) 6 MG tablet, Take 6 mg by mouth daily  Glucagon HCl (GLUCAGON EMERGENCY) 1 MG/ML SOLR, Inject 1 mg as directed as needed (HYPOGLYCEMIA)  glucose (GLUTOSE) 40 % GEL, Take 15 g by mouth as needed (HYPOGLYCEMIA)  ipratropium-albuterol (DUONEB) 0.5-2.5 (3) MG/3ML SOLN nebulizer solution, Inhale 1 vial into the lungs every 4 hours as needed (COUGHINGAND DYSPNEA)  potassium chloride (KLOR-CON M) 20 MEQ extended release tablet, Take 20 mEq by mouth daily  enoxaparin (LOVENOX) 60 MG/0.6ML, Inject 60 mg into the skin daily  polyethylene glycol (GLYCOLAX) 17 g packet, Take 17 g by mouth daily as needed for Constipation  phenazopyridine (PYRIDIUM) 200 MG tablet, Take 200 mg by mouth daily  dextromethorphan-guaiFENesin (ROBITUSSIN-DM)  MG/5ML syrup, Take 5 mLs by mouth every 6 hours as needed for Cough  Cholecalciferol (VITAMIN D3) 1.25 MG (08547 UT) CAPS, Take 50,000 Units by mouth once a week **FRIDAYS**  loperamide (IMODIUM) 2 MG capsule, Take 2 mg by mouth daily as needed for Diarrhea  Multiple Vitamins-Minerals (THERAPEUTIC MULTIVITAMIN-MINERALS) tablet, Take 1 tablet by mouth daily  gabapentin (NEURONTIN) 300 MG capsule, Take 1 capsule by mouth nightly for 7 days. levothyroxine (SYNTHROID) 50 MCG tablet, Take 1 tablet by mouth in the morning. atorvastatin (LIPITOR) 20 MG tablet, Take 1 tablet by mouth nightly  magnesium oxide (MAG-OX) 400 (240 Mg) MG tablet, Take 1 tablet by mouth every other day  ascorbic acid (VITAMIN C) 500 MG tablet, Take 1 tablet by mouth in the morning.   acetaminophen (TYLENOL) 325 MG tablet, Take 650 mg by mouth every 4 hours as needed (DISCOMFORT;PAIN/ELEV TEMP >100.4F)  pantoprazole (PROTONIX) 40 MG tablet, Take 40 mg by mouth daily  amLODIPine (NORVASC) 2.5 MG tablet, Take 1 tablet by mouth daily  metoprolol tartrate (LOPRESSOR) 25 MG tablet, Take 1 tablet by mouth 2 times daily  metFORMIN (GLUCOPHAGE) 1000 MG tablet, Take 1 tablet by mouth 2 times daily  omeprazole (PRILOSEC) 20 MG delayed release capsule, Take 1 capsule by mouth daily  furosemide (LASIX) 40 MG tablet, Take 0.5 tablets by mouth daily    Allergies:  Bee venom    Social History:   TOBACCO:   reports that he quit smoking about 20 years ago. His smoking use included cigars. He has never used smokeless tobacco.  ETOH:   reports no history of alcohol use. DRUGS:   Social History     Substance and Sexual Activity   Drug Use No       Family History:       Problem Relation Age of Onset    Heart Disease Mother     Stroke Father          REVIEW OF SYSTEMS:    All pertinent review of systems both positive and negative discussed in HPI      LOWER EXTREMITY EXAMINATION   - Previous physical exam results below:    VASCULAR:  DP and PT pulses weakly palpable 1+ to the bilateral LE. 1-2+ pitting edema appreciated as well. NEUROLOGIC:  Protective sensation diminished to distal aspect of bilateral lower extremities. DERM:  Right pressure heel ulceration with overlying eschar noted. Fat layer exposed. No clinical signs of infection. Does not probe deep at this time. Measures roughly 8 cm x 9 cm x unknown depth. No malodor. Little drainage. Superficial wound noted to patient's anterior Right ankle as well. Not clinically infected    MUSCULOSKELETAL: Tenderness to palpation of the right heel wound. Negative TTP of bilateral calves at this time.  MSK strength testing deferred this afternoon        CONSULTS:  IP CONSULT TO INTERNAL MEDICINE  IP CONSULT TO PODIATRY  IP CONSULT TO IV TEAM  IP CONSULT TO VASCULAR SURGERY  IP CONSULT TO INFECTIOUS DISEASES  IP CONSULT TO NEPHROLOGY    MEDICATION:  Scheduled Meds:   amLODIPine  5 mg Oral Daily    sodium chloride flush  5-40 mL IntraVENous BID    insulin lispro  0-4 Units SubCUTAneous TID WC    insulin lispro  0-4 Units SubCUTAneous Nightly    miconazole   Topical BID    ampicillin-sulbactam  3,000 mg IntraVENous Q6H    linezolid  600 mg Oral 2 times per day    enoxaparin  60 mg SubCUTAneous Daily    ascorbic acid  500 mg Oral Daily    atorvastatin  20 mg Oral Nightly    vitamin D  50,000 Units Oral Weekly    levothyroxine  50 mcg Oral Daily    magnesium oxide  400 mg Oral Every Other Day meclizine  25 mg Oral TID    metoprolol tartrate  25 mg Oral BID    therapeutic multivitamin-minerals  1 tablet Oral Daily    pantoprazole  40 mg Oral Daily    phenazopyridine  200 mg Oral Daily    collagenase   Topical Daily     Continuous Infusions:   dextrose       PRN Meds:.glucose, dextrose bolus **OR** dextrose bolus, glucagon (rDNA), dextrose, acetaminophen, ipratropium-albuterol, loperamide, polyethylene glycol    RADIOLOGY:  CT ANKLE RIGHT WO CONTRAST   Final Result   This report is for right foot and right ankle. No CT evidence of osteomyelitis. MRI could better evaluate for osteomyelitis   if clinically indicated. Mild generalized subcutaneous edema/skin thickening. No drainable abscess,   allowing for limitations of noncontrast technique. Other incidental findings as above. CT FOOT RIGHT WO CONTRAST   Final Result   This report is for right foot and right ankle. No CT evidence of osteomyelitis. MRI could better evaluate for osteomyelitis   if clinically indicated. Mild generalized subcutaneous edema/skin thickening. No drainable abscess,   allowing for limitations of noncontrast technique. Other incidental findings as above. XR FOOT RIGHT (MIN 3 VIEWS)   Final Result   1. Surface irregularity and edema about the right calcaneus. No definite   evidence of osseous destruction or erosion at time. 2.  Minimal right large toe metatarsal phalangeal joint osteoarthritis. Calcaneal enthesophytes. XR CHEST PORTABLE   Final Result   No acute process         CT Head WO Contrast   Final Result   1. There is no acute intracranial abnormality. Specifically, there is no   intracranial hemorrhage. 2. Atrophy and periventricular leukomalacia,   3. Old left occipital lobe infarct         CT Cervical Spine WO Contrast   Final Result   1. There is no acute compression fracture or subluxation of the cervical   spine.    2. Advanced multilevel degenerative disc and degenerative joint disease. Vitals:    BP (!) 155/65   Pulse 67   Temp 97.4 °F (36.3 °C) (Oral)   Resp 16   Ht 6' 2\" (1.88 m)   Wt 250 lb (113.4 kg)   SpO2 95%   BMI 32.10 kg/m²     LABS:   Recent Labs     09/24/22  1103 09/26/22  0342   WBC 6.6 6.8   HGB 9.7* 9.1*   HCT 31.1* 29.6*    282       Recent Labs     09/24/22  0300 09/24/22  1103 09/26/22  0342   NA  --    < > 139   K  --    < > 3.5   CL  --    < > 106   CO2  --    < > 22   PHOS 3.2  --   --    BUN  --    < > 24*   CREATININE  --    < > 2.0*    < > = values in this interval not displayed. Recent Labs     09/25/22  1745   INR 2.0       ASSESSMENTS:   Principal Problem:  - Chronic Right heel diabetic ulceration with eschar   - Anterior right ankle diabetic wound  - Type II diabetic     Hyperkalemia  - Recent fall with dizziness (chief complaint)         PLAN:  - Patient seen and evaluated bedside  - Charts and labs reviewed   -WBC: 6.8  - Cultures : from 8/24 grew Proteus mirabilis, MRSA, and Corynebacterium.  - Prevalon offloading boots applied to the bilateral lower extremities  - Dressing: Santyl DSD applied to patient's right heel. QOD dressing changes. Dressings changed this morning. CT right foot- No CT evidence of osteomyelitis. - Podiatry will monitor patient while in house  - Clear for discharge from podiatry perspective   - Discussed patient with Dr. Светлана Louis  - Will continue to follow patient while they are in-house. Thank you for the opportunity to take part in the patient's care. Please do not hesitate to call for any questions or concerns.

## 2022-09-26 NOTE — PROGRESS NOTES
Wound / ostomy dept consulted for this Pt admitted with Hyperkalemia. History includes: HLD, HTN, CKD, L renal mass. The Pt has foot wounds which are followed by podiatry. He is incontinent of stool. He has a aparicio. Pt cleansed of large liquid stool. His buttock and posterior thighs are discolored. Fungal rash noted with epidermal skin loss on perianal area and scrotum. Recommend Aloe Vesta topically, continue SOS precautions. He is on a low air loss bed. **Informed Consent**    The patient has given verbal consent to have photos taken of buttocks, scrotum  and inserted into their chart as part of their permanent medical record for purposes of documentation, treatment management and/or medical review. All Images taken on 9/26/22 of patient name: Cherokee Shames were transmitted and stored on secured [employer] located within [facility] by a registered [employer] Mobile Application Device.

## 2022-09-26 NOTE — PROGRESS NOTES
Vascular Surgery Progress Note    CC: PVD, wound    HISTORY:  The patient is awake, alert, and oriented. Denies pain at this time    IMPRESSION: PVD, slow healing right heel wound. PLAN: Arteriogram was scheduled for today but unfortunately patient's INR is elevated at 2.0. We will continue to hold Coumadin with Lovenox bridge and reschedule patient for arteriogram on Thursday, 9/29. If patient is able to be discharged from medical standpoint, transport can be arranged from his nursing home and the procedure on Thursday can be done as an outpatient. Appreciate local wound care per podiatry. Appreciate nephrology's renal optimization prior to arteriogram.    Addendum to previous: unfortunately due to scheduling constraints in the cath lab, patient would have to remain inpatient for procedure to be done on Thursday as no more outpatient cases can be added for 9/29.     Patient Active Problem List   Diagnosis Code    Hypertension I10    Hyperlipidemia with target LDL less than 100 E78.5    Impaired mobility and ADLs Z74.09, Z78.9    Cardiac pacemaker in situ Z95.0    Cerebrovascular accident (CVA) determined by clinical assessment ([address] I63.9    Left renal mass N28.89    Class 2 severe obesity due to excess calories with serious comorbidity and body mass index (BMI) of 35.0 to 35.9 in adult [facility]) E66.01, Z68.35    Acquired hypothyroidism E03.9    H/O deep venous thrombosis Z86.718    Type 2 diabetes mellitus without complication, without long-term current use of insulin (HCC) E11.9    Stage 3 chronic kidney disease (HCC) N18.30    Ulcer of right heel and midfoot with fat layer exposed ([address] L97.412    Atherosclerosis of native artery of right lower extremity with ulceration of heel (HCC) I70.234    Status post nephrectomy Z90.5    Chronic renal failure, stage 3b (HCC) N18.32    Necrotic eschar ([address] I96    Diabetic ulcer of left heel associated with type 2 diabetes mellitus, with fat layer exposed ([address] O89.551, I29.724    Femoral-popliteal atherosclerosis (HCC) I70.209    Hyperkalemia E87.5       Current Medications:    dextrose        glucose, dextrose bolus **OR** dextrose bolus, glucagon (rDNA), dextrose, acetaminophen, ipratropium-albuterol, loperamide, polyethylene glycol    miconazole nitrate   Topical BID    amLODIPine  5 mg Oral Daily    sodium chloride flush  5-40 mL IntraVENous BID    insulin lispro  0-4 Units SubCUTAneous TID WC    insulin lispro  0-4 Units SubCUTAneous Nightly    miconazole   Topical BID    ampicillin-sulbactam  3,000 mg IntraVENous Q6H    linezolid  600 mg Oral 2 times per day    enoxaparin  60 mg SubCUTAneous Daily    ascorbic acid  500 mg Oral Daily    atorvastatin  20 mg Oral Nightly    vitamin D  50,000 Units Oral Weekly    levothyroxine  50 mcg Oral Daily    magnesium oxide  400 mg Oral Every Other Day    meclizine  25 mg Oral TID    metoprolol tartrate  25 mg Oral BID    therapeutic multivitamin-minerals  1 tablet Oral Daily    pantoprazole  40 mg Oral Daily    phenazopyridine  200 mg Oral Daily    collagenase   Topical Daily          PHYSICAL EXAM:    Vitals:    09/26/22 1340   BP: (!) 146/68   Pulse: 59   Resp: 18   Temp: 97.6 °F (36.4 °C)   SpO2: 96%     CONSTITUTIONAL:  awake, alert, cooperative, no apparent distress  LUNGS:  no increased work of breathing, good air exchange   CARDIOVASCULAR:  regular rate and rhythm  ABDOMEN:  soft, non-distended and non-tender  EXTREMITIES: slight leg edema bilaterally, wrap in place to R foot, media reviewed.  + motor and sensation     PULSE EXAM      Right      Left   Brachial       Radial 2 2   Femoral 3 3   Popliteal biphasic     Dorsalis Pedis wrap biphasic   Posterior Tibial monophasic 1, multiphasic   (3=normal, 2=diminished, 1=barely palpable, 4=widened)    LABS:    Lab Results   Component Value Date    WBC 6.8 09/26/2022    HGB 9.1 (L) 09/26/2022    HCT 29.6 (L) 09/26/2022     09/26/2022    PROTIME 21.6 (H) 09/25/2022    INR 2.0 09/25/2022    APTT 34.8 08/01/2022    K 3.5 09/26/2022    BUN 24 (H) 09/26/2022    CREATININE 2.0 (H) 09/26/2022       RADIOLOGY:

## 2022-09-26 NOTE — PLAN OF CARE
Problem: Skin/Tissue Integrity  Goal: Absence of new skin breakdown  Description: 1. Monitor for areas of redness and/or skin breakdown  2. Assess vascular access sites hourly  3. Every 4-6 hours minimum:  Change oxygen saturation probe site  4. Every 4-6 hours:  If on nasal continuous positive airway pressure, respiratory therapy assess nares and determine need for appliance change or resting period.   9/26/2022 0112 by Heike Vicente RN  Outcome: Progressing  9/25/2022 1827 by Melly Martin RN  Outcome: Progressing     Problem: Discharge Planning  Goal: Discharge to home or other facility with appropriate resources  9/26/2022 0112 by Heike Vicente RN  Outcome: Progressing  9/25/2022 1827 by Melly Martin RN  Outcome: Progressing     Problem: Safety - Adult  Goal: Free from fall injury  9/26/2022 0112 by Heike Vicente RN  Outcome: Progressing  9/25/2022 1827 by Melly Martin RN  Outcome: Progressing     Problem: Pain  Goal: Verbalizes/displays adequate comfort level or baseline comfort level  Outcome: Progressing     Problem: Chronic Conditions and Co-morbidities  Goal: Patient's chronic conditions and co-morbidity symptoms are monitored and maintained or improved  Outcome: Progressing     Problem: ABCDS Injury Assessment  Goal: Absence of physical injury  Outcome: Progressing

## 2022-09-26 NOTE — PROGRESS NOTES
Spoke to [facility] PA with vascular regarding INR. Was told to cancel transport to [facility] and the arteriogram will be rescheduled.

## 2022-09-26 NOTE — PROGRESS NOTES
[address] TREATMENT NOTE      Date:2022  Patient Name: Maryjo Brunner  MRN: [id number]  : 1945  Room: [address] [place] [address]     Evaluating OT: Barbara Benitez, [facility], O[facility]/L [id number]  Referring Josslein Gimenez MD  Specific Provider Orders: OT eval and treat   Recommended Adaptive Equipment:  TBD      Diagnosis: JUAN (s/p fall)   Surgery: none   Pertinent Medical History: CA, DM, HTN, CVA, thyroid disease, a-fib, pacemaker   Precautions:  Fall Risk, contact, NWB RLE (per perfect serve  DPJASMIN)     Assessment of current deficits   [x] Functional mobility           [x]ADLs           [x] Strength                  [x]Cognition   [x] Functional transfers         [x] IADLs         [x] Safety Awareness   [x]Endurance   [] Fine Coordination                         [x] Balance      [] Vision/perception   [x]Sensation     []Gross Motor Coordination             [] ROM           [] Delirium                   [] Motor Control      OT PLAN OF CARE   OT POC based on physician orders, patient diagnosis and results of clinical assessment     Frequency/Duration: 2-3 days/wk for 2 weeks PRN   Specific OT Treatment to include:   * Instruction/training on adapted ADL techniques and AE recommendations to increase functional independence within precautions       * Training on energy conservation strategies, correct breathing pattern and techniques to improve independence/tolerance for self-care routine  * Functional transfer/mobility training/DME recommendations for increased independence, safety, and fall prevention  * Patient/Family education to increase follow through with safety techniques and functional independence  * Recommendation of environmental modifications for increased safety with functional transfers/mobility and ADLs  * Therapeutic exercise to improve motor endurance, ROM, and functional strength for ADLs/functional transfers  * Therapeutic activities to facilitate/challenge dynamic balance, stand tolerance for increased safety and independence with ADLs  * Neuro-muscular re-education: facilitation of righting/equilibrium reactions, midline orientation, scapular stability/mobility, normalization of muscle tone, and facilitation of volitional active controled movement     Home Living: Pt presents from a [facility]. Required assist with ADLs and 2 person assist for functional transfers. Pt reports not ambulating prior. Pain Level: Pt did not report pain. Cognition: Awake and alert. Limited safety awareness. Functional Assessment:  AM-PAC Daily Activity Raw Score: 15/24    Initial Eval Status  Date: 9/22/22 Treatment Status  Date: 9/26/22 STGs=LTGs  Time Frame: 10-14 days   Feeding SUP (bed level)  Pt demonstrated ability to feed himself. Set-up   Grooming SBA  Min A seated. Set-up   UB Dressing SBA   Set-up   LB Dressing dep (assist with socks)  max A  Mod A    Bathing Max A (simulated)   Mod A    Toileting Max A  catheter. Max A barrington hygiene due to slight incontinence in the bed. Mod A   Bed Mobility  Log roll: Mod A  Supine to sit: Mod A   Sit to supine: Min A  Min A supine <> sit   Max A scoot to the [facility] due to poor carry over of NWB. Log roll SBA  Supine to sit: SBA   Sit to supine: SBA   Functional Transfers Sit to stand: attempted, unable to complete safely and fully with 1 assist   Stand to sit:NT  Commode: NT Pt unable to maintain NWB on R LE to scoot to the [facility]. Does not follow instructions for technique to low pivot and maintain NWB. Mod A   Functional Mobility NT   Did not complete prior   Balance Sitting: SBA  Standing: NT  sit balance SBA       Activity Tolerance fair Limited       Comments:  pt laying in the bed. Agreeable to therapy. Coughing throughout the session. States he choked on something from his lunch earlier. Upon entering the room, pt positioned with feet pressing up against the foot of the bed. He completed activity while seated on the side of the bed. Positioning completed at the end of the session. Due to his height, pt may benefit from bed extension to prevent pressure on NWB R LE.  PRAFO's in place. Exercise: AROM exercises completed while seated on the side of the bed. Education/treatment:  ADL retraining with facilitation of movement to increase self care skills. Therapeutic activity to address balance and endurance for ADL and transfers. Pt education of daily orientation and importance of NWB to R LE. Pt has made limited  progress towards set goals.        Time In: 2:23   Time Out: 2:48     Min Units   Therapeutic Ex [id number]    Therapeutic Activities [id number] 5   ADL/Self Care [id number] 1   Orthotic Management 69332     Neuro Re-Ed [id number]     Non-Billable Time     TOTAL TIMED TREATMENT [facility] ABBIE/L 16047

## 2022-09-26 NOTE — PROGRESS NOTES
Admit Date: 9/21/2022    Subjective:     Feels fine no complaint     Objective:     Patient Vitals for the past 8 hrs:   BP Temp Temp src Pulse Resp SpO2 Weight   09/26/22 0531 -- -- -- -- -- -- 250 lb (113.4 kg)   09/26/22 0145 (!) 162/74 98 °F (36.7 °C) Oral 62 18 96 % --     I/O last 3 completed shifts: In: 780 [P.O.:780]  Out: 2400 [Urine:2400]  I/O this shift:  In: -   Out: 350 [Urine:350]    HEENT: Normal  NECK: Thyroid normal. No carotid bruit. No lymphphadenopathy. CVS: RRR  RS: Clear. No wheeze. No rhonchi. Good airflow bilaterally. ABD: Soft. Non tender. No mass. Normal BS. EXT: No edema. Non tender.  Dressing feet   NEURO: no focal deficit       Scheduled Meds:   amLODIPine  5 mg Oral Daily    sodium chloride flush  5-40 mL IntraVENous BID    insulin lispro  0-4 Units SubCUTAneous TID WC    insulin lispro  0-4 Units SubCUTAneous Nightly    miconazole   Topical BID    ampicillin-sulbactam  3,000 mg IntraVENous Q6H    linezolid  600 mg Oral 2 times per day    enoxaparin  60 mg SubCUTAneous Daily    ascorbic acid  500 mg Oral Daily    atorvastatin  20 mg Oral Nightly    vitamin D  50,000 Units Oral Weekly    levothyroxine  50 mcg Oral Daily    magnesium oxide  400 mg Oral Every Other Day    meclizine  25 mg Oral TID    metoprolol tartrate  25 mg Oral BID    therapeutic multivitamin-minerals  1 tablet Oral Daily    pantoprazole  40 mg Oral Daily    phenazopyridine  200 mg Oral Daily    collagenase   Topical Daily     Continuous Infusions:   dextrose         CBC with Differential:    Lab Results   Component Value Date/Time    WBC 6.8 09/26/2022 03:42 AM    RBC 3.28 09/26/2022 03:42 AM    HGB 9.1 09/26/2022 03:42 AM    HCT 29.6 09/26/2022 03:42 AM     09/26/2022 03:42 AM    MCV 90.2 09/26/2022 03:42 AM    MCH 27.7 09/26/2022 03:42 AM    MCHC 30.7 09/26/2022 03:42 AM    RDW 14.1 09/26/2022 03:42 AM    SEGSPCT 80 01/04/2012 03:00 AM    BANDSPCT 1 11/27/2014 04:50 AM    LYMPHOPCT 11.6 09/21/2022 10:44 AM MONOPCT 7.5 09/21/2022 10:44 AM    MYELOPCT 0.9 02/28/2018 05:21 AM    BASOPCT 0.5 09/21/2022 10:44 AM    MONOSABS 0.69 09/21/2022 10:44 AM    LYMPHSABS 1.07 09/21/2022 10:44 AM    EOSABS 0.28 09/21/2022 10:44 AM    BASOSABS 0.05 09/21/2022 10:44 AM     CMP:    Lab Results   Component Value Date/Time     09/26/2022 03:42 AM    K 3.5 09/26/2022 03:42 AM    K 6.2 09/21/2022 10:44 AM     09/26/2022 03:42 AM    CO2 22 09/26/2022 03:42 AM    BUN 24 09/26/2022 03:42 AM    CREATININE 2.0 09/26/2022 03:42 AM    GFRAA 39 09/26/2022 03:42 AM    LABGLOM 32 09/26/2022 03:42 AM    PROT 7.5 09/21/2022 10:44 AM    LABALBU 3.5 09/21/2022 10:44 AM    LABALBU 3.4 01/04/2012 03:00 AM    CALCIUM 9.2 09/26/2022 03:42 AM    BILITOT 0.3 09/21/2022 10:44 AM    ALKPHOS 97 09/21/2022 10:44 AM    AST 21 09/21/2022 10:44 AM    ALT 7 09/21/2022 10:44 AM     PT/INR:    Lab Results   Component Value Date/Time    PROTIME 21.6 09/25/2022 05:45 PM    PROTIME 12.2 12/30/2011 10:20 PM    INR 2.0 09/25/2022 05:45 PM       Assessment:     Principal Problem:    JUAN on CKD improved   Hyperkalemia due to above and supplement resolved   Right heal wound infection   HTN  Obesity  DM  Impaired mobility   Fungal dermatitis   Remote CVA   PVD   Hypothyroid   Hypomagnesemia       Plan:   Continue same ,going for arteriogram this Am

## 2022-09-26 NOTE — PROGRESS NOTES
Received call from [facility] ([facility]) cath lab, pt needs to be in cath lab by 0830 on 9/26. Spoke to [facility] ambulance informed them of time. Patient will be picked up around 0730 on 9/26 to be transported to the cath lab.

## 2022-09-26 NOTE — CARE COORDINATION
Social Work/Discharge Planning:  Chart reviewed. Patient procedure was canceled for today. Plan remains to return to [facility] when medically stable. No pre-cert needed, but will need a negative covid result on day of discharge. Will continue to follow.   Electronically signed by ROHIT Garner on 9/26/2022 at 11:07 AM

## 2022-09-26 NOTE — PLAN OF CARE
Problem: Skin/Tissue Integrity  Goal: Absence of new skin breakdown  Description: 1. Monitor for areas of redness and/or skin breakdown  2. Assess vascular access sites hourly  3. Every 4-6 hours minimum:  Change oxygen saturation probe site  4. Every 4-6 hours:  If on nasal continuous positive airway pressure, respiratory therapy assess nares and determine need for appliance change or resting period.   9/26/2022 1350 by Zipporah Lundborg, RN  Outcome: Progressing     Problem: Discharge Planning  Goal: Discharge to home or other facility with appropriate resources  9/26/2022 1350 by Zipporah Lundborg, RN  Outcome: Progressing     Problem: Safety - Adult  Goal: Free from fall injury  9/26/2022 1350 by Zipporah Lundborg, RN  Outcome: Progressing     Problem: Pain  Goal: Verbalizes/displays adequate comfort level or baseline comfort level  9/26/2022 1350 by Zipporah Lundborg, RN  Outcome: Progressing

## 2022-09-26 NOTE — PROGRESS NOTES
[address] [place] [address] [facility]  Progress Note    SUBJECTIVE:  Chief Complaint   Patient presents with    Lico Mater over to pick something up and felt like the room was spinning. -LOC, +head, +thinners. Abrasion forehead. Dizziness     Sitting up in bed, just finished lunch  Arteriogram was cancelled this AM due to INR  No complaints today  No fevers  Tolerating antibiotics      Review of systems:  As stated above in the chief complaint, otherwise negative. Medications:  Scheduled Meds:   miconazole nitrate   Topical BID    amLODIPine  5 mg Oral Daily    sodium chloride flush  5-40 mL IntraVENous BID    insulin lispro  0-4 Units SubCUTAneous TID WC    insulin lispro  0-4 Units SubCUTAneous Nightly    miconazole   Topical BID    ampicillin-sulbactam  3,000 mg IntraVENous Q6H    linezolid  600 mg Oral 2 times per day    enoxaparin  60 mg SubCUTAneous Daily    ascorbic acid  500 mg Oral Daily    atorvastatin  20 mg Oral Nightly    vitamin D  50,000 Units Oral Weekly    levothyroxine  50 mcg Oral Daily    magnesium oxide  400 mg Oral Every Other Day    meclizine  25 mg Oral TID    metoprolol tartrate  25 mg Oral BID    therapeutic multivitamin-minerals  1 tablet Oral Daily    pantoprazole  40 mg Oral Daily    phenazopyridine  200 mg Oral Daily    collagenase   Topical Daily     Continuous Infusions:   dextrose       PRN Meds:glucose, dextrose bolus **OR** dextrose bolus, glucagon (rDNA), dextrose, acetaminophen, ipratropium-albuterol, loperamide, polyethylene glycol    OBJECTIVE:  BP (!) 155/65   Pulse 60   Temp 97.4 °F (36.3 °C) (Oral)   Resp 16   Ht 6' 2\" (1.88 m)   Wt 250 lb (113.4 kg)   SpO2 95%   BMI 32.10 kg/m²   Temp  Av.5 °F (36.4 °C)  Min: 97.1 °F (36.2 °C)  Max: 98 °F (36.7 °C)  Constitutional: The patient is sitting up in bed, just finished lunch. Awake, alert. Skin: Warm and dry. No rashes were noted. HEENT: Round and reactive pupils. Moist mucous membranes. No ulcerations or thrush. Neck: Supple to movements. Chest: No use of accessory muscles to breathe. Symmetrical expansion. No wheezing, crackles or rhonchi. Cardiovascular: S1 and S2 are rhythmic and regular. No murmurs appreciated. Abdomen: Positive bowel sounds to auscultation. Benign to palpation. No masses felt. Extremities: right foot is dressed. Unstageable necrotic eschar over the right heel. Ulcer dorsum of the right foot.   Lines: peripheral    Laboratory and Tests Review:  Lab Results   Component Value Date    WBC 6.8 09/26/2022    WBC 6.6 09/24/2022    WBC 9.3 09/21/2022    HGB 9.1 (L) 09/26/2022    HCT 29.6 (L) 09/26/2022    MCV 90.2 09/26/2022     09/26/2022     Lab Results   Component Value Date    NEUTROABS 7.13 09/21/2022    NEUTROABS 4.66 08/22/2022    NEUTROABS 4.96 08/16/2022     No results found for: [facility]  Lab Results   Component Value Date    ALT 7 09/21/2022    AST 21 09/21/2022    ALKPHOS 97 09/21/2022    BILITOT 0.3 09/21/2022     Lab Results   Component Value Date/Time     09/26/2022 03:42 AM    K 3.5 09/26/2022 03:42 AM    K 6.2 09/21/2022 10:44 AM     09/26/2022 03:42 AM    CO2 22 09/26/2022 03:42 AM    BUN 24 09/26/2022 03:42 AM    CREATININE 2.0 09/26/2022 03:42 AM    CREATININE 2.1 09/25/2022 02:48 AM    CREATININE 2.1 09/24/2022 11:03 AM    GFRAA 39 09/26/2022 03:42 AM    LABGLOM 32 09/26/2022 03:42 AM    GLUCOSE 132 09/26/2022 03:42 AM    GLUCOSE 297 01/04/2012 03:00 AM    PROT 7.5 09/21/2022 10:44 AM    LABALBU 3.5 09/21/2022 10:44 AM    LABALBU 3.4 01/04/2012 03:00 AM    CALCIUM 9.2 09/26/2022 03:42 AM    BILITOT 0.3 09/21/2022 10:44 AM    ALKPHOS 97 09/21/2022 10:44 AM    AST 21 09/21/2022 10:44 AM    ALT 7 09/21/2022 10:44 AM     Lab Results   Component Value Date    CRP 8.5 (H) 09/22/2022    CRP 7.2 (H) 08/01/2022    CRP 4.9 (H) 10/03/2018     Lab Results   Component Value Date    SEDRATE 93 (H) 09/22/2022    SEDRATE 87 (H) 08/01/2022    SEDRATE 77 (H) 10/03/2018     Radiology:      Microbiology:  Wound culture 9/22/2022: Proteus mirabilis, E.coli, M_SA    ASSESSMENT:  Right heel decubitus ulcer  Right heel wound infection with cellulitis. Previous cultures in August grew Proteus mirabilis, MRSA, Streptococcus agalactiae and Corynebacterium    PLAN:  Continue Unasyn and Linezolid  Arteriogram was cancelled today - unsure of timing   Podiatry plans pending arteriogram   May need PICC for IV antibiotics - depending on the vascular and podiatry intervention. Check final cultures  We will follow with you    Rashad Wilson, MELANIE - CNP  12:47 PM  9/26/2022    Pt seen and examined. Above discussed agree with advanced practice nurse. Labs, cultures, and radiographs reviewed. Face to Face encounter occurred. Changes made as necessary.      Ronit Jiménez MD

## 2022-09-26 NOTE — PROGRESS NOTES
Department of Internal Medicine  Nephrology Progress Note      Events reviewed. SUBJECTIVE: We are following . Kymberly Gonzalez for CKD and hyperkalemia. He has no complaints today. PHYSICAL EXAM:      Vitals:    VITALS:  BP (!) 146/68   Pulse 59   Temp 97.6 °F (36.4 °C) (Oral)   Resp 18   Ht 6' 2\" (1.88 m)   Wt 250 lb (113.4 kg)   SpO2 96%   BMI 32.10 kg/m²   24HR INTAKE/OUTPUT:    Intake/Output Summary (Last 24 hours) at 9/26/2022 1602  Last data filed at 9/26/2022 0618  Gross per 24 hour   Intake 240 ml   Output 1050 ml   Net -810 ml         Constitutional:  Well built. Obese. Mild distress. HEENT:  BEERLA. JVD not seen. Respiratory:  Breath sounds normal. No rales or rhonchi  Cardiovascular/Edema:  Heart sounds normal. No murmur. Gastrointestinal:  Bowel sounds normal. No oranomgaly  Neurologic:  A/O x 3. No focal deficit. Skin:  Dry skin. Normal turgor. Other:  Non healing ulcer in right foot.     Scheduled Meds:   miconazole nitrate   Topical BID    amLODIPine  5 mg Oral Daily    sodium chloride flush  5-40 mL IntraVENous BID    insulin lispro  0-4 Units SubCUTAneous TID WC    insulin lispro  0-4 Units SubCUTAneous Nightly    miconazole   Topical BID    ampicillin-sulbactam  3,000 mg IntraVENous Q6H    linezolid  600 mg Oral 2 times per day    enoxaparin  60 mg SubCUTAneous Daily    ascorbic acid  500 mg Oral Daily    atorvastatin  20 mg Oral Nightly    vitamin D  50,000 Units Oral Weekly    levothyroxine  50 mcg Oral Daily    magnesium oxide  400 mg Oral Every Other Day    meclizine  25 mg Oral TID    metoprolol tartrate  25 mg Oral BID    therapeutic multivitamin-minerals  1 tablet Oral Daily    pantoprazole  40 mg Oral Daily    phenazopyridine  200 mg Oral Daily    collagenase   Topical Daily     Continuous Infusions:   dextrose       PRN Meds:.glucose, dextrose bolus **OR** dextrose bolus, glucagon (rDNA), dextrose, acetaminophen, ipratropium-albuterol, loperamide, polyethylene glycol      DATA: CBC:   Lab Results   Component Value Date/Time    WBC 6.8 09/26/2022 03:42 AM    RBC 3.28 09/26/2022 03:42 AM    HGB 9.1 09/26/2022 03:42 AM    HCT 29.6 09/26/2022 03:42 AM    MCV 90.2 09/26/2022 03:42 AM    MCH 27.7 09/26/2022 03:42 AM    MCHC 30.7 09/26/2022 03:42 AM    RDW 14.1 09/26/2022 03:42 AM     09/26/2022 03:42 AM    MPV 9.8 09/26/2022 03:42 AM     CMP:    Lab Results   Component Value Date/Time     09/26/2022 03:42 AM    K 3.5 09/26/2022 03:42 AM    K 6.2 09/21/2022 10:44 AM     09/26/2022 03:42 AM    CO2 22 09/26/2022 03:42 AM    BUN 24 09/26/2022 03:42 AM    CREATININE 2.0 09/26/2022 03:42 AM    GFRAA 39 09/26/2022 03:42 AM    LABGLOM 32 09/26/2022 03:42 AM    GLUCOSE 132 09/26/2022 03:42 AM    GLUCOSE 297 01/04/2012 03:00 AM    PROT 7.5 09/21/2022 10:44 AM    LABALBU 3.5 09/21/2022 10:44 AM    LABALBU 3.4 01/04/2012 03:00 AM    CALCIUM 9.2 09/26/2022 03:42 AM    BILITOT 0.3 09/21/2022 10:44 AM    ALKPHOS 97 09/21/2022 10:44 AM    AST 21 09/21/2022 10:44 AM    ALT 7 09/21/2022 10:44 AM     Magnesium:    Lab Results   Component Value Date/Time    MG 1.8 09/26/2022 03:42 AM     Phosphorus:    Lab Results   Component Value Date/Time    PHOS 3.2 09/24/2022 03:00 AM           BRIEF SUMMARY OF INITIAL CONSULT:    Briefly, Mr. Apple Chapman  is a 54-year-old male with a PMH of CKD stage IIIb, without proteinuria, baseline creatinine 2.0-2.1 mg/dL, secondary to nephrosclerosis, kidney cancer, s/p total left nephrectomy on 7/5/22 at [facility], hypertension, type II diabetes mellitus, HFpEF 55% with stage II DD, PAF, factor V Leyden deficiency, hyperlipidemia, sick sinus syndrome s/p pacemaker placement, CVA, hypothyroidism, PAF on chronic anticoagulation with warfarin, who was admitted on September 21, 2022 after a fall at home, his labs were significant for potassium 6.2 mmol/L, BUN 57 mg/dL, creatinine 2.8 mg/dL, reason for this consult.  His home medications include lasix, lisinopril    Resolved:    Hyperkalemia, 2/2 ACE inhibition, decrease GFR and on potassium supplementation,  improved with lokelma  JUAN on CKD, volume responsive pre-renal JUAN, 2/2 to overt diuresis, decrease oral intake in the setting of ACE inhibition. Resolved, renal function has returned to baseline, last creatinine 2.1 mg/dL. We will discontinue IV fluids. IMPRESSION/RECOMMENDATIONS:        CKD stage IV, without proteinuria, baseline creatinine 2.1-2.4 mg/dL, secondary to nephrosclerosis and solitary kidney. Renal function stable at baseline. Left kidney cancer, s/p total left nephrectomy 7/5/22 at [facility]  HTN, on metoprolol   HFpEF 55% with stage II DD, pro-, Lasix on hold  Hypomagnesemia, 2/2 diuretics, levels improved, to replace  -------------------------------------------------  S/p fall  Nonhealing right heel wound, arteriogram with possible intervention on 9/26 with Vascular, ID following, on linezolid and ampicillin-sulbactam  PAF, on metoprolol and warfarin  Sick sinus syndrome, s/p pacemaker placement   Type 2 DM, on metformin at home, on hold, SSI for now  HLD, on atorvastatin  Hypothyroidism, on levothyroxine  History of CVA  Normocytic anemia, ferritin 178, iron saturation 18%, folate 8.7, B12: 270     Plan:       Replace magnesium  Continue to hold Lasix  Continue to hold metformin  Continue to monitor renal function  Continue to monitor magnesium levels. Angio on hold due to high INR.

## 2022-09-27 ENCOUNTER — APPOINTMENT (OUTPATIENT)
Dept: GENERAL RADIOLOGY | Age: 77
DRG: 641 | End: 2022-09-27
Payer: MEDICARE

## 2022-09-27 LAB
ANION GAP SERPL CALCULATED.3IONS-SCNC: 12 MMOL/L (ref 7–16)
BUN BLDV-MCNC: 22 MG/DL (ref 6–23)
CALCIUM SERPL-MCNC: 9.1 MG/DL (ref 8.6–10.2)
CHLORIDE BLD-SCNC: 105 MMOL/L (ref 98–107)
CO2: 22 MMOL/L (ref 22–29)
CREAT SERPL-MCNC: 2.2 MG/DL (ref 0.7–1.2)
GFR AFRICAN AMERICAN: 35
GFR NON-AFRICAN AMERICAN: 29 ML/MIN/1.73
GLUCOSE BLD-MCNC: 131 MG/DL (ref 74–99)
MAGNESIUM: 1.7 MG/DL (ref 1.6–2.6)
METER GLUCOSE: 126 MG/DL (ref 74–99)
METER GLUCOSE: 140 MG/DL (ref 74–99)
METER GLUCOSE: 140 MG/DL (ref 74–99)
METER GLUCOSE: 142 MG/DL (ref 74–99)
ORGANISM: ABNORMAL
POTASSIUM SERPL-SCNC: 3.5 MMOL/L (ref 3.5–5)
SODIUM BLD-SCNC: 139 MMOL/L (ref 132–146)
WOUND/ABSCESS: ABNORMAL

## 2022-09-27 PROCEDURE — 99232 SBSQ HOSP IP/OBS MODERATE 35: CPT | Performed by: INTERNAL MEDICINE

## 2022-09-27 PROCEDURE — 2060000000 HC ICU INTERMEDIATE R&B

## 2022-09-27 PROCEDURE — 2580000003 HC RX 258: Performed by: INTERNAL MEDICINE

## 2022-09-27 PROCEDURE — 83735 ASSAY OF MAGNESIUM: CPT

## 2022-09-27 PROCEDURE — 80048 BASIC METABOLIC PNL TOTAL CA: CPT

## 2022-09-27 PROCEDURE — 6360000002 HC RX W HCPCS: Performed by: SPECIALIST

## 2022-09-27 PROCEDURE — 82962 GLUCOSE BLOOD TEST: CPT

## 2022-09-27 PROCEDURE — 71045 X-RAY EXAM CHEST 1 VIEW: CPT

## 2022-09-27 PROCEDURE — 36415 COLL VENOUS BLD VENIPUNCTURE: CPT

## 2022-09-27 PROCEDURE — 6370000000 HC RX 637 (ALT 250 FOR IP): Performed by: INTERNAL MEDICINE

## 2022-09-27 PROCEDURE — 6360000002 HC RX W HCPCS: Performed by: INTERNAL MEDICINE

## 2022-09-27 PROCEDURE — 2580000003 HC RX 258: Performed by: SPECIALIST

## 2022-09-27 PROCEDURE — 6370000000 HC RX 637 (ALT 250 FOR IP): Performed by: SPECIALIST

## 2022-09-27 RX ORDER — ACETYLCYSTEINE 200 MG/ML
600 SOLUTION ORAL; RESPIRATORY (INHALATION) 2 TIMES DAILY
Status: COMPLETED | OUTPATIENT
Start: 2022-09-27 | End: 2022-09-29

## 2022-09-27 RX ADMIN — AMPICILLIN SODIUM AND SULBACTAM SODIUM 3000 MG: 2; 1 INJECTION, POWDER, FOR SOLUTION INTRAMUSCULAR; INTRAVENOUS at 20:17

## 2022-09-27 RX ADMIN — AMPICILLIN SODIUM AND SULBACTAM SODIUM 3000 MG: 2; 1 INJECTION, POWDER, FOR SOLUTION INTRAMUSCULAR; INTRAVENOUS at 14:49

## 2022-09-27 RX ADMIN — AMLODIPINE BESYLATE 5 MG: 5 TABLET ORAL at 07:51

## 2022-09-27 RX ADMIN — PHENAZOPYRIDINE 200 MG: 100 TABLET ORAL at 07:51

## 2022-09-27 RX ADMIN — MECLIZINE 25 MG: 12.5 TABLET ORAL at 13:16

## 2022-09-27 RX ADMIN — MICONAZOLE NITRATE: 2 OINTMENT TOPICAL at 20:18

## 2022-09-27 RX ADMIN — METOPROLOL TARTRATE 25 MG: 25 TABLET, FILM COATED ORAL at 07:51

## 2022-09-27 RX ADMIN — AMPICILLIN SODIUM AND SULBACTAM SODIUM 3000 MG: 2; 1 INJECTION, POWDER, FOR SOLUTION INTRAMUSCULAR; INTRAVENOUS at 02:34

## 2022-09-27 RX ADMIN — SODIUM CHLORIDE, PRESERVATIVE FREE 10 ML: 5 INJECTION INTRAVENOUS at 20:17

## 2022-09-27 RX ADMIN — MECLIZINE 25 MG: 12.5 TABLET ORAL at 20:19

## 2022-09-27 RX ADMIN — LEVOTHYROXINE SODIUM 50 MCG: 0.05 TABLET ORAL at 05:30

## 2022-09-27 RX ADMIN — OXYCODONE HYDROCHLORIDE AND ACETAMINOPHEN 500 MG: 500 TABLET ORAL at 07:51

## 2022-09-27 RX ADMIN — PANTOPRAZOLE SODIUM 40 MG: 40 TABLET, DELAYED RELEASE ORAL at 07:51

## 2022-09-27 RX ADMIN — AMPICILLIN SODIUM AND SULBACTAM SODIUM 3000 MG: 2; 1 INJECTION, POWDER, FOR SOLUTION INTRAMUSCULAR; INTRAVENOUS at 07:54

## 2022-09-27 RX ADMIN — MECLIZINE 25 MG: 12.5 TABLET ORAL at 07:51

## 2022-09-27 RX ADMIN — MICONAZOLE NITRATE: 20 POWDER TOPICAL at 07:49

## 2022-09-27 RX ADMIN — LINEZOLID 600 MG: 600 TABLET, FILM COATED ORAL at 20:18

## 2022-09-27 RX ADMIN — MULTIPLE VITAMINS W/ MINERALS TAB 1 TABLET: TAB at 07:51

## 2022-09-27 RX ADMIN — ATORVASTATIN CALCIUM 20 MG: 20 TABLET, FILM COATED ORAL at 20:18

## 2022-09-27 RX ADMIN — MICONAZOLE NITRATE: 2 OINTMENT TOPICAL at 07:49

## 2022-09-27 RX ADMIN — METOPROLOL TARTRATE 25 MG: 25 TABLET, FILM COATED ORAL at 20:19

## 2022-09-27 RX ADMIN — ACETYLCYSTEINE 600 MG: 200 SOLUTION ORAL; RESPIRATORY (INHALATION) at 15:13

## 2022-09-27 RX ADMIN — MICONAZOLE NITRATE: 20 POWDER TOPICAL at 20:18

## 2022-09-27 RX ADMIN — LINEZOLID 600 MG: 600 TABLET, FILM COATED ORAL at 07:51

## 2022-09-27 RX ADMIN — SODIUM CHLORIDE, PRESERVATIVE FREE 10 ML: 5 INJECTION INTRAVENOUS at 07:49

## 2022-09-27 ASSESSMENT — PAIN SCALES - GENERAL
PAINLEVEL_OUTOF10: 0

## 2022-09-27 NOTE — PROGRESS NOTES
Department of Internal Medicine  Nephrology Progress Note      Events reviewed. SUBJECTIVE: We are following Mr. Te Carcamo for CKD and hyperkalemia. He has no complaints today. PHYSICAL EXAM:      Vitals:    VITALS:  /89   Pulse 65   Temp 98 °F (36.7 °C) (Oral)   Resp 16   Ht 6' 2\" (1.88 m)   Wt 259 lb (117.5 kg)   SpO2 94%   BMI 33.25 kg/m²   24HR INTAKE/OUTPUT:    Intake/Output Summary (Last 24 hours) at 9/27/2022 1344  Last data filed at 9/27/2022 0316  Gross per 24 hour   Intake 10 ml   Output 1050 ml   Net -1040 ml         Constitutional:  Well built. Obese. Mild distress. HEENT:  BEERLA. JVD not seen. Respiratory:  Breath sounds normal. No rales or rhonchi  Cardiovascular/Edema:  Heart sounds normal. No murmur. Gastrointestinal:  Bowel sounds normal. No oranomgaly  Neurologic:  A/O x 3. No focal deficit. Skin:  Dry skin. Normal turgor. Other:  Non healing ulcer in right foot.     Scheduled Meds:   miconazole nitrate   Topical BID    amLODIPine  5 mg Oral Daily    sodium chloride flush  5-40 mL IntraVENous BID    insulin lispro  0-4 Units SubCUTAneous TID WC    insulin lispro  0-4 Units SubCUTAneous Nightly    miconazole   Topical BID    ampicillin-sulbactam  3,000 mg IntraVENous Q6H    linezolid  600 mg Oral 2 times per day    enoxaparin  60 mg SubCUTAneous Daily    ascorbic acid  500 mg Oral Daily    atorvastatin  20 mg Oral Nightly    vitamin D  50,000 Units Oral Weekly    levothyroxine  50 mcg Oral Daily    magnesium oxide  400 mg Oral Every Other Day    meclizine  25 mg Oral TID    metoprolol tartrate  25 mg Oral BID    therapeutic multivitamin-minerals  1 tablet Oral Daily    pantoprazole  40 mg Oral Daily    phenazopyridine  200 mg Oral Daily    collagenase   Topical Daily     Continuous Infusions:   dextrose       PRN Meds:.glucose, dextrose bolus **OR** dextrose bolus, glucagon (rDNA), dextrose, acetaminophen, ipratropium-albuterol, loperamide, polyethylene glycol      DATA: CBC:   Lab Results   Component Value Date/Time    WBC 6.8 09/26/2022 03:42 AM    RBC 3.28 09/26/2022 03:42 AM    HGB 9.1 09/26/2022 03:42 AM    HCT 29.6 09/26/2022 03:42 AM    MCV 90.2 09/26/2022 03:42 AM    MCH 27.7 09/26/2022 03:42 AM    MCHC 30.7 09/26/2022 03:42 AM    RDW 14.1 09/26/2022 03:42 AM     09/26/2022 03:42 AM    MPV 9.8 09/26/2022 03:42 AM     CMP:    Lab Results   Component Value Date/Time     09/27/2022 02:37 AM    K 3.5 09/27/2022 02:37 AM    K 6.2 09/21/2022 10:44 AM     09/27/2022 02:37 AM    CO2 22 09/27/2022 02:37 AM    BUN 22 09/27/2022 02:37 AM    CREATININE 2.2 09/27/2022 02:37 AM    GFRAA 35 09/27/2022 02:37 AM    LABGLOM 29 09/27/2022 02:37 AM    GLUCOSE 131 09/27/2022 02:37 AM    GLUCOSE 297 01/04/2012 03:00 AM    PROT 7.5 09/21/2022 10:44 AM    LABALBU 3.5 09/21/2022 10:44 AM    LABALBU 3.4 01/04/2012 03:00 AM    CALCIUM 9.1 09/27/2022 02:37 AM    BILITOT 0.3 09/21/2022 10:44 AM    ALKPHOS 97 09/21/2022 10:44 AM    AST 21 09/21/2022 10:44 AM    ALT 7 09/21/2022 10:44 AM     Magnesium:    Lab Results   Component Value Date/Time    MG 1.7 09/27/2022 02:37 AM     Phosphorus:    Lab Results   Component Value Date/Time    PHOS 3.2 09/24/2022 03:00 AM           BRIEF SUMMARY OF INITIAL CONSULT:    Briefly, Mr. Veronika Prado  is a 79-year-old male with a PMH of CKD stage IIIb, without proteinuria, baseline creatinine 2.0-2.1 mg/dL, secondary to nephrosclerosis, kidney cancer, s/p total left nephrectomy on 7/5/22 at [facility], hypertension, type II diabetes mellitus, HFpEF 55% with stage II DD, PAF, factor V Leyden deficiency, hyperlipidemia, sick sinus syndrome s/p pacemaker placement, CVA, hypothyroidism, PAF on chronic anticoagulation with warfarin, who was admitted on September 21, 2022 after a fall at home, his labs were significant for potassium 6.2 mmol/L, BUN 57 mg/dL, creatinine 2.8 mg/dL, reason for this consult.  His home medications include lasix, lisinopril    Resolved:    Hyperkalemia, 2/2 ACE inhibition, decrease GFR and on potassium supplementation,  improved with lokelma  JUAN on CKD, volume responsive pre-renal JUAN, 2/2 to overt diuresis, decrease oral intake in the setting of ACE inhibition. Resolved, renal function has returned to baseline, last creatinine 2.1 mg/dL. We will discontinue IV fluids. IMPRESSION/RECOMMENDATIONS:        CKD stage IV, without proteinuria, baseline creatinine 2.1-2.4 mg/dL, secondary to nephrosclerosis and solitary kidney. Renal function stable at baseline. Left kidney cancer, s/p total left nephrectomy 7/5/22 at [facility]  HTN, on metoprolol   HFpEF 55% with stage II DD, pro-, Lasix on hold  Hypomagnesemia, 2/2 diuretics, levels improved, to replace  -------------------------------------------------  S/p fall  Nonhealing right heel wound, arteriogram with possible intervention on 9/26 with Vascular, ID following, on linezolid and ampicillin-sulbactam  PAF, on metoprolol and warfarin  Sick sinus syndrome, s/p pacemaker placement   Type 2 DM, on metformin at home, on hold, SSI for now  HLD, on atorvastatin  Hypothyroidism, on levothyroxine  History of CVA  Normocytic anemia, ferritin 178, iron saturation 18%, folate 8.7, B12: 270     Plan:       Replace magnesium  Continue to hold Lasix  Continue to hold metformin  Continue to monitor renal function  Continue to monitor magnesium levels. Angio tomorrow.  Start mucomyst.

## 2022-09-27 NOTE — PROGRESS NOTES
Called and spoke with patients son, Lexii Chiu in regards to events this morning while choking. Updated him on the order for a video swallow to be obtained, the family understands the risks of not obtaining a video swallow and will refuse at this time. Patients previous diet order will be resubmitted at this time.

## 2022-09-27 NOTE — PROGRESS NOTES
Admit Date: 9/21/2022    Subjective:     Feels fine no complaint arteriogram postponed till Thursday due to high INR     Objective:     Patient Vitals for the past 8 hrs:   BP Temp Pulse Resp SpO2 Weight   09/27/22 0230 (!) 165/67 97.6 °F (36.4 °C) 60 16 95 % --   09/27/22 0030 -- -- -- -- -- 259 lb (117.5 kg)     I/O last 3 completed shifts: In: 10 [I.V.:10]  Out: 2100 [Urine:2100]  No intake/output data recorded. HEENT: Normal  NECK: Thyroid normal. No carotid bruit. No lymphphadenopathy. CVS: RRR  RS: Clear. No wheeze. No rhonchi. Good airflow bilaterally. ABD: Soft. Non tender. No mass. Normal BS. EXT: No edema. Non tender.  Dressing feet   NEURO: no focal deficit       Scheduled Meds:   miconazole nitrate   Topical BID    amLODIPine  5 mg Oral Daily    sodium chloride flush  5-40 mL IntraVENous BID    insulin lispro  0-4 Units SubCUTAneous TID WC    insulin lispro  0-4 Units SubCUTAneous Nightly    miconazole   Topical BID    ampicillin-sulbactam  3,000 mg IntraVENous Q6H    linezolid  600 mg Oral 2 times per day    enoxaparin  60 mg SubCUTAneous Daily    ascorbic acid  500 mg Oral Daily    atorvastatin  20 mg Oral Nightly    vitamin D  50,000 Units Oral Weekly    levothyroxine  50 mcg Oral Daily    magnesium oxide  400 mg Oral Every Other Day    meclizine  25 mg Oral TID    metoprolol tartrate  25 mg Oral BID    therapeutic multivitamin-minerals  1 tablet Oral Daily    pantoprazole  40 mg Oral Daily    phenazopyridine  200 mg Oral Daily    collagenase   Topical Daily     Continuous Infusions:   dextrose         CBC with Differential:    Lab Results   Component Value Date/Time    WBC 6.8 09/26/2022 03:42 AM    RBC 3.28 09/26/2022 03:42 AM    HGB 9.1 09/26/2022 03:42 AM    HCT 29.6 09/26/2022 03:42 AM     09/26/2022 03:42 AM    MCV 90.2 09/26/2022 03:42 AM    MCH 27.7 09/26/2022 03:42 AM    MCHC 30.7 09/26/2022 03:42 AM    RDW 14.1 09/26/2022 03:42 AM    SEGSPCT 80 01/04/2012 03:00 AM    BANDST 1 11/27/2014 04:50 AM    LYMPHOPCT 11.6 09/21/2022 10:44 AM    MONOPCT 7.5 09/21/2022 10:44 AM    MYELOPCT 0.9 02/28/2018 05:21 AM    BASOPCT 0.5 09/21/2022 10:44 AM    MONOSABS 0.69 09/21/2022 10:44 AM    LYMPHSABS 1.07 09/21/2022 10:44 AM    EOSABS 0.28 09/21/2022 10:44 AM    BASOSABS 0.05 09/21/2022 10:44 AM     CMP:    Lab Results   Component Value Date/Time     09/27/2022 02:37 AM    K 3.5 09/27/2022 02:37 AM    K 6.2 09/21/2022 10:44 AM     09/27/2022 02:37 AM    CO2 22 09/27/2022 02:37 AM    BUN 22 09/27/2022 02:37 AM    CREATININE 2.2 09/27/2022 02:37 AM    GFRAA 35 09/27/2022 02:37 AM    LABGLOM 29 09/27/2022 02:37 AM    PROT 7.5 09/21/2022 10:44 AM    LABALBU 3.5 09/21/2022 10:44 AM    LABALBU 3.4 01/04/2012 03:00 AM    CALCIUM 9.1 09/27/2022 02:37 AM    BILITOT 0.3 09/21/2022 10:44 AM    ALKPHOS 97 09/21/2022 10:44 AM    AST 21 09/21/2022 10:44 AM    ALT 7 09/21/2022 10:44 AM     PT/INR:    Lab Results   Component Value Date/Time    PROTIME 21.6 09/25/2022 05:45 PM    PROTIME 12.2 12/30/2011 10:20 PM    INR 2.0 09/25/2022 05:45 PM       Assessment:     Principal Problem:    JUAN on CKD improved   Hyperkalemia due to above and supplement resolved   Right heal wound infection   HTN  Obesity  DM  Impaired mobility   Fungal dermatitis   Remote CVA   PVD   Hypothyroid   Hypomagnesemia          Plan:   Continue same management

## 2022-09-27 NOTE — PLAN OF CARE
Problem: Skin/Tissue Integrity  Goal: Absence of new skin breakdown  Description: 1. Monitor for areas of redness and/or skin breakdown  2. Assess vascular access sites hourly  3. Every 4-6 hours minimum:  Change oxygen saturation probe site  4. Every 4-6 hours:  If on nasal continuous positive airway pressure, respiratory therapy assess nares and determine need for appliance change or resting period.   9/27/2022 0049 by Delores Griffin RN  Outcome: Progressing  9/26/2022 1350 by Parth Powell RN  Outcome: Progressing     Problem: Discharge Planning  Goal: Discharge to home or other facility with appropriate resources  9/27/2022 0049 by Delores Griffin RN  Outcome: Progressing  9/26/2022 1350 by Parth Powell RN  Outcome: Progressing     Problem: Safety - Adult  Goal: Free from fall injury  9/27/2022 0049 by Delores Griffin RN  Outcome: Progressing  9/26/2022 1350 by Parth Powell RN  Outcome: Progressing     Problem: Pain  Goal: Verbalizes/displays adequate comfort level or baseline comfort level  9/27/2022 0049 by Delores Griffin RN  Outcome: Progressing  9/26/2022 1350 by Parth Powell RN  Outcome: Progressing     Problem: Chronic Conditions and Co-morbidities  Goal: Patient's chronic conditions and co-morbidity symptoms are monitored and maintained or improved  Outcome: Progressing

## 2022-09-27 NOTE — PLAN OF CARE
Problem: Skin/Tissue Integrity  Goal: Absence of new skin breakdown  Description: 1. Monitor for areas of redness and/or skin breakdown  2. Assess vascular access sites hourly  3. Every 4-6 hours minimum:  Change oxygen saturation probe site  4. Every 4-6 hours:  If on nasal continuous positive airway pressure, respiratory therapy assess nares and determine need for appliance change or resting period.   Outcome: Progressing     Problem: Discharge Planning  Goal: Discharge to home or other facility with appropriate resources  Outcome: Progressing     Problem: Safety - Adult  Goal: Free from fall injury  Outcome: Progressing     Problem: Pain  Goal: Verbalizes/displays adequate comfort level or baseline comfort level  Outcome: Progressing     Problem: Chronic Conditions and Co-morbidities  Goal: Patient's chronic conditions and co-morbidity symptoms are monitored and maintained or improved  Outcome: Progressing     Problem: ABCDS Injury Assessment  Goal: Absence of physical injury  Outcome: Progressing

## 2022-09-27 NOTE — PROGRESS NOTES
Speech Language Pathology      NAME:  Denise Hagen  :  1945  DATE: 2022  ROOM:  [address] [place] [address]      Order received for video swallow eval due to RN report of coughing with breakfast. RN not available at this time to discuss. SLP familiar with this pt from last admit and coughing with meals is not new. Thickened liquids and a video swallow were recommended during last admit. Family agreed to thickened liquids, however declined video at that time. Pt was discharged to rehab on thickener, however while there was changed to back to thin liquids. Unclear if due to improvement or signed waiver. Chest radiograph was clear on admit and pt on room air during clinical evaluation. Recommend RN/physician discuss video swallow with family for consent prior to completing. Chest radiograph also ordered today by physician. SLP will review results. Hyperkalemia [E87.5]  Dizziness [R42]  Elevated troponin [R77.8]  Acute kidney injury superimposed on chronic kidney disease ([facility] [address] [N17.9, N18.9]        Devin ARCHULETA CCC/SLP [id number]  Speech-Language Pathologist

## 2022-09-27 NOTE — PROGRESS NOTES
Messaged Dr. Emeterio Mccain in regards to patient coughing will eating breakfast. New orders received.

## 2022-09-27 NOTE — PROGRESS NOTES
Physical Therapy  Facility/Department: [facility]  INTERMED[facility]ATE [facility]  NAME: Blaine Wynn  : 1945  MRN: [id number]    Chart reviewed and PT treatment attempted this am.  Pt kindly declined at this time. Will check back at later time/date.      Arvin Noguera, [phone]

## 2022-09-27 NOTE — PROGRESS NOTES
[address] [place] [address] [facility]  Progress Note    SUBJECTIVE:  Chief Complaint   Patient presents with    Tia Amour over to pick something up and felt like the room was spinning. -LOC, +head, +thinners. Abrasion forehead. Dizziness     Sitting up in bed,  Reports choking on banana this am  No fevers  Tolerating antibiotics      Review of systems:  As stated above in the chief complaint, otherwise negative. Medications:  Scheduled Meds:   miconazole nitrate   Topical BID    amLODIPine  5 mg Oral Daily    sodium chloride flush  5-40 mL IntraVENous BID    insulin lispro  0-4 Units SubCUTAneous TID WC    insulin lispro  0-4 Units SubCUTAneous Nightly    miconazole   Topical BID    ampicillin-sulbactam  3,000 mg IntraVENous Q6H    linezolid  600 mg Oral 2 times per day    enoxaparin  60 mg SubCUTAneous Daily    ascorbic acid  500 mg Oral Daily    atorvastatin  20 mg Oral Nightly    vitamin D  50,000 Units Oral Weekly    levothyroxine  50 mcg Oral Daily    magnesium oxide  400 mg Oral Every Other Day    meclizine  25 mg Oral TID    metoprolol tartrate  25 mg Oral BID    therapeutic multivitamin-minerals  1 tablet Oral Daily    pantoprazole  40 mg Oral Daily    phenazopyridine  200 mg Oral Daily    collagenase   Topical Daily     Continuous Infusions:   dextrose       PRN Meds:glucose, dextrose bolus **OR** dextrose bolus, glucagon (rDNA), dextrose, acetaminophen, ipratropium-albuterol, loperamide, polyethylene glycol    OBJECTIVE:  /89   Pulse 65   Temp 98 °F (36.7 °C) (Oral)   Resp 16   Ht 6' 2\" (1.88 m)   Wt 259 lb (117.5 kg)   SpO2 94%   BMI 33.25 kg/m²   Temp  Av.8 °F (36.6 °C)  Min: 97.6 °F (36.4 °C)  Max: 98.1 °F (36.7 °C)  Constitutional: The patient is sitting up in bed, just finished lunch. Awake, alert. Skin: Warm and dry. No rashes were noted. HEENT: Round and reactive pupils. Moist mucous membranes. No ulcerations or thrush. Neck: Supple to movements. Chest: No use of accessory muscles to breathe. Symmetrical expansion. No wheezing, crackles or rhonchi. Cardiovascular: S1 and S2 are rhythmic and regular. No murmurs appreciated. Abdomen: Positive bowel sounds to auscultation. Benign to palpation. No masses felt. Extremities: right foot is dressed. Unstageable necrotic eschar over the right heel. Ulcer dorsum of the right foot.   Lines: peripheral    Laboratory and Tests Review:  Lab Results   Component Value Date    WBC 6.8 09/26/2022    WBC 6.6 09/24/2022    WBC 9.3 09/21/2022    HGB 9.1 (L) 09/26/2022    HCT 29.6 (L) 09/26/2022    MCV 90.2 09/26/2022     09/26/2022     Lab Results   Component Value Date    NEUTROABS 7.13 09/21/2022    NEUTROABS 4.66 08/22/2022    NEUTROABS 4.96 08/16/2022     No results found for: [facility]  Lab Results   Component Value Date    ALT 7 09/21/2022    AST 21 09/21/2022    ALKPHOS 97 09/21/2022    BILITOT 0.3 09/21/2022     Lab Results   Component Value Date/Time     09/27/2022 02:37 AM    K 3.5 09/27/2022 02:37 AM    K 6.2 09/21/2022 10:44 AM     09/27/2022 02:37 AM    CO2 22 09/27/2022 02:37 AM    BUN 22 09/27/2022 02:37 AM    CREATININE 2.2 09/27/2022 02:37 AM    CREATININE 2.0 09/26/2022 03:42 AM    CREATININE 2.1 09/25/2022 02:48 AM    GFRAA 35 09/27/2022 02:37 AM    LABGLOM 29 09/27/2022 02:37 AM    GLUCOSE 131 09/27/2022 02:37 AM    GLUCOSE 297 01/04/2012 03:00 AM    PROT 7.5 09/21/2022 10:44 AM    LABALBU 3.5 09/21/2022 10:44 AM    LABALBU 3.4 01/04/2012 03:00 AM    CALCIUM 9.1 09/27/2022 02:37 AM    BILITOT 0.3 09/21/2022 10:44 AM    ALKPHOS 97 09/21/2022 10:44 AM    AST 21 09/21/2022 10:44 AM    ALT 7 09/21/2022 10:44 AM     Lab Results   Component Value Date    CRP 8.5 (H) 09/22/2022    CRP 7.2 (H) 08/01/2022    CRP 4.9 (H) 10/03/2018     Lab Results   Component Value Date    SEDRATE 93 (H) 09/22/2022    SEDRATE 87 (H) 08/01/2022    SEDRATE 77 (H) 10/03/2018     Radiology:      Microbiology:  Wound culture 9/22/2022: Proteus mirabilis, E.coli, MRSA    ASSESSMENT:  Right heel decubitus ulcer  Right heel wound infection with cellulitis. Previous cultures in August grew Proteus mirabilis, MRSA, Streptococcus agalactiae and Corynebacterium    PLAN:  Continue Unasyn and Linezolid  Arteriogram was cancelled today - unsure of timing   Podiatry plans pending arteriogram-to be done on thursday  May need PICC for IV antibiotics - depending on the vascular and podiatry intervention  Check final cultures  We will follow with you    MELANIE Manzo - CNP  10:02 AM  9/27/2022    Pt seen and examined. Above discussed agree with advanced practice nurse. Labs, cultures, and radiographs reviewed. Face to Face encounter occurred. Changes made as necessary.      Miguel Ritter MD

## 2022-09-27 NOTE — PROGRESS NOTES
Department of Podiatry   Progress Note      HISTORY OF PRESENT ILLNESS:     67 y.o. male  followed by podiatry for chronic painful Right heel wound with eschar. Reports that he choked while eating a banana earlier this morning. OtherwiseNo acute overnight events. Pt has no pedal questions or additional complaints at this time. 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[address]     Femoral-popliteal atherosclerosis ([address] 9/13/2022    Hypertension     Ischemic stroke ([address] 03/06/2018    Pacemaker     Paraplegia ([address]     Sinus node dysfunction ([address]     Stroke (cerebrum) ([address] 02/27/2018    Thyroid disease     Ulcer of right heel and midfoot with fat layer exposed ([address] 8/15/2022       Past Surgical History:        Procedure Laterality Date    CARDIAC PACEMAKER PLACEMENT  12/19/2014    COLONOSCOPY      EYE SURGERY      KNEE ARTHROSCOPY  right knee    PACEMAKER PLACEMENT      [place] COLONOSCOPY FLX DX W/COLLJ SPEC WHEN PFRMD N/A 3/20/2018    COLONOSCOPY DIAGNOSTIC performed by Monique Rangel MD at [address] EGD PERCUTANEOUS PLACEMENT GASTROSTOMY TUBE N/A 3/29/2018    PEG TUBE/PT.  IN 5507 B performed by Monique Rangel MD at [address] EGD PERCUTANEOUS PLACEMENT GASTROSTOMY TUBE N/A 9/12/2018    EGD PEG TUBE PLACEMENT performed by Monique Rangel MD at [address] [place] [address]       Medications Prior to Admission:    Medications Prior to Admission: meclizine (ANTIVERT) 25 MG tablet, Take 25 mg by mouth three times daily  warfarin (COUMADIN) 6 MG tablet, Take 6 mg by mouth daily  Glucagon HCl (GLUCAGON EMERGENCY) 1 MG/ML SOLR, Inject 1 mg as directed as needed (HYPOGLYCEMIA)  glucose (GLUTOSE) 40 % GEL, Take 15 g by mouth as needed (HYPOGLYCEMIA)  ipratropium-albuterol (DUONEB) 0.5-2.5 (3) MG/3ML SOLN nebulizer solution, Inhale 1 vial into the lungs every 4 hours as needed (COUGHINGAND DYSPNEA)  potassium chloride (KLOR-CON M) 20 MEQ extended release tablet, Take 20 mEq by mouth daily  [] enoxaparin (LOVENOX) 60 MG/0.6ML, Inject 60 mg into the skin daily  polyethylene glycol (GLYCOLAX) 17 g packet, Take 17 g by mouth daily as needed for Constipation  phenazopyridine (PYRIDIUM) 200 MG tablet, Take 200 mg by mouth daily  dextromethorphan-guaiFENesin (ROBITUSSIN-DM)  MG/5ML syrup, Take 5 mLs by mouth every 6 hours as needed for Cough  Cholecalciferol (VITAMIN D3) 1.25 MG (24505 UT) CAPS, Take 50,000 Units by mouth once a week ****  loperamide (IMODIUM) 2 MG capsule, Take 2 mg by mouth daily as needed for Diarrhea  Multiple Vitamins-Minerals (THERAPEUTIC MULTIVITAMIN-MINERALS) tablet, Take 1 tablet by mouth daily  gabapentin (NEURONTIN) 300 MG capsule, Take 1 capsule by mouth nightly for 7 days. levothyroxine (SYNTHROID) 50 MCG tablet, Take 1 tablet by mouth in the morning. atorvastatin (LIPITOR) 20 MG tablet, Take 1 tablet by mouth nightly  magnesium oxide (MAG-OX) 400 (240 Mg) MG tablet, Take 1 tablet by mouth every other day  ascorbic acid (VITAMIN C) 500 MG tablet, Take 1 tablet by mouth in the morning.   acetaminophen (TYLENOL) 325 MG tablet, Take 650 mg by mouth every 4 hours as needed (DISCOMFORT;PAIN/ELEV TEMP >100.4F)  pantoprazole (PROTONIX) 40 MG tablet, Take 40 mg by mouth daily  amLODIPine (NORVASC) 2.5 MG tablet, Take 1 tablet by mouth daily  metoprolol tartrate (LOPRESSOR) 25 MG tablet, Take 1 tablet by mouth 2 times daily  metFORMIN (GLUCOPHAGE) 1000 MG tablet, Take 1 tablet by mouth 2 times daily  omeprazole (PRILOSEC) 20 MG delayed release capsule, Take 1 capsule by mouth daily  furosemide (LASIX) 40 MG tablet, Take 0.5 tablets by mouth daily    Allergies:  Bee venom    Social History:   TOBACCO:   reports that he quit smoking about 20 years ago. His smoking use included cigars. He has never used smokeless tobacco.  ETOH:   reports no history of alcohol use. DRUGS:   Social History     Substance and Sexual Activity   Drug Use No       Family History:       Problem Relation Age of Onset    Heart Disease Mother     Stroke Father          REVIEW OF SYSTEMS:    All pertinent review of systems both positive and negative discussed in HPI      LOWER EXTREMITY EXAMINATION   - Previous physical exam results below:    VASCULAR:  DP and PT pulses weakly palpable 1+ to the bilateral LE. 1-2+ pitting edema appreciated as well. NEUROLOGIC:  Protective sensation diminished to distal aspect of bilateral lower extremities. DERM:  Right pressure heel ulceration with overlying eschar noted. Fat layer exposed. No clinical signs of infection. Does not probe deep at this time. Measures roughly 8 cm x 9 cm x unknown depth. No malodor. Little drainage. Superficial wound noted to patient's anterior Right ankle as well. Not clinically infected    MUSCULOSKELETAL: Tenderness to palpation of the right heel wound. Negative TTP of bilateral calves at this time.  MSK strength testing deferred this afternoon        CONSULTS:  IP CONSULT TO INTERNAL MEDICINE  IP CONSULT TO PODIATRY  IP CONSULT TO IV TEAM  IP CONSULT TO VASCULAR SURGERY  IP CONSULT TO INFECTIOUS DISEASES  IP CONSULT TO NEPHROLOGY    MEDICATION:  Scheduled Meds:   miconazole nitrate   Topical BID    amLODIPine  5 mg Oral Daily    sodium chloride flush  5-40 mL IntraVENous BID    insulin lispro  0-4 Units SubCUTAneous TID WC    insulin lispro  0-4 Units SubCUTAneous Nightly    miconazole   Topical BID    ampicillin-sulbactam  3,000 mg IntraVENous Q6H    linezolid  600 mg Oral 2 times per day    enoxaparin  60 mg SubCUTAneous Daily    ascorbic acid  500 mg Oral Daily    atorvastatin  20 mg Oral Nightly    vitamin D  50,000 Units Oral Weekly    levothyroxine  50 mcg Oral Daily is no acute compression fracture or subluxation of the cervical   spine. 2. Advanced multilevel degenerative disc and degenerative joint disease. FL MODIFIED BARIUM SWALLOW W VIDEO    (Results Pending)       Vitals:    /89   Pulse 65   Temp 98 °F (36.7 °C) (Oral)   Resp 16   Ht 6' 2\" (1.88 m)   Wt 259 lb (117.5 kg)   SpO2 94%   BMI 33.25 kg/m²     LABS:   Recent Labs     09/24/22  1103 09/26/22  0342   WBC 6.6 6.8   HGB 9.7* 9.1*   HCT 31.1* 29.6*    282       Recent Labs     09/27/22  0237      K 3.5      CO2 22   BUN 22   CREATININE 2.2*       Recent Labs     09/25/22  1745   INR 2.0         ASSESSMENTS:   Principal Problem:  - Chronic Right heel diabetic ulceration with eschar   - Anterior right ankle diabetic wound  - Type II diabetic     Hyperkalemia  - Recent fall with dizziness (chief complaint)         PLAN:  - Patient seen and evaluated bedside  - Charts and labs reviewed   -WBC: 6.8  - Cultures : from 8/24 grew Proteus mirabilis, MRSA, and Corynebacterium.  - Abx: Unasyn and Linezolid   - Prevalon offloading boots applied to the bilateral lower extremities  - Dressing: Santyl DSD applied to patient's right heel. QOD dressing changes. Left intact today. Next change tomorrow 9/28  CT right foot- No CT evidence of osteomyelitis. - Podiatry will monitor patient while in house    - Clear for discharge from podiatry perspective   - Discussed patient with Dr. Wai Tovar  - Will continue to follow patient while they are in-house. Thank you for the opportunity to take part in the patient's care. Please do not hesitate to call for any questions or concerns.

## 2022-09-28 ENCOUNTER — HOSPITAL ENCOUNTER (OUTPATIENT)
Dept: WOUND CARE | Age: 77
Discharge: HOME | End: 2022-09-28

## 2022-09-28 LAB
ANION GAP SERPL CALCULATED.3IONS-SCNC: 12 MMOL/L (ref 7–16)
APTT: 30.5 SEC (ref 24.5–35.1)
BUN BLDV-MCNC: 24 MG/DL (ref 6–23)
CALCIUM SERPL-MCNC: 9.1 MG/DL (ref 8.6–10.2)
CHLORIDE BLD-SCNC: 106 MMOL/L (ref 98–107)
CO2: 21 MMOL/L (ref 22–29)
CREAT SERPL-MCNC: 2.3 MG/DL (ref 0.7–1.2)
GFR AFRICAN AMERICAN: 33
GFR NON-AFRICAN AMERICAN: 28 ML/MIN/1.73
GLUCOSE BLD-MCNC: 139 MG/DL (ref 74–99)
INR BLD: 1.5
METER GLUCOSE: 127 MG/DL (ref 74–99)
METER GLUCOSE: 154 MG/DL (ref 74–99)
METER GLUCOSE: 154 MG/DL (ref 74–99)
METER GLUCOSE: 167 MG/DL (ref 74–99)
POTASSIUM SERPL-SCNC: 3.2 MMOL/L (ref 3.5–5)
PROTHROMBIN TIME: 15.8 SEC (ref 9.3–12.4)
SODIUM BLD-SCNC: 139 MMOL/L (ref 132–146)

## 2022-09-28 PROCEDURE — 2580000003 HC RX 258: Performed by: SPECIALIST

## 2022-09-28 PROCEDURE — 99232 SBSQ HOSP IP/OBS MODERATE 35: CPT | Performed by: INTERNAL MEDICINE

## 2022-09-28 PROCEDURE — 6370000000 HC RX 637 (ALT 250 FOR IP): Performed by: INTERNAL MEDICINE

## 2022-09-28 PROCEDURE — 36415 COLL VENOUS BLD VENIPUNCTURE: CPT

## 2022-09-28 PROCEDURE — 80048 BASIC METABOLIC PNL TOTAL CA: CPT

## 2022-09-28 PROCEDURE — 6360000002 HC RX W HCPCS: Performed by: SPECIALIST

## 2022-09-28 PROCEDURE — 97530 THERAPEUTIC ACTIVITIES: CPT

## 2022-09-28 PROCEDURE — 82962 GLUCOSE BLOOD TEST: CPT

## 2022-09-28 PROCEDURE — 6370000000 HC RX 637 (ALT 250 FOR IP): Performed by: SPECIALIST

## 2022-09-28 PROCEDURE — 85730 THROMBOPLASTIN TIME PARTIAL: CPT

## 2022-09-28 PROCEDURE — 6360000002 HC RX W HCPCS: Performed by: PHYSICIAN ASSISTANT

## 2022-09-28 PROCEDURE — 2580000003 HC RX 258: Performed by: INTERNAL MEDICINE

## 2022-09-28 PROCEDURE — 2060000000 HC ICU INTERMEDIATE R&B

## 2022-09-28 PROCEDURE — 85610 PROTHROMBIN TIME: CPT

## 2022-09-28 RX ORDER — POTASSIUM CHLORIDE 20 MEQ/1
40 TABLET, EXTENDED RELEASE ORAL ONCE
Status: COMPLETED | OUTPATIENT
Start: 2022-09-28 | End: 2022-09-28

## 2022-09-28 RX ADMIN — MECLIZINE 25 MG: 12.5 TABLET ORAL at 20:44

## 2022-09-28 RX ADMIN — LEVOTHYROXINE SODIUM 50 MCG: 0.05 TABLET ORAL at 05:51

## 2022-09-28 RX ADMIN — ENOXAPARIN SODIUM 60 MG: 100 INJECTION SUBCUTANEOUS at 08:14

## 2022-09-28 RX ADMIN — AMLODIPINE BESYLATE 5 MG: 5 TABLET ORAL at 08:14

## 2022-09-28 RX ADMIN — METOPROLOL TARTRATE 25 MG: 25 TABLET, FILM COATED ORAL at 08:14

## 2022-09-28 RX ADMIN — METOPROLOL TARTRATE 25 MG: 25 TABLET, FILM COATED ORAL at 20:44

## 2022-09-28 RX ADMIN — MICONAZOLE NITRATE: 20 POWDER TOPICAL at 20:44

## 2022-09-28 RX ADMIN — AMPICILLIN SODIUM AND SULBACTAM SODIUM 3000 MG: 2; 1 INJECTION, POWDER, FOR SOLUTION INTRAMUSCULAR; INTRAVENOUS at 02:53

## 2022-09-28 RX ADMIN — PANTOPRAZOLE SODIUM 40 MG: 40 TABLET, DELAYED RELEASE ORAL at 08:14

## 2022-09-28 RX ADMIN — MECLIZINE 25 MG: 12.5 TABLET ORAL at 14:41

## 2022-09-28 RX ADMIN — AMPICILLIN SODIUM AND SULBACTAM SODIUM 3000 MG: 2; 1 INJECTION, POWDER, FOR SOLUTION INTRAMUSCULAR; INTRAVENOUS at 08:22

## 2022-09-28 RX ADMIN — OXYCODONE HYDROCHLORIDE AND ACETAMINOPHEN 500 MG: 500 TABLET ORAL at 08:14

## 2022-09-28 RX ADMIN — LINEZOLID 600 MG: 600 TABLET, FILM COATED ORAL at 20:44

## 2022-09-28 RX ADMIN — SODIUM CHLORIDE, PRESERVATIVE FREE 10 ML: 5 INJECTION INTRAVENOUS at 08:16

## 2022-09-28 RX ADMIN — AMPICILLIN SODIUM AND SULBACTAM SODIUM 3000 MG: 2; 1 INJECTION, POWDER, FOR SOLUTION INTRAMUSCULAR; INTRAVENOUS at 20:43

## 2022-09-28 RX ADMIN — MICONAZOLE NITRATE: 2 OINTMENT TOPICAL at 08:15

## 2022-09-28 RX ADMIN — SODIUM CHLORIDE, PRESERVATIVE FREE 10 ML: 5 INJECTION INTRAVENOUS at 20:44

## 2022-09-28 RX ADMIN — ATORVASTATIN CALCIUM 20 MG: 20 TABLET, FILM COATED ORAL at 20:44

## 2022-09-28 RX ADMIN — MECLIZINE 25 MG: 12.5 TABLET ORAL at 08:14

## 2022-09-28 RX ADMIN — LINEZOLID 600 MG: 600 TABLET, FILM COATED ORAL at 08:14

## 2022-09-28 RX ADMIN — MULTIPLE VITAMINS W/ MINERALS TAB 1 TABLET: TAB at 08:14

## 2022-09-28 RX ADMIN — AMPICILLIN SODIUM AND SULBACTAM SODIUM 3000 MG: 2; 1 INJECTION, POWDER, FOR SOLUTION INTRAMUSCULAR; INTRAVENOUS at 14:44

## 2022-09-28 RX ADMIN — MICONAZOLE NITRATE: 2 OINTMENT TOPICAL at 20:44

## 2022-09-28 RX ADMIN — Medication 400 MG: at 08:14

## 2022-09-28 RX ADMIN — ACETYLCYSTEINE 600 MG: 200 SOLUTION ORAL; RESPIRATORY (INHALATION) at 20:44

## 2022-09-28 RX ADMIN — ACETYLCYSTEINE 600 MG: 200 SOLUTION ORAL; RESPIRATORY (INHALATION) at 08:16

## 2022-09-28 RX ADMIN — PHENAZOPYRIDINE 200 MG: 100 TABLET ORAL at 08:14

## 2022-09-28 RX ADMIN — MICONAZOLE NITRATE: 20 POWDER TOPICAL at 08:14

## 2022-09-28 RX ADMIN — POTASSIUM CHLORIDE 40 MEQ: 1500 TABLET, EXTENDED RELEASE ORAL at 08:14

## 2022-09-28 ASSESSMENT — PAIN SCALES - GENERAL
PAINLEVEL_OUTOF10: 0

## 2022-09-28 NOTE — PROGRESS NOTES
Physical Therapy  Facility/Department: [address] INTERMEDIATE 1  Daily Treatment Note  NAME: Yimi Dennis  : 1945  MRN: [id number]    Date of Service: 2022    Patient Diagnosis(es): The primary encounter diagnosis was Hyperkalemia. Diagnoses of Acute kidney injury superimposed on chronic kidney disease ([address] Elevated troponin, and Dizziness were also pertinent to this visit. Evaluating Therapist: Collette Heymann PT     Room #:  6449/8228-D  Diagnosis:  Hyperkalemia [E87.5]  Dizziness [R42]  Elevated troponin [R77.8]  Acute kidney injury superimposed on chronic kidney disease (HCC) [N17.9, N18.9]  PMHx/PSHx:  CA, Afib, CVA  Precautions:  falls, alarm, Per OT note PT NWB R foot (no info in chart) nursing to clarify WB, contact isolation     Social:  Pt admitted from [place] [address]. Was transferring to wheelchair with assist of 2. Has post op shoe for L and offloading shoe for R. Initial Evaluation  Date: 22 Treatment      Short Term/ Long Term   Goals   Was pt agreeable to Eval/treatment? yes  yes     Does pt have pain?    back pain     Bed Mobility  Rolling: min assist  Supine to sit: min assist  Sit to supine: NT  Scooting: min assist  rolling: SBA  Supine to sit:  Min A  Sit to supine:  Min A  Scooting: Mod A along the side of the bed SBA   Transfers Sit to stand: max assist of 2  Stand to sit: max assist of 2  Scoot bed to chair: max assist of 2.  Difficulty maintaining NWB  NT Mod assist of 2   Ambulation    NT  NT N/A   Stair Negotiation  Ascended and descended  NT  NT  N/A   LE strength     3/5     4-/5   balance      Good sitting fair standing  sitting EOB:  SBA     AM-PAC Raw score               10/24  10/24        Patient education  Pt educated on PT objectives during treatment session, NWB right LE    Patient response to education:   Pt verbalized understanding Pt demonstrated skill Pt requires further education in this area   yes With cueing yes     ASSESSMENT:    Comments:  Pt found and left in bed with call light in reach and bed alarm on. Treatment:  Patient practiced and was instructed in the following treatment:   Functional mobility performed as documented above. Pt reported feeling dizzy once sitting EOB which decreased with seated rest.  LAQs and ankle pumps performed while sitting EOB. Cueing required for NWB right LE when side scooting along the side of the bed. Pt sat EOB 5 minutes. PLAN:    Patient is making fair progress towards established goals. Will continue with current POC.      Time in  1005  Time out  1018    Total Treatment Time  13 minutes     CPT codes:    [x] Therapeutic activities 02537 13 minutes  [] Therapeutic exercises 93958  minutes      Licha Singh, [address]

## 2022-09-28 NOTE — PROGRESS NOTES
Department of Podiatry   Progress Note      HISTORY OF PRESENT ILLNESS:     67 y.o. male  followed by podiatry for chronic painful Right heel wound with eschar. Doing well this am, no acute overnight events. He states that his right heel is feeling better. Pt has no pedal questions or additional complaints at this time. 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[address]     Femoral-popliteal atherosclerosis ([address] 9/13/2022    Hypertension     Ischemic stroke ([address] 03/06/2018    Pacemaker     Paraplegia ([address]     Sinus node dysfunction ([address]     Stroke (cerebrum) ([address] 02/27/2018    Thyroid disease     Ulcer of right heel and midfoot with fat layer exposed ([address] 8/15/2022       Past Surgical History:        Procedure Laterality Date    CARDIAC PACEMAKER PLACEMENT  12/19/2014    COLONOSCOPY      EYE SURGERY      KNEE ARTHROSCOPY  right knee    PACEMAKER PLACEMENT      [place] COLONOSCOPY FLX DX W/COLLJ SPEC WHEN PFRMD N/A 3/20/2018    COLONOSCOPY DIAGNOSTIC performed by Sally Tillman MD at [address] EGD PERCUTANEOUS PLACEMENT GASTROSTOMY TUBE N/A 3/29/2018    PEG TUBE/PT.  IN 5507 B performed by Sally Tillman MD at [address] EGD PERCUTANEOUS PLACEMENT GASTROSTOMY TUBE N/A 9/12/2018    EGD PEG TUBE PLACEMENT performed by Sally Tillman MD at [address]       Medications Prior to Admission:    Medications Prior to Admission: meclizine (ANTIVERT) 25 MG tablet, Take 25 mg by mouth three times daily  warfarin (COUMADIN) 6 MG tablet, Take 6 mg by mouth daily  Glucagon HCl (GLUCAGON EMERGENCY) 1 MG/ML SOLR, Inject 1 mg as directed as needed (HYPOGLYCEMIA)  glucose (GLUTOSE) 40 % GEL, Take 15 g by mouth as needed (HYPOGLYCEMIA)  ipratropium-albuterol (DUONEB) 0.5-2.5 (3) MG/3ML SOLN nebulizer solution, Inhale 1 vial into the lungs every 4 hours as needed (COUGHINGAND DYSPNEA)  potassium chloride (KLOR-CON M) 20 MEQ extended release tablet, Take 20 mEq by mouth daily  [] enoxaparin (LOVENOX) 60 MG/0.6ML, Inject 60 mg into the skin daily  polyethylene glycol (GLYCOLAX) 17 g packet, Take 17 g by mouth daily as needed for Constipation  phenazopyridine (PYRIDIUM) 200 MG tablet, Take 200 mg by mouth daily  dextromethorphan-guaiFENesin (ROBITUSSIN-DM)  MG/5ML syrup, Take 5 mLs by mouth every 6 hours as needed for Cough  Cholecalciferol (VITAMIN D3) 1.25 MG (04809 UT) CAPS, Take 50,000 Units by mouth once a week ****  loperamide (IMODIUM) 2 MG capsule, Take 2 mg by mouth daily as needed for Diarrhea  Multiple Vitamins-Minerals (THERAPEUTIC MULTIVITAMIN-MINERALS) tablet, Take 1 tablet by mouth daily  gabapentin (NEURONTIN) 300 MG capsule, Take 1 capsule by mouth nightly for 7 days. levothyroxine (SYNTHROID) 50 MCG tablet, Take 1 tablet by mouth in the morning. atorvastatin (LIPITOR) 20 MG tablet, Take 1 tablet by mouth nightly  magnesium oxide (MAG-OX) 400 (240 Mg) MG tablet, Take 1 tablet by mouth every other day  ascorbic acid (VITAMIN C) 500 MG tablet, Take 1 tablet by mouth in the morning.   acetaminophen (TYLENOL) 325 MG tablet, Take 650 mg by mouth every 4 hours as needed (DISCOMFORT;PAIN/ELEV TEMP >100.4F)  pantoprazole (PROTONIX) 40 MG tablet, Take 40 mg by mouth daily  amLODIPine (NORVASC) 2.5 MG tablet, Take 1 tablet by mouth daily  metoprolol tartrate (LOPRESSOR) 25 MG tablet, Take 1 tablet by mouth 2 times daily  metFORMIN (GLUCOPHAGE) 1000 MG tablet, Take 1 tablet by mouth 2 times daily  omeprazole (PRILOSEC) 20 MG delayed release capsule, Take 1 capsule by mouth daily  furosemide (LASIX) 40 MG tablet, Take 0.5 tablets by mouth daily    Allergies:  Bee venom    Social History:   TOBACCO:   reports that he quit smoking about 20 years ago. His smoking use included cigars. He has never used smokeless tobacco.  ETOH:   reports no history of alcohol use. DRUGS:   Social History     Substance and Sexual Activity   Drug Use No       Family History:       Problem Relation Age of Onset    Heart Disease Mother     Stroke Father          REVIEW OF SYSTEMS:    All pertinent review of systems both positive and negative discussed in HPI      LOWER EXTREMITY EXAMINATION   - Previous physical exam results below:    VASCULAR:  DP and PT pulses weakly palpable 1+ to the bilateral LE. 1-2+ pitting edema appreciated as well. NEUROLOGIC:  Protective sensation diminished to distal aspect of bilateral lower extremities. DERM:  Right pressure heel ulceration with overlying eschar noted. Fat layer exposed. No clinical signs of infection. Does not probe deep at this time. Measures roughly 8 cm x 9 cm x unknown depth. No malodor. Little drainage. Superficial wound noted to patient's anterior Right ankle as well. Not clinically infected    MUSCULOSKELETAL: Tenderness to palpation of the right heel wound. Negative TTP of bilateral calves at this time. MSK strength testing deferred this afternoon          Media Information  Document Information    Clinical Consent:  Wound      09/28/2022 07:54   Attached To:    Hospital Encounter on 9/21/22     Source Information    Amna Melendez MD  [address] Intermediate       CONSULTS:  IP CONSULT TO INTERNAL MEDICINE  IP CONSULT TO PODIATRY  IP CONSULT TO IV TEAM  IP CONSULT TO VASCULAR SURGERY  IP CONSULT TO INFECTIOUS DISEASES  IP CONSULT TO NEPHROLOGY    MEDICATION:  Scheduled Meds:   potassium chloride  40 mEq Oral Once    acetylcysteine  600 mg Oral BID    miconazole nitrate   Topical BID    amLODIPine  5 mg Oral Daily    sodium chloride flush  5-40 mL IntraVENous BID    insulin lispro  0-4 Units SubCUTAneous TID WC    insulin lispro  0-4 Units SubCUTAneous Nightly    miconazole Topical BID    ampicillin-sulbactam  3,000 mg IntraVENous Q6H    linezolid  600 mg Oral 2 times per day    enoxaparin  60 mg SubCUTAneous Daily    ascorbic acid  500 mg Oral Daily    atorvastatin  20 mg Oral Nightly    vitamin D  50,000 Units Oral Weekly    levothyroxine  50 mcg Oral Daily    magnesium oxide  400 mg Oral Every Other Day    meclizine  25 mg Oral TID    metoprolol tartrate  25 mg Oral BID    therapeutic multivitamin-minerals  1 tablet Oral Daily    pantoprazole  40 mg Oral Daily    phenazopyridine  200 mg Oral Daily    collagenase   Topical Daily     Continuous Infusions:   dextrose       PRN Meds:.glucose, dextrose bolus **OR** dextrose bolus, glucagon (rDNA), dextrose, acetaminophen, ipratropium-albuterol, loperamide, polyethylene glycol    RADIOLOGY:  XR CHEST PORTABLE   Final Result   No acute process. CT ANKLE RIGHT WO CONTRAST   Final Result   This report is for right foot and right ankle. No CT evidence of osteomyelitis. MRI could better evaluate for osteomyelitis   if clinically indicated. Mild generalized subcutaneous edema/skin thickening. No drainable abscess,   allowing for limitations of noncontrast technique. Other incidental findings as above. CT FOOT RIGHT WO CONTRAST   Final Result   This report is for right foot and right ankle. No CT evidence of osteomyelitis. MRI could better evaluate for osteomyelitis   if clinically indicated. Mild generalized subcutaneous edema/skin thickening. No drainable abscess,   allowing for limitations of noncontrast technique. Other incidental findings as above. XR FOOT RIGHT (MIN 3 VIEWS)   Final Result   1. Surface irregularity and edema about the right calcaneus. No definite   evidence of osseous destruction or erosion at time. 2.  Minimal right large toe metatarsal phalangeal joint osteoarthritis. Calcaneal enthesophytes.          XR CHEST PORTABLE   Final Result   No acute process CT Head WO Contrast   Final Result   1. There is no acute intracranial abnormality. Specifically, there is no   intracranial hemorrhage. 2. Atrophy and periventricular leukomalacia,   3. Old left occipital lobe infarct         CT Cervical Spine WO Contrast   Final Result   1. There is no acute compression fracture or subluxation of the cervical   spine. 2. Advanced multilevel degenerative disc and degenerative joint disease. 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  - Abx: Unasyn and Linezolid   - Prevalon offloading boots applied to the bilateral lower extremities  - Dressing: Santyl DSD applied to patient's right heel. QOD dressing changes. Left intact today. Next change tomorrow 9/28  CT right foot- No CT evidence of osteomyelitis. - Podiatry will monitor patient while in house  - Arteriogram tomorrow planned with vascular team  - Discussed patient with Dr. Claribel Campbell  - Will continue to follow patient while they are in-house. Thank you for the opportunity to take part in the patient's care. Please do not hesitate to call for any questions or concerns.

## 2022-09-28 NOTE — PLAN OF CARE
Problem: Skin/Tissue Integrity  Goal: Absence of new skin breakdown  Description: 1. Monitor for areas of redness and/or skin breakdown  2. Assess vascular access sites hourly  3. Every 4-6 hours minimum:  Change oxygen saturation probe site  4. Every 4-6 hours:  If on nasal continuous positive airway pressure, respiratory therapy assess nares and determine need for appliance change or resting period.   9/27/2022 2137 by Claire Saleh RN  Outcome: Progressing     Problem: Discharge Planning  Goal: Discharge to home or other facility with appropriate resources  9/27/2022 2137 by Claire Saleh RN  Outcome: Progressing     Problem: Safety - Adult  Goal: Free from fall injury  9/27/2022 2137 by Claire Saleh RN  Outcome: Progressing     Problem: Pain  Goal: Verbalizes/displays adequate comfort level or baseline comfort level  9/27/2022 2137 by Claire Saleh RN  Outcome: Progressing     Problem: Chronic Conditions and Co-morbidities  Goal: Patient's chronic conditions and co-morbidity symptoms are monitored and maintained or improved  9/27/2022 2137 by Claire Saleh RN  Outcome: Progressing     Problem: ABCDS Injury Assessment  Goal: Absence of physical injury  9/27/2022 2137 by Claire Saleh RN  Outcome: Progressing

## 2022-09-28 NOTE — PROGRESS NOTES
Comprehensive Nutrition Assessment    Type and Reason for Visit:  Initial, RD Nutrition Re-Screen/LOS    Nutrition Recommendations/Plan:   Continue current diet and ONS, as tolerated  Continue inpatient monitoring     Malnutrition Assessment:  Malnutrition Status: At risk for malnutrition (Comment) (09/28/22 1611)    Context:  Chronic Illness     Findings of the 6 clinical characteristics of malnutrition:  Energy Intake:  Mild decrease in energy intake (Comment)  Weight Loss:  No significant weight loss     Body Fat Loss:  No significant body fat loss     Muscle Mass Loss:  No significant muscle mass loss    Fluid Accumulation:  Unable to assess Extremities (renal component)   Strength:  Not Performed    Nutrition Assessment:    Pt admit from ECF s/p fall. PMH=CKD 4 s/p Left kidney CA with total left nephrectomy 7/5/22 at [facility], DM, CVA, nonhealing heel wound. 9/23 seen by SLP and regular diet with sips thin liquids recommended. Podiatry following fpr rt chronic heel and ankle wounds. Will add Wound healing ONS BID to current regimen and monitor tolerance    Nutrition Related Findings:    A&O but confused at times, soft rounded abd +BS, +1 edema, -I/O 8.2L, hypokalemia, wheelchair Wound Type: Multiple (Fungal rash noted with epidermal skin loss on perianal area and scrotum. Rt ankle, Rt heel chronic wounds with eschar and arteriogram 9/29 planned with vascular team)       Current Nutrition Intake & Therapies:    Average Meal Intake: 51-75% (average at meals)  Average Supplements Intake: None Ordered  ADULT DIET; Regular; 4 carb choices (60 gm/meal); Low Sodium (2 gm)  Diet NPO Exceptions are: Sips of Water with Meds  ADULT ORAL NUTRITION SUPPLEMENT; Breakfast, Dinner; Wound Healing Oral Supplement    Anthropometric Measures:  Height: 6' 2\" (188 cm)  Ideal Body Weight (IBW): 190 lbs (86 kg)    Admission Body Weight: 264 lb 6.4 oz (119.9 kg) (9/21 bedscale)  Current Body Weight: 250 lb (113.4 kg), 131.6 % IBW. Weight Source: Bed Scale (9/28)  Current BMI (kg/m2): 32.1  Usual Body Weight: 250 lb (113.4 kg) (at OV x 1 month)  % Weight Change (Calculated): 0                    BMI Categories: Obese Class 1 (BMI 30.0-34. 9)    Estimated Daily Nutrient Needs:  Energy Requirements Based On: Formula ([address])  Weight Used for Energy Requirements: Current  Energy (kcal/day):   Weight Used for Protein Requirements: Ideal  Protein (g/day): 110-120 (1.3-1.5 g/kg as eliza w/CKD)  Method Used for Fluid Requirements: Standard Renal  Fluid (ml/day): per Renal management    Nutrition Diagnosis:   Increased nutrient needs related to other (comment) (nutrient demand to promote healing of chronic wounds) as evidenced by wounds    Nutrition Interventions:   Food and/or Nutrient Delivery: Continue Current Diet, Start Oral Nutrition Supplement (Wound healing ONS BID)  Nutrition Education/Counseling: Education not indicated (Discharge plan to [facility] E[facility])  Coordination of Nutrition Care: Continue to monitor while inpatient       Goals:     Goals: PO intake 75% or greater       Nutrition Monitoring and Evaluation:   Behavioral-Environmental Outcomes: None Identified  Food/Nutrient Intake Outcomes: Food and Nutrient Intake, Supplement Intake  Physical Signs/Symptoms Outcomes: Biochemical Data, GI Status, Fluid Status or Edema, Nutrition Focused Physical Findings, Skin, Weight    Discharge Planning:    Continue Oral Nutrition Supplement, Continue current diet     Aleksandra Ervin RD, CNSC, LD  Contact: [phone]

## 2022-09-28 NOTE — PROGRESS NOTES
[facility] arranged with [facility] for arteriogram tomorrow, states they will be here at 0830

## 2022-09-28 NOTE — PROGRESS NOTES
[address] [place] [address] [facility]  GLADYSDWAYNE  Progress Note    SUBJECTIVE:  Chief Complaint   Patient presents with    Brunilda Age over to pick something up and felt like the room was spinning. -LOC, +head, +thinners. Abrasion forehead. Dizziness     Laying in bed, asleep  Awakened easily, has some pain   No fevers  Tolerating antibiotics      Review of systems:  As stated above in the chief complaint, otherwise negative. Medications:  Scheduled Meds:   acetylcysteine  600 mg Oral BID    miconazole nitrate   Topical BID    amLODIPine  5 mg Oral Daily    sodium chloride flush  5-40 mL IntraVENous BID    insulin lispro  0-4 Units SubCUTAneous TID WC    insulin lispro  0-4 Units SubCUTAneous Nightly    miconazole   Topical BID    ampicillin-sulbactam  3,000 mg IntraVENous Q6H    linezolid  600 mg Oral 2 times per day    enoxaparin  60 mg SubCUTAneous Daily    ascorbic acid  500 mg Oral Daily    atorvastatin  20 mg Oral Nightly    vitamin D  50,000 Units Oral Weekly    levothyroxine  50 mcg Oral Daily    magnesium oxide  400 mg Oral Every Other Day    meclizine  25 mg Oral TID    metoprolol tartrate  25 mg Oral BID    therapeutic multivitamin-minerals  1 tablet Oral Daily    pantoprazole  40 mg Oral Daily    phenazopyridine  200 mg Oral Daily    collagenase   Topical Daily     Continuous Infusions:   dextrose       PRN Meds:glucose, dextrose bolus **OR** dextrose bolus, glucagon (rDNA), dextrose, acetaminophen, ipratropium-albuterol, loperamide, polyethylene glycol    OBJECTIVE:  BP (!) 149/60   Pulse 69   Temp 98.8 °F (37.1 °C) (Oral)   Resp 16   Ht 6' 2\" (1.88 m)   Wt 250 lb (113.4 kg)   SpO2 94%   BMI 32.10 kg/m²   Temp  Av.4 °F (36.9 °C)  Min: 98.1 °F (36.7 °C)  Max: 98.8 °F (37.1 °C)  Constitutional: The patient is laying in bed asleep. Skin: Warm and dry. No rashes were noted. HEENT: Round and reactive pupils. Moist mucous membranes. No ulcerations or thrush.   Neck: Supple to movements. Chest: No use of accessory muscles to breathe. Symmetrical expansion. No wheezing, crackles or rhonchi. Cardiovascular: S1 and S2 are rhythmic and regular. No murmurs appreciated. Abdomen: Positive bowel sounds to auscultation. Benign to palpation. No masses felt. Extremities: right foot is dressed. Unstageable necrotic eschar over the right heel. Ulcer dorsum of the right foot.   Lines: peripheral    Laboratory and Tests Review:  Lab Results   Component Value Date    WBC 6.8 09/26/2022    WBC 6.6 09/24/2022    WBC 9.3 09/21/2022    HGB 9.1 (L) 09/26/2022    HCT 29.6 (L) 09/26/2022    MCV 90.2 09/26/2022     09/26/2022     Lab Results   Component Value Date    NEUTROABS 7.13 09/21/2022    NEUTROABS 4.66 08/22/2022    NEUTROABS 4.96 08/16/2022     No results found for: [facility]  Lab Results   Component Value Date    ALT 7 09/21/2022    AST 21 09/21/2022    ALKPHOS 97 09/21/2022    BILITOT 0.3 09/21/2022     Lab Results   Component Value Date/Time     09/28/2022 02:25 AM    K 3.2 09/28/2022 02:25 AM    K 6.2 09/21/2022 10:44 AM     09/28/2022 02:25 AM    CO2 21 09/28/2022 02:25 AM    BUN 24 09/28/2022 02:25 AM    CREATININE 2.3 09/28/2022 02:25 AM    CREATININE 2.2 09/27/2022 02:37 AM    CREATININE 2.0 09/26/2022 03:42 AM    GFRAA 33 09/28/2022 02:25 AM    LABGLOM 28 09/28/2022 02:25 AM    GLUCOSE 139 09/28/2022 02:25 AM    GLUCOSE 297 01/04/2012 03:00 AM    PROT 7.5 09/21/2022 10:44 AM    LABALBU 3.5 09/21/2022 10:44 AM    LABALBU 3.4 01/04/2012 03:00 AM    CALCIUM 9.1 09/28/2022 02:25 AM    BILITOT 0.3 09/21/2022 10:44 AM    ALKPHOS 97 09/21/2022 10:44 AM    AST 21 09/21/2022 10:44 AM    ALT 7 09/21/2022 10:44 AM     Lab Results   Component Value Date    CRP 8.5 (H) 09/22/2022    CRP 7.2 (H) 08/01/2022    CRP 4.9 (H) 10/03/2018     Lab Results   Component Value Date    SEDRATE 93 (H) 09/22/2022    SEDRATE 87 (H) 08/01/2022    SEDRATE 77 (H) 10/03/2018 Radiology:      Microbiology:  Wound culture 9/22/2022: Proteus mirabilis, E.coli, MRSA    ASSESSMENT:  Right heel decubitus ulcer  Right heel wound infection with cellulitis. Previous cultures in August grew Proteus mirabilis, MRSA, Streptococcus agalactiae and Corynebacterium    PLAN:  Continue Unasyn and Linezolid  Arteriogram planned for Thursday  Podiatry plans pending arteriogram-to be done on Thursday  May need PICC for IV antibiotics - depending on the vascular and podiatry intervention  We will follow with you    MELANIE Vasquez CNP  4:21 PM  9/28/2022    Pt seen and examined. Above discussed agree with advanced practice nurse. Labs, cultures, and radiographs reviewed. Face to Face encounter occurred. Changes made as necessary.      Kurtis Brooks MD

## 2022-09-28 NOTE — PROGRESS NOTES
Admit Date: 9/21/2022    Subjective:     Doing fine no new event     Objective:     Patient Vitals for the past 8 hrs:   Weight   09/28/22 0549 250 lb (113.4 kg)     I/O last 3 completed shifts: In: 130 [P.O.:120; I.V.:10]  Out: 2400 [Urine:2400]  No intake/output data recorded. HEENT: Normal  NECK: Thyroid normal. No carotid bruit. No lymphphadenopathy. CVS: RRR  RS: Clear. No wheeze. No rhonchi. Good airflow bilaterally. ABD: Soft. Non tender. No mass. Normal BS. EXT: No edema. Non tender.  Dressing feet   NEURO: no focal deficit       Scheduled Meds:   acetylcysteine  600 mg Oral BID    miconazole nitrate   Topical BID    amLODIPine  5 mg Oral Daily    sodium chloride flush  5-40 mL IntraVENous BID    insulin lispro  0-4 Units SubCUTAneous TID WC    insulin lispro  0-4 Units SubCUTAneous Nightly    miconazole   Topical BID    ampicillin-sulbactam  3,000 mg IntraVENous Q6H    linezolid  600 mg Oral 2 times per day    enoxaparin  60 mg SubCUTAneous Daily    ascorbic acid  500 mg Oral Daily    atorvastatin  20 mg Oral Nightly    vitamin D  50,000 Units Oral Weekly    levothyroxine  50 mcg Oral Daily    magnesium oxide  400 mg Oral Every Other Day    meclizine  25 mg Oral TID    metoprolol tartrate  25 mg Oral BID    therapeutic multivitamin-minerals  1 tablet Oral Daily    pantoprazole  40 mg Oral Daily    phenazopyridine  200 mg Oral Daily    collagenase   Topical Daily     Continuous Infusions:   dextrose         CBC with Differential:    Lab Results   Component Value Date/Time    WBC 6.8 09/26/2022 03:42 AM    RBC 3.28 09/26/2022 03:42 AM    HGB 9.1 09/26/2022 03:42 AM    HCT 29.6 09/26/2022 03:42 AM     09/26/2022 03:42 AM    MCV 90.2 09/26/2022 03:42 AM    MCH 27.7 09/26/2022 03:42 AM    MCHC 30.7 09/26/2022 03:42 AM    RDW 14.1 09/26/2022 03:42 AM    SEGSPCT 80 01/04/2012 03:00 AM    BANDSPCT 1 11/27/2014 04:50 AM    LYMPHOPCT 11.6 09/21/2022 10:44 AM    MONOPCT 7.5 09/21/2022 10:44 AM    MYELOPCT 0.9 02/28/2018 05:21 AM    BASOPCT 0.5 09/21/2022 10:44 AM    MONOSABS 0.69 09/21/2022 10:44 AM    LYMPHSABS 1.07 09/21/2022 10:44 AM    EOSABS 0.28 09/21/2022 10:44 AM    BASOSABS 0.05 09/21/2022 10:44 AM     CMP:    Lab Results   Component Value Date/Time     09/28/2022 02:25 AM    K 3.2 09/28/2022 02:25 AM    K 6.2 09/21/2022 10:44 AM     09/28/2022 02:25 AM    CO2 21 09/28/2022 02:25 AM    BUN 24 09/28/2022 02:25 AM    CREATININE 2.3 09/28/2022 02:25 AM    GFRAA 33 09/28/2022 02:25 AM    LABGLOM 28 09/28/2022 02:25 AM    PROT 7.5 09/21/2022 10:44 AM    LABALBU 3.5 09/21/2022 10:44 AM    LABALBU 3.4 01/04/2012 03:00 AM    CALCIUM 9.1 09/28/2022 02:25 AM    BILITOT 0.3 09/21/2022 10:44 AM    ALKPHOS 97 09/21/2022 10:44 AM    AST 21 09/21/2022 10:44 AM    ALT 7 09/21/2022 10:44 AM     PT/INR:    Lab Results   Component Value Date/Time    PROTIME 21.6 09/25/2022 05:45 PM    PROTIME 12.2 12/30/2011 10:20 PM    INR 2.0 09/25/2022 05:45 PM       Assessment:     Principal Problem:   JUAN on CKD improved   Hyperkalemia due to above and supplement resolved   Right heal wound infection   HTN  Obesity  DM  Impaired mobility   Fungal dermatitis   Remote CVA   PVD   Hypothyroid   Hypomagnesemia       Plan:   Continue same ,K+ supplement await arteriogram

## 2022-09-28 NOTE — CARE COORDINATION
Social Work/Discharge Planning:  Chart reviewed. Patient to have an arteriogram tomorrow. He may possibly need IV antibiotics per ID note. [facility] to [facility] if [facility] accommodate. Rajputluis Fowlerll states patient only has 32 Medicare days left and to confirm plan with family. Met with patient and he states to call his son Lilly Sibley. Called Lilly Sibley (ph: [phone]) and provided him with an update. Lilly Sibley to confirm plan and will return this workers call. Will continue to follow. Electronically signed by ROHIT Polanco on 9/28/2022 at 10:07 AM    Addendum:  Patient son Lilly Sibley returned call and states plan is to [address] at discharge. Referral made to Lisa Red with [address] and facility will review patient information. Will continue to follow.   Electronically signed by ROHIT Polanco on 9/28/2022 at 12:33 PM

## 2022-09-29 ENCOUNTER — HOSPITAL ENCOUNTER (INPATIENT)
Dept: CARDIAC CATH/INVASIVE PROCEDURES | Age: 77
LOS: 9 days | Discharge: NURSING, REHAB OR LONG-TERM CARE | DRG: 253 | End: 2022-10-08
Attending: INTERNAL MEDICINE | Admitting: INTERNAL MEDICINE
Payer: MEDICARE

## 2022-09-29 VITALS
RESPIRATION RATE: 16 BRPM | HEIGHT: 74 IN | BODY MASS INDEX: 31.57 KG/M2 | WEIGHT: 246 LBS | TEMPERATURE: 97.8 F | SYSTOLIC BLOOD PRESSURE: 147 MMHG | DIASTOLIC BLOOD PRESSURE: 56 MMHG | OXYGEN SATURATION: 96 % | HEART RATE: 68 BPM

## 2022-09-29 PROBLEM — I73.9 PERIPHERAL ARTERIAL DISEASE (HCC): Status: ACTIVE | Noted: 2022-09-29

## 2022-09-29 LAB
ABO/RH: NORMAL
ANION GAP SERPL CALCULATED.3IONS-SCNC: 12 MMOL/L (ref 7–16)
ANTIBODY SCREEN: NORMAL
BUN BLDV-MCNC: 24 MG/DL (ref 6–23)
CALCIUM SERPL-MCNC: 9 MG/DL (ref 8.6–10.2)
CHLORIDE BLD-SCNC: 109 MMOL/L (ref 98–107)
CO2: 20 MMOL/L (ref 22–29)
CREAT SERPL-MCNC: 2.3 MG/DL (ref 0.7–1.2)
GFR AFRICAN AMERICAN: 33
GFR NON-AFRICAN AMERICAN: 28 ML/MIN/1.73
GLUCOSE BLD-MCNC: 140 MG/DL (ref 74–99)
METER GLUCOSE: 132 MG/DL (ref 74–99)
METER GLUCOSE: 152 MG/DL (ref 74–99)
METER GLUCOSE: 168 MG/DL (ref 74–99)
POTASSIUM SERPL-SCNC: 3.2 MMOL/L (ref 3.5–5)
SODIUM BLD-SCNC: 141 MMOL/L (ref 132–146)

## 2022-09-29 PROCEDURE — 2140000000 HC CCU INTERMEDIATE R&B

## 2022-09-29 PROCEDURE — C1887 CATHETER, GUIDING: HCPCS

## 2022-09-29 PROCEDURE — 6370000000 HC RX 637 (ALT 250 FOR IP): Performed by: INTERNAL MEDICINE

## 2022-09-29 PROCEDURE — 2580000003 HC RX 258: Performed by: INTERNAL MEDICINE

## 2022-09-29 PROCEDURE — 80048 BASIC METABOLIC PNL TOTAL CA: CPT

## 2022-09-29 PROCEDURE — 75710 ARTERY X-RAYS ARM/LEG: CPT

## 2022-09-29 PROCEDURE — 75774 ARTERY X-RAY EACH VESSEL: CPT

## 2022-09-29 PROCEDURE — 2580000003 HC RX 258: Performed by: SURGERY

## 2022-09-29 PROCEDURE — 6370000000 HC RX 637 (ALT 250 FOR IP): Performed by: SPECIALIST

## 2022-09-29 PROCEDURE — 36415 COLL VENOUS BLD VENIPUNCTURE: CPT

## 2022-09-29 PROCEDURE — B41DYZZ FLUOROSCOPY OF AORTA AND BILATERAL LOWER EXTREMITY ARTERIES USING OTHER CONTRAST: ICD-10-PCS | Performed by: SURGERY

## 2022-09-29 PROCEDURE — 6360000002 HC RX W HCPCS: Performed by: SPECIALIST

## 2022-09-29 PROCEDURE — 86901 BLOOD TYPING SEROLOGIC RH(D): CPT

## 2022-09-29 PROCEDURE — 82962 GLUCOSE BLOOD TEST: CPT

## 2022-09-29 PROCEDURE — 2580000003 HC RX 258: Performed by: SPECIALIST

## 2022-09-29 PROCEDURE — 36247 INS CATH ABD/L-EXT ART 3RD: CPT

## 2022-09-29 PROCEDURE — C1894 INTRO/SHEATH, NON-LASER: HCPCS

## 2022-09-29 PROCEDURE — 6370000000 HC RX 637 (ALT 250 FOR IP): Performed by: SURGERY

## 2022-09-29 PROCEDURE — C1769 GUIDE WIRE: HCPCS

## 2022-09-29 PROCEDURE — 99232 SBSQ HOSP IP/OBS MODERATE 35: CPT | Performed by: INTERNAL MEDICINE

## 2022-09-29 PROCEDURE — 86850 RBC ANTIBODY SCREEN: CPT

## 2022-09-29 PROCEDURE — 86900 BLOOD TYPING SEROLOGIC ABO: CPT

## 2022-09-29 PROCEDURE — 2709999900 HC NON-CHARGEABLE SUPPLY

## 2022-09-29 RX ORDER — AMLODIPINE BESYLATE 5 MG/1
5 TABLET ORAL DAILY
Status: DISCONTINUED | OUTPATIENT
Start: 2022-09-30 | End: 2022-10-08 | Stop reason: HOSPADM

## 2022-09-29 RX ORDER — POTASSIUM CHLORIDE 20 MEQ/1
40 TABLET, EXTENDED RELEASE ORAL ONCE
Status: DISCONTINUED | OUTPATIENT
Start: 2022-09-29 | End: 2022-09-29 | Stop reason: HOSPADM

## 2022-09-29 RX ORDER — INSULIN LISPRO 100 [IU]/ML
0-8 INJECTION, SOLUTION INTRAVENOUS; SUBCUTANEOUS
Status: DISCONTINUED | OUTPATIENT
Start: 2022-09-29 | End: 2022-10-08 | Stop reason: HOSPADM

## 2022-09-29 RX ORDER — PANTOPRAZOLE SODIUM 40 MG/1
40 TABLET, DELAYED RELEASE ORAL
Status: DISCONTINUED | OUTPATIENT
Start: 2022-09-30 | End: 2022-10-08 | Stop reason: HOSPADM

## 2022-09-29 RX ORDER — ATORVASTATIN CALCIUM 20 MG/1
20 TABLET, FILM COATED ORAL NIGHTLY
Status: DISCONTINUED | OUTPATIENT
Start: 2022-09-29 | End: 2022-10-08 | Stop reason: HOSPADM

## 2022-09-29 RX ORDER — GUAIFENESIN 100 MG/5ML
200 SOLUTION ORAL EVERY 4 HOURS PRN
Status: DISCONTINUED | OUTPATIENT
Start: 2022-09-29 | End: 2022-09-29 | Stop reason: HOSPADM

## 2022-09-29 RX ORDER — SODIUM CHLORIDE 9 MG/ML
INJECTION, SOLUTION INTRAVENOUS CONTINUOUS
Status: ACTIVE | OUTPATIENT
Start: 2022-09-29 | End: 2022-09-29

## 2022-09-29 RX ORDER — ACETAMINOPHEN 325 MG/1
650 TABLET ORAL EVERY 4 HOURS PRN
Status: DISCONTINUED | OUTPATIENT
Start: 2022-09-29 | End: 2022-10-08 | Stop reason: HOSPADM

## 2022-09-29 RX ORDER — GABAPENTIN 300 MG/1
300 CAPSULE ORAL 3 TIMES DAILY
Status: DISCONTINUED | OUTPATIENT
Start: 2022-09-29 | End: 2022-10-08 | Stop reason: HOSPADM

## 2022-09-29 RX ORDER — LEVOTHYROXINE SODIUM 0.05 MG/1
50 TABLET ORAL DAILY
Status: DISCONTINUED | OUTPATIENT
Start: 2022-09-29 | End: 2022-10-08 | Stop reason: HOSPADM

## 2022-09-29 RX ORDER — INSULIN LISPRO 100 [IU]/ML
0-4 INJECTION, SOLUTION INTRAVENOUS; SUBCUTANEOUS NIGHTLY
Status: DISCONTINUED | OUTPATIENT
Start: 2022-09-29 | End: 2022-10-08 | Stop reason: HOSPADM

## 2022-09-29 RX ORDER — POTASSIUM CHLORIDE 20 MEQ/1
40 TABLET, EXTENDED RELEASE ORAL ONCE
Status: COMPLETED | OUTPATIENT
Start: 2022-09-29 | End: 2022-09-29

## 2022-09-29 RX ADMIN — METOPROLOL TARTRATE 25 MG: 25 TABLET, FILM COATED ORAL at 08:08

## 2022-09-29 RX ADMIN — ATORVASTATIN CALCIUM 20 MG: 20 TABLET, FILM COATED ORAL at 22:19

## 2022-09-29 RX ADMIN — POTASSIUM CHLORIDE 40 MEQ: 20 TABLET, EXTENDED RELEASE ORAL at 10:10

## 2022-09-29 RX ADMIN — GABAPENTIN 300 MG: 300 CAPSULE ORAL at 16:48

## 2022-09-29 RX ADMIN — ACETYLCYSTEINE 600 MG: 200 SOLUTION ORAL; RESPIRATORY (INHALATION) at 08:10

## 2022-09-29 RX ADMIN — OXYCODONE HYDROCHLORIDE AND ACETAMINOPHEN 500 MG: 500 TABLET ORAL at 08:08

## 2022-09-29 RX ADMIN — LINEZOLID 600 MG: 600 TABLET, FILM COATED ORAL at 08:09

## 2022-09-29 RX ADMIN — GABAPENTIN 300 MG: 300 CAPSULE ORAL at 22:19

## 2022-09-29 RX ADMIN — MECLIZINE 25 MG: 12.5 TABLET ORAL at 08:08

## 2022-09-29 RX ADMIN — MICONAZOLE NITRATE: 2 OINTMENT TOPICAL at 08:10

## 2022-09-29 RX ADMIN — PANTOPRAZOLE SODIUM 40 MG: 40 TABLET, DELAYED RELEASE ORAL at 08:08

## 2022-09-29 RX ADMIN — MULTIPLE VITAMINS W/ MINERALS TAB 1 TABLET: TAB at 08:09

## 2022-09-29 RX ADMIN — SODIUM CHLORIDE: 9 INJECTION, SOLUTION INTRAVENOUS at 14:00

## 2022-09-29 RX ADMIN — METOPROLOL TARTRATE 25 MG: 25 TABLET, FILM COATED ORAL at 22:20

## 2022-09-29 RX ADMIN — AMPICILLIN SODIUM AND SULBACTAM SODIUM 3000 MG: 2; 1 INJECTION, POWDER, FOR SOLUTION INTRAMUSCULAR; INTRAVENOUS at 08:21

## 2022-09-29 RX ADMIN — SODIUM CHLORIDE, PRESERVATIVE FREE 10 ML: 5 INJECTION INTRAVENOUS at 08:10

## 2022-09-29 RX ADMIN — MICONAZOLE NITRATE: 20 POWDER TOPICAL at 08:10

## 2022-09-29 RX ADMIN — AMLODIPINE BESYLATE 5 MG: 5 TABLET ORAL at 08:09

## 2022-09-29 RX ADMIN — AMPICILLIN SODIUM AND SULBACTAM SODIUM 3000 MG: 2; 1 INJECTION, POWDER, FOR SOLUTION INTRAMUSCULAR; INTRAVENOUS at 03:17

## 2022-09-29 RX ADMIN — PHENAZOPYRIDINE 200 MG: 100 TABLET ORAL at 08:09

## 2022-09-29 ASSESSMENT — PAIN SCALES - GENERAL
PAINLEVEL_OUTOF10: 0

## 2022-09-29 ASSESSMENT — LIFESTYLE VARIABLES: HOW OFTEN DO YOU HAVE A DRINK CONTAINING ALCOHOL: NEVER

## 2022-09-29 NOTE — DISCHARGE INSTR - COC
Continuity of Care Form    Patient Name: Radha Kc   :  1945  MRN:  [id number]    Admit date:  2022  Discharge date:  10/08/2022    Code Status Order: Prior   Advance Directives:     Admitting Physician:  Gabriella Nieves MD  PCP: Ariane Up DO    Discharging Nurse: [facility] Unit/Room#: 1539/4521-V  Discharging Unit Phone Number: ***    Emergency Contact:   Extended Emergency Contact Information  Primary Emergency Contact: Lety Silver  Address: [address] Phone: [phone]  Mobile Phone: [phone]  Relation: Child  Secondary Emergency Contact: Sherre Apgar  Address: [address] [place] [address]           Montez Guardado[address] Phone: [phone]  Mobile Phone: [phone]  Relation: Child    Past Surgical History:  Past Surgical History:   Procedure Laterality Date    CARDIAC PACEMAKER PLACEMENT  2014    COLONOSCOPY      EYE SURGERY      KIDNEY REMOVAL Left 2022    CANCER [facility] removed    KNEE ARTHROSCOPY  right knee    PACEMAKER PLACEMENT      [place] COLONOSCOPY FLX DX W/COLLJ SPEC WHEN PFRMD N/A 2018    COLONOSCOPY DIAGNOSTIC performed by Hever Michaels MD at [address] EGD PERCUTANEOUS PLACEMENT GASTROSTOMY TUBE N/A 2018    PEG TUBE/PT.  IN 5507 [address] performed by Hever Michaels MD at [address] EGD PERCUTANEOUS PLACEMENT GASTROSTOMY TUBE N/A 2018    EGD PEG TUBE PLACEMENT performed by Hever Michaels MD at [address] [place] [address]       Immunization History:   Immunization History   Administered Date(s) Administered    COVID-19, PFIZER PURPLE top, DILUTE for use, (age 15 y+), 30mcg/0.3mL 2021, 2021, 10/04/2021    Influenza Virus Vaccine 2012, 2014, 10/22/2021    Influenza, FLUZONE (age 72 y+), High Dose, 0.7mL 10/13/2020, 10/22/2021, 10/22/2021    Influenza, High Dose (Fluzone 65 yrs and older) 2019, 2019, 10/13/2020    Pneumococcal Conjugate 13-valent (Pcjreki32) 09/30/2019    Pneumococcal Polysaccharide (Uxgdcmgzo66) 01/04/2012, 11/19/2019       Active Problems:  Patient Active Problem List   Diagnosis Code    Hypertension I10    Hyperlipidemia with target LDL less than 100 E78.5    Impaired mobility and ADLs Z74.09, Z78.9    Cardiac pacemaker in situ Z95.0    Cerebrovascular accident (CVA) determined by clinical assessment ([address] I63.9    Left renal mass N28.89    Class 2 severe obesity due to excess calories with serious comorbidity and body mass index (BMI) of 35.0 to 35.9 in adult [facility]) E66.01, Z68.35    Acquired hypothyroidism E03.9    H/O deep venous thrombosis Z86.718    Type 2 diabetes mellitus without complication, without long-term current use of insulin (HCC) E11.9    Stage 3 chronic kidney disease (HCC) N18.30    Ulcer of right heel and midfoot with fat layer exposed ([address] L97.412    Atherosclerosis of native artery of right lower extremity with ulceration of heel (HCC) I70.234    Status post nephrectomy Z90.5    Chronic renal failure, stage 3b (HCC) N18.32    Necrotic eschar ([address] I96    Diabetic ulcer of left heel associated with type 2 diabetes mellitus, with fat layer exposed ([address] E11.621, L97.422    Femoral-popliteal atherosclerosis (HCC) I70.209    Hyperkalemia E87.5    Peripheral arterial disease ([facility]) I73.9       Isolation/Infection:   Isolation            Contact          Patient Infection Status       Infection Onset Added Last Indicated Last Indicated By Review Planned Expiration Resolved Resolved By    MRSA 08/01/22 08/06/22 09/22/22 Culture, Wound Aerobic Only        Foot Wound 9/22/2022  Wound right foot 8/24/22  Wound foot 8/1/22            Nurse Assessment:  Last Vital Signs: BP (!) 161/69   Pulse 60   Temp 98 °F (36.7 °C) (Temporal)   Resp 18   Ht 6' 2\" (1.88 m)   Wt 254 lb (115.2 kg)   SpO2 98%   BMI 32.61 kg/m²     Last documented pain score (0-10 scale):    Last Weight:    Wt Readings from Last 1 Encounters:   09/29/22 254 lb (115.2 kg)     Mental Status:  oriented and alert    IV Access:  - None    Nursing Mobility/ADLs:  Walking   Dependent  Transfer  Dependent  Bathing  Assisted  Dressing  Assisted  Toileting  Dependent  Feeding  Assisted  Med Admin  Independent  Med Delivery   whole    Wound Care Documentation and Therapy:  Wound 08/15/22 Heel Right #1 (Active)   Dressing Status New dressing applied;Clean;Dry; Intact 09/29/22 0815   Dressing/Treatment Dry dressing 09/29/22 0815   Offloading for Diabetic Foot Ulcers Post op shoe 09/28/22 2030   Dressing Change Due 09/30/22 09/29/22 0815   Wound Assessment Other (Comment) 09/29/22 0815   Tory-wound Assessment Other (Comment) 09/28/22 2030   Number of days: 45       Wound 09/21/22 Buttocks Left open area (Active)   Dressing/Treatment Pharmaceutical agent (see MAR) 09/29/22 0815   Wound Length (cm) 1 cm 09/27/22 2019   Wound Width (cm) 1 cm 09/27/22 2019   Wound Depth (cm) 0.01 cm 09/21/22 1522   Wound Surface Area (cm^2) 1 cm^2 09/27/22 2019   Change in Wound Size % (l*w) 0 09/27/22 2019   Wound Volume (cm^3) 0.01 cm^3 09/21/22 1522   Wound Assessment Pink/red 09/29/22 0815   Drainage Amount None 09/29/22 0815   Odor None 09/29/22 0815   Tory-wound Assessment Fragile 09/29/22 0815   Number of days: 7       Wound 09/21/22 Knee Anterior; Left Abrasion (Active)   Dressing Status Clean;Dry; Intact 09/25/22 2115   Dressing/Treatment Other (comment) 09/29/22 0815   Dressing Change Due 09/28/22 09/25/22 2115   Wound Length (cm) 1 cm 09/27/22 2019   Wound Width (cm) 1 cm 09/27/22 2019   Wound Depth (cm) 0.1 cm 09/21/22 1522   Wound Surface Area (cm^2) 1 cm^2 09/27/22 2019   Change in Wound Size % (l*w) 0 09/27/22 2019   Wound Volume (cm^3) 0.1 cm^3 09/21/22 1522   Wound Assessment Pink/red 09/29/22 0815   Drainage Amount None 09/29/22 0815   Odor None 09/29/22 0815   Tory-wound Assessment Fragile 09/29/22 0815   Number of days: 7       Wound 09/21/22 Perineum scrotum (Active)   Dressing Status Other (Comment) 09/21/22 2000   Dressing/Treatment Pharmaceutical agent (see MAR) 09/29/22 0815   Wound Length (cm) 15 cm 09/27/22 2019   Wound Width (cm) 1 cm 09/27/22 2019   Wound Depth (cm) 0.1 cm 09/22/22 0830   Wound Surface Area (cm^2) 15 cm^2 09/27/22 2019   Change in Wound Size % (l*w) -5900 09/27/22 2019   Wound Volume (cm^3) 1.5 cm^3 09/22/22 0830   Wound Healing % -2900 09/22/22 0830   Wound Assessment Pink/red 09/29/22 0815   Drainage Amount Scant 09/29/22 0815   Drainage Description Serosanguinous 09/29/22 0815   Odor None 09/29/22 0815   Tory-wound Assessment Intact;Fragile;Blanchable erythema 09/29/22 0815   Number of days: 7       Wound 09/22/22 Coccyx (Active)   Dressing Status Clean;Dry; Intact 09/26/22 0830   Wound Cleansed Cleansed with saline 09/26/22 0700   Dressing/Treatment Pharmaceutical agent (see MAR) 09/29/22 0815   Dressing Change Due 09/27/22 09/26/22 0830   Wound Length (cm) 2.5 cm 09/27/22 2019   Wound Width (cm) 3 cm 09/27/22 2019   Wound Depth (cm) 0.1 cm 09/24/22 0457   Wound Surface Area (cm^2) 7.5 cm^2 09/27/22 2019   Change in Wound Size % (l*w) 0 09/27/22 2019   Wound Volume (cm^3) 0.75 cm^3 09/24/22 0457   Wound Healing % 0 09/24/22 0457   Wound Assessment Pink/red 09/29/22 0815   Drainage Amount None 09/29/22 0815   Odor None 09/29/22 0815   Tory-wound Assessment Fragile; Intact 09/29/22 0815   Number of days: 7        Elimination:  Continence: Bowel: Yes  Bladder: Yes  Urinary Catheter: Indication for Use of Catheter: Need for fluid management in critally ill patients in a critical care setting not able to be managed by other means such as BSC with hat, bedpan, urinal, condom catheter, or short term intermittent urethral catheterization   Colostomy/Ileostomy/Ileal Conduit: No       Date of Last BM: ***  No intake or output data in the 24 hours ending 09/29/22 1351  No intake/output data recorded. Safety Concerns:      At Risk for Falls    Impairments/Disabilities:      None    Nutrition Therapy:  Current Nutrition Therapy:   - Oral Diet:  Dysphagia 1 pureed    Routes of Feeding: Oral  Liquids: Thin Liquids  Daily Fluid Restriction: no  Last Modified Barium Swallow with Video (Video Swallowing Test): not done    Treatments at the Time of Hospital Discharge:   Respiratory Treatments: ***  Oxygen Therapy:  is not on home oxygen therapy. Ventilator:    - No ventilator support    Rehab Therapies: {THERAPEUTIC INTERVENTION:2704849856}  Weight Bearing Status/Restrictions: No weight bearing restrictions  Other Medical Equipment (for information only, NOT a DME order):  wheelchair  Other Treatments: ***    Patient's personal belongings (please select all that are sent with patient):  ***    RN SIGNATURE:  Electronically signed by Rajiv Burks RN on 10/8/22 at 3:12 AM EDT    CASE MANAGEMENT/SOCIAL WORK SECTION    Inpatient Status Date: ***    Readmission Risk Assessment Score:  Readmission Risk              Risk of Unplanned Readmission:  17           Discharging to Facility/ Agency   Name: [facility], [address]  Phone:[phone]    Dialysis Facility (if applicable)   Name:  Address:  Dialysis Schedule:  Phone:  Fax:    / signature: Electronically signed by ROHIT Brady on 9/29/22 at 1:53 PM EDT    PHYSICIAN SECTION    Prognosis: {Prognosis:3618685872}    Condition at Discharge: [address] Patient Condition:625244846}    Rehab Potential (if transferring to Rehab): {Prognosis:8708841427}    Recommended Labs or Other Treatments After Discharge: ***    Physician Certification: I certify the above information and transfer of Williams Figueroa  is necessary for the continuing treatment of the diagnosis listed and that he requires {Admit to Appropriate Level of Care:56526} for {GREATER/LESS:747928649} 30 days.      Update Admission H&P: {CHP DME Changes in HRMLV:362072222}    PHYSICIAN SIGNATURE:  Dilcia Matamoros MD.

## 2022-09-29 NOTE — PROGRESS NOTES
Infectious disease consult placed, Dr. Frieda Yao on call. Podiatry consult placed to Dr. Purnima Rodríguez. Renal consult placed to Dr. Salina Reilly.

## 2022-09-29 NOTE — PROGRESS NOTES
Department of Podiatry   Progress Note      HISTORY OF PRESENT ILLNESS:     67 y.o. male  followed by podiatry for chronic painful Right heel wound with eschar. Doing well this am, no acute overnight events. He will be transported to [address] [place] [address] later today for arteriogram. States that his right heel is feeling better. Pt has no pedal questions or additional complaints at this time. Patient's son bedside this morning. 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[address]     Femoral-popliteal atherosclerosis ([address] 9/13/2022    Hypertension     Ischemic stroke ([address] 03/06/2018    Pacemaker     Paraplegia ([address]     Sinus node dysfunction ([address]     Stroke (cerebrum) ([address] 02/27/2018    Thyroid disease     Ulcer of right heel and midfoot with fat layer exposed ([address] 8/15/2022       Past Surgical History:        Procedure Laterality Date    CARDIAC PACEMAKER PLACEMENT  12/19/2014    COLONOSCOPY      EYE SURGERY      KIDNEY REMOVAL Left 07/05/2022    CANCER [facility] removed    KNEE ARTHROSCOPY  right knee    PACEMAKER PLACEMENT      RI COLONOSCOPY FLX DX W/COLLJ SPEC WHEN PFRMD N/A 03/20/2018    COLONOSCOPY DIAGNOSTIC performed by Hever Michaels MD at [address] EGD PERCUTANEOUS PLACEMENT GASTROSTOMY TUBE N/A 03/29/2018    PEG TUBE/PT.  IN 5507 B performed by Hever Michaels MD at [address] EGD PERCUTANEOUS PLACEMENT GASTROSTOMY TUBE N/A 09/12/2018    EGD PEG TUBE PLACEMENT performed by Hever Michaels MD at [address] [place] [address]       Medications Prior to Admission:    Medications Prior to Admission: meclizine (ANTIVERT) 25 MG tablet, Take 25 mg by mouth three times daily  warfarin (COUMADIN) 6 MG tablet, Take 6 mg by mouth daily  Glucagon HCl (GLUCAGON EMERGENCY) 1 MG/ML SOLR, Inject 1 mg as directed as needed (HYPOGLYCEMIA)  glucose (GLUTOSE) 40 % GEL, Take 15 g by mouth as needed (HYPOGLYCEMIA)  ipratropium-albuterol (DUONEB) 0.5-2.5 (3) MG/3ML SOLN nebulizer solution, Inhale 1 vial into the lungs every 4 hours as needed (COUGHINGAND DYSPNEA)  potassium chloride (KLOR-CON M) 20 MEQ extended release tablet, Take 20 mEq by mouth daily  [] enoxaparin (LOVENOX) 60 MG/0.6ML, Inject 60 mg into the skin daily  polyethylene glycol (GLYCOLAX) 17 g packet, Take 17 g by mouth daily as needed for Constipation  phenazopyridine (PYRIDIUM) 200 MG tablet, Take 200 mg by mouth daily  dextromethorphan-guaiFENesin (ROBITUSSIN-DM)  MG/5ML syrup, Take 5 mLs by mouth every 6 hours as needed for Cough  Cholecalciferol (VITAMIN D3) 1.25 MG (28180 UT) CAPS, Take 50,000 Units by mouth once a week ****  loperamide (IMODIUM) 2 MG capsule, Take 2 mg by mouth daily as needed for Diarrhea  Multiple Vitamins-Minerals (THERAPEUTIC MULTIVITAMIN-MINERALS) tablet, Take 1 tablet by mouth daily  gabapentin (NEURONTIN) 300 MG capsule, Take 1 capsule by mouth nightly for 7 days. levothyroxine (SYNTHROID) 50 MCG tablet, Take 1 tablet by mouth in the morning. atorvastatin (LIPITOR) 20 MG tablet, Take 1 tablet by mouth nightly  magnesium oxide (MAG-OX) 400 (240 Mg) MG tablet, Take 1 tablet by mouth every other day  ascorbic acid (VITAMIN C) 500 MG tablet, Take 1 tablet by mouth in the morning.   acetaminophen (TYLENOL) 325 MG tablet, Take 650 mg by mouth every 4 hours as needed (DISCOMFORT;PAIN/ELEV TEMP >100.4F)  pantoprazole (PROTONIX) 40 MG tablet, Take 40 mg by mouth daily  amLODIPine (NORVASC) 2.5 MG tablet, Take 1 tablet by mouth daily  metoprolol tartrate (LOPRESSOR) 25 MG tablet, Take 1 tablet by mouth 2 times daily  metFORMIN (GLUCOPHAGE) 1000 MG tablet, Take 1 tablet by mouth 2 times daily  omeprazole (PRILOSEC) 20 MG delayed release capsule, Take 1 capsule by mouth daily  furosemide (LASIX) 40 MG tablet, Take 0.5 tablets by mouth daily    Allergies:  Bee venom    Social History:   TOBACCO:   reports that he quit smoking about 20 years ago. His smoking use included cigars. He has never used smokeless tobacco.  ETOH:   reports no history of alcohol use. DRUGS:   Social History     Substance and Sexual Activity   Drug Use No       Family History:       Problem Relation Age of Onset    Heart Disease Mother     Stroke Father          REVIEW OF SYSTEMS:    All pertinent review of systems both positive and negative discussed in HPI      LOWER EXTREMITY EXAMINATION   - Previous physical exam results below:  - Dressings today remain clean, dry, intact. VASCULAR:  DP and PT pulses weakly palpable 1+ to the bilateral LE. 1-2+ pitting edema appreciated as well. NEUROLOGIC:  Protective sensation diminished to distal aspect of bilateral lower extremities. DERM:  Right pressure heel ulceration with overlying eschar noted. Fat layer exposed. No clinical signs of infection. Does not probe deep at this time. Measures roughly 8 cm x 9 cm x unknown depth. No malodor. Little drainage. Superficial wound noted to patient's anterior Right ankle as well. Not clinically infected    MUSCULOSKELETAL: Tenderness to palpation of the right heel wound. Negative TTP of bilateral calves at this time. MSK strength testing deferred this afternoon          Media Information  Document Information    Clinical Consent:  Wound      09/28/2022 07:54   Attached To:    Hospital Encounter on 9/21/22     Source Information    MD Adelfo Freitas 4s Intermediate       CONSULTS:  IP CONSULT TO INTERNAL MEDICINE  IP CONSULT TO PODIATRY  IP CONSULT TO IV TEAM  IP CONSULT TO VASCULAR SURGERY  IP CONSULT TO INFECTIOUS DISEASES  IP CONSULT TO NEPHROLOGY    MEDICATION:  Scheduled Meds:   potassium chloride  40 mEq Oral Once    miconazole nitrate   Topical BID    amLODIPine  5 mg Oral Daily    sodium chloride flush  5-40 mL IntraVENous BID    insulin lispro  0-4 Units SubCUTAneous TID WC    insulin lispro  0-4 Units SubCUTAneous Nightly    miconazole   Topical BID    ampicillin-sulbactam  3,000 mg IntraVENous Q6H    linezolid  600 mg Oral 2 times per day    enoxaparin  60 mg SubCUTAneous Daily    ascorbic acid  500 mg Oral Daily    atorvastatin  20 mg Oral Nightly    vitamin D  50,000 Units Oral Weekly    levothyroxine  50 mcg Oral Daily    magnesium oxide  400 mg Oral Every Other Day    meclizine  25 mg Oral TID    metoprolol tartrate  25 mg Oral BID    therapeutic multivitamin-minerals  1 tablet Oral Daily    pantoprazole  40 mg Oral Daily    phenazopyridine  200 mg Oral Daily    collagenase   Topical Daily     Continuous Infusions:   dextrose       PRN Meds:.guaiFENesin, glucose, dextrose bolus **OR** dextrose bolus, glucagon (rDNA), dextrose, acetaminophen, ipratropium-albuterol, loperamide, polyethylene glycol    RADIOLOGY:  XR CHEST PORTABLE   Final Result   No acute process. CT ANKLE RIGHT WO CONTRAST   Final Result   This report is for right foot and right ankle. No CT evidence of osteomyelitis. MRI could better evaluate for osteomyelitis   if clinically indicated. Mild generalized subcutaneous edema/skin thickening. No drainable abscess,   allowing for limitations of noncontrast technique. Other incidental findings as above. CT FOOT RIGHT WO CONTRAST   Final Result   This report is for right foot and right ankle. No CT evidence of osteomyelitis. MRI could better evaluate for osteomyelitis   if clinically indicated. Mild generalized subcutaneous edema/skin thickening. No drainable abscess,   allowing for limitations of noncontrast technique. Other incidental findings as above. XR FOOT RIGHT (MIN 3 VIEWS)   Final Result   1. Surface irregularity and edema about the right calcaneus. No definite   evidence of osseous destruction or erosion at time.       2. Minimal right large toe metatarsal phalangeal joint osteoarthritis. Calcaneal enthesophytes. XR CHEST PORTABLE   Final Result   No acute process         CT Head WO Contrast   Final Result   1. There is no acute intracranial abnormality. Specifically, there is no   intracranial hemorrhage. 2. Atrophy and periventricular leukomalacia,   3. Old left occipital lobe infarct         CT Cervical Spine WO Contrast   Final Result   1. There is no acute compression fracture or subluxation of the cervical   spine. 2. Advanced multilevel degenerative disc and degenerative joint disease. Vitals:    BP (!) 147/56   Pulse 68   Temp 97.8 °F (36.6 °C) (Oral)   Resp 16   Ht 6' 2\" (1.88 m)   Wt 246 lb (111.6 kg)   SpO2 96%   BMI 31.58 kg/m²     LABS:   No results for input(s): WBC, HGB, HCT, PLT in the last 72 hours. Recent Labs     09/29/22  0335      K 3.2*   *   CO2 20*   BUN 24*   CREATININE 2.3*       Recent Labs     09/28/22  0939   INR 1.5   APTT 30.5         ASSESSMENTS:   Principal Problem:  - Chronic Right heel diabetic ulceration with eschar   - Anterior right ankle diabetic wound  - Type II diabetic     Hyperkalemia  - Recent fall with dizziness (chief complaint)         PLAN:  - Patient seen and evaluated bedside  - Charts and labs reviewed   -WBC: 6.8  - Cultures : from 8/24 grew Proteus mirabilis, MRSA, and Corynebacterium.  - Abx: Unasyn and Linezolid per ID  - Prevalon offloading boots applied to the bilateral lower extremities  - Dressing: Santyl DSD applied to patient's right heel. QOD dressing changes. Left intact today. Will be removed later today as he is planning on undergoing an arteriogram at [address] [place] [address]  CT right foot- No CT evidence of osteomyelitis. - Podiatry will monitor patient while in house    - Discussed patient with Dr. Jason Russell  - Will continue to follow patient while they are in-house.         Thank you for the opportunity to take part in the patient's care. Please do not hesitate to call for any questions or concerns.

## 2022-09-29 NOTE — PROGRESS NOTES
[address] [place] [address] [facility]  Progress Note    SUBJECTIVE:  Chief Complaint   Patient presents with    Vincent Mulch over to pick something up and felt like the room was spinning. -LOC, +head, +thinners. Abrasion forehead. Dizziness     Laying in bed, alert  has some pain   Awaiting transport for angio  Family at bedside  No fevers  Tolerating antibiotics      Review of systems:  As stated above in the chief complaint, otherwise negative. Medications:  Scheduled Meds:   acetylcysteine  600 mg Oral BID    miconazole nitrate   Topical BID    amLODIPine  5 mg Oral Daily    sodium chloride flush  5-40 mL IntraVENous BID    insulin lispro  0-4 Units SubCUTAneous TID WC    insulin lispro  0-4 Units SubCUTAneous Nightly    miconazole   Topical BID    ampicillin-sulbactam  3,000 mg IntraVENous Q6H    linezolid  600 mg Oral 2 times per day    enoxaparin  60 mg SubCUTAneous Daily    ascorbic acid  500 mg Oral Daily    atorvastatin  20 mg Oral Nightly    vitamin D  50,000 Units Oral Weekly    levothyroxine  50 mcg Oral Daily    magnesium oxide  400 mg Oral Every Other Day    meclizine  25 mg Oral TID    metoprolol tartrate  25 mg Oral BID    therapeutic multivitamin-minerals  1 tablet Oral Daily    pantoprazole  40 mg Oral Daily    phenazopyridine  200 mg Oral Daily    collagenase   Topical Daily     Continuous Infusions:   dextrose       PRN Meds:glucose, dextrose bolus **OR** dextrose bolus, glucagon (rDNA), dextrose, acetaminophen, ipratropium-albuterol, loperamide, polyethylene glycol    OBJECTIVE:  BP (!) 147/56   Pulse 68   Temp 97.8 °F (36.6 °C) (Oral)   Resp 16   Ht 6' 2\" (1.88 m)   Wt 246 lb (111.6 kg)   SpO2 96%   BMI 31.58 kg/m²   Temp  Av.5 °F (36.9 °C)  Min: 97.8 °F (36.6 °C)  Max: 98.9 °F (37.2 °C)  Constitutional: The patient is laying in bed, alert  Skin: Warm and dry. No rashes were noted. HEENT: Round and reactive pupils. Moist mucous membranes.   No ulcerations or thrush. Neck: Supple to movements. Chest: No use of accessory muscles to breathe. Symmetrical expansion. No wheezing, crackles or rhonchi. Cardiovascular: S1 and S2 are rhythmic and regular. No murmurs appreciated. Abdomen: Positive bowel sounds to auscultation. Benign to palpation. No masses felt. Extremities: right foot is dressed. Unstageable necrotic eschar over the right heel. Ulcer dorsum of the right foot.   Lines: peripheral    Laboratory and Tests Review:  Lab Results   Component Value Date    WBC 6.8 09/26/2022    WBC 6.6 09/24/2022    WBC 9.3 09/21/2022    HGB 9.1 (L) 09/26/2022    HCT 29.6 (L) 09/26/2022    MCV 90.2 09/26/2022     09/26/2022     Lab Results   Component Value Date    NEUTROABS 7.13 09/21/2022    NEUTROABS 4.66 08/22/2022    NEUTROABS 4.96 08/16/2022     No results found for: [facility]  Lab Results   Component Value Date    ALT 7 09/21/2022    AST 21 09/21/2022    ALKPHOS 97 09/21/2022    BILITOT 0.3 09/21/2022     Lab Results   Component Value Date/Time     09/29/2022 03:35 AM    K 3.2 09/29/2022 03:35 AM    K 6.2 09/21/2022 10:44 AM     09/29/2022 03:35 AM    CO2 20 09/29/2022 03:35 AM    BUN 24 09/29/2022 03:35 AM    CREATININE 2.3 09/29/2022 03:35 AM    CREATININE 2.3 09/28/2022 02:25 AM    CREATININE 2.2 09/27/2022 02:37 AM    GFRAA 33 09/29/2022 03:35 AM    LABGLOM 28 09/29/2022 03:35 AM    GLUCOSE 140 09/29/2022 03:35 AM    GLUCOSE 297 01/04/2012 03:00 AM    PROT 7.5 09/21/2022 10:44 AM    LABALBU 3.5 09/21/2022 10:44 AM    LABALBU 3.4 01/04/2012 03:00 AM    CALCIUM 9.0 09/29/2022 03:35 AM    BILITOT 0.3 09/21/2022 10:44 AM    ALKPHOS 97 09/21/2022 10:44 AM    AST 21 09/21/2022 10:44 AM    ALT 7 09/21/2022 10:44 AM     Lab Results   Component Value Date    CRP 8.5 (H) 09/22/2022    CRP 7.2 (H) 08/01/2022    CRP 4.9 (H) 10/03/2018     Lab Results   Component Value Date    SEDRATE 93 (H) 09/22/2022    SEDRATE 87 (H) 08/01/2022    SEDRATE 77 (H) 10/03/2018 Radiology:      Microbiology:  Wound culture 9/22/2022: Proteus mirabilis, E.coli, MRSA    ASSESSMENT:  Right heel decubitus ulcer  Right heel wound infection with cellulitis.   Previous cultures in August grew Proteus mirabilis, MRSA, Streptococcus agalactiae and Corynebacterium    PLAN:  Continue Unasyn and Linezolid  Arteriogram planned for today  Podiatry plans pending arteriogram  May need PICC for IV antibiotics - depending on the vascular and podiatry intervention  We will follow with you    MELANIE Melendrez CNP  8:03 AM  9/29/2022

## 2022-09-29 NOTE — CARE COORDINATION
Social Work/Discharge Planning:  Patient at [facility] for procedure. Called Neeru with [address] and left a message in regards to status of referral.  Will continue to follow. Electronically signed by ROHIT Morales on 9/29/2022 at 9:51 AM    Addendum:  Destiny Cristobal with [address] states facility can accept patient at discharge. No pre-cert needed, but will need a negative covid result on day of discharge. Will continue to follow.  Electronically signed by ROHIT Morales on 9/29/2022 at 10:41 AM

## 2022-09-29 NOTE — LETTER
[place] Department Medicaid  CERTIFICATION OF NECESSITY  FOR NON-EMERGENCY TRANSPORTATION   BY GROUND AMBULANCE      Individual Information   1. Name: Cyril Liriano 2. [place] Medicaid Billing Number: ***   3. Address: [id number] [facility]     Transportation Provider Information   4. Provider Name: ***   5. [place] Medicaid Provider Number: *** National Provider Identifier (NPI): ***     Certification  7. Criteria:  During transport, this individual requires:  [] Medical treatment or continuous     supervision by an EMT. [] The administration or regulation of oxygen by another person. [] Supervised protective restraint. 8. Period Beginning Date: ***   9. Length  [] Not more than {#:16607} day(s)  [] One Year     Additional Information Relevant to Certification   10. Comments or Explanations, If Necessary or Appropriate     ***     Certifying Practitioner Information   11. Name of Practitioner: ***   12. [place] Medicaid Provider Number, If Applicable: *** 13. National Provider Identifier (NPI): ***     Signature Information   14.  Date of Signature: *** 15. Name of Person Signing: ***   12. Signature and Professional Designation: ***     [facility] [id number]  Rev. 7/2015

## 2022-09-29 NOTE — PROGRESS NOTES
Admit Date: 9/21/2022    Subjective:     Clear cough fine otherwise     Objective:     Patient Vitals for the past 8 hrs:   BP Temp Temp src Pulse Resp SpO2 Weight   09/29/22 0719 (!) 147/56 97.8 °F (36.6 °C) Oral 68 16 96 % --   09/29/22 0550 -- -- -- -- -- -- 246 lb (111.6 kg)   09/29/22 0315 (!) 120/56 98.6 °F (37 °C) Oral 66 16 92 % --     I/O last 3 completed shifts: In: 120 [P.O.:120]  Out: 1850 [Urine:1850]  No intake/output data recorded. HEENT: Normal  NECK: Thyroid normal. No carotid bruit. No lymphphadenopathy. CVS: RRR  RS: Clear. No wheeze. No rhonchi. Good airflow bilaterally. ABD: Soft. Non tender. No mass. Normal BS. EXT: No edema. Non tender.  Dressing feet  NEURO: no focal deficit       Scheduled Meds:   miconazole nitrate   Topical BID    amLODIPine  5 mg Oral Daily    sodium chloride flush  5-40 mL IntraVENous BID    insulin lispro  0-4 Units SubCUTAneous TID WC    insulin lispro  0-4 Units SubCUTAneous Nightly    miconazole   Topical BID    ampicillin-sulbactam  3,000 mg IntraVENous Q6H    linezolid  600 mg Oral 2 times per day    enoxaparin  60 mg SubCUTAneous Daily    ascorbic acid  500 mg Oral Daily    atorvastatin  20 mg Oral Nightly    vitamin D  50,000 Units Oral Weekly    levothyroxine  50 mcg Oral Daily    magnesium oxide  400 mg Oral Every Other Day    meclizine  25 mg Oral TID    metoprolol tartrate  25 mg Oral BID    therapeutic multivitamin-minerals  1 tablet Oral Daily    pantoprazole  40 mg Oral Daily    phenazopyridine  200 mg Oral Daily    collagenase   Topical Daily     Continuous Infusions:   dextrose         CBC with Differential:    Lab Results   Component Value Date/Time    WBC 6.8 09/26/2022 03:42 AM    RBC 3.28 09/26/2022 03:42 AM    HGB 9.1 09/26/2022 03:42 AM    HCT 29.6 09/26/2022 03:42 AM     09/26/2022 03:42 AM    MCV 90.2 09/26/2022 03:42 AM    MCH 27.7 09/26/2022 03:42 AM    MCHC 30.7 09/26/2022 03:42 AM    RDW 14.1 09/26/2022 03:42 AM    SEGSPCT 80 01/04/2012 03:00 AM    BANDSPCT 1 11/27/2014 04:50 AM    LYMPHOPCT 11.6 09/21/2022 10:44 AM    MONOPCT 7.5 09/21/2022 10:44 AM    MYELOPCT 0.9 02/28/2018 05:21 AM    BASOPCT 0.5 09/21/2022 10:44 AM    MONOSABS 0.69 09/21/2022 10:44 AM    LYMPHSABS 1.07 09/21/2022 10:44 AM    EOSABS 0.28 09/21/2022 10:44 AM    BASOSABS 0.05 09/21/2022 10:44 AM     CMP:    Lab Results   Component Value Date/Time     09/29/2022 03:35 AM    K 3.2 09/29/2022 03:35 AM    K 6.2 09/21/2022 10:44 AM     09/29/2022 03:35 AM    CO2 20 09/29/2022 03:35 AM    BUN 24 09/29/2022 03:35 AM    CREATININE 2.3 09/29/2022 03:35 AM    GFRAA 33 09/29/2022 03:35 AM    LABGLOM 28 09/29/2022 03:35 AM    PROT 7.5 09/21/2022 10:44 AM    LABALBU 3.5 09/21/2022 10:44 AM    LABALBU 3.4 01/04/2012 03:00 AM    CALCIUM 9.0 09/29/2022 03:35 AM    BILITOT 0.3 09/21/2022 10:44 AM    ALKPHOS 97 09/21/2022 10:44 AM    AST 21 09/21/2022 10:44 AM    ALT 7 09/21/2022 10:44 AM     PT/INR:    Lab Results   Component Value Date/Time    PROTIME 15.8 09/28/2022 09:39 AM    PROTIME 12.2 12/30/2011 10:20 PM    INR 1.5 09/28/2022 09:39 AM       Assessment:     Principal Problem:    JUAN on CKD improved   Hyperkalemia due to above and supplement resolved   Right heal wound infection   HTN  Obesity  DM  Impaired mobility   Fungal dermatitis   Remote CVA   PVD   Hypothyroid   Hypomagnesemia       Plan:   K+ supplement ,cough syrup ,for arteriogram

## 2022-09-29 NOTE — PROGRESS NOTES
Dr Cassandra Hernandez called with Potassium of 3.2. Potassium held by nurses this am.  Dr Cassandra Hernandez made aware that patient doesn't have a recent CBC and last one resulted with a Hgb of 9.1. No need to repeat per Dr Cassandra Hernandez.

## 2022-09-29 NOTE — PLAN OF CARE
Problem: Chronic Conditions and Co-morbidities  Goal: Patient's chronic conditions and co-morbidity symptoms are monitored and maintained or improved  Outcome: Progressing     Problem: Discharge Planning  Goal: Discharge to home or other facility with appropriate resources  Outcome: Progressing  Flowsheets (Taken 9/29/2022 1316)  Discharge to home or other facility with appropriate resources: Identify barriers to discharge with patient and caregiver     Problem: Safety - Adult  Goal: Free from fall injury  Outcome: Progressing

## 2022-09-29 NOTE — LETTER
[place] Department Medicaid  CERTIFICATION OF NECESSITY  FOR NON-EMERGENCY TRANSPORTATION   BY GROUND AMBULANCE      Individual Information   1. Name: Cyril Liriano 2. [place] Medicaid Billing Number: ***   3. Address: [id number] [facility] [address]      Transportation Provider Information   4. Provider Name: ***   5. [place] Medicaid Provider Number: *** National Provider Identifier (NPI): ***     Certification  7. Criteria:  During transport, this individual requires:  [] Medical treatment or continuous     supervision by an EMT. [] The administration or regulation of oxygen by another person. [] Supervised protective restraint. 8. Period Beginning Date: ***   9. Length  [] Not more than {#:74842} day(s)  [] One Year     Additional Information Relevant to Certification   10. Comments or Explanations, If Necessary or Appropriate     ***     Certifying Practitioner Information   11. Name of Practitioner: ***   12. [place] Medicaid Provider Number, If Applicable: *** 13. National Provider Identifier (NPI): ***     Signature Information   14.  Date of Signature: *** 15. Name of Person Signing: ***   12. Signature and Professional Designation: ***     [facility] [id number]  Rev. 7/2015

## 2022-09-29 NOTE — CARE COORDINATION
9/29/22 Transition of Care update. Patient was transferred from [address] [place] [address] for arteriogram.  Discharge goal is a transfer to [facility] once medically stable. Patient will not need a pre-cert , HENS or COVID per liaison Nelson Blackman. Ambulance started an on the soft chart. It will need completed once discharge date is determined. ELSIE/MAEGAN to follow.     Electronically signed by ROHIT Sandhu on 9/29/2022 at 2:14 PM

## 2022-09-29 NOTE — PROGRESS NOTES
Discussion with the patient and son at the bedside regarding this procedure. This took place in the Cath Lab    Risk, benefits, and alternatives, were discussed with the patient including but not limited to bleeding, infection, arteriovenous nerve injury, myocardial infarction, respiratory failure, contrast reaction, contrast reaction leading to kidney failure and/or hemodialysis, injury to the good leg, injury to the bed late, distal embolization, emergency surgery, limb loss of either extremity, sensory or motor disturbance. All questions were answered according to their satisfaction and they wish to proceed.     Chip Conklin

## 2022-09-29 NOTE — PROGRESS NOTES
Notified Dr. SAEZ [facility] x 2 for DNR order. Patient's documents in chart.

## 2022-09-30 LAB
ANION GAP SERPL CALCULATED.3IONS-SCNC: 12 MMOL/L (ref 7–16)
ANION GAP SERPL CALCULATED.3IONS-SCNC: 12 MMOL/L (ref 7–16)
BUN BLDV-MCNC: 22 MG/DL (ref 6–23)
BUN BLDV-MCNC: 24 MG/DL (ref 6–23)
CALCIUM SERPL-MCNC: 9 MG/DL (ref 8.6–10.2)
CALCIUM SERPL-MCNC: 9.2 MG/DL (ref 8.6–10.2)
CHLORIDE BLD-SCNC: 103 MMOL/L (ref 98–107)
CHLORIDE BLD-SCNC: 106 MMOL/L (ref 98–107)
CO2: 20 MMOL/L (ref 22–29)
CO2: 20 MMOL/L (ref 22–29)
CREAT SERPL-MCNC: 2.3 MG/DL (ref 0.7–1.2)
CREAT SERPL-MCNC: 2.3 MG/DL (ref 0.7–1.2)
GFR AFRICAN AMERICAN: 33
GFR AFRICAN AMERICAN: 33
GFR NON-AFRICAN AMERICAN: 28 ML/MIN/1.73
GFR NON-AFRICAN AMERICAN: 28 ML/MIN/1.73
GLUCOSE BLD-MCNC: 137 MG/DL (ref 74–99)
GLUCOSE BLD-MCNC: 164 MG/DL (ref 74–99)
METER GLUCOSE: 149 MG/DL (ref 74–99)
METER GLUCOSE: 177 MG/DL (ref 74–99)
METER GLUCOSE: 178 MG/DL (ref 74–99)
POTASSIUM SERPL-SCNC: 3.3 MMOL/L (ref 3.5–5)
POTASSIUM SERPL-SCNC: 3.7 MMOL/L (ref 3.5–5)
SODIUM BLD-SCNC: 135 MMOL/L (ref 132–146)
SODIUM BLD-SCNC: 138 MMOL/L (ref 132–146)

## 2022-09-30 PROCEDURE — 6370000000 HC RX 637 (ALT 250 FOR IP): Performed by: INTERNAL MEDICINE

## 2022-09-30 PROCEDURE — 6360000002 HC RX W HCPCS: Performed by: INTERNAL MEDICINE

## 2022-09-30 PROCEDURE — 80048 BASIC METABOLIC PNL TOTAL CA: CPT

## 2022-09-30 PROCEDURE — 2580000003 HC RX 258: Performed by: INTERNAL MEDICINE

## 2022-09-30 PROCEDURE — 2140000000 HC CCU INTERMEDIATE R&B

## 2022-09-30 PROCEDURE — 36415 COLL VENOUS BLD VENIPUNCTURE: CPT

## 2022-09-30 PROCEDURE — 82962 GLUCOSE BLOOD TEST: CPT

## 2022-09-30 PROCEDURE — 6370000000 HC RX 637 (ALT 250 FOR IP): Performed by: NURSE PRACTITIONER

## 2022-09-30 PROCEDURE — 99232 SBSQ HOSP IP/OBS MODERATE 35: CPT | Performed by: INTERNAL MEDICINE

## 2022-09-30 RX ORDER — POTASSIUM CHLORIDE 20 MEQ/1
20 TABLET, EXTENDED RELEASE ORAL ONCE
Status: COMPLETED | OUTPATIENT
Start: 2022-09-30 | End: 2022-09-30

## 2022-09-30 RX ORDER — LINEZOLID 600 MG/1
600 TABLET, FILM COATED ORAL EVERY 12 HOURS SCHEDULED
Status: DISCONTINUED | OUTPATIENT
Start: 2022-09-30 | End: 2022-10-08 | Stop reason: HOSPADM

## 2022-09-30 RX ADMIN — LINEZOLID 600 MG: 600 TABLET, FILM COATED ORAL at 12:34

## 2022-09-30 RX ADMIN — AMLODIPINE BESYLATE 5 MG: 5 TABLET ORAL at 08:38

## 2022-09-30 RX ADMIN — AMPICILLIN SODIUM AND SULBACTAM SODIUM 3000 MG: 2; 1 INJECTION, POWDER, FOR SOLUTION INTRAMUSCULAR; INTRAVENOUS at 18:20

## 2022-09-30 RX ADMIN — POTASSIUM CHLORIDE 20 MEQ: 1500 TABLET, EXTENDED RELEASE ORAL at 12:22

## 2022-09-30 RX ADMIN — METOPROLOL TARTRATE 25 MG: 25 TABLET, FILM COATED ORAL at 08:38

## 2022-09-30 RX ADMIN — PANTOPRAZOLE SODIUM 40 MG: 40 TABLET, DELAYED RELEASE ORAL at 06:25

## 2022-09-30 RX ADMIN — AMPICILLIN SODIUM AND SULBACTAM SODIUM 3000 MG: 2; 1 INJECTION, POWDER, FOR SOLUTION INTRAMUSCULAR; INTRAVENOUS at 23:18

## 2022-09-30 RX ADMIN — ATORVASTATIN CALCIUM 20 MG: 20 TABLET, FILM COATED ORAL at 21:11

## 2022-09-30 RX ADMIN — GABAPENTIN 300 MG: 300 CAPSULE ORAL at 13:07

## 2022-09-30 RX ADMIN — METOPROLOL TARTRATE 25 MG: 25 TABLET, FILM COATED ORAL at 21:11

## 2022-09-30 RX ADMIN — GABAPENTIN 300 MG: 300 CAPSULE ORAL at 08:38

## 2022-09-30 RX ADMIN — LEVOTHYROXINE SODIUM 50 MCG: 0.05 TABLET ORAL at 06:25

## 2022-09-30 RX ADMIN — AMPICILLIN SODIUM AND SULBACTAM SODIUM 3000 MG: 2; 1 INJECTION, POWDER, FOR SOLUTION INTRAMUSCULAR; INTRAVENOUS at 12:34

## 2022-09-30 RX ADMIN — GABAPENTIN 300 MG: 300 CAPSULE ORAL at 21:11

## 2022-09-30 ASSESSMENT — PAIN SCALES - GENERAL: PAINLEVEL_OUTOF10: 0

## 2022-09-30 NOTE — PROGRESS NOTES
Department of Podiatry   Progress Note        Patient seen and evaluated at bedside this morning. Transferred to [facility] from [facility] for vascular intervention. . Doing well this am, no acute overnight events. States that his right heel is feeling better. No new pedal complaints. 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[address]     Femoral-popliteal atherosclerosis ([address] 9/13/2022    Hypertension     Ischemic stroke ([address] 03/06/2018    Pacemaker     Paraplegia ([address]     Sinus node dysfunction ([address]     Stroke (cerebrum) ([address] 02/27/2018    Thyroid disease     Ulcer of right heel and midfoot with fat layer exposed ([address] 8/15/2022       Past Surgical History:        Procedure Laterality Date    CARDIAC PACEMAKER PLACEMENT  12/19/2014    COLONOSCOPY      EYE SURGERY      KIDNEY REMOVAL Left 07/05/2022    CANCER [facility] removed    KNEE ARTHROSCOPY  right knee    PACEMAKER PLACEMENT      TX COLONOSCOPY FLX DX W/COLLJ SPEC WHEN PFRMD N/A 03/20/2018    COLONOSCOPY DIAGNOSTIC performed by Ace Park MD at [address] EGD PERCUTANEOUS PLACEMENT GASTROSTOMY TUBE N/A 03/29/2018    PEG TUBE/PT.  IN 5507 B performed by Ace Park MD at [address] EGD PERCUTANEOUS PLACEMENT GASTROSTOMY TUBE N/A 09/12/2018    EGD PEG TUBE PLACEMENT performed by Ace Park MD at [address] [place] [address]       Medications Prior to Admission:    Medications Prior to Admission: meclizine (ANTIVERT) 25 MG tablet, Take 25 mg by mouth three times daily  warfarin (COUMADIN) 6 MG tablet, Take 6 mg by mouth daily  Glucagon HCl (GLUCAGON EMERGENCY) 1 MG/ML SOLR, Inject 1 mg as directed as needed (HYPOGLYCEMIA)  glucose (GLUTOSE) 40 % GEL, Take 15 g by mouth as needed (HYPOGLYCEMIA)  ipratropium-albuterol (DUONEB) 0.5-2.5 (3) MG/3ML SOLN nebulizer solution, Inhale 1 vial into the lungs every 4 hours as needed (COUGHINGAND DYSPNEA)  potassium chloride (KLOR-CON M) 20 MEQ extended release tablet, Take 20 mEq by mouth daily  polyethylene glycol (GLYCOLAX) 17 g packet, Take 17 g by mouth daily as needed for Constipation  phenazopyridine (PYRIDIUM) 200 MG tablet, Take 200 mg by mouth daily  dextromethorphan-guaiFENesin (ROBITUSSIN-DM)  MG/5ML syrup, Take 5 mLs by mouth every 6 hours as needed for Cough  Cholecalciferol (VITAMIN D3) 1.25 MG (23411 UT) CAPS, Take 50,000 Units by mouth once a week **FRIDAYS**  loperamide (IMODIUM) 2 MG capsule, Take 2 mg by mouth daily as needed for Diarrhea  Multiple Vitamins-Minerals (THERAPEUTIC MULTIVITAMIN-MINERALS) tablet, Take 1 tablet by mouth daily  gabapentin (NEURONTIN) 300 MG capsule, Take 1 capsule by mouth nightly for 7 days. levothyroxine (SYNTHROID) 50 MCG tablet, Take 1 tablet by mouth in the morning. atorvastatin (LIPITOR) 20 MG tablet, Take 1 tablet by mouth nightly  magnesium oxide (MAG-OX) 400 (240 Mg) MG tablet, Take 1 tablet by mouth every other day  ascorbic acid (VITAMIN C) 500 MG tablet, Take 1 tablet by mouth in the morning. acetaminophen (TYLENOL) 325 MG tablet, Take 650 mg by mouth every 4 hours as needed (DISCOMFORT;PAIN/ELEV TEMP >100.4F)  pantoprazole (PROTONIX) 40 MG tablet, Take 40 mg by mouth daily  amLODIPine (NORVASC) 2.5 MG tablet, Take 1 tablet by mouth daily  metoprolol tartrate (LOPRESSOR) 25 MG tablet, Take 1 tablet by mouth 2 times daily  metFORMIN (GLUCOPHAGE) 1000 MG tablet, Take 1 tablet by mouth 2 times daily  omeprazole (PRILOSEC) 20 MG delayed release capsule, Take 1 capsule by mouth daily  furosemide (LASIX) 40 MG tablet, Take 0.5 tablets by mouth daily    Allergies:  Bee venom    Social History:   TOBACCO:   reports that he quit smoking about 20 years ago. His smoking use included cigars.  He has never used smokeless tobacco.  ETOH:   reports no history of alcohol use. DRUGS:   Social History     Substance and Sexual Activity   Drug Use No       Family History:       Problem Relation Age of Onset    Heart Disease Mother     Stroke Father          REVIEW OF SYSTEMS:    All pertinent review of systems both positive and negative discussed in HPI      LOWER EXTREMITY EXAMINATION   - Previous physical exam results below:  - Dressings today remain clean, dry, intact. VASCULAR:  DP and PT pulses weakly palpable 1+ to the bilateral LE. 1-2+ pitting edema appreciated as well. NEUROLOGIC:  Protective sensation diminished to distal aspect of bilateral lower extremities. DERM:  Right pressure heel ulceration with overlying eschar noted. Fat layer exposed. No clinical signs of infection. Does not probe deep at this time. Measures roughly 8 cm x 9 cm x unknown depth. No malodor. Little drainage. Superficial wound noted to patient's anterior Right ankle as well. Not clinically infected    MUSCULOSKELETAL: Tenderness to palpation of the right heel wound. Negative TTP of bilateral calves at this time. MSK strength testing deferred this afternoon          Media Information  Document Information    Clinical Consent:  Wound      09/28/2022 07:54   Attached To:    Hospital Encounter on 9/21/22     Source Information    MD Davon Solo 4s Intermediate       CONSULTS:  IP CONSULT TO INFECTIOUS DISEASES    MEDICATION:  Scheduled Meds:   amLODIPine  5 mg Oral Daily    atorvastatin  20 mg Oral Nightly    levothyroxine  50 mcg Oral Daily    pantoprazole  40 mg Oral QAM AC    metoprolol tartrate  25 mg Oral BID    gabapentin  300 mg Oral TID    insulin lispro  0-8 Units SubCUTAneous TID WC    insulin lispro  0-4 Units SubCUTAneous Nightly     Continuous Infusions:      PRN Meds:.acetaminophen    RADIOLOGY:  No orders to display       Vitals:    BP (!) 130/100   Pulse 63   Temp (!) 96.3 °F (35.7 °C) (Temporal) Resp 16   Ht 6' 2\" (1.88 m)   Wt 254 lb (115.2 kg)   SpO2 99%   BMI 32.61 kg/m²     LABS:   No results for input(s): WBC, HGB, HCT, PLT in the last 72 hours. 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  - Abx: Unasyn and Linezolid per ID  - Prevalon offloading boots applied to the bilateral lower extremities  - Dressing: Santyl ordered. Will be applied to patient's right heel. QOD dressing changes. CT right foot- No CT evidence of osteomyelitis. - Will follow  - Discussed patient with Dr. Leopold Rockers  - Will continue to follow patient while they are in-house. Thank you for the opportunity to take part in the patient's care. Please do not hesitate to call for any questions or concerns.

## 2022-09-30 NOTE — PLAN OF CARE
Problem: Chronic Conditions and Co-morbidities  Goal: Patient's chronic conditions and co-morbidity symptoms are monitored and maintained or improved  Outcome: Progressing  Flowsheets  Taken 9/30/2022 0725 by Tiffanie Varela 34 - Patient's Chronic Conditions and Co-Morbidity Symptoms are Monitored and Maintained or Improved: Monitor and assess patient's chronic conditions and comorbid symptoms for stability, deterioration, or improvement       Problem: Discharge Planning  Goal: Discharge to home or other facility with appropriate resources  Outcome: Progressing  Flowsheets  Taken 9/30/2022 0725 by Linda Prado RN  Discharge to home or other facility with appropriate resources: Identify barriers to discharge with patient and caregiver       Problem: Safety - Adult  Goal: Free from fall injury  Outcome: Progressing  Flowsheets (Taken 9/30/2022 0737)  Free From Fall Injury: Instruct family/caregiver on patient safety     Problem: Skin/Tissue Integrity  Goal: Absence of new skin breakdown  Description: 1. Monitor for areas of redness and/or skin breakdown  2. Assess vascular access sites hourly  3. Every 4-6 hours minimum:  Change oxygen saturation probe site  4. Every 4-6 hours:  If on nasal continuous positive airway pressure, respiratory therapy assess nares and determine need for appliance change or resting period.   Outcome: Progressing

## 2022-09-30 NOTE — CONSULTS
[facility] CONSULT NOTE    Reason for Consult: Right heel wound infection  Requested by: Lalo Ramírez MD     Chief complaint: Arteriogram    History Obtained From: Patient and EMR     HISTORY Catina              The patient is a 67 y.o. male with history of atrial fibrillation, DM, stroke, sinus node dysfunction, pacemaker in situ, factor V deficiency, renal cancer s/p left nephrectomy and left adrenalectomy in 07/2022, transferred to [facility] on 09/29 for arteriogram from [facility] where he initially presented on 09/29 after a fall while transferring from his wheelchair. He was seen by Dr. Iman Vega for right heel pressure ulcer present since his last hospital admission in 07/2022 for nephrectomy and was started on ampicillin-sulbactam and linezolid based on previous wound cultures in August showing Proteus mirabilis, MRSA, corynebacterium and Streptococcus agalactiae. CT right foot and ankle showed no evidence of osteomyelitis. Inflammatory markers were elevated with ESR of 93 mm/hr and CRP of 8 mg/dL. Wound culture on 0922 showed heavy growth of MRSA, E coli (non-ESBL; sensitive to fluoroquinolones and cotrimoxazole), Proteus mirabilis (non-ESBL; resistant to fluoroquinolones and cotrimoxazole). On transfer, he remained afebrile and hemodynamically stable with no leukocytosis. ID service was subsequently consulted for resumption of antibiotic therapy.     Past Medical History  Past Medical History:   Diagnosis Date    Atherosclerosis of native artery of right lower extremity with ulceration of heel ([address] 8/15/2022    Atrial fibrillation (HCC)     Cancer (HCC)     kidney    Chronic renal failure, stage 3b ([address] 8/15/2022    Diabetes mellitus ([address]     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[address]     Femoral-popliteal atherosclerosis ([address] 9/13/2022    Hypertension     Ischemic stroke ([address] 03/06/2018    Pacemaker     Paraplegia ([address]     Sinus node dysfunction ([address]     Stroke (cerebrum) ([address] 02/27/2018    Thyroid disease     Ulcer of right heel and midfoot with fat layer exposed [facility] 8/15/2022       Current Facility-Administered Medications   Medication Dose Route Frequency Provider Last Rate Last Admin    amLODIPine (NORVASC) tablet 5 mg  5 mg Oral Daily Meeta Martin MD   5 mg at 09/30/22 8001    atorvastatin (LIPITOR) tablet 20 mg  20 mg Oral Nightly Meeta Martin MD   20 mg at 09/29/22 2219    levothyroxine (SYNTHROID) tablet 50 mcg  50 mcg Oral Daily Meeta Martin MD   50 mcg at 09/30/22 3680    pantoprazole (PROTONIX) tablet 40 mg  40 mg Oral QAM AC Meeta Martin MD   40 mg at 09/30/22 3477    metoprolol tartrate (LOPRESSOR) tablet 25 mg  25 mg Oral BID Meeta Martin MD   25 mg at 09/30/22 7569    acetaminophen (TYLENOL) tablet 650 mg  650 mg Oral Q4H PRN Meeta Martin MD        gabapentin (NEURONTIN) capsule 300 mg  300 mg Oral TID Meeta Martin MD   300 mg at 09/30/22 6972    insulin lispro (HUMALOG) injection vial 0-8 Units  0-8 Units SubCUTAneous TID WC Meeta Martin MD        insulin lispro (HUMALOG) injection vial 0-4 Units  0-4 Units SubCUTAneous Nightly Meeta Martin MD           Allergies   Allergen Reactions    Bee Venom Anaphylaxis       Surgical History  Past Surgical History:   Procedure Laterality Date    CARDIAC PACEMAKER PLACEMENT  12/19/2014    COLONOSCOPY      EYE SURGERY      KIDNEY REMOVAL Left 07/05/2022    CANCER [facility] removed    KNEE ARTHROSCOPY  right knee    PACEMAKER PLACEMENT      IA COLONOSCOPY FLX DX W/COLLJ SPEC WHEN PFRMD N/A 03/20/2018    COLONOSCOPY DIAGNOSTIC performed by Beltran Beatty MD at [address] EGD PERCUTANEOUS PLACEMENT GASTROSTOMY TUBE N/A 03/29/2018    PEG TUBE/PT.  IN 5507 B performed by Beltran Beatty MD at [address] EGD PERCUTANEOUS PLACEMENT GASTROSTOMY TUBE N/A 09/12/2018    EGD PEG TUBE PLACEMENT performed by Beltran Beatty MD at [address] [facility] History  Social History     Socioeconomic History    Marital status:     Number of children: 3   Tobacco Use    Smoking status: Former     Types: Cigars     Quit date:      Years since quittin.7    Smokeless tobacco: Never   Vaping Use    Vaping Use: Never used   Substance and Sexual Activity    Alcohol use: No    Drug use: No       Family Medical History  Family History   Problem Relation Age of Onset    Heart Disease Mother     Stroke Father        Review of Systems:  Constitutional: No fever, no chills  Eyes: No vision changes, no retroorbital pain  ENT: No hearing changes, no ear pain  Respiratory: No cough, no dyspnea  Cardiovascular: No chest pain, no palpitations  Gastrointestinal: No abdominal pain, no diarrhea  Genitourinary: No dysuria, no hematuria  Integumentary: No rash, no itching  Musculoskeletal: No muscle pain, no joint pain  Neurologic: No headache, no numbness in extremities    Physical Examination:  Vitals:    22 2320 22 0411 22 0757 22 1058   BP: (!) 144/96 136/67 (!) 130/100 104/65   Pulse: 65 63 63 59   Resp: 18 16 16 18   Temp: 98.2 °F (36.8 °C) 98 °F (36.7 °C) (!) 96.3 °F (35.7 °C) 97 °F (36.1 °C)   TempSrc: Oral Oral Temporal Oral   SpO2: 99% 96% 99% 96%   Weight:       Height:         Constitutional: Alert, not in distress  Eyes: Sclerae anicteric, no conjunctival erythema  ENT: No buccal lesion, no pharyngeal exudates  Neck: No nuchal rigidity, no cervical adenopathy  Lungs: Clear breath sounds, no crackles, no wheezes  Heart: Regular rate and rhythm, no murmurs  Abdomen:  Bowel sounds present, soft, nontender  Skin: Warm and dry, no active dermatoses  Musculoskeletal: Right heel pressure ulcer malodorous with overlying eschar, as follow:      Laboratory:  Lab Results   Component Value Date    WBC 6.8 2022    WBC 6.6 2022    WBC 9.3 2022    HGB 9.1 (L) 2022    HCT 29.6 (L) 2022    MCV 90.2 2022     2022     Lab Results   Component Value Date    NEUTROABS 7.13 2022 NEUTROABS 4.66 08/22/2022    NEUTROABS 4.96 08/16/2022     Lab Results   Component Value Date    CRP 8.5 (H) 09/22/2022    CRP 7.2 (H) 08/01/2022    CRP 4.9 (H) 10/03/2018     No results found for: CRPHS  Lab Results   Component Value Date    SEDRATE 93 (H) 09/22/2022    SEDRATE 87 (H) 08/01/2022    SEDRATE 77 (H) 10/03/2018     Lab Results   Component Value Date    ALT 7 09/21/2022    AST 21 09/21/2022    ALKPHOS 97 09/21/2022    BILITOT 0.3 09/21/2022     Lab Results   Component Value Date/Time     09/30/2022 09:00 AM    K 3.3 09/30/2022 09:00 AM    K 6.2 09/21/2022 10:44 AM     09/30/2022 09:00 AM    CO2 20 09/30/2022 09:00 AM    BUN 22 09/30/2022 09:00 AM    CREATININE 2.3 09/30/2022 09:00 AM    CREATININE 2.3 09/29/2022 03:35 AM    CREATININE 2.3 09/28/2022 02:25 AM    GFRAA 33 09/30/2022 09:00 AM    LABGLOM 28 09/30/2022 09:00 AM    GLUCOSE 137 09/30/2022 09:00 AM    GLUCOSE 297 01/04/2012 03:00 AM    PROT 7.5 09/21/2022 10:44 AM    LABALBU 3.5 09/21/2022 10:44 AM    LABALBU 3.4 01/04/2012 03:00 AM    CALCIUM 9.0 09/30/2022 09:00 AM    BILITOT 0.3 09/21/2022 10:44 AM    ALKPHOS 97 09/21/2022 10:44 AM    AST 21 09/21/2022 10:44 AM    ALT 7 09/21/2022 10:44 AM       Radiology: CT right foot and ankle (09/22): No CT evidence of osteomyelitis. MRI could better evaluate for osteomyelitis   if clinically indicated. Mild generalized subcutaneous edema/skin thickening. No drainable abscess,   allowing for limitations of noncontrast technique.          Microbiology:   Organism   Date Value Ref Range Status   09/22/2022 Proteus mirabilis (A)  Final   09/22/2022 Escherichia coli (A)  Final   09/22/2022 Staphylococcus aureus (A)  Final     WOUND/ABSCESS   Date Value Ref Range Status   09/22/2022 (A)  Final    Mixed ana cristina also isolated, including:  Corynebacteria     09/22/2022 Heavy growth  Final   09/22/2022 Heavy growth  Final   09/22/2022   Final    Heavy growth  Methicillin resistant Staph aureus isolated. Most Methicillin  resistant Staphylococcus are usually resistant to multiple  antibiotics including other B-Lactams, Aminoglycosides,  Macrolides, Clindamycin and Tetracycline. Contact isolation  is indicated. This isolate is presumed to be resistant based on the  detection of inducible Clindamycin resistance. Clindamycin  may still be effective in some patients. Assessment:  Right heel pressure ulcer, necrotic    Plan:  Resume ampicillin-sulbactam 3g q6h and linezolid 600 mg q12h. Follow up Podiatry recommendations. Follow up surgical plans, if any. Continue local wound care. Thank you for involving me in the care of Denise Hagen. Dr. Niels Pimentel will continue to follow. Please do not hesitate to call for any questions or concerns.     Electronically signed by Soha Truong MD on 9/30/2022 at 11:37 AM

## 2022-09-30 NOTE — PROGRESS NOTES
Department of Internal Medicine  Nephrology Progress Note      Events reviewed. Patient was transferred from [facility] for vascular intervention. SUBJECTIVE: We are following Mr. Varinder Whipple for CKD and hyperkalemia. He has no complaints today. PHYSICAL EXAM:      Vitals:    VITALS:  BP (!) 130/100   Pulse 63   Temp (!) 96.3 °F (35.7 °C) (Temporal)   Resp 16   Ht 6' 2\" (1.88 m)   Wt 254 lb (115.2 kg)   SpO2 99%   BMI 32.61 kg/m²   24HR INTAKE/OUTPUT:    Intake/Output Summary (Last 24 hours) at 9/30/2022 0946  Last data filed at 9/30/2022 0858  Gross per 24 hour   Intake 180 ml   Output 850 ml   Net -670 ml         Constitutional:  Well built. Obese. Mild distress. HEENT:  BEERLA. JVD not seen. Respiratory:  Breath sounds normal. No rales or rhonchi  Cardiovascular/Edema:  Heart sounds normal. No murmur. Gastrointestinal:  Bowel sounds normal. No oranomgaly  Neurologic:  A/O x 3. No focal deficit. Skin:  Dry skin. Normal turgor. Other:  Non healing ulcer in right foot.     Scheduled Meds:   amLODIPine  5 mg Oral Daily    atorvastatin  20 mg Oral Nightly    levothyroxine  50 mcg Oral Daily    pantoprazole  40 mg Oral QAM AC    metoprolol tartrate  25 mg Oral BID    gabapentin  300 mg Oral TID    insulin lispro  0-8 Units SubCUTAneous TID WC    insulin lispro  0-4 Units SubCUTAneous Nightly     Continuous Infusions:      PRN Meds:.acetaminophen      DATA:    CBC:   Lab Results   Component Value Date/Time    WBC 6.8 09/26/2022 03:42 AM    RBC 3.28 09/26/2022 03:42 AM    HGB 9.1 09/26/2022 03:42 AM    HCT 29.6 09/26/2022 03:42 AM    MCV 90.2 09/26/2022 03:42 AM    MCH 27.7 09/26/2022 03:42 AM    MCHC 30.7 09/26/2022 03:42 AM    RDW 14.1 09/26/2022 03:42 AM     09/26/2022 03:42 AM    MPV 9.8 09/26/2022 03:42 AM     CMP:    Lab Results   Component Value Date/Time     09/29/2022 03:35 AM    K 3.2 09/29/2022 03:35 AM    K 6.2 09/21/2022 10:44 AM     09/29/2022 03:35 AM    CO2 20 09/29/2022 03:35 AM BUN 24 09/29/2022 03:35 AM    CREATININE 2.3 09/29/2022 03:35 AM    GFRAA 33 09/29/2022 03:35 AM    LABGLOM 28 09/29/2022 03:35 AM    GLUCOSE 140 09/29/2022 03:35 AM    GLUCOSE 297 01/04/2012 03:00 AM    PROT 7.5 09/21/2022 10:44 AM    LABALBU 3.5 09/21/2022 10:44 AM    LABALBU 3.4 01/04/2012 03:00 AM    CALCIUM 9.0 09/29/2022 03:35 AM    BILITOT 0.3 09/21/2022 10:44 AM    ALKPHOS 97 09/21/2022 10:44 AM    AST 21 09/21/2022 10:44 AM    ALT 7 09/21/2022 10:44 AM     Magnesium:    Lab Results   Component Value Date/Time    MG 1.7 09/27/2022 02:37 AM     Phosphorus:    Lab Results   Component Value Date/Time    PHOS 3.2 09/24/2022 03:00 AM       BRIEF SUMMARY OF INITIAL CONSULT:    Briefly, Mr. Antoinette Bernabe  is a 48-year-old male with a PMH of CKD stage IIIb, without proteinuria, baseline creatinine 2.0-2.1 mg/dL, secondary to nephrosclerosis, kidney cancer, s/p total left nephrectomy on 7/5/22 at [facility], hypertension, type II diabetes mellitus, HFpEF 55% with stage II DD, PAF, factor V Leyden deficiency, hyperlipidemia, sick sinus syndrome s/p pacemaker placement, CVA, hypothyroidism, PAF on chronic anticoagulation with warfarin, who was admitted on September 21, 2022 after a fall at home, his labs were significant for potassium 6.2 mmol/L, BUN 57 mg/dL, creatinine 2.8 mg/dL, reason for this consult. His home medications include lasix, lisinopril    Resolved:    Hyperkalemia, 2/2 ACE inhibition, decrease GFR and on potassium supplementation,  improved with lokelma  JUAN on CKD, volume responsive pre-renal JUAN, 2/2 to overt diuresis, decrease oral intake in the setting of ACE inhibition. Resolved, renal function has returned to baseline, last creatinine 2.1 mg/dL. We will discontinue IV fluids. IMPRESSION/RECOMMENDATIONS:        CKD stage IV, without proteinuria, baseline creatinine 2.1-2.4 mg/dL, secondary to nephrosclerosis and solitary kidney. Renal function stable at baseline.    Left kidney cancer, s/p total left nephrectomy 7/5/22 at [facility]  Hypokalemia, likely secondary to poor oral intake. To replace. HTN, on metoprolol   HFpEF 55% with stage II DD, pro-, Lasix on hold  Hypomagnesemia, 2/2 diuretics, levels improved  -------------------------------------------------  S/p fall  Nonhealing right heel wound, ID following, on linezolid and ampicillin-sulbactam. For arteriogram with possible intervention today.    PAF, on metoprolol and warfarin  Sick sinus syndrome, s/p pacemaker placement   Type 2 DM, on metformin at home, on hold, SSI for now  HLD, on atorvastatin  Hypothyroidism, on levothyroxine  History of CVA  Normocytic anemia, ferritin 178, iron saturation 18%, folate 8.7, B12: 270     Plan:       Replace potassium   Continue to hold Lasix  Continue to hold metformin  Continue to monitor renal function, BMP 4 PM  Continue to monitor magnesium levels    Electronically signed by MELANIE Miller CNP on 9/30/2022 at 12:15 PM

## 2022-09-30 NOTE — PROGRESS NOTES
Admit Date: 9/29/2022    Subjective:     Transferred from [facility] yesterday for arteriogram lower extremities due to heal wound infection   Sleeping comfortably     Objective:     Patient Vitals for the past 8 hrs:   BP Temp Temp src Pulse Resp SpO2   09/30/22 0411 136/67 98 °F (36.7 °C) Oral 63 16 96 %     I/O last 3 completed shifts: In: 120 [P.O.:120]  Out: 850 [Urine:850]  No intake/output data recorded. HEENT: Normal  NECK: Thyroid normal. No carotid bruit. No lymphphadenopathy. CVS: RRR  RS: Clear. No wheeze. No rhonchi. ABD: Soft. Non tender. No mass. Normal BS.obese   EXT: No edema. Non tender.  Dressing feet       Scheduled Meds:   amLODIPine  5 mg Oral Daily    atorvastatin  20 mg Oral Nightly    levothyroxine  50 mcg Oral Daily    pantoprazole  40 mg Oral QAM AC    metoprolol tartrate  25 mg Oral BID    gabapentin  300 mg Oral TID    insulin lispro  0-8 Units SubCUTAneous TID WC    insulin lispro  0-4 Units SubCUTAneous Nightly     Continuous Infusions:    CBC with Differential:    Lab Results   Component Value Date/Time    WBC 6.8 09/26/2022 03:42 AM    RBC 3.28 09/26/2022 03:42 AM    HGB 9.1 09/26/2022 03:42 AM    HCT 29.6 09/26/2022 03:42 AM     09/26/2022 03:42 AM    MCV 90.2 09/26/2022 03:42 AM    MCH 27.7 09/26/2022 03:42 AM    MCHC 30.7 09/26/2022 03:42 AM    RDW 14.1 09/26/2022 03:42 AM    SEGSPCT 80 01/04/2012 03:00 AM    BANDSPCT 1 11/27/2014 04:50 AM    LYMPHOPCT 11.6 09/21/2022 10:44 AM    MONOPCT 7.5 09/21/2022 10:44 AM    MYELOPCT 0.9 02/28/2018 05:21 AM    BASOPCT 0.5 09/21/2022 10:44 AM    MONOSABS 0.69 09/21/2022 10:44 AM    LYMPHSABS 1.07 09/21/2022 10:44 AM    EOSABS 0.28 09/21/2022 10:44 AM    BASOSABS 0.05 09/21/2022 10:44 AM     CMP:    Lab Results   Component Value Date/Time     09/29/2022 03:35 AM    K 3.2 09/29/2022 03:35 AM    K 6.2 09/21/2022 10:44 AM     09/29/2022 03:35 AM    CO2 20 09/29/2022 03:35 AM    BUN 24 09/29/2022 03:35 AM    CREATININE 2.3 09/29/2022 03:35 AM    GFRAA 33 09/29/2022 03:35 AM    LABGLOM 28 09/29/2022 03:35 AM    PROT 7.5 09/21/2022 10:44 AM    LABALBU 3.5 09/21/2022 10:44 AM    LABALBU 3.4 01/04/2012 03:00 AM    CALCIUM 9.0 09/29/2022 03:35 AM    BILITOT 0.3 09/21/2022 10:44 AM    ALKPHOS 97 09/21/2022 10:44 AM    AST 21 09/21/2022 10:44 AM    ALT 7 09/21/2022 10:44 AM     PT/INR:    Lab Results   Component Value Date/Time    PROTIME 15.8 09/28/2022 09:39 AM    PROTIME 12.2 12/30/2011 10:20 PM    INR 1.5 09/28/2022 09:39 AM       Assessment:     Active Problems:    JUAN on CKD improved   Hyperkalemia due to above and supplement resolved   Right heal wound infection   HTN  Obesity  DM  Impaired mobility   Fungal dermatitis   Remote CVA   PVD   Hypothyroid   Hypomagnesemia       Plan:   Wound care ,ATB per ID, await arteriogram ,continue support

## 2022-09-30 NOTE — PROGRESS NOTES
Notified Dr. Werner Amezquita of patient coughing with swallowing foods and liquids. Orders given for swallow study but patient refuses. Dr. Werner Amezquita notified.

## 2022-09-30 NOTE — DISCHARGE SUMMARY
Discharge Summary    Date: 9/30/2022  Patient Name: Brendan Mejía    YOB: 1945     Age: 67 y.o. Admit Date: 9/21/2022  Discharge Date: 9/29/2022  Discharge Condition: Stable    Admission Diagnosis  Hyperkalemia [E87.5]; Dizziness [R42]; Elevated troponin [R77.8]; Acute kidney injury superimposed on chronic kidney disease ([address] [N17.9, N18.9]      Discharge Diagnosis  Principal Problem:    Hyperkalemia  Resolved Problems:    * No resolved hospital problems. [facility] Stay  Narrative of Hospital Course:  [facility] Admitted from ER after a fall at [place] [employer] known chronic heal wound infected was in JUAN on CKD seen by renal ,ID,podiatry as well as vascular treated with IV fluid ATB wound care transferred to [facility] main for arteriogram     Consultants:  IP CONSULT TO INTERNAL MEDICINE  IP CONSULT TO PODIATRY  IP CONSULT TO IV TEAM  IP CONSULT TO VASCULAR SURGERY  IP CONSULT TO INFECTIOUS DISEASES  IP CONSULT TO NEPHROLOGY    Surgeries/procedures Performed:      Treatments:            Discharge Plan/Disposition:  Discharge/ReAdmit    Hospital/Incidental Findings Requiring Follow Up:    Patient Instructions:    Diet: Regular Diet    Activity:Activity as Tolerated  For number of days (if applicable): Other Instructions:    Provider Follow-Up:   No follow-ups on file.      Significant Diagnostic Studies:    Recent Labs:  Admission on 09/29/2022  ABO/Rh                                        Date: 09/29/2022  Value: AB NEG        Status: Final  Antibody Screen                               Date: 09/29/2022  Value: NEG           Status: Final  Meter Glucose                                 Date: 09/29/2022  Value: 168 (A)     Ref range: 74 - 99 mg/dL      Status: Final  Meter Glucose                                 Date: 09/29/2022  Value: 152 (A)     Ref range: 74 - 99 mg/dL      Status: Final  Meter Glucose                                 Date: 09/30/2022  Value: 149 (A)     Ref range: 74 - 99 mg/dL Status: Final  Sodium                                        Date: 09/30/2022  Value: 135         Ref range: 132 - 146 mmol/L   Status: Final  Potassium                                     Date: 09/30/2022  Value: 3.3 (A)     Ref range: 3.5 - 5.0 mmol/L   Status: Final  Chloride                                      Date: 09/30/2022  Value: 103         Ref range: 98 - 107 mmol/L    Status: Final  CO2                                           Date: 09/30/2022  Value: 20 (A)      Ref range: 22 - 29 mmol/L     Status: Final  Anion Gap                                     Date: 09/30/2022  Value: 12          Ref range: 7 - 16 mmol/L      Status: Final  Glucose                                       Date: 09/30/2022  Value: 137 (A)     Ref range: 74 - 99 mg/dL      Status: Final  BUN                                           Date: 09/30/2022  Value: 22          Ref range: 6 - 23 mg/dL       Status: Final  Creatinine                                    Date: 09/30/2022  Value: 2.3 (A)     Ref range: 0.7 - 1.2 mg/dL    Status: Final  GFR Non-                      Date: 09/30/2022  Value: 28          Ref range: >=60 mL/min/1.73   Status: Final                Comment: Chronic Kidney Disease: less than 60 ml/min/1.73 sq.m. Kidney Failure: less than 15 ml/min/1.73 sq.m. Results valid for patients 18 years and older. GFR                           Date: 09/30/2022  Value: 33            Status: Final  Calcium                                       Date: 09/30/2022  Value: 9.0         Ref range: 8.6 - 10.2 mg/dL   Status: Final  Meter Glucose                                 Date: 09/30/2022  Value: 178 (A)     Ref range: 74 - 99 mg/dL      Status: Final  ------------  Admission on 09/21/2022, Discharged on 09/29/2022  No results displayed because visit has over 200 results.     ------------    Radiology last 7 days:  CT ANKLE RIGHT WO CONTRAST    Result Date: 9/23/2022  This report is for right foot and right ankle. No CT evidence of osteomyelitis. MRI could better evaluate for osteomyelitis if clinically indicated. Mild generalized subcutaneous edema/skin thickening. No drainable abscess, allowing for limitations of noncontrast technique. Other incidental findings as above. XR CHEST PORTABLE    Result Date: 9/27/2022  No acute process. CT FOOT RIGHT WO CONTRAST    Result Date: 9/23/2022  This report is for right foot and right ankle. No CT evidence of osteomyelitis. MRI could better evaluate for osteomyelitis if clinically indicated. Mild generalized subcutaneous edema/skin thickening. No drainable abscess, allowing for limitations of noncontrast technique. Other incidental findings as above.         [unfilled]    Discharge Medications    Discharge Medication List as of 9/29/2022 12:01 PM        Discharge Medication List as of 9/29/2022 12:01 PM        Discharge Medication List as of 9/29/2022 12:01 PM    CONTINUE these medications which have NOT CHANGED    meclizine (ANTIVERT) 25 MG tablet  Take 25 mg by mouth three times daily  Historical Med    warfarin (COUMADIN) 6 MG tablet  Take 6 mg by mouth daily  Historical Med    Glucagon HCl (GLUCAGON EMERGENCY) 1 MG/ML SOLR  Inject 1 mg as directed as needed (HYPOGLYCEMIA)  Historical Med    glucose (GLUTOSE) 40 % GEL  Take 15 g by mouth as needed (HYPOGLYCEMIA)  Historical Med    ipratropium-albuterol (DUONEB) 0.5-2.5 (3) MG/3ML SOLN nebulizer solution  Inhale 1 vial into the lungs every 4 hours as needed (COUGHINGAND DYSPNEA)  Historical Med    potassium chloride (KLOR-CON M) 20 MEQ extended release tablet  Take 20 mEq by mouth daily  Historical Med    polyethylene glycol (GLYCOLAX) 17 g packet  Take 17 g by mouth daily as needed for Constipation  Historical Med    phenazopyridine (PYRIDIUM) 200 MG tablet  Take 200 mg by mouth daily  Historical Med    dextromethorphan-guaiFENesin (ROBITUSSIN-DM)  MG/5ML syrup  Take 5 mLs by mouth every 6 hours as needed for Cough  Historical Med    Cholecalciferol (VITAMIN D3) 1.25 MG (73031 UT) CAPS  Take 50,000 Units by mouth once a week **FRIDAYS**  Historical Med    loperamide (IMODIUM) 2 MG capsule  Take 2 mg by mouth daily as needed for Diarrhea  Historical Med    Multiple Vitamins-Minerals (THERAPEUTIC MULTIVITAMIN-MINERALS) tablet  Take 1 tablet by mouth daily  Historical Med    gabapentin (NEURONTIN) 300 MG capsule  Take 1 capsule by mouth nightly for 7 days. , Disp-90 capsule, R-3  DC to SNF    levothyroxine (SYNTHROID) 50 MCG tablet  Take 1 tablet by mouth in the morning., Disp-30 tablet, R-3  Normal    atorvastatin (LIPITOR) 20 MG tablet  Take 1 tablet by mouth nightly, Disp-30 tablet, R-3  DC to SNF    magnesium oxide (MAG-OX) 400 (240 Mg) MG tablet  Take 1 tablet by mouth every other day, Disp-30 tablet  DC to SNF    ascorbic acid (VITAMIN C) 500 MG tablet  Take 1 tablet by mouth in the morning., Disp-30 tablet, R-3  Normal    acetaminophen (TYLENOL) 325 MG tablet  Take 650 mg by mouth every 4 hours as needed (DISCOMFORT;PAIN/ELEV TEMP >100.4F)  Historical Med    pantoprazole (PROTONIX) 40 MG tablet  Take 40 mg by mouth daily  Historical Med    amLODIPine (NORVASC) 2.5 MG tablet  Take 1 tablet by mouth daily, Disp-90 tablet, R-1  Normal    metoprolol tartrate (LOPRESSOR) 25 MG tablet  Take 1 tablet by mouth 2 times daily, Disp-180 tablet, R-1  Normal    metFORMIN (GLUCOPHAGE) 1000 MG tablet  Take 1 tablet by mouth 2 times daily, Disp-180 tablet, R-1  Normal    omeprazole (PRILOSEC) 20 MG delayed release capsule  Take 1 capsule by mouth daily, Disp-90 capsule, R-1  Normal    furosemide (LASIX) 40 MG tablet  Take 0.5 tablets by mouth daily, Disp-45 tablet, R-2  Normal          Discharge Medication List as of 9/29/2022 12:01 PM    STOP taking these medications    enoxaparin (LOVENOX) 60 MG/0.6ML  Comments:  Reason for Stopping:    potassium chloride (KLOR-CON M) 20 MEQ TBCR extended release tablet  Comments:  Reason for Stopping:    lovastatin (MEVACOR) 20 MG tablet  Comments:  Reason for Stopping:    blood glucose test strips (EZ SMART BLOOD GLUCOSE TEST) strip  Comments:  Reason for Stopping:          Time Spent on Discharge:  30 minutes were spent in patient examination, evaluation, counseling as well as medication reconciliation, prescriptions for required medications, discharge plan, and follow up.     Electronically signed by Lisandro Marrero MD on 9/30/22 at 11:37 AM EDT

## 2022-10-01 ENCOUNTER — APPOINTMENT (OUTPATIENT)
Dept: GENERAL RADIOLOGY | Age: 77
DRG: 253 | End: 2022-10-01
Attending: INTERNAL MEDICINE
Payer: MEDICARE

## 2022-10-01 LAB
ALBUMIN SERPL-MCNC: 2.7 G/DL (ref 3.5–5.2)
ALP BLD-CCNC: 74 U/L (ref 40–129)
ALT SERPL-CCNC: 12 U/L (ref 0–40)
ANION GAP SERPL CALCULATED.3IONS-SCNC: 13 MMOL/L (ref 7–16)
AST SERPL-CCNC: 21 U/L (ref 0–39)
BASOPHILS ABSOLUTE: 0.03 E9/L (ref 0–0.2)
BASOPHILS RELATIVE PERCENT: 0.4 % (ref 0–2)
BILIRUB SERPL-MCNC: <0.2 MG/DL (ref 0–1.2)
BUN BLDV-MCNC: 24 MG/DL (ref 6–23)
CALCIUM SERPL-MCNC: 8.8 MG/DL (ref 8.6–10.2)
CHLORIDE BLD-SCNC: 108 MMOL/L (ref 98–107)
CO2: 22 MMOL/L (ref 22–29)
CREAT SERPL-MCNC: 2.3 MG/DL (ref 0.7–1.2)
EOSINOPHILS ABSOLUTE: 0.6 E9/L (ref 0.05–0.5)
EOSINOPHILS RELATIVE PERCENT: 7.3 % (ref 0–6)
GFR AFRICAN AMERICAN: 33
GFR NON-AFRICAN AMERICAN: 28 ML/MIN/1.73
GLUCOSE BLD-MCNC: 117 MG/DL (ref 74–99)
HCT VFR BLD CALC: 27.9 % (ref 37–54)
HEMOGLOBIN: 8.7 G/DL (ref 12.5–16.5)
IMMATURE GRANULOCYTES #: 0.03 E9/L
IMMATURE GRANULOCYTES %: 0.4 % (ref 0–5)
LYMPHOCYTES ABSOLUTE: 1 E9/L (ref 1.5–4)
LYMPHOCYTES RELATIVE PERCENT: 12.1 % (ref 20–42)
MAGNESIUM: 1.6 MG/DL (ref 1.6–2.6)
MCH RBC QN AUTO: 28.8 PG (ref 26–35)
MCHC RBC AUTO-ENTMCNC: 31.2 % (ref 32–34.5)
MCV RBC AUTO: 92.4 FL (ref 80–99.9)
METER GLUCOSE: 143 MG/DL (ref 74–99)
METER GLUCOSE: 165 MG/DL (ref 74–99)
METER GLUCOSE: 176 MG/DL (ref 74–99)
MONOCYTES ABSOLUTE: 0.73 E9/L (ref 0.1–0.95)
MONOCYTES RELATIVE PERCENT: 8.8 % (ref 2–12)
NEUTROPHILS ABSOLUTE: 5.87 E9/L (ref 1.8–7.3)
NEUTROPHILS RELATIVE PERCENT: 71 % (ref 43–80)
PDW BLD-RTO: 14.9 FL (ref 11.5–15)
PLATELET # BLD: 245 E9/L (ref 130–450)
PMV BLD AUTO: 9.6 FL (ref 7–12)
POTASSIUM SERPL-SCNC: 3 MMOL/L (ref 3.5–5)
RBC # BLD: 3.02 E12/L (ref 3.8–5.8)
SODIUM BLD-SCNC: 143 MMOL/L (ref 132–146)
TOTAL PROTEIN: 6.4 G/DL (ref 6.4–8.3)
WBC # BLD: 8.3 E9/L (ref 4.5–11.5)

## 2022-10-01 PROCEDURE — 6370000000 HC RX 637 (ALT 250 FOR IP): Performed by: NURSE PRACTITIONER

## 2022-10-01 PROCEDURE — 94640 AIRWAY INHALATION TREATMENT: CPT

## 2022-10-01 PROCEDURE — 6370000000 HC RX 637 (ALT 250 FOR IP): Performed by: INTERNAL MEDICINE

## 2022-10-01 PROCEDURE — 2140000000 HC CCU INTERMEDIATE R&B

## 2022-10-01 PROCEDURE — 71045 X-RAY EXAM CHEST 1 VIEW: CPT

## 2022-10-01 PROCEDURE — 36415 COLL VENOUS BLD VENIPUNCTURE: CPT

## 2022-10-01 PROCEDURE — 2580000003 HC RX 258: Performed by: INTERNAL MEDICINE

## 2022-10-01 PROCEDURE — 80053 COMPREHEN METABOLIC PANEL: CPT

## 2022-10-01 PROCEDURE — 83735 ASSAY OF MAGNESIUM: CPT

## 2022-10-01 PROCEDURE — 6360000002 HC RX W HCPCS: Performed by: INTERNAL MEDICINE

## 2022-10-01 PROCEDURE — 85025 COMPLETE CBC W/AUTO DIFF WBC: CPT

## 2022-10-01 PROCEDURE — 82962 GLUCOSE BLOOD TEST: CPT

## 2022-10-01 RX ORDER — POTASSIUM CHLORIDE 20 MEQ/1
40 TABLET, EXTENDED RELEASE ORAL ONCE
Status: COMPLETED | OUTPATIENT
Start: 2022-10-01 | End: 2022-10-01

## 2022-10-01 RX ORDER — IPRATROPIUM BROMIDE AND ALBUTEROL SULFATE 2.5; .5 MG/3ML; MG/3ML
1 SOLUTION RESPIRATORY (INHALATION) 4 TIMES DAILY
Status: DISCONTINUED | OUTPATIENT
Start: 2022-10-01 | End: 2022-10-08 | Stop reason: HOSPADM

## 2022-10-01 RX ADMIN — LEVOTHYROXINE SODIUM 50 MCG: 0.05 TABLET ORAL at 05:53

## 2022-10-01 RX ADMIN — AMPICILLIN SODIUM AND SULBACTAM SODIUM 3000 MG: 2; 1 INJECTION, POWDER, FOR SOLUTION INTRAMUSCULAR; INTRAVENOUS at 12:17

## 2022-10-01 RX ADMIN — GABAPENTIN 300 MG: 300 CAPSULE ORAL at 09:28

## 2022-10-01 RX ADMIN — METOPROLOL TARTRATE 25 MG: 25 TABLET, FILM COATED ORAL at 21:21

## 2022-10-01 RX ADMIN — AMLODIPINE BESYLATE 5 MG: 5 TABLET ORAL at 09:28

## 2022-10-01 RX ADMIN — GABAPENTIN 300 MG: 300 CAPSULE ORAL at 21:22

## 2022-10-01 RX ADMIN — AMPICILLIN SODIUM AND SULBACTAM SODIUM 3000 MG: 2; 1 INJECTION, POWDER, FOR SOLUTION INTRAMUSCULAR; INTRAVENOUS at 18:52

## 2022-10-01 RX ADMIN — ATORVASTATIN CALCIUM 20 MG: 20 TABLET, FILM COATED ORAL at 21:22

## 2022-10-01 RX ADMIN — PANTOPRAZOLE SODIUM 40 MG: 40 TABLET, DELAYED RELEASE ORAL at 05:53

## 2022-10-01 RX ADMIN — IPRATROPIUM BROMIDE AND ALBUTEROL SULFATE 1 AMPULE: .5; 2.5 SOLUTION RESPIRATORY (INHALATION) at 15:44

## 2022-10-01 RX ADMIN — AMPICILLIN SODIUM AND SULBACTAM SODIUM 3000 MG: 2; 1 INJECTION, POWDER, FOR SOLUTION INTRAMUSCULAR; INTRAVENOUS at 05:53

## 2022-10-01 RX ADMIN — IPRATROPIUM BROMIDE AND ALBUTEROL SULFATE 1 AMPULE: .5; 2.5 SOLUTION RESPIRATORY (INHALATION) at 19:17

## 2022-10-01 RX ADMIN — POTASSIUM CHLORIDE 40 MEQ: 1500 TABLET, EXTENDED RELEASE ORAL at 09:34

## 2022-10-01 RX ADMIN — LINEZOLID 600 MG: 600 TABLET, FILM COATED ORAL at 21:22

## 2022-10-01 RX ADMIN — METOPROLOL TARTRATE 25 MG: 25 TABLET, FILM COATED ORAL at 09:29

## 2022-10-01 RX ADMIN — LINEZOLID 600 MG: 600 TABLET, FILM COATED ORAL at 09:29

## 2022-10-01 RX ADMIN — IPRATROPIUM BROMIDE AND ALBUTEROL SULFATE 1 AMPULE: .5; 2.5 SOLUTION RESPIRATORY (INHALATION) at 14:14

## 2022-10-01 ASSESSMENT — PAIN SCALES - GENERAL
PAINLEVEL_OUTOF10: 0
PAINLEVEL_OUTOF10: 0
PAINLEVEL_OUTOF10: 1
PAINLEVEL_OUTOF10: 0

## 2022-10-01 NOTE — PATIENT CARE CONFERENCE
P Quality Flow/Interdisciplinary Rounds Progress Note        Quality Flow Rounds held on October 1, 2022    Disciplines Attending:  Bedside Nurse, , , and Nursing Unit Leadership    Janice Reno was admitted on 9/29/2022 12:57 PM    Anticipated Discharge Date:       Disposition:    Fish Score:  Fish Scale Score: 15    Readmission Risk              Risk of Unplanned Readmission:  21           Discussed patient goal for the day, patient clinical progression, and barriers to discharge.   The following Goal(s) of the Day/Commitment(s) have been identified:  Labs - Report Results      Bertrand Ramírez RN  October 1, 2022

## 2022-10-01 NOTE — PLAN OF CARE
Problem: Chronic Conditions and Co-morbidities  Goal: Patient's chronic conditions and co-morbidity symptoms are monitored and maintained or improved  10/1/2022 1900 by Nichelle Adair RN  Outcome: Progressing  Flowsheets (Taken 10/1/2022 1645)  Care Plan - Patient's Chronic Conditions and Co-Morbidity Symptoms are Monitored and Maintained or Improved:   Monitor and assess patient's chronic conditions and comorbid symptoms for stability, deterioration, or improvement   Collaborate with multidisciplinary team to address chronic and comorbid conditions and prevent exacerbation or deterioration  10/1/2022 1021 by Nichelle Adair RN  Outcome: Progressing  Flowsheets (Taken 10/1/2022 0915)  Care Plan - Patient's Chronic Conditions and Co-Morbidity Symptoms are Monitored and Maintained or Improved:   Monitor and assess patient's chronic conditions and comorbid symptoms for stability, deterioration, or improvement   Collaborate with multidisciplinary team to address chronic and comorbid conditions and prevent exacerbation or deterioration     Problem: Discharge Planning  Goal: Discharge to home or other facility with appropriate resources  10/1/2022 1900 by Nichelle Adair RN  Outcome: Progressing  Flowsheets (Taken 10/1/2022 1645)  Discharge to home or other facility with appropriate resources:   Identify barriers to discharge with patient and caregiver   Arrange for needed discharge resources and transportation as appropriate  10/1/2022 1021 by Nichelle Adair RN  Outcome: Progressing  Flowsheets (Taken 10/1/2022 0915)  Discharge to home or other facility with appropriate resources:   Identify barriers to discharge with patient and caregiver   Arrange for needed discharge resources and transportation as appropriate     Problem: Safety - Adult  Goal: Free from fall injury  10/1/2022 1900 by Nichelle Adair RN  Outcome: Progressing  10/1/2022 1021 by Nichelle Adair RN  Outcome: Progressing  Flowsheets (Taken 10/1/2022 0915)  Free From Fall Injury: Instruct family/caregiver on patient safety     Problem: ABCDS Injury Assessment  Goal: Absence of physical injury  10/1/2022 1900 by Cathi Victoria RN  Outcome: Progressing  10/1/2022 1021 by Cathi Victoria RN  Outcome: Progressing  Flowsheets (Taken 10/1/2022 0915)  Absence of Physical Injury: Implement safety measures based on patient assessment     Problem: Skin/Tissue Integrity  Goal: Absence of new skin breakdown  10/1/2022 1900 by Cathi Victoria RN  Outcome: Progressing  10/1/2022 1021 by Cathi Victoria RN  Outcome: Progressing     Problem: Pain  Goal: Verbalizes/displays adequate comfort level or baseline comfort level  10/1/2022 1900 by Cathi Victoria RN  Outcome: Progressing  Flowsheets  Taken 10/1/2022 1645  Verbalizes/displays adequate comfort level or baseline comfort level:   Encourage patient to monitor pain and request assistance   Assess pain using appropriate pain scale  Taken 10/1/2022 1121  Verbalizes/displays adequate comfort level or baseline comfort level:   Encourage patient to monitor pain and request assistance   Assess pain using appropriate pain scale  10/1/2022 1021 by Cathi Victoria RN  Outcome: Progressing  Flowsheets (Taken 10/1/2022 0742)  Verbalizes/displays adequate comfort level or baseline comfort level:   Encourage patient to monitor pain and request assistance   Assess pain using appropriate pain scale

## 2022-10-01 NOTE — PROGRESS NOTES
[address] [place] [address] [facility]  Progress Note      No chief complaint on file. SUBJECTIVE:  Patient is tolerating medications. No reported adverse drug reactions. No nausea, vomiting, diarrhea. Foot sore. Review of systems:  As stated above in the chief complaint, otherwise negative. Medications:  Scheduled Meds:   ampicillin-sulbactam  3,000 mg IntraVENous Q6H    linezolid  600 mg Oral 2 times per day    amLODIPine  5 mg Oral Daily    atorvastatin  20 mg Oral Nightly    levothyroxine  50 mcg Oral Daily    pantoprazole  40 mg Oral QAM AC    metoprolol tartrate  25 mg Oral BID    gabapentin  300 mg Oral TID    insulin lispro  0-8 Units SubCUTAneous TID WC    insulin lispro  0-4 Units SubCUTAneous Nightly     Continuous Infusions:  PRN Meds:acetaminophen    OBJECTIVE:  BP (!) 140/65   Pulse 66   Temp 98.4 °F (36.9 °C) (Temporal)   Resp 18   Ht 6' 2\" (1.88 m)   Wt 254 lb (115.2 kg)   SpO2 96%   BMI 32.61 kg/m²   Temp  Av.3 °F (36.3 °C)  Min: 96.3 °F (35.7 °C)  Max: 98.4 °F (36.9 °C)  Constitutional: The patient is awake, alert, and oriented. Skin: Warm and dry. No rashes were noted. HEENT: Round and reactive pupils. Moist mucous membranes. No ulcerations or thrush. Neck: Supple to movements. Chest: No use of accessory muscles to breathe. Symmetrical expansion. No wheezing, crackles or rhonchi. Cardiovascular: S1 and S2 are rhythmic and regular. No murmurs appreciated. Abdomen: Positive bowel sounds to auscultation. Benign to palpation. No masses felt. No hepatosplenomegaly. Genitourinary: mlae  Extremities: No clubbing, no cyanosis, no edema.  Right foot with dry dressing  Lines: peripheral    Laboratory and Tests Review:  Lab Results   Component Value Date    WBC 8.3 10/01/2022    WBC 6.8 2022    WBC 6.6 2022    HGB 8.7 (L) 10/01/2022    HCT 27.9 (L) 10/01/2022    MCV 92.4 10/01/2022     10/01/2022     Lab Results   Component Value Date NEUTROABS 5.87 10/01/2022    NEUTROABS 7.13 09/21/2022    NEUTROABS 4.66 08/22/2022     No results found for: CRPHS  Lab Results   Component Value Date    ALT 7 09/21/2022    AST 21 09/21/2022    ALKPHOS 97 09/21/2022    BILITOT 0.3 09/21/2022     Lab Results   Component Value Date/Time     09/30/2022 04:18 PM    K 3.7 09/30/2022 04:18 PM    K 6.2 09/21/2022 10:44 AM     09/30/2022 04:18 PM    CO2 20 09/30/2022 04:18 PM    BUN 24 09/30/2022 04:18 PM    CREATININE 2.3 09/30/2022 04:18 PM    CREATININE 2.3 09/30/2022 09:00 AM    CREATININE 2.3 09/29/2022 03:35 AM    GFRAA 33 09/30/2022 04:18 PM    LABGLOM 28 09/30/2022 04:18 PM    GLUCOSE 164 09/30/2022 04:18 PM    GLUCOSE 297 01/04/2012 03:00 AM    PROT 7.5 09/21/2022 10:44 AM    LABALBU 3.5 09/21/2022 10:44 AM    LABALBU 3.4 01/04/2012 03:00 AM    CALCIUM 9.2 09/30/2022 04:18 PM    BILITOT 0.3 09/21/2022 10:44 AM    ALKPHOS 97 09/21/2022 10:44 AM    AST 21 09/21/2022 10:44 AM    ALT 7 09/21/2022 10:44 AM     Lab Results   Component Value Date    CRP 8.5 (H) 09/22/2022    CRP 7.2 (H) 08/01/2022    CRP 4.9 (H) 10/03/2018     Lab Results   Component Value Date    SEDRATE 93 (H) 09/22/2022    SEDRATE 87 (H) 08/01/2022    SEDRATE 77 (H) 10/03/2018     Radiology:      Microbiology:   Lab Results   Component Value Date/Time    BC 5 Days no growth 08/01/2022 02:46 PM    BC 5 Days- no growth 10/01/2018 11:15 AM    BC 5 Days- no growth 09/23/2018 08:55 AM    ORG Proteus mirabilis 09/22/2022 04:30 PM    ORG Escherichia coli 09/22/2022 04:30 PM    ORG Staphylococcus aureus 09/22/2022 04:30 PM     Lab Results   Component Value Date/Time    BLOODCULT2 5 Days no growth 08/01/2022 03:01 PM    BLOODCULT2 5 Days- no growth 10/01/2018 11:15 AM    BLOODCULT2 5 Days- no growth 09/23/2018 09:10 AM    ORG Proteus mirabilis 09/22/2022 04:30 PM    ORG Escherichia coli 09/22/2022 04:30 PM    ORG Staphylococcus aureus 09/22/2022 04:30 PM     WOUND/ABSCESS   Date Value Ref Range Status   09/22/2022 (A)  Final    Mixed ana cristina also isolated, including:  Corynebacteria     09/22/2022 Heavy growth  Final   09/22/2022 Heavy growth  Final   09/22/2022   Final    Heavy growth  Methicillin resistant Staph aureus isolated. Most Methicillin  resistant Staphylococcus are usually resistant to multiple  antibiotics including other B-Lactams, Aminoglycosides,  Macrolides, Clindamycin and Tetracycline. Contact isolation  is indicated. This isolate is presumed to be resistant based on the  detection of inducible Clindamycin resistance. Clindamycin  may still be effective in some patients. Smear, Respiratory   Date Value Ref Range Status   10/02/2018   Final    Group 6: <25 PMN's/LPF and <25 Epithelial cells/LPF  Rare Polymorphonuclear leukocytes  Epithelial cells not seen  No organisms seen           Component Value Date/Time    AFBCX No growth after 6 weeks of incubation. 10/03/2018 1230    FUNGSM No Fungal elements seen 10/03/2018 1230    LABFUNG No growth after 4 weeks of incubation. 10/03/2018 1230     CULTURE, RESPIRATORY   Date Value Ref Range Status   10/02/2018 Oral Pharyngeal Ana Cristina absent (A)  Final   10/02/2018 Rare growth  Final     No results found for: CXCATHTIP  Body Fluid Culture, Sterile   Date Value Ref Range Status   10/03/2018 Growth not present  Final     No results found for: CXSURG  Urine Culture, Routine   Date Value Ref Range Status   11/07/2018 >100,000 CFU/ml  Final   09/23/2018 Growth not present  Final   02/28/2018   Final    ,000 CFU/mL  Mixed ana cristina isolated. Further workup and sensitivity testing  is not routinely indicated and will not be performed.   Mixed ana cristina isolated includes:  Mixed gram positive organisms  Gram negative rods       MRSA Culture Only   Date Value Ref Range Status   10/01/2018 Methicillin resistant Staph aureus not isolated  Final   12/27/2014 Methicillin resistant Staph aureus not isolated  Final           Assessment:  Right heel pressure ulcer, necrotic     Plan:  Resume ampicillin-sulbactam 3g q6h and linezolid 600 mg q12h. Follow up Podiatry recommendations. Follow up surgical plans, if any. Continue local wound care. Thank you for involving me in the care of Melanie Frazier. Please do not hesitate to call for any questions or concerns. Electronically signed by MELANIE Churchill CNP on 10/1/2022 at 7:23 AM Pt seen and examined. Above discussed agree with advanced practice nurse. Labs, cultures, and radiographs reviewed. Face to Face encounter occurred. Changes made as necessary.      Renetta Haskins MD

## 2022-10-01 NOTE — PROGRESS NOTES
Department of Internal Medicine  Nephrology Progress Note      Events reviewed. Patient was transferred from [facility] for vascular intervention. SUBJECTIVE: We are following Mr. Zee García for CKD and hyperkalemia. He has no complaints today. PHYSICAL EXAM:      Vitals:    VITALS:  BP (!) 153/68   Pulse 66   Temp 98.1 °F (36.7 °C)   Resp 18   Ht 6' 2\" (1.88 m)   Wt 254 lb (115.2 kg)   SpO2 96%   BMI 32.61 kg/m²   24HR INTAKE/OUTPUT:    Intake/Output Summary (Last 24 hours) at 10/1/2022 0918  Last data filed at 10/1/2022 0546  Gross per 24 hour   Intake 312.73 ml   Output 1350 ml   Net -1037.27 ml       Constitutional:  Well built. Obese. Mild distress. HEENT:  BEERLA. JVD not seen. Respiratory:  Breath sounds normal. No rales or rhonchi  Cardiovascular/Edema:  Heart sounds normal. No murmur. Gastrointestinal:  Bowel sounds normal. No oranomgaly  Neurologic:  A/O x 3. No focal deficit. Skin:  Dry skin. Normal turgor. Other:  Non healing ulcer in right foot.     Scheduled Meds:   ampicillin-sulbactam  3,000 mg IntraVENous Q6H    linezolid  600 mg Oral 2 times per day    amLODIPine  5 mg Oral Daily    atorvastatin  20 mg Oral Nightly    levothyroxine  50 mcg Oral Daily    pantoprazole  40 mg Oral QAM AC    metoprolol tartrate  25 mg Oral BID    gabapentin  300 mg Oral TID    insulin lispro  0-8 Units SubCUTAneous TID WC    insulin lispro  0-4 Units SubCUTAneous Nightly     Continuous Infusions:      PRN Meds:.acetaminophen      DATA:    CBC:   Lab Results   Component Value Date/Time    WBC 8.3 10/01/2022 05:08 AM    RBC 3.02 10/01/2022 05:08 AM    HGB 8.7 10/01/2022 05:08 AM    HCT 27.9 10/01/2022 05:08 AM    MCV 92.4 10/01/2022 05:08 AM    MCH 28.8 10/01/2022 05:08 AM    MCHC 31.2 10/01/2022 05:08 AM    RDW 14.9 10/01/2022 05:08 AM     10/01/2022 05:08 AM    MPV 9.6 10/01/2022 05:08 AM     CMP:    Lab Results   Component Value Date/Time     10/01/2022 05:08 AM    K 3.0 10/01/2022 05:08 AM    K 6.2 09/21/2022 10:44 AM     10/01/2022 05:08 AM    CO2 22 10/01/2022 05:08 AM    BUN 24 10/01/2022 05:08 AM    CREATININE 2.3 10/01/2022 05:08 AM    GFRAA 33 10/01/2022 05:08 AM    LABGLOM 28 10/01/2022 05:08 AM    GLUCOSE 117 10/01/2022 05:08 AM    GLUCOSE 297 01/04/2012 03:00 AM    PROT 6.4 10/01/2022 05:08 AM    LABALBU 2.7 10/01/2022 05:08 AM    LABALBU 3.4 01/04/2012 03:00 AM    CALCIUM 8.8 10/01/2022 05:08 AM    BILITOT <0.2 10/01/2022 05:08 AM    ALKPHOS 74 10/01/2022 05:08 AM    AST 21 10/01/2022 05:08 AM    ALT 12 10/01/2022 05:08 AM     Magnesium:    Lab Results   Component Value Date/Time    MG 1.6 10/01/2022 05:08 AM     Phosphorus:    Lab Results   Component Value Date/Time    PHOS 3.2 09/24/2022 03:00 AM       BRIEF SUMMARY OF INITIAL CONSULT:    Briefly, Mr. Mary Salas  is a 59-year-old male with a PMH of CKD stage IIIb, without proteinuria, baseline creatinine 2.0-2.1 mg/dL, secondary to nephrosclerosis, kidney cancer, s/p total left nephrectomy on 7/5/22 at [facility], hypertension, type II diabetes mellitus, HFpEF 55% with stage II DD, PAF, factor V Leyden deficiency, hyperlipidemia, sick sinus syndrome s/p pacemaker placement, CVA, hypothyroidism, PAF on chronic anticoagulation with warfarin, who was admitted on September 21, 2022 after a fall at home, his labs were significant for potassium 6.2 mmol/L, BUN 57 mg/dL, creatinine 2.8 mg/dL, reason for this consult. His home medications include lasix, lisinopril    Resolved:    Hyperkalemia, 2/2 ACE inhibition, decrease GFR and on potassium supplementation,  improved with lokelma  JUAN on CKD, volume responsive pre-renal JUAN, 2/2 to overt diuresis, decrease oral intake in the setting of ACE inhibition. Resolved, renal function has returned to baseline, last creatinine 2.1 mg/dL. We will discontinue IV fluids.     IMPRESSION/RECOMMENDATIONS:        CKD stage IV, without proteinuria, baseline creatinine 2.1-2.4 mg/dL, secondary to nephrosclerosis and solitary kidney. Renal function stable at baseline. Left kidney cancer, s/p total left nephrectomy 7/5/22 at [facility]  Hypokalemia, likely secondary to poor oral intake. To replace. HTN, on metoprolol   HFpEF 55% with stage II DD, pro-, Lasix on hold  Hypomagnesemia, 2/2 diuretics, levels improved  -------------------------------------------------  S/p fall  Nonhealing right heel wound, ID following, on linezolid and ampicillin-sulbactam. For arteriogram with possible intervention today.    PAF, on metoprolol and warfarin  Sick sinus syndrome, s/p pacemaker placement   Type 2 DM, on metformin at home, on hold, SSI for now  HLD, on atorvastatin  Hypothyroidism, on levothyroxine  History of CVA  Normocytic anemia, ferritin 178, iron saturation 18%, folate 8.7, B12: 270     Plan:       Replace potassium   Continue to hold Lasix  Continue to hold metformin  Continue to monitor renal function  Continue to monitor magnesium levels    Electronically signed by MELANIE Mart CNP on 10/1/2022 at 9:18 AM

## 2022-10-01 NOTE — PROGRESS NOTES
Subjective:  Patient was seen on the floor earlier this morning  His sister was at bedside  Admission details noted  Data reviewed in detail with his sister  Pain control adequate    Objective:  /68   Pulse 83   Temp 97.8 °F (36.6 °C) (Temporal)   Resp 24   Ht 6' 2\" (1.88 m)   Wt 254 lb (115.2 kg)   SpO2 94%   BMI 32.61 kg/m²   Somnolent but arousable  Looks pale  Chronically ill-appearing  Oral mucosa moist  Neck no adenopathy  Heart:  RRR, no murmurs, gallops, or rubs.   Lungs: Diffuse anterior wheezes noted  Abd: bowel sounds present, nontender, no masses  Abdomen is somewhat distended but nontender  Left nephrectomy scar is healing well  Extrem: Right lower extremity heel is heavily wrapped with a bandage    Data reviewed    Assessment:  Right heel wound infection in a diabetic patient  Recent left nephrectomy For cancer  Acute kidney injury at the time of admission  Ischemia right lower extremity  Resolved hyperkalemia  Hypokalemic this morning  Patient Active Problem List   Diagnosis    Hypertension    Hyperlipidemia with target LDL less than 100    Impaired mobility and ADLs    Cardiac pacemaker in situ    Cerebrovascular accident (CVA) determined by clinical assessment ([address]    Left renal mass    Class 2 severe obesity due to excess calories with serious comorbidity and body mass index (BMI) of 35.0 to 35.9 in [facility])    Acquired hypothyroidism    H/O deep venous thrombosis    Type 2 diabetes mellitus without complication, without long-term current use of insulin (HCC)    Stage 3 chronic kidney disease (HCC)    Ulcer of right heel and midfoot with fat layer exposed ([address]    Atherosclerosis of native artery of right lower extremity with ulceration of heel (HCC)    Status post nephrectomy    Chronic renal failure, stage 3b (HCC)    Necrotic eschar (HCC)    Diabetic ulcer of left heel associated with type 2 diabetes mellitus, with fat layer exposed ([address]    Femoral-popliteal atherosclerosis ([facility]    Hyperkalemia    Peripheral arterial disease ([facility]       Plan:  Check chest x-ray  Add to DuoNeb treatments  Antibiotics per ID currently on Unasyn  Angiogram soon  Questions answered to her satisfaction        Nikunj Lopez MD  3:56 PM  10/1/2022

## 2022-10-01 NOTE — PROGRESS NOTES
Department of Podiatry   Progress Note        Patient seen and evaluated at bedside this morning. Doing well this am, no acute overnight events. No new pedal complaints. Continue conservative care to right foot wound. Past Medical History:        Diagnosis Date    Atherosclerosis of native artery of right lower extremity with ulceration of heel ([facility] 8/15/2022    Atrial fibrillation (HCC)     Cancer (HCC)     kidney    Chronic renal failure, stage 3b ([facility] 8/15/2022    Diabetes mellitus ([facility]     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[facility]     Femoral-popliteal atherosclerosis ([facility] 9/13/2022    Hypertension     Ischemic stroke ([facility] 03/06/2018    Pacemaker     Paraplegia ([facility]     Sinus node dysfunction ([facility]     Stroke (cerebrum) ([facility] 02/27/2018    Thyroid disease     Ulcer of right heel and midfoot with fat layer exposed ([facility] 8/15/2022       Past Surgical History:        Procedure Laterality Date    CARDIAC PACEMAKER PLACEMENT  12/19/2014    COLONOSCOPY      EYE SURGERY      KIDNEY REMOVAL Left 07/05/2022    CANCER [facility] removed    KNEE ARTHROSCOPY  right knee    PACEMAKER PLACEMENT      [place] COLONOSCOPY FLX DX W/COLLJ SPEC WHEN PFRMD N/A 03/20/2018    COLONOSCOPY DIAGNOSTIC performed by Connie Bolton MD at [address] EGD PERCUTANEOUS PLACEMENT GASTROSTOMY TUBE N/A 03/29/2018    PEG TUBE/PT.  IN 5507 B performed by Connie Bolton MD at [address] EGD PERCUTANEOUS PLACEMENT GASTROSTOMY TUBE N/A 09/12/2018    EGD PEG TUBE PLACEMENT performed by Connie Bolton MD at [address] [place] [address]       Medications Prior to Admission:    Medications Prior to Admission: meclizine (ANTIVERT) 25 MG tablet, Take 25 mg by mouth three times daily  warfarin (COUMADIN) 6 MG tablet, Take 6 mg by mouth daily  Glucagon HCl (GLUCAGON EMERGENCY) 1 MG/ML SOLR, Inject 1 mg as directed as needed (HYPOGLYCEMIA)  glucose (GLUTOSE) 40 % GEL, Take 15 g by mouth as needed (HYPOGLYCEMIA)  ipratropium-albuterol (DUONEB) 0.5-2.5 (3) MG/3ML SOLN nebulizer solution, Inhale 1 vial into the lungs every 4 hours as needed (COUGHINGAND DYSPNEA)  potassium chloride (KLOR-CON M) 20 MEQ extended release tablet, Take 20 mEq by mouth daily  polyethylene glycol (GLYCOLAX) 17 g packet, Take 17 g by mouth daily as needed for Constipation  phenazopyridine (PYRIDIUM) 200 MG tablet, Take 200 mg by mouth daily  dextromethorphan-guaiFENesin (ROBITUSSIN-DM)  MG/5ML syrup, Take 5 mLs by mouth every 6 hours as needed for Cough  Cholecalciferol (VITAMIN D3) 1.25 MG (66608 UT) CAPS, Take 50,000 Units by mouth once a week **FRIDAYS**  loperamide (IMODIUM) 2 MG capsule, Take 2 mg by mouth daily as needed for Diarrhea  Multiple Vitamins-Minerals (THERAPEUTIC MULTIVITAMIN-MINERALS) tablet, Take 1 tablet by mouth daily  gabapentin (NEURONTIN) 300 MG capsule, Take 1 capsule by mouth nightly for 7 days. levothyroxine (SYNTHROID) 50 MCG tablet, Take 1 tablet by mouth in the morning. atorvastatin (LIPITOR) 20 MG tablet, Take 1 tablet by mouth nightly  magnesium oxide (MAG-OX) 400 (240 Mg) MG tablet, Take 1 tablet by mouth every other day  ascorbic acid (VITAMIN C) 500 MG tablet, Take 1 tablet by mouth in the morning. acetaminophen (TYLENOL) 325 MG tablet, Take 650 mg by mouth every 4 hours as needed (DISCOMFORT;PAIN/ELEV TEMP >100.4F)  pantoprazole (PROTONIX) 40 MG tablet, Take 40 mg by mouth daily  amLODIPine (NORVASC) 2.5 MG tablet, Take 1 tablet by mouth daily  metoprolol tartrate (LOPRESSOR) 25 MG tablet, Take 1 tablet by mouth 2 times daily  metFORMIN (GLUCOPHAGE) 1000 MG tablet, Take 1 tablet by mouth 2 times daily  omeprazole (PRILOSEC) 20 MG delayed release capsule, Take 1 capsule by mouth daily  furosemide (LASIX) 40 MG tablet, Take 0.5 tablets by mouth daily    Allergies:  Bee venom    Social History:   TOBACCO:   reports that he quit smoking about 20 years ago. His smoking use included cigars.  He has never used smokeless tobacco.  ETOH:   reports no history of alcohol use.  DRUGS:   Social History     Substance and Sexual Activity   Drug Use No       Family History:       Problem Relation Age of Onset    Heart Disease Mother     Stroke Father          REVIEW OF SYSTEMS:    All pertinent review of systems both positive and negative discussed in HPI    Previous Exa:  LOWER EXTREMITY EXAMINATION   - Previous physical exam results below:  - Dressings today remain clean, dry, intact. VASCULAR:  DP and PT pulses weakly palpable 1+ to the bilateral LE. 1-2+ pitting edema appreciated as well. NEUROLOGIC:  Protective sensation diminished to distal aspect of bilateral lower extremities. DERM:  Right pressure heel ulceration with overlying eschar noted. Fat layer exposed. No clinical signs of infection. Does not probe deep at this time. Measures roughly 8 cm x 9 cm x unknown depth. No malodor. Little drainage. Superficial wound noted to patient's anterior Right ankle as well. Not clinically infected    MUSCULOSKELETAL: Tenderness to palpation of the right heel wound. Negative TTP of bilateral calves at this time. MSK strength testing deferred this afternoon          Media Information  Document Information    Clinical Consent:  Wound      09/28/2022 07:54   Attached To:    Hospital Encounter on 9/21/22     Source Information    MD [facility] Intermediate       CONSULTS:  IP CONSULT TO INFECTIOUS DISEASES    MEDICATION:  Scheduled Meds:   ipratropium-albuterol  1 ampule Inhalation 4x daily    ampicillin-sulbactam  3,000 mg IntraVENous Q6H    linezolid  600 mg Oral 2 times per day    amLODIPine  5 mg Oral Daily    atorvastatin  20 mg Oral Nightly    levothyroxine  50 mcg Oral Daily    pantoprazole  40 mg Oral QAM AC    metoprolol tartrate  25 mg Oral BID    gabapentin  300 mg Oral TID    insulin lispro  0-8 Units SubCUTAneous TID WC    insulin lispro  0-4 Units SubCUTAneous Nightly     Continuous Infusions:      PRN Meds:.acetaminophen    RADIOLOGY:  XR CHEST PORTABLE (Results Pending)       Vitals:    /68   Pulse 60   Temp 97.8 °F (36.6 °C) (Temporal)   Resp 16   Ht 6' 2\" (1.88 m)   Wt 254 lb (115.2 kg)   SpO2 94%   BMI 32.61 kg/m²     LABS:   Recent Labs     10/01/22  0508   WBC 8.3   HGB 8.7*   HCT 27.9*          Recent Labs     10/01/22  0508      K 3.0*   *   CO2 22   BUN 24*   CREATININE 2.3*     Recent Labs     10/01/22  0508   PROT 6.4       ASSESSMENTS:   Principal Problem:  - Chronic Right heel diabetic ulceration with eschar   - Anterior right ankle diabetic wound  - Type II diabetic     Hyperkalemia  - Recent fall with dizziness (chief complaint)         PLAN:  - Patient seen and evaluated bedside  - Charts and labs reviewed   - Cultures : from 8/24 grew Proteus mirabilis, MRSA, and Corynebacterium.  - Abx: Unasyn and Linezolid per ID  - Prevalon offloading boots applied to the bilateral lower extremities  - Dressing: Santyl applied to patient's right heel. QOD   CT right foot- No CT evidence of osteomyelitis. - Will follow  - Discussed patient with Dr. Francisco Youssef  - Will continue to follow patient while they are in-house. Thank you for the opportunity to take part in the patient's care. Please do not hesitate to call for any questions or concerns.

## 2022-10-02 LAB
ALBUMIN SERPL-MCNC: 2.7 G/DL (ref 3.5–5.2)
ALP BLD-CCNC: 70 U/L (ref 40–129)
ALT SERPL-CCNC: 13 U/L (ref 0–40)
ANION GAP SERPL CALCULATED.3IONS-SCNC: 13 MMOL/L (ref 7–16)
AST SERPL-CCNC: 23 U/L (ref 0–39)
BASOPHILS ABSOLUTE: 0.04 E9/L (ref 0–0.2)
BASOPHILS RELATIVE PERCENT: 0.5 % (ref 0–2)
BILIRUB SERPL-MCNC: <0.2 MG/DL (ref 0–1.2)
BUN BLDV-MCNC: 26 MG/DL (ref 6–23)
CALCIUM SERPL-MCNC: 8.8 MG/DL (ref 8.6–10.2)
CHLORIDE BLD-SCNC: 111 MMOL/L (ref 98–107)
CHLORIDE URINE RANDOM: 28 MMOL/L
CO2: 21 MMOL/L (ref 22–29)
CREAT SERPL-MCNC: 2.4 MG/DL (ref 0.7–1.2)
EOSINOPHILS ABSOLUTE: 0.56 E9/L (ref 0.05–0.5)
EOSINOPHILS RELATIVE PERCENT: 6.5 % (ref 0–6)
GFR AFRICAN AMERICAN: 32
GFR NON-AFRICAN AMERICAN: 26 ML/MIN/1.73
GLUCOSE BLD-MCNC: 130 MG/DL (ref 74–99)
HCT VFR BLD CALC: 26.9 % (ref 37–54)
HEMOGLOBIN: 8 G/DL (ref 12.5–16.5)
IMMATURE GRANULOCYTES #: 0.05 E9/L
IMMATURE GRANULOCYTES %: 0.6 % (ref 0–5)
LYMPHOCYTES ABSOLUTE: 1.5 E9/L (ref 1.5–4)
LYMPHOCYTES RELATIVE PERCENT: 17.3 % (ref 20–42)
MAGNESIUM: 1.8 MG/DL (ref 1.6–2.6)
MCH RBC QN AUTO: 27.6 PG (ref 26–35)
MCHC RBC AUTO-ENTMCNC: 29.7 % (ref 32–34.5)
MCV RBC AUTO: 92.8 FL (ref 80–99.9)
METER GLUCOSE: 132 MG/DL (ref 74–99)
METER GLUCOSE: 167 MG/DL (ref 74–99)
METER GLUCOSE: 196 MG/DL (ref 74–99)
METER GLUCOSE: 198 MG/DL (ref 74–99)
MONOCYTES ABSOLUTE: 0.75 E9/L (ref 0.1–0.95)
MONOCYTES RELATIVE PERCENT: 8.7 % (ref 2–12)
NEUTROPHILS ABSOLUTE: 5.76 E9/L (ref 1.8–7.3)
NEUTROPHILS RELATIVE PERCENT: 66.4 % (ref 43–80)
PDW BLD-RTO: 15.1 FL (ref 11.5–15)
PLATELET # BLD: 229 E9/L (ref 130–450)
PMV BLD AUTO: 9.3 FL (ref 7–12)
POTASSIUM SERPL-SCNC: 3 MMOL/L (ref 3.5–5)
POTASSIUM SERPL-SCNC: 3.1 MMOL/L (ref 3.5–5)
POTASSIUM, UR: 20.8 MMOL/L
RBC # BLD: 2.9 E12/L (ref 3.8–5.8)
SODIUM BLD-SCNC: 145 MMOL/L (ref 132–146)
SODIUM URINE: 49 MMOL/L
TOTAL PROTEIN: 6.5 G/DL (ref 6.4–8.3)
WBC # BLD: 8.7 E9/L (ref 4.5–11.5)

## 2022-10-02 PROCEDURE — 6370000000 HC RX 637 (ALT 250 FOR IP): Performed by: INTERNAL MEDICINE

## 2022-10-02 PROCEDURE — 6360000002 HC RX W HCPCS: Performed by: INTERNAL MEDICINE

## 2022-10-02 PROCEDURE — 84132 ASSAY OF SERUM POTASSIUM: CPT

## 2022-10-02 PROCEDURE — 94640 AIRWAY INHALATION TREATMENT: CPT

## 2022-10-02 PROCEDURE — 84300 ASSAY OF URINE SODIUM: CPT

## 2022-10-02 PROCEDURE — 84133 ASSAY OF URINE POTASSIUM: CPT

## 2022-10-02 PROCEDURE — 80053 COMPREHEN METABOLIC PANEL: CPT

## 2022-10-02 PROCEDURE — 82962 GLUCOSE BLOOD TEST: CPT

## 2022-10-02 PROCEDURE — 82436 ASSAY OF URINE CHLORIDE: CPT

## 2022-10-02 PROCEDURE — 85025 COMPLETE CBC W/AUTO DIFF WBC: CPT

## 2022-10-02 PROCEDURE — 2580000003 HC RX 258: Performed by: INTERNAL MEDICINE

## 2022-10-02 PROCEDURE — 36415 COLL VENOUS BLD VENIPUNCTURE: CPT

## 2022-10-02 PROCEDURE — 2140000000 HC CCU INTERMEDIATE R&B

## 2022-10-02 PROCEDURE — 2700000000 HC OXYGEN THERAPY PER DAY

## 2022-10-02 PROCEDURE — 83735 ASSAY OF MAGNESIUM: CPT

## 2022-10-02 RX ORDER — SODIUM CHLORIDE 9 MG/ML
INJECTION, SOLUTION INTRAVENOUS PRN
Status: ACTIVE | OUTPATIENT
Start: 2022-10-02 | End: 2022-10-02

## 2022-10-02 RX ORDER — POTASSIUM CHLORIDE 7.45 MG/ML
10 INJECTION INTRAVENOUS EVERY 12 HOURS
Status: DISCONTINUED | OUTPATIENT
Start: 2022-10-02 | End: 2022-10-03

## 2022-10-02 RX ORDER — SODIUM CHLORIDE 0.9 % (FLUSH) 0.9 %
5-40 SYRINGE (ML) INJECTION PRN
Status: DISCONTINUED | OUTPATIENT
Start: 2022-10-02 | End: 2022-10-08 | Stop reason: HOSPADM

## 2022-10-02 RX ORDER — POTASSIUM CHLORIDE 20 MEQ/1
20 TABLET, EXTENDED RELEASE ORAL 2 TIMES DAILY WITH MEALS
Status: DISCONTINUED | OUTPATIENT
Start: 2022-10-02 | End: 2022-10-02

## 2022-10-02 RX ORDER — POTASSIUM CHLORIDE 20 MEQ/1
40 TABLET, EXTENDED RELEASE ORAL ONCE
Status: DISCONTINUED | OUTPATIENT
Start: 2022-10-02 | End: 2022-10-02

## 2022-10-02 RX ADMIN — LINEZOLID 600 MG: 600 TABLET, FILM COATED ORAL at 11:01

## 2022-10-02 RX ADMIN — IPRATROPIUM BROMIDE AND ALBUTEROL SULFATE 1 AMPULE: .5; 2.5 SOLUTION RESPIRATORY (INHALATION) at 11:48

## 2022-10-02 RX ADMIN — AMPICILLIN SODIUM AND SULBACTAM SODIUM 3000 MG: 2; 1 INJECTION, POWDER, FOR SOLUTION INTRAMUSCULAR; INTRAVENOUS at 06:33

## 2022-10-02 RX ADMIN — PANTOPRAZOLE SODIUM 40 MG: 40 TABLET, DELAYED RELEASE ORAL at 06:34

## 2022-10-02 RX ADMIN — POTASSIUM CHLORIDE 10 MEQ: 7.46 INJECTION, SOLUTION INTRAVENOUS at 23:32

## 2022-10-02 RX ADMIN — IPRATROPIUM BROMIDE AND ALBUTEROL SULFATE 1 AMPULE: .5; 2.5 SOLUTION RESPIRATORY (INHALATION) at 07:41

## 2022-10-02 RX ADMIN — POTASSIUM CHLORIDE 10 MEQ: 7.46 INJECTION, SOLUTION INTRAVENOUS at 12:34

## 2022-10-02 RX ADMIN — SODIUM CHLORIDE, PRESERVATIVE FREE 10 ML: 5 INJECTION INTRAVENOUS at 21:11

## 2022-10-02 RX ADMIN — AMPICILLIN SODIUM AND SULBACTAM SODIUM 3000 MG: 2; 1 INJECTION, POWDER, FOR SOLUTION INTRAMUSCULAR; INTRAVENOUS at 18:11

## 2022-10-02 RX ADMIN — AMPICILLIN SODIUM AND SULBACTAM SODIUM 3000 MG: 2; 1 INJECTION, POWDER, FOR SOLUTION INTRAMUSCULAR; INTRAVENOUS at 00:10

## 2022-10-02 RX ADMIN — METOPROLOL TARTRATE 25 MG: 25 TABLET, FILM COATED ORAL at 21:02

## 2022-10-02 RX ADMIN — IPRATROPIUM BROMIDE AND ALBUTEROL SULFATE 1 AMPULE: .5; 2.5 SOLUTION RESPIRATORY (INHALATION) at 18:32

## 2022-10-02 RX ADMIN — IPRATROPIUM BROMIDE AND ALBUTEROL SULFATE 1 AMPULE: .5; 2.5 SOLUTION RESPIRATORY (INHALATION) at 15:21

## 2022-10-02 RX ADMIN — GABAPENTIN 300 MG: 300 CAPSULE ORAL at 14:54

## 2022-10-02 RX ADMIN — LINEZOLID 600 MG: 600 TABLET, FILM COATED ORAL at 21:02

## 2022-10-02 RX ADMIN — AMPICILLIN SODIUM AND SULBACTAM SODIUM 3000 MG: 2; 1 INJECTION, POWDER, FOR SOLUTION INTRAMUSCULAR; INTRAVENOUS at 11:55

## 2022-10-02 RX ADMIN — SODIUM CHLORIDE 25 ML: 9 INJECTION, SOLUTION INTRAVENOUS at 06:31

## 2022-10-02 RX ADMIN — LEVOTHYROXINE SODIUM 50 MCG: 0.05 TABLET ORAL at 06:34

## 2022-10-02 RX ADMIN — METOPROLOL TARTRATE 25 MG: 25 TABLET, FILM COATED ORAL at 11:00

## 2022-10-02 RX ADMIN — SODIUM CHLORIDE 25 ML: 9 INJECTION, SOLUTION INTRAVENOUS at 00:08

## 2022-10-02 RX ADMIN — AMLODIPINE BESYLATE 5 MG: 5 TABLET ORAL at 09:37

## 2022-10-02 RX ADMIN — SODIUM CHLORIDE, PRESERVATIVE FREE 10 ML: 5 INJECTION INTRAVENOUS at 21:05

## 2022-10-02 ASSESSMENT — PAIN SCALES - GENERAL
PAINLEVEL_OUTOF10: 0

## 2022-10-02 NOTE — PROGRESS NOTES
Department of Podiatry   Progress Note        Patient seen and evaluated at bedside this morning. No acute overnight events. No new pedal complaints. Continue conservative care to right foot wound. Pending vascular recommendations post angiogram.    Past Medical History:        Diagnosis Date    Atherosclerosis of native artery of right lower extremity with ulceration of heel ([address] 8/15/2022    Atrial fibrillation (HCC)     Cancer (HCC)     kidney    Chronic renal failure, stage 3b ([facility] 8/15/2022    Diabetes mellitus ([facility]     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[address]     Femoral-popliteal atherosclerosis ([facility] 9/13/2022    Hypertension     Ischemic stroke ([facility] 03/06/2018    Pacemaker     Paraplegia ([address]     Sinus node dysfunction ([facility]     Stroke (cerebrum) ([facility] 02/27/2018    Thyroid disease     Ulcer of right heel and midfoot with fat layer exposed ([facility] 8/15/2022       Past Surgical History:        Procedure Laterality Date    CARDIAC PACEMAKER PLACEMENT  12/19/2014    COLONOSCOPY      EYE SURGERY      KIDNEY REMOVAL Left 07/05/2022    CANCER [facility] removed    KNEE ARTHROSCOPY  right knee    PACEMAKER PLACEMENT      [place] COLONOSCOPY FLX DX W/COLLJ SPEC WHEN PFRMD N/A 03/20/2018    COLONOSCOPY DIAGNOSTIC performed by Libia Menezes MD at [address] EGD PERCUTANEOUS PLACEMENT GASTROSTOMY TUBE N/A 03/29/2018    PEG TUBE/PT.  IN 5507 B performed by Libia Menezes MD at [address] EGD PERCUTANEOUS PLACEMENT GASTROSTOMY TUBE N/A 09/12/2018    EGD PEG TUBE PLACEMENT performed by Libia Menezes MD at [facility]       Medications Prior to Admission:    Medications Prior to Admission: meclizine (ANTIVERT) 25 MG tablet, Take 25 mg by mouth three times daily  warfarin (COUMADIN) 6 MG tablet, Take 6 mg by mouth daily  Glucagon HCl (GLUCAGON EMERGENCY) 1 MG/ML SOLR, Inject 1 mg as directed as needed (HYPOGLYCEMIA)  glucose (GLUTOSE) 40 % GEL, Take 15 g by mouth as needed (HYPOGLYCEMIA)  ipratropium-albuterol (DUONEB) 0.5-2.5 (3) MG/3ML SOLN nebulizer solution, Inhale 1 vial into the lungs every 4 hours as needed (COUGHINGAND DYSPNEA)  potassium chloride (KLOR-CON M) 20 MEQ extended release tablet, Take 20 mEq by mouth daily  polyethylene glycol (GLYCOLAX) 17 g packet, Take 17 g by mouth daily as needed for Constipation  phenazopyridine (PYRIDIUM) 200 MG tablet, Take 200 mg by mouth daily  dextromethorphan-guaiFENesin (ROBITUSSIN-DM)  MG/5ML syrup, Take 5 mLs by mouth every 6 hours as needed for Cough  Cholecalciferol (VITAMIN D3) 1.25 MG (16455 UT) CAPS, Take 50,000 Units by mouth once a week **FRIDAYS**  loperamide (IMODIUM) 2 MG capsule, Take 2 mg by mouth daily as needed for Diarrhea  Multiple Vitamins-Minerals (THERAPEUTIC MULTIVITAMIN-MINERALS) tablet, Take 1 tablet by mouth daily  gabapentin (NEURONTIN) 300 MG capsule, Take 1 capsule by mouth nightly for 7 days. levothyroxine (SYNTHROID) 50 MCG tablet, Take 1 tablet by mouth in the morning. atorvastatin (LIPITOR) 20 MG tablet, Take 1 tablet by mouth nightly  magnesium oxide (MAG-OX) 400 (240 Mg) MG tablet, Take 1 tablet by mouth every other day  ascorbic acid (VITAMIN C) 500 MG tablet, Take 1 tablet by mouth in the morning. acetaminophen (TYLENOL) 325 MG tablet, Take 650 mg by mouth every 4 hours as needed (DISCOMFORT;PAIN/ELEV TEMP >100.4F)  pantoprazole (PROTONIX) 40 MG tablet, Take 40 mg by mouth daily  amLODIPine (NORVASC) 2.5 MG tablet, Take 1 tablet by mouth daily  metoprolol tartrate (LOPRESSOR) 25 MG tablet, Take 1 tablet by mouth 2 times daily  metFORMIN (GLUCOPHAGE) 1000 MG tablet, Take 1 tablet by mouth 2 times daily  omeprazole (PRILOSEC) 20 MG delayed release capsule, Take 1 capsule by mouth daily  furosemide (LASIX) 40 MG tablet, Take 0.5 tablets by mouth daily    Allergies:  Bee venom    Social History:   TOBACCO:   reports that he quit smoking about 20 years ago. His smoking use included cigars.  He has never used smokeless tobacco.  ETOH: reports no history of alcohol use. DRUGS:   Social History     Substance and Sexual Activity   Drug Use No       Family History:       Problem Relation Age of Onset    Heart Disease Mother     Stroke Father          REVIEW OF SYSTEMS:    All pertinent review of systems both positive and negative discussed in HPI    Previous Exa:  LOWER EXTREMITY EXAMINATION   - Previous physical exam results below:  - Dressings today remain clean, dry, intact. VASCULAR:  DP and PT pulses weakly palpable 1+ to the bilateral LE. 1-2+ pitting edema appreciated as well. NEUROLOGIC:  Protective sensation diminished to distal aspect of bilateral lower extremities. DERM:  Right pressure heel ulceration with overlying eschar noted. Fat layer exposed. No clinical signs of infection. Does not probe deep at this time. Measures roughly 8 cm x 9 cm x unknown depth. No malodor. Little drainage. Superficial wound noted to patient's anterior Right ankle as well. Not clinically infected    MUSCULOSKELETAL: Tenderness to palpation of the right heel wound. Negative TTP of bilateral calves at this time. MSK strength testing deferred this afternoon          Media Information  Document Information    Clinical Consent:  Wound      09/28/2022 07:54   Attached To:    Hospital Encounter on 9/21/22     Source Information    Amna Melendez MD  [address] Intermediate       CONSULTS:  IP CONSULT TO INFECTIOUS DISEASES    MEDICATION:  Scheduled Meds:   potassium chloride  10 mEq IntraVENous Q12H    ipratropium-albuterol  1 ampule Inhalation 4x daily    ampicillin-sulbactam  3,000 mg IntraVENous Q6H    linezolid  600 mg Oral 2 times per day    amLODIPine  5 mg Oral Daily    atorvastatin  20 mg Oral Nightly    levothyroxine  50 mcg Oral Daily    pantoprazole  40 mg Oral QAM AC    metoprolol tartrate  25 mg Oral BID    gabapentin  300 mg Oral TID    insulin lispro  0-8 Units SubCUTAneous TID WC    insulin lispro  0-4 Units SubCUTAneous Nightly Continuous Infusions:   sodium chloride Stopped (10/02/22 0730)       PRN Meds:.sodium chloride, acetaminophen    RADIOLOGY:  XR CHEST PORTABLE   Final Result   1. Mild cardiomegaly      2. Minimal basilar subsegmental atelectasis. 3.  Dilated bowel loops in the upper abdomen, consider abdominal radiograph   or CT exam.             Vitals:    BP (!) 130/59   Pulse 70   Temp 97.2 °F (36.2 °C) (Temporal)   Resp 18   Ht 6' 2\" (1.88 m)   Wt 254 lb (115.2 kg)   SpO2 93%   BMI 32.61 kg/m²     LABS:   Recent Labs     10/01/22  0508 10/02/22  0522   WBC 8.3 8.7   HGB 8.7* 8.0*   HCT 27.9* 26.9*    229       Recent Labs     10/02/22  0522      K 3.0*   *   CO2 21*   BUN 26*   CREATININE 2.4*     Recent Labs     10/01/22  0508 10/02/22  0522   PROT 6.4 6.5       ASSESSMENTS:   Principal Problem:  - Chronic Right heel diabetic ulceration with eschar   - Anterior right ankle diabetic wound  - Type II diabetic     Hyperkalemia  - Recent fall with dizziness (chief complaint)         PLAN:  - Patient seen and evaluated bedside  - Charts and labs reviewed   - Cultures : from 8/24 grew Proteus mirabilis, MRSA, and Corynebacterium.  - Abx: ampicillin and Linezolid per ID  -Vascular: following, angiogram results pending  - Prevalon offloading boots applied to the bilateral lower extremities  - Dressing: Santyl with DSD, QOD   CT right foot- No CT evidence of osteomyelitis. - Will follow  - Discussed patient with Dr. Jason Russell  - Will continue to follow patient while they are in-house. Thank you for the opportunity to take part in the patient's care. Please do not hesitate to call for any questions or concerns.

## 2022-10-02 NOTE — PATIENT CARE CONFERENCE
[facility] Quality Flow/Interdisciplinary Rounds Progress Note        Quality Flow Rounds held on October 2, 2022    Disciplines Attending:  Bedside Nurse, , , and Nursing Unit Leadership    Kimberly Mehta was admitted on 9/29/2022 12:57 PM    Anticipated Discharge Date:       Disposition:    Fish Score:  Fish Scale Score: 14    Readmission Risk              Risk of Unplanned Readmission:  22           Discussed patient goal for the day, patient clinical progression, and barriers to discharge.   The following Goal(s) of the Day/Commitment(s) have been identified:  Physical Therapy - Obtain Consult Order      Veronica Crowley RN  October 2, 2022

## 2022-10-02 NOTE — PROGRESS NOTES
Subjective:  Patient was seen on the floor earlier this morning  Registered nurse at bedside  Admission details noted  Data reviewed in detail with his sister  Pain control adequate    Objective:  /61   Pulse 65   Temp 97.3 °F (36.3 °C) (Temporal)   Resp 20   Ht 6' 2\" (1.88 m)   Wt 254 lb (115.2 kg)   SpO2 96%   BMI 32.61 kg/m²   Somnolent but arousable  Looks pale  Chronically ill-appearing  Oral mucosa moist  Neck no adenopathy  Heart:  RRR, no murmurs, gallops, or rubs.   Lungs: Diffuse anterior wheezes noted/much improved today  Abd: bowel sounds present, nontender, no masses  Abdomen is somewhat distended but nontender  Left nephrectomy scar is healing well  Extrem: Right lower extremity heel is heavily wrapped with a bandage    Data reviewed    Assessment:  Right heel wound infection in a diabetic patient  Recent left nephrectomy For cancer  Acute kidney injury at the time of admission  Ischemia right lower extremity  Resolved hyperkalemia  Hypokalemic this morning  Patient Active Problem List   Diagnosis    Hypertension    Hyperlipidemia with target LDL less than 100    Impaired mobility and ADLs    Cardiac pacemaker in situ    Cerebrovascular accident (CVA) determined by clinical assessment ([address]    Left renal mass    Class 2 severe obesity due to excess calories with serious comorbidity and body mass index (BMI) of 35.0 to 35.9 in [facility])    Acquired hypothyroidism    H/O deep venous thrombosis    Type 2 diabetes mellitus without complication, without long-term current use of insulin (HCC)    Stage 3 chronic kidney disease (HCC)    Ulcer of right heel and midfoot with fat layer exposed ([address]    Atherosclerosis of native artery of right lower extremity with ulceration of heel (HCC)    Status post nephrectomy    Chronic renal failure, stage 3b (HCC)    Necrotic eschar (HCC)    Diabetic ulcer of left heel associated with type 2 diabetes mellitus, with fat layer exposed ([address] Femoral-popliteal atherosclerosis (HCC)    Hyperkalemia    Peripheral arterial disease ([facility]       Plan:  Advance diet and activity  Add to DuoNeb treatments  Antibiotics per ID currently on Unasyn  Angiogram soon        Lilliam Ghosh MD  10:45 AM  10/2/2022

## 2022-10-02 NOTE — PROGRESS NOTES
Department of Internal Medicine  Nephrology Progress Note      Events reviewed. SUBJECTIVE: We are following Mr. Carli Scott for CKD and hyperkalemia. He has no complaints today. PHYSICAL EXAM:      Vitals:    VITALS:  /61   Pulse 65   Temp 97.3 °F (36.3 °C) (Temporal)   Resp 20   Ht 6' 2\" (1.88 m)   Wt 254 lb (115.2 kg)   SpO2 96%   BMI 32.61 kg/m²   24HR INTAKE/OUTPUT:    Intake/Output Summary (Last 24 hours) at 10/2/2022 1012  Last data filed at 10/2/2022 0917  Gross per 24 hour   Intake 1059.88 ml   Output 550 ml   Net 509.88 ml         Constitutional:  Well built. Obese. Mild distress. HEENT:  BEERLA. JVD not seen. Respiratory:  Breath sounds normal. No rales or rhonchi  Cardiovascular/Edema:  Heart sounds normal. No murmur. Gastrointestinal:  Bowel sounds normal. No oranomgaly  Neurologic:  A/O x 3. No focal deficit. Skin:  Dry skin. Normal turgor. Other:  Non healing ulcer in right foot.     Scheduled Meds:   ipratropium-albuterol  1 ampule Inhalation 4x daily    ampicillin-sulbactam  3,000 mg IntraVENous Q6H    linezolid  600 mg Oral 2 times per day    amLODIPine  5 mg Oral Daily    atorvastatin  20 mg Oral Nightly    levothyroxine  50 mcg Oral Daily    pantoprazole  40 mg Oral QAM AC    metoprolol tartrate  25 mg Oral BID    gabapentin  300 mg Oral TID    insulin lispro  0-8 Units SubCUTAneous TID WC    insulin lispro  0-4 Units SubCUTAneous Nightly     Continuous Infusions:   sodium chloride Stopped (10/02/22 0730)       PRN Meds:.sodium chloride, acetaminophen      DATA:    CBC:   Lab Results   Component Value Date/Time    WBC 8.7 10/02/2022 05:22 AM    RBC 2.90 10/02/2022 05:22 AM    HGB 8.0 10/02/2022 05:22 AM    HCT 26.9 10/02/2022 05:22 AM    MCV 92.8 10/02/2022 05:22 AM    MCH 27.6 10/02/2022 05:22 AM    MCHC 29.7 10/02/2022 05:22 AM    RDW 15.1 10/02/2022 05:22 AM     10/02/2022 05:22 AM    MPV 9.3 10/02/2022 05:22 AM     CMP:    Lab Results   Component Value Date/Time  10/02/2022 05:22 AM    K 3.0 10/02/2022 05:22 AM    K 6.2 09/21/2022 10:44 AM     10/02/2022 05:22 AM    CO2 21 10/02/2022 05:22 AM    BUN 26 10/02/2022 05:22 AM    CREATININE 2.4 10/02/2022 05:22 AM    GFRAA 32 10/02/2022 05:22 AM    LABGLOM 26 10/02/2022 05:22 AM    GLUCOSE 130 10/02/2022 05:22 AM    GLUCOSE 297 01/04/2012 03:00 AM    PROT 6.5 10/02/2022 05:22 AM    LABALBU 2.7 10/02/2022 05:22 AM    LABALBU 3.4 01/04/2012 03:00 AM    CALCIUM 8.8 10/02/2022 05:22 AM    BILITOT <0.2 10/02/2022 05:22 AM    ALKPHOS 70 10/02/2022 05:22 AM    AST 23 10/02/2022 05:22 AM    ALT 13 10/02/2022 05:22 AM     Magnesium:    Lab Results   Component Value Date/Time    MG 1.8 10/02/2022 05:22 AM     Phosphorus:    Lab Results   Component Value Date/Time    PHOS 3.2 09/24/2022 03:00 AM       BRIEF SUMMARY OF INITIAL CONSULT:    Briefly, Mr. Adams Buclkey  is a 48-year-old male with a PMH of CKD stage IIIb, without proteinuria, baseline creatinine 2.0-2.1 mg/dL, secondary to nephrosclerosis, kidney cancer, s/p total left nephrectomy on 7/5/22 at [facility], hypertension, type II diabetes mellitus, HFpEF 55% with stage II DD, PAF, factor V Leyden deficiency, hyperlipidemia, sick sinus syndrome s/p pacemaker placement, CVA, hypothyroidism, PAF on chronic anticoagulation with warfarin, who was admitted on September 21, 2022 after a fall at home, his labs were significant for potassium 6.2 mmol/L, BUN 57 mg/dL, creatinine 2.8 mg/dL, reason for this consult. His home medications include lasix, lisinopril    Resolved:    Hyperkalemia, 2/2 ACE inhibition, decrease GFR and on potassium supplementation,  improved with lokelma  JUAN on CKD, volume responsive pre-renal JUAN, 2/2 to overt diuresis, decrease oral intake in the setting of ACE inhibition. Resolved, renal function has returned to baseline, last creatinine 2.1 mg/dL. We will discontinue IV fluids.     IMPRESSION/RECOMMENDATIONS:        CKD stage IV, without proteinuria, baseline creatinine 2.1-2.4 mg/dL, secondary to nephrosclerosis and solitary kidney. Renal function stable at baseline. Left kidney cancer, s/p total left nephrectomy [facility]/5/[facility] at [facility]  Hypokalemia, likely secondary to poor oral intake. To replace. HTN, on metoprolol   HFpEF 55% with stage II DD, pro-, Lasix on hold  Hypomagnesemia, 2/2 diuretics, levels improved  -------------------------------------------------  S/p fall  Nonhealing right heel wound, ID following, on linezolid and ampicillin-sulbactam. For arteriogram with possible intervention today. PAF, on metoprolol and warfarin  Sick sinus syndrome, s/p pacemaker placement   Type 2 DM, on metformin at home, on hold, SSI for now  HLD, on atorvastatin  Hypothyroidism, on levothyroxine  History of CVA  Normocytic anemia, ferritin 178, iron saturation 18%, folate 8.7, B12: 270     Plan:       Replace potassium   Continue to hold Lasix  Continue to hold metformin  Continue to monitor renal function  Continue to monitor magnesium levels  Obtain urine electrolytes to r/o potassium wasting.      Electronically signed by MELANIE Groves CNP on 10/2/2022 at 10:12 AM

## 2022-10-02 NOTE — PLAN OF CARE
Problem: Chronic Conditions and Co-morbidities  Goal: Patient's chronic conditions and co-morbidity symptoms are monitored and maintained or improved  10/2/2022 1824 by Cathi Victoria RN  Outcome: Progressing  Flowsheets (Taken 10/2/2022 1455)  Care Plan - Patient's Chronic Conditions and Co-Morbidity Symptoms are Monitored and Maintained or Improved:   Monitor and assess patient's chronic conditions and comorbid symptoms for stability, deterioration, or improvement   Collaborate with multidisciplinary team to address chronic and comorbid conditions and prevent exacerbation or deterioration  10/2/2022 1039 by Cathi Victoria RN  Outcome: Not Progressing  Flowsheets (Taken 10/2/2022 0755)  Care Plan - Patient's Chronic Conditions and Co-Morbidity Symptoms are Monitored and Maintained or Improved:   Monitor and assess patient's chronic conditions and comorbid symptoms for stability, deterioration, or improvement   Collaborate with multidisciplinary team to address chronic and comorbid conditions and prevent exacerbation or deterioration     Problem: Discharge Planning  Goal: Discharge to home or other facility with appropriate resources  10/2/2022 1824 by Cathi Victoria RN  Outcome: Progressing  Flowsheets (Taken 10/2/2022 1455)  Discharge to home or other facility with appropriate resources:   Identify barriers to discharge with patient and caregiver   Arrange for needed discharge resources and transportation as appropriate  10/2/2022 1039 by Cathi Victoria RN  Outcome: Progressing  [address] [facility] (Taken 10/2/2022 0755)  Discharge to home or other facility with appropriate resources:   Identify barriers to discharge with patient and caregiver   Arrange for needed discharge resources and transportation as appropriate     Problem: Safety - Adult  Goal: Free from fall injury  10/2/2022 1824 by Cathi Victoria RN  Outcome: Progressing  10/2/2022 1039 by Cathi Victoria RN  Outcome: Progressing  Flowsheets (Taken 10/2/2022 0755)  Free From Fall Injury: Instruct family/caregiver on patient safety     Problem: ABCDS Injury Assessment  Goal: Absence of physical injury  10/2/2022 1824 by Lizabeth Belcher RN  Outcome: Progressing  10/2/2022 1039 by Lizabeth Belcher RN  Outcome: Progressing  Flowsheets  Taken 10/2/2022 0755 by Lizabeth Belcher RN  Absence of Physical Injury: Implement safety measures based on patient assessment  Taken 10/1/2022 2252 by Kayla Wang RN  Absence of Physical Injury: Implement safety measures based on patient assessment     Problem: Skin/Tissue Integrity  Goal: Absence of new skin breakdown  10/2/2022 1824 by Lizabeth Belcher RN  Outcome: Progressing  10/2/2022 1039 by Lizabeth Belcher RN  Outcome: Not Progressing     Problem: Pain  Goal: Verbalizes/displays adequate comfort level or baseline comfort level  10/2/2022 1824 by Lizabeth Belcher RN  Outcome: Progressing  Flowsheets  Taken 10/2/2022 1502  Verbalizes/displays adequate comfort level or baseline comfort level:   Encourage patient to monitor pain and request assistance   Assess pain using appropriate pain scale  Taken 10/2/2022 1055  Verbalizes/displays adequate comfort level or baseline comfort level:   Encourage patient to monitor pain and request assistance   Assess pain using appropriate pain scale  10/2/2022 1039 by Lizabeth Belcher RN  Outcome: Progressing  Flowsheets (Taken 10/2/2022 0753)  Verbalizes/displays adequate comfort level or baseline comfort level:   Encourage patient to monitor pain and request assistance   Assess pain using appropriate pain scale     Problem: Chronic Conditions and Co-morbidities  Goal: Patient's chronic conditions and co-morbidity symptoms are monitored and maintained or improved  10/2/2022 1824 by Lizabeth Belcher RN  Outcome: Progressing  Flowsheets (Taken 10/2/2022 1455)  Care Plan - Patient's Chronic Conditions and Co-Morbidity Symptoms are Monitored and Maintained or Improved:   Monitor and assess patient's chronic conditions and comorbid symptoms for stability, deterioration, or improvement   Collaborate with multidisciplinary team to address chronic and comorbid conditions and prevent exacerbation or deterioration  10/2/2022 1039 by Asad Mcnamara, RN  Outcome: Not Progressing  Flowsheets (Taken 10/2/2022 0755)  Care Plan - Patient's Chronic Conditions and Co-Morbidity Symptoms are Monitored and Maintained or Improved:   Monitor and assess patient's chronic conditions and comorbid symptoms for stability, deterioration, or improvement   Collaborate with multidisciplinary team to address chronic and comorbid conditions and prevent exacerbation or deterioration     Problem: Skin/Tissue Integrity  Goal: Absence of new skin breakdown  10/2/2022 1824 by Asad Mcnamara, RN  Outcome: Progressing  10/2/2022 1039 by Asad Mcnamara, RN  Outcome: Not Progressing

## 2022-10-02 NOTE — PROGRESS NOTES
[address] [place] [address] [facility]  Progress Note      No chief complaint on file. SUBJECTIVE:  Patient is tolerating medications. No reported adverse drug reactions. No nausea, vomiting, diarrhea. Foot remains sore. Review of systems:  As stated above in the chief complaint, otherwise negative. Medications:  Scheduled Meds:   ipratropium-albuterol  1 ampule Inhalation 4x daily    ampicillin-sulbactam  3,000 mg IntraVENous Q6H    linezolid  600 mg Oral 2 times per day    amLODIPine  5 mg Oral Daily    atorvastatin  20 mg Oral Nightly    levothyroxine  50 mcg Oral Daily    pantoprazole  40 mg Oral QAM AC    metoprolol tartrate  25 mg Oral BID    gabapentin  300 mg Oral TID    insulin lispro  0-8 Units SubCUTAneous TID WC    insulin lispro  0-4 Units SubCUTAneous Nightly     Continuous Infusions:   sodium chloride 25 mL (10/02/22 0631)     PRN Meds:sodium chloride, acetaminophen    OBJECTIVE:  /60   Pulse 59   Temp 97 °F (36.1 °C) (Temporal)   Resp 16   Ht 6' 2\" (1.88 m)   Wt 254 lb (115.2 kg)   SpO2 94%   BMI 32.61 kg/m²   Temp  Av.1 °F (36.7 °C)  Min: 97 °F (36.1 °C)  Max: 99 °F (37.2 °C)  Constitutional: The patient is awake, alert, and oriented. Skin: Warm and dry. No rashes were noted. HEENT: Round and reactive pupils. Moist mucous membranes. No ulcerations or thrush. Neck: Supple to movements. Chest: No use of accessory muscles to breathe. Symmetrical expansion. No wheezing, crackles or rhonchi. Cardiovascular: S1 and S2 are rhythmic and regular. No murmurs appreciated. Abdomen: Positive bowel sounds to auscultation. Benign to palpation. No masses felt. No hepatosplenomegaly. Genitourinary: mlae  Extremities: No clubbing, no cyanosis, no edema.  Right foot with dry dressing  Lines: peripheral    Laboratory and Tests Review:  Lab Results   Component Value Date    WBC 8.7 10/02/2022    WBC 8.3 10/01/2022    WBC 6.8 2022    HGB 8.0 (L) 10/02/2022 HCT 26.9 (L) 10/02/2022    MCV 92.8 10/02/2022     10/02/2022     Lab Results   Component Value Date    NEUTROABS 5.76 10/02/2022    NEUTROABS 5.87 10/01/2022    NEUTROABS 7.13 09/21/2022     No results found for: CRPHS  Lab Results   Component Value Date    ALT 13 10/02/2022    AST 23 10/02/2022    ALKPHOS 70 10/02/2022    BILITOT <0.2 10/02/2022     Lab Results   Component Value Date/Time     10/02/2022 05:22 AM    K 3.0 10/02/2022 05:22 AM    K 6.2 09/21/2022 10:44 AM     10/02/2022 05:22 AM    CO2 21 10/02/2022 05:22 AM    BUN 26 10/02/2022 05:22 AM    CREATININE 2.4 10/02/2022 05:22 AM    CREATININE 2.3 10/01/2022 05:08 AM    CREATININE 2.3 09/30/2022 04:18 PM    GFRAA 32 10/02/2022 05:22 AM    LABGLOM 26 10/02/2022 05:22 AM    GLUCOSE 130 10/02/2022 05:22 AM    GLUCOSE 297 01/04/2012 03:00 AM    PROT 6.5 10/02/2022 05:22 AM    LABALBU 2.7 10/02/2022 05:22 AM    LABALBU 3.4 01/04/2012 03:00 AM    CALCIUM 8.8 10/02/2022 05:22 AM    BILITOT <0.2 10/02/2022 05:22 AM    ALKPHOS 70 10/02/2022 05:22 AM    AST 23 10/02/2022 05:22 AM    ALT 13 10/02/2022 05:22 AM     Lab Results   Component Value Date    CRP 8.5 (H) 09/22/2022    CRP 7.2 (H) 08/01/2022    CRP 4.9 (H) 10/03/2018     Lab Results   Component Value Date    SEDRATE 93 (H) 09/22/2022    SEDRATE 87 (H) 08/01/2022    SEDRATE 77 (H) 10/03/2018     Radiology:      Microbiology:   Lab Results   Component Value Date/Time    BC 5 Days no growth 08/01/2022 02:46 PM    BC 5 Days- no growth 10/01/2018 11:15 AM    BC 5 Days- no growth 09/23/2018 08:55 AM    ORG Proteus mirabilis 09/22/2022 04:30 PM    ORG Escherichia coli 09/22/2022 04:30 PM    ORG Staphylococcus aureus 09/22/2022 04:30 PM     Lab Results   Component Value Date/Time    BLOODCULT2 5 Days no growth 08/01/2022 03:01 PM    BLOODCULT2 5 Days- no growth 10/01/2018 11:15 AM    BLOODCULT2 5 Days- no growth 09/23/2018 09:10 AM    ORG Proteus mirabilis 09/22/2022 04:30 PM    ORG Escherichia coli 09/22/2022 04:30 PM    ORG Staphylococcus aureus 09/22/2022 04:30 PM     WOUND/ABSCESS   Date Value Ref Range Status   09/22/2022 (A)  Final    Mixed ana cristina also isolated, including:  Corynebacteria     09/22/2022 Heavy growth  Final   09/22/2022 Heavy growth  Final   09/22/2022   Final    Heavy growth  Methicillin resistant Staph aureus isolated. Most Methicillin  resistant Staphylococcus are usually resistant to multiple  antibiotics including other B-Lactams, Aminoglycosides,  Macrolides, Clindamycin and Tetracycline. Contact isolation  is indicated. This isolate is presumed to be resistant based on the  detection of inducible Clindamycin resistance. Clindamycin  may still be effective in some patients. Smear, Respiratory   Date Value Ref Range Status   10/02/2018   Final    Group 6: <25 PMN's/LPF and <25 Epithelial cells/LPF  Rare Polymorphonuclear leukocytes  Epithelial cells not seen  No organisms seen           Component Value Date/Time    AFBCX No growth after 6 weeks of incubation. 10/03/2018 1230    FUNGSM No Fungal elements seen 10/03/2018 1230    LABFUNG No growth after 4 weeks of incubation. 10/03/2018 1230     CULTURE, RESPIRATORY   Date Value Ref Range Status   10/02/2018 Oral Pharyngeal Ana Cristina absent (A)  Final   10/02/2018 Rare growth  Final     No results found for: CXCATHTIP  Body Fluid Culture, Sterile   Date Value Ref Range Status   10/03/2018 Growth not present  Final     No results found for: CXSURG  Urine Culture, Routine   Date Value Ref Range Status   11/07/2018 >100,000 CFU/ml  Final   09/23/2018 Growth not present  Final   02/28/2018   Final    ,000 CFU/mL  Mixed ana cristina isolated. Further workup and sensitivity testing  is not routinely indicated and will not be performed.   Mixed ana cristina isolated includes:  Mixed gram positive organisms  Gram negative rods       MRSA Culture Only   Date Value Ref Range Status   10/01/2018 Methicillin resistant Staph aureus not isolated  Final 12/27/2014 Methicillin resistant Staph aureus not isolated  Final           Assessment:  Right heel pressure ulcer, necrotic     Plan:  Resume ampicillin-sulbactam 3g q6h and linezolid 600 mg q12h. Follow up Podiatry recommendations. Follow up surgical plans, if any. Continue local wound care. Had arteriogram-results pending     Thank you for involving me in the care of Juno Mckeon. Please do not hesitate to call for any questions or concerns. Electronically signed by MELANIE Anguiano CNP on 10/2/2022 at 7:47 AM   ,Pt seen and examined. Above discussed agree with advanced practice nurse. Labs, cultures, and radiographs reviewed. Face to Face encounter occurred. Changes made as necessary.      Noni Schwartz MD

## 2022-10-02 NOTE — PROGRESS NOTES
Subjective:  Patient was seen on the floor earlier this morning  His sister was at bedside  Admission details noted  Data reviewed in detail with his sister  Pain control adequate    Objective:  /61   Pulse 65   Temp 97.3 °F (36.3 °C) (Temporal)   Resp 20   Ht 6' 2\" (1.88 m)   Wt 254 lb (115.2 kg)   SpO2 96%   BMI 32.61 kg/m²   Somnolent but arousable  Looks pale  Chronically ill-appearing  Oral mucosa moist  Neck no adenopathy  Heart:  RRR, no murmurs, gallops, or rubs.   Lungs: Diffuse anterior wheezes noted  Abd: bowel sounds present, nontender, no masses  Abdomen is somewhat distended but nontender  Left nephrectomy scar is healing well  Extrem: Right lower extremity heel is heavily wrapped with a bandage    Data reviewed    Assessment:  Right heel wound infection in a diabetic patient  Recent left nephrectomy For cancer  Acute kidney injury at the time of admission  Ischemia right lower extremity  Resolved hyperkalemia  Hypokalemic this morning  Patient Active Problem List   Diagnosis    Hypertension    Hyperlipidemia with target LDL less than 100    Impaired mobility and ADLs    Cardiac pacemaker in situ    Cerebrovascular accident (CVA) determined by clinical assessment ([address]    Left renal mass    Class 2 severe obesity due to excess calories with serious comorbidity and body mass index (BMI) of 35.0 to 35.9 in [facility])    Acquired hypothyroidism    H/O deep venous thrombosis    Type 2 diabetes mellitus without complication, without long-term current use of insulin (HCC)    Stage 3 chronic kidney disease (HCC)    Ulcer of right heel and midfoot with fat layer exposed ([address]    Atherosclerosis of native artery of right lower extremity with ulceration of heel (HCC)    Status post nephrectomy    Chronic renal failure, stage 3b (HCC)    Necrotic eschar (HCC)    Diabetic ulcer of left heel associated with type 2 diabetes mellitus, with fat layer exposed ([address]    Femoral-popliteal atherosclerosis ([facility]    Hyperkalemia    Peripheral arterial disease ([facility]       Plan:  Check chest x-ray  Add to DuoNeb treatments  Antibiotics per ID currently on Unasyn  Angiogram soon  Questions answered to her satisfaction        Aron Wren MD  10:43 AM  10/2/2022

## 2022-10-03 LAB
ALBUMIN SERPL-MCNC: 2.7 G/DL (ref 3.5–5.2)
ALP BLD-CCNC: 71 U/L (ref 40–129)
ALT SERPL-CCNC: 12 U/L (ref 0–40)
ANION GAP SERPL CALCULATED.3IONS-SCNC: 11 MMOL/L (ref 7–16)
ANION GAP SERPL CALCULATED.3IONS-SCNC: 14 MMOL/L (ref 7–16)
AST SERPL-CCNC: 14 U/L (ref 0–39)
BASOPHILS ABSOLUTE: 0.05 E9/L (ref 0–0.2)
BASOPHILS RELATIVE PERCENT: 0.7 % (ref 0–2)
BILIRUB SERPL-MCNC: <0.2 MG/DL (ref 0–1.2)
BUN BLDV-MCNC: 22 MG/DL (ref 6–23)
BUN BLDV-MCNC: 24 MG/DL (ref 6–23)
CALCIUM SERPL-MCNC: 8.7 MG/DL (ref 8.6–10.2)
CALCIUM SERPL-MCNC: 9.1 MG/DL (ref 8.6–10.2)
CHLORIDE BLD-SCNC: 111 MMOL/L (ref 98–107)
CHLORIDE BLD-SCNC: 114 MMOL/L (ref 98–107)
CO2: 20 MMOL/L (ref 22–29)
CO2: 21 MMOL/L (ref 22–29)
CREAT SERPL-MCNC: 2.3 MG/DL (ref 0.7–1.2)
CREAT SERPL-MCNC: 2.3 MG/DL (ref 0.7–1.2)
EOSINOPHILS ABSOLUTE: 0.5 E9/L (ref 0.05–0.5)
EOSINOPHILS RELATIVE PERCENT: 6.5 % (ref 0–6)
GFR AFRICAN AMERICAN: 33
GFR AFRICAN AMERICAN: 33
GFR NON-AFRICAN AMERICAN: 28 ML/MIN/1.73
GFR NON-AFRICAN AMERICAN: 28 ML/MIN/1.73
GLUCOSE BLD-MCNC: 140 MG/DL (ref 74–99)
GLUCOSE BLD-MCNC: 163 MG/DL (ref 74–99)
HCT VFR BLD CALC: 28.2 % (ref 37–54)
HEMOGLOBIN: 8.6 G/DL (ref 12.5–16.5)
IMMATURE GRANULOCYTES #: 0.04 E9/L
IMMATURE GRANULOCYTES %: 0.5 % (ref 0–5)
LYMPHOCYTES ABSOLUTE: 1.09 E9/L (ref 1.5–4)
LYMPHOCYTES RELATIVE PERCENT: 14.3 % (ref 20–42)
MAGNESIUM: 1.9 MG/DL (ref 1.6–2.6)
MCH RBC QN AUTO: 28.7 PG (ref 26–35)
MCHC RBC AUTO-ENTMCNC: 30.5 % (ref 32–34.5)
MCV RBC AUTO: 94 FL (ref 80–99.9)
METER GLUCOSE: 148 MG/DL (ref 74–99)
METER GLUCOSE: 150 MG/DL (ref 74–99)
METER GLUCOSE: 155 MG/DL (ref 74–99)
METER GLUCOSE: 158 MG/DL (ref 74–99)
METER GLUCOSE: 159 MG/DL (ref 74–99)
MONOCYTES ABSOLUTE: 0.52 E9/L (ref 0.1–0.95)
MONOCYTES RELATIVE PERCENT: 6.8 % (ref 2–12)
NEUTROPHILS ABSOLUTE: 5.44 E9/L (ref 1.8–7.3)
NEUTROPHILS RELATIVE PERCENT: 71.2 % (ref 43–80)
PDW BLD-RTO: 15.1 FL (ref 11.5–15)
PLATELET # BLD: 232 E9/L (ref 130–450)
PMV BLD AUTO: 9.5 FL (ref 7–12)
POTASSIUM SERPL-SCNC: 3.1 MMOL/L (ref 3.5–5)
POTASSIUM SERPL-SCNC: 3.3 MMOL/L (ref 3.5–5)
POTASSIUM SERPL-SCNC: 3.5 MMOL/L (ref 3.5–5)
RBC # BLD: 3 E12/L (ref 3.8–5.8)
SODIUM BLD-SCNC: 143 MMOL/L (ref 132–146)
SODIUM BLD-SCNC: 148 MMOL/L (ref 132–146)
TOTAL PROTEIN: 6.4 G/DL (ref 6.4–8.3)
WBC # BLD: 7.6 E9/L (ref 4.5–11.5)

## 2022-10-03 PROCEDURE — 83735 ASSAY OF MAGNESIUM: CPT

## 2022-10-03 PROCEDURE — 2580000003 HC RX 258: Performed by: INTERNAL MEDICINE

## 2022-10-03 PROCEDURE — 6360000002 HC RX W HCPCS: Performed by: INTERNAL MEDICINE

## 2022-10-03 PROCEDURE — 2500000003 HC RX 250 WO HCPCS: Performed by: INTERNAL MEDICINE

## 2022-10-03 PROCEDURE — 97161 PT EVAL LOW COMPLEX 20 MIN: CPT

## 2022-10-03 PROCEDURE — 84132 ASSAY OF SERUM POTASSIUM: CPT

## 2022-10-03 PROCEDURE — 6370000000 HC RX 637 (ALT 250 FOR IP): Performed by: INTERNAL MEDICINE

## 2022-10-03 PROCEDURE — 97530 THERAPEUTIC ACTIVITIES: CPT

## 2022-10-03 PROCEDURE — 6360000002 HC RX W HCPCS: Performed by: NURSE PRACTITIONER

## 2022-10-03 PROCEDURE — 85025 COMPLETE CBC W/AUTO DIFF WBC: CPT

## 2022-10-03 PROCEDURE — 36415 COLL VENOUS BLD VENIPUNCTURE: CPT

## 2022-10-03 PROCEDURE — 2060000000 HC ICU INTERMEDIATE R&B

## 2022-10-03 PROCEDURE — 92610 EVALUATE SWALLOWING FUNCTION: CPT

## 2022-10-03 PROCEDURE — 80048 BASIC METABOLIC PNL TOTAL CA: CPT

## 2022-10-03 PROCEDURE — 2580000003 HC RX 258: Performed by: NURSE PRACTITIONER

## 2022-10-03 PROCEDURE — 82962 GLUCOSE BLOOD TEST: CPT

## 2022-10-03 PROCEDURE — 94640 AIRWAY INHALATION TREATMENT: CPT

## 2022-10-03 PROCEDURE — 80053 COMPREHEN METABOLIC PANEL: CPT

## 2022-10-03 PROCEDURE — 99233 SBSQ HOSP IP/OBS HIGH 50: CPT | Performed by: INTERNAL MEDICINE

## 2022-10-03 PROCEDURE — 92526 ORAL FUNCTION THERAPY: CPT

## 2022-10-03 RX ORDER — POTASSIUM CHLORIDE 7.45 MG/ML
10 INJECTION INTRAVENOUS
Status: DISCONTINUED | OUTPATIENT
Start: 2022-10-03 | End: 2022-10-03 | Stop reason: SDUPTHER

## 2022-10-03 RX ORDER — DEXTROSE MONOHYDRATE 50 MG/ML
INJECTION, SOLUTION INTRAVENOUS CONTINUOUS
Status: DISCONTINUED | OUTPATIENT
Start: 2022-10-03 | End: 2022-10-04

## 2022-10-03 RX ORDER — POTASSIUM CHLORIDE 7.45 MG/ML
10 INJECTION INTRAVENOUS ONCE
Status: COMPLETED | OUTPATIENT
Start: 2022-10-03 | End: 2022-10-03

## 2022-10-03 RX ORDER — POTASSIUM CHLORIDE 20 MEQ/1
40 TABLET, EXTENDED RELEASE ORAL ONCE
Status: DISCONTINUED | OUTPATIENT
Start: 2022-10-03 | End: 2022-10-03

## 2022-10-03 RX ADMIN — IPRATROPIUM BROMIDE AND ALBUTEROL SULFATE 1 AMPULE: .5; 2.5 SOLUTION RESPIRATORY (INHALATION) at 19:59

## 2022-10-03 RX ADMIN — GABAPENTIN 300 MG: 300 CAPSULE ORAL at 14:13

## 2022-10-03 RX ADMIN — IPRATROPIUM BROMIDE AND ALBUTEROL SULFATE 1 AMPULE: .5; 2.5 SOLUTION RESPIRATORY (INHALATION) at 12:53

## 2022-10-03 RX ADMIN — SODIUM CHLORIDE, PRESERVATIVE FREE 10 ML: 5 INJECTION INTRAVENOUS at 06:22

## 2022-10-03 RX ADMIN — ANTI-FUNGAL POWDER MICONAZOLE NITRATE TALC FREE: 1.42 POWDER TOPICAL at 16:10

## 2022-10-03 RX ADMIN — AMLODIPINE BESYLATE 5 MG: 5 TABLET ORAL at 09:40

## 2022-10-03 RX ADMIN — GABAPENTIN 300 MG: 300 CAPSULE ORAL at 09:40

## 2022-10-03 RX ADMIN — METOPROLOL TARTRATE 25 MG: 25 TABLET, FILM COATED ORAL at 09:40

## 2022-10-03 RX ADMIN — IPRATROPIUM BROMIDE AND ALBUTEROL SULFATE 1 AMPULE: .5; 2.5 SOLUTION RESPIRATORY (INHALATION) at 08:38

## 2022-10-03 RX ADMIN — AMPICILLIN SODIUM AND SULBACTAM SODIUM 3000 MG: 2; 1 INJECTION, POWDER, FOR SOLUTION INTRAMUSCULAR; INTRAVENOUS at 00:27

## 2022-10-03 RX ADMIN — ANTI-FUNGAL POWDER MICONAZOLE NITRATE TALC FREE: 1.42 POWDER TOPICAL at 21:51

## 2022-10-03 RX ADMIN — MICONAZOLE NITRATE: 2 OINTMENT TOPICAL at 21:51

## 2022-10-03 RX ADMIN — LEVOTHYROXINE SODIUM 50 MCG: 0.05 TABLET ORAL at 06:26

## 2022-10-03 RX ADMIN — LINEZOLID 600 MG: 600 TABLET, FILM COATED ORAL at 20:11

## 2022-10-03 RX ADMIN — MICONAZOLE NITRATE: 2 OINTMENT TOPICAL at 16:07

## 2022-10-03 RX ADMIN — POTASSIUM CHLORIDE 10 MEQ: 7.46 INJECTION, SOLUTION INTRAVENOUS at 12:02

## 2022-10-03 RX ADMIN — GABAPENTIN 300 MG: 300 CAPSULE ORAL at 20:11

## 2022-10-03 RX ADMIN — PANTOPRAZOLE SODIUM 40 MG: 40 TABLET, DELAYED RELEASE ORAL at 06:26

## 2022-10-03 RX ADMIN — SODIUM CHLORIDE, PRESERVATIVE FREE 10 ML: 5 INJECTION INTRAVENOUS at 07:05

## 2022-10-03 RX ADMIN — MICONAZOLE NITRATE: 2 OINTMENT TOPICAL at 16:08

## 2022-10-03 RX ADMIN — METOPROLOL TARTRATE 25 MG: 25 TABLET, FILM COATED ORAL at 20:12

## 2022-10-03 RX ADMIN — LINEZOLID 600 MG: 600 TABLET, FILM COATED ORAL at 09:40

## 2022-10-03 RX ADMIN — SODIUM CHLORIDE, PRESERVATIVE FREE 10 ML: 5 INJECTION INTRAVENOUS at 00:26

## 2022-10-03 RX ADMIN — POTASSIUM CHLORIDE 10 MEQ: 7.46 INJECTION, SOLUTION INTRAVENOUS at 13:31

## 2022-10-03 RX ADMIN — DEXTROSE MONOHYDRATE: 50 INJECTION, SOLUTION INTRAVENOUS at 11:55

## 2022-10-03 RX ADMIN — AMPICILLIN SODIUM AND SULBACTAM SODIUM 3000 MG: 2; 1 INJECTION, POWDER, FOR SOLUTION INTRAMUSCULAR; INTRAVENOUS at 06:22

## 2022-10-03 RX ADMIN — IPRATROPIUM BROMIDE AND ALBUTEROL SULFATE 1 AMPULE: .5; 2.5 SOLUTION RESPIRATORY (INHALATION) at 15:38

## 2022-10-03 RX ADMIN — AMPICILLIN SODIUM AND SULBACTAM SODIUM 3000 MG: 2; 1 INJECTION, POWDER, FOR SOLUTION INTRAMUSCULAR; INTRAVENOUS at 18:52

## 2022-10-03 RX ADMIN — AMPICILLIN SODIUM AND SULBACTAM SODIUM 3000 MG: 2; 1 INJECTION, POWDER, FOR SOLUTION INTRAMUSCULAR; INTRAVENOUS at 12:39

## 2022-10-03 RX ADMIN — POTASSIUM CHLORIDE 10 MEQ: 7.46 INJECTION, SOLUTION INTRAVENOUS at 10:30

## 2022-10-03 RX ADMIN — ATORVASTATIN CALCIUM 20 MG: 20 TABLET, FILM COATED ORAL at 20:12

## 2022-10-03 ASSESSMENT — PAIN SCALES - GENERAL
PAINLEVEL_OUTOF10: 0

## 2022-10-03 NOTE — PROGRESS NOTES
Admit Date: 9/29/2022    Subjective:     Week end event reviewed  Still with cough with meals     Objective:     Patient Vitals for the past 8 hrs:   BP Temp Temp src Pulse Resp SpO2   10/03/22 0342 138/64 97.5 °F (36.4 °C) Temporal 65 18 94 %   10/02/22 2340 132/60 97.2 °F (36.2 °C) Temporal 66 19 90 %     I/O last 3 completed shifts: In: 1465.8 [P.O.:585; I.V.:76.8; IV Piggyback:803.9]  Out: 935 [Urine:935]  No intake/output data recorded. HEENT: Normal  NECK: Thyroid normal. No carotid bruit. No lymphphadenopathy. CVS: RRR  RS: Clear. No wheeze. No rhonchi. Good airflow bilaterally. ABD: Soft. Non tender. No mass. Normal BS.obese   EXT: No edema. Non tender.  Dressing feet   NEURO: no focal deficit       Scheduled Meds:   potassium chloride  10 mEq IntraVENous Q12H    ipratropium-albuterol  1 ampule Inhalation 4x daily    ampicillin-sulbactam  3,000 mg IntraVENous Q6H    linezolid  600 mg Oral 2 times per day    amLODIPine  5 mg Oral Daily    atorvastatin  20 mg Oral Nightly    levothyroxine  50 mcg Oral Daily    pantoprazole  40 mg Oral QAM AC    metoprolol tartrate  25 mg Oral BID    gabapentin  300 mg Oral TID    insulin lispro  0-8 Units SubCUTAneous TID WC    insulin lispro  0-4 Units SubCUTAneous Nightly     Continuous Infusions:    CBC with Differential:    Lab Results   Component Value Date/Time    WBC 7.6 10/03/2022 05:17 AM    RBC 3.00 10/03/2022 05:17 AM    HGB 8.6 10/03/2022 05:17 AM    HCT 28.2 10/03/2022 05:17 AM     10/03/2022 05:17 AM    MCV 94.0 10/03/2022 05:17 AM    MCH 28.7 10/03/2022 05:17 AM    MCHC 30.5 10/03/2022 05:17 AM    RDW 15.1 10/03/2022 05:17 AM    SEGSPCT 80 01/04/2012 03:00 AM    BANDSPCT 1 11/27/2014 04:50 AM    LYMPHOPCT 14.3 10/03/2022 05:17 AM    MONOPCT 6.8 10/03/2022 05:17 AM    MYELOPCT 0.9 02/28/2018 05:21 AM    BASOPCT 0.7 10/03/2022 05:17 AM    MONOSABS 0.52 10/03/2022 05:17 AM    LYMPHSABS 1.09 10/03/2022 05:17 AM    EOSABS 0.50 10/03/2022 05:17 AM BASOSABS 0.05 10/03/2022 05:17 AM     CMP:    Lab Results   Component Value Date/Time     10/03/2022 05:17 AM    K 3.1 10/03/2022 05:17 AM    K 6.2 09/21/2022 10:44 AM     10/03/2022 05:17 AM    CO2 20 10/03/2022 05:17 AM    BUN 24 10/03/2022 05:17 AM    CREATININE 2.3 10/03/2022 05:17 AM    GFRAA 33 10/03/2022 05:17 AM    LABGLOM 28 10/03/2022 05:17 AM    PROT 6.4 10/03/2022 05:17 AM    LABALBU 2.7 10/03/2022 05:17 AM    LABALBU 3.4 01/04/2012 03:00 AM    CALCIUM 8.7 10/03/2022 05:17 AM    BILITOT <0.2 10/03/2022 05:17 AM    ALKPHOS 71 10/03/2022 05:17 AM    AST 14 10/03/2022 05:17 AM    ALT 12 10/03/2022 05:17 AM     PT/INR:    Lab Results   Component Value Date/Time    PROTIME 15.8 09/28/2022 09:39 AM    PROTIME 12.2 12/30/2011 10:20 PM    INR 1.5 09/28/2022 09:39 AM       Assessment:     Active Problems:  JUAN on CKD improved   Hyperkalemia due to above and supplement resolved   Right heal wound infection   HTN  Obesity  DM  Impaired mobility   Fungal dermatitis   Remote CVA   PVD   Hypothyroid   Hypomagnesemia       Plan:    To check swallowing study not done yet ,continue ATB per ID ,await arteriogram

## 2022-10-03 NOTE — PROGRESS NOTES
Department of Podiatry   Progress Note        Patient seen and evaluated this morning with sister at bedside. No acute overnight events. No new pedal complaints. Continue conservative care to right foot wound. 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[address]     Femoral-popliteal atherosclerosis ([address] 9/13/2022    Hypertension     Ischemic stroke ([address] 03/06/2018    Pacemaker     Paraplegia ([address]     Sinus node dysfunction ([address]     Stroke (cerebrum) ([address] 02/27/2018    Thyroid disease     Ulcer of right heel and midfoot with fat layer exposed ([address] 8/15/2022       Past Surgical History:        Procedure Laterality Date    CARDIAC PACEMAKER PLACEMENT  12/19/2014    COLONOSCOPY      EYE SURGERY      KIDNEY REMOVAL Left 07/05/2022    CANCER [facility] removed    KNEE ARTHROSCOPY  right knee    PACEMAKER PLACEMENT      NE COLONOSCOPY FLX DX W/COLLJ SPEC WHEN PFRMD N/A 03/20/2018    COLONOSCOPY DIAGNOSTIC performed by Nick Ford MD at [address] EGD PERCUTANEOUS PLACEMENT GASTROSTOMY TUBE N/A 03/29/2018    PEG TUBE/PT.  IN 5507 B performed by Nick Ford MD at [address] EGD PERCUTANEOUS PLACEMENT GASTROSTOMY TUBE N/A 09/12/2018    EGD PEG TUBE PLACEMENT performed by Nick Ford MD at [address] [place] [address]       Medications Prior to Admission:    Medications Prior to Admission: meclizine (ANTIVERT) 25 MG tablet, Take 25 mg by mouth three times daily  warfarin (COUMADIN) 6 MG tablet, Take 6 mg by mouth daily  Glucagon HCl (GLUCAGON EMERGENCY) 1 MG/ML SOLR, Inject 1 mg as directed as needed (HYPOGLYCEMIA)  glucose (GLUTOSE) 40 % GEL, Take 15 g by mouth as needed (HYPOGLYCEMIA)  ipratropium-albuterol (DUONEB) 0.5-2.5 (3) MG/3ML SOLN nebulizer solution, Inhale 1 vial into the lungs every 4 hours as needed (COUGHINGAND DYSPNEA)  potassium chloride (KLOR-CON M) 20 MEQ extended release tablet, Take 20 mEq by mouth daily  polyethylene glycol (GLYCOLAX) 17 g packet, Take 17 g by mouth daily as needed for Constipation  phenazopyridine (PYRIDIUM) 200 MG tablet, Take 200 mg by mouth daily  dextromethorphan-guaiFENesin (ROBITUSSIN-DM)  MG/5ML syrup, Take 5 mLs by mouth every 6 hours as needed for Cough  Cholecalciferol (VITAMIN D3) 1.25 MG (02715 UT) CAPS, Take 50,000 Units by mouth once a week **FRIDAYS**  loperamide (IMODIUM) 2 MG capsule, Take 2 mg by mouth daily as needed for Diarrhea  Multiple Vitamins-Minerals (THERAPEUTIC MULTIVITAMIN-MINERALS) tablet, Take 1 tablet by mouth daily  gabapentin (NEURONTIN) 300 MG capsule, Take 1 capsule by mouth nightly for 7 days. levothyroxine (SYNTHROID) 50 MCG tablet, Take 1 tablet by mouth in the morning. atorvastatin (LIPITOR) 20 MG tablet, Take 1 tablet by mouth nightly  magnesium oxide (MAG-OX) 400 (240 Mg) MG tablet, Take 1 tablet by mouth every other day  ascorbic acid (VITAMIN C) 500 MG tablet, Take 1 tablet by mouth in the morning. acetaminophen (TYLENOL) 325 MG tablet, Take 650 mg by mouth every 4 hours as needed (DISCOMFORT;PAIN/ELEV TEMP >100.4F)  pantoprazole (PROTONIX) 40 MG tablet, Take 40 mg by mouth daily  amLODIPine (NORVASC) 2.5 MG tablet, Take 1 tablet by mouth daily  metoprolol tartrate (LOPRESSOR) 25 MG tablet, Take 1 tablet by mouth 2 times daily  metFORMIN (GLUCOPHAGE) 1000 MG tablet, Take 1 tablet by mouth 2 times daily  omeprazole (PRILOSEC) 20 MG delayed release capsule, Take 1 capsule by mouth daily  furosemide (LASIX) 40 MG tablet, Take 0.5 tablets by mouth daily    Allergies:  Bee venom    Social History:   TOBACCO:   reports that he quit smoking about 20 years ago. His smoking use included cigars.  He has never used smokeless tobacco.  ETOH:   reports no history of alcohol use.  DRUGS:   Social History     Substance and Sexual Activity   Drug Use No       Family History:       Problem Relation Age of Onset    Heart Disease Mother     Stroke Father          REVIEW OF SYSTEMS:    All pertinent review of systems both positive and negative discussed in HPI    Previous Exa:  LOWER EXTREMITY EXAMINATION   - Previous physical exam results below:  - Dressings today remain clean, dry, intact. VASCULAR:  DP and PT pulses weakly palpable 1+ to the bilateral LE. 1-2+ pitting edema appreciated as well. NEUROLOGIC:  Protective sensation diminished to distal aspect of bilateral lower extremities. DERM:  Right pressure heel ulceration with overlying eschar noted. Fat layer exposed. No clinical signs of infection. Does not probe deep at this time. Measures roughly 8 cm x 9 cm x unknown depth. No malodor. Little drainage. Superficial wound noted to patient's anterior Right ankle as well. Not clinically infected    MUSCULOSKELETAL: Tenderness to palpation of the right heel wound. Negative TTP of bilateral calves at this time. MSK strength testing deferred this afternoon          Media Information  Document Information    Clinical Consent:  Wound      09/28/2022 07:54   Attached To:    Hospital Encounter on 9/21/22     Source Information    MD Ph[facility]s Intermediate       CONSULTS:  IP CONSULT TO INFECTIOUS DISEASES    MEDICATION:  Scheduled Meds:   potassium chloride  10 mEq IntraVENous Q1H    ipratropium-albuterol  1 ampule Inhalation 4x daily    ampicillin-sulbactam  3,000 mg IntraVENous Q6H    linezolid  600 mg Oral 2 times per day    amLODIPine  5 mg Oral Daily    atorvastatin  20 mg Oral Nightly    levothyroxine  50 mcg Oral Daily    pantoprazole  40 mg Oral QAM AC    metoprolol tartrate  25 mg Oral BID    gabapentin  300 mg Oral TID    insulin lispro  0-8 Units SubCUTAneous TID WC    insulin lispro  0-4 Units SubCUTAneous Nightly     Continuous Infusions:   dextrose PRN Meds:.sodium chloride flush, acetaminophen    RADIOLOGY:  XR CHEST PORTABLE   Final Result   1. Mild cardiomegaly      2. Minimal basilar subsegmental atelectasis. 3.  Dilated bowel loops in the upper abdomen, consider abdominal radiograph   or CT exam.             Vitals:    BP (!) 141/65   Pulse 64   Temp 97 °F (36.1 °C) (Temporal)   Resp 17   Ht 6' 2\" (1.88 m)   Wt 254 lb (115.2 kg)   SpO2 99%   BMI 32.61 kg/m²     LABS:   Recent Labs     10/02/22  0522 10/03/22  0517   WBC 8.7 7.6   HGB 8.0* 8.6*   HCT 26.9* 28.2*    232       Recent Labs     10/03/22  0517   *   K 3.1*   *   CO2 20*   BUN 24*   CREATININE 2.3*     Recent Labs     10/02/22  0522 10/03/22  0517   PROT 6.5 6.4       ASSESSMENTS:   Principal Problem:  - Chronic Right heel diabetic ulceration with eschar   - Anterior right ankle diabetic wound  - Type II diabetic     Hyperkalemia  - Recent fall with dizziness (chief complaint)         PLAN:  - Patient seen and evaluated bedside  - Charts and labs reviewed   - Cultures : from 8/24 grew Proteus mirabilis, MRSA, and Corynebacterium.  - Abx: ampicillin and Linezolid per ID  -Vascular: Following, angiogram results are pending  - Prevalon offloading boots applied to the bilateral lower extremities  - Dressing: Continue Santyl with DSD, QOD   -CT right foot- No CT evidence of osteomyelitis. - Will follow  - Discussed patient with Dr. Moss Dose  - Will continue to follow patient while they are in-house. Thank you for the opportunity to take part in the patient's care. Please do not hesitate to call for any questions or concerns.

## 2022-10-03 NOTE — PROGRESS NOTES
Messaged  to clarify if pt is to be on pureed diet. Speech recommendations was to keep pt NPO until MBSS. Dr. Brien Curry responded, \"Yes per family they declined video swallowing and want him on purred diet\".

## 2022-10-03 NOTE — PROGRESS NOTES
SPEECH/LANGUAGE PATHOLOGY  CLINICAL ASSESSMENT OF SWALLOWING FUNCTION   and PLAN OF CARE    PATIENT NAME:  Brendan Mejía  (male)     MRN:  [id number]    :  1945  (67 y.o.)  STATUS:  Inpatient: Room 6520/7336-    TODAY'S DATE:  10/3/2022  REFERRING PROVIDER:   Lalo Ramírez MD  SPECIFIC PROVIDER ORDER: SLP swallowing-dysphagia evaluation and treatment Date of order:  10/03/2022  REASON FOR REFERRAL: Assess swallow fx   EVALUATING THERAPIST: JAY Nicole                 RESULTS:    DYSPHAGIA DIAGNOSIS:   Clinical indicators of severe-profound pharyngeal phase dysphagia     SLP recommending MBSS;   RECOMMEND PHYSICIAN/ CARE TEAM DISCUSS GOALS OF CARE WITH PT/FAMILY   SLP recommend that physician discuss overall goals of care/ wishes. SLP instructed patient and RN to discuss with physician and care team and stressed that aspiration remained a risk. Pt and pt's family have refused video swallow in past.       DIET RECOMMENDATIONS:  NPO until MBSS can be completed        FEEDING RECOMMENDATIONS:     Assistance level:  Not applicable      Compensatory strategies recommended: Not applicable      Discussed recommendations with nursing and/or faxed report to referring provider: Yes    Per chart review prior to assessment, RN (Liyah) reported family refusing swallow evaluation. Order still remained at 11:30AM. SLP consulted patient's current RN Anthony Romero), she reported patient agreeing to bedside swallow evaluation. RN Anthony Romero) provided clearance for bedside swallow evaluation. SPEECH THERAPY  PLAN OF CARE   The dysphagia POC is established based on physician order, dysphagia diagnosis and results of clinical assessment     Will establish POC once MBSS is completed.     Conditions Requiring Skilled Therapeutic Intervention for dysphagia:    Throat clearing during PO intake   Wet vocal quality during PO intake  Coughing during PO intake      Specific dysphagia interventions to include:     ongoing skilled PO analysis to determine if PO diet can be initiated     Specific instructions for next treatment:  MBSS to be completed  Patient Treatment Goals:    Short Term Goals:  Pt will participate in MBSS to fully assess oropharyngeal swallow function and to assist in determining the least restrictive PO diet to maintain adequate nutrition/hydration     Long Term Goals: A Video Swallow Study (MBSS) is recommended and requires a physician order      Patient/family Goal:    To eat/drink without coughing or choking    Plan of care discussed with Patient   The Patient understand(s) the diagnosis, prognosis and plan of care     Rehabilitation Potential/Prognosis: fair                    ADMITTING DIAGNOSIS: Peripheral arterial disease ([facility] [I73.9]    PATIENT REPORT/COMPLAINT: coughing with all consistencies  RN cleared patient for participation in assessment     yes and meal tray present during evaluation     PRIOR LEVEL OF SWALLOW FUNCTION:    PAST HISTORY OF DYSPHAGIA?: yes    Home diet: Regular consistency solids (IDDSI level 7) with  thin liquids (IDDSI level 0)  Current Diet Order:  ADULT DIET; Dysphagia - Pureed; 4 carb choices (60 gm/meal); Low Fat/Low Chol/High Fiber/JOSE A    PROCEDURE:  Consistencies Administered During the Evaluation   Liquids: thin liquid   Solids:  pureed foods      Method of Intake:   cup, spoon  Self fed      Position:   Seated, upright    CLINICAL ASSESSMENT:  Oral Stage:        The oral stage of swallowing was within functional limits for consistencies administered      Pharyngeal Stage:    Throat clearing present after presentation of all consistencies administered  Immediate wet cough was noted after presentation of all consistencies administered  Latent wet cough was noted after presentation of all consistencies administered  Wet respirations were noted after presentation of all consistencies administered  Wet/gurgly vocal quality was noted after presentation of all consistencies administered  Eye watering/flushing of skin    Cognition:   Within functional limits for this exam    Oral Peripheral Examination   Could not test    Current Respiratory Status    room air     Parameters of Speech Production  Respiration:  Adequate for speech production  Quality:   Within functional limits  Intensity: Within functional limits    Volitional Swallow: present     Volitional Cough:   present     Pain: No pain reported. EDUCATION:   The Speech Language Pathologist (SLP) completed education regarding results of evaluation and that intervention is warranted at this time. Learner: Patient  Education: Reviewed results and recommendations of this evaluation  Evaluation of Education:  Ryan Rueda understanding    This plan may be re-evaluated and revised as warranted. Evaluation Time includes thorough review of current medical information, gathering information on past medical history/social history and prior level of function, completion of standardized testing/informal observation of tasks, assessment of data and education on plan of care and goals. INTERVENTION     Speech Pathologist (SLP) completed education with the patient and RN regarding type of swallowing impairment. No safe diet texture is able to be recommended following this assessment. If family/ Pt wish to forego NPO recommendations, the least restrictive PO diet is Pureed consistency solids (IDDSI level 4) with  pudding consistency (extremely thick - IDDSI level 4)  Liquids with multiple swallows in attempts to clear residuals in vallecula. RECOMMEND PHYSICIAN/ CARE TEAM DISCUSS GOALS OF CARE WITH PT/FAMILY   SLP recommend that physician discuss overall goals of care/ wishes. SLP instructed patient and RN to discuss with physician and care team and stressed that aspiration remained a risk. [x]The admitting diagnosis and active problem list, have been reviewed prior to initiation of this evaluation.         ACTIVE PROBLEM LIST:   Patient Active Problem List   Diagnosis    Hypertension    Hyperlipidemia with target LDL less than 100    Impaired mobility and ADLs    Cardiac pacemaker in situ    Cerebrovascular accident (CVA) determined by clinical assessment ([address]    Left renal mass    Class 2 severe obesity due to excess calories with serious comorbidity and body mass index (BMI) of 35.0 to 35.9 in adult [facility])    Acquired hypothyroidism    H/O deep venous thrombosis    Type 2 diabetes mellitus without complication, without long-term current use of insulin (HCC)    Stage 3 chronic kidney disease (HCC)    Ulcer of right heel and midfoot with fat layer exposed ([address]    Atherosclerosis of native artery of right lower extremity with ulceration of heel ([address]    Status post nephrectomy    Chronic renal failure, stage 3b (HCC)    Necrotic eschar (HCC)    Diabetic ulcer of left heel associated with type 2 diabetes mellitus, with fat layer exposed ([address]    Femoral-popliteal atherosclerosis (HCC)    Hyperkalemia    Peripheral arterial disease ([address]         CPT code:  96390  bedside swallow eval, 89548 dysphagia therapy    Nitish Valente.  Lizy Lomax M.A. [facility]. [id number]

## 2022-10-03 NOTE — PROGRESS NOTES
Spoke with patient's sister. She states family is refusing swallow evaluation. She requested son be called to notify him  Has ordered an swallow evaluation and have a refusal for treatment signed. Mike Perry RN states family had been refusing this test last week and attending physician was aware.  Arnaldo Santiago RN. 10/3/2022

## 2022-10-03 NOTE — PROGRESS NOTES
Vascular Surgery Progress Note    CC: PVD, wound    HISTORY:  The patient is awake, but fall asleep easily throughout visit. No new complaints. IMPRESSION: PVD, slow healing right heel wound. PLAN: Pt unable to tolerate arteriogram. Needs rescheduled with anesthesia. Planning for Thursday or Friday of this week pending schedule availability.      Patient Active Problem List   Diagnosis Code    Hypertension I10    Hyperlipidemia with target LDL less than 100 E78.5    Impaired mobility and ADLs Z74.09, Z78.9    Cardiac pacemaker in situ Z95.0    Cerebrovascular accident (CVA) determined by clinical assessment ([address] I63.9    Left renal mass N28.89    Class 2 severe obesity due to excess calories with serious comorbidity and body mass index (BMI) of 35.0 to 35.9 in adult [facility]) E66.01, Z68.35    Acquired hypothyroidism E03.9    H/O deep venous thrombosis Z86.718    Type 2 diabetes mellitus without complication, without long-term current use of insulin (HCC) E11.9    Stage 3 chronic kidney disease (HCC) N18.30    Ulcer of right heel and midfoot with fat layer exposed ([address] L97.412    Atherosclerosis of native artery of right lower extremity with ulceration of heel (HCC) I70.234    Status post nephrectomy Z90.5    Chronic renal failure, stage 3b (HCC) N18.32    Necrotic eschar ([address] I96    Diabetic ulcer of left heel associated with type 2 diabetes mellitus, with fat layer exposed ([address] E11.621, L97.422    Femoral-popliteal atherosclerosis (HCC) I70.209    Hyperkalemia E87.5    Peripheral arterial disease (HCC) I73.9       Current Medications:    dextrose 75 mL/hr at 10/03/22 1155      miconazole nitrate **AND** miconazole nitrate, sodium chloride flush, acetaminophen    miconazole nitrate   Topical BID    miconazole   Topical BID    ipratropium-albuterol  1 ampule Inhalation 4x daily    ampicillin-sulbactam  3,000 mg IntraVENous Q6H    linezolid  600 mg Oral 2 times per day    amLODIPine  5 mg Oral Daily atorvastatin  20 mg Oral Nightly    levothyroxine  50 mcg Oral Daily    pantoprazole  40 mg Oral QAM AC    metoprolol tartrate  25 mg Oral BID    gabapentin  300 mg Oral TID    insulin lispro  0-8 Units SubCUTAneous TID WC    insulin lispro  0-4 Units SubCUTAneous Nightly          PHYSICAL EXAM:    Vitals:    10/03/22 1413   BP: 131/61   Pulse: 60   Resp: 20   Temp: 98.6 °F (37 °C)   SpO2: 95%     CONSTITUTIONAL:  awake, alert, cooperative, no apparent distress  LUNGS:  no increased work of breathing, good air exchange   CARDIOVASCULAR:  regular rate and rhythm  ABDOMEN:  soft, non-distended and non-tender  EXTREMITIES: slight leg edema bilaterally, wrap in place to R foot      PULSE EXAM      Right      Left   Brachial       Radial 2 2   Femoral 3 3   Popliteal biphasic     Dorsalis Pedis wrap biphasic   Posterior Tibial monophasic 1, multiphasic   (3=normal, 2=diminished, 1=barely palpable, 4=widened)    LABS:    Lab Results   Component Value Date    WBC 7.6 10/03/2022    HGB 8.6 (L) 10/03/2022    HCT 28.2 (L) 10/03/2022     10/03/2022    PROTIME 15.8 (H) 09/28/2022    INR 1.5 09/28/2022    APTT 30.5 09/28/2022    K 3.1 (L) 10/03/2022    BUN 24 (H) 10/03/2022    CREATININE 2.3 (H) 10/03/2022       RADIOLOGY:

## 2022-10-03 NOTE — PROGRESS NOTES
Department of Internal Medicine  Nephrology Progress Note      Events reviewed. SUBJECTIVE: We are following Mr. Amaya Sellers for CKD. He has no complaints today. PHYSICAL EXAM:      Vitals:    VITALS:  BP (!) 143/66   Pulse 65   Temp 98 °F (36.7 °C) (Temporal)   Resp 18   Ht 6' 2\" (1.88 m)   Wt 254 lb (115.2 kg)   SpO2 98%   BMI 32.61 kg/m²   24HR INTAKE/OUTPUT:    Intake/Output Summary (Last 24 hours) at 10/3/2022 1234  Last data filed at 10/3/2022 0705  Gross per 24 hour   Intake 794.69 ml   Output 660 ml   Net 134.69 ml       Constitutional:  Well built. Obese. Mild distress. HEENT:  BEERLA. JVD not seen. Respiratory:  Breath sounds normal. No rales or rhonchi  Cardiovascular/Edema:  Heart sounds normal. No murmur. Gastrointestinal:  Bowel sounds normal. No oranomgaly  Neurologic:  A/O x 3. No focal deficit. Skin:  Dry skin. Normal turgor. Other:  Non healing ulcer in right foot.     Scheduled Meds:   potassium chloride  10 mEq IntraVENous Q1H    ipratropium-albuterol  1 ampule Inhalation 4x daily    ampicillin-sulbactam  3,000 mg IntraVENous Q6H    linezolid  600 mg Oral 2 times per day    amLODIPine  5 mg Oral Daily    atorvastatin  20 mg Oral Nightly    levothyroxine  50 mcg Oral Daily    pantoprazole  40 mg Oral QAM AC    metoprolol tartrate  25 mg Oral BID    gabapentin  300 mg Oral TID    insulin lispro  0-8 Units SubCUTAneous TID WC    insulin lispro  0-4 Units SubCUTAneous Nightly     Continuous Infusions:   dextrose 75 mL/hr at 10/03/22 1155       PRN Meds:.sodium chloride flush, acetaminophen      DATA:    CBC:   Lab Results   Component Value Date/Time    WBC 7.6 10/03/2022 05:17 AM    RBC 3.00 10/03/2022 05:17 AM    HGB 8.6 10/03/2022 05:17 AM    HCT 28.2 10/03/2022 05:17 AM    MCV 94.0 10/03/2022 05:17 AM    MCH 28.7 10/03/2022 05:17 AM    MCHC 30.5 10/03/2022 05:17 AM    RDW 15.1 10/03/2022 05:17 AM     10/03/2022 05:17 AM    MPV 9.5 10/03/2022 05:17 AM     CMP:    Lab Results Component Value Date/Time     10/03/2022 05:17 AM    K 3.1 10/03/2022 05:17 AM    K 6.2 09/21/2022 10:44 AM     10/03/2022 05:17 AM    CO2 20 10/03/2022 05:17 AM    BUN 24 10/03/2022 05:17 AM    CREATININE 2.3 10/03/2022 05:17 AM    GFRAA 33 10/03/2022 05:17 AM    LABGLOM 28 10/03/2022 05:17 AM    GLUCOSE 140 10/03/2022 05:17 AM    GLUCOSE 297 01/04/2012 03:00 AM    PROT 6.4 10/03/2022 05:17 AM    LABALBU 2.7 10/03/2022 05:17 AM    LABALBU 3.4 01/04/2012 03:00 AM    CALCIUM 8.7 10/03/2022 05:17 AM    BILITOT <0.2 10/03/2022 05:17 AM    ALKPHOS 71 10/03/2022 05:17 AM    AST 14 10/03/2022 05:17 AM    ALT 12 10/03/2022 05:17 AM     Magnesium:    Lab Results   Component Value Date/Time    MG 1.9 10/03/2022 05:17 AM     Phosphorus:    Lab Results   Component Value Date/Time    PHOS 3.2 09/24/2022 03:00 AM       BRIEF SUMMARY OF INITIAL CONSULT:    Briefly, Mr. Vivi Hayes  is a 76-year-old male with a PMH of CKD stage IIIb, without proteinuria, baseline creatinine 2.0-2.1 mg/dL, secondary to nephrosclerosis, kidney cancer, s/p total left nephrectomy on 7/5/22 at [facility], hypertension, type II diabetes mellitus, HFpEF 55% with stage II DD, PAF, factor V Leyden deficiency, hyperlipidemia, sick sinus syndrome s/p pacemaker placement, CVA, hypothyroidism, PAF on chronic anticoagulation with warfarin, who was admitted on September 21, 2022 after a fall at home, his labs were significant for potassium 6.2 mmol/L, BUN 57 mg/dL, creatinine 2.8 mg/dL, reason for this consult. His home medications include lasix, lisinopril    Resolved:    Hyperkalemia, 2/2 ACE inhibition, decrease GFR and on potassium supplementation,  improved with lokelma  JUAN on CKD, volume responsive pre-renal JUAN, 2/2 to overt diuresis, decrease oral intake in the setting of ACE inhibition. Resolved, renal function has returned to baseline, last creatinine 2.1 mg/dL. We will discontinue IV fluids.     IMPRESSION/RECOMMENDATIONS:        CKD stage IV, without proteinuria, baseline creatinine 2.1-2.4 mg/dL, secondary to nephrosclerosis and solitary kidney. Renal function stable at baseline.    Hypernatremia, sodium up to 148, to start free water  Left kidney cancer, s/p total left nephrectomy 7/5/22 at [facility]  Hypokalemia, probably with renal K wasting due to ampicillin-sulbactam (Non reabsorbable anions)   HTN, on metoprolol   HFpEF 55% with stage II DD, pro-, Lasix on hold  Hypomagnesemia, 2/2 diuretics, levels improved  -------------------------------------------------  S/p fall  Nonhealing right heel wound, ID following, on linezolid and ampicillin-sulbactam.    PAF, on metoprolol and warfarin  Sick sinus syndrome, s/p pacemaker placement   Type 2 DM, on metformin at home, on hold, SSI for now  HLD, on atorvastatin  Hypothyroidism, on levothyroxine  History of CVA  Normocytic anemia, ferritin 178, iron saturation 18%, folate 8.7, B12: 270     Plan:       D5W at 75 cc/hour   Monitor sodium levels  Replace potassium   Continue to hold Lasix  Continue to hold metformin  Continue to monitor renal function  Continue to monitor magnesium levels      Electronically signed by Aimee Nguyen MD on 10/3/2022 at 12:34 PM

## 2022-10-03 NOTE — FLOWSHEET NOTE
Inpatient Wound Care (Initial consult) 6307A    Admit Date: 9/29/2022 12:57 PM    Reason for consult:  Scrotum    Significant history:  Admitted with wounds    Findings:     10/03/22 1402   Skin Integumentary    Skin Integrity   (dry flaky)   [facility] BLE   Skin Integrity Site 2   Skin Integrity [facility] 2   (dry scab)   [facility] 2 left knee   Skin Integrity Site 3   Skin Integrity [facility] 3 Ecchymosis    [facility] 3 BUE   Skin Integrity Site 4   Skin Integrity [facility] 4 Excoriation   [facility] 4 abdominal fold   Skin Integrity Site 5   Skin Integrity [facility] 5 Erosion/denuded   [facility] 5 bilateral buttocks   Wound 09/21/22 Perineum scrotum   Date First Assessed/Time First Assessed: 09/21/22 1537   Present on Hospital Admission: Yes  [facility]: Perineum  Wound Description (Comments): scrotum   Wound Image    Dressing/Treatment Pharmaceutical agent (see MAR)   Wound Length (cm) 1 cm   Wound Width (cm) 1 cm   Wound Depth (cm) 0.1 cm   Wound Surface Area (cm^2) 1 cm^2   Change in Wound Size % (l*w) -300   Wound Volume (cm^3) 0.1 cm^3   Wound Healing % -100   Wound Assessment Pink/red   Drainage Amount None   Odor None   Tory-wound Assessment Dry/flaky   Wound Thickness Description not for Pressure Injury Partial thickness   Wound 08/15/22 Heel Right #1   Date First Assessed/Time First Assessed: 08/15/22 1341   Present on Hospital Admission: Yes  Wound Approximate Age at First Assessment (Weeks): 4 weeks  Primary Wound Type: Pressure Injury  [facility]: Heel  Wound [facility] Orientation: Right  Wound Desc. ..    Dressing Status Intact   Wound 10/03/22 Scrotum Distal   Date First Assessed/Time First Assessed: 10/03/22 1404   Present on Hospital Admission: Yes  [facility]: Scrotum  Wound [facility] Orientation: Distal   Wound Image    Dressing/Treatment Pharmaceutical agent (see MAR)   Wound Length (cm) 0.7 cm   Wound Width (cm) 3 cm   Wound Depth (cm) 0.1 cm   Wound Surface Area (cm^2) 2.1 cm^2   Wound Volume (cm^3) 0.21 cm^3 Wound Assessment Pink/red  (yellow)   Drainage Amount None   Odor Fecal   Tory-wound Assessment Dry/flaky   Wound Thickness Description not for Pressure Injury Partial thickness     **Informed Consent**    The patient has given verbal consent to have photos taken of wounds and inserted into their chart as part of their permanent medical record for purposes of documentation, treatment management and/or medical review. All Images taken on 10/3/22 of patient name: Daiana Pride were transmitted and stored on secured [employer] located within [facility] by a registered [employer] Mobile Application Device. Impression:  Scrotum: partial thickness wounds    Plan: Aloe Vesta ointment  Antifungal powder  Low air loss module  TAPS  Medix heel boots  Podiatry following and managing right heel wound  Patient will need continued preventative care.       Colonel Samir RN 10/3/2022 2:08 PM

## 2022-10-03 NOTE — PLAN OF CARE
Problem: Chronic Conditions and Co-morbidities  Goal: Patient's chronic conditions and co-morbidity symptoms are monitored and maintained or improved  10/3/2022 0327 by Tristan Cordon RN  Outcome: Progressing  Flowsheets (Taken 10/2/2022 2000)  Care Plan - Patient's Chronic Conditions and Co-Morbidity Symptoms are Monitored and Maintained or Improved: Monitor and assess patient's chronic conditions and comorbid symptoms for stability, deterioration, or improvement     Problem: Discharge Planning  Goal: Discharge to home or other facility with appropriate resources  10/3/2022 0327 by Tristan Cordon RN  Outcome: Progressing  Flowsheets (Taken 10/2/2022 2000)  Discharge to home or other facility with appropriate resources:   Identify barriers to discharge with patient and caregiver   Refer to discharge planning if patient needs post-hospital services based on physician order or complex needs related to functional status, cognitive ability or social support system  10/2/2022 1824 by Maryann Landeros RN  Outcome: Progressing  Flowsheets (Taken 10/2/2022 1455)  Discharge to home or other facility with appropriate resources:   Identify barriers to discharge with patient and caregiver   Arrange for needed discharge resources and transportation as appropriate     Problem: Safety - Adult  Goal: Free from fall injury  10/3/2022 0327 by Tristan Cordon RN  Outcome: Progressing  10/2/2022 1824 by Maryann Landeros RN  Outcome: Progressing     Problem: ABCDS Injury Assessment  Goal: Absence of physical injury  10/2/2022 1824 by Maryann Landeros RN  Outcome: Progressing     Problem: Safety - Adult  Goal: Free from fall injury  10/3/2022 0327 by Tristan Cordon RN  Outcome: Progressing  10/2/2022 1824 by Maryann Landeros RN  Outcome: Progressing

## 2022-10-03 NOTE — PROGRESS NOTES
Patient is very drowsy but arouses to voice and agreed to take antibiotic and lopressor. He chewed the medications and swallowed with pudding. He did cough. He refused gabapentin and Lipitor.   Daniel Luna, RN  10/2/2022

## 2022-10-03 NOTE — PROGRESS NOTES
Physical Therapy  Physical Therapy Initial Assessment       Name: Maryjo Brunner  : 1945  MRN: [id number]      Date of Service: 10/3/2022    Evaluating PT:  Stephanie Winkler PT, DPT  [id number]    Room #:  1861/6924-T  Diagnosis:  Peripheral arterial disease ([facility] [I73.9]  PMHx/PSHx:   has a past medical history of Atherosclerosis of native artery of right lower extremity with ulceration of heel ([facility] Atrial fibrillation ([facility] Cancer ([facility] Chronic renal failure, stage 3b ([facility] Diabetes mellitus ([facility] 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[facility] Femoral-popliteal atherosclerosis ([facility] Hypertension, Ischemic stroke ([facility] Pacemaker, Paraplegia ([facility] Sinus node dysfunction ([facility] Stroke (cerebrum) ([facility] Thyroid disease, and Ulcer of right heel and midfoot with fat layer exposed ([facility] Procedure/Surgery:  None  Precautions:  Alarm, Cognition, Falls,  NWB RLE (per perfect serve  DPM ), Per pt and [facility] note has postop shoe for L foot (not present for evaluation), Z Flex Boot B, Romero, TAPS, Contact  Equipment Needs:  TBD    SUBJECTIVE:  Pt is from [facility]. Pt reports he was receiving therapy while there. Pt reports he was completing transfers and ambulating with [employer] and assistance to [facility]. Equipment Owned:     OBJECTIVE:   Initial Evaluation  Date: 10/3/22 Treatment Short Term/ Long Term   Goals   AM-PAC 6 Clicks      Was pt agreeable to Eval/treatment? Yes     Does pt have pain?  No reports      Bed Mobility  Rolling: MaxA  Supine to sit: MaxA x2  Sit to supine: MaxA x2  Scooting: MaxA x2 NWB R foot   Rolling: SBA  Supine to sit: Basim  Sit to supine: Basim  Scooting: Basim   Transfers Sit to stand: Attempted, unable d/t weight bear status and weakness    Sit to stand: ModA  Stand to sit: ModA  Stand pivot: ModA AAD   Ambulation    NT  5 feet with ModA AAD   Stair negotiation: ascended and descended  NT     ROM BUE:  Defer to OT  BLE:  WFL     Strength BUE:  Defer to OT  BLE:   LLE 3+/5  RLE hips 3-/5, knee, ankle 3+/5  Improve 1 MMT   Balance Sitting EOB:  ModA  Dynamic Standing:  NT  Sitting EOB:  SBA  Dynamic Standing:  ModA AAD     Pt is A & O x 2 (self and time, able to choose from options for location and situation) Pt requires cues for attention and requires increased time for processing. Frequently closing eyes  Sensation:  Denies numbness and tingling   Edema:  Unremarkable     Vitals:  /78   ACTIVITY    Therapeutic Exercises:  Functional Mobility     Patient education  Pt educated on role of PT    Patient response to education:   Pt verbalized understanding Pt demonstrated skill Pt requires further education in this area   Partially  Partially  Reinforced      ASSESSMENT:    Conditions Requiring Skilled Therapeutic Intervention:    [x]Decreased strength     [x]Decreased ROM  [x]Decreased functional mobility  [x]Decreased balance   [x]Decreased endurance   [x]Decreased posture  []Decreased sensation  []Decreased coordination   []Decreased vision  [x]Decreased safety awareness   []Increased pain       Comments:  Pt supine in bed upon arrival and agreeable for PT evaluation. Pt completes bed mobility with assistance and verbal cues for mobility. Pt completes supine to sit transfer with assistance and verbal cues for mobility and safety. Pt attempts STS but unable to achieve stance d/t weight bear restriction and weakness. Pt completes EOB activity ~15 min. EOB time limited by dizziness. BP as noted. Pt completes scooting to achieve optimal position for sit to supine transfer with assistance. Pt positioned skillfully in chair position. Patient would benefit from continued skilled PT to maximize functional mobility independence. Treatment:  Patient practiced and was instructed in the following treatment:    Bed mobility- Verbal cues and assistance to achieve maximum independence. Neuromuscular reeducation- ~15 min EOB. Functional reach and weight shifting activities     Pt's/ family goals   1.  Get better    Prognosis is good for reaching above PT goals. Patient and or family understand(s) diagnosis, prognosis, and plan of care. Yes, Sister present for evaluation. PHYSICAL THERAPY PLAN OF CARE:    PT POC is established based on physician order and patient diagnosis     Referring provider/PT Order:    10/02/22 0945  PT eval and treat  Start:  10/02/22 0945,   End:  10/02/22 0945,   ONE TIME,   Standing Count:  1 Occurrences,   R         Henry Bowden MD     Diagnosis:  Peripheral arterial disease ([facility] [I73.9]  Specific instructions for next treatment:  Progress functional mobility    Current Treatment Recommendations:     [x] Strengthening to improve independence with functional mobility   [x] ROM to improve independence with functional mobility   [x] Balance Training to improve static/dynamic balance and to reduce fall risk  [x] Endurance Training to improve activity tolerance during functional mobility   [x] Transfer Training to improve safety and independence with all functional transfers   [x] Gait Training to improve gait mechanics, endurance and assess need for appropriate assistive device  [] Stair Training in preparation for safe discharge home and/or into the community   [x] Positioning to prevent skin breakdown and contractures  [x] Safety and Education Training   [x] Patient/Caregiver Education   [x] HEP  [] Other     PT long term treatment goals are located in above grid    Frequency of treatments: 2-5x/week x 1-2 weeks. Time in  0835  Time out  0910    Total Treatment Time  23 minutes     Evaluation Time includes thorough review of current medical information, gathering information on past medical history/social history and prior level of function, completion of standardized testing/informal observation of tasks, assessment of data and education on plan of care and goals.     CPT codes:  [x] Low Complexity PT evaluation 25552  [] Moderate Complexity PT evaluation 94984  [] High Complexity PT evaluation [id number]  [] PT Re-evaluation [id number]  [] Gait training 38592 0 minutes  [] Manual therapy 09007 0 minutes  [x] Therapeutic activities 06083 23 minutes  [] Therapeutic exercises 21146 0 minutes  [] Neuromuscular reeducation 65343 0 minutes       Arely Godoy PT, DPT   [id number]    Irvin Robles, sPT

## 2022-10-04 PROBLEM — L97.909 PERIPHERAL VASCULAR DISEASE OF LOWER EXTREMITY WITH ULCERATION (HCC): Status: ACTIVE | Noted: 2022-09-29

## 2022-10-04 LAB
ALBUMIN SERPL-MCNC: 2.3 G/DL (ref 3.5–5.2)
ALP BLD-CCNC: 69 U/L (ref 40–129)
ALT SERPL-CCNC: 11 U/L (ref 0–40)
ANION GAP SERPL CALCULATED.3IONS-SCNC: 11 MMOL/L (ref 7–16)
AST SERPL-CCNC: 13 U/L (ref 0–39)
BASOPHILS ABSOLUTE: 0.06 E9/L (ref 0–0.2)
BASOPHILS RELATIVE PERCENT: 0.8 % (ref 0–2)
BILIRUB SERPL-MCNC: <0.2 MG/DL (ref 0–1.2)
BUN BLDV-MCNC: 20 MG/DL (ref 6–23)
BURR CELLS: ABNORMAL
CALCIUM SERPL-MCNC: 8.9 MG/DL (ref 8.6–10.2)
CHLORIDE BLD-SCNC: 109 MMOL/L (ref 98–107)
CO2: 20 MMOL/L (ref 22–29)
CREAT SERPL-MCNC: 2.1 MG/DL (ref 0.7–1.2)
EOSINOPHILS ABSOLUTE: 0.54 E9/L (ref 0.05–0.5)
EOSINOPHILS RELATIVE PERCENT: 6.9 % (ref 0–6)
GFR AFRICAN AMERICAN: 37
GFR NON-AFRICAN AMERICAN: 31 ML/MIN/1.73
GLUCOSE BLD-MCNC: 157 MG/DL (ref 74–99)
HCT VFR BLD CALC: 28.7 % (ref 37–54)
HEMOGLOBIN: 8.3 G/DL (ref 12.5–16.5)
IMMATURE GRANULOCYTES #: 0.05 E9/L
IMMATURE GRANULOCYTES %: 0.6 % (ref 0–5)
LYMPHOCYTES ABSOLUTE: 1.17 E9/L (ref 1.5–4)
LYMPHOCYTES RELATIVE PERCENT: 14.9 % (ref 20–42)
MCH RBC QN AUTO: 27.8 PG (ref 26–35)
MCHC RBC AUTO-ENTMCNC: 28.9 % (ref 32–34.5)
MCV RBC AUTO: 96 FL (ref 80–99.9)
METER GLUCOSE: 150 MG/DL (ref 74–99)
METER GLUCOSE: 150 MG/DL (ref 74–99)
METER GLUCOSE: 168 MG/DL (ref 74–99)
METER GLUCOSE: 171 MG/DL (ref 74–99)
MONOCYTES ABSOLUTE: 0.58 E9/L (ref 0.1–0.95)
MONOCYTES RELATIVE PERCENT: 7.4 % (ref 2–12)
NEUTROPHILS ABSOLUTE: 5.44 E9/L (ref 1.8–7.3)
NEUTROPHILS RELATIVE PERCENT: 69.4 % (ref 43–80)
OVALOCYTES: ABNORMAL
PDW BLD-RTO: 15.2 FL (ref 11.5–15)
PLATELET # BLD: 229 E9/L (ref 130–450)
PMV BLD AUTO: 9.4 FL (ref 7–12)
POIKILOCYTES: ABNORMAL
POTASSIUM SERPL-SCNC: 3.1 MMOL/L (ref 3.5–5)
POTASSIUM SERPL-SCNC: 3.2 MMOL/L (ref 3.5–5)
POTASSIUM SERPL-SCNC: 3.6 MMOL/L (ref 3.5–5)
RBC # BLD: 2.99 E12/L (ref 3.8–5.8)
SODIUM BLD-SCNC: 140 MMOL/L (ref 132–146)
TEAR DROP CELLS: ABNORMAL
TOTAL PROTEIN: 6.3 G/DL (ref 6.4–8.3)
WBC # BLD: 7.8 E9/L (ref 4.5–11.5)

## 2022-10-04 PROCEDURE — 80053 COMPREHEN METABOLIC PANEL: CPT

## 2022-10-04 PROCEDURE — 82962 GLUCOSE BLOOD TEST: CPT

## 2022-10-04 PROCEDURE — 6360000002 HC RX W HCPCS: Performed by: INTERNAL MEDICINE

## 2022-10-04 PROCEDURE — 97535 SELF CARE MNGMENT TRAINING: CPT

## 2022-10-04 PROCEDURE — 94640 AIRWAY INHALATION TREATMENT: CPT

## 2022-10-04 PROCEDURE — 97165 OT EVAL LOW COMPLEX 30 MIN: CPT

## 2022-10-04 PROCEDURE — 85025 COMPLETE CBC W/AUTO DIFF WBC: CPT

## 2022-10-04 PROCEDURE — 36415 COLL VENOUS BLD VENIPUNCTURE: CPT

## 2022-10-04 PROCEDURE — 99232 SBSQ HOSP IP/OBS MODERATE 35: CPT | Performed by: SURGERY

## 2022-10-04 PROCEDURE — 6370000000 HC RX 637 (ALT 250 FOR IP): Performed by: INTERNAL MEDICINE

## 2022-10-04 PROCEDURE — 99233 SBSQ HOSP IP/OBS HIGH 50: CPT | Performed by: INTERNAL MEDICINE

## 2022-10-04 PROCEDURE — 2580000003 HC RX 258: Performed by: INTERNAL MEDICINE

## 2022-10-04 PROCEDURE — 2060000000 HC ICU INTERMEDIATE R&B

## 2022-10-04 PROCEDURE — 99232 SBSQ HOSP IP/OBS MODERATE 35: CPT | Performed by: INTERNAL MEDICINE

## 2022-10-04 PROCEDURE — 84132 ASSAY OF SERUM POTASSIUM: CPT

## 2022-10-04 RX ORDER — POTASSIUM CHLORIDE 7.45 MG/ML
10 INJECTION INTRAVENOUS
Status: COMPLETED | OUTPATIENT
Start: 2022-10-04 | End: 2022-10-04

## 2022-10-04 RX ADMIN — ATORVASTATIN CALCIUM 20 MG: 20 TABLET, FILM COATED ORAL at 19:14

## 2022-10-04 RX ADMIN — IPRATROPIUM BROMIDE AND ALBUTEROL SULFATE 1 AMPULE: .5; 2.5 SOLUTION RESPIRATORY (INHALATION) at 10:56

## 2022-10-04 RX ADMIN — AMLODIPINE BESYLATE 5 MG: 5 TABLET ORAL at 08:47

## 2022-10-04 RX ADMIN — METOPROLOL TARTRATE 25 MG: 25 TABLET, FILM COATED ORAL at 19:14

## 2022-10-04 RX ADMIN — AMPICILLIN SODIUM AND SULBACTAM SODIUM 3000 MG: 2; 1 INJECTION, POWDER, FOR SOLUTION INTRAMUSCULAR; INTRAVENOUS at 05:48

## 2022-10-04 RX ADMIN — PANTOPRAZOLE SODIUM 40 MG: 40 TABLET, DELAYED RELEASE ORAL at 05:57

## 2022-10-04 RX ADMIN — POTASSIUM CHLORIDE 10 MEQ: 7.46 INJECTION, SOLUTION INTRAVENOUS at 08:46

## 2022-10-04 RX ADMIN — ANTI-FUNGAL POWDER MICONAZOLE NITRATE TALC FREE: 1.42 POWDER TOPICAL at 08:47

## 2022-10-04 RX ADMIN — IPRATROPIUM BROMIDE AND ALBUTEROL SULFATE 1 AMPULE: .5; 2.5 SOLUTION RESPIRATORY (INHALATION) at 14:45

## 2022-10-04 RX ADMIN — AMPICILLIN SODIUM AND SULBACTAM SODIUM 3000 MG: 2; 1 INJECTION, POWDER, FOR SOLUTION INTRAMUSCULAR; INTRAVENOUS at 00:06

## 2022-10-04 RX ADMIN — LEVOTHYROXINE SODIUM 50 MCG: 0.05 TABLET ORAL at 05:57

## 2022-10-04 RX ADMIN — AMPICILLIN SODIUM AND SULBACTAM SODIUM 3000 MG: 2; 1 INJECTION, POWDER, FOR SOLUTION INTRAMUSCULAR; INTRAVENOUS at 18:49

## 2022-10-04 RX ADMIN — METOPROLOL TARTRATE 25 MG: 25 TABLET, FILM COATED ORAL at 08:47

## 2022-10-04 RX ADMIN — GABAPENTIN 300 MG: 300 CAPSULE ORAL at 13:36

## 2022-10-04 RX ADMIN — ANTI-FUNGAL POWDER MICONAZOLE NITRATE TALC FREE: 1.42 POWDER TOPICAL at 19:14

## 2022-10-04 RX ADMIN — POTASSIUM CHLORIDE 10 MEQ: 7.46 INJECTION, SOLUTION INTRAVENOUS at 07:41

## 2022-10-04 RX ADMIN — MICONAZOLE NITRATE: 2 OINTMENT TOPICAL at 19:15

## 2022-10-04 RX ADMIN — LINEZOLID 600 MG: 600 TABLET, FILM COATED ORAL at 08:47

## 2022-10-04 RX ADMIN — GABAPENTIN 300 MG: 300 CAPSULE ORAL at 08:46

## 2022-10-04 RX ADMIN — MICONAZOLE NITRATE: 2 OINTMENT TOPICAL at 08:47

## 2022-10-04 RX ADMIN — LINEZOLID 600 MG: 600 TABLET, FILM COATED ORAL at 19:14

## 2022-10-04 RX ADMIN — POTASSIUM CHLORIDE 10 MEQ: 7.46 INJECTION, SOLUTION INTRAVENOUS at 09:46

## 2022-10-04 RX ADMIN — IPRATROPIUM BROMIDE AND ALBUTEROL SULFATE 1 AMPULE: .5; 2.5 SOLUTION RESPIRATORY (INHALATION) at 19:22

## 2022-10-04 RX ADMIN — GABAPENTIN 300 MG: 300 CAPSULE ORAL at 19:14

## 2022-10-04 RX ADMIN — AMPICILLIN SODIUM AND SULBACTAM SODIUM 3000 MG: 2; 1 INJECTION, POWDER, FOR SOLUTION INTRAMUSCULAR; INTRAVENOUS at 11:42

## 2022-10-04 ASSESSMENT — PAIN SCALES - GENERAL: PAINLEVEL_OUTOF10: 0

## 2022-10-04 NOTE — PROGRESS NOTES
Department of Podiatry   Progress Note        Patient seen and evaluated this morning. Transferred to new room. No acute overnight events. No new pedal complaints and no complaints of pain  to LE . Continue conservative care. Past Medical History:        Diagnosis Date    Atherosclerosis of native artery of right lower extremity with ulceration of heel ([facility] 8/15/2022    Atrial fibrillation (HCC)     Cancer (HCC)     kidney    Chronic renal failure, stage 3b ([facility] 8/15/2022    Diabetes mellitus ([facility]     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[facility]     Femoral-popliteal atherosclerosis ([facility] 9/13/2022    Hypertension     Ischemic stroke ([facility] 03/06/2018    Pacemaker     Paraplegia ([facility]     Sinus node dysfunction ([facility]     Stroke (cerebrum) ([facility] 02/27/2018    Thyroid disease     Ulcer of right heel and midfoot with fat layer exposed ([facility] 8/15/2022       Past Surgical History:        Procedure Laterality Date    CARDIAC PACEMAKER PLACEMENT  12/19/2014    COLONOSCOPY      EYE SURGERY      KIDNEY REMOVAL Left 07/05/2022    CANCER [facility] removed    KNEE ARTHROSCOPY  right knee    PACEMAKER PLACEMENT      OK COLONOSCOPY FLX DX W/COLLJ SPEC WHEN PFRMD N/A 03/20/2018    COLONOSCOPY DIAGNOSTIC performed by Sadiq Shaw MD at [address] [place] [address] EGD PERCUTANEOUS PLACEMENT GASTROSTOMY TUBE N/A 03/29/2018    PEG TUBE/PT.  IN 5507 B performed by Sadiq Shaw MD at [address] [place] [address] EGD PERCUTANEOUS PLACEMENT GASTROSTOMY TUBE N/A 09/12/2018    EGD PEG TUBE PLACEMENT performed by Sadiq Shaw MD at [address]       Medications Prior to Admission:    Medications Prior to Admission: meclizine (ANTIVERT) 25 MG tablet, Take 25 mg by mouth three times daily  warfarin (COUMADIN) 6 MG tablet, Take 6 mg by mouth daily  Glucagon HCl (GLUCAGON EMERGENCY) 1 MG/ML SOLR, Inject 1 mg as directed as needed (HYPOGLYCEMIA)  glucose (GLUTOSE) 40 % GEL, Take 15 g by mouth as needed (HYPOGLYCEMIA)  ipratropium-albuterol (DUONEB) 0.5-2.5 (3) MG/3ML SOLN nebulizer solution, Inhale 1 vial into the lungs every 4 hours as needed (COUGHINGAND DYSPNEA)  potassium chloride (KLOR-CON M) 20 MEQ extended release tablet, Take 20 mEq by mouth daily  polyethylene glycol (GLYCOLAX) 17 g packet, Take 17 g by mouth daily as needed for Constipation  phenazopyridine (PYRIDIUM) 200 MG tablet, Take 200 mg by mouth daily  dextromethorphan-guaiFENesin (ROBITUSSIN-DM)  MG/5ML syrup, Take 5 mLs by mouth every 6 hours as needed for Cough  Cholecalciferol (VITAMIN D3) 1.25 MG (48142 UT) CAPS, Take 50,000 Units by mouth once a week **FRIDAYS**  loperamide (IMODIUM) 2 MG capsule, Take 2 mg by mouth daily as needed for Diarrhea  Multiple Vitamins-Minerals (THERAPEUTIC MULTIVITAMIN-MINERALS) tablet, Take 1 tablet by mouth daily  gabapentin (NEURONTIN) 300 MG capsule, Take 1 capsule by mouth nightly for 7 days. levothyroxine (SYNTHROID) 50 MCG tablet, Take 1 tablet by mouth in the morning. atorvastatin (LIPITOR) 20 MG tablet, Take 1 tablet by mouth nightly  magnesium oxide (MAG-OX) 400 (240 Mg) MG tablet, Take 1 tablet by mouth every other day  ascorbic acid (VITAMIN C) 500 MG tablet, Take 1 tablet by mouth in the morning. acetaminophen (TYLENOL) 325 MG tablet, Take 650 mg by mouth every 4 hours as needed (DISCOMFORT;PAIN/ELEV TEMP >100.4F)  pantoprazole (PROTONIX) 40 MG tablet, Take 40 mg by mouth daily  amLODIPine (NORVASC) 2.5 MG tablet, Take 1 tablet by mouth daily  metoprolol tartrate (LOPRESSOR) 25 MG tablet, Take 1 tablet by mouth 2 times daily  metFORMIN (GLUCOPHAGE) 1000 MG tablet, Take 1 tablet by mouth 2 times daily  omeprazole (PRILOSEC) 20 MG delayed release capsule, Take 1 capsule by mouth daily  furosemide (LASIX) 40 MG tablet, Take 0.5 tablets by mouth daily    Allergies:  Bee venom    Social History:   TOBACCO:   reports that he quit smoking about 20 years ago. His smoking use included cigars.  He has never used smokeless tobacco.  ETOH:   reports no history of alcohol use. DRUGS:   Social History     Substance and Sexual Activity   Drug Use No       Family History:       Problem Relation Age of Onset    Heart Disease Mother     Stroke Father          REVIEW OF SYSTEMS:    All pertinent review of systems both positive and negative discussed in HPI    Previous Exam:  LOWER EXTREMITY EXAMINATION     -VASCULAR:  DP and PT pulses weakly palpable 1+ to the bilateral LE. 1-2+ pitting edema appreciated as well. NEUROLOGIC:  Protective sensation diminished to distal aspect of bilateral lower extremities. DERM:  Right pressure heel ulceration with overlying eschar noted. Fat layer exposed. No clinical signs of infection. Does not probe deep at this time. Measures roughly 8 cm x 9 cm x unknown depth. No malodor. Little drainage. Superficial wound noted to patient's anterior Right ankle as well. Not clinically infected    MUSCULOSKELETAL: Tenderness to palpation of the right heel wound. Negative TTP of bilateral calves at this time. MSK strength testing deferred this afternoon          Media Information  Document Information    Clinical Consent:  Wound      09/28/2022 07:54   Attached To:    Hospital Encounter on 9/21/22     Source Information    MD Rik Juarez 4s Intermediate       CONSULTS:  IP CONSULT TO INFECTIOUS DISEASES  IP CONSULT TO DIETITIAN    MEDICATION:  Scheduled Meds:   miconazole nitrate   Topical BID    miconazole   Topical BID    ipratropium-albuterol  1 ampule Inhalation 4x daily    ampicillin-sulbactam  3,000 mg IntraVENous Q6H    linezolid  600 mg Oral 2 times per day    amLODIPine  5 mg Oral Daily    atorvastatin  20 mg Oral Nightly    levothyroxine  50 mcg Oral Daily    pantoprazole  40 mg Oral QAM AC    metoprolol tartrate  25 mg Oral BID    gabapentin  300 mg Oral TID    insulin lispro  0-8 Units SubCUTAneous TID WC    insulin lispro  0-4 Units SubCUTAneous Nightly     Continuous Infusions:        PRN Meds:.miconazole nitrate **AND** miconazole nitrate, sodium chloride flush, acetaminophen    RADIOLOGY:  XR CHEST PORTABLE   Final Result   1. Mild cardiomegaly      2. Minimal basilar subsegmental atelectasis. 3.  Dilated bowel loops in the upper abdomen, consider abdominal radiograph   or CT exam.             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 - Discussed patient with Dr. Zac Ramachandran  - Will continue to follow patient while they are in-house. Thank you for the opportunity to take part in the patient's care. Please do not hesitate to call for any questions or concerns.

## 2022-10-04 NOTE — PROGRESS NOTES
[address] [place] [address] [facility]  Progress Note      No chief complaint on file. SUBJECTIVE:  Patient is tolerating medications. No reported adverse drug reactions. No nausea, vomiting, diarrhea. Foot remains sore. Review of systems:  As stated above in the chief complaint, otherwise negative. Medications:  Scheduled Meds:   miconazole nitrate   Topical BID    miconazole   Topical BID    ipratropium-albuterol  1 ampule Inhalation 4x daily    ampicillin-sulbactam  3,000 mg IntraVENous Q6H    linezolid  600 mg Oral 2 times per day    amLODIPine  5 mg Oral Daily    atorvastatin  20 mg Oral Nightly    levothyroxine  50 mcg Oral Daily    pantoprazole  40 mg Oral QAM AC    metoprolol tartrate  25 mg Oral BID    gabapentin  300 mg Oral TID    insulin lispro  0-8 Units SubCUTAneous TID WC    insulin lispro  0-4 Units SubCUTAneous Nightly     Continuous Infusions:      PRN Meds:miconazole nitrate **AND** miconazole nitrate, sodium chloride flush, acetaminophen    OBJECTIVE:  BP (!) 149/63   Pulse 80   Temp (!) 96.5 °F (35.8 °C) (Temporal)   Resp 18   Ht 6' 2\" (1.88 m)   Wt 254 lb (115.2 kg)   SpO2 93%   BMI 32.61 kg/m²   Temp  Av.4 °F (36.3 °C)  Min: 96.5 °F (35.8 °C)  Max: 98.6 °F (37 °C)  Constitutional: The patient is awake, alert, and oriented. Skin: Warm and dry. No rashes were noted. HEENT: Round and reactive pupils. Moist mucous membranes. No ulcerations or thrush. Neck: Supple to movements. Chest: No use of accessory muscles to breathe. Symmetrical expansion. No wheezing, crackles or rhonchi. Cardiovascular: S1 and S2 are rhythmic and regular. No murmurs appreciated. Abdomen: Positive bowel sounds to auscultation. Benign to palpation. No masses felt. No hepatosplenomegaly. Genitourinary: mlae  Extremities: No clubbing, no cyanosis, no edema.  Right foot with dry dressing  Lines: peripheral    Laboratory and Tests Review:  Lab Results   Component Value Date WBC 7.8 10/04/2022    WBC 7.6 10/03/2022    WBC 8.7 10/02/2022    HGB 8.3 (L) 10/04/2022    HCT 28.7 (L) 10/04/2022    MCV 96.0 10/04/2022     10/04/2022     Lab Results   Component Value Date    NEUTROABS 5.44 10/04/2022    NEUTROABS 5.44 10/03/2022    NEUTROABS 5.76 10/02/2022     No results found for: CRPHS  Lab Results   Component Value Date    ALT 11 10/04/2022    AST 13 10/04/2022    ALKPHOS 69 10/04/2022    BILITOT <0.2 10/04/2022     Lab Results   Component Value Date/Time     10/04/2022 04:47 AM    K 3.2 10/04/2022 04:47 AM    K 6.2 09/21/2022 10:44 AM     10/04/2022 04:47 AM    CO2 20 10/04/2022 04:47 AM    BUN 20 10/04/2022 04:47 AM    CREATININE 2.1 10/04/2022 04:47 AM    CREATININE 2.3 10/03/2022 03:28 PM    CREATININE 2.3 10/03/2022 05:17 AM    GFRAA 37 10/04/2022 04:47 AM    LABGLOM 31 10/04/2022 04:47 AM    GLUCOSE 157 10/04/2022 04:47 AM    GLUCOSE 297 01/04/2012 03:00 AM    PROT 6.3 10/04/2022 04:47 AM    LABALBU 2.3 10/04/2022 04:47 AM    LABALBU 3.4 01/04/2012 03:00 AM    CALCIUM 8.9 10/04/2022 04:47 AM    BILITOT <0.2 10/04/2022 04:47 AM    ALKPHOS 69 10/04/2022 04:47 AM    AST 13 10/04/2022 04:47 AM    ALT 11 10/04/2022 04:47 AM     Lab Results   Component Value Date    CRP 8.5 (H) 09/22/2022    CRP 7.2 (H) 08/01/2022    CRP 4.9 (H) 10/03/2018     Lab Results   Component Value Date    SEDRATE 93 (H) 09/22/2022    SEDRATE 87 (H) 08/01/2022    SEDRATE 77 (H) 10/03/2018     Radiology:            Microbiology:   Lab Results   Component Value Date/Time    BC 5 Days no growth 08/01/2022 02:46 PM    BC 5 Days- no growth 10/01/2018 11:15 AM    BC 5 Days- no growth 09/23/2018 08:55 AM    ORG Proteus mirabilis 09/22/2022 04:30 PM    ORG Escherichia coli 09/22/2022 04:30 PM    ORG Staphylococcus aureus 09/22/2022 04:30 PM     Lab Results   Component Value Date/Time    BLOODCULT2 5 Days no growth 08/01/2022 03:01 PM    BLOODCULT2 5 Days- no growth 10/01/2018 11:15 AM    BLOODCULT2 5 Days- no growth 09/23/2018 09:10 AM    ORG Proteus mirabilis 09/22/2022 04:30 PM    ORG Escherichia coli 09/22/2022 04:30 PM    ORG Staphylococcus aureus 09/22/2022 04:30 PM     WOUND/ABSCESS   Date Value Ref Range Status   09/22/2022 (A)  Final    Mixed ana cristina also isolated, including:  Corynebacteria     09/22/2022 Heavy growth  Final   09/22/2022 Heavy growth  Final   09/22/2022   Final    Heavy growth  Methicillin resistant Staph aureus isolated. Most Methicillin  resistant Staphylococcus are usually resistant to multiple  antibiotics including other B-Lactams, Aminoglycosides,  Macrolides, Clindamycin and Tetracycline. Contact isolation  is indicated. This isolate is presumed to be resistant based on the  detection of inducible Clindamycin resistance. Clindamycin  may still be effective in some patients. Smear, Respiratory   Date Value Ref Range Status   10/02/2018   Final    Group 6: <25 PMN's/LPF and <25 Epithelial cells/LPF  Rare Polymorphonuclear leukocytes  Epithelial cells not seen  No organisms seen           Component Value Date/Time    AFBCX No growth after 6 weeks of incubation. 10/03/2018 1230    FUNGSM No Fungal elements seen 10/03/2018 1230    LABFUNG No growth after 4 weeks of incubation. 10/03/2018 1230     CULTURE, RESPIRATORY   Date Value Ref Range Status   10/02/2018 Oral Pharyngeal Ana Cristina absent (A)  Final   10/02/2018 Rare growth  Final     No results found for: CXCATHTIP  Body Fluid Culture, Sterile   Date Value Ref Range Status   10/03/2018 Growth not present  Final     No results found for: CXSURG  Urine Culture, Routine   Date Value Ref Range Status   11/07/2018 >100,000 CFU/ml  Final   09/23/2018 Growth not present  Final   02/28/2018   Final    ,000 CFU/mL  Mixed ana cristina isolated. Further workup and sensitivity testing  is not routinely indicated and will not be performed.   Mixed ana cristina isolated includes:  Mixed gram positive organisms  Gram negative rods       MRSA Culture Only   Date Value Ref Range Status   10/01/2018 Methicillin resistant Staph aureus not isolated  Final   12/27/2014 Methicillin resistant Staph aureus not isolated  Final           Assessment:  Right heel pressure ulcer, necrotic     Plan:  Resume ampicillin-sulbactam 3g q6h and linezolid 600 mg q12h. Follow up Podiatry recommendations. Follow up surgical plans, if any. Continue local wound care. Had arteriogram-results pending  Podiatry note noted from oct 4  ? Saw pictures from dr Rachael Figueroa   ? Amputation ? Vascular note noted  [facility]     Thank you for involving me in the care of Jesus Mcconnell. Please do not hesitate to call for any questions or concerns.        Electronically signed by Laina Harman MD on 10/4/2022 at 10:14 AM   ,    Romulo Valdes MD

## 2022-10-04 NOTE — PROGRESS NOTES
Department of Internal Medicine  Nephrology Progress Note      Events reviewed. SUBJECTIVE: We are following Mr. Rhett Matthews for CKD. He has no complaints today. PHYSICAL EXAM:      Vitals:    VITALS:  BP (!) 149/63   Pulse 60   Temp (!) 96.5 °F (35.8 °C) (Temporal)   Resp 18   Ht 6' 2\" (1.88 m)   Wt 254 lb (115.2 kg)   SpO2 93%   BMI 32.61 kg/m²   24HR INTAKE/OUTPUT:    Intake/Output Summary (Last 24 hours) at 10/4/2022 0845  Last data filed at 10/4/2022 0605  Gross per 24 hour   Intake --   Output 925 ml   Net -925 ml         Constitutional:  Well built. Obese. Mild distress. HEENT:  BEERLA. JVD not seen. Respiratory:  Breath sounds normal. No rales or rhonchi  Cardiovascular/Edema:  Heart sounds normal. No murmur. Gastrointestinal:  Bowel sounds normal. No oranomgaly  Neurologic:  A/O x 3. No focal deficit. Skin:  Dry skin. Normal turgor. Other:  Non healing ulcer in right foot.     Scheduled Meds:   potassium chloride  10 mEq IntraVENous Q1H    miconazole nitrate   Topical BID    miconazole   Topical BID    ipratropium-albuterol  1 ampule Inhalation 4x daily    ampicillin-sulbactam  3,000 mg IntraVENous Q6H    linezolid  600 mg Oral 2 times per day    amLODIPine  5 mg Oral Daily    atorvastatin  20 mg Oral Nightly    levothyroxine  50 mcg Oral Daily    pantoprazole  40 mg Oral QAM AC    metoprolol tartrate  25 mg Oral BID    gabapentin  300 mg Oral TID    insulin lispro  0-8 Units SubCUTAneous TID WC    insulin lispro  0-4 Units SubCUTAneous Nightly     Continuous Infusions:        PRN Meds:.miconazole nitrate **AND** miconazole nitrate, sodium chloride flush, acetaminophen      DATA:    CBC:   Lab Results   Component Value Date/Time    WBC 7.8 10/04/2022 04:47 AM    RBC 2.99 10/04/2022 04:47 AM    HGB 8.3 10/04/2022 04:47 AM    HCT 28.7 10/04/2022 04:47 AM    MCV 96.0 10/04/2022 04:47 AM    MCH 27.8 10/04/2022 04:47 AM    MCHC 28.9 10/04/2022 04:47 AM    RDW 15.2 10/04/2022 04:47 AM     10/04/2022 04:47 AM    MPV 9.4 10/04/2022 04:47 AM     CMP:    Lab Results   Component Value Date/Time     10/04/2022 04:47 AM    K 3.2 10/04/2022 04:47 AM    K 6.2 09/21/2022 10:44 AM     10/04/2022 04:47 AM    CO2 20 10/04/2022 04:47 AM    BUN 20 10/04/2022 04:47 AM    CREATININE 2.1 10/04/2022 04:47 AM    GFRAA 37 10/04/2022 04:47 AM    LABGLOM 31 10/04/2022 04:47 AM    GLUCOSE 157 10/04/2022 04:47 AM    GLUCOSE 297 01/04/2012 03:00 AM    PROT 6.3 10/04/2022 04:47 AM    LABALBU 2.3 10/04/2022 04:47 AM    LABALBU 3.4 01/04/2012 03:00 AM    CALCIUM 8.9 10/04/2022 04:47 AM    BILITOT <0.2 10/04/2022 04:47 AM    ALKPHOS 69 10/04/2022 04:47 AM    AST 13 10/04/2022 04:47 AM    ALT 11 10/04/2022 04:47 AM     Magnesium:    Lab Results   Component Value Date/Time    MG 1.9 10/03/2022 05:17 AM     Phosphorus:    Lab Results   Component Value Date/Time    PHOS 3.2 09/24/2022 03:00 AM       BRIEF SUMMARY OF INITIAL CONSULT:    Briefly, Mr. Fidel Garcia  is a 26-year-old male with a PMH of CKD stage IIIb, without proteinuria, baseline creatinine 2.0-2.1 mg/dL, secondary to nephrosclerosis, kidney cancer, s/p total left nephrectomy on 7/5/22 at [facility], hypertension, type II diabetes mellitus, HFpEF 55% with stage II DD, PAF, factor V Leyden deficiency, hyperlipidemia, sick sinus syndrome s/p pacemaker placement, CVA, hypothyroidism, PAF on chronic anticoagulation with warfarin, who was admitted on September 21, 2022 after a fall at home, his labs were significant for potassium 6.2 mmol/L, BUN 57 mg/dL, creatinine 2.8 mg/dL, reason for this consult. His home medications include lasix, lisinopril    Resolved:    Hyperkalemia, 2/2 ACE inhibition, decrease GFR and on potassium supplementation,  improved with lokelma  JUAN on CKD, volume responsive pre-renal JUAN, 2/2 to overt diuresis, decrease oral intake in the setting of ACE inhibition. Resolved, renal function has returned to baseline, last creatinine 2.1 mg/dL.   We will discontinue IV fluids. IMPRESSION/RECOMMENDATIONS:        CKD stage IV, without proteinuria, baseline creatinine 2.1-2.4 mg/dL, secondary to nephrosclerosis and solitary kidney. Renal function continues to improve, creatinine down to 2.1 with adequate urine output. Hypernatremia, sodium up to 148, sodium levels improved. Left kidney cancer, s/p total left nephrectomy 7/5/22 at [facility]  Hypokalemia, probably with renal K wasting due to ampicillin-sulbactam (Non reabsorbable anions), to obtain urine K/creatinine ratio.   HTN, on amlodipine and metoprolol  HFpEF 55% with stage II DD, pro-, Lasix on hold  Hypomagnesemia, 2/2 diuretics, levels improved  -------------------------------------------------  S/p fall  Nonhealing right heel wound, ID following, on linezolid and ampicillin-sulbactam.    PAF, on metoprolol and warfarin  Sick sinus syndrome, s/p pacemaker placement   Type 2 DM, on metformin at home, on hold, SSI for now  HLD, on atorvastatin  Hypothyroidism, on levothyroxine  History of CVA  Normocytic anemia, ferritin 178, iron saturation 18%, folate 8.7, B12: 270     Plan:       Discontinue D5W  Replace potassium, KCl IV 10 mEq x 3  Monitor potassium levels  Continue to hold Lasix  Continue to hold metformin  Continue to monitor renal function        Electronically signed by Billy Dejesus MD on 10/4/2022 at 8:45 AM

## 2022-10-04 NOTE — PROGRESS NOTES
Vascular Surgery Progress Note    Pt is being seen in f/u today regarding critical limb ischemia of the right lower extremity with an eschar    Subjective:  Wai Styles is a 67 y.o. male with a history of right lower extremity peripheral vascular disease and a large eschar to the heel. Right now he denies any significant pain or discomfort. He denies any chest pain or shortness of breath he is eating well. Current Medications:      miconazole nitrate **AND** miconazole nitrate, sodium chloride flush, acetaminophen    miconazole nitrate   Topical BID    miconazole   Topical BID    ipratropium-albuterol  1 ampule Inhalation 4x daily    ampicillin-sulbactam  3,000 mg IntraVENous Q6H    linezolid  600 mg Oral 2 times per day    amLODIPine  5 mg Oral Daily    atorvastatin  20 mg Oral Nightly    levothyroxine  50 mcg Oral Daily    pantoprazole  40 mg Oral QAM AC    metoprolol tartrate  25 mg Oral BID    gabapentin  300 mg Oral TID    insulin lispro  0-8 Units SubCUTAneous TID WC    insulin lispro  0-4 Units SubCUTAneous Nightly        PHYSICAL EXAM:    BP (!) 149/63   Pulse (!) 110   Temp (!) 96.5 °F (35.8 °C) (Temporal)   Resp 24   Ht 6' 2\" (1.88 m)   Wt 254 lb (115.2 kg)   SpO2 93%   BMI 32.61 kg/m²     Intake/Output Summary (Last 24 hours) at 10/4/2022 1602  Last data filed at 10/4/2022 1414  Gross per 24 hour   Intake --   Output 1425 ml   Net -1425 ml          General: He is awake he is answering questions appropriately. He is little confused  Skin: Warm and dry no change turgor no jaundice no scleral icterus. Heel wound is essentially unchanged. HEENT: Normocephalic atraumatic trach is midline no jugular venous distention normal range of motion  CVS: Currently regular rate and rhythm no murmur rub or gallop  Resp: Clear to auscultation bilaterally no wheezes rales or rhonchi  Abd: Soft nontender no rebound or guarding.   Positive bowel sounds  Extremities: Bilateral palp brachial radial pulses femorals are palpable. DP PTs are nonpalpable. Right lower extremity wound is unchanged  Neuro: Cranial nerves II to XII are gross intact bilaterally gross cranial deficit    LABS:    Lab Results   Component Value Date    WBC 7.8 10/04/2022    HGB 8.3 (L) 10/04/2022    HCT 28.7 (L) 10/04/2022     10/04/2022    PROTIME 15.8 (H) 09/28/2022    INR 1.5 09/28/2022    APTT 30.5 09/28/2022    K 3.6 10/04/2022    BUN 20 10/04/2022    CREATININE 2.1 (H) 10/04/2022       RADIOLOGY:  XR CHEST PORTABLE   Final Result   1. Mild cardiomegaly      2. Minimal basilar subsegmental atelectasis. 3.  Dilated bowel loops in the upper abdomen, consider abdominal radiograph   or CT exam.             ASSESSMENT/PLAN:   #1 critical limb ischemia with a large eschar to the right heel. We attempted an arteriogram with intervention however he could not tolerate the procedure. He will be scheduled with anesthesia in the next day or 2. This is been discussed with the patient's family. Also if we are unable to revascularize him there will be no plans for amputation per the patient and family.         Electronically signed by Silvina Umanzor MD on 10/4/2022 at 4:02 PM

## 2022-10-04 NOTE — PLAN OF CARE

## 2022-10-04 NOTE — PROGRESS NOTES
Comprehensive Nutrition Assessment    Type and Reason for Visit:  Initial, Wound, Consult    Nutrition Recommendations/Plan:   Continue current diet as tolerated. Recommend and start Saul ONS BID and Nepro once daily to optimize intake and promote wound healing. Malnutrition Assessment:  Malnutrition Status: At risk for malnutrition (Comment) (10/04/22 1117)    Context:  Chronic Illness     Findings of the 6 clinical characteristics of malnutrition:  Energy Intake:  Mild decrease in energy intake (Comment)  Weight Loss:  No significant weight loss     Body Fat Loss:  No significant body fat loss     Muscle Mass Loss:  No significant muscle mass loss    Fluid Accumulation:  No significant fluid accumulation     Strength:  Not Performed    Nutrition Assessment:    Pt. transferred from [facility] for arteriogram-Podiatry following for rt chronic heel and wound. Hx of CKD s/p Left kidney CA with total left nephrectomy 7/5/22 at [facility], DM, CVA, nonhealing heel wound; Noted speech rec for NPO until MBSS; however appears that family declined and pt. placed on purreed diet. Pt. PO intake varied. Recommend wound healing ONS and monitor. Nutrition Related Findings:    A&Ox4, hypokalemia, hyperglycemia, -I/O (2L), rounded/taut abd, +BS, +1 pitting edema, Wound Type: Partial Thickness, Diabetic Ulcer (Scrotum: partial thickness wounds- per podiatry Chronic Right heel diabetic ulceration with eschar)       Current Nutrition Intake & Therapies:    Average Meal Intake: 1-25%, 51-75%, % (intake varied)  Average Supplements Intake: None Ordered  ADULT DIET; Dysphagia - Pureed; 4 carb choices (60 gm/meal); Low Fat/Low Chol/High Fiber/JOSE A  ADULT ORAL NUTRITION SUPPLEMENT; Breakfast, Dinner;  Wound Healing Oral Supplement  ADULT ORAL NUTRITION SUPPLEMENT; Breakfast; Renal Oral Supplement    Anthropometric Measures:  Height: 6' 2\" (188 cm)  Ideal Body Weight (IBW): 190 lbs (86 kg)    Admission Body Weight: 254 lb (115.2 kg) (9/29 BS)  Current Body Weight: 254 lb (115.2 kg) (9/29 BS), 133.7 % IBW. Weight Source: Bed Scale  Current BMI (kg/m2): 32.6  Usual Body Weight: 250 lb (113.4 kg) (at OV x 1mo)  % Weight Change (Calculated): 1.6  Weight Adjustment For: Paraplegia  Total Adjusted Percentage (Calculated): 7.5  Adjusted Ideal Body Weight (lbs) (Calculated): 175.8 lbs  Adjusted Ideal Body Weight (kg) (Calculated): 79.91 kg  Adjusted % Ideal Body Weight (Calculated): 144.5  Adjusted BMI (kg/m2) (Calculated): 35  BMI Categories: Obese Class 1 (BMI 30.0-34. 9)    Estimated Daily Nutrient Needs:  Energy Requirements Based On: Formula  Weight Used for Energy Requirements: Current  Energy (kcal/day): 2300-2500kcal (MSJ x1.2-1. 3SF)  Weight Used for Protein Requirements: Ideal  Protein (g/day): 110-120 (1.3-1.5 g/kg as eliza w/CKD)  Method Used for Fluid Requirements: Standard Renal  Fluid (ml/day): per renal    Nutrition Diagnosis:   Increased nutrient needs related to increase demand for energy/nutrients as evidenced by wounds    Nutrition Interventions:   Food and/or Nutrient Delivery: Continue Current Diet, Start Oral Nutrition Supplement (Wound healing ONS BID, Nepro Once daily)  Nutrition Education/Counseling: No recommendation at this time  Coordination of Nutrition Care: Continue to monitor while inpatient       Goals:     Goals: PO intake 75% or greater, by next RD assessment       Nutrition Monitoring and Evaluation:   Behavioral-Environmental Outcomes: None Identified  Food/Nutrient Intake Outcomes: Food and Nutrient Intake, Supplement Intake  Physical Signs/Symptoms Outcomes: Biochemical Data, Chewing or Swallowing, Nutrition Focused Physical Findings, Skin, Weight, GI Status, Fluid Status or Edema    Discharge Planning:    Continue Oral Nutrition Supplement     Diamond Astorga RD  Contact: ext [phone]

## 2022-10-04 NOTE — PROGRESS NOTES
[address]        Date:10/4/2022                                                  Patient Name: Latanya Alanis    MRN: [id number]    : 1945    Room: [address] [place] [address]      Evaluating OT: Rhianna Moran, [address] OTR/L; [id number]      Referring Provider: Deborah Buchanan MD    Specific Provider Orders/Date: OT Eval and Treat 10/2/22      Diagnosis:  Peripheral arterial disease ([address] [I73.9]     Surgery: none this admission     Pertinent Medical History:  has a past medical history of Atherosclerosis of native artery of right lower extremity with ulceration of heel ([address] Atrial fibrillation ([address] Cancer ([address] Chronic renal failure, stage 3b ([address] Diabetes mellitus ([address] 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[address] Femoral-popliteal atherosclerosis ([address] Hypertension, Ischemic stroke ([address] Pacemaker, Paraplegia ([address] Sinus node dysfunction ([address] Stroke (cerebrum) ([address] Thyroid disease, and Ulcer of right heel and midfoot with fat layer exposed ([address]       Reason for admission: fall while transferring to [facility] and hit his head, small abrasion upper forehead, recent admission on  and discharge back to SNF    Recommended Adaptive Equipment:  TBD pending progression - AE for LE bathing and dressing    Precautions:  Fall Risk, Contact Isolation - MRSA, bed alarm, Cognition, Romero, TAPS, NWB RLE (per perfect serve  DPM ), Prevalon Boot B     Assessment of current deficits:    [x] Functional mobility  [x]ADLs  [x] Strength               [x]Cognition    [x] Functional transfers   [x] IADLs         [x] Safety Awareness   [x]Endurance    [x] Fine Coordination              [x] Balance      [] Vision/perception   []Sensation     []Gross Motor Coordination  [] ROM  [] Delirium                   [] Motor Control     OT PLAN OF CARE   OT POC based on physician orders, patient diagnosis and results of clinical assessment    Frequency/Duration: 2-3 days/wk for 2 weeks PRN   Specific OT Treatment Interventions to include:   * Instruction/training on adapted ADL techniques and AE recommendations to increase functional independence within precautions       * Training on energy conservation strategies, correct breathing pattern and techniques to improve independence/tolerance for self-care routine  * Functional transfer/mobility training/DME recommendations for increased independence, safety, and fall prevention  * Patient/Family education to increase follow through with safety techniques and functional independence  * Recommendation of environmental modifications for increased safety with functional transfers/mobility and ADLs  * Therapeutic exercise to improve motor endurance, ROM, and functional strength for ADLs/functional transfers  * Therapeutic activities to facilitate/challenge dynamic balance, stand tolerance for increased safety and independence with ADLs    Home Living: Pt admitted form [facility].  Active with therapy prior to admission   Equipment owned: w/c    Prior Level of Function: assist with all dressing/bathing tasks with ADLs , dependent on staff with IADLs  Ambulation per pt not ambulating with ww however squat pivot to/from w/c with assist of 2 and propelling w/c independently     Pain Level: 0/10 pain  Cognition: A&O: 3/4 - stated location as [facility] required choices to recall name of hospital, stated month as Aug and corrected to October with cues, stated year as 2022   Follows single 1 step directions   Memory:  fair   Sequencing:  fair   Problem solving:  fair   Judgement/safety:  fair   Functional Assessment:  AM-PAC Daily Activity Raw Score: 13/24    Initial Eval Status  Date: 10/4/22 Treatment Status  Date: STGs=LTGs  Time Frame: 10-14 days   Feeding SBA  Dysphagia/purred diet   Set-up   Grooming SBA   Simulated functional reach seated EOB   Set-up   UB Dressing SBA  Constantino/doff gown    Set-up   LB Dressing Dep   Constantino/doff sock lying supine   Mod A    Bathing Max A  Poor LB functional reach seated EOB    Mod A    Toileting Dep  Romero Catheter   Declined functional mobility EOB to assess appropriateness for BSC   Mod A   Bed Mobility  Log roll: Mod A  Supine to sit: Mod A with HOB elevated  Sit to supine: Mod A   Log roll SBA  Supine to sit: SBA   Sit to supine: SBA   Functional Transfers Sit to stand:NT  Stand to sit:NT  Stand pivot: NT  Commode: NT   Mod A with AD   Functional Mobility NT  Pt declined   Mod A with AD   Balance Sitting: SBA  Standing: NT       Activity Tolerance FAIR   Limited d/t overall fatigue/weakness   GOOD    Visual/  Perceptual Glasses: no        Vitals SpO2 95%  HR:    RN aware                      UE ROM:        RUE:  WFL                  LUE:  WFL  Strength:        RUE: grossly 4/5         LUE: grossly 4/5   Strength: B WFL  Fine Motor Coordination:  WFL      Hearing: WFL  Sensation:  No c/o numbness/tingling   Tone:  WFL  Edema: BLEs    Comment: Cleared by RN to see pt. Upon arrival patient lying supine in bed and agreeable to OT session. At end of session, patient lying supine in bed with TAPs/edges/prevalon boots donned with call light and phone within reach, all lines and tubes intact. Overall patient demonstrated  decreased independence and safety during completion of ADL/functional transfer/mobility tasks. Pt would benefit from continued skilled OT to increase safety and independence with completion of ADL/IADL tasks for functional independence and quality of life. Treatment: Pt required vc's for proper technique/safety with hand placement/body mechanics/posture for bed mobility/ADLs/functional tranfers/mobility/ww management. Pt required vc's for sequencing/initiation of ADLs/functional transfers. Pt able to  sit EOB ~10 minutes  mins to increase core strength/balance/activity tolerance for ease with ADLs.  Pt educated on NWB RLE status. Pt required increased time to complete ADLs/functional transfers due to overall fatigue/weakness. Pt would benefit from educated on AE for ease with LE dressing. Pt completed supine to sit with assist to scoot to EOB, declined further functional mobility Pt don/doff gown and returned to supinew with TAPS, wedges, prevalon boots and pillows in place for overall joint/skin integrity. Pt required skilled monitoring of SpO2 during session and education on energy conservation techniques. Pt required rest breaks during session and educated on pursed lip breathing. Pt appeared to have tolerated session well and appears motivated/cooperative/pleasant . Pt instructed on use of call light for assistance and fall prevention. Pt demo'ing fair understanding of education provided. Continue to educate. Rehab Potential: Good  for established goals     LTG: maximize independence with ADLs to return to PLOF    Patient and/or family were instructed on functional diagnosis, prognosis/goals and OT plan of care. Demonstrated FAIR understanding. [] Malnutrition indicators have been identified and nursing has been notified to ensure a dietitian consult is ordered. Eval Complexity: Low     Evaluation time includes thorough review of current medical information, gathering information on past medical & social history & PLOF, completion of standardized testing, informal observation of tasks, consultation with other medical professions/disciplines, assessment of data & development of POC/goals.      Time In: 1605  Time Out: 1635  Total Treatment Time: 15    Min Units   OT Eval Low 94314  x     OT Eval Medium 71187      OT Eval High 07882      OT Re-Eval [id number]       Therapeutic Ex 96149       Therapeutic Activities 35241       ADL/Self Care 28330  15  1   Orthotic Management 61959       Manual 72409     Neuro Re-Ed 32440       Non-Billable Time          Evaluation Time additionally includes thorough review of current medical information, gathering information on past medical history/social history and prior level of function, interpretation of standardized testing/informal observation of tasks, assessment of data and development of plan of care and goals.               ENRIQUE Kim OTR/L; [id number]

## 2022-10-04 NOTE — PROGRESS NOTES
Admit Date: 9/29/2022    Subjective:     Awake feels fine discussed speech swallowing refuse video swallowing want to continue with his diet     Objective:     No data found. I/O last 3 completed shifts: In: 1044.2 [P.O.:385; I.V.:56.8; IV Piggyback:602.4]  Out: 1035 [SYSUC:5756]  I/O this shift:  In: -   Out: 750 [Urine:750]    HEENT: Normal  NECK: Thyroid normal. No carotid bruit. No lymphphadenopathy. CVS: RRR  RS: Clear. No wheeze. No rhonchi. Good airflow bilaterally. ABD: Soft. Non tender. No mass. Normal BS.obese   EXT: No edema. Non tender.  Dressing feet   NEURO: no focal deficit       Scheduled Meds:   potassium chloride  10 mEq IntraVENous Q1H    miconazole nitrate   Topical BID    miconazole   Topical BID    ipratropium-albuterol  1 ampule Inhalation 4x daily    ampicillin-sulbactam  3,000 mg IntraVENous Q6H    linezolid  600 mg Oral 2 times per day    amLODIPine  5 mg Oral Daily    atorvastatin  20 mg Oral Nightly    levothyroxine  50 mcg Oral Daily    pantoprazole  40 mg Oral QAM AC    metoprolol tartrate  25 mg Oral BID    gabapentin  300 mg Oral TID    insulin lispro  0-8 Units SubCUTAneous TID WC    insulin lispro  0-4 Units SubCUTAneous Nightly     Continuous Infusions:    CBC with Differential:    Lab Results   Component Value Date/Time    WBC 7.8 10/04/2022 04:47 AM    RBC 2.99 10/04/2022 04:47 AM    HGB 8.3 10/04/2022 04:47 AM    HCT 28.7 10/04/2022 04:47 AM     10/04/2022 04:47 AM    MCV 96.0 10/04/2022 04:47 AM    MCH 27.8 10/04/2022 04:47 AM    MCHC 28.9 10/04/2022 04:47 AM    RDW 15.2 10/04/2022 04:47 AM    SEGSPCT 80 01/04/2012 03:00 AM    BANDSPCT 1 11/27/2014 04:50 AM    LYMPHOPCT 14.3 10/03/2022 05:17 AM    MONOPCT 6.8 10/03/2022 05:17 AM    MYELOPCT 0.9 02/28/2018 05:21 AM    BASOPCT 0.7 10/03/2022 05:17 AM    MONOSABS 0.52 10/03/2022 05:17 AM    LYMPHSABS 1.09 10/03/2022 05:17 AM    EOSABS 0.50 10/03/2022 05:17 AM    BASOSABS 0.05 10/03/2022 05:17 AM     CMP:    Lab Results Component Value Date/Time     10/04/2022 04:47 AM    K 3.2 10/04/2022 04:47 AM    K 6.2 09/21/2022 10:44 AM     10/04/2022 04:47 AM    CO2 20 10/04/2022 04:47 AM    BUN 20 10/04/2022 04:47 AM    CREATININE 2.1 10/04/2022 04:47 AM    GFRAA 37 10/04/2022 04:47 AM    LABGLOM 31 10/04/2022 04:47 AM    PROT 6.3 10/04/2022 04:47 AM    LABALBU 2.3 10/04/2022 04:47 AM    LABALBU 3.4 01/04/2012 03:00 AM    CALCIUM 8.9 10/04/2022 04:47 AM    BILITOT <0.2 10/04/2022 04:47 AM    ALKPHOS 69 10/04/2022 04:47 AM    AST 13 10/04/2022 04:47 AM    ALT 11 10/04/2022 04:47 AM     PT/INR:    Lab Results   Component Value Date/Time    PROTIME 15.8 09/28/2022 09:39 AM    PROTIME 12.2 12/30/2011 10:20 PM    INR 1.5 09/28/2022 09:39 AM       Assessment:     Active Problems:    JUAN on CKD improved   Hyperkalemia due to above and supplement resolved   Right heal wound infection   HTN  Obesity  DM  Impaired mobility   Fungal dermatitis   Remote CVA   PVD   Hypothyroid   Hypomagnesemia   Dysphagia       Plan:   Fluid per renal ,K+ supplement ,continue ATB, puree diet ,awaiting arteriogram

## 2022-10-05 LAB
ALBUMIN SERPL-MCNC: 2.7 G/DL (ref 3.5–5.2)
ALP BLD-CCNC: 76 U/L (ref 40–129)
ALT SERPL-CCNC: 10 U/L (ref 0–40)
ANION GAP SERPL CALCULATED.3IONS-SCNC: 12 MMOL/L (ref 7–16)
AST SERPL-CCNC: 13 U/L (ref 0–39)
BASOPHILS ABSOLUTE: 0.07 E9/L (ref 0–0.2)
BASOPHILS RELATIVE PERCENT: 1.1 % (ref 0–2)
BILIRUB SERPL-MCNC: <0.2 MG/DL (ref 0–1.2)
BUN BLDV-MCNC: 16 MG/DL (ref 6–23)
CALCIUM SERPL-MCNC: 9 MG/DL (ref 8.6–10.2)
CHLORIDE BLD-SCNC: 109 MMOL/L (ref 98–107)
CO2: 22 MMOL/L (ref 22–29)
CREAT SERPL-MCNC: 1.9 MG/DL (ref 0.7–1.2)
EOSINOPHILS ABSOLUTE: 0.54 E9/L (ref 0.05–0.5)
EOSINOPHILS RELATIVE PERCENT: 8.5 % (ref 0–6)
GFR AFRICAN AMERICAN: 42
GFR NON-AFRICAN AMERICAN: 34 ML/MIN/1.73
GLUCOSE BLD-MCNC: 130 MG/DL (ref 74–99)
HCT VFR BLD CALC: 30.6 % (ref 37–54)
HEMOGLOBIN: 9.1 G/DL (ref 12.5–16.5)
IMMATURE GRANULOCYTES #: 0.03 E9/L
IMMATURE GRANULOCYTES %: 0.5 % (ref 0–5)
LYMPHOCYTES ABSOLUTE: 1.1 E9/L (ref 1.5–4)
LYMPHOCYTES RELATIVE PERCENT: 17.4 % (ref 20–42)
MCH RBC QN AUTO: 27.6 PG (ref 26–35)
MCHC RBC AUTO-ENTMCNC: 29.7 % (ref 32–34.5)
MCV RBC AUTO: 92.7 FL (ref 80–99.9)
METER GLUCOSE: 133 MG/DL (ref 74–99)
METER GLUCOSE: 153 MG/DL (ref 74–99)
METER GLUCOSE: 178 MG/DL (ref 74–99)
METER GLUCOSE: 187 MG/DL (ref 74–99)
MONOCYTES ABSOLUTE: 0.45 E9/L (ref 0.1–0.95)
MONOCYTES RELATIVE PERCENT: 7.1 % (ref 2–12)
NEUTROPHILS ABSOLUTE: 4.13 E9/L (ref 1.8–7.3)
NEUTROPHILS RELATIVE PERCENT: 65.4 % (ref 43–80)
PDW BLD-RTO: 15 FL (ref 11.5–15)
PLATELET # BLD: 259 E9/L (ref 130–450)
PMV BLD AUTO: 9.4 FL (ref 7–12)
POTASSIUM SERPL-SCNC: 3.5 MMOL/L (ref 3.5–5)
POTASSIUM SERPL-SCNC: 3.6 MMOL/L (ref 3.5–5)
RBC # BLD: 3.3 E12/L (ref 3.8–5.8)
SODIUM BLD-SCNC: 143 MMOL/L (ref 132–146)
TOTAL PROTEIN: 7.1 G/DL (ref 6.4–8.3)
WBC # BLD: 6.3 E9/L (ref 4.5–11.5)

## 2022-10-05 PROCEDURE — 80053 COMPREHEN METABOLIC PANEL: CPT

## 2022-10-05 PROCEDURE — 6370000000 HC RX 637 (ALT 250 FOR IP): Performed by: INTERNAL MEDICINE

## 2022-10-05 PROCEDURE — 2580000003 HC RX 258: Performed by: INTERNAL MEDICINE

## 2022-10-05 PROCEDURE — 94640 AIRWAY INHALATION TREATMENT: CPT

## 2022-10-05 PROCEDURE — 99232 SBSQ HOSP IP/OBS MODERATE 35: CPT | Performed by: INTERNAL MEDICINE

## 2022-10-05 PROCEDURE — 2060000000 HC ICU INTERMEDIATE R&B

## 2022-10-05 PROCEDURE — 82962 GLUCOSE BLOOD TEST: CPT

## 2022-10-05 PROCEDURE — 97110 THERAPEUTIC EXERCISES: CPT

## 2022-10-05 PROCEDURE — 36415 COLL VENOUS BLD VENIPUNCTURE: CPT

## 2022-10-05 PROCEDURE — 85025 COMPLETE CBC W/AUTO DIFF WBC: CPT

## 2022-10-05 PROCEDURE — 84132 ASSAY OF SERUM POTASSIUM: CPT

## 2022-10-05 PROCEDURE — 6360000002 HC RX W HCPCS: Performed by: INTERNAL MEDICINE

## 2022-10-05 PROCEDURE — 97530 THERAPEUTIC ACTIVITIES: CPT

## 2022-10-05 RX ORDER — POTASSIUM CHLORIDE 20 MEQ/1
40 TABLET, EXTENDED RELEASE ORAL ONCE
Status: COMPLETED | OUTPATIENT
Start: 2022-10-05 | End: 2022-10-05

## 2022-10-05 RX ADMIN — PANTOPRAZOLE SODIUM 40 MG: 40 TABLET, DELAYED RELEASE ORAL at 05:33

## 2022-10-05 RX ADMIN — GABAPENTIN 300 MG: 300 CAPSULE ORAL at 20:35

## 2022-10-05 RX ADMIN — AMPICILLIN SODIUM AND SULBACTAM SODIUM 3000 MG: 2; 1 INJECTION, POWDER, FOR SOLUTION INTRAMUSCULAR; INTRAVENOUS at 12:13

## 2022-10-05 RX ADMIN — POTASSIUM CHLORIDE 40 MEQ: 1500 TABLET, EXTENDED RELEASE ORAL at 08:09

## 2022-10-05 RX ADMIN — MICONAZOLE NITRATE: 2 OINTMENT TOPICAL at 08:12

## 2022-10-05 RX ADMIN — LINEZOLID 600 MG: 600 TABLET, FILM COATED ORAL at 20:34

## 2022-10-05 RX ADMIN — IPRATROPIUM BROMIDE AND ALBUTEROL SULFATE 1 AMPULE: .5; 2.5 SOLUTION RESPIRATORY (INHALATION) at 19:01

## 2022-10-05 RX ADMIN — ANTI-FUNGAL POWDER MICONAZOLE NITRATE TALC FREE: 1.42 POWDER TOPICAL at 08:12

## 2022-10-05 RX ADMIN — METOPROLOL TARTRATE 25 MG: 25 TABLET, FILM COATED ORAL at 08:10

## 2022-10-05 RX ADMIN — IPRATROPIUM BROMIDE AND ALBUTEROL SULFATE 1 AMPULE: .5; 2.5 SOLUTION RESPIRATORY (INHALATION) at 11:36

## 2022-10-05 RX ADMIN — METOPROLOL TARTRATE 25 MG: 25 TABLET, FILM COATED ORAL at 20:34

## 2022-10-05 RX ADMIN — GABAPENTIN 300 MG: 300 CAPSULE ORAL at 08:09

## 2022-10-05 RX ADMIN — LINEZOLID 600 MG: 600 TABLET, FILM COATED ORAL at 08:10

## 2022-10-05 RX ADMIN — ANTI-FUNGAL POWDER MICONAZOLE NITRATE TALC FREE: 1.42 POWDER TOPICAL at 20:39

## 2022-10-05 RX ADMIN — GABAPENTIN 300 MG: 300 CAPSULE ORAL at 13:53

## 2022-10-05 RX ADMIN — AMPICILLIN SODIUM AND SULBACTAM SODIUM 3000 MG: 2; 1 INJECTION, POWDER, FOR SOLUTION INTRAMUSCULAR; INTRAVENOUS at 00:03

## 2022-10-05 RX ADMIN — MICONAZOLE NITRATE: 2 OINTMENT TOPICAL at 20:38

## 2022-10-05 RX ADMIN — IPRATROPIUM BROMIDE AND ALBUTEROL SULFATE 1 AMPULE: .5; 2.5 SOLUTION RESPIRATORY (INHALATION) at 15:42

## 2022-10-05 RX ADMIN — ATORVASTATIN CALCIUM 20 MG: 20 TABLET, FILM COATED ORAL at 20:35

## 2022-10-05 RX ADMIN — LEVOTHYROXINE SODIUM 50 MCG: 0.05 TABLET ORAL at 05:33

## 2022-10-05 RX ADMIN — AMPICILLIN SODIUM AND SULBACTAM SODIUM 3000 MG: 2; 1 INJECTION, POWDER, FOR SOLUTION INTRAMUSCULAR; INTRAVENOUS at 05:33

## 2022-10-05 RX ADMIN — AMPICILLIN SODIUM AND SULBACTAM SODIUM 3000 MG: 2; 1 INJECTION, POWDER, FOR SOLUTION INTRAMUSCULAR; INTRAVENOUS at 18:30

## 2022-10-05 RX ADMIN — AMLODIPINE BESYLATE 5 MG: 5 TABLET ORAL at 08:09

## 2022-10-05 ASSESSMENT — PAIN SCALES - GENERAL: PAINLEVEL_OUTOF10: 0

## 2022-10-05 NOTE — PROGRESS NOTES
[address] [address] [address]  [address] [place], [place]        Date:10/5/2022                                                  Patient Name: Feliz Ferrari    MRN: [id number]    : 1945    Room: [address] [place] [address]      Evaluating OT: Andreina Villela, [address] OTR/L; [id number]      Referring Provider: Estrella Cruz MD    Specific Provider Orders/Date: OT Eval and Treat 10/2/22      Diagnosis:  Peripheral arterial disease ([address] [I73.9]     Surgery: none this admission     Pertinent Medical History:  has a past medical history of Atherosclerosis of native artery of right lower extremity with ulceration of heel ([address] Atrial fibrillation ([address] Cancer ([address] Chronic renal failure, stage 3b ([address] Diabetes mellitus ([address] 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[address] Femoral-popliteal atherosclerosis ([address] Hypertension, Ischemic stroke ([address] Pacemaker, Paraplegia ([address] Sinus node dysfunction ([address] Stroke (cerebrum) ([address] Thyroid disease, and Ulcer of right heel and midfoot with fat layer exposed ([address]       Reason for admission: fall while transferring to [facility] and hit his head, small abrasion upper forehead, recent admission on  and discharge back to SNF    Recommended Adaptive Equipment:  TBD pending progression - AE for LE bathing and dressing    Precautions:  Fall Risk, Contact Isolation - MRSA, bed alarm, Cognition, Romero, TAPS, NWB RLE (per perfect serve  DPM ), Prevalon Boot B     Assessment of current deficits:    [x] Functional mobility  [x]ADLs  [x] Strength               [x]Cognition    [x] Functional transfers   [x] IADLs         [x] Safety Awareness   [x]Endurance    [x] Fine Coordination              [x] Balance      [] Vision/perception   []Sensation     []Gross Motor Coordination  [] ROM  [] Delirium                   [] Motor Control     OT PLAN OF CARE   OT POC based on physician orders, patient diagnosis and results of clinical assessment    Frequency/Duration: 2-3 days/wk for 2 weeks PRN   Specific OT Treatment Interventions to include:   * Instruction/training on adapted ADL techniques and AE recommendations to increase functional independence within precautions       * Training on energy conservation strategies, correct breathing pattern and techniques to improve independence/tolerance for self-care routine  * Functional transfer/mobility training/DME recommendations for increased independence, safety, and fall prevention  * Patient/Family education to increase follow through with safety techniques and functional independence  * Recommendation of environmental modifications for increased safety with functional transfers/mobility and ADLs  * Therapeutic exercise to improve motor endurance, ROM, and functional strength for ADLs/functional transfers  * Therapeutic activities to facilitate/challenge dynamic balance, stand tolerance for increased safety and independence with ADLs    Home Living: Pt admitted form [facility]. Active with therapy prior to admission   Equipment owned: w/c    Prior Level of Function: assist with all dressing/bathing tasks with ADLs , dependent on staff with IADLs  Ambulation per pt not ambulating with ww however squat pivot to/from w/c with assist of 2 and propelling w/c independently     Pain Level: 0/10 pain  Cognition: A&O: 4/4  Follows single 1 step directions with increased time. Memory:  fair+   Sequencing:  fair   Problem solving:  fair   Judgement/safety:  fair     Functional Assessment:  AM-PAC Daily Activity Raw Score: 13/24    Initial Eval Status  Date: 10/4/22 Treatment Status  Date: 10/5/22 STGs=LTGs  Time Frame: 10-14 days   Feeding SBA  Dysphagia/purred diet  SBA Set-up   Grooming SBA   Simulated functional reach seated EOB  SBA  Seated at EOB to wash face with wash cloth. Set-up   UB Dressing SBA  Constantino/doff gown   NT  Pt declined.    Set-up   LB Dressing Dep   Constantino/doff sock lying supine  Dep  To don/doff socks at EOB. Pt with increased fatigue this date. Mod A    Bathing Max A  Poor LB functional reach seated EOB   Max A Mod A    Toileting Dep  Romero Catheter   Declined functional mobility EOB to assess appropriateness for BSC Dep  Mod A   Bed Mobility  Log roll: Mod A  Supine to sit: Mod A with HOB elevated  Sit to supine: Mod A  Log Roll: Max A  Sup>Sit: Mod A with elevated HOB  Sit>Sup:Mod A Log roll SBA  Supine to sit: SBA   Sit to supine: SBA   Functional Transfers Sit to stand:NT  Stand to sit:NT  Stand pivot: NT  Commode: NT Scooting: Max A     Sit to stand: Pt unable while maintaining RLE NWB precautions with FWW. Educated on body mechanics/safety with FWW. Stand to sit:NT  Stand pivot: NT  Commode: NT Mod A with AD   Functional Mobility NT  Pt declined  NT  Pt unable   Mod A with AD   Balance Sitting: SBA  Standing: NT Sitting: SBA  Standing: NT      Activity Tolerance FAIR   Limited d/t overall fatigue/weakness Fair  Limited d/t increased fatigue this date. Pt stating, \"I feel weaker than yesterday. \"   Pt tolerated sitting EOB for ~15 min and requesting to lay down. GOOD    Visual/  Perceptual Glasses: no        Vitals SpO2 95%  HR:    RN aware   No SOB noted. UE ROM:        RUE:  WFL                  LUE:  WFL  Strength:        RUE: grossly 4/5         LUE: grossly 4/5   Strength: B WFL  Fine Motor Coordination:  WFL      Hearing: WFL  Sensation:  No c/o numbness/tingling   Tone:  WFL  Edema: BLEs    Comment: Cleared by RN to see pt. Upon arrival patient lying supine in bed and agreeable to OT session. At end of session, patient lying supine in bed with TAPs/wedges/prevalon boots donned with call light and phone within reach, all lines and tubes intact. Nursing aware of patient positioning.     Treatment:    Instruction/training on safe bed mobility/functional mobility/transfer techniques: Pt educated on body mechanics for bed mobility/functional transfers with focus on safety, body mechanics, and precautions. Fair recall of precautions after 5 min. Proper Positioning/Alignment: Pt properly positioned in bed for optimal healing, skin integrity, to prevent breakdown, decrease edema, and encourage functional positioning. Sitting/Standing Balance/Tolerance: Pt sat at EOB for ~15 min for ADLs/functional transfers to increase balance and activity tolerance during ADLs and facilitate proper posture and positioning. Therapeutic exercise: Instruction on BUE/BLE AROM exercises in all planes to improve strength/function for increased [place] with ADL/IADLs. Therapeutic activity: to challenge dynamic sitting/standing balance and endurance to promote safety during ADL tasks and functional transfers and mobility. Delirium Prevention: Environmental and sensory modifications assessed and implemented to decrease ICU acquired delirium and to improve overall orientation, mentation and pt interaction with family/staff.     Time In: 1041  Time Out: 1030  Total Treatment Time: 38    Min Units   OT Eval Low 46910       OT Eval Medium 98411      OT Eval High [id number]      OT Re-Eval [id number]       Therapeutic Ex 04272 10  1   Therapeutic Activities 39347 28 2    ADL/Self Care 49093      Orthotic Management 57679       Manual [id number]     Neuro Re-Ed 69275       Non-Billable Time          Lin Creeks [address] [facility] [place], OTR/L, [id number]

## 2022-10-05 NOTE — PROGRESS NOTES
Department of Podiatry   Progress Note        Patient seen and evaluated this morning resting comfortably. No new pedal complaints and no complaints of pain to LE. Repeat angiogram planned for later this week. No new pedal complaints. 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[address]     Femoral-popliteal atherosclerosis ([address] 9/13/2022    Hypertension     Ischemic stroke ([address] 03/06/2018    Pacemaker     Paraplegia ([address]     Sinus node dysfunction ([address]     Stroke (cerebrum) ([facility] 02/27/2018    Thyroid disease     Ulcer of right heel and midfoot with fat layer exposed ([address] 8/15/2022       Past Surgical History:        Procedure Laterality Date    CARDIAC PACEMAKER PLACEMENT  12/19/2014    COLONOSCOPY      EYE SURGERY      KIDNEY REMOVAL Left 07/05/2022    CANCER [facility] removed    KNEE ARTHROSCOPY  right knee    PACEMAKER PLACEMENT      IN COLONOSCOPY FLX DX W/COLLJ SPEC WHEN PFRMD N/A 03/20/2018    COLONOSCOPY DIAGNOSTIC performed by Tawanna Cortez MD at [address] EGD PERCUTANEOUS PLACEMENT GASTROSTOMY TUBE N/A 03/29/2018    PEG TUBE/PT.  IN 5507 B performed by Tawanna Cortez MD at [address] EGD PERCUTANEOUS PLACEMENT GASTROSTOMY TUBE N/A 09/12/2018    EGD PEG TUBE PLACEMENT performed by Tawanna Cortez MD at [address]       Medications Prior to Admission:    Medications Prior to Admission: meclizine (ANTIVERT) 25 MG tablet, Take 25 mg by mouth three times daily  warfarin (COUMADIN) 6 MG tablet, Take 6 mg by mouth daily  Glucagon HCl (GLUCAGON EMERGENCY) 1 MG/ML SOLR, Inject 1 mg as directed as needed (HYPOGLYCEMIA)  glucose (GLUTOSE) 40 % GEL, Take 15 g by mouth as needed (HYPOGLYCEMIA)  ipratropium-albuterol (DUONEB) 0.5-2.5 (3) MG/3ML SOLN nebulizer solution, Inhale 1 vial into the lungs every 4 hours as needed (COUGHINGAND DYSPNEA)  potassium chloride (KLOR-CON M) 20 MEQ extended release tablet, Take 20 mEq by mouth daily  polyethylene glycol (GLYCOLAX) 17 g packet, Take 17 g by mouth daily as needed for Constipation  phenazopyridine (PYRIDIUM) 200 MG tablet, Take 200 mg by mouth daily  dextromethorphan-guaiFENesin (ROBITUSSIN-DM)  MG/5ML syrup, Take 5 mLs by mouth every 6 hours as needed for Cough  Cholecalciferol (VITAMIN D3) 1.25 MG (19509 UT) CAPS, Take 50,000 Units by mouth once a week **FRIDAYS**  loperamide (IMODIUM) 2 MG capsule, Take 2 mg by mouth daily as needed for Diarrhea  Multiple Vitamins-Minerals (THERAPEUTIC MULTIVITAMIN-MINERALS) tablet, Take 1 tablet by mouth daily  gabapentin (NEURONTIN) 300 MG capsule, Take 1 capsule by mouth nightly for 7 days. levothyroxine (SYNTHROID) 50 MCG tablet, Take 1 tablet by mouth in the morning. atorvastatin (LIPITOR) 20 MG tablet, Take 1 tablet by mouth nightly  magnesium oxide (MAG-OX) 400 (240 Mg) MG tablet, Take 1 tablet by mouth every other day  ascorbic acid (VITAMIN C) 500 MG tablet, Take 1 tablet by mouth in the morning. acetaminophen (TYLENOL) 325 MG tablet, Take 650 mg by mouth every 4 hours as needed (DISCOMFORT;PAIN/ELEV TEMP >100.4F)  pantoprazole (PROTONIX) 40 MG tablet, Take 40 mg by mouth daily  amLODIPine (NORVASC) 2.5 MG tablet, Take 1 tablet by mouth daily  metoprolol tartrate (LOPRESSOR) 25 MG tablet, Take 1 tablet by mouth 2 times daily  metFORMIN (GLUCOPHAGE) 1000 MG tablet, Take 1 tablet by mouth 2 times daily  omeprazole (PRILOSEC) 20 MG delayed release capsule, Take 1 capsule by mouth daily  furosemide (LASIX) 40 MG tablet, Take 0.5 tablets by mouth daily    Allergies:  Bee venom    Social History:   TOBACCO:   reports that he quit smoking about 20 years ago. His smoking use included cigars.  He has never used smokeless tobacco.  ETOH:   reports no history of alcohol use. DRUGS:   Social History     Substance and Sexual Activity   Drug Use No       Family History:       Problem Relation Age of Onset    Heart Disease Mother     Stroke Father          REVIEW OF SYSTEMS:    All pertinent review of systems both positive and negative discussed in HPI    Previous Exam:  LOWER EXTREMITY EXAMINATION     -VASCULAR:  DP and PT pulses weakly palpable 1+ to the bilateral LE. 1-2+ pitting edema appreciated as well. NEUROLOGIC:  Protective sensation diminished to distal aspect of bilateral lower extremities. DERM:  Right pressure heel ulceration with overlying eschar noted. Fat layer exposed. No clinical signs of infection. Does not probe deep at this time. Measures roughly 8 cm x 9 cm x unknown depth. No malodor. Little drainage. Superficial wound noted to patient's anterior Right ankle as well. Not clinically infected    MUSCULOSKELETAL: Tenderness to palpation of the right heel wound. Negative TTP of bilateral calves at this time. MSK strength testing deferred this afternoon          Media Information  Document Information    Clinical Consent:  Wound      09/28/2022 07:54   Attached To:    Hospital Encounter on 9/21/22     Source Information    Hazle Schwab, MD Marlene [facility]om 4s Intermediate       CONSULTS:  IP CONSULT TO INFECTIOUS DISEASES  IP CONSULT TO DIETITIAN    MEDICATION:  Scheduled Meds:   miconazole nitrate   Topical BID    miconazole   Topical BID    ipratropium-albuterol  1 ampule Inhalation 4x daily    ampicillin-sulbactam  3,000 mg IntraVENous Q6H    linezolid  600 mg Oral 2 times per day    amLODIPine  5 mg Oral Daily    atorvastatin  20 mg Oral Nightly    levothyroxine  50 mcg Oral Daily    pantoprazole  40 mg Oral QAM AC    metoprolol tartrate  25 mg Oral BID    gabapentin  300 mg Oral TID    insulin lispro  0-8 Units SubCUTAneous TID WC    insulin lispro  0-4 Units SubCUTAneous Nightly     Continuous Infusions:        PRN Meds:.miconazole nitrate **AND** miconazole nitrate, sodium chloride flush, acetaminophen    RADIOLOGY:  XR CHEST PORTABLE   Final Result   1. Mild cardiomegaly      2. Minimal basilar subsegmental atelectasis. 3.  Dilated bowel loops in the upper abdomen, consider abdominal radiograph   or CT exam.             Vitals:    BP (!) 141/72   Pulse 68   Temp (!) 96.5 °F (35.8 °C) (Temporal)   Resp 18   Ht 6' 2\" (1.88 m)   Wt 254 lb (115.2 kg)   SpO2 98%   BMI 32.61 kg/m²     LABS:   Recent Labs     10/04/22  0447 10/05/22  0652   WBC 7.8 6.3   HGB 8.3* 9.1*   HCT 28.7* 30.6*    259       Recent Labs     10/05/22  0652      K 3.5   *   CO2 22   BUN 16   CREATININE 1.9*     Recent Labs     10/04/22  0447 10/05/22  0652   PROT 6.3* 7.1       ASSESSMENTS:   Principal Problem:  - Chronic Right heel diabetic ulceration with eschar   - Anterior right ankle diabetic wound  - Type II diabetic     Hyperkalemia  - Recent fall with dizziness (chief complaint)         PLAN:  - Patient seen and evaluated bedside  - Charts and labs reviewed   - Cultures : corynebacteria,proteus mirabilis, e.coli, staph aureus  - Abx: ampicillin and Linezolid per ID  -Vascular: will attempted repeat angiogram in the next 1 or 2 days  - Prevalon offloading boots applied to the bilateral lower extremities  - Dressing: Continue Santyl with DSD, QOD   -CT- No evidence of osteomyelitis. - Discussed patient with Dr. Deya Riggs  - Will continue to follow patient while they are in-house. Thank you for the opportunity to take part in the patient's care. Please do not hesitate to call for any questions or concerns.

## 2022-10-05 NOTE — ACP (ADVANCE CARE PLANNING)
Advance Care Planning   Healthcare Decision Maker:    Primary Decision Maker: Benny Palencia - Child - [phone]    Secondary Decision Maker: Alicia Malone - Child - [phone]    Secondary Decision Maker: Bill Gudino - Child - [phone]    Click here to complete Healthcare Decision Makers including selection of the Healthcare Decision Maker Relationship (ie \"Primary\").

## 2022-10-05 NOTE — PROGRESS NOTES
[facility] PROGRESS NOTE      Chief complaint: Follow-up of infected right heel wound    The patient is a 67 y.o. male with history of atrial fibrillation, DM, stroke, sinus node dysfunction, pacemaker in situ, factor V deficiency, renal cancer s/p left nephrectomy and left adrenalectomy in 07/2022, transferred to [facility] on 09/29 for arteriogram from [facility] where he initially presented on 09/29 after a fall while transferring from his wheelchair. He was seen by Dr. Yefri Kaur for right heel pressure ulcer present since his last hospital admission in 07/2022 for nephrectomy and was started on ampicillin-sulbactam and linezolid based on previous wound cultures in August showing Proteus mirabilis, MRSA, corynebacterium and Streptococcus agalactiae. CT right foot and ankle showed no evidence of osteomyelitis. Inflammatory markers were elevated with ESR of 93 mm/hr and CRP of 8 mg/dL. Wound culture on 0922 showed heavy growth of MRSA, E coli (non-ESBL; sensitive to fluoroquinolones and cotrimoxazole), Proteus mirabilis (non-ESBL; resistant to fluoroquinolones and cotrimoxazole). On transfer, he remained afebrile and hemodynamically stable with no leukocytosis. Subjective: Patient was seen and examined. No chills, no abdominal pain, no diarrhea, no rash, no itching. Objective:  BP (!) 141/72   Pulse 97   Temp (!) 96.5 °F (35.8 °C) (Temporal)   Resp 20   Ht 6' 2\" (1.88 m)   Wt 254 lb (115.2 kg)   SpO2 98%   BMI 32.61 kg/m²   Constitutional: Alert, not in distress  Respiratory: Clear breath sounds, no crackles, no wheezes  Cardiovascular: Regular rate and rhythm, no murmurs  Gastrointestinal: Bowel sounds present, soft, nontender  Skin: Warm and dry, no active dermatoses  Musculoskeletal: Right heel pressure ulcer malodorous with overlying eschar, as follow:      Labs, imaging, and medical records/notes were personally reviewed.     Assessment:  Right heel pressure ulcer, necrotic    Recommendations:  Continue ampicillin-sulbactam 3g q6h and linezolid 600 mg q12h. Follow up Podiatry and Vascular Surgery recommendations. Follow up surgical plans, if any. Continue local wound care. Thank you for involving me in the care of Jesus Mcconnell. I will continue to follow. Please do not hesitate to call for any questions or concerns.       Electronically signed by Becky Azul MD on 10/5/2022 at 10:08 AM

## 2022-10-05 NOTE — PROGRESS NOTES
Vascular Surgery Progress Note    CC: PVD, wound    HISTORY:  The patient is awake, alert. No new complaints. IMPRESSION: PVD, slow healing right heel wound. PLAN: Pt unable to tolerate arteriogram. Rescheduled with anesthesia. Planning for Thursday. The procedure including risks, benefits, and alternative options were discussed. The patient understands and agrees to proceed. NPO at midnight     Pt seen and plan reviewed with Dr. Cristobal Kitchen.      Patient Active Problem List   Diagnosis Code    Hypertension I10    Hyperlipidemia with target LDL less than 100 E78.5    Impaired mobility and ADLs Z74.09, Z78.9    Cardiac pacemaker in situ Z95.0    Cerebrovascular accident (CVA) determined by clinical assessment ([address] I63.9    Left renal mass N28.89    Class 2 severe obesity due to excess calories with serious comorbidity and body mass index (BMI) of 35.0 to 35.9 in adult [facility]) E66.01, Z68.35    Acquired hypothyroidism E03.9    H/O deep venous thrombosis Z86.718    Type 2 diabetes mellitus without complication, without long-term current use of insulin (HCC) E11.9    Stage 3 chronic kidney disease (HCC) N18.30    Ulcer of right heel and midfoot with fat layer exposed ([address] L97.412    Atherosclerosis of native artery of right lower extremity with ulceration of heel (HCC) I70.234    Status post nephrectomy Z90.5    Chronic renal failure, stage 3b (HCC) N18.32    Necrotic eschar ([address] I96    Diabetic ulcer of left heel associated with type 2 diabetes mellitus, with fat layer exposed ([address] E11.621, L97.422    Femoral-popliteal atherosclerosis (HCC) I70.209    Hyperkalemia E87.5    Peripheral vascular disease of lower extremity with ulceration (HCC) I73.9, L97.909       Current Medications:        miconazole nitrate **AND** miconazole nitrate, sodium chloride flush, acetaminophen    miconazole nitrate   Topical BID    miconazole   Topical BID    ipratropium-albuterol  1 ampule Inhalation 4x daily    ampicillin-sulbactam 3,000 mg IntraVENous Q6H    linezolid  600 mg Oral 2 times per day    amLODIPine  5 mg Oral Daily    atorvastatin  20 mg Oral Nightly    levothyroxine  50 mcg Oral Daily    pantoprazole  40 mg Oral QAM AC    metoprolol tartrate  25 mg Oral BID    gabapentin  300 mg Oral TID    insulin lispro  0-8 Units SubCUTAneous TID WC    insulin lispro  0-4 Units SubCUTAneous Nightly          PHYSICAL EXAM:    Vitals:    10/05/22 1100   BP:    Pulse: 97   Resp: 20   Temp:    SpO2:      CONSTITUTIONAL:  awake, alert, cooperative, no apparent distress  LUNGS:  no increased work of breathing, good air exchange   CARDIOVASCULAR:  regular rate and rhythm  ABDOMEN:  soft, non-distended and non-tender  EXTREMITIES: slight leg edema bilaterally, wrap in place to R foot      PULSE EXAM      Right      Left   Brachial       Radial 2 2   Femoral 3 3   Popliteal biphasic     Dorsalis Pedis wrap biphasic   Posterior Tibial monophasic 1, multiphasic   (3=normal, 2=diminished, 1=barely palpable, 4=widened)    LABS:    Lab Results   Component Value Date    WBC 6.3 10/05/2022    HGB 9.1 (L) 10/05/2022    HCT 30.6 (L) 10/05/2022     10/05/2022    PROTIME 15.8 (H) 09/28/2022    INR 1.5 09/28/2022    APTT 30.5 09/28/2022    K 3.6 10/05/2022    BUN 16 10/05/2022    CREATININE 1.9 (H) 10/05/2022       RADIOLOGY:

## 2022-10-05 NOTE — PROGRESS NOTES
Department of Internal Medicine  Nephrology Progress Note    Events reviewed. SUBJECTIVE: We are following Mr. Talia Jamison for CKD. He has no complaints. PHYSICAL EXAM:      Vitals:    VITALS:  BP (!) 141/72   Pulse 97   Temp (!) 96.5 °F (35.8 °C) (Temporal)   Resp 20   Ht 6' 2\" (1.88 m)   Wt 254 lb (115.2 kg)   SpO2 98%   BMI 32.61 kg/m²   24HR INTAKE/OUTPUT:    Intake/Output Summary (Last 24 hours) at 10/5/2022 1419  Last data filed at 10/5/2022 0656  Gross per 24 hour   Intake --   Output 1500 ml   Net -1500 ml     Constitutional:  Well built. Obese. Mild distress. HEENT:  BEERLA. JVD not seen. Respiratory:  Breath sounds normal. No rales or rhonchi  Cardiovascular/Edema:  Heart sounds normal. No murmur. Gastrointestinal:  Bowel sounds normal. No oranomgaly  Neurologic:  A/O x 3. No focal deficit. Skin:  Dry skin. Normal turgor. Other:  Non healing ulcer in right foot.     Scheduled Meds:   miconazole nitrate   Topical BID    miconazole   Topical BID    ipratropium-albuterol  1 ampule Inhalation 4x daily    ampicillin-sulbactam  3,000 mg IntraVENous Q6H    linezolid  600 mg Oral 2 times per day    amLODIPine  5 mg Oral Daily    atorvastatin  20 mg Oral Nightly    levothyroxine  50 mcg Oral Daily    pantoprazole  40 mg Oral QAM AC    metoprolol tartrate  25 mg Oral BID    gabapentin  300 mg Oral TID    insulin lispro  0-8 Units SubCUTAneous TID WC    insulin lispro  0-4 Units SubCUTAneous Nightly     Continuous Infusions:        PRN Meds:.miconazole nitrate **AND** miconazole nitrate, sodium chloride flush, acetaminophen      DATA:    CBC:   Lab Results   Component Value Date/Time    WBC 6.3 10/05/2022 06:52 AM    RBC 3.30 10/05/2022 06:52 AM    HGB 9.1 10/05/2022 06:52 AM    HCT 30.6 10/05/2022 06:52 AM    MCV 92.7 10/05/2022 06:52 AM    MCH 27.6 10/05/2022 06:52 AM    MCHC 29.7 10/05/2022 06:52 AM    RDW 15.0 10/05/2022 06:52 AM     10/05/2022 06:52 AM    MPV 9.4 10/05/2022 06:52 AM     CMP: Lab Results   Component Value Date/Time     10/05/2022 06:52 AM    K 3.6 10/05/2022 10:37 AM    K 6.2 09/21/2022 10:44 AM     10/05/2022 06:52 AM    CO2 22 10/05/2022 06:52 AM    BUN 16 10/05/2022 06:52 AM    CREATININE 1.9 10/05/2022 06:52 AM    GFRAA 42 10/05/2022 06:52 AM    LABGLOM 34 10/05/2022 06:52 AM    GLUCOSE 130 10/05/2022 06:52 AM    GLUCOSE 297 01/04/2012 03:00 AM    PROT 7.1 10/05/2022 06:52 AM    LABALBU 2.7 10/05/2022 06:52 AM    LABALBU 3.4 01/04/2012 03:00 AM    CALCIUM 9.0 10/05/2022 06:52 AM    BILITOT <0.2 10/05/2022 06:52 AM    ALKPHOS 76 10/05/2022 06:52 AM    AST 13 10/05/2022 06:52 AM    ALT 10 10/05/2022 06:52 AM     Magnesium:    Lab Results   Component Value Date/Time    MG 1.9 10/03/2022 05:17 AM     Phosphorus:    Lab Results   Component Value Date/Time    PHOS 3.2 09/24/2022 03:00 AM       BRIEF SUMMARY OF INITIAL CONSULT:    Briefly, Mr. Lilliam Trevizo  is a 58-year-old male with a PMH of CKD stage IIIb, without proteinuria, baseline creatinine 2.0-2.1 mg/dL, secondary to nephrosclerosis, kidney cancer, s/p total left nephrectomy on 7/5/22 at [facility], hypertension, type II diabetes mellitus, HFpEF 55% with stage II DD, PAF, factor V Leyden deficiency, hyperlipidemia, sick sinus syndrome s/p pacemaker placement, CVA, hypothyroidism, PAF on chronic anticoagulation with warfarin, who was admitted on September 21, 2022 after a fall at home, his labs were significant for potassium 6.2 mmol/L, BUN 57 mg/dL, creatinine 2.8 mg/dL, reason for this consult. His home medications include lasix, lisinopril    Resolved:    Hyperkalemia, 2/2 ACE inhibition, decrease GFR and on potassium supplementation,  improved with lokelma  JUAN on CKD, volume responsive pre-renal JUAN, 2/2 to overt diuresis, decrease oral intake in the setting of ACE inhibition. Resolved, renal function has returned to baseline, last creatinine 2.1 mg/dL. We will discontinue IV fluids.   Hypernatremia, sodium up to 148, sodium levels improved. IMPRESSION/RECOMMENDATIONS:        CKD stage IV, without proteinuria, baseline creatinine 2.1-2.4 mg/dL, secondary to nephrosclerosis and solitary kidney. Renal function continues to improve, creatinine down to 1.9 mg/dL with adequate urine output. Left kidney cancer, s/p total left nephrectomy 7/5/22 at [facility]  Hypokalemia, probably with renal K wasting due to ampicillin-sulbactam (Non reabsorbable anions), to obtain urine K/creatinine ratio.   HTN, on amlodipine and metoprolol  HFpEF 55% with stage II DD, pro-, Lasix on hold  Hypomagnesemia, 2/2 diuretics, levels improved  -------------------------------------------------  S/p fall  Nonhealing right heel wound, ID following, on linezolid and ampicillin-sulbactam.    PAF, on metoprolol and warfarin  Sick sinus syndrome, s/p pacemaker placement   Type 2 DM, on metformin at home, on hold, SSI for now  HLD, on atorvastatin  Hypothyroidism, on levothyroxine  History of CVA  Normocytic anemia, ferritin 178, iron saturation 18%, folate 8.7, B12: 270     Plan:     Replace potassium  Continue to monitor potassium levels  Continue to hold Lasix  Continue to hold metformin  Continue to monitor renal function  Obtain magnesium level       Electronically signed by MELANIE Morley CNP on 10/5/2022 at 2:19 PM

## 2022-10-05 NOTE — PLAN OF CARE

## 2022-10-05 NOTE — PROGRESS NOTES
Admit Date: 9/29/2022    Subjective:     Awake feels fine     Objective:     No data found. I/O last 3 completed shifts: In: 180 [P.O.:180]  Out: 2750 [Urine:2750]  No intake/output data recorded. HEENT: Normal  NECK: Thyroid normal. No carotid bruit. No lymphphadenopathy. CVS: RRR  RS: Clear. No wheeze. No rhonchi. ABD: Soft. Non tender. No mass. Normal BS.obese   EXT: No edema. Non tender.  Dressing feet   NEURO: no focal deficit       Scheduled Meds:   miconazole nitrate   Topical BID    miconazole   Topical BID    ipratropium-albuterol  1 ampule Inhalation 4x daily    ampicillin-sulbactam  3,000 mg IntraVENous Q6H    linezolid  600 mg Oral 2 times per day    amLODIPine  5 mg Oral Daily    atorvastatin  20 mg Oral Nightly    levothyroxine  50 mcg Oral Daily    pantoprazole  40 mg Oral QAM AC    metoprolol tartrate  25 mg Oral BID    gabapentin  300 mg Oral TID    insulin lispro  0-8 Units SubCUTAneous TID WC    insulin lispro  0-4 Units SubCUTAneous Nightly     Continuous Infusions:    CBC with Differential:    Lab Results   Component Value Date/Time    WBC 7.8 10/04/2022 04:47 AM    RBC 2.99 10/04/2022 04:47 AM    HGB 8.3 10/04/2022 04:47 AM    HCT 28.7 10/04/2022 04:47 AM     10/04/2022 04:47 AM    MCV 96.0 10/04/2022 04:47 AM    MCH 27.8 10/04/2022 04:47 AM    MCHC 28.9 10/04/2022 04:47 AM    RDW 15.2 10/04/2022 04:47 AM    SEGSPCT 80 01/04/2012 03:00 AM    BANDSPCT 1 11/27/2014 04:50 AM    LYMPHOPCT 14.9 10/04/2022 04:47 AM    MONOPCT 7.4 10/04/2022 04:47 AM    MYELOPCT 0.9 02/28/2018 05:21 AM    BASOPCT 0.8 10/04/2022 04:47 AM    MONOSABS 0.58 10/04/2022 04:47 AM    LYMPHSABS 1.17 10/04/2022 04:47 AM    EOSABS 0.54 10/04/2022 04:47 AM    BASOSABS 0.06 10/04/2022 04:47 AM     CMP:    Lab Results   Component Value Date/Time     10/04/2022 04:47 AM    K 3.1 10/04/2022 11:21 PM    K 6.2 09/21/2022 10:44 AM     10/04/2022 04:47 AM    CO2 20 10/04/2022 04:47 AM    BUN 20 10/04/2022 04:47 AM    CREATININE 2.1 10/04/2022 04:47 AM    GFRAA 37 10/04/2022 04:47 AM    LABGLOM 31 10/04/2022 04:47 AM    PROT 6.3 10/04/2022 04:47 AM    LABALBU 2.3 10/04/2022 04:47 AM    LABALBU 3.4 01/04/2012 03:00 AM    CALCIUM 8.9 10/04/2022 04:47 AM    BILITOT <0.2 10/04/2022 04:47 AM    ALKPHOS 69 10/04/2022 04:47 AM    AST 13 10/04/2022 04:47 AM    ALT 11 10/04/2022 04:47 AM     PT/INR:    Lab Results   Component Value Date/Time    PROTIME 15.8 09/28/2022 09:39 AM    PROTIME 12.2 12/30/2011 10:20 PM    INR 1.5 09/28/2022 09:39 AM       Assessment:     Active Problems:    JUAN on CKD improved   Hyperkalemia due to above and supplement resolved   Right heal wound infection   HTN  Obesity  DM  Impaired mobility   Fungal dermatitis   Remote CVA   PVD   Hypothyroid   Hypomagnesemia   Dysphagia       Plan:   K+ supplement ,continue ATB per ID, for arteriogram ,monitor renal function

## 2022-10-05 NOTE — CARE COORDINATION
Transferred from [facility] -presenting to the emergency department due to dizziness and fall. Podiatry,ID, vascular and nephrology following. Per vascular-#1 critical limb ischemia with a large eschar to the right heel. We attempted an arteriogram with intervention however he could not tolerate the procedure. He will be scheduled with anesthesia for tomorrow -. Aortogram, bilateral lower extremity arteriogram, possible intervention. This is been discussed with the patient's family. If  unable to revascularize him there will be no plans for amputation per the patient and family. Discharge plan is to [address] [place] [address] once medically stable. Patient will not need a pre-cert HENS or COVID per liaison Nelson Blackman. Ambulance started an on the soft chart. It will need completed once discharge date is determined. ELSIE/MAEGAN to follow. Electronically signed by Thomsa Krishnan RN on 10/5/2022 at 2:40 PM

## 2022-10-06 ENCOUNTER — APPOINTMENT (OUTPATIENT)
Dept: CARDIAC CATH/INVASIVE PROCEDURES | Age: 77
DRG: 253 | End: 2022-10-06
Attending: INTERNAL MEDICINE
Payer: MEDICARE

## 2022-10-06 ENCOUNTER — ANESTHESIA (OUTPATIENT)
Dept: CARDIAC CATH/INVASIVE PROCEDURES | Age: 77
DRG: 253 | End: 2022-10-06
Payer: MEDICARE

## 2022-10-06 ENCOUNTER — ANESTHESIA EVENT (OUTPATIENT)
Dept: CARDIAC CATH/INVASIVE PROCEDURES | Age: 77
DRG: 253 | End: 2022-10-06
Payer: MEDICARE

## 2022-10-06 LAB
ALBUMIN SERPL-MCNC: 2 G/DL (ref 3.5–5.2)
ALP BLD-CCNC: 73 U/L (ref 40–129)
ALT SERPL-CCNC: 9 U/L (ref 0–40)
ANION GAP SERPL CALCULATED.3IONS-SCNC: 9 MMOL/L (ref 7–16)
AST SERPL-CCNC: 22 U/L (ref 0–39)
BASOPHILS ABSOLUTE: 0.05 E9/L (ref 0–0.2)
BASOPHILS RELATIVE PERCENT: 1 % (ref 0–2)
BILIRUB SERPL-MCNC: 0.2 MG/DL (ref 0–1.2)
BUN BLDV-MCNC: 20 MG/DL (ref 6–23)
CALCIUM SERPL-MCNC: 8.8 MG/DL (ref 8.6–10.2)
CHLORIDE BLD-SCNC: 107 MMOL/L (ref 98–107)
CO2: 19 MMOL/L (ref 22–29)
CREAT SERPL-MCNC: 1.6 MG/DL (ref 0.7–1.2)
EOSINOPHILS ABSOLUTE: 0.37 E9/L (ref 0.05–0.5)
EOSINOPHILS RELATIVE PERCENT: 7.3 % (ref 0–6)
GFR AFRICAN AMERICAN: 51
GFR NON-AFRICAN AMERICAN: 42 ML/MIN/1.73
GLUCOSE BLD-MCNC: 153 MG/DL (ref 74–99)
HCT VFR BLD CALC: 35.2 % (ref 37–54)
HEMOGLOBIN: 10.3 G/DL (ref 12.5–16.5)
IMMATURE GRANULOCYTES #: 0.03 E9/L
IMMATURE GRANULOCYTES %: 0.6 % (ref 0–5)
LYMPHOCYTES ABSOLUTE: 0.99 E9/L (ref 1.5–4)
LYMPHOCYTES RELATIVE PERCENT: 19.4 % (ref 20–42)
MAGNESIUM: 2.4 MG/DL (ref 1.6–2.6)
MCH RBC QN AUTO: 28.2 PG (ref 26–35)
MCHC RBC AUTO-ENTMCNC: 29.3 % (ref 32–34.5)
MCV RBC AUTO: 96.4 FL (ref 80–99.9)
METER GLUCOSE: 113 MG/DL (ref 74–99)
METER GLUCOSE: 127 MG/DL (ref 74–99)
METER GLUCOSE: 153 MG/DL (ref 74–99)
MONOCYTES ABSOLUTE: 0.33 E9/L (ref 0.1–0.95)
MONOCYTES RELATIVE PERCENT: 6.5 % (ref 2–12)
NEUTROPHILS ABSOLUTE: 3.32 E9/L (ref 1.8–7.3)
NEUTROPHILS RELATIVE PERCENT: 65.2 % (ref 43–80)
PDW BLD-RTO: 14.9 FL (ref 11.5–15)
PLATELET # BLD: 242 E9/L (ref 130–450)
PMV BLD AUTO: 9.2 FL (ref 7–12)
POTASSIUM SERPL-SCNC: 4.3 MMOL/L (ref 3.5–5)
RBC # BLD: 3.65 E12/L (ref 3.8–5.8)
SODIUM BLD-SCNC: 135 MMOL/L (ref 132–146)
TOTAL PROTEIN: 6.6 G/DL (ref 6.4–8.3)
WBC # BLD: 5.1 E9/L (ref 4.5–11.5)

## 2022-10-06 PROCEDURE — 6370000000 HC RX 637 (ALT 250 FOR IP): Performed by: INTERNAL MEDICINE

## 2022-10-06 PROCEDURE — 37232 HC TIB PER TERR ADDL PLASTY: CPT

## 2022-10-06 PROCEDURE — 82962 GLUCOSE BLOOD TEST: CPT

## 2022-10-06 PROCEDURE — 85025 COMPLETE CBC W/AUTO DIFF WBC: CPT

## 2022-10-06 PROCEDURE — 6360000002 HC RX W HCPCS: Performed by: INTERNAL MEDICINE

## 2022-10-06 PROCEDURE — 047P3ZZ DILATION OF RIGHT ANTERIOR TIBIAL ARTERY, PERCUTANEOUS APPROACH: ICD-10-PCS | Performed by: INTERNAL MEDICINE

## 2022-10-06 PROCEDURE — 75774 ARTERY X-RAY EACH VESSEL: CPT

## 2022-10-06 PROCEDURE — 2580000003 HC RX 258: Performed by: NURSE ANESTHETIST, CERTIFIED REGISTERED

## 2022-10-06 PROCEDURE — 83735 ASSAY OF MAGNESIUM: CPT

## 2022-10-06 PROCEDURE — 37228 HC TIB PER TERRITORY PLASTY: CPT

## 2022-10-06 PROCEDURE — 75710 ARTERY X-RAYS ARM/LEG: CPT

## 2022-10-06 PROCEDURE — 047T3ZZ DILATION OF RIGHT PERONEAL ARTERY, PERCUTANEOUS APPROACH: ICD-10-PCS | Performed by: INTERNAL MEDICINE

## 2022-10-06 PROCEDURE — 37232 PR REVSC OPN/PRQ TIB/PERO W/ANGIOPLASTY UNI EA VSL: CPT | Performed by: SURGERY

## 2022-10-06 PROCEDURE — 6370000000 HC RX 637 (ALT 250 FOR IP): Performed by: SURGERY

## 2022-10-06 PROCEDURE — C1887 CATHETER, GUIDING: HCPCS

## 2022-10-06 PROCEDURE — 2580000003 HC RX 258: Performed by: INTERNAL MEDICINE

## 2022-10-06 PROCEDURE — 3700000000 HC ANESTHESIA ATTENDED CARE

## 2022-10-06 PROCEDURE — 2500000003 HC RX 250 WO HCPCS

## 2022-10-06 PROCEDURE — C1769 GUIDE WIRE: HCPCS

## 2022-10-06 PROCEDURE — 3700000001 HC ADD 15 MINUTES (ANESTHESIA)

## 2022-10-06 PROCEDURE — 37228 PR REVSC OPN/PRQ TIB/PERO W/ANGIOPLASTY UNI: CPT | Performed by: SURGERY

## 2022-10-06 PROCEDURE — 80053 COMPREHEN METABOLIC PANEL: CPT

## 2022-10-06 PROCEDURE — 6360000002 HC RX W HCPCS: Performed by: NURSE ANESTHETIST, CERTIFIED REGISTERED

## 2022-10-06 PROCEDURE — C1725 CATH, TRANSLUMIN NON-LASER: HCPCS

## 2022-10-06 PROCEDURE — C1894 INTRO/SHEATH, NON-LASER: HCPCS

## 2022-10-06 PROCEDURE — 2709999900 HC NON-CHARGEABLE SUPPLY

## 2022-10-06 PROCEDURE — 94640 AIRWAY INHALATION TREATMENT: CPT

## 2022-10-06 PROCEDURE — 99231 SBSQ HOSP IP/OBS SF/LOW 25: CPT | Performed by: INTERNAL MEDICINE

## 2022-10-06 PROCEDURE — 2060000000 HC ICU INTERMEDIATE R&B

## 2022-10-06 PROCEDURE — 36415 COLL VENOUS BLD VENIPUNCTURE: CPT

## 2022-10-06 RX ORDER — FENTANYL CITRATE 50 UG/ML
INJECTION, SOLUTION INTRAMUSCULAR; INTRAVENOUS PRN
Status: DISCONTINUED | OUTPATIENT
Start: 2022-10-06 | End: 2022-10-06 | Stop reason: SDUPTHER

## 2022-10-06 RX ORDER — MIDAZOLAM HYDROCHLORIDE 1 MG/ML
INJECTION INTRAMUSCULAR; INTRAVENOUS PRN
Status: DISCONTINUED | OUTPATIENT
Start: 2022-10-06 | End: 2022-10-06 | Stop reason: SDUPTHER

## 2022-10-06 RX ORDER — IPRATROPIUM BROMIDE AND ALBUTEROL SULFATE 2.5; .5 MG/3ML; MG/3ML
1 SOLUTION RESPIRATORY (INHALATION) EVERY 4 HOURS PRN
Status: DISCONTINUED | OUTPATIENT
Start: 2022-10-06 | End: 2022-10-08 | Stop reason: HOSPADM

## 2022-10-06 RX ORDER — IPRATROPIUM BROMIDE AND ALBUTEROL SULFATE 2.5; .5 MG/3ML; MG/3ML
1 SOLUTION RESPIRATORY (INHALATION) EVERY 4 HOURS PRN
Status: DISCONTINUED | OUTPATIENT
Start: 2022-10-06 | End: 2022-10-06

## 2022-10-06 RX ORDER — CLOPIDOGREL BISULFATE 75 MG/1
75 TABLET ORAL DAILY
Status: DISCONTINUED | OUTPATIENT
Start: 2022-10-06 | End: 2022-10-08 | Stop reason: HOSPADM

## 2022-10-06 RX ORDER — HEPARIN SODIUM 10000 [USP'U]/ML
INJECTION, SOLUTION INTRAVENOUS; SUBCUTANEOUS PRN
Status: DISCONTINUED | OUTPATIENT
Start: 2022-10-06 | End: 2022-10-06 | Stop reason: SDUPTHER

## 2022-10-06 RX ORDER — PROPOFOL 10 MG/ML
INJECTION, EMULSION INTRAVENOUS CONTINUOUS PRN
Status: DISCONTINUED | OUTPATIENT
Start: 2022-10-06 | End: 2022-10-06 | Stop reason: SDUPTHER

## 2022-10-06 RX ORDER — PROTAMINE SULFATE 10 MG/ML
INJECTION, SOLUTION INTRAVENOUS PRN
Status: DISCONTINUED | OUTPATIENT
Start: 2022-10-06 | End: 2022-10-06 | Stop reason: SDUPTHER

## 2022-10-06 RX ORDER — SODIUM CHLORIDE 9 MG/ML
INJECTION, SOLUTION INTRAVENOUS CONTINUOUS PRN
Status: DISCONTINUED | OUTPATIENT
Start: 2022-10-06 | End: 2022-10-06 | Stop reason: SDUPTHER

## 2022-10-06 RX ADMIN — PROTAMINE SULFATE 40 MG: 10 INJECTION, SOLUTION INTRAVENOUS at 13:51

## 2022-10-06 RX ADMIN — ATORVASTATIN CALCIUM 20 MG: 20 TABLET, FILM COATED ORAL at 20:31

## 2022-10-06 RX ADMIN — LINEZOLID 600 MG: 600 TABLET, FILM COATED ORAL at 20:30

## 2022-10-06 RX ADMIN — IPRATROPIUM BROMIDE AND ALBUTEROL SULFATE 1 AMPULE: .5; 2.5 SOLUTION RESPIRATORY (INHALATION) at 17:10

## 2022-10-06 RX ADMIN — FENTANYL CITRATE 25 MCG: 50 INJECTION, SOLUTION INTRAMUSCULAR; INTRAVENOUS at 13:04

## 2022-10-06 RX ADMIN — SODIUM CHLORIDE, PRESERVATIVE FREE 10 ML: 5 INJECTION INTRAVENOUS at 15:48

## 2022-10-06 RX ADMIN — CLOPIDOGREL BISULFATE 75 MG: 75 TABLET ORAL at 18:30

## 2022-10-06 RX ADMIN — AMPICILLIN SODIUM AND SULBACTAM SODIUM 3000 MG: 2; 1 INJECTION, POWDER, FOR SOLUTION INTRAMUSCULAR; INTRAVENOUS at 00:14

## 2022-10-06 RX ADMIN — PROPOFOL 75 MCG/KG/MIN: 10 INJECTION, EMULSION INTRAVENOUS at 12:51

## 2022-10-06 RX ADMIN — PANTOPRAZOLE SODIUM 40 MG: 40 TABLET, DELAYED RELEASE ORAL at 05:36

## 2022-10-06 RX ADMIN — IPRATROPIUM BROMIDE AND ALBUTEROL SULFATE 1 AMPULE: .5; 2.5 SOLUTION RESPIRATORY (INHALATION) at 21:13

## 2022-10-06 RX ADMIN — AMPICILLIN SODIUM AND SULBACTAM SODIUM 3000 MG: 2; 1 INJECTION, POWDER, FOR SOLUTION INTRAMUSCULAR; INTRAVENOUS at 18:31

## 2022-10-06 RX ADMIN — METOPROLOL TARTRATE 25 MG: 25 TABLET, FILM COATED ORAL at 15:48

## 2022-10-06 RX ADMIN — FENTANYL CITRATE 25 MCG: 50 INJECTION, SOLUTION INTRAMUSCULAR; INTRAVENOUS at 13:46

## 2022-10-06 RX ADMIN — SODIUM CHLORIDE, PRESERVATIVE FREE 10 ML: 5 INJECTION INTRAVENOUS at 20:31

## 2022-10-06 RX ADMIN — LEVOTHYROXINE SODIUM 50 MCG: 0.05 TABLET ORAL at 05:36

## 2022-10-06 RX ADMIN — ANTI-FUNGAL POWDER MICONAZOLE NITRATE TALC FREE: 1.42 POWDER TOPICAL at 20:30

## 2022-10-06 RX ADMIN — SODIUM CHLORIDE: 9 INJECTION, SOLUTION INTRAVENOUS at 12:42

## 2022-10-06 RX ADMIN — GABAPENTIN 300 MG: 300 CAPSULE ORAL at 15:48

## 2022-10-06 RX ADMIN — AMLODIPINE BESYLATE 5 MG: 5 TABLET ORAL at 15:48

## 2022-10-06 RX ADMIN — GABAPENTIN 300 MG: 300 CAPSULE ORAL at 20:30

## 2022-10-06 RX ADMIN — AMPICILLIN SODIUM AND SULBACTAM SODIUM 3000 MG: 2; 1 INJECTION, POWDER, FOR SOLUTION INTRAMUSCULAR; INTRAVENOUS at 05:39

## 2022-10-06 RX ADMIN — METOPROLOL TARTRATE 25 MG: 25 TABLET, FILM COATED ORAL at 20:31

## 2022-10-06 RX ADMIN — FENTANYL CITRATE 25 MCG: 50 INJECTION, SOLUTION INTRAMUSCULAR; INTRAVENOUS at 13:48

## 2022-10-06 RX ADMIN — MIDAZOLAM 2 MG: 1 INJECTION INTRAMUSCULAR; INTRAVENOUS at 12:42

## 2022-10-06 RX ADMIN — MICONAZOLE NITRATE: 2 OINTMENT TOPICAL at 20:30

## 2022-10-06 RX ADMIN — FENTANYL CITRATE 25 MCG: 50 INJECTION, SOLUTION INTRAMUSCULAR; INTRAVENOUS at 13:12

## 2022-10-06 RX ADMIN — HEPARIN SODIUM 14000 UNITS: 10000 INJECTION INTRAVENOUS; SUBCUTANEOUS at 13:18

## 2022-10-06 ASSESSMENT — LIFESTYLE VARIABLES: SMOKING_STATUS: 0

## 2022-10-06 ASSESSMENT — ENCOUNTER SYMPTOMS: DYSPNEA ACTIVITY LEVEL: AFTER AMBULATING 2 FLIGHTS OF STAIRS

## 2022-10-06 NOTE — CARE COORDINATION
Transferred from [place] [address] after a fall and dizziness. Podiatry,ID, vascular and nephrology following. For arteriogram today with anesthesia. -. Aortogram, bilateral lower extremity arteriogram, possible intervention. This is been discussed with the patient's family. If  unable to revascularize him there will be no plans for amputation per the patient and family. Discharge plan is to [facility] once medically stable. Patient will not need a pre-cert HENS or COVID per liaison Colonel Lester. Ambulance started an on the soft chart. It will need completed once discharge date is determined. ELSIE/MAEGAN to follow. Electronically signed by Sandy Cueva RN on 10/6/2022 at 1:26 PM

## 2022-10-06 NOTE — PROGRESS NOTES
Department of Podiatry   Progress Note        Patient seen and evaluated this morning at bedside. No new pedal complaints and no complaints of pain to LE. Repeat angiogram planned for today. No new pedal complaints. Past Medical History:        Diagnosis Date    Atherosclerosis of native artery of right lower extremity with ulceration of heel ([address] 8/15/2022    Atrial fibrillation (HCC)     Cancer (HCC)     kidney    Chronic renal failure, stage 3b ([address] 8/15/2022    Diabetes mellitus ([address]     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[address]     Femoral-popliteal atherosclerosis ([facility] 9/13/2022    Hypertension     Ischemic stroke ([facility] 03/06/2018    Pacemaker     Paraplegia ([address]     Sinus node dysfunction ([address]     Stroke (cerebrum) ([facility] 02/27/2018    Thyroid disease     Ulcer of right heel and midfoot with fat layer exposed ([facility] 8/15/2022       Past Surgical History:        Procedure Laterality Date    CARDIAC PACEMAKER PLACEMENT  12/19/2014    COLONOSCOPY      EYE SURGERY      KIDNEY REMOVAL Left 07/05/2022    CANCER [facility] removed    KNEE ARTHROSCOPY  right knee    PACEMAKER PLACEMENT      OK COLONOSCOPY FLX DX W/COLLJ SPEC WHEN PFRMD N/A 03/20/2018    COLONOSCOPY DIAGNOSTIC performed by Cristal Dueñas MD at [address] EGD PERCUTANEOUS PLACEMENT GASTROSTOMY TUBE N/A 03/29/2018    PEG TUBE/PT.  IN 5507 B performed by Cristal Dueñas MD at [address] EGD PERCUTANEOUS PLACEMENT GASTROSTOMY TUBE N/A 09/12/2018    EGD PEG TUBE PLACEMENT performed by Cristal Dueñas MD at [address] [place] [address]       Medications Prior to Admission:    Medications Prior to Admission: meclizine (ANTIVERT) 25 MG tablet, Take 25 mg by mouth three times daily  warfarin (COUMADIN) 6 MG tablet, Take 6 mg by mouth daily  Glucagon HCl (GLUCAGON EMERGENCY) 1 MG/ML SOLR, Inject 1 mg as directed as needed (HYPOGLYCEMIA)  glucose (GLUTOSE) 40 % GEL, Take 15 g by mouth as needed (HYPOGLYCEMIA)  ipratropium-albuterol (DUONEB) 0.5-2.5 (3) MG/3ML SOLN nebulizer solution, Inhale 1 vial into the lungs every 4 hours as needed (COUGHINGAND DYSPNEA)  potassium chloride (KLOR-CON M) 20 MEQ extended release tablet, Take 20 mEq by mouth daily  polyethylene glycol (GLYCOLAX) 17 g packet, Take 17 g by mouth daily as needed for Constipation  phenazopyridine (PYRIDIUM) 200 MG tablet, Take 200 mg by mouth daily  dextromethorphan-guaiFENesin (ROBITUSSIN-DM)  MG/5ML syrup, Take 5 mLs by mouth every 6 hours as needed for Cough  Cholecalciferol (VITAMIN D3) 1.25 MG (82401 UT) CAPS, Take 50,000 Units by mouth once a week **FRIDAYS**  loperamide (IMODIUM) 2 MG capsule, Take 2 mg by mouth daily as needed for Diarrhea  Multiple Vitamins-Minerals (THERAPEUTIC MULTIVITAMIN-MINERALS) tablet, Take 1 tablet by mouth daily  gabapentin (NEURONTIN) 300 MG capsule, Take 1 capsule by mouth nightly for 7 days. levothyroxine (SYNTHROID) 50 MCG tablet, Take 1 tablet by mouth in the morning. atorvastatin (LIPITOR) 20 MG tablet, Take 1 tablet by mouth nightly  magnesium oxide (MAG-OX) 400 (240 Mg) MG tablet, Take 1 tablet by mouth every other day  ascorbic acid (VITAMIN C) 500 MG tablet, Take 1 tablet by mouth in the morning. acetaminophen (TYLENOL) 325 MG tablet, Take 650 mg by mouth every 4 hours as needed (DISCOMFORT;PAIN/ELEV TEMP >100.4F)  pantoprazole (PROTONIX) 40 MG tablet, Take 40 mg by mouth daily  amLODIPine (NORVASC) 2.5 MG tablet, Take 1 tablet by mouth daily  metoprolol tartrate (LOPRESSOR) 25 MG tablet, Take 1 tablet by mouth 2 times daily  metFORMIN (GLUCOPHAGE) 1000 MG tablet, Take 1 tablet by mouth 2 times daily  omeprazole (PRILOSEC) 20 MG delayed release capsule, Take 1 capsule by mouth daily  furosemide (LASIX) 40 MG tablet, Take 0.5 tablets by mouth daily    Allergies:  Bee venom    Social History:   TOBACCO:   reports that he quit smoking about 20 years ago. His smoking use included cigars.  He has never used smokeless tobacco.  ETOH:   reports no history of alcohol use.  DRUGS:   Social History     Substance and Sexual Activity   Drug Use No       Family History:       Problem Relation Age of Onset    Heart Disease Mother     Stroke Father          REVIEW OF SYSTEMS:    All pertinent review of systems both positive and negative discussed in HPI    Previous Exam:  LOWER EXTREMITY EXAMINATION     -VASCULAR:  DP and PT pulses weakly palpable 1+ to the bilateral LE. 1-2+ pitting edema appreciated as well. NEUROLOGIC:  Protective sensation diminished to distal aspect of bilateral lower extremities. DERM:  Right pressure heel ulceration with overlying eschar noted. Fat layer exposed. No clinical signs of infection. Does not probe deep at this time. Measures roughly 8 cm x 9 cm x unknown depth. No malodor. Little drainage. Superficial wound noted to patient's anterior Right ankle as well. Not clinically infected    MUSCULOSKELETAL: Tenderness to palpation of the right heel wound. Negative TTP of bilateral calves at this time. MSK strength testing deferred this afternoon          Media Information  Document Information    Clinical Consent:  Wound      09/28/2022 07:54   Attached To:    Hospital Encounter on 9/21/22     Source Information    MD Hugo Chandler 4s Intermediate       CONSULTS:  IP CONSULT TO INFECTIOUS DISEASES  IP CONSULT TO DIETITIAN    MEDICATION:  Scheduled Meds:   miconazole nitrate   Topical BID    miconazole   Topical BID    ipratropium-albuterol  1 ampule Inhalation 4x daily    ampicillin-sulbactam  3,000 mg IntraVENous Q6H    linezolid  600 mg Oral 2 times per day    amLODIPine  5 mg Oral Daily    atorvastatin  20 mg Oral Nightly    levothyroxine  50 mcg Oral Daily    pantoprazole  40 mg Oral QAM AC    metoprolol tartrate  25 mg Oral BID    gabapentin  300 mg Oral TID    insulin lispro  0-8 Units SubCUTAneous TID WC    insulin lispro  0-4 Units SubCUTAneous Nightly     Continuous Infusions:        PRN Meds:.miconazole nitrate **AND** miconazole nitrate, sodium chloride flush, acetaminophen    RADIOLOGY:  XR CHEST PORTABLE   Final Result   1. Mild cardiomegaly      2. Minimal basilar subsegmental atelectasis. 3.  Dilated bowel loops in the upper abdomen, consider abdominal radiograph   or CT exam.             Vitals:    BP (!) 141/59   Pulse 71   Temp 97.5 °F (36.4 °C) (Oral)   Resp 20   Ht 6' 2\" (1.88 m)   Wt 254 lb (115.2 kg)   SpO2 93%   BMI 32.61 kg/m²     LABS:   Recent Labs     10/05/22  0652 10/06/22  0555   WBC 6.3 5.1   HGB 9.1* 10.3*   HCT 30.6* 35.2*    242       Recent Labs     10/06/22  0555      K 4.3      CO2 19*   BUN 20   CREATININE 1.6*     Recent Labs     10/05/22  0652 10/06/22  0555   PROT 7.1 6.6       ASSESSMENTS:   Principal Problem:  - Chronic Right heel diabetic ulceration with eschar   - Anterior right ankle diabetic wound  - Type II diabetic     Hyperkalemia  - Recent fall with dizziness (chief complaint)         PLAN:  - Patient seen and evaluated bedside  - Charts and labs reviewed   - Cultures : corynebacteria,proteus mirabilis, e.coli, staph aureus  - Abx: Per ID  -Vascular: will attempted repeat angiogram in the next 1 or 2 days  - Prevalon offloading boots applied to the bilateral lower extremities  - Dressing: Continue Santyl with DSD, QOD   -CT- No evidence of osteomyelitis. - Discussed patient with Dr. Marcia Concepcion  - Will continue to follow patient while they are in-house. Thank you for the opportunity to take part in the patient's care. Please do not hesitate to call for any questions or concerns.

## 2022-10-06 NOTE — ANESTHESIA POSTPROCEDURE EVALUATION
Department of Anesthesiology  Postprocedure Note    Patient: Ene Chawla  MRN: [id number]  YOB: 1945  Date of evaluation: 10/6/2022      Procedure Summary     Date: 10/06/22 Room / [facility]: [facility] [place] [facility]    Anesthesia Start: 6751 Anesthesia Stop: 7269    Procedure: ABDOMINAL AORTOGRAM WITH RUNOFF W ANESTHESIA Diagnosis:     Scheduled Providers:  Responsible Provider: Gurvinder Stout DO    Anesthesia Type: MAC ASA Status: 3          Anesthesia Type: No value filed.     Adelaide Phase I:      Adelaide Phase II:        Anesthesia Post Evaluation    Patient location during evaluation: bedside  Patient participation: complete - patient cannot participate  Level of consciousness: awake and alert  Airway patency: patent  Nausea & Vomiting: no nausea and no vomiting  Complications: no  Cardiovascular status: blood pressure returned to baseline  Respiratory status: acceptable  Hydration status: euvolemic  Multimodal analgesia pain management approach

## 2022-10-06 NOTE — PROGRESS NOTES
Patient was off the floor for angiogram at the time of my visit. I will see him again tomorrow. Thank you for involving me in the care of Giovany De Oliveira. I will continue to follow. Please do not hesitate to call for any questions or concerns.       Electronically signed by Licha Vazquez MD on 10/6/2022 at 10:25 AM

## 2022-10-06 NOTE — ANESTHESIA PRE PROCEDURE
Department of Anesthesiology  Preprocedure Note       Name:  Cyril Liriano   Age:  67 y.o.  :  1945                                          MRN:  [id number]         Date:  10/6/2022      Surgeon: * Surgery not found *    Procedure:     Medications prior to admission:   Prior to Admission medications    Medication Sig Start Date End Date Taking? Authorizing Provider   meclizine (ANTIVERT) 25 MG tablet Take 25 mg by mouth three times daily    Historical Provider, MD   warfarin (COUMADIN) 6 MG tablet Take 6 mg by mouth daily    Historical Provider, MD   Glucagon HCl (GLUCAGON EMERGENCY) 1 MG/ML SOLR Inject 1 mg as directed as needed (HYPOGLYCEMIA)    Historical Provider, MD   glucose (GLUTOSE) 40 % GEL Take 15 g by mouth as needed (HYPOGLYCEMIA)    Historical Provider, MD   ipratropium-albuterol (DUONEB) 0.5-2.5 (3) MG/3ML SOLN nebulizer solution Inhale 1 vial into the lungs every 4 hours as needed ([place])    Historical Provider, MD   potassium chloride (KLOR-CON M) 20 MEQ extended release tablet Take 20 mEq by mouth daily    Historical Provider, MD   polyethylene glycol (GLYCOLAX) 17 g packet Take 17 g by mouth daily as needed for Constipation    Historical Provider, MD   phenazopyridine (PYRIDIUM) 200 MG tablet Take 200 mg by mouth daily    Historical Provider, MD   dextromethorphan-guaiFENesin (ROBITUSSIN-DM)  MG/5ML syrup Take 5 mLs by mouth every 6 hours as needed for Cough    Historical Provider, MD   Cholecalciferol (VITAMIN D3) 1.25 MG (87104 UT) CAPS Take 50,000 Units by mouth once a week ****    Historical Provider, MD   loperamide (IMODIUM) 2 MG capsule Take 2 mg by mouth daily as needed for Diarrhea    Historical Provider, MD   Multiple Vitamins-Minerals (THERAPEUTIC MULTIVITAMIN-MINERALS) tablet Take 1 tablet by mouth daily    Historical Provider, MD   gabapentin (NEURONTIN) 300 MG capsule Take 1 capsule by mouth nightly for 7 days.  8/8/22 8/15/22  Xavier Cruz MD levothyroxine (SYNTHROID) 50 MCG tablet Take 1 tablet by mouth in the morning. 8/9/22   Angelica Belcher MD   atorvastatin (LIPITOR) 20 MG tablet Take 1 tablet by mouth nightly 8/8/22   Angelica Belcher MD   magnesium oxide (MAG-OX) 400 (240 Mg) MG tablet Take 1 tablet by mouth every other day 8/9/22   Angelica Belcher MD   ascorbic acid (VITAMIN C) 500 MG tablet Take 1 tablet by mouth in the morning. 8/9/22   Angelica Belcher MD   acetaminophen (TYLENOL) 325 MG tablet Take 650 mg by mouth every 4 hours as needed (DISCOMFORT;PAIN/ELEV TEMP >100.4F)    Historical Provider, MD   pantoprazole (PROTONIX) 40 MG tablet Take 40 mg by mouth daily    Historical Provider, MD   amLODIPine (NORVASC) 2.5 MG tablet Take 1 tablet by mouth daily 8/20/21 8/15/22  Lesley Wang DO   potassium chloride (KLOR-CON M) 20 MEQ TBCR extended release tablet Take 1 tablet by mouth daily 3/31/21 8/8/22  Lesley Wang DO   metoprolol tartrate (LOPRESSOR) 25 MG tablet Take 1 tablet by mouth 2 times daily 2/19/21   Lesley Wang DO   metFORMIN (GLUCOPHAGE) 1000 MG tablet Take 1 tablet by mouth 2 times daily 2/19/21   Lesley Wang DO   lovastatin (MEVACOR) 20 MG tablet Take 1 tablet by mouth nightly  Patient not taking: Reported on 8/1/2022 11/20/20 8/8/22  Lesley Wang DO   omeprazole (PRILOSEC) 20 MG delayed release capsule Take 1 capsule by mouth daily 5/22/20 4/22/22  Lesley Wang DO   furosemide (LASIX) 40 MG tablet Take 0.5 tablets by mouth daily 1/17/20   Lesley Wang DO   blood glucose test strips (EZ SMART BLOOD GLUCOSE TEST) strip 1 each by In Vitro route 2 times daily As needed.  2/11/19 8/8/22  Lesley Wang DO       Current medications:    Current Facility-Administered Medications   Medication Dose Route Frequency Provider Last Rate Last Admin    miconazole nitrate 2 % ointment   Topical BID Angelica Belcher MD   Given at 10/05/22 2038    And    miconazole nitrate 2 % ointment   Topical TID PRN Angelica Belcher MD  miconazole (MICOTIN) 2 % powder   Topical BID Mile Jackson MD   Given at 10/05/22 2039    sodium chloride flush 0.9 % injection 5-40 mL  5-40 mL IntraVENous PRN Deborah Buchanan MD   10 mL at 10/03/22 0705    ipratropium-albuterol (DUONEB) nebulizer solution 1 ampule  1 ampule Inhalation 4x daily Deborah Buchanan MD   1 ampule at 10/05/22 1901    ampicillin-sulbactam (UNASYN) 3000 mg in 100 mL NS IVPB minibag  3,000 mg IntraVENous Q6H Rozina Viveros MD   Stopped at 10/06/22 0600    linezolid (ZYVOX) tablet 600 mg  600 mg Oral 2 times per day Rozina Viveros MD   600 mg at 10/05/22 2034    amLODIPine (NORVASC) tablet 5 mg  5 mg Oral Daily Mile Jackson MD   5 mg at 10/05/22 0809    atorvastatin (LIPITOR) tablet 20 mg  20 mg Oral Nightly Mile Jackson MD   20 mg at 10/05/22 2035    levothyroxine (SYNTHROID) tablet 50 mcg  50 mcg Oral Daily Mile Jackson MD   50 mcg at 10/06/22 0536    pantoprazole (PROTONIX) tablet 40 mg  40 mg Oral QAM AC Mile Jackson MD   40 mg at 10/06/22 0536    metoprolol tartrate (LOPRESSOR) tablet 25 mg  25 mg Oral BID Mile Jackson MD   25 mg at 10/05/22 2034    acetaminophen (TYLENOL) tablet 650 mg  650 mg Oral Q4H PRN Mile Jackson MD        gabapentin (NEURONTIN) capsule 300 mg  300 mg Oral TID Mile Jackson MD   300 mg at 10/05/22 2035    insulin lispro (HUMALOG) injection vial 0-8 Units  0-8 Units SubCUTAneous TID WC Mile Jackson MD        insulin lispro (HUMALOG) injection vial 0-4 Units  0-4 Units SubCUTAneous Nightly Mile Jackson MD           Allergies:     Allergies   Allergen Reactions    Bee Venom Anaphylaxis       Problem List:    Patient Active Problem List   Diagnosis Code    Hypertension I10    Hyperlipidemia with target LDL less than 100 E78.5    Impaired mobility and ADLs Z74.09, Z78.9    Cardiac pacemaker in situ Z95.0    Cerebrovascular accident (CVA) determined by clinical assessment ([facility] I63.9    Left renal mass N28.89    Class 2 severe obesity due to excess calories with serious comorbidity and body mass index (BMI) of 35.0 to 35.9 in adult ([facility]) E66.01, Z68.35    Acquired hypothyroidism E03.9    H/O deep venous thrombosis Z86.718    Type 2 diabetes mellitus without complication, without long-term current use of insulin (HCC) E11.9    Stage 3 chronic kidney disease (HCC) N18.30    Ulcer of right heel and midfoot with fat layer exposed ([address] L97.412    Atherosclerosis of native artery of right lower extremity with ulceration of heel ([facility]) I70.234    Status post nephrectomy Z90.5    Chronic renal failure, stage 3b ([facility]) N18.32    Necrotic eschar ([facility]) I96    Diabetic ulcer of left heel associated with type 2 diabetes mellitus, with fat layer exposed ([address] E11.621, L97.422    Femoral-popliteal atherosclerosis ([facility]) I70.209    Hyperkalemia E87.5    Peripheral vascular disease of lower extremity with ulceration ([facility]) I73.9, L97.909       Past Medical History:        Diagnosis Date    Atherosclerosis of native artery of right lower extremity with ulceration of heel ([address] 8/15/2022    Atrial fibrillation (HCC)     Cancer (HCC)     kidney    Chronic renal failure, stage 3b ([address] 8/15/2022    Diabetes mellitus ([address]     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[address]     Femoral-popliteal atherosclerosis ([address] 9/13/2022    Hypertension     Ischemic stroke ([address] 03/06/2018    Pacemaker     Paraplegia ([address]     Sinus node dysfunction ([address]     Stroke (cerebrum) ([address] 02/27/2018    Thyroid disease     Ulcer of right heel and midfoot with fat layer exposed ([address] 8/15/2022       Past Surgical History:        Procedure Laterality Date    CARDIAC PACEMAKER PLACEMENT  12/19/2014    COLONOSCOPY      EYE SURGERY      KIDNEY REMOVAL Left 07/05/2022    CANCER [facility] removed    KNEE ARTHROSCOPY  right knee    PACEMAKER PLACEMENT      DC COLONOSCOPY FLX DX W/COLLJ SPEC WHEN PFRMD N/A 03/20/2018    COLONOSCOPY DIAGNOSTIC performed by El Katz MD at [address] [facility] EGD PERCUTANEOUS PLACEMENT GASTROSTOMY TUBE N/A 2018    PEG TUBE/PT. IN 5507 B performed by El Katz MD at [facility] EGD PERCUTANEOUS PLACEMENT GASTROSTOMY TUBE N/A 2018    EGD PEG TUBE PLACEMENT performed by El Katz MD at [address] [facility] History:    Social History     Tobacco Use    Smoking status: Former     Types: Cigars     Quit date:      Years since quittin.7    Smokeless tobacco: Never   Substance Use Topics    Alcohol use: No                                Counseling given: Not Answered      Vital Signs (Current):   Vitals:    10/05/22 0809 10/05/22 1100 10/05/22 1542 10/05/22 2015   BP: (!) 141/72   (!) 14159   Pulse: 68 97 96 71   Resp:  20  20   Temp:    36.4 °C (97.5 °F)   TempSrc:    Oral   SpO2:    93%   Weight:       Height:                                                  BP Readings from Last 3 Encounters:   10/05/22 (!) 141/59   22 (!) 147/56   22 (!) 142/62       NPO Status:                                                                                 BMI:   Wt Readings from Last 3 Encounters:   22 254 lb (115.2 kg)   22 246 lb (111.6 kg)   22 250 lb (113.4 kg)     Body mass index is 32.61 kg/m².     CBC:   Lab Results   Component Value Date/Time    WBC 5.1 10/06/2022 05:55 AM    RBC 3.65 10/06/2022 05:55 AM    HGB 10.3 10/06/2022 05:55 AM    HCT 35.2 10/06/2022 05:55 AM    MCV 96.4 10/06/2022 05:55 AM    RDW 14.9 10/06/2022 05:55 AM     10/06/2022 05:55 AM       CMP:   Lab Results   Component Value Date/Time     10/06/2022 05:55 AM    K 4.3 10/06/2022 05:55 AM    K 6.2 2022 10:44 AM     10/06/2022 05:55 AM    CO2 19 10/06/2022 05:55 AM    BUN 20 10/06/2022 05:55 AM    CREATININE 1.6 10/06/2022 05:55 AM    GFRAA 51 10/06/2022 05:55 AM    LABGLOM 42 10/06/2022 05:55 AM    GLUCOSE 153 10/06/2022 05:55 AM GLUCOSE 297 01/04/2012 03:00 AM    PROT 6.6 10/06/2022 05:55 AM    CALCIUM 8.8 10/06/2022 05:55 AM    BILITOT 0.2 10/06/2022 05:55 AM    ALKPHOS 73 10/06/2022 05:55 AM    AST 22 10/06/2022 05:55 AM    ALT 9 10/06/2022 05:55 AM       POC Tests: No results for input(s): POCGLU, POCNA, POCK, POCCL, POCBUN, POCHEMO, POCHCT in the last 72 hours. Coags:   Lab Results   Component Value Date/Time    PROTIME 15.8 09/28/2022 09:39 AM    PROTIME 12.2 12/30/2011 10:20 PM    INR 1.5 09/28/2022 09:39 AM    APTT 30.5 09/28/2022 09:39 AM       HCG (If Applicable): No results found for: PREGTESTUR, PREGSERUM, HCG, HCGQUANT     ABGs: No results found for: PHART, PO2ART, CGU4SRO, ZDU9AYL, BEART, G4VVCGDX     Type & Screen (If Applicable):  No results found for: LABABO, LABRH    Drug/Infectious Status (If Applicable):  No results found for: HIV, HEPCAB    COVID-19 Screening (If Applicable):   Lab Results   Component Value Date/Time    COVID19 Not Detected 08/08/2022 04:20 PM     10/2/18 cardiac ECHO   Summary   Technically difficult study (Definity contrast used). Normal left ventricular systolic function. Ejection fraction is visually estimated at > 55%. Normal right ventricular function (TAPSE 1.9 cm). There is doppler evidence of stage II diastolic dysfunction. 9/21/22 EKG  Narrative & Impression    Sinus rhythm with 1st degree AV block  Low voltage QRS  Cannot rule out Anteroseptal infarct (cited on or before 01-OCT-2018)  Abnormal ECG     8/3/22 VL LOWER EXTREMITY ARTERIAL SEGMENTAL PRESSURES W PPG    Impression   Absence of right foot digit waveforms with monophasic dorsalis pedis waveform   noted suggestive of significant peripheral artery disease in the right foot.       Normal bilateral ABIs.            Anesthesia Evaluation  Patient summary reviewed and Nursing notes reviewed no history of anesthetic complications:   Airway: Mallampati: III  TM distance: <3 FB   Neck ROM: full  Mouth opening: > = 3 FB   Dental: Comment: Pt states he has a cap on his front tooth: otherwise, denies chipped or cracked teeth. Pulmonary:   (+) decreased breath sounds: bilateral     (-) not a current smoker                          PE comment: Lungs very diminished bilaterally Cardiovascular:  Exercise tolerance: good (>4 METS),   (+) hypertension: moderate, pacemaker: pacemaker, NDIAYE: after ambulating 2 flights of stairs, hyperlipidemia      ECG reviewed  Rhythm: regular  Rate: normal  Echocardiogram reviewed         Beta Blocker:  Dose within 24 Hrs        PE comment: Distant heart sounds   Neuro/Psych:   (+) CVA (Left sided hemiparesis): residual symptoms,             GI/Hepatic/Renal:   (+) renal disease: CRI,          ROS comment: Left renal mass  S/p nephrectomy. Endo/Other:    (+) DiabetesType II DM, , hypothyroidism::., .                 Abdominal:   (+) obese,            PE comment: Abdomen firm   Vascular:   + PVD, aortic or cerebral, . ROS comment: Admission for critical limb ischemia RLE. Fem pop athlerosclerosis  . Other Findings: Pt is not ambulatory at home without a cane and walker. Anesthesia Plan      MAC     ASA 3     (PIV)  Induction: intravenous. Anesthetic plan and risks discussed with patient. Plan discussed with CRNA and attending.                     Jeffrey Day RN   10/6/2022

## 2022-10-06 NOTE — PROGRESS NOTES
Department of Internal Medicine  Nephrology Progress Note    Events reviewed. SUBJECTIVE: We are following Mr. Rubén Braun for CKD. He has no complaints. PHYSICAL EXAM:      Vitals:    VITALS:  BP (!) 141/59   Pulse 71   Temp 97.5 °F (36.4 °C) (Oral)   Resp 20   Ht 6' 2\" (1.88 m)   Wt 254 lb (115.2 kg)   SpO2 93%   BMI 32.61 kg/m²   24HR INTAKE/OUTPUT:    Intake/Output Summary (Last 24 hours) at 10/6/2022 1024  Last data filed at 10/6/2022 4583  Gross per 24 hour   Intake 240 ml   Output 1050 ml   Net -810 ml       Constitutional:  Well built. Obese. Mild distress. HEENT:  BEERLA. JVD not seen. Respiratory:  Breath sounds normal. No rales or rhonchi  Cardiovascular/Edema:  Heart sounds normal. No murmur. Gastrointestinal:  Bowel sounds normal. No oranomgaly  Neurologic:  A/O x 3. No focal deficit. Skin:  Dry skin. Normal turgor. Other:  Non healing ulcer in right foot.     Scheduled Meds:   miconazole nitrate   Topical BID    miconazole   Topical BID    ipratropium-albuterol  1 ampule Inhalation 4x daily    ampicillin-sulbactam  3,000 mg IntraVENous Q6H    linezolid  600 mg Oral 2 times per day    amLODIPine  5 mg Oral Daily    atorvastatin  20 mg Oral Nightly    levothyroxine  50 mcg Oral Daily    pantoprazole  40 mg Oral QAM AC    metoprolol tartrate  25 mg Oral BID    gabapentin  300 mg Oral TID    insulin lispro  0-8 Units SubCUTAneous TID WC    insulin lispro  0-4 Units SubCUTAneous Nightly     Continuous Infusions:        PRN Meds:.miconazole nitrate **AND** miconazole nitrate, sodium chloride flush, acetaminophen      DATA:    CBC:   Lab Results   Component Value Date/Time    WBC 5.1 10/06/2022 05:55 AM    RBC 3.65 10/06/2022 05:55 AM    HGB 10.3 10/06/2022 05:55 AM    HCT 35.2 10/06/2022 05:55 AM    MCV 96.4 10/06/2022 05:55 AM    MCH 28.2 10/06/2022 05:55 AM    MCHC 29.3 10/06/2022 05:55 AM    RDW 14.9 10/06/2022 05:55 AM     10/06/2022 05:55 AM    MPV 9.2 10/06/2022 05:55 AM     CMP: Lab Results   Component Value Date/Time     10/06/2022 05:55 AM    K 4.3 10/06/2022 05:55 AM    K 6.2 09/21/2022 10:44 AM     10/06/2022 05:55 AM    CO2 19 10/06/2022 05:55 AM    BUN 20 10/06/2022 05:55 AM    CREATININE 1.6 10/06/2022 05:55 AM    GFRAA 51 10/06/2022 05:55 AM    LABGLOM 42 10/06/2022 05:55 AM    GLUCOSE 153 10/06/2022 05:55 AM    GLUCOSE 297 01/04/2012 03:00 AM    PROT 6.6 10/06/2022 05:55 AM    LABALBU 2.0 10/06/2022 05:55 AM    LABALBU 3.4 01/04/2012 03:00 AM    CALCIUM 8.8 10/06/2022 05:55 AM    BILITOT 0.2 10/06/2022 05:55 AM    ALKPHOS 73 10/06/2022 05:55 AM    AST 22 10/06/2022 05:55 AM    ALT 9 10/06/2022 05:55 AM     Magnesium:    Lab Results   Component Value Date/Time    MG 2.4 10/06/2022 05:55 AM     Phosphorus:    Lab Results   Component Value Date/Time    PHOS 3.2 09/24/2022 03:00 AM       BRIEF SUMMARY OF INITIAL CONSULT:    Briefly, Mr. Lilliam Trevizo  is a 76-year-old male with a PMH of CKD stage IIIb, without proteinuria, baseline creatinine 2.0-2.1 mg/dL, secondary to nephrosclerosis, kidney cancer, s/p total left nephrectomy on 7/5/22 at [facility], hypertension, type II diabetes mellitus, HFpEF 55% with stage II DD, PAF, factor V Leyden deficiency, hyperlipidemia, sick sinus syndrome s/p pacemaker placement, CVA, hypothyroidism, PAF on chronic anticoagulation with warfarin, who was admitted on September 21, 2022 after a fall at home, his labs were significant for potassium 6.2 mmol/L, BUN 57 mg/dL, creatinine 2.8 mg/dL, reason for this consult. His home medications include lasix, lisinopril    Resolved:    Hyperkalemia, 2/2 ACE inhibition, decrease GFR and on potassium supplementation,  improved with lokelma  JUAN on CKD, volume responsive pre-renal JUAN, 2/2 to overt diuresis, decrease oral intake in the setting of ACE inhibition. Resolved, renal function has returned to baseline, last creatinine 2.1 mg/dL. We will discontinue IV fluids.   Hypernatremia, sodium up to 148, sodium levels improved. IMPRESSION/RECOMMENDATIONS:        CKD stage IV, without proteinuria, baseline creatinine 2.1-2.4 mg/dL, secondary to nephrosclerosis and solitary kidney. Renal function continues to improve, creatinine down to 1.6 mg/dL with adequate urine output. Left kidney cancer, s/p total left nephrectomy 7/5/22 at [facility]  Hypokalemia, probably with renal K wasting due to ampicillin-sulbactam (Non reabsorbable anions), to obtain urine K/creatinine ratio.   HTN, on amlodipine and metoprolol  HFpEF 55% with stage II DD, pro-, Lasix on hold  Hypomagnesemia, 2/2 diuretics, levels improved  -------------------------------------------------  S/p fall  Nonhealing right heel wound, ID following, on linezolid and ampicillin-sulbactam.    PAF, on metoprolol and warfarin  Sick sinus syndrome, s/p pacemaker placement   Type 2 DM, on metformin at home, on hold, SSI for now  HLD, on atorvastatin  Hypothyroidism, on levothyroxine  History of CVA  Normocytic anemia, ferritin 178, iron saturation 18%, folate 8.7, B12: 270     Plan:     Replace potassium  Continue to monitor potassium levels  Continue to hold Lasix  Continue to hold metformin  Continue to monitor renal function        Electronically signed by Lynnann Cheadle, MD on 10/6/2022 at 10:24 AM

## 2022-10-06 NOTE — OP NOTE
Operative Note      Patient: Lyubov Lemon  YOB: 1945  MRN: [id number]    Date of Procedure:   9/29/2022    Pre-Op Diagnosis: Critical limb ischemia the right lower extremity with a large eschar on the heel    Post-Op Diagnosis: Same    Findings: The abdominal aorta is widely patent. He has a very tight bifurcation. The bilateral common iliacs, hypogastric, and external iliacs are widely patent. Bilateral common femorals are patent. On the right side the common femoral, profunda, superficial femoral and popliteal are all patent. The trifurcation is severely diseased. The anterior tibial occludes at its proximal one third with some reconstitution in the middle third and then occlusion again just above the level of the ankle. The peroneal vessel is a dominant runoff to the foot with disease at proximal one third. Reconstituting the distal posterior tibial at the ankle. Surgeon: Bradford Garcia MD    Assistant: None    Anesthesia: Local lidocaine combined with fentanyl and Versed    Estimated blood loss: Less than 20 cc. Estimated contrast: See nursing notes    Procedure:  #1 ultrasound-guided needle access of the left common femoral artery  #2 abdominal aortogram with right lower extremity runoff. Catheter position placed at the contralateral superficial femoral vessel    Description of the procedure: After risk-benefit alternatives were discussed the patient and family. See below consent was achieved. The day the procedure he was brought to the Cath Lab and placed in supine position. He was prepped and draped in a sterile sterile fashion. Timeout was taken verify the person the operative site as well as the procedure. Fluoroscopy was used to identify the femoral head on the left side. Followed by ultrasound-guided needle access the common femoral vessel. This was done with a micropuncture wire micropuncture sheath. We then upsized to a Bentson wire.   We then delivered a 4 [facility] sheath/flushed with heparinized Jessa solution. The pigtail catheter was brought up to the level of the infrarenal aorta and images were taken see above. We tried to cross over the aortic bifurcation with a pigtail catheter but were having difficulty doing it secondary to how tight the bifurcation was. We then exchanged the pigtail for smith's hook style catheter. We used a combination of a Glidewire advantage and a floppy Glidewire and a glide catheter as well as a trailblazer catheter to eventually cross over into the contralateral superficial femoral vessel. Images were taken see above    At this point this took us a significant amount of time. He was very uncomfortable during the case and wished us to stop. All catheters and wires were removed and pressure was held upon removal of the sheath. He will be scheduled with anesthesia. Drains:   Urinary Catheter 10/02/22 Romero (Active)   Catheter Indications Need for fluid volume management of the critically ill patient in a critical care setting 10/05/22 2000   Site Assessment Pink 10/05/22 2000   Urine Color Yellow 10/05/22 2000   Urine Appearance Clear 10/05/22 2000   Urine Odor Malodorous 10/05/22 2000   Collection Container Standard 10/05/22 2000   Securement Method Leg strap 10/05/22 2000   Catheter Care Completed Yes 10/03/22 0603   Catheter Best Practices  Drainage tube clipped to bed;Catheter secured to thigh; Tamper seal intact; Lack of dependent loop in tubing;Drainage bag less than half full;Bag not on floor; Bag below bladder 10/05/22 2000   Status Draining 10/05/22 2000   Output (mL) 500 mL 10/05/22 1705       [REMOVED] Urinary Catheter 09/22/22 (Removed)   Catheter Indications Urinary retention (acute or chronic), continuous bladder irrigation or bladder outlet obstruction;Assist in healing of open sacral or perineal wounds (Stage III, IV, or unstageable documented by a provider or enterostomal therapy) and/or full thickness perineal and lower extremity burns in incontinent patients;Prolonged immobilization (e.g. unstable thoracic or lumbar spine, multiple traumatic injuries such as pelvic fractures) 10/02/22 1455   Site Assessment Pink 10/02/22 1455   Urine Color Orange 10/02/22 1811   Urine Appearance Sediment 10/02/22 1811   Urine Odor Malodorous 10/02/22 1811   Collection Container Standard 10/02/22 1811   Securement Method Leg strap 10/02/22 1811   Catheter Care Completed Yes 10/01/22 1645   Catheter Best Practices  Catheter secured to thigh; Tamper seal intact; Bag below bladder;Bag not on floor; Lack of dependent loop in tubing;Drainage bag less than half full 10/02/22 1811   Status Leaking 10/02/22 1811   Output (mL) 275 mL 10/02/22 1811         Electronically signed by Sergio Pruitt MD on 10/6/2022 at 2:10 PM

## 2022-10-06 NOTE — OP NOTE
Operative Note      Patient: Eric Sampson  YOB: 1945  MRN: [id number]    Date of procedure: 10/6/2022    Preoperative diagnosis: Critical limb ischemia the right lower extremity. Postoperative diagnosis: Same    Findings: The common femoral, profunda femoris and superficial femoral are widely patent. The popliteals widely patent the trifurcation is severely diseased. The anterior tibial artery on the right occludes at its proximal one third. There is some reconstitution in the middle third and then occludes again at the level of the ankle with reconstitution of the dorsalis pedis. The peroneal vessel is diseased. The tibioperoneal trunk is patent. The peroneal demonstrates proximal high-grade stenosis versus occlusive disease. The rest the peroneal is intact. The posterior tibials occluded throughout its entire length and reconstitutes via the peroneal at the level of the ankle. Surgeon: Imani Turk MD    Assistant: None    Anesthesia: Local lidocaine and MAC anesthesia    Estimated contrast: See nursing notes    Estimated blood loss: Approximately 15 cc    Procedure:  #1 ultrasound-guided antegrade right common femoral artery puncture  #2 right lower extremity arteriogram  #3 balloon angioplasty of the anterior tibial vessel with a 3 x 220 over-the-wire balloon  #4 balloon angioplasty of the dorsalis pedis with a 3 x 220 balloon into the anterior tibial vessel  #5 balloon angioplasty of the tibial peroneal trunk with a 3 x 220 Monorail    Description of the procedure: After risk benefits and alternatives were discussed with the patient and family. Consent was achieved. I had a long discussion with the son again at the bedside. The day the procedure is brought to the operating room placed in supine position. Is prepped and draped in the standard sterile fashion. Timeout is taken verify the person the operative site as well as the procedure.   This time he had anesthesia second the fact that the patient did not tolerate the initial arteriogram.    Lidocaine was used infiltrate the skin and subcutaneous tissue over the common femoral artery. A micropuncture needle micropuncture wire and sheath were inserted with ultrasound guidance. The wire was able to be placed into the superficial femoral vessel followed by the micropuncture sheath. We then placed a Bentson wire into the superficial femoral vessel upsized to a 4 Rowan Chet sheath/flushed with heparinized saline solution. Images were taken of the right extremity demonstrating tibial vessel disease see above. The patient was then heparinized see nursing notes. We then used an 014 Glidewire advantage. We initially try to cross into the dorsalis pedis but were unable to do so. I did make an additional attempt after balloon angioplasty of the anterior tibial and peroneal vessel but I was unsuccessful. We then addressed the peroneal vessel. I was able to cross the high-grade stenosis and occlusion at the proximal one third. Wire confirmation was confirmed. We then delivered a 3 x 220 Monorail balloon. Balloon angioplasty was performed for approximately 3 minutes. Completion images demonstrated widely patent peroneal vessel without evidence of complication. We then withdrew all the catheters. Used a trailblazer and the 014 trailblazer catheter to select the anterior tibial vessel. We able to cross all of the occlusions and placed wire into the dorsalis pedis. We then delivered and over the wire 3 x 220 balloon. Balloon angioplasty started in the dorsalis pedis extending up through the anterior tibial vessel. An additional balloon angioplasty was performed at the level of the takeoff of the anterior tibial vessel. Completion images demonstrated widely patent vessel. And a widely patent peroneal vessel without evidence of complications.     At this point the patient was reversed with protamine and the antegrade sheath was removed and pressure held. Drains:   Urinary Catheter 10/02/22 Romero (Active)   Catheter Indications Need for fluid volume management of the critically ill patient in a critical care setting 10/05/22 2000   Site Assessment Pink 10/05/22 2000   Urine Color Yellow 10/05/22 2000   Urine Appearance Clear 10/05/22 2000   Urine Odor Malodorous 10/05/22 2000   Collection Container Standard 10/05/22 2000   Securement Method Leg strap 10/05/22 2000   Catheter Care Completed Yes 10/03/22 0603   Catheter Best Practices  Drainage tube clipped to bed;Catheter secured to thigh; Tamper seal intact; Lack of dependent loop in tubing;Drainage bag less than half full;Bag not on floor; Bag below bladder 10/05/22 2000   Status Draining 10/05/22 2000   Output (mL) 500 mL 10/05/22 1705       [REMOVED] Urinary Catheter 09/22/22 (Removed)   Catheter Indications Urinary retention (acute or chronic), continuous bladder irrigation or bladder outlet obstruction;Assist in healing of open sacral or perineal wounds (Stage III, IV, or unstageable documented by a provider or enterostomal therapy) and/or full thickness perineal and lower extremity burns in incontinent patients;Prolonged immobilization (e.g. unstable thoracic or lumbar spine, multiple traumatic injuries such as pelvic fractures) 10/02/22 1455   Site Assessment Pink 10/02/22 1455   Urine Color Orange 10/02/22 1811   Urine Appearance Sediment 10/02/22 1811   Urine Odor Malodorous 10/02/22 1811   Collection Container Standard 10/02/22 1811   Securement Method Leg strap 10/02/22 1811   Catheter Care Completed Yes 10/01/22 1645   Catheter Best Practices  Catheter secured to thigh; Tamper seal intact; Bag below bladder;Bag not on floor; Lack of dependent loop in tubing;Drainage bag less than half full 10/02/22 1811   Status Leaking 10/02/22 1811   Output (mL) 275 mL 10/02/22 1811           Electronically signed by Neelima Mena MD on 10/6/2022 at 2:17 PM

## 2022-10-06 NOTE — PROGRESS NOTES
Department of Internal Medicine  Nephrology Progress Note    Events reviewed. SUBJECTIVE: We are following Mr. Nichole Morales for CKD. He has no complaints. PHYSICAL EXAM:      Vitals:    VITALS:  BP (!) 141/59   Pulse 71   Temp 97.5 °F (36.4 °C) (Oral)   Resp 20   Ht 6' 2\" (1.88 m)   Wt 254 lb (115.2 kg)   SpO2 93%   BMI 32.61 kg/m²   24HR INTAKE/OUTPUT:    Intake/Output Summary (Last 24 hours) at 10/6/2022 1356  Last data filed at 10/6/2022 1354  Gross per 24 hour   Intake 490 ml   Output 1050 ml   Net -560 ml       Constitutional:  Well built. Obese. Mild distress. HEENT:  PERRLA. JVD not seen. Respiratory:  Breath sounds normal. No rales or rhonchi  Cardiovascular/Edema:  Heart sounds normal. No murmur. Gastrointestinal:  Bowel sounds normal. No oranomgaly  Neurologic:  A/O x 3. No focal deficit. Skin:  Dry skin. Normal turgor. Other:  Non healing ulcer in right foot.     Scheduled Meds:   miconazole nitrate   Topical BID    miconazole   Topical BID    ipratropium-albuterol  1 ampule Inhalation 4x daily    ampicillin-sulbactam  3,000 mg IntraVENous Q6H    linezolid  600 mg Oral 2 times per day    amLODIPine  5 mg Oral Daily    atorvastatin  20 mg Oral Nightly    levothyroxine  50 mcg Oral Daily    pantoprazole  40 mg Oral QAM AC    metoprolol tartrate  25 mg Oral BID    gabapentin  300 mg Oral TID    insulin lispro  0-8 Units SubCUTAneous TID WC    insulin lispro  0-4 Units SubCUTAneous Nightly     Continuous Infusions:        PRN Meds:.miconazole nitrate **AND** miconazole nitrate, sodium chloride flush, acetaminophen      DATA:    CBC:   Lab Results   Component Value Date/Time    WBC 5.1 10/06/2022 05:55 AM    RBC 3.65 10/06/2022 05:55 AM    HGB 10.3 10/06/2022 05:55 AM    HCT 35.2 10/06/2022 05:55 AM    MCV 96.4 10/06/2022 05:55 AM    MCH 28.2 10/06/2022 05:55 AM    MCHC 29.3 10/06/2022 05:55 AM    RDW 14.9 10/06/2022 05:55 AM     10/06/2022 05:55 AM    MPV 9.2 10/06/2022 05:55 AM     CMP: Lab Results   Component Value Date/Time     10/06/2022 05:55 AM    K 4.3 10/06/2022 05:55 AM    K 6.2 09/21/2022 10:44 AM     10/06/2022 05:55 AM    CO2 19 10/06/2022 05:55 AM    BUN 20 10/06/2022 05:55 AM    CREATININE 1.6 10/06/2022 05:55 AM    GFRAA 51 10/06/2022 05:55 AM    LABGLOM 42 10/06/2022 05:55 AM    GLUCOSE 153 10/06/2022 05:55 AM    GLUCOSE 297 01/04/2012 03:00 AM    PROT 6.6 10/06/2022 05:55 AM    LABALBU 2.0 10/06/2022 05:55 AM    LABALBU 3.4 01/04/2012 03:00 AM    CALCIUM 8.8 10/06/2022 05:55 AM    BILITOT 0.2 10/06/2022 05:55 AM    ALKPHOS 73 10/06/2022 05:55 AM    AST 22 10/06/2022 05:55 AM    ALT 9 10/06/2022 05:55 AM     Magnesium:    Lab Results   Component Value Date/Time    MG 2.4 10/06/2022 05:55 AM     Phosphorus:    Lab Results   Component Value Date/Time    PHOS 3.2 09/24/2022 03:00 AM       BRIEF SUMMARY OF INITIAL CONSULT:    Briefly, Mr. Qasim Grossman  is a 76-year-old male with a PMH of CKD stage IIIb, without proteinuria, baseline creatinine 2.0-2.1 mg/dL, secondary to nephrosclerosis, kidney cancer, s/p total left nephrectomy on 7/5/22 at [facility], hypertension, type II diabetes mellitus, HFpEF 55% with stage II DD, PAF, factor V Leyden deficiency, hyperlipidemia, sick sinus syndrome s/p pacemaker placement, CVA, hypothyroidism, PAF on chronic anticoagulation with warfarin, who was admitted on September 21, 2022 after a fall at home, his labs were significant for potassium 6.2 mmol/L, BUN 57 mg/dL, creatinine 2.8 mg/dL, reason for this consult. His home medications include lasix, lisinopril    Resolved:    Hyperkalemia, 2/2 ACE inhibition, decrease GFR and on potassium supplementation,  improved with lokelma  JUAN on CKD, volume responsive pre-renal JUAN, 2/2 to overt diuresis, decrease oral intake in the setting of ACE inhibition. Resolved, renal function has returned to baseline, last creatinine 2.1 mg/dL. We will discontinue IV fluids.   Hypernatremia, sodium up to 148, sodium levels improved. IMPRESSION/RECOMMENDATIONS:        CKD stage IV, without proteinuria, baseline creatinine 2.1-2.4 mg/dL, secondary to nephrosclerosis and solitary kidney. Renal function continues to improve, creatinine down to 1.6 mg/dL with adequate urine output. Left kidney cancer, s/p total left nephrectomy 7/5/22 at [facility]  Hypokalemia, probably with renal K wasting due to ampicillin-sulbactam (Non reabsorbable anions), to obtain urine K/creatinine ratio.   HTN, on amlodipine and metoprolol  HFpEF 55% with stage II DD, pro-, Lasix on hold  Hypomagnesemia, 2/2 diuretics, levels improved  -------------------------------------------------  S/p fall  Nonhealing right heel wound, ID following, on linezolid and ampicillin-sulbactam.    PAF, on metoprolol and warfarin  Sick sinus syndrome, s/p pacemaker placement   Type 2 DM, on metformin at home, on hold, SSI for now  HLD, on atorvastatin  Hypothyroidism, on levothyroxine  History of CVA  Normocytic anemia, ferritin 178, iron saturation 18%, folate 8.7, B12: 270     Plan:     Continue to monitor potassium levels  Continue to hold Lasix  Continue to hold metformin  Continue to monitor renal function      Electronically signed by Bruno Feliciano MD on 10/6/2022 at 1:56 PM

## 2022-10-06 NOTE — PLAN OF CARE
Problem: Chronic Conditions and Co-morbidities  Goal: Patient's chronic conditions and co-morbidity symptoms are monitored and maintained or improved  Outcome: Progressing  Flowsheets (Taken 10/6/2022 1521)  Care Plan - Patient's Chronic Conditions and Co-Morbidity Symptoms are Monitored and Maintained or Improved: Monitor and assess patient's chronic conditions and comorbid symptoms for stability, deterioration, or improvement     Problem: Discharge Planning  Goal: Discharge to home or other facility with appropriate resources  Outcome: Progressing  Flowsheets (Taken 10/6/2022 1521)  Discharge to home or other facility with appropriate resources: Identify barriers to discharge with patient and caregiver     Problem: Safety - Adult  Goal: Free from fall injury  Outcome: Progressing  Flowsheets (Taken 10/6/2022 1521)  Free From Fall Injury: Instruct family/caregiver on patient safety     Problem: ABCDS Injury Assessment  Goal: Absence of physical injury  Outcome: Progressing  Flowsheets (Taken 10/6/2022 1521)  Absence of Physical Injury: Implement safety measures based on patient assessment     Problem: Skin/Tissue Integrity  Goal: Absence of new skin breakdown  Description: 1. Monitor for areas of redness and/or skin breakdown  2. Assess vascular access sites hourly  3. Every 4-6 hours minimum:  Change oxygen saturation probe site  4. Every 4-6 hours:  If on nasal continuous positive airway pressure, respiratory therapy assess nares and determine need for appliance change or resting period.   Outcome: Progressing     Problem: Pain  Goal: Verbalizes/displays adequate comfort level or baseline comfort level  Outcome: Progressing     Problem: Nutrition Deficit:  Goal: Optimize nutritional status  Outcome: Progressing

## 2022-10-07 VITALS
BODY MASS INDEX: 32.6 KG/M2 | HEIGHT: 74 IN | RESPIRATION RATE: 18 BRPM | TEMPERATURE: 98 F | OXYGEN SATURATION: 96 % | HEART RATE: 71 BPM | SYSTOLIC BLOOD PRESSURE: 133 MMHG | WEIGHT: 254 LBS | DIASTOLIC BLOOD PRESSURE: 81 MMHG

## 2022-10-07 PROBLEM — N18.9 ACUTE KIDNEY INJURY SUPERIMPOSED ON CHRONIC KIDNEY DISEASE (HCC): Status: ACTIVE | Noted: 2022-01-01

## 2022-10-07 PROBLEM — N17.9 ACUTE KIDNEY INJURY SUPERIMPOSED ON CHRONIC KIDNEY DISEASE (HCC): Status: ACTIVE | Noted: 2022-01-01

## 2022-10-07 LAB
ALBUMIN SERPL-MCNC: 2.8 G/DL (ref 3.5–5.2)
ALP BLD-CCNC: 74 U/L (ref 40–129)
ALT SERPL-CCNC: 9 U/L (ref 0–40)
ANION GAP SERPL CALCULATED.3IONS-SCNC: 11 MMOL/L (ref 7–16)
AST SERPL-CCNC: 10 U/L (ref 0–39)
BASOPHILS ABSOLUTE: 0.06 E9/L (ref 0–0.2)
BASOPHILS RELATIVE PERCENT: 1 % (ref 0–2)
BILIRUB SERPL-MCNC: <0.2 MG/DL (ref 0–1.2)
BUN BLDV-MCNC: 23 MG/DL (ref 6–23)
CALCIUM SERPL-MCNC: 9 MG/DL (ref 8.6–10.2)
CHLORIDE BLD-SCNC: 105 MMOL/L (ref 98–107)
CHLORIDE URINE RANDOM: 125 MMOL/L
CO2: 25 MMOL/L (ref 22–29)
CREAT SERPL-MCNC: 1.7 MG/DL (ref 0.7–1.2)
CREATININE URINE: 91 MG/DL (ref 40–278)
EOSINOPHILS ABSOLUTE: 0.41 E9/L (ref 0.05–0.5)
EOSINOPHILS RELATIVE PERCENT: 6.7 % (ref 0–6)
GFR AFRICAN AMERICAN: 47
GFR NON-AFRICAN AMERICAN: 39 ML/MIN/1.73
GLUCOSE BLD-MCNC: 130 MG/DL (ref 74–99)
HCT VFR BLD CALC: 29.5 % (ref 37–54)
HEMOGLOBIN: 9 G/DL (ref 12.5–16.5)
IMMATURE GRANULOCYTES #: 0.03 E9/L
IMMATURE GRANULOCYTES %: 0.5 % (ref 0–5)
LYMPHOCYTES ABSOLUTE: 1.01 E9/L (ref 1.5–4)
LYMPHOCYTES RELATIVE PERCENT: 16.6 % (ref 20–42)
MCH RBC QN AUTO: 28.8 PG (ref 26–35)
MCHC RBC AUTO-ENTMCNC: 30.5 % (ref 32–34.5)
MCV RBC AUTO: 94.2 FL (ref 80–99.9)
METER GLUCOSE: 130 MG/DL (ref 74–99)
METER GLUCOSE: 144 MG/DL (ref 74–99)
METER GLUCOSE: 167 MG/DL (ref 74–99)
MONOCYTES ABSOLUTE: 0.38 E9/L (ref 0.1–0.95)
MONOCYTES RELATIVE PERCENT: 6.2 % (ref 2–12)
NEUTROPHILS ABSOLUTE: 4.21 E9/L (ref 1.8–7.3)
NEUTROPHILS RELATIVE PERCENT: 69 % (ref 43–80)
PDW BLD-RTO: 15.1 FL (ref 11.5–15)
PLATELET # BLD: 247 E9/L (ref 130–450)
PMV BLD AUTO: 9.1 FL (ref 7–12)
POTASSIUM SERPL-SCNC: 3.4 MMOL/L (ref 3.5–5)
POTASSIUM, UR: 16.3 MMOL/L
RBC # BLD: 3.13 E12/L (ref 3.8–5.8)
SODIUM BLD-SCNC: 141 MMOL/L (ref 132–146)
SODIUM URINE: 118 MMOL/L
TOTAL PROTEIN: 6.5 G/DL (ref 6.4–8.3)
WBC # BLD: 6.1 E9/L (ref 4.5–11.5)

## 2022-10-07 PROCEDURE — 80053 COMPREHEN METABOLIC PANEL: CPT

## 2022-10-07 PROCEDURE — 84300 ASSAY OF URINE SODIUM: CPT

## 2022-10-07 PROCEDURE — 36415 COLL VENOUS BLD VENIPUNCTURE: CPT

## 2022-10-07 PROCEDURE — 94640 AIRWAY INHALATION TREATMENT: CPT

## 2022-10-07 PROCEDURE — 6370000000 HC RX 637 (ALT 250 FOR IP): Performed by: INTERNAL MEDICINE

## 2022-10-07 PROCEDURE — 6360000002 HC RX W HCPCS: Performed by: INTERNAL MEDICINE

## 2022-10-07 PROCEDURE — 6370000000 HC RX 637 (ALT 250 FOR IP): Performed by: NURSE PRACTITIONER

## 2022-10-07 PROCEDURE — 6370000000 HC RX 637 (ALT 250 FOR IP): Performed by: SURGERY

## 2022-10-07 PROCEDURE — 2580000003 HC RX 258: Performed by: INTERNAL MEDICINE

## 2022-10-07 PROCEDURE — 82436 ASSAY OF URINE CHLORIDE: CPT

## 2022-10-07 PROCEDURE — 99239 HOSP IP/OBS DSCHRG MGMT >30: CPT | Performed by: INTERNAL MEDICINE

## 2022-10-07 PROCEDURE — 99232 SBSQ HOSP IP/OBS MODERATE 35: CPT | Performed by: SURGERY

## 2022-10-07 PROCEDURE — 82962 GLUCOSE BLOOD TEST: CPT

## 2022-10-07 PROCEDURE — 2060000000 HC ICU INTERMEDIATE R&B

## 2022-10-07 PROCEDURE — 84133 ASSAY OF URINE POTASSIUM: CPT

## 2022-10-07 PROCEDURE — 82570 ASSAY OF URINE CREATININE: CPT

## 2022-10-07 PROCEDURE — 85025 COMPLETE CBC W/AUTO DIFF WBC: CPT

## 2022-10-07 RX ORDER — INSULIN LISPRO 100 [IU]/ML
0-4 INJECTION, SOLUTION INTRAVENOUS; SUBCUTANEOUS NIGHTLY
Qty: 1 EACH | Refills: 0 | Status: SHIPPED | OUTPATIENT
Start: 2022-10-07

## 2022-10-07 RX ORDER — POTASSIUM CHLORIDE 20 MEQ/1
20 TABLET, EXTENDED RELEASE ORAL ONCE
Status: COMPLETED | OUTPATIENT
Start: 2022-10-07 | End: 2022-10-07

## 2022-10-07 RX ORDER — INSULIN LISPRO 100 [IU]/ML
0-8 INJECTION, SOLUTION INTRAVENOUS; SUBCUTANEOUS
Qty: 1 EACH | Refills: 0 | Status: SHIPPED | OUTPATIENT
Start: 2022-10-07

## 2022-10-07 RX ADMIN — LINEZOLID 600 MG: 600 TABLET, FILM COATED ORAL at 09:10

## 2022-10-07 RX ADMIN — POTASSIUM CHLORIDE 20 MEQ: 1500 TABLET, EXTENDED RELEASE ORAL at 10:16

## 2022-10-07 RX ADMIN — CLOPIDOGREL BISULFATE 75 MG: 75 TABLET ORAL at 09:10

## 2022-10-07 RX ADMIN — IPRATROPIUM BROMIDE AND ALBUTEROL SULFATE 1 AMPULE: .5; 2.5 SOLUTION RESPIRATORY (INHALATION) at 20:22

## 2022-10-07 RX ADMIN — MICONAZOLE NITRATE: 2 OINTMENT TOPICAL at 10:15

## 2022-10-07 RX ADMIN — PANTOPRAZOLE SODIUM 40 MG: 40 TABLET, DELAYED RELEASE ORAL at 05:29

## 2022-10-07 RX ADMIN — LEVOTHYROXINE SODIUM 50 MCG: 0.05 TABLET ORAL at 05:29

## 2022-10-07 RX ADMIN — SODIUM CHLORIDE, PRESERVATIVE FREE 10 ML: 5 INJECTION INTRAVENOUS at 09:10

## 2022-10-07 RX ADMIN — IPRATROPIUM BROMIDE AND ALBUTEROL SULFATE 1 AMPULE: .5; 2.5 SOLUTION RESPIRATORY (INHALATION) at 09:48

## 2022-10-07 RX ADMIN — LINEZOLID 600 MG: 600 TABLET, FILM COATED ORAL at 20:42

## 2022-10-07 RX ADMIN — MICONAZOLE NITRATE: 2 OINTMENT TOPICAL at 20:43

## 2022-10-07 RX ADMIN — AMPICILLIN SODIUM AND SULBACTAM SODIUM 3000 MG: 2; 1 INJECTION, POWDER, FOR SOLUTION INTRAMUSCULAR; INTRAVENOUS at 00:26

## 2022-10-07 RX ADMIN — SODIUM CHLORIDE, PRESERVATIVE FREE 10 ML: 5 INJECTION INTRAVENOUS at 20:41

## 2022-10-07 RX ADMIN — METOPROLOL TARTRATE 25 MG: 25 TABLET, FILM COATED ORAL at 20:42

## 2022-10-07 RX ADMIN — GABAPENTIN 300 MG: 300 CAPSULE ORAL at 14:28

## 2022-10-07 RX ADMIN — ANTI-FUNGAL POWDER MICONAZOLE NITRATE TALC FREE: 1.42 POWDER TOPICAL at 10:15

## 2022-10-07 RX ADMIN — AMPICILLIN SODIUM AND SULBACTAM SODIUM 3000 MG: 2; 1 INJECTION, POWDER, FOR SOLUTION INTRAMUSCULAR; INTRAVENOUS at 05:29

## 2022-10-07 RX ADMIN — AMLODIPINE BESYLATE 5 MG: 5 TABLET ORAL at 09:10

## 2022-10-07 RX ADMIN — AMPICILLIN SODIUM AND SULBACTAM SODIUM 3000 MG: 2; 1 INJECTION, POWDER, FOR SOLUTION INTRAMUSCULAR; INTRAVENOUS at 15:02

## 2022-10-07 RX ADMIN — IPRATROPIUM BROMIDE AND ALBUTEROL SULFATE 1 AMPULE: .5; 2.5 SOLUTION RESPIRATORY (INHALATION) at 16:18

## 2022-10-07 RX ADMIN — ANTI-FUNGAL POWDER MICONAZOLE NITRATE TALC FREE: 1.42 POWDER TOPICAL at 20:43

## 2022-10-07 RX ADMIN — IPRATROPIUM BROMIDE AND ALBUTEROL SULFATE 1 AMPULE: .5; 2.5 SOLUTION RESPIRATORY (INHALATION) at 13:26

## 2022-10-07 RX ADMIN — GABAPENTIN 300 MG: 300 CAPSULE ORAL at 20:42

## 2022-10-07 RX ADMIN — AMPICILLIN SODIUM AND SULBACTAM SODIUM 3000 MG: 2; 1 INJECTION, POWDER, FOR SOLUTION INTRAMUSCULAR; INTRAVENOUS at 19:05

## 2022-10-07 RX ADMIN — ATORVASTATIN CALCIUM 20 MG: 20 TABLET, FILM COATED ORAL at 20:42

## 2022-10-07 RX ADMIN — METOPROLOL TARTRATE 25 MG: 25 TABLET, FILM COATED ORAL at 09:10

## 2022-10-07 RX ADMIN — GABAPENTIN 300 MG: 300 CAPSULE ORAL at 09:10

## 2022-10-07 ASSESSMENT — PAIN SCALES - GENERAL: PAINLEVEL_OUTOF10: 0

## 2022-10-07 NOTE — PROGRESS NOTES
Dr Krista Umanzor notified via after hours call service of vascular clearance for debridement of RLE heel wound.

## 2022-10-07 NOTE — PLAN OF CARE
Problem: Chronic Conditions and Co-morbidities  Goal: Patient's chronic conditions and co-morbidity symptoms are monitored and maintained or improved  Outcome: Progressing  Flowsheets (Taken 10/7/2022 0848)  Care Plan - Patient's Chronic Conditions and Co-Morbidity Symptoms are Monitored and Maintained or Improved: Monitor and assess patient's chronic conditions and comorbid symptoms for stability, deterioration, or improvement     Problem: Discharge Planning  Goal: Discharge to home or other facility with appropriate resources  Outcome: Progressing  Flowsheets (Taken 10/7/2022 0848)  Discharge to home or other facility with appropriate resources: Identify barriers to discharge with patient and caregiver     Problem: Safety - Adult  Goal: Free from fall injury  Outcome: Progressing  Flowsheets (Taken 10/7/2022 0848)  Free From Fall Injury: Instruct family/caregiver on patient safety     Problem: ABCDS Injury Assessment  Goal: Absence of physical injury  Outcome: Progressing  Flowsheets (Taken 10/7/2022 0848)  Absence of Physical Injury: Implement safety measures based on patient assessment     Problem: Skin/Tissue Integrity  Goal: Absence of new skin breakdown  Description: 1. Monitor for areas of redness and/or skin breakdown  2. Assess vascular access sites hourly  3. Every 4-6 hours minimum:  Change oxygen saturation probe site  4. Every 4-6 hours:  If on nasal continuous positive airway pressure, respiratory therapy assess nares and determine need for appliance change or resting period.   Outcome: Progressing     Problem: Pain  Goal: Verbalizes/displays adequate comfort level or baseline comfort level  Outcome: Progressing     Problem: Nutrition Deficit:  Goal: Optimize nutritional status  Outcome: Progressing

## 2022-10-07 NOTE — PROGRESS NOTES
Vascular Surgery Progress Note    Pt is being seen in f/u today regarding right lower extremity heel ulcer. Subjective:  Heike Hadley is a 67 y.o. male status post arteriogram with intervention and recannulization of the anterior tibial dorsalis pedis and peroneal vessel. He denies any significant groin pain denies any chest pain shortness of breath or discomfort. His family is in the room. Current Medications:      ipratropium-albuterol, miconazole nitrate **AND** miconazole nitrate, sodium chloride flush, acetaminophen    collagenase   Topical Daily    clopidogrel  75 mg Oral Daily    miconazole nitrate   Topical BID    miconazole   Topical BID    ipratropium-albuterol  1 ampule Inhalation 4x daily    ampicillin-sulbactam  3,000 mg IntraVENous Q6H    linezolid  600 mg Oral 2 times per day    amLODIPine  5 mg Oral Daily    atorvastatin  20 mg Oral Nightly    levothyroxine  50 mcg Oral Daily    pantoprazole  40 mg Oral QAM AC    metoprolol tartrate  25 mg Oral BID    gabapentin  300 mg Oral TID    insulin lispro  0-8 Units SubCUTAneous TID WC    insulin lispro  0-4 Units SubCUTAneous Nightly        PHYSICAL EXAM:    BP (!) 158/69   Pulse 93   Temp 97.9 °F (36.6 °C) (Temporal)   Resp 18   Ht 6' 2\" (1.88 m)   Wt 254 lb (115.2 kg)   SpO2 96%   BMI 32.61 kg/m²     Intake/Output Summary (Last 24 hours) at 10/7/2022 1054  Last data filed at 10/7/2022 0830  Gross per 24 hour   Intake 250 ml   Output 1450 ml   Net -1200 ml          General: Alert and oriented answers questions properly currently in no acute distress. Skin: Right foot demonstrates an eschar that is present on the heel is fairly extensive. HEENT: Normocephalic atraumatic trach is midline no jugular venous tension  CVS: Currently regular rate and rhythm no murmur rub or gallop on today's exam  Resp: Clear to auscultation bilateral no wheeze rales rhonchi  Abd: Abdomen: Soft nontender no guarding.   Positive bowel sounds  Extremities: Right extremity demonstrates a nice palpable dorsalis pedis at 3+. I confirmed this with multiphasic signal is a weak monophasic posterior signal.  The groin access site is unremarkable  Neuro: Cranials 2 through 12 are gross intact bilaterally gross cranial deficit    LABS:    Lab Results   Component Value Date    WBC 6.1 10/07/2022    HGB 9.0 (L) 10/07/2022    HCT 29.5 (L) 10/07/2022     10/07/2022    PROTIME 15.8 (H) 09/28/2022    INR 1.5 09/28/2022    APTT 30.5 09/28/2022    K 3.4 (L) 10/07/2022    BUN 23 10/07/2022    CREATININE 1.7 (H) 10/07/2022       RADIOLOGY:  XR CHEST PORTABLE   Final Result   1. Mild cardiomegaly      2. Minimal basilar subsegmental atelectasis. 3.  Dilated bowel loops in the upper abdomen, consider abdominal radiograph   or CT exam.             ASSESSMENT/PLAN:   #1 status post arteriogram with recanalization of the anterior tibial, dorsalis pedis and peroneal vessel. He is palpable. From our standpoint I would recommend debridement of the eschar while he has blood flow. I will defer to podiatry. Apparently they have no plans for additional surgical intervention at this point. I have reviewed his creatinine.   It seems to be at baseline or better      Electronically signed by Latanya Aguiar MD on 10/7/2022 at 10:54 AM

## 2022-10-07 NOTE — CARE COORDINATION
Transferred from [place] [address] after a fall and dizziness. Podiatry,ID, vascular and nephrology following. Had arteriogram yesterday flow improved. No surgical intervention planned from podiatry perspective. .- Discharge plan is to [facility] once medically stable. Patient will not need a pre-cert HENS or COVID per liaison Joshua Valencia. Ambulance started an on the soft chart. It will need completed once discharge date is determined. Can discharge over weekend if ready. SW/CM to follow. Electronically signed by Jackeline Hicks RN on 10/7/2022 at 11:51 AM      Message to attending to see if can discharge? Electronically signed by Jackeline Hicks RN on 10/7/2022 at 2:12 PM    Discharge order noted. [facility] and [facility] ambulance are booked. Ambulance transport via physicians for 1030 pm- asked them to outsource it. Patient updated and call placed to juli Rosales and also updated. Facility liaison also ntfd. Bedside RN  updated. Electronically signed by Jackeline Hicks RN on 10/7/2022 at 3:48 PM

## 2022-10-07 NOTE — PROGRESS NOTES
Department of Internal Medicine  Nephrology Progress Note    Events reviewed. SUBJECTIVE: We are following Mr. Shannon Avendano for CKD. He has no complaints. PHYSICAL EXAM:      Vitals:    VITALS:  BP (!) 158/69   Pulse 93   Temp 97.9 °F (36.6 °C) (Temporal)   Resp 18   Ht 6' 2\" (1.88 m)   Wt 254 lb (115.2 kg)   SpO2 96%   BMI 32.61 kg/m²   24HR INTAKE/OUTPUT:    Intake/Output Summary (Last 24 hours) at 10/7/2022 1345  Last data filed at 10/7/2022 0830  Gross per 24 hour   Intake 250 ml   Output 1450 ml   Net -1200 ml     Constitutional:  Well built. Obese. Mild distress. HEENT:  PERRLA. JVD not seen. Respiratory:  Breath sounds normal. No rales or rhonchi  Cardiovascular/Edema:  Heart sounds normal. No murmur. Gastrointestinal:  Bowel sounds normal. No oranomgaly  Neurologic:  A/O x 3. No focal deficit. Skin:  Dry skin. Normal turgor. Other:  Non healing ulcer in right foot.     Scheduled Meds:   collagenase   Topical Daily    clopidogrel  75 mg Oral Daily    miconazole nitrate   Topical BID    miconazole   Topical BID    ipratropium-albuterol  1 ampule Inhalation 4x daily    ampicillin-sulbactam  3,000 mg IntraVENous Q6H    linezolid  600 mg Oral 2 times per day    amLODIPine  5 mg Oral Daily    atorvastatin  20 mg Oral Nightly    levothyroxine  50 mcg Oral Daily    pantoprazole  40 mg Oral QAM AC    metoprolol tartrate  25 mg Oral BID    gabapentin  300 mg Oral TID    insulin lispro  0-8 Units SubCUTAneous TID WC    insulin lispro  0-4 Units SubCUTAneous Nightly     Continuous Infusions:        PRN Meds:.ipratropium-albuterol, miconazole nitrate **AND** miconazole nitrate, sodium chloride flush, acetaminophen      DATA:    CBC:   Lab Results   Component Value Date/Time    WBC 6.1 10/07/2022 05:02 AM    RBC 3.13 10/07/2022 05:02 AM    HGB 9.0 10/07/2022 05:02 AM    HCT 29.5 10/07/2022 05:02 AM    MCV 94.2 10/07/2022 05:02 AM    MCH 28.8 10/07/2022 05:02 AM    MCHC 30.5 10/07/2022 05:02 AM    RDW 15.1 10/07/2022 05:02 AM     10/07/2022 05:02 AM    MPV 9.1 10/07/2022 05:02 AM     CMP:    Lab Results   Component Value Date/Time     10/07/2022 05:02 AM    K 3.4 10/07/2022 05:02 AM    K 6.2 09/21/2022 10:44 AM     10/07/2022 05:02 AM    CO2 25 10/07/2022 05:02 AM    BUN 23 10/07/2022 05:02 AM    CREATININE 1.7 10/07/2022 05:02 AM    GFRAA 47 10/07/2022 05:02 AM    LABGLOM 39 10/07/2022 05:02 AM    GLUCOSE 130 10/07/2022 05:02 AM    GLUCOSE 297 01/04/2012 03:00 AM    PROT 6.5 10/07/2022 05:02 AM    LABALBU 2.8 10/07/2022 05:02 AM    LABALBU 3.4 01/04/2012 03:00 AM    CALCIUM 9.0 10/07/2022 05:02 AM    BILITOT <0.2 10/07/2022 05:02 AM    ALKPHOS 74 10/07/2022 05:02 AM    AST 10 10/07/2022 05:02 AM    ALT 9 10/07/2022 05:02 AM     Magnesium:    Lab Results   Component Value Date/Time    MG 2.4 10/06/2022 05:55 AM     Phosphorus:    Lab Results   Component Value Date/Time    PHOS 3.2 09/24/2022 03:00 AM       BRIEF SUMMARY OF INITIAL CONSULT:    Briefly, Mr. Jose Viveros  is a 58-year-old male with a PMH of CKD stage IIIb, without proteinuria, baseline creatinine 2.0-2.1 mg/dL, secondary to nephrosclerosis, kidney cancer, s/p total left nephrectomy on 7/5/22 at [facility], hypertension, type II diabetes mellitus, HFpEF 55% with stage II DD, PAF, factor V Leyden deficiency, hyperlipidemia, sick sinus syndrome s/p pacemaker placement, CVA, hypothyroidism, PAF on chronic anticoagulation with warfarin, who was admitted on September 21, 2022 after a fall at home, his labs were significant for potassium 6.2 mmol/L, BUN 57 mg/dL, creatinine 2.8 mg/dL, reason for this consult. His home medications include lasix, lisinopril    Resolved:    Hyperkalemia, 2/2 ACE inhibition, decrease GFR and on potassium supplementation,  improved with lokelma  JUAN on CKD, volume responsive pre-renal JUAN, 2/2 to overt diuresis, decrease oral intake in the setting of ACE inhibition.   Resolved, renal function has returned to baseline, last creatinine 2.1 mg/dL. We will discontinue IV fluids. Hypernatremia, sodium up to 148, sodium levels improved. Hypomagnesemia, 2/2 diuretics, levels improved    IMPRESSION/RECOMMENDATIONS:        CKD stage IV, without proteinuria, baseline creatinine 2.1-2.4 mg/dL, secondary to nephrosclerosis and solitary kidney. Renal function remains stable, creatinine down to 1.7 mg/dL. Left kidney cancer, s/p total left nephrectomy 7/5/22 at [facility]  Hypokalemia, with renal K wasting, urine K/creatine ratio 17.9 (>13) due to ampicillin-sulbactam (Non reabsorbable anions). To replace. HTN, on amlodipine and metoprolol  HFpEF 55% with stage II DD, pro-, Lasix on hold  -------------------------------------------------  S/p fall  Nonhealing right heel wound, ID following, on linezolid and ampicillin-sulbactam.  S/p arteriogram with intervention yesterday. Plan is for debridement of wound.    PAF, on metoprolol and warfarin  Sick sinus syndrome, s/p pacemaker placement   Type 2 DM, on metformin at home, on hold, SSI for now  HLD, on atorvastatin  Hypothyroidism, on levothyroxine  History of CVA  Normocytic anemia, ferritin 178, iron saturation 18%, folate 8.7, B12: 270     Plan:     Replace potassium   Continue to monitor potassium levels  Continue to hold Lasix  Continue to hold metformin  Continue to monitor renal function  Continue to monitor potassium level       Electronically signed by MELANIE Mcdonald CNP on 10/7/2022 at 1:45 PM

## 2022-10-07 NOTE — PROGRESS NOTES
Admit Date: 9/29/2022    Subjective:     Awake feels fine no complaint   Arteriogram and angioplasty done yesterday     Objective:     No data found. I/O last 3 completed shifts: In: 56 [P.O.:240; I.V.:250]  Out: 1050 [Urine:1050]  No intake/output data recorded. HEENT: Normal  NECK: Thyroid normal. No carotid bruit. No lymphphadenopathy. CVS: RRR  RS: Clear. No wheeze. No rhonchi. Good airflow bilaterally. ABD: Soft. Non tender. No mass. Normal BS.obese   EXT: No edema. Non tender.  Dressing feet   NEURO: no focal deficit       Scheduled Meds:   clopidogrel  75 mg Oral Daily    miconazole nitrate   Topical BID    miconazole   Topical BID    ipratropium-albuterol  1 ampule Inhalation 4x daily    ampicillin-sulbactam  3,000 mg IntraVENous Q6H    linezolid  600 mg Oral 2 times per day    amLODIPine  5 mg Oral Daily    atorvastatin  20 mg Oral Nightly    levothyroxine  50 mcg Oral Daily    pantoprazole  40 mg Oral QAM AC    metoprolol tartrate  25 mg Oral BID    gabapentin  300 mg Oral TID    insulin lispro  0-8 Units SubCUTAneous TID WC    insulin lispro  0-4 Units SubCUTAneous Nightly     Continuous Infusions:    CBC with Differential:    Lab Results   Component Value Date/Time    WBC 6.1 10/07/2022 05:02 AM    RBC 3.13 10/07/2022 05:02 AM    HGB 9.0 10/07/2022 05:02 AM    HCT 29.5 10/07/2022 05:02 AM     10/07/2022 05:02 AM    MCV 94.2 10/07/2022 05:02 AM    MCH 28.8 10/07/2022 05:02 AM    MCHC 30.5 10/07/2022 05:02 AM    RDW 15.1 10/07/2022 05:02 AM    SEGSPCT 80 01/04/2012 03:00 AM    BANDSPCT 1 11/27/2014 04:50 AM    LYMPHOPCT 16.6 10/07/2022 05:02 AM    MONOPCT 6.2 10/07/2022 05:02 AM    MYELOPCT 0.9 02/28/2018 05:21 AM    BASOPCT 1.0 10/07/2022 05:02 AM    MONOSABS 0.38 10/07/2022 05:02 AM    LYMPHSABS 1.01 10/07/2022 05:02 AM    EOSABS 0.41 10/07/2022 05:02 AM    BASOSABS 0.06 10/07/2022 05:02 AM     CMP:    Lab Results   Component Value Date/Time     10/07/2022 05:02 AM    K 3.4 10/07/2022 05:02 AM    K 6.2 09/21/2022 10:44 AM     10/07/2022 05:02 AM    CO2 25 10/07/2022 05:02 AM    BUN 23 10/07/2022 05:02 AM    CREATININE 1.7 10/07/2022 05:02 AM    GFRAA 47 10/07/2022 05:02 AM    LABGLOM 39 10/07/2022 05:02 AM    PROT 6.5 10/07/2022 05:02 AM    LABALBU 2.8 10/07/2022 05:02 AM    LABALBU 3.4 01/04/2012 03:00 AM    CALCIUM 9.0 10/07/2022 05:02 AM    BILITOT <0.2 10/07/2022 05:02 AM    ALKPHOS 74 10/07/2022 05:02 AM    AST 10 10/07/2022 05:02 AM    ALT 9 10/07/2022 05:02 AM     PT/INR:    Lab Results   Component Value Date/Time    PROTIME 15.8 09/28/2022 09:39 AM    PROTIME 12.2 12/30/2011 10:20 PM    INR 1.5 09/28/2022 09:39 AM       Assessment:     Active Problems:     JUAN on CKD improved   Hyperkalemia due to above and supplement resolved   Right heal wound infection   HTN  Obesity  DM  Impaired mobility   Fungal dermatitis   Remote CVA   PVD   Hypothyroid   Hypomagnesemia   Dysphagia       Plan:   Continue ATB per ID ,await podiatry input may need debridement of his heal

## 2022-10-07 NOTE — PROGRESS NOTES
[facility] PROGRESS NOTE      Chief complaint: Follow-up of infected right heel wound    The patient is a 67 y.o. male with history of atrial fibrillation, DM, stroke, sinus node dysfunction, pacemaker in situ, factor V deficiency, renal cancer s/p left nephrectomy and left adrenalectomy in 07/2022, transferred to [address] [place] [address] on 09/29 for arteriogram from [facility] where he initially presented on 09/29 after a fall while transferring from his wheelchair. He was seen by Dr. Joel Crabtree for right heel pressure ulcer present since his last hospital admission in 07/2022 for nephrectomy and was started on ampicillin-sulbactam and linezolid based on previous wound cultures in August showing Proteus mirabilis, MRSA, corynebacterium and Streptococcus agalactiae. CT right foot and ankle showed no evidence of osteomyelitis. Inflammatory markers were elevated with ESR of 93 mm/hr and CRP of 8 mg/dL. Wound culture on 0922 showed heavy growth of MRSA, E coli (non-ESBL; sensitive to fluoroquinolones and cotrimoxazole), Proteus mirabilis (non-ESBL; resistant to fluoroquinolones and cotrimoxazole). On transfer, he remained afebrile and hemodynamically stable with no leukocytosis. Ampicillin-sulbactam and linezolid were continued. He underwent right lower extremity arteriogram with balloon angioplasty on 10/06. Subjective: Patient was seen and examined. No chills, no abdominal pain, no diarrhea, no rash, no itching.       Objective:  BP (!) 158/69   Pulse 93   Temp 97.9 °F (36.6 °C) (Temporal)   Resp 18   Ht 6' 2\" (1.88 m)   Wt 254 lb (115.2 kg)   SpO2 96%   BMI 32.61 kg/m²   Constitutional: Alert, not in distress  Respiratory: Clear breath sounds, no crackles, no wheezes  Cardiovascular: Regular rate and rhythm, no murmurs  Gastrointestinal: Bowel sounds present, soft, nontender  Skin: Warm and dry, no active dermatoses  Musculoskeletal: Right heel pressure ulcer malodorous with overlying eschar, as follow:      Labs, imaging, and medical records/notes were personally reviewed. Assessment:  Right heel pressure ulcer, necrotic    Recommendations:  Continue ampicillin-sulbactam 3g q6h and linezolid 600 mg q12h. No further antibiotic therapy if no surgical debridement will be done. Follow up Podiatry recommendations. Follow up surgical plans, if any. Continue local wound care. Thank you for involving me in the care of Chace Hood. I will continue to follow. Please do not hesitate to call for any questions or concerns.       Electronically signed by Santy Herrera MD on 10/7/2022 at 9:54 AM

## 2022-10-07 NOTE — PROGRESS NOTES
Department of Podiatry   Progress Note        Patient seen and evaluated this morning at bedside. Mild left extremity pain reported today , s/p angiogram 10/6. No new pedal complaints. Continue conservative care to right heel. 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[address]     Femoral-popliteal atherosclerosis ([address] 9/13/2022    Hypertension     Ischemic stroke ([address] 03/06/2018    Pacemaker     Paraplegia ([address]     Sinus node dysfunction ([address]     Stroke (cerebrum) ([address] 02/27/2018    Thyroid disease     Ulcer of right heel and midfoot with fat layer exposed ([address] 8/15/2022       Past Surgical History:        Procedure Laterality Date    CARDIAC PACEMAKER PLACEMENT  12/19/2014    COLONOSCOPY      EYE SURGERY      KIDNEY REMOVAL Left 07/05/2022    CANCER [facility] removed    KNEE ARTHROSCOPY  right knee    PACEMAKER PLACEMENT      NV COLONOSCOPY FLX DX W/COLLJ SPEC WHEN PFRMD N/A 03/20/2018    COLONOSCOPY DIAGNOSTIC performed by Baron Taylor MD at [address] EGD PERCUTANEOUS PLACEMENT GASTROSTOMY TUBE N/A 03/29/2018    PEG TUBE/PT.  IN 5507 B performed by Baron Taylor MD at [address] EGD PERCUTANEOUS PLACEMENT GASTROSTOMY TUBE N/A 09/12/2018    EGD PEG TUBE PLACEMENT performed by Baron Taylor MD at [address] [place] [address]       Medications Prior to Admission:    Medications Prior to Admission: meclizine (ANTIVERT) 25 MG tablet, Take 25 mg by mouth three times daily  warfarin (COUMADIN) 6 MG tablet, Take 6 mg by mouth daily  Glucagon HCl (GLUCAGON EMERGENCY) 1 MG/ML SOLR, Inject 1 mg as directed as needed (HYPOGLYCEMIA)  glucose (GLUTOSE) 40 % GEL, Take 15 g by mouth as needed (HYPOGLYCEMIA)  ipratropium-albuterol (DUONEB) 0.5-2.5 (3) MG/3ML SOLN nebulizer solution, Inhale 1 vial into the lungs every 4 hours as needed (COUGHINGAND DYSPNEA)  potassium chloride (KLOR-CON M) 20 MEQ extended release tablet, Take 20 mEq by mouth daily  polyethylene glycol (GLYCOLAX) 17 g packet, Take 17 g by mouth daily as needed for Constipation  phenazopyridine (PYRIDIUM) 200 MG tablet, Take 200 mg by mouth daily  dextromethorphan-guaiFENesin (ROBITUSSIN-DM)  MG/5ML syrup, Take 5 mLs by mouth every 6 hours as needed for Cough  Cholecalciferol (VITAMIN D3) 1.25 MG (33278 UT) CAPS, Take 50,000 Units by mouth once a week **FRIDAYS**  loperamide (IMODIUM) 2 MG capsule, Take 2 mg by mouth daily as needed for Diarrhea  Multiple Vitamins-Minerals (THERAPEUTIC MULTIVITAMIN-MINERALS) tablet, Take 1 tablet by mouth daily  gabapentin (NEURONTIN) 300 MG capsule, Take 1 capsule by mouth nightly for 7 days. levothyroxine (SYNTHROID) 50 MCG tablet, Take 1 tablet by mouth in the morning. atorvastatin (LIPITOR) 20 MG tablet, Take 1 tablet by mouth nightly  magnesium oxide (MAG-OX) 400 (240 Mg) MG tablet, Take 1 tablet by mouth every other day  ascorbic acid (VITAMIN C) 500 MG tablet, Take 1 tablet by mouth in the morning. acetaminophen (TYLENOL) 325 MG tablet, Take 650 mg by mouth every 4 hours as needed (DISCOMFORT;PAIN/ELEV TEMP >100.4F)  pantoprazole (PROTONIX) 40 MG tablet, Take 40 mg by mouth daily  amLODIPine (NORVASC) 2.5 MG tablet, Take 1 tablet by mouth daily  metoprolol tartrate (LOPRESSOR) 25 MG tablet, Take 1 tablet by mouth 2 times daily  metFORMIN (GLUCOPHAGE) 1000 MG tablet, Take 1 tablet by mouth 2 times daily  omeprazole (PRILOSEC) 20 MG delayed release capsule, Take 1 capsule by mouth daily  furosemide (LASIX) 40 MG tablet, Take 0.5 tablets by mouth daily    Allergies:  Bee venom    Social History:   TOBACCO:   reports that he quit smoking about 20 years ago. His smoking use included cigars.  He has never used smokeless tobacco.  ETOH:   reports no history of alcohol use. DRUGS:   Social History     Substance and Sexual Activity   Drug Use No       Family History:       Problem Relation Age of Onset    Heart Disease Mother     Stroke Father          REVIEW OF SYSTEMS:    All pertinent review of systems both positive and negative discussed in HPI    Previous Exam:  LOWER EXTREMITY EXAMINATION     -VASCULAR:  DP and PT pulses weakly palpable 1+ to the bilateral LE. 1-2+ pitting edema appreciated as well. NEUROLOGIC:  Protective sensation diminished to distal aspect of bilateral lower extremities. DERM:  Right pressure heel ulceration with overlying eschar noted. Fat layer exposed. No clinical signs of infection. Does not probe deep at this time. Measures roughly 8 cm x 9 cm x unknown depth. No malodor. Little drainage. Superficial wound noted to patient's anterior Right ankle as well. Not clinically infected    MUSCULOSKELETAL: Tenderness to palpation of the right heel wound. Negative TTP of bilateral calves at this time. MSK strength testing deferred this afternoon          Media Information  Document Information    Clinical Consent:  Wound      09/28/2022 07:54   Attached To:    Hospital Encounter on 9/21/22     Source Information    MD Sophie Espinal 4s Intermediate       CONSULTS:  IP CONSULT TO INFECTIOUS DISEASES  IP CONSULT TO DIETITIAN    MEDICATION:  Scheduled Meds:   collagenase   Topical Daily    clopidogrel  75 mg Oral Daily    miconazole nitrate   Topical BID    miconazole   Topical BID    ipratropium-albuterol  1 ampule Inhalation 4x daily    ampicillin-sulbactam  3,000 mg IntraVENous Q6H    linezolid  600 mg Oral 2 times per day    amLODIPine  5 mg Oral Daily    atorvastatin  20 mg Oral Nightly    levothyroxine  50 mcg Oral Daily    pantoprazole  40 mg Oral QAM AC    metoprolol tartrate  25 mg Oral BID    gabapentin  300 mg Oral TID    insulin lispro  0-8 Units SubCUTAneous TID WC    insulin lispro  0-4 Units SubCUTAneous Nightly Continuous Infusions:        PRN Meds:.ipratropium-albuterol, miconazole nitrate **AND** miconazole nitrate, sodium chloride flush, acetaminophen    RADIOLOGY:  XR CHEST PORTABLE   Final Result   1. Mild cardiomegaly      2. Minimal basilar subsegmental atelectasis. 3.  Dilated bowel loops in the upper abdomen, consider abdominal radiograph   or CT exam.             Vitals:    BP (!) 158/69   Pulse 93   Temp 97.9 °F (36.6 °C) (Temporal)   Resp 18   Ht 6' 2\" (1.88 m)   Wt 254 lb (115.2 kg)   SpO2 96%   BMI 32.61 kg/m²     LABS:   Recent Labs     10/06/22  0555 10/07/22  0502   WBC 5.1 6.1   HGB 10.3* 9.0*   HCT 35.2* 29.5*    247       Recent Labs     10/07/22  0502      K 3.4*      CO2 25   BUN 23   CREATININE 1.7*     Recent Labs     10/06/22  0555 10/07/22  0502   PROT 6.6 6.5       ASSESSMENTS:   Principal Problem:  - Chronic Right heel diabetic ulceration with eschar   - Anterior right ankle diabetic wound  - Type II diabetic     Hyperkalemia  - Recent fall with dizziness (chief complaint)         PLAN:  - Patient seen and evaluated bedside  - Charts and labs reviewed   - Cultures : corynebacteria,proteus mirabilis, e.coli, staph aureus  - Abx: Per ID:[email or web address]/ [email or web address]  -Vascular: angiogram yesterday. Flow improved  - Prevalon offloading boots applied to the bilateral lower extremities  - Dressing: Continue Santyl with DSD, QOD   -CT- No evidence of osteomyelitis.   -No surgical intervention planned from podiatry perspective.   -Will have patient follow up in wound care clinic  -Ok to d/c from podiatry perspective when medically stable  - Discussed patient with Dr. Lorna Blandon  - Will continue to follow patient while they are in-house. Thank you for the opportunity to take part in the patient's care. Please do not hesitate to call for any questions or concerns.

## 2022-10-07 NOTE — PROGRESS NOTES
Physician Progress Note      PATIENT:               Linette Nicholas  CSN #:                  [id number]  :                       1945  ADMIT DATE:       2022 10:15 AM  Chris Baumann DATE:        2022 12:01 PM  RESPONDING  PROVIDER #:        Babak Barnhart MD          QUERY TEXT:    Dr. Gertrude Lira,    Patient admitted with hyperkalemia. Noted documentation of Acute Kidney   Injury in the H&P on . In order to support the diagnosis of JUAN, please   include additional clinical indicators in your documentation. ? Or please   document if the diagnosis of JUAN has been ruled out after further study. The medical record reflects the following:  Risk Factors: Hypertension, Diabetes  Clinical Indicators: per the H&P \"JUAN on CKD\" On admission serum creatinine   2.8 with a baseline of 2.1, per the  Nephrology progress note \"CKD stage   IV, without proteinuria, baseline creatinine 2.1-2.4 mg/dL\"  Treatment: Nephrology consult, IVFs    Thank you,  Johanny Penaloza RN  Clinical Documentation Improvement  [email or web address]    Defined by Kidney Disease Improving Global Outcomes (KDIGO) clinical practice   guideline for acute kidney injury:  -Increase in SCr by greater than or equal to 0.3 mg/dl within 48 hours; or  -Increase or decrease in SCr to greater than or equal to 1.5 times baseline,   which is known or presumed to have occurred within the prior 7 days; or  -Urine volume < 0.5ml/kg/h for 6 hours. Options provided:  -- Acute kidney injury evidenced by, Please document evidence as well as a   numerical baseline creatinine, if known. -- Acute kidney injury ruled out after study  -- Other - I will add my own diagnosis  -- Disagree - Not applicable / Not valid  -- Disagree - Clinically unable to determine / Unknown  -- Refer to Clinical Documentation Reviewer    PROVIDER RESPONSE TEXT:    Acute kidney injury was ruled out, elevated serum creatinine due to CKD stage   4.     Query created by: Regina Irizarry on 10/6/2022 12:45 PM      Electronically signed by:  Cameron Tim MD 10/7/2022 11:27 AM

## 2022-10-07 NOTE — PLAN OF CARE
Problem: Chronic Conditions and Co-morbidities  Goal: Patient's chronic conditions and co-morbidity symptoms are monitored and maintained or improved  10/7/2022 1628 by Jessica Johnston RN  Outcome: Completed  10/7/2022 0949 by Jessica Johnston RN  Outcome: Progressing  Flowsheets (Taken 10/7/2022 0848)  Care Plan - Patient's Chronic Conditions and Co-Morbidity Symptoms are Monitored and Maintained or Improved: Monitor and assess patient's chronic conditions and comorbid symptoms for stability, deterioration, or improvement     Problem: Discharge Planning  Goal: Discharge to home or other facility with appropriate resources  10/7/2022 1628 by Jessica Johnston RN  Outcome: Completed  10/7/2022 0949 by Jessica Johnston RN  Outcome: Progressing  Flowsheets (Taken 10/7/2022 0848)  Discharge to home or other facility with appropriate resources: Identify barriers to discharge with patient and caregiver     Problem: Safety - Adult  Goal: Free from fall injury  10/7/2022 1628 by Jessica Johnston RN  Outcome: Completed  10/7/2022 0949 by Jessica Johnston RN  Outcome: Progressing  Flowsheets (Taken 10/7/2022 0848)  Free From Fall Injury: Instruct family/caregiver on patient safety     Problem: ABCDS Injury Assessment  Goal: Absence of physical injury  10/7/2022 1628 by Jessica Johnston RN  Outcome: Completed  10/7/2022 0949 by Jessica Johnston RN  Outcome: Progressing  Flowsheets (Taken 10/7/2022 0848)  Absence of Physical Injury: Implement safety measures based on patient assessment     Problem: Skin/Tissue Integrity  Goal: Absence of new skin breakdown  Description: 1. Monitor for areas of redness and/or skin breakdown  2. Assess vascular access sites hourly  3. Every 4-6 hours minimum:  Change oxygen saturation probe site  4. Every 4-6 hours:  If on nasal continuous positive airway pressure, respiratory therapy assess nares and determine need for appliance change or resting period.   10/7/2022 1628 by Jessica Johnston RN  Outcome: Completed  10/7/2022 0949 by Rosalina Aaron RN  Outcome: Progressing     Problem: Pain  Goal: Verbalizes/displays adequate comfort level or baseline comfort level  10/7/2022 1628 by Rosalina Aaron RN  Outcome: Completed  10/7/2022 0949 by Rosalina Aaron RN  Outcome: Progressing     Problem: Nutrition Deficit:  Goal: Optimize nutritional status  10/7/2022 1628 by Rosalina Aaron RN  Outcome: Completed  10/7/2022 0949 by Rosalina Aaron RN  Outcome: Progressing

## 2022-10-08 PROCEDURE — 2580000003 HC RX 258: Performed by: INTERNAL MEDICINE

## 2022-10-08 PROCEDURE — 6360000002 HC RX W HCPCS: Performed by: INTERNAL MEDICINE

## 2022-10-08 RX ADMIN — AMPICILLIN SODIUM AND SULBACTAM SODIUM 3000 MG: 2; 1 INJECTION, POWDER, FOR SOLUTION INTRAMUSCULAR; INTRAVENOUS at 00:21

## 2022-10-08 NOTE — PROGRESS NOTES
[facility] called stated they are running behind and won't be picking up the patient at the original planned time of 2200

## 2022-10-08 NOTE — DISCHARGE SUMMARY
Discharge Summary    Date: 10/8/2022  Patient Name: Ene Chawla    YOB: 1945     Age: 67 y.o. Admit Date: 9/29/2022  Discharge Date: 10/7/2022  Discharge Condition: [address]    Admission Diagnosis  Peripheral arterial disease ([facility] [I73.9]      Discharge Diagnosis  Active Problems:    Peripheral vascular disease of lower extremity with ulceration ([facility]    Acute kidney injury superimposed on chronic kidney disease ([facility]  Resolved Problems:    * No resolved hospital problems. [facility] Stay  Narrative of Hospital Course:  Transferred from [facility] with infected heal ulcer ,PVD for arteriogram seen by ID,podiatry and vascular treated with IV ATB wound care also had same dysphagia patient and family declined video swallowing .had arteriogram with angioplasty to right leg podiatry where not planing for debridement or surgery thus patient was transferred to SNF to continue wound care and ATB . Consultants:  IP CONSULT TO INFECTIOUS DISEASES  IP CONSULT TO DIETITIAN    Surgeries/procedures Performed:      Treatments:            Discharge Plan/Disposition:  To [facility]/Incidental Findings Requiring Follow Up:    Patient Instructions:    Diet: Regular Diet    Activity:Activity as Tolerated  For number of days (if applicable): Other Instructions:    Provider Follow-Up:   No follow-ups on file. Significant Diagnostic Studies:    Recent Labs:  Admission on 09/29/2022, Discharged on 10/08/2022  No results displayed because visit has over 200 results. ------------    Radiology last 7 days:  XR CHEST PORTABLE    Result Date: 10/1/2022  1. Mild cardiomegaly 2. Minimal basilar subsegmental atelectasis.  3.  Dilated bowel loops in the upper abdomen, consider abdominal radiograph or CT exam.        [unfilled]    Discharge Medications    Discharge Medication List as of 10/8/2022  3:22 AM    START taking these medications    !! insulin lispro (HUMALOG) 100 UNIT/ML SOLN injection vial  Inject 0-8 Units into the skin 3 times daily (with meals), Disp-1 each, R-0  Normal    !! insulin lispro (HUMALOG) 100 UNIT/ML SOLN injection vial  Inject 0-4 Units into the skin nightly, Disp-1 each, R-0  Normal    !! - Potential duplicate medications found. Please discuss with provider. Discharge Medication List as of 10/8/2022  3:22 AM        Discharge Medication List as of 10/8/2022  3:22 AM    CONTINUE these medications which have NOT CHANGED    warfarin (COUMADIN) 6 MG tablet  Take 6 mg by mouth daily  Historical Med    ipratropium-albuterol (DUONEB) 0.5-2.5 (3) MG/3ML SOLN nebulizer solution  Inhale 1 vial into the lungs every 4 hours as needed (COUGHINGAND DYSPNEA)  Historical Med    gabapentin (NEURONTIN) 300 MG capsule  Take 1 capsule by mouth nightly for 7 days. , Disp-90 capsule, R-3  DC to SNF    levothyroxine (SYNTHROID) 50 MCG tablet  Take 1 tablet by mouth in the morning., Disp-30 tablet, R-3  Normal    atorvastatin (LIPITOR) 20 MG tablet  Take 1 tablet by mouth nightly, Disp-30 tablet, R-3  DC to SNF    acetaminophen (TYLENOL) 325 MG tablet  Take 650 mg by mouth every 4 hours as needed (DISCOMFORT;PAIN/ELEV TEMP >100.4F)  Historical Med    pantoprazole (PROTONIX) 40 MG tablet  Take 40 mg by mouth daily  Historical Med    amLODIPine (NORVASC) 2.5 MG tablet  Take 1 tablet by mouth daily, Disp-90 tablet, R-1  Normal    metoprolol tartrate (LOPRESSOR) 25 MG tablet  Take 1 tablet by mouth 2 times daily, Disp-180 tablet, R-1  Normal          Discharge Medication List as of 10/8/2022  3:22 AM    STOP taking these medications    meclizine (ANTIVERT) 25 MG tablet  Comments:  Reason for Stopping:    Glucagon HCl (GLUCAGON EMERGENCY) 1 MG/ML SOLR  Comments:  Reason for Stopping:    glucose (GLUTOSE) 40 % GEL  Comments:  Reason for Stopping:    potassium chloride (KLOR-CON M) 20 MEQ extended release tablet  Comments:  Reason for Stopping:    polyethylene glycol (GLYCOLAX) 17 g packet  Comments:  Reason for Stopping:    phenazopyridine (PYRIDIUM) 200 MG tablet  Comments:  Reason for Stopping:    dextromethorphan-guaiFENesin (ROBITUSSIN-DM)  MG/5ML syrup  Comments:  Reason for Stopping:    Cholecalciferol (VITAMIN D3) 1.25 MG (33898 UT) CAPS  Comments:  Reason for Stopping:    loperamide (IMODIUM) 2 MG capsule  Comments:  Reason for Stopping:    Multiple Vitamins-Minerals (THERAPEUTIC MULTIVITAMIN-MINERALS) tablet  Comments:  Reason for Stopping:    magnesium oxide (MAG-OX) 400 (240 Mg) MG tablet  Comments:  Reason for Stopping:    ascorbic acid (VITAMIN C) 500 MG tablet  Comments:  Reason for Stopping:    potassium chloride (KLOR-CON M) 20 MEQ TBCR extended release tablet  Comments:  Reason for Stopping:    metFORMIN (GLUCOPHAGE) 1000 MG tablet  Comments:  Reason for Stopping:    lovastatin (MEVACOR) 20 MG tablet  Comments:  Reason for Stopping:    omeprazole (PRILOSEC) 20 MG delayed release capsule  Comments:  Reason for Stopping:    furosemide (LASIX) 40 MG tablet  Comments:  Reason for Stopping:    blood glucose test strips (EZ SMART BLOOD GLUCOSE TEST) strip  Comments:  Reason for Stopping:          Time Spent on Discharge:  30 minutes were spent in patient examination, evaluation, counseling as well as medication reconciliation, prescriptions for required medications, discharge plan, and follow up.     Electronically signed by Moon Stein MD on 10/8/22 at 10:46 AM EDT

## 2022-10-12 NOTE — H&P
Wai Styles is a 67 y.o. male with past medical history of hypertension, diabetes, thyroid disease, A. fib on Coumadin, paraplegia status postischemic stroke, PVD, CKD, chronic foot wounds and factor V deficiency presenting to the emergency department due to dizziness and fall. Patient was brought in by ambulance from nursing facility for evaluation. He is coming from [facility]. Patient has been there for almost a month apparently. Patient states that he is transferring from his wheelchair when he fell and hit his head. He does have a small abrasion to his upper forehead but no other obvious injuries or deformities anywhere else. Patient is denying any dizziness on initial evaluation in the emergency department. He states that his symptoms are completely resolved. He is also denying any other symptoms including fever, chills, nausea, vomiting, chest pain, shortness of breath, abdominal pain, lightheadedness, syncope, urinary symptoms, abdominal pain, constipation or diarrhea. Patient states that he has had similar symptoms in the past and has needed evaluation in the hospital.   He is awake alert and oriented x3 and following commands. found with hyperkalemia and JUAN started on treatment and admitted for management transferred to [facility] main for arteriogram /angioplasty      Past Medical History[]Expand by Default        Past Medical History:   Diagnosis Date    Atherosclerosis of native artery of right lower extremity with ulceration of heel ([address] 8/15/2022    Atrial fibrillation (HCC)      Cancer ([address]       kidney    Chronic renal failure, stage 3b ([address] 8/15/2022    Diabetes mellitus ([address]      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[address]      Femoral-popliteal atherosclerosis ([address] 9/13/2022    Hypertension      Ischemic stroke ([address] 03/06/2018    Pacemaker      Paraplegia ([address]      Sinus node dysfunction ([address]      Stroke (cerebrum) ([address] 02/27/2018    Thyroid disease      Ulcer of right heel and midfoot with fat layer exposed ([facility] 8/15/2022            Past Surgical History[]Expand by Default         Past Surgical History:   Procedure Laterality Date    CARDIAC PACEMAKER PLACEMENT   12/19/2014    COLONOSCOPY        EYE SURGERY        KNEE ARTHROSCOPY   right knee    PACEMAKER PLACEMENT        MI COLONOSCOPY FLX DX W/COLLJ SPEC WHEN PFRMD N/A 3/20/2018     COLONOSCOPY DIAGNOSTIC performed by Jay Cristobal MD at [place] [address] EGD PERCUTANEOUS PLACEMENT GASTROSTOMY TUBE N/A 3/29/2018     PEG TUBE/PT. IN 5507 B performed by Jay Cristobal MD at [place] [address] EGD PERCUTANEOUS PLACEMENT GASTROSTOMY TUBE N/A 9/12/2018     EGD PEG TUBE PLACEMENT performed by Jay Cristobal MD at [address] by Default         Family History   Problem Relation Age of Onset    Heart Disease Mother      Stroke Father              Home Medications[]Expand by Default           Prior to Admission medications    Medication Sig Start Date End Date Taking?  Authorizing Provider   meclizine (ANTIVERT) 25 MG tablet Take 25 mg by mouth three times daily     Yes Historical Provider, MD   warfarin (COUMADIN) 6 MG tablet Take 6 mg by mouth daily     Yes Historical Provider, MD   Glucagon HCl (GLUCAGON EMERGENCY) 1 MG/ML SOLR Inject 1 mg as directed as needed (HYPOGLYCEMIA)     Yes Historical Provider, MD   glucose (GLUTOSE) 40 % GEL Take 15 g by mouth as needed (HYPOGLYCEMIA)     Yes Historical Provider, MD   ipratropium-albuterol (DUONEB) 0.5-2.5 (3) MG/3ML SOLN nebulizer solution Inhale 1 vial into the lungs every 4 hours as needed (Jh Kail)     Yes Historical Provider, MD   potassium chloride (KLOR-CON M) 20 MEQ extended release tablet Take 20 mEq by mouth daily     Yes Historical Provider, MD   enoxaparin (LOVENOX) 60 MG/0.6ML Inject 60 mg into the skin daily 9/23/22 9/26/22 Yes Historical Provider, MD   polyethylene glycol (GLYCOLAX) 17 g packet Take 17 g by mouth daily as needed for Constipation     Yes Historical Provider, MD   phenazopyridine (PYRIDIUM) 200 MG tablet Take 200 mg by mouth daily     Yes Historical Provider, MD   dextromethorphan-guaiFENesin (ROBITUSSIN-DM)  MG/5ML syrup Take 5 mLs by mouth every 6 hours as needed for Cough     Yes Historical Provider, MD   Cholecalciferol (VITAMIN D3) 1.25 MG (99566 UT) CAPS Take 50,000 Units by mouth once a week **FRIDAYS**       Historical Provider, MD   loperamide (IMODIUM) 2 MG capsule Take 2 mg by mouth daily as needed for Diarrhea       Historical Provider, MD   Multiple Vitamins-Minerals (THERAPEUTIC MULTIVITAMIN-MINERALS) tablet Take 1 tablet by mouth daily       Historical Provider, MD   gabapentin (NEURONTIN) 300 MG capsule Take 1 capsule by mouth nightly for 7 days. 8/8/22 8/15/22   Lisandro Marrero MD   levothyroxine (SYNTHROID) 50 MCG tablet Take 1 tablet by mouth in the morning. 8/9/22     Lisandro Marrero MD   atorvastatin (LIPITOR) 20 MG tablet Take 1 tablet by mouth nightly 8/8/22     Lisandro Marrero MD   magnesium oxide (MAG-OX) 400 (240 Mg) MG tablet Take 1 tablet by mouth every other day 8/9/22     Lisandro Marrero MD   ascorbic acid (VITAMIN C) 500 MG tablet Take 1 tablet by mouth in the morning.  8/9/22     Lisandro Marrero MD   acetaminophen (TYLENOL) 325 MG tablet Take 650 mg by mouth every 4 hours as needed (DISCOMFORT;PAIN/ELEV TEMP >100.4F)       Historical Provider, MD   pantoprazole (PROTONIX) 40 MG tablet Take 40 mg by mouth daily       Historical Provider, MD   amLODIPine (NORVASC) 2.5 MG tablet Take 1 tablet by mouth daily 8/20/21 8/15/22   Lesley Wang DO   potassium chloride (KLOR-CON M) 20 MEQ TBCR extended release tablet Take 1 tablet by mouth daily 3/31/21 8/8/22   Lesley Wang DO   metoprolol tartrate (LOPRESSOR) 25 MG tablet Take 1 tablet by mouth 2 times daily 2/19/21     Lesley Wang DO   metFORMIN (GLUCOPHAGE) 1000 MG tablet Take 1 tablet by mouth 2 times daily 2/19/21     Lesley DO Kathleen   lovastatin (MEVACOR) 20 MG tablet Take 1 tablet by mouth nightly  Patient not taking: Reported on 20   Lesley Wang DO   omeprazole (PRILOSEC) 20 MG delayed release capsule Take 1 capsule by mouth daily 20   Lesley Wang DO   furosemide (LASIX) 40 MG tablet Take 0.5 tablets by mouth daily 20     Lesley Wang DO   blood glucose test strips (EZ SMART BLOOD GLUCOSE TEST) strip 1 each by In Vitro route 2 times daily As needed. 19   Lesley Wang DO            Allergies: Bee venom     Social History            Tobacco Use    Smoking status: Former       Types: Cigars       Quit date:        Years since quittin.    Smokeless tobacco: Never   Substance Use Topics    Alcohol use: No         Review of Systems:  Respiratory: negative for cough and hemoptysis  Cardiovascular: negative for chest pain and dyspnea  Gastrointestinal: negative for abdominal pain, diarrhea, nausea and vomiting  Genitourinary:negative for dysuria and hematuria  Derm: negative for rash and skin lesion(s)  Neurological: negative for seizures and tremors  Endocrine: negative for diabetic symptoms including polydipsia and polyuria     Physical Exam:      Vitals:     22 0636   BP: 130/66   Pulse: 65   Resp: 14   Temp: 97.3 °F (36.3 °C)   SpO2: 100%      Skin:  Warm and dry.   No rash or bruises  HEENT:  PERRLA, EOMI  Neck:  No JVD, No thyromegaly, No carotid bruit  Cardiac:  RRR, No gallop or murmur  Lungs:  CTA, Normal percussion  Abdomen: Normal bowel sounds, no HSM, non-tender,rash abdominal fold obese   Extremities:  No clubbing, edema or cyanosis     Labs:     CBC with Differential:          Lab Results   Component Value Date/Time     WBC 9.3 2022 10:44 AM     RBC 3.55 2022 10:44 AM     HGB 10.0 2022 10:44 AM     HCT 32.3 2022 10:44 AM      2022 10:44 AM     MCV 91.0 2022 10:44 AM     MCH 28.2 2022 10:44 AM     MCHC 31.0 09/21/2022 10:44 AM     RDW 14.2 09/21/2022 10:44 AM     SEGSPCT 80 01/04/2012 03:00 AM     BANDSPCT 1 11/27/2014 04:50 AM     LYMPHOPCT 11.6 09/21/2022 10:44 AM     MONOPCT 7.5 09/21/2022 10:44 AM     MYELOPCT 0.9 02/28/2018 05:21 AM     BASOPCT 0.5 09/21/2022 10:44 AM     MONOSABS 0.69 09/21/2022 10:44 AM     LYMPHSABS 1.07 09/21/2022 10:44 AM     EOSABS 0.28 09/21/2022 10:44 AM     BASOSABS 0.05 09/21/2022 10:44 AM      CMP:          Lab Results   Component Value Date/Time      09/22/2022 03:20 AM     K 5.3 09/22/2022 03:20 AM     K 6.2 09/21/2022 10:44 AM      09/22/2022 03:20 AM     CO2 19 09/22/2022 03:20 AM     BUN 53 09/22/2022 03:20 AM     CREATININE 2.5 09/22/2022 03:20 AM     GFRAA 30 09/22/2022 03:20 AM     LABGLOM 25 09/22/2022 03:20 AM     GLUCOSE 122 09/22/2022 03:20 AM     GLUCOSE 297 01/04/2012 03:00 AM     PROT 7.5 09/21/2022 10:44 AM     LABALBU 3.5 09/21/2022 10:44 AM     LABALBU 3.4 01/04/2012 03:00 AM     CALCIUM 9.3 09/22/2022 03:20 AM     BILITOT 0.3 09/21/2022 10:44 AM     ALKPHOS 97 09/21/2022 10:44 AM     AST 21 09/21/2022 10:44 AM     ALT 7 09/21/2022 10:44 AM      Imaging:Ct scan head :  1. There is no acute intracranial abnormality. Specifically, there is no   intracranial hemorrhage. 2. Atrophy and periventricular leukomalacia,   3.  Old left occipital lobe infarct      CXR : clear         Assessment and Plan:     Patient Active Problem List   Diagnosis     JUAN on CKD  Hyperkalemia due to above and supplement   HTN  Obesity  DM  Impaired mobility   Fungal dermatitis   Remote CVA   PVD   Hypothyroid   Heal ulcers

## 2022-10-15 LAB
INR BLD: 1.5
PROTHROMBIN TIME: 16.3 SEC (ref 9.3–12.4)

## 2022-10-19 ENCOUNTER — HOSPITAL ENCOUNTER (OUTPATIENT)
Dept: WOUND CARE | Age: 77
Discharge: HOME | End: 2022-10-19

## 2022-11-09 ENCOUNTER — OFFICE VISIT (OUTPATIENT)
Dept: VASCULAR SURGERY | Age: 77
End: 2022-11-09
Payer: MEDICARE

## 2022-11-09 VITALS — HEIGHT: 75 IN | BODY MASS INDEX: 33.82 KG/M2 | WEIGHT: 272 LBS

## 2022-11-09 DIAGNOSIS — I70.234 ATHEROSCLEROSIS OF NATIVE ARTERY OF RIGHT LOWER EXTREMITY WITH ULCERATION OF HEEL (HCC): Primary | ICD-10-CM

## 2022-11-09 PROCEDURE — G8484 FLU IMMUNIZE NO ADMIN: HCPCS | Performed by: SURGERY

## 2022-11-09 PROCEDURE — 1123F ACP DISCUSS/DSCN MKR DOCD: CPT | Performed by: SURGERY

## 2022-11-09 PROCEDURE — G8417 CALC BMI ABV UP PARAM F/U: HCPCS | Performed by: SURGERY

## 2022-11-09 PROCEDURE — 99213 OFFICE O/P EST LOW 20 MIN: CPT | Performed by: SURGERY

## 2022-11-09 PROCEDURE — 1036F TOBACCO NON-USER: CPT | Performed by: SURGERY

## 2022-11-09 PROCEDURE — G8427 DOCREV CUR MEDS BY ELIG CLIN: HCPCS | Performed by: SURGERY

## 2022-11-09 NOTE — PROGRESS NOTES
Vascular Surgery Outpatient Progress Note      Chief Complaint   Patient presents with    Post-Op Check     S/p angio       HISTORY OF PRESENT ILLNESS:                The patient is a 65 y.o. male who returns for follow-up evaluation of patient with a history of peripheral vascular disease status post intervention see below. Overall he is doing well. He has had debridements at the nursing home. He denies any chest pain shortness of breath or discomfort. He denies any motor or sensation loss currently. He otherwise is doing well. 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[address]     Femoral-popliteal atherosclerosis ([address] 9/13/2022    Hypertension     Ischemic stroke ([address] 03/06/2018    Pacemaker     Paraplegia ([address]     Sinus node dysfunction ([address]     Stroke (cerebrum) ([address] 02/27/2018    Thyroid disease     Ulcer of right heel and midfoot with fat layer exposed ([address] 8/15/2022     Past Surgical History:        Procedure Laterality Date    CARDIAC PACEMAKER PLACEMENT  12/19/2014    COLONOSCOPY      EYE SURGERY      KIDNEY REMOVAL Left 07/05/2022    CANCER [facility] removed    KNEE ARTHROSCOPY  right knee    PACEMAKER PLACEMENT      DC COLONOSCOPY FLX DX W/COLLJ SPEC WHEN PFRMD N/A 03/20/2018    COLONOSCOPY DIAGNOSTIC performed by Cristal Dueñas MD at [address] EGD PERCUTANEOUS PLACEMENT GASTROSTOMY TUBE N/A 03/29/2018    PEG TUBE/PT. IN 5507 B performed by Cristal Dueñas MD at [address] EGD PERCUTANEOUS PLACEMENT GASTROSTOMY TUBE N/A 09/12/2018    EGD PEG TUBE PLACEMENT performed by Cristal Dueñas MD at [address] [place] [address]     Current Medications:   Prior to Admission medications    Medication Sig Start Date End Date Taking? Authorizing Provider   insulin lispro (HUMALOG) 100 UNIT/ML SOLN injection vial Inject 0-8 Units into the skin 3 times daily (with meals) 10/7/22  Yes Meeta Martin MD   insulin lispro (HUMALOG) 100 UNIT/ML SOLN injection vial Inject 0-4 Units into the skin nightly 10/7/22  Yes Meeta Martin MD   warfarin (COUMADIN) 6 MG tablet Take 6 mg by mouth daily   Yes Historical Provider, MD   ipratropium-albuterol (DUONEB) 0.5-2.5 (3) MG/3ML SOLN nebulizer solution Inhale 1 vial into the lungs every 4 hours as needed (COUGHINGAND DYSPNEA)   Yes Historical Provider, MD   levothyroxine (SYNTHROID) 50 MCG tablet Take 1 tablet by mouth in the morning. 8/9/22  Yes Meeta Martin MD   atorvastatin (LIPITOR) 20 MG tablet Take 1 tablet by mouth nightly 8/8/22  Yes Meeta Martin MD   acetaminophen (TYLENOL) 325 MG tablet Take 650 mg by mouth every 4 hours as needed (DISCOMFORT;PAIN/ELEV TEMP >100.4F)   Yes Historical Provider, MD   pantoprazole (PROTONIX) 40 MG tablet Take 40 mg by mouth daily   Yes Historical Provider, MD   metoprolol tartrate (LOPRESSOR) 25 MG tablet Take 1 tablet by mouth 2 times daily 2/19/21  Yes Lesley Wang DO   gabapentin (NEURONTIN) 300 MG capsule Take 1 capsule by mouth nightly for 7 days. 8/8/22 8/15/22  Meeta Martin MD   amLODIPine (NORVASC) 2.5 MG tablet Take 1 tablet by mouth daily 8/20/21 8/15/22  Lesley Wang DO   potassium chloride (KLOR-CON M) 20 MEQ TBCR extended release tablet Take 1 tablet by mouth daily 3/31/21 8/8/22  Lesley Wang DO   lovastatin (MEVACOR) 20 MG tablet Take 1 tablet by mouth nightly  Patient not taking: Reported on 8/1/2022 11/20/20 8/8/22  Lesley Wang DO   blood glucose test strips (EZ SMART BLOOD GLUCOSE TEST) strip 1 each by In Vitro route 2 times daily As needed. 2/11/19 8/8/22  Lesley Buccino, DO     Allergies:  Bee venom    Social History     Socioeconomic History    Marital status:       Spouse name: Not on file    Number of children: 3 Years of education: Not on file    Highest education level: Not on file   Occupational History    Not on file   Tobacco Use    Smoking status: Former     Types: Cigars     Quit date:      Years since quittin.8    Smokeless tobacco: Never   Vaping Use    Vaping Use: Never used   Substance and Sexual Activity    Alcohol use: No    Drug use: No    Sexual activity: Not on file   Other Topics Concern    Not on file   Social History Narrative    Not on file     Social Determinants of Health     Financial Resource Strain: Not on file   Food Insecurity: Not on file   Transportation Needs: Not on file   Physical Activity: Not on file   Stress: Not on file   Social Connections: Not on file   Intimate Partner Violence: Not on file   Housing Stability: Not on file        Family History   Problem Relation Age of Onset    Heart Disease Mother     Stroke Father        REVIEW OF SYSTEMS:    Constitutional: Negative for activity change, appetite change, chills, diaphoresis, fatigue, fever and unexpected weight change. HEENT: Negative for congestion, ear discharge, ear pain, hearing loss, nosebleeds, rhinorrhea, sinus pain, sore throat, tinnitus, trouble swallowing and voice change. Eyes: Negative for photophobia, pain, discharge, redness, itching and visual disturbance. Respiratory: Negative for apnea, cough, chest tightness, shortness of breath and wheezing. Cardiovascular: Negative for chest pain, palpitations and leg swelling. Gastrointestinal: Negative for abdominal distention, abdominal pain, blood in stool, constipation, diarrhea, nausea and vomiting. Endocrine: Negative for cold intolerance, heat intolerance, polydipsia, polyphagia and polyuria. Genitourinary: Negative for decreased urine volume, difficulty urinating, dysuria, frequency, hematuria and urgency. Musculoskeletal: Negative for arthralgias, back pain, gait problem, joint swelling and neck pain.    Skin: Negative for color change, pallor, rash and wound. Allergic/Immunologic: Negative for environmental allergies, food allergies and immunocompromised state. Neurological: Negative for dizziness, syncope, facial asymmetry, speech difficulty, weakness, light-headedness, numbness and headaches. Hematological: Negative for adenopathy. Does not bruise/bleed easily. Psychiatric/Behavioral: Negative for agitation, confusion, sleep disturbance and suicidal ideas. The patient is not nervous/anxious. Vascular: See above          PHYSICAL EXAM:  There were no vitals filed for this visit. General Appearance: alert and oriented to person, place and time, well developed and well- nourished, in no acute distress  Skin: warm and dry, no rash or erythema eschars noted of the right foot. Soft. It has decreased in size. The rest the foot is pink  Head: normocephalic and atraumatic  Eyes: extraocular eye movements intact, conjunctivae normal  ENT: external ear and ear canal normal bilaterally, nose without deformity  Pulmonary/Chest: clear to auscultation bilaterally- no wheezes, rales or rhonchi, normal air movement, no respiratory distress  Cardiovascular: normal rate, regular rhythm, normal S1 and S2, no murmurs, no carotid bruits  Abdomen: soft, non-tender, non-distended, normal bowel sounds, no masses or organomegaly  Musculoskeletal: normal range of motion, no joint swelling, deformity or tenderness  Neurologic: no cranial nerve deficit, gait, coordination and speech normal  Extremities: Lateral palpable brachial radial pulses femorals are difficult to feel popliteals are difficult to feel but is secondary to the patient's eyes. He has signals present DPs and PTs bilaterally. See skin exam    RADIOLOGY: VMSA today  Operative Note        Patient: Blaine Wynn  YOB: 1945  MRN: [id number]     Date of procedure: 10/6/2022     Preoperative diagnosis: Critical limb ischemia the right lower extremity.      Postoperative diagnosis: Same Findings: The common femoral, profunda femoris and superficial femoral are widely patent. The popliteals widely patent the trifurcation is severely diseased. The anterior tibial artery on the right occludes at its proximal one third. There is some reconstitution in the middle third and then occludes again at the level of the ankle with reconstitution of the dorsalis pedis. The peroneal vessel is diseased. The tibioperoneal trunk is patent. The peroneal demonstrates proximal high-grade stenosis versus occlusive disease. The rest the peroneal is intact. The posterior tibials occluded throughout its entire length and reconstitutes via the peroneal at the level of the ankle. Surgeon: Jama Wakefield MD     Assistant: None     Anesthesia: Local lidocaine and MAC anesthesia     Estimated contrast: See nursing notes     Estimated blood loss: Approximately 15 cc     Procedure:  #1 ultrasound-guided antegrade right common femoral artery puncture  #2 right lower extremity arteriogram  #3 balloon angioplasty of the anterior tibial vessel with a 3 x 220 over-the-wire balloon  #4 balloon angioplasty of the dorsalis pedis with a 3 x 220 balloon into the anterior tibial vessel  #5 balloon angioplasty of the tibial peroneal trunk with a 3 x 220 Monorail     Description of the procedure: After risk benefits and alternatives were discussed with the patient and family. Consent was achieved. I had a long discussion with the son again at the bedside. The day the procedure is brought to the operating room placed in supine position. Is prepped and draped in the standard sterile fashion. Timeout is taken verify the person the operative site as well as the procedure. This time he had anesthesia second the fact that the patient did not tolerate the initial arteriogram.     Lidocaine was used infiltrate the skin and subcutaneous tissue over the common femoral artery.   A micropuncture needle micropuncture wire and sheath were inserted with ultrasound guidance. The wire was able to be placed into the superficial femoral vessel followed by the micropuncture sheath. We then placed a Bentson wire into the superficial femoral vessel upsized to a 4 Jaffe Severe sheath/flushed with heparinized saline solution. Images were taken of the right extremity demonstrating tibial vessel disease see above. The patient was then heparinized see nursing notes. We then used an 014 Glidewire advantage. We initially try to cross into the dorsalis pedis but were unable to do so. I did make an additional attempt after balloon angioplasty of the anterior tibial and peroneal vessel but I was unsuccessful. We then addressed the peroneal vessel. I was able to cross the high-grade stenosis and occlusion at the proximal one third. Wire confirmation was confirmed. We then delivered a 3 x 220 Monorail balloon. Problem List Items Addressed This Visit       Atherosclerosis of native artery of right lower extremity with ulceration of heel ([facility] - Primary     1 status post arteriogram secondary to a right lower extremity wound. At this point will we will order ABIs that he can follow-up in the wound care clinic where he can be evaluated by my partner Dr. Micheal Zendejas      No follow-ups on file.

## 2022-11-14 ENCOUNTER — HOSPITAL ENCOUNTER (INPATIENT)
Age: 77
LOS: 1 days | Discharge: NURSING, REHAB OR LONG-TERM CARE | DRG: 194 | End: 2022-11-17
Attending: EMERGENCY MEDICINE | Admitting: INTERNAL MEDICINE
Payer: MEDICARE

## 2022-11-14 ENCOUNTER — APPOINTMENT (OUTPATIENT)
Dept: CT IMAGING | Age: 77
DRG: 194 | End: 2022-11-14
Payer: MEDICARE

## 2022-11-14 ENCOUNTER — APPOINTMENT (OUTPATIENT)
Dept: GENERAL RADIOLOGY | Age: 77
DRG: 194 | End: 2022-11-14
Payer: MEDICARE

## 2022-11-14 DIAGNOSIS — K52.9 COLITIS: ICD-10-CM

## 2022-11-14 DIAGNOSIS — R53.1 GENERAL WEAKNESS: ICD-10-CM

## 2022-11-14 DIAGNOSIS — N18.9 CHRONIC KIDNEY DISEASE, UNSPECIFIED CKD STAGE: ICD-10-CM

## 2022-11-14 DIAGNOSIS — R77.8 TROPONIN LEVEL ELEVATED: ICD-10-CM

## 2022-11-14 DIAGNOSIS — J18.9 PNEUMONIA OF BOTH LUNGS DUE TO INFECTIOUS ORGANISM, UNSPECIFIED PART OF LUNG: Primary | ICD-10-CM

## 2022-11-14 PROBLEM — J16.8 PNEUMONIA DUE TO OTHER SPECIFIED INFECTIOUS ORGANISMS: Status: ACTIVE | Noted: 2022-11-14

## 2022-11-14 PROBLEM — I73.9 PVD (PERIPHERAL VASCULAR DISEASE) (HCC): Status: ACTIVE | Noted: 2022-11-14

## 2022-11-14 LAB
ALBUMIN SERPL-MCNC: 2.7 G/DL (ref 3.5–5.2)
ALP BLD-CCNC: 77 U/L (ref 40–129)
ALT SERPL-CCNC: <5 U/L (ref 0–40)
ANION GAP SERPL CALCULATED.3IONS-SCNC: 11 MMOL/L (ref 7–16)
AST SERPL-CCNC: 9 U/L (ref 0–39)
BACTERIA: NORMAL /HPF
BASOPHILS ABSOLUTE: 0.05 E9/L (ref 0–0.2)
BASOPHILS RELATIVE PERCENT: 0.8 % (ref 0–2)
BILIRUB SERPL-MCNC: 0.3 MG/DL (ref 0–1.2)
BILIRUBIN URINE: NEGATIVE
BLOOD, URINE: NEGATIVE
BUN BLDV-MCNC: 28 MG/DL (ref 6–23)
CALCIUM SERPL-MCNC: 8.5 MG/DL (ref 8.6–10.2)
CHLORIDE BLD-SCNC: 103 MMOL/L (ref 98–107)
CLARITY: CLEAR
CO2: 25 MMOL/L (ref 22–29)
COLOR: YELLOW
CREAT SERPL-MCNC: 1.9 MG/DL (ref 0.7–1.2)
EOSINOPHILS ABSOLUTE: 0.35 E9/L (ref 0.05–0.5)
EOSINOPHILS RELATIVE PERCENT: 5.9 % (ref 0–6)
GFR SERPL CREATININE-BSD FRML MDRD: 36 ML/MIN/1.73
GLUCOSE BLD-MCNC: 183 MG/DL (ref 74–99)
GLUCOSE URINE: NEGATIVE MG/DL
HCT VFR BLD CALC: 29.8 % (ref 37–54)
HCT VFR BLD CALC: 31.2 % (ref 37–54)
HEMOGLOBIN: 9.3 G/DL (ref 12.5–16.5)
HEMOGLOBIN: 9.6 G/DL (ref 12.5–16.5)
HYALINE CASTS: NORMAL /LPF (ref 0–2)
IMMATURE GRANULOCYTES #: 0.02 E9/L
IMMATURE GRANULOCYTES %: 0.3 % (ref 0–5)
INR BLD: 1.9
INR BLD: 2.1
KETONES, URINE: NEGATIVE MG/DL
LACTIC ACID: 1.1 MMOL/L (ref 0.5–2.2)
LEUKOCYTE ESTERASE, URINE: NEGATIVE
LYMPHOCYTES ABSOLUTE: 1.32 E9/L (ref 1.5–4)
LYMPHOCYTES RELATIVE PERCENT: 22.2 % (ref 20–42)
MAGNESIUM: 1.9 MG/DL (ref 1.6–2.6)
MCH RBC QN AUTO: 27.9 PG (ref 26–35)
MCH RBC QN AUTO: 28 PG (ref 26–35)
MCHC RBC AUTO-ENTMCNC: 30.8 % (ref 32–34.5)
MCHC RBC AUTO-ENTMCNC: 31.2 % (ref 32–34.5)
MCV RBC AUTO: 89.5 FL (ref 80–99.9)
MCV RBC AUTO: 91 FL (ref 80–99.9)
MONOCYTES ABSOLUTE: 0.55 E9/L (ref 0.1–0.95)
MONOCYTES RELATIVE PERCENT: 9.3 % (ref 2–12)
NEUTROPHILS ABSOLUTE: 3.65 E9/L (ref 1.8–7.3)
NEUTROPHILS RELATIVE PERCENT: 61.5 % (ref 43–80)
NITRITE, URINE: NEGATIVE
PDW BLD-RTO: 15.8 FL (ref 11.5–15)
PDW BLD-RTO: 15.9 FL (ref 11.5–15)
PH UA: 6 (ref 5–9)
PLATELET # BLD: 216 E9/L (ref 130–450)
PLATELET # BLD: 233 E9/L (ref 130–450)
PMV BLD AUTO: 10.2 FL (ref 7–12)
PMV BLD AUTO: 9.8 FL (ref 7–12)
POTASSIUM SERPL-SCNC: 3.4 MMOL/L (ref 3.5–5)
PROTEIN UA: NORMAL MG/DL
PROTHROMBIN TIME: 21.4 SEC (ref 9.3–12.4)
PROTHROMBIN TIME: 24.2 SEC (ref 9.3–12.4)
RBC # BLD: 3.33 E12/L (ref 3.8–5.8)
RBC # BLD: 3.43 E12/L (ref 3.8–5.8)
RBC UA: NORMAL /HPF (ref 0–2)
SARS-COV-2, NAAT: NOT DETECTED
SODIUM BLD-SCNC: 139 MMOL/L (ref 132–146)
SPECIFIC GRAVITY UA: 1.02 (ref 1–1.03)
TOTAL PROTEIN: 5.7 G/DL (ref 6.4–8.3)
TROPONIN, HIGH SENSITIVITY: 71 NG/L (ref 0–11)
TROPONIN, HIGH SENSITIVITY: 78 NG/L (ref 0–11)
TSH SERPL DL<=0.05 MIU/L-ACNC: 1.86 UIU/ML (ref 0.27–4.2)
UROBILINOGEN, URINE: 0.2 E.U./DL
WBC # BLD: 5 E9/L (ref 4.5–11.5)
WBC # BLD: 5.9 E9/L (ref 4.5–11.5)
WBC UA: NORMAL /HPF (ref 0–5)

## 2022-11-14 PROCEDURE — 85025 COMPLETE CBC W/AUTO DIFF WBC: CPT

## 2022-11-14 PROCEDURE — 84484 ASSAY OF TROPONIN QUANT: CPT

## 2022-11-14 PROCEDURE — 86850 RBC ANTIBODY SCREEN: CPT

## 2022-11-14 PROCEDURE — 96375 TX/PRO/DX INJ NEW DRUG ADDON: CPT

## 2022-11-14 PROCEDURE — 96366 THER/PROPH/DIAG IV INF ADDON: CPT

## 2022-11-14 PROCEDURE — 93005 ELECTROCARDIOGRAM TRACING: CPT | Performed by: EMERGENCY MEDICINE

## 2022-11-14 PROCEDURE — 86900 BLOOD TYPING SEROLOGIC ABO: CPT

## 2022-11-14 PROCEDURE — 83605 ASSAY OF LACTIC ACID: CPT

## 2022-11-14 PROCEDURE — 87040 BLOOD CULTURE FOR BACTERIA: CPT

## 2022-11-14 PROCEDURE — 84443 ASSAY THYROID STIM HORMONE: CPT

## 2022-11-14 PROCEDURE — 99285 EMERGENCY DEPT VISIT HI MDM: CPT

## 2022-11-14 PROCEDURE — 86901 BLOOD TYPING SEROLOGIC RH(D): CPT

## 2022-11-14 PROCEDURE — 51702 INSERT TEMP BLADDER CATH: CPT

## 2022-11-14 PROCEDURE — 6360000002 HC RX W HCPCS: Performed by: EMERGENCY MEDICINE

## 2022-11-14 PROCEDURE — 80053 COMPREHEN METABOLIC PANEL: CPT

## 2022-11-14 PROCEDURE — 96361 HYDRATE IV INFUSION ADD-ON: CPT

## 2022-11-14 PROCEDURE — 74176 CT ABD & PELVIS W/O CONTRAST: CPT

## 2022-11-14 PROCEDURE — 71045 X-RAY EXAM CHEST 1 VIEW: CPT

## 2022-11-14 PROCEDURE — 87635 SARS-COV-2 COVID-19 AMP PRB: CPT

## 2022-11-14 PROCEDURE — 80048 BASIC METABOLIC PNL TOTAL CA: CPT

## 2022-11-14 PROCEDURE — 70450 CT HEAD/BRAIN W/O DYE: CPT

## 2022-11-14 PROCEDURE — 85027 COMPLETE CBC AUTOMATED: CPT

## 2022-11-14 PROCEDURE — 2500000003 HC RX 250 WO HCPCS: Performed by: EMERGENCY MEDICINE

## 2022-11-14 PROCEDURE — G0378 HOSPITAL OBSERVATION PER HR: HCPCS

## 2022-11-14 PROCEDURE — 96365 THER/PROPH/DIAG IV INF INIT: CPT

## 2022-11-14 PROCEDURE — 81001 URINALYSIS AUTO W/SCOPE: CPT

## 2022-11-14 PROCEDURE — 2580000003 HC RX 258: Performed by: EMERGENCY MEDICINE

## 2022-11-14 PROCEDURE — 2580000003 HC RX 258: Performed by: NURSE PRACTITIONER

## 2022-11-14 PROCEDURE — 85610 PROTHROMBIN TIME: CPT

## 2022-11-14 PROCEDURE — 36415 COLL VENOUS BLD VENIPUNCTURE: CPT

## 2022-11-14 PROCEDURE — 83735 ASSAY OF MAGNESIUM: CPT

## 2022-11-14 RX ORDER — ARGININE/ASCORBATE SOD/VITE AC 4.5 G/9.2G
1 POWDER IN PACKET (EA) ORAL 2 TIMES DAILY
Status: ON HOLD | COMMUNITY

## 2022-11-14 RX ORDER — SODIUM CHLORIDE 0.9 % (FLUSH) 0.9 %
5-40 SYRINGE (ML) INJECTION PRN
Status: DISCONTINUED | OUTPATIENT
Start: 2022-11-14 | End: 2022-11-17 | Stop reason: HOSPADM

## 2022-11-14 RX ORDER — LOPERAMIDE HYDROCHLORIDE 2 MG/1
2 CAPSULE ORAL EVERY 8 HOURS PRN
Status: ON HOLD | COMMUNITY

## 2022-11-14 RX ORDER — BISACODYL 10 MG
10 SUPPOSITORY, RECTAL RECTAL DAILY PRN
Status: ON HOLD | COMMUNITY

## 2022-11-14 RX ORDER — 0.9 % SODIUM CHLORIDE 0.9 %
500 INTRAVENOUS SOLUTION INTRAVENOUS ONCE
Status: COMPLETED | OUTPATIENT
Start: 2022-11-14 | End: 2022-11-14

## 2022-11-14 RX ORDER — SODIUM CHLORIDE 9 MG/ML
INJECTION, SOLUTION INTRAVENOUS CONTINUOUS
Status: DISCONTINUED | OUTPATIENT
Start: 2022-11-14 | End: 2022-11-15

## 2022-11-14 RX ORDER — SODIUM CHLORIDE 9 MG/ML
INJECTION, SOLUTION INTRAVENOUS PRN
Status: DISCONTINUED | OUTPATIENT
Start: 2022-11-14 | End: 2022-11-15

## 2022-11-14 RX ORDER — SODIUM CHLORIDE 0.9 % (FLUSH) 0.9 %
5-40 SYRINGE (ML) INJECTION EVERY 12 HOURS SCHEDULED
Status: DISCONTINUED | OUTPATIENT
Start: 2022-11-14 | End: 2022-11-17 | Stop reason: HOSPADM

## 2022-11-14 RX ORDER — FERROUS SULFATE 325(65) MG
325 TABLET ORAL 2 TIMES DAILY
Status: ON HOLD | COMMUNITY

## 2022-11-14 RX ADMIN — SODIUM CHLORIDE 500 ML: 9 INJECTION, SOLUTION INTRAVENOUS at 14:00

## 2022-11-14 RX ADMIN — SODIUM CHLORIDE: 9 INJECTION, SOLUTION INTRAVENOUS at 22:40

## 2022-11-14 RX ADMIN — DOXYCYCLINE 100 MG: 100 INJECTION, POWDER, LYOPHILIZED, FOR SOLUTION INTRAVENOUS at 15:51

## 2022-11-14 RX ADMIN — Medication 10 ML: at 22:42

## 2022-11-14 RX ADMIN — WATER 2000 MG: 1 INJECTION INTRAMUSCULAR; INTRAVENOUS; SUBCUTANEOUS at 15:51

## 2022-11-14 ASSESSMENT — ENCOUNTER SYMPTOMS
PHOTOPHOBIA: 0
CONSTIPATION: 0
BACK PAIN: 0
ABDOMINAL PAIN: 0
SORE THROAT: 0
VOMITING: 0
NAUSEA: 0
SHORTNESS OF BREATH: 0
CHOKING: 0
FACIAL SWELLING: 0
EYE PAIN: 0
COUGH: 0
DIARRHEA: 0

## 2022-11-14 ASSESSMENT — PAIN SCALES - GENERAL: PAINLEVEL_OUTOF10: 0

## 2022-11-14 ASSESSMENT — PAIN - FUNCTIONAL ASSESSMENT
PAIN_FUNCTIONAL_ASSESSMENT: NONE - DENIES PAIN
PAIN_FUNCTIONAL_ASSESSMENT: NONE - DENIES PAIN

## 2022-11-14 NOTE — ED PROVIDER NOTES
[facility]  Department of Emergency Medicine     Written by: Cornel Roper MD  Patient Name: Latanya Alanis  Attending Provider: Smith Lopez DO  Admit Date: 2022 12:48 PM  MRN: [id number]                   : 1945        Chief Complaint   Patient presents with    Dizziness     Dizziness/ lightheadedness and weakness since yesterday. From [facility] for rehab after having kidney removed. Difficulty ambulating due to weakness.     - Chief complaint    69-year-old male with known history of hypertension, diabetes, [employer] pacemaker placement due to atrial fibrillation, CKD, ischemic stroke in 2018 presents to the ED with complaints of lightheadedness and generalized weakness since yesterday from [facility]. Daughter is bedside states that he just seems more fatigued and a lot weaker than usual.  Patient had nephrectomy back in July, moved to [facility] about a month ago. Patient usually ambulates well, however yesterday was noted to not able to walk at all. Patient does have a wound on his heel on the right foot, being seen by Dr. Veronica Garcia podiatry. EMS state that patient had sinus pauses on cardiac monitor, and felt lightheaded during those episodes. It spontaneously resolved, and subsequently symptoms resolved as well. Daughter who is bedside also states that the patient has had a distended abdomen for about the past month. She states all the symptoms started gradually, progressively worsening, nothing makes it better or worse, quality of symptoms is lightheadedness and weakness, with distention of the abdomen, radiation is generalized, severe according to the daughter is moderately severe, timing is constant. The patient self denies fever, chills, diaphoresis, abdominal pain, urine changes, diarrhea, constipation. He denies shortness of breath or chest pain. He is on warfarin for atrial fibrillation.          Review of Systems   Constitutional:  Positive for fatigue. Negative for appetite change, chills, diaphoresis and fever. HENT:  Negative for ear discharge, ear pain, facial swelling, sneezing and sore throat. Eyes:  Negative for photophobia and pain. Respiratory:  Negative for cough, choking and shortness of breath. Cardiovascular:  Negative for chest pain and palpitations. Gastrointestinal:  Negative for abdominal pain, constipation, diarrhea, nausea and vomiting. Genitourinary:  Negative for dysuria, enuresis, flank pain, frequency and hematuria. Musculoskeletal:  Positive for gait problem. Negative for arthralgias, back pain, joint swelling, myalgias and neck pain. Skin:  Positive for wound (Chronic right heel). Negative for pallor and rash. Neurological:  Positive for weakness and light-headedness. Negative for headaches. Physical Exam  Constitutional:       General: He is not in acute distress. Appearance: Normal appearance. He is not ill-appearing. HENT:      Head: Normocephalic and atraumatic. Nose: Nose normal. No congestion. Mouth/Throat:      Mouth: Mucous membranes are moist.      Pharynx: No posterior oropharyngeal erythema. Eyes:      General: No scleral icterus. Extraocular Movements: Extraocular movements intact. Pupils: Pupils are equal, round, and reactive to light. Cardiovascular:      Rate and Rhythm: Normal rate and regular rhythm. Pulses: Normal pulses. Heart sounds: Normal heart sounds. Pulmonary:      Effort: Pulmonary effort is normal. No respiratory distress. Breath sounds: Normal breath sounds. No stridor. No wheezing or rhonchi. Abdominal:      General: Bowel sounds are normal. There is distension. Palpations: Abdomen is soft. There is no mass. Tenderness: There is no abdominal tenderness. Musculoskeletal:         General: No swelling, tenderness or deformity. Normal range of motion. Cervical back: Normal range of motion. No rigidity or tenderness. Skin:     Capillary Refill: Capillary refill takes less than 2 seconds. Coloration: Skin is not jaundiced or pale. Findings: No erythema. Neurological:      General: No focal deficit present. Mental Status: He is alert and oriented to person, place, and time. Mental status is at baseline. Cranial Nerves: No cranial nerve deficit. Sensory: No sensory deficit. Motor: No weakness. Procedures       MDM  Number of Diagnoses or Management Options  Chronic kidney disease, unspecified CKD stage  Colitis  General weakness  Pneumonia of both lungs due to infectious organism, unspecified part of lung  Troponin level elevated  Diagnosis management comments: 74-year-old male from [address] [place] [address] presents to the ED due to the concerns for lightheadedness and weakness and fatigue since yesterday. The patient does have a pacemaker, which they are concerned is not working well which may be the cause of his lightheadedness episodes. The patient is on warfarin for atrial fibrillation. He did have a nephrectomy back in July. He does have a chronic right heel wound which is being seen by Dr. Lauren Espinoza podiatry. The patient here is hemodynamically stable, and in no acute distress. He is calm, alert, oriented. The patient's lungs are clear to auscultation, normal heart murmurs are heard. The abdomen seems distended, however no tenderness appreciated to palpation. The patient neurovascular intact, has good cranial nerve function and good pulses and cap refill bilaterally in both upper and lower extremities. The patient has good motor and sensory function throughout the body. The right heel appears to have a chronic heel wound. After interrogation of the pacemaker, he is noted to have a high right atrial threshold, however right ventricle is normal.  EKG reveals sinus rhythm with atrial pacing at a rate of 60. Labs are all negative for acute process or pathology.   Initial troponin is 78, repeat troponin will be obtained. CT head reveals no acute process only chronic infarctions. CT abdomen revealed signs of colitis. Patient chest x-ray reveals concerns of pneumonia. Patient started on Rocephin and doxycycline. Patient attempted to get orthostatic vitals, however patient felt too weak to stand up and would not able to obtain them. Spoke to admitting team, Dr. Che Nunes, patient will be admitted. Differential diagnoses include but are not limited to cardiac etiology of symptoms including UTI, COVID, pneumonia, intra-abdominal infection. Due to presence of pneumonia, this is presumed to be likely source of infection. Patient will be admitted however for further work-up. ED Course as of 22   1346 ECG  1342  A paced rhythm  HR 60  No acute ischemia [PAVEL]   1542 EC  A paced w prolong av conduction  Left axis deviation  Normal qtc no acute ischemica [PAVEL]      ED Course User Index  [PAVEL] Zuleika Jeffrey, DO       --------------------------------------------- PAST HISTORY ---------------------------------------------  Past Medical History:  has a past medical history of Atherosclerosis of native artery of right lower extremity with ulceration of heel ([address] Atrial fibrillation ([address] Cancer ([address] Chronic renal failure, stage 3b ([address] Diabetes mellitus ([address] 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[address] Femoral-popliteal atherosclerosis ([address] Hypertension, Ischemic stroke ([address] Pacemaker, Paraplegia ([address] Sinus node dysfunction ([address] Stroke (cerebrum) ([address] Thyroid disease, and Ulcer of right heel and midfoot with fat layer exposed ([address] Past Surgical History:  has a past surgical history that includes eye surgery; Knee arthroscopy (right knee); pr colonoscopy flx dx w/collj spec when pfrmd (N/A, 2018);  Colonoscopy; pr egd percutaneous placement gastrostomy tube (N/A, 2018); pacemaker placement; pr egd percutaneous placement gastrostomy tube (N/A, 09/12/2018); Cardiac pacemaker placement (12/19/2014); and Kidney removal (Left, 07/05/2022). Social History:  reports that he quit smoking about 20 years ago. His smoking use included cigars. He has never used smokeless tobacco. He reports that he does not drink alcohol and does not use drugs. Family History: family history includes Heart Disease in his mother; Stroke in his father. The patients home medications have been reviewed.     Allergies: Bee venom    -------------------------------------------------- RESULTS -------------------------------------------------    LABS:  Results for orders placed or performed during the hospital encounter of 11/14/22   COVID-19, Rapid    Specimen: Nasopharyngeal Swab   Result Value Ref Range    SARS-CoV-2, NAAT Not Detected Not Detected   CBC with Auto Differential   Result Value Ref Range    WBC 5.9 4.5 - 11.5 E9/L    RBC 3.43 (L) 3.80 - 5.80 E12/L    Hemoglobin 9.6 (L) 12.5 - 16.5 g/dL    Hematocrit 31.2 (L) 37.0 - 54.0 %    MCV 91.0 80.0 - 99.9 fL    MCH 28.0 26.0 - 35.0 pg    MCHC 30.8 (L) 32.0 - 34.5 %    RDW 15.8 (H) 11.5 - 15.0 fL    Platelets 399 168 - 948 E9/L    MPV 10.2 7.0 - 12.0 fL    Neutrophils % 61.5 43.0 - 80.0 %    Immature Granulocytes % 0.3 0.0 - 5.0 %    Lymphocytes % 22.2 20.0 - 42.0 %    Monocytes % 9.3 2.0 - 12.0 %    Eosinophils % 5.9 0.0 - 6.0 %    Basophils % 0.8 0.0 - 2.0 %    Neutrophils Absolute 3.65 1.80 - 7.30 E9/L    Immature Granulocytes # 0.02 E9/L    Lymphocytes Absolute 1.32 (L) 1.50 - 4.00 E9/L    Monocytes Absolute 0.55 0.10 - 0.95 E9/L    Eosinophils Absolute 0.35 0.05 - 0.50 E9/L    Basophils Absolute 0.05 0.00 - 0.20 E9/L   Lactic Acid   Result Value Ref Range    Lactic Acid 1.1 0.5 - 2.2 mmol/L   Magnesium   Result Value Ref Range    Magnesium 1.9 1.6 - 2.6 mg/dL   CMP   Result Value Ref Range    Sodium 139 132 - 146 mmol/L    Potassium 3.4 (L) 3.5 - 5.0 mmol/L    Chloride 103 98 - 107 mmol/L    CO2 25 22 - 29 mmol/L Anion Gap 11 7 - 16 mmol/L    Glucose 183 (H) 74 - 99 mg/dL    BUN 28 (H) 6 - 23 mg/dL    Creatinine 1.9 (H) 0.7 - 1.2 mg/dL    Est, Glom Filt Rate 36 >=60 mL/min/1.73    Calcium 8.5 (L) 8.6 - 10.2 mg/dL    Total Protein 5.7 (L) 6.4 - 8.3 g/dL    Albumin 2.7 (L) 3.5 - 5.2 g/dL    Total Bilirubin 0.3 0.0 - 1.2 mg/dL    Alkaline Phosphatase 77 40 - 129 U/L    ALT <5 0 - 40 U/L    AST 9 0 - 39 U/L   Urinalysis with Microscopic   Result Value Ref Range    Color, UA Yellow Straw/Yellow    Clarity, UA Clear Clear    Glucose, Ur Negative Negative mg/dL    Bilirubin Urine Negative Negative    Ketones, Urine Negative Negative mg/dL    Specific Gravity, UA 1.020 1.005 - 1.030    Blood, Urine Negative Negative    pH, UA 6.0 5.0 - 9.0    Protein, UA TRACE Negative mg/dL    Urobilinogen, Urine 0.2 <2.0 E.U./dL    Nitrite, Urine Negative Negative    Leukocyte Esterase, Urine Negative Negative    Hyaline Casts, UA 0-2 0 - 2 /LPF    WBC, UA NONE 0 - 5 /HPF    RBC, UA NONE 0 - 2 /HPF    Bacteria, UA NONE SEEN None Seen /HPF   Troponin   Result Value Ref Range    Troponin, High Sensitivity 78 (H) 0 - 11 ng/L   Protime-INR   Result Value Ref Range    Protime 24.2 (H) 9.3 - 12.4 sec    INR 2.1    EKG 12 Lead   Result Value Ref Range    Ventricular Rate 60 BPM    Atrial Rate 250 BPM    P-R Interval 342 ms    QRS Duration 96 ms    Q-T Interval 432 ms    QTc Calculation (Bazett) 432 ms    R Axis -29 degrees    T Axis -11 degrees       RADIOLOGY:  CT Head W/O Contrast   Final Result   1. No acute intracranial abnormality. 2. Chronic infarction in the left temporal occipital region. 3. Chronic lacunar infarctions in the right basal ganglia and the left   thalamus. CT ABDOMEN PELVIS WO CONTRAST Additional Contrast? None   Final Result   1. Gas filled distended sigmoid colon as well as gas-filled distended mid and   distal transverse colon. Mild thickened appearance of wall of distal sigmoid   colon and rectum.   Findings could suggest infectious or inflammatory   colitis/proctitis. 2. No free air or free fluid. 3. Opacities are present in lung bases notable on the right which could   suggest bibasilar pneumonia. XR CHEST PORTABLE   Final Result   1. Small patchy infiltrate seen within the right lung base. Medications   0.9 % sodium chloride bolus (0 mLs IntraVENous Stopped 11/14/22 1430)   doxycycline (VIBRAMYCIN) 100 mg in dextrose 5 % 100 mL IVPB (100 mg IntraVENous New Bag 11/14/22 1551)   cefTRIAXone (ROCEPHIN) 2,000 mg in sterile water 20 mL IV syringe (2,000 mg IntraVENous Given 11/14/22 1551)       EKG: This EKG is signed and interpreted by me. Rate: 60  Rhythm: Atrial paced, left axis deviation, no acute ST changes  Interpretation: no acute changes  Comparison: stable as compared to patient's most recent EKG      ------------------------- NURSING NOTES AND VITALS REVIEWED ---------------------------  Date / Time Roomed:  11/14/2022 12:48 PM  ED Bed Assignment:  18/18    The nursing notes within the ED encounter and vital signs as below have been reviewed. Patient Vitals for the past 24 hrs:   BP Temp Temp src Pulse Resp SpO2   11/14/22 1816 134/76 98.5 °F (36.9 °C) -- 59 18 96 %   11/14/22 1257 132/61 98.4 °F (36.9 °C) Oral 65 18 94 %       Oxygen Saturation Interpretation: Normal    ------------------------------------------ PROGRESS NOTES ------------------------------------------  Re-evaluation(s):  Time: Every 30 minutes. Patients symptoms show no change  Repeat physical examination is not changed    Counseling:  I have spoken with the patient and discussed todays results, in addition to providing specific details for the plan of care and counseling regarding the diagnosis and prognosis.   Their questions are answered at this time and they are agreeable with the plan of admission.    --------------------------------- ADDITIONAL PROVIDER NOTES ---------------------------------  Consultations:  Spoke with Dr. Blanca Martinez. Discussed case. They will admit the patient. This patient's ED course included: a personal history and physicial examination, re-evaluation prior to disposition, multiple bedside re-evaluations, IV medications, cardiac monitoring, and continuous pulse oximetry    This patient has remained hemodynamically stable during their ED course. Diagnosis:  1. Pneumonia of both lungs due to infectious organism, unspecified part of lung    2. General weakness    3. Colitis    4. Troponin level elevated    5. Chronic kidney disease, unspecified CKD stage        Disposition:  Patient's disposition: Admit to med/surg floor  Patient's condition is stable. Patient was seen and evaluated by myself and my attending Sergio Payton DO. Assessment and Plan discussed with attending provider, please see attestation for final plan of care.      MD Mando Comer MD  Resident  11/14/22 5301

## 2022-11-14 NOTE — ED TRIAGE NOTES
Dizziness/ lightheadedness and generalized weakness since yesterday. From [facility] for rehab after having kidney removed. Difficulty ambulating due to weakness. Diarrhea x a few days. Seen by woundcare for multiple wounds. Megan Aguilar was seen and treated in our emergency department on 12/5/2021.  He may return to work on 12/16/2021.       If you have any questions or concerns, please don't hesitate to call.      Marybeth Gilbert MD

## 2022-11-15 PROBLEM — J18.9 PNEUMONIA OF BOTH LUNGS DUE TO INFECTIOUS ORGANISM: Status: ACTIVE | Noted: 2022-11-14

## 2022-11-15 LAB
ABO/RH: NORMAL
ANION GAP SERPL CALCULATED.3IONS-SCNC: 9 MMOL/L (ref 7–16)
ANTIBODY SCREEN: NORMAL
BUN BLDV-MCNC: 27 MG/DL (ref 6–23)
C DIFF TOXIN/ANTIGEN: NORMAL
CALCIUM SERPL-MCNC: 8.3 MG/DL (ref 8.6–10.2)
CHLORIDE BLD-SCNC: 103 MMOL/L (ref 98–107)
CO2: 25 MMOL/L (ref 22–29)
CREAT SERPL-MCNC: 2.2 MG/DL (ref 0.7–1.2)
EKG ATRIAL RATE: 60 BPM
EKG P AXIS: 52 DEGREES
EKG P-R INTERVAL: 346 MS
EKG Q-T INTERVAL: 414 MS
EKG QRS DURATION: 90 MS
EKG QTC CALCULATION (BAZETT): 414 MS
EKG R AXIS: -35 DEGREES
EKG T AXIS: -22 DEGREES
EKG VENTRICULAR RATE: 60 BPM
GFR SERPL CREATININE-BSD FRML MDRD: 30 ML/MIN/1.73
GLUCOSE BLD-MCNC: 156 MG/DL (ref 74–99)
MAGNESIUM: 1.8 MG/DL (ref 1.6–2.6)
METER GLUCOSE: 134 MG/DL (ref 74–99)
POTASSIUM REFLEX MAGNESIUM: 3.1 MMOL/L (ref 3.5–5)
SODIUM BLD-SCNC: 137 MMOL/L (ref 132–146)

## 2022-11-15 PROCEDURE — 99204 OFFICE O/P NEW MOD 45 MIN: CPT | Performed by: INTERNAL MEDICINE

## 2022-11-15 PROCEDURE — 2580000003 HC RX 258: Performed by: INTERNAL MEDICINE

## 2022-11-15 PROCEDURE — G0378 HOSPITAL OBSERVATION PER HR: HCPCS

## 2022-11-15 PROCEDURE — 87449 NOS EACH ORGANISM AG IA: CPT

## 2022-11-15 PROCEDURE — 97530 THERAPEUTIC ACTIVITIES: CPT

## 2022-11-15 PROCEDURE — 99222 1ST HOSP IP/OBS MODERATE 55: CPT | Performed by: INTERNAL MEDICINE

## 2022-11-15 PROCEDURE — 97166 OT EVAL MOD COMPLEX 45 MIN: CPT

## 2022-11-15 PROCEDURE — 96367 TX/PROPH/DG ADDL SEQ IV INF: CPT

## 2022-11-15 PROCEDURE — 97161 PT EVAL LOW COMPLEX 20 MIN: CPT

## 2022-11-15 PROCEDURE — 96375 TX/PRO/DX INJ NEW DRUG ADDON: CPT

## 2022-11-15 PROCEDURE — 82962 GLUCOSE BLOOD TEST: CPT

## 2022-11-15 PROCEDURE — 87324 CLOSTRIDIUM AG IA: CPT

## 2022-11-15 PROCEDURE — 96361 HYDRATE IV INFUSION ADD-ON: CPT

## 2022-11-15 PROCEDURE — 6370000000 HC RX 637 (ALT 250 FOR IP): Performed by: INTERNAL MEDICINE

## 2022-11-15 PROCEDURE — 2580000003 HC RX 258: Performed by: NURSE PRACTITIONER

## 2022-11-15 PROCEDURE — 6360000002 HC RX W HCPCS: Performed by: INTERNAL MEDICINE

## 2022-11-15 PROCEDURE — 93010 ELECTROCARDIOGRAM REPORT: CPT | Performed by: INTERNAL MEDICINE

## 2022-11-15 PROCEDURE — 96366 THER/PROPH/DIAG IV INF ADDON: CPT

## 2022-11-15 RX ORDER — WARFARIN SODIUM 4 MG/1
8 TABLET ORAL DAILY
Status: DISCONTINUED | OUTPATIENT
Start: 2022-11-15 | End: 2022-11-17 | Stop reason: HOSPADM

## 2022-11-15 RX ORDER — LOPERAMIDE HYDROCHLORIDE 2 MG/1
2 CAPSULE ORAL EVERY 6 HOURS PRN
Status: DISCONTINUED | OUTPATIENT
Start: 2022-11-15 | End: 2022-11-17 | Stop reason: HOSPADM

## 2022-11-15 RX ORDER — FERROUS SULFATE 325(65) MG
325 TABLET ORAL 2 TIMES DAILY
Status: DISCONTINUED | OUTPATIENT
Start: 2022-11-15 | End: 2022-11-17 | Stop reason: HOSPADM

## 2022-11-15 RX ORDER — ACETAMINOPHEN 325 MG/1
650 TABLET ORAL EVERY 4 HOURS PRN
Status: DISCONTINUED | OUTPATIENT
Start: 2022-11-15 | End: 2022-11-17 | Stop reason: HOSPADM

## 2022-11-15 RX ORDER — GABAPENTIN 300 MG/1
300 CAPSULE ORAL NIGHTLY
Status: DISCONTINUED | OUTPATIENT
Start: 2022-11-15 | End: 2022-11-17 | Stop reason: HOSPADM

## 2022-11-15 RX ORDER — BISACODYL 10 MG
10 SUPPOSITORY, RECTAL RECTAL DAILY PRN
Status: DISCONTINUED | OUTPATIENT
Start: 2022-11-15 | End: 2022-11-17 | Stop reason: HOSPADM

## 2022-11-15 RX ORDER — ATORVASTATIN CALCIUM 20 MG/1
20 TABLET, FILM COATED ORAL NIGHTLY
Status: DISCONTINUED | OUTPATIENT
Start: 2022-11-15 | End: 2022-11-17 | Stop reason: HOSPADM

## 2022-11-15 RX ORDER — AMLODIPINE BESYLATE 2.5 MG/1
2.5 TABLET ORAL DAILY
Status: DISCONTINUED | OUTPATIENT
Start: 2022-11-15 | End: 2022-11-17 | Stop reason: HOSPADM

## 2022-11-15 RX ORDER — POTASSIUM CHLORIDE AND SODIUM CHLORIDE 900; 300 MG/100ML; MG/100ML
INJECTION, SOLUTION INTRAVENOUS CONTINUOUS
Status: DISCONTINUED | OUTPATIENT
Start: 2022-11-15 | End: 2022-11-17 | Stop reason: HOSPADM

## 2022-11-15 RX ORDER — LEVOTHYROXINE SODIUM 0.05 MG/1
50 TABLET ORAL DAILY
Status: DISCONTINUED | OUTPATIENT
Start: 2022-11-15 | End: 2022-11-17 | Stop reason: HOSPADM

## 2022-11-15 RX ORDER — PANTOPRAZOLE SODIUM 40 MG/1
40 TABLET, DELAYED RELEASE ORAL DAILY
Status: DISCONTINUED | OUTPATIENT
Start: 2022-11-15 | End: 2022-11-17 | Stop reason: HOSPADM

## 2022-11-15 RX ADMIN — CEFEPIME 2000 MG: 2 INJECTION, POWDER, FOR SOLUTION INTRAVENOUS at 10:45

## 2022-11-15 RX ADMIN — METOPROLOL TARTRATE 25 MG: 25 TABLET, FILM COATED ORAL at 21:18

## 2022-11-15 RX ADMIN — POTASSIUM CHLORIDE AND SODIUM CHLORIDE: 900; 300 INJECTION, SOLUTION INTRAVENOUS at 10:50

## 2022-11-15 RX ADMIN — PANTOPRAZOLE SODIUM 40 MG: 40 TABLET, DELAYED RELEASE ORAL at 10:41

## 2022-11-15 RX ADMIN — ATORVASTATIN CALCIUM 20 MG: 20 TABLET, FILM COATED ORAL at 21:18

## 2022-11-15 RX ADMIN — FERROUS SULFATE TAB 325 MG (65 MG ELEMENTAL FE) 325 MG: 325 (65 FE) TAB at 21:18

## 2022-11-15 RX ADMIN — CEFEPIME 2000 MG: 2 INJECTION, POWDER, FOR SOLUTION INTRAVENOUS at 18:07

## 2022-11-15 RX ADMIN — AMLODIPINE BESYLATE 2.5 MG: 2.5 TABLET ORAL at 10:41

## 2022-11-15 RX ADMIN — LEVOTHYROXINE SODIUM 50 MCG: 50 TABLET ORAL at 10:41

## 2022-11-15 RX ADMIN — WARFARIN SODIUM 8 MG: 4 TABLET ORAL at 18:08

## 2022-11-15 RX ADMIN — FERROUS SULFATE TAB 325 MG (65 MG ELEMENTAL FE) 325 MG: 325 (65 FE) TAB at 10:41

## 2022-11-15 RX ADMIN — METOPROLOL TARTRATE 25 MG: 25 TABLET, FILM COATED ORAL at 10:41

## 2022-11-15 RX ADMIN — Medication 10 ML: at 21:19

## 2022-11-15 RX ADMIN — GABAPENTIN 300 MG: 300 CAPSULE ORAL at 21:18

## 2022-11-15 ASSESSMENT — PAIN SCALES - GENERAL: PAINLEVEL_OUTOF10: 0

## 2022-11-15 NOTE — PROGRESS NOTES
OCCUPATIONAL THERAPY INITIAL EVALUATION     [address] [facility], [place]       CEWV:                                                  Patient Name: Melanie Frazier  MRN: [id number]  : 1945  Room: [address] [place] [address]    Evaluating OT: Herberth Corrales, [address] [place], OTR/L [id number]  Referring Cali Elizabeth MD  Specific Provider Orders: OT eval and treat 11/15  Recommended Adaptive Equipment:  TBD     Diagnosis: weakness   Surgery: none   Pertinent Medical History: CA, DM, CVA, thyroid disease, a-fib, pacemaker, PVD   Precautions:  Fall Risk, NWB RLE (per previous chart review, until clarified by podiatry), contact iso    Assessment of current deficits   [x] Functional mobility  [x]ADLs  [x] Strength               [x]Cognition   [x] Functional transfers   [x] IADLs         [x] Safety Awareness   [x]Endurance   [] Fine Coordination              [x] Balance      [] Vision/perception   [x]Sensation    []Gross Motor Coordination  [] ROM  [] Delirium                   [] Motor Control     OT PLAN OF CARE   OT POC based on physician orders, patient diagnosis and results of clinical assessment    Frequency/Duration: 2-3 days/wk for 2 weeks PRN   Specific OT Treatment to include:   * Instruction/training on adapted ADL techniques and AE recommendations to increase functional independence within precautions       * Training on energy conservation strategies, correct breathing pattern and techniques to improve independence/tolerance for self-care routine  * Functional transfer/mobility training/DME recommendations for increased independence, safety, and fall prevention  * Patient/Family education to increase follow through with safety techniques and functional independence  * Recommendation of environmental modifications for increased safety with functional transfers/mobility and ADLs  * Therapeutic exercise to improve motor endurance, ROM, and functional strength for ADLs/functional transfers  * Therapeutic activities to facilitate/challenge dynamic balance, stand tolerance for increased safety and independence with ADLs  * Neuro-muscular re-education: facilitation of righting/equilibrium reactions, midline orientation, scapular stability/mobility, normalization of muscle tone, and facilitation of volitional active controled movement    Home Living: Pt presents from [facility]. Pt required assist with ADLs and used a w/c and ww for transfers. Pain Level: 0/10  Cognition: A&O: 3/4; Follows 1 step directions, with repetition and increased time   Memory:  fair    Sequencing:  fair    Problem solving:  fair    Judgement/safety:  fair     Functional Assessment:  AM-PAC Daily Activity Raw Score: 12/24   Initial Eval Status  Date: 11/15/22 Treatment Status  Date: STGs=LTGs  Time Frame: 10-14 days   Feeding Min A (positioning in bed)   SBA   Grooming Min A (seated EOB)   SBA   UB Dressing Max A (due to limited ROM)   Min A   LB Dressing Dep (assist with socks)  Max A    Bathing Max A (simulated)  Mod A    Toileting dep  Max A   Bed Mobility  Log roll: Max A  Supine to sit: Max A   Sit to supine: Mod A   Log roll Min A  Supine to sit: Min A   Sit to supine: Min A   Functional Transfers Sit to stand: Mod A   Stand to sit:Mod A  Commode: NT  Min A   Functional Mobility Nt (difficulty complying with NWB RLE)  Mod A   Balance Sitting: Min A (progressed to SBA)  Standing: Mod A     Activity Tolerance fair     Visual/  Perceptual Glasses: no                UE ROM: BUE: elbow flex WFL, shoulder flex grossly 100'  Strength: RUE: grossly 3/5 LUE: grossly 3/5   Strength: B WFL  Fine Motor Coordination:  WFL     Hearing: WFL  Sensation:  No c/o numbness/tingling   Tone:  WFL  Edema: BLEs                            Comments:Cleared by RN to see pt. Upon arrival, patient supine in bed and agreeable to OT session.  At end of session, patient supine in bed with call light and phone within reach, all lines and tubes intact. Pt would benefit from continued OT to increase functional independence and quality of life. Treatment: Pt required vc's and physical assist for proper technique/safety with hand placement/body mechanics/posture for bed mobility/ADLs/functional tranfers/ww management. Pt required vc's for sequencing/initiation of ADLs/functional transfers. Pt able to  sit EOB ~8 mins to increase core strength/balance/activity tolerance for ease with ADLs. Pt educated on NWB status until clarified. Pt demo'ing decreased compliance with NWB RLE. Pt required rest breaks during session. Pt appeared to have tolerated session fairly and appears cooperative/pleasant . Pt instructed on use of call light for assistance and fall prevention. Pt demo'ing fair understanding of education provided. Continue to educate. Eval Complexity: moderate  History: Expanded chart review of medical records and additional review of physical, cognitive, or psychosocial history related to current functional performance  Exam: 3+ performance deficits  Assistance/Modification: mod/max assistance or modifications required to perform tasks. May have comorbidities that affect occupational performance. Rehab Potential: Good for established goals, pt. assisted in establishment of goals. LTG: maximize independence with ADLs to return to PLOF    Patient  instructed on diagnosis, prognosis/goals and plan of care. Demonstrated fair understanding. [] Malnutrition indicators have been identified and nursing has been notified to ensure a dietitian consult is ordered. Evaluation time includes thorough review of current medical information, gathering information on past medical & social history & PLOF, completion of standardized testing, informal observation of tasks, consultation with other medical professions/disciplines, assessment of data & development of POC/goals.      Time In: 0815       Time Out: 9728 Total treatment time: 0       Treatment Charges: Mins Units   OT Eval Low 64919     OT Eval Medium 74002 X    OT Eval High [id number]     OT Re-Eval [id number]     Ther Ex  59132       Manual Therapy 46025       Thera Activities 91163       ADL/Home Mgt 01029     Neuro Re-ed 03651       Group Therapy        Orthotic manage/training  61194       Non-Billable Time           Liya Stephani, OTR/L [id number]

## 2022-11-15 NOTE — CARE COORDINATION
Social Work:    Social service reviewed chart notes. Mr. Emily Hutchinson was admitted from [facility] snf due to dizziness & weakness. He has been in rehab after having a kidney removed due to difficulty ambulating. Social work attempted to meet with Mr. Emily Hutchinson but he had aide assistance at the time. Social service placed a call to [facility] who advised social work that his father was skilled under insurance & more recently paying privately for snf, however, Manny Arenas advises that their goal is have Mr. Emily Hutchinson return to his home. Severiano Alan expressed a concern about his father frequent diarrhea and is hoping that GI can be consulted to address the ongoing problem. Social work placed Manny Arenas on speaker phone and Severiano Alan updated charge RN Gurjit Mg &  Johann Woods about his concerns. Gurjit Mg to check with Dr. Blanca Martinez. Neeru at [address] confirmed bedhold and snf return. Social work to continue to follow.  (N-17, ambulance/lette completed)    Electronically signed by ROHIT Pryor on 11/15/2022 at 2:53 PM

## 2022-11-15 NOTE — DISCHARGE INSTR - COC
Continuity of Care Form    Patient Name: Wai Styles   :  1945  MRN:  [id number]    Admit date:  2022  Discharge date:  22    Code Status Order: Full Code   Advance Directives:     Admitting Physician:  Heike Tristan MD  PCP: Mattie Marina DO    Discharging Nurse: Elliot Parra Dr Unit/Room#: 2614/0051-A  Discharging Unit Phone Number: [phone]    Emergency Contact:   Extended Emergency Contact Information  Primary Emergency Contact: Celso Coombs  Address: [address] Phone: [phone]  Mobile Phone: [phone]  Relation: Child  Secondary Emergency Contact: Annie Grissom  Address: [address] [place] [address]           Montez Guardado [address] Phone: [phone]  Mobile Phone: [phone]  Relation: Child    Past Surgical History:  Past Surgical History:   Procedure Laterality Date    CARDIAC PACEMAKER PLACEMENT  2014    COLONOSCOPY      EYE SURGERY      KIDNEY REMOVAL Left 2022    CANCER [facility] removed    KNEE ARTHROSCOPY  right knee    PACEMAKER PLACEMENT      OR COLONOSCOPY FLX DX W/COLLJ SPEC WHEN PFRMD N/A 2018    COLONOSCOPY DIAGNOSTIC performed by Quang Campoverde MD at [address] EGD PERCUTANEOUS PLACEMENT GASTROSTOMY TUBE N/A 2018    PEG TUBE/PT.  IN 5507 B performed by Quang Campoverde MD at [address] EGD PERCUTANEOUS PLACEMENT GASTROSTOMY TUBE N/A 2018    EGD PEG TUBE PLACEMENT performed by Quang Campoverde MD at [address]       Immunization History:   Immunization History   Administered Date(s) Administered    COVID-19, PFIZER PURPLE top, DILUTE for use, (age 15 y+), 30mcg/0.3mL 2021, 2021, 10/04/2021    Influenza Virus Vaccine 2012, 2014, 10/22/2021    Influenza, FLUZONE (age 72 y+), High Dose, 0.7mL 10/13/2020, 10/22/2021, 10/22/2021, 10/19/2022    Influenza, High Dose (Fluzone 65 yrs and 8/24/22  Wound foot 8/1/22    Resolved    COVID-19 (Rule Out) 11/14/22 11/14/22 11/14/22 COVID-19, Rapid (Ordered)   11/14/22 Rule-Out Test Resulted    C-diff Rule Out 11/14/22 11/14/22 11/15/22 CLOSTRIDIUM DIFFICILE EIA (Ordered)   11/15/22 Rule-Out Test Resulted            Nurse Assessment:  Last Vital Signs: BP (!) 166/76   Pulse 58   Temp 98.5 °F (36.9 °C) (Oral)   Resp 16   Ht 6' 3\" (1.905 m)   Wt 292 lb (132.5 kg)   SpO2 94%   BMI 36.50 kg/m²     Last documented pain score (0-10 scale): Pain Level: 0  Last Weight:   Wt Readings from Last 1 Encounters:   11/14/22 292 lb (132.5 kg)     Mental Status:  oriented and alert    IV Access:  - None    Nursing Mobility/ADLs:  Walking   Assisted  Transfer  Assisted  Bathing  Assisted  Dressing  Assisted  Toileting  Assisted  Feeding  Independent  Med Admin  Independent  Med Delivery   whole    Wound Care Documentation and Therapy:  Wound 08/15/22 Heel Right #1 (Active)   Dressing Status New dressing applied 11/15/22 0915   Wound Cleansed Cleansed with saline 11/15/22 0915   Dressing/Treatment Roll gauze 11/15/22 0915   Wound Length (cm) 5 cm 11/15/22 0356   Wound Width (cm) 6.5 cm 11/15/22 0356   Wound Surface Area (cm^2) 32.5 cm^2 11/15/22 0356   Change in Wound Size % (l*w) 16.88 11/15/22 0356   Wound Assessment Eschar dry 11/15/22 0915   Drainage Amount Scant 11/15/22 0915   Drainage Description Thin;Serous 11/15/22 0915   Odor Mild 11/15/22 0915   Tory-wound Assessment Non-blanchable erythema 11/15/22 0915   Number of days: 92       Wound 09/21/22 Perineum scrotum (Active)   Number of days: 55       Wound 10/03/22 Scrotum Distal (Active)   Number of days: 43       Wound 11/14/22 Sacrum (Active)   Wound Cleansed Irrigated with saline 11/15/22 0915   Wound Length (cm) 0.3 cm 11/15/22 0355   Wound Width (cm) 0.4 cm 11/15/22 0355   Wound Surface Area (cm^2) 0.12 cm^2 11/15/22 0355   Wound Assessment Erythema;Non-blanchable erythema;Pink/red 11/15/22 4541 Drainage Amount None 11/15/22 0915   Odor None 11/15/22 0915   Margins Unattached edges 11/15/22 0915   Number of days: 0        Elimination:  Continence: Bowel: Yes  Bladder: Yes  Urinary Catheter: None   Colostomy/Ileostomy/Ileal Conduit: No       Date of Last BM: 11/17/22    Intake/Output Summary (Last 24 hours) at 11/15/2022 1456  Last data filed at 11/15/2022 0356  Gross per 24 hour   Intake --   Output 1000 ml   Net -1000 ml     I/O last 3 completed shifts:  In: -   Out: 1000 [Urine:1000]    Safety Concerns: At Risk for Falls    Impairments/Disabilities:      None    Nutrition Therapy:  Current Nutrition Therapy:   - Oral Diet:  General    Routes of Feeding: Oral  Liquids: Thin Liquids  Daily Fluid Restriction: no  Last Modified Barium Swallow with Video (Video Swallowing Test): not done    Treatments at the Time of Hospital Discharge:   Respiratory Treatments: ***  Oxygen Therapy:  is not on home oxygen therapy.   Ventilator:    - No ventilator support    Rehab Therapies: Physical Therapy and Occupational Therapy  Weight Bearing Status/Restrictions: No weight bearing restrictions  Other Medical Equipment (for information only, NOT a DME order):  {EQUIPMENT:906617309}  Other Treatments: ***    Patient's personal belongings (please select all that are sent with patient):  {[facility] DME Belongings:416024803}    RN SIGNATURE:  Electronically signed by Corie Montoya RN on 11/17/22 at 2:06 PM EST    CASE MANAGEMENT/SOCIAL WORK SECTION    Inpatient Status Date: ***    Readmission Risk Assessment Score:  Readmission Risk              Risk of Unplanned Readmission:  0           Discharging to Facility/ Agency   Name: [facility]  Address:  Phone: [phone]  Fax:[phone]    Dialysis Facility (if applicable)   Name:  Address:  Dialysis Schedule:  Phone:  Fax:    / signature: Electronically signed by ROHIT Youssef on 11/15/2022 at 2:56 PM    PHYSICIAN SECTION    Prognosis: {Prognosis:5435235271}    Condition at Discharge: [address] Patient Condition:763909745}    Rehab Potential (if transferring to Rehab): {Prognosis:0011097867}    Recommended Labs or Other Treatments After Discharge: ***    Physician Certification: I certify the above information and transfer of Sammye Kanner  is necessary for the continuing treatment of the diagnosis listed and that he requires [facility] for less 30 days.      Update Admission H&P: {[facility] DME Changes in [facility]:458553726}    PHYSICIAN SIGNATURE:  Júnior Vázquez MD.

## 2022-11-15 NOTE — PROGRESS NOTES
Pharmacy Note - Extended Infusion Beta-Lactam Adjustment    Cefepime 2000mg Q12h for treatment of Hospital acquired pneumonia. Per [facility] Extended Infusion Beta-Lactam Policy, cefepime will be changed to 2000mg load followed by 2000mg Q12h extended infusion    Estimated Creatinine Clearance: Estimated Creatinine Clearance: 41 mL/min (A) (based on SCr of 2.2 mg/dL (H)). BMI: Body mass index is 36.5 kg/m². Please call with any questions.     Thank you,    Christal Gill, [facility]

## 2022-11-15 NOTE — PROGRESS NOTES
Physical Therapy   Initial Assessment     Name: Sammye Kanner  : 1945  MRN: [id number]      Date of Service: 11/15/2022    Evaluating PT:  Chelsi Webster Kimberly Small, [id number]    Room #:  7854/4722-B  Diagnosis:  Colitis [K52.9]  General weakness [R53.1]  Troponin level elevated [R77.8]  Pneumonia due to other specified infectious organisms [J16.8]  Pneumonia of both lungs due to infectious organism, unspecified part of lung [J18.9]  Chronic kidney disease, unspecified CKD stage [N18.9]  PVD (peripheral vascular disease) ([facility]) [I73.9]  PMHx/PSHx:     has a past medical history of Acquired absence of kidney, Acute kidney injury ([address] Atherosclerosis of native artery of right lower extremity with ulceration of heel ([address] Atrial fibrillation ([address] Cancer ([address] Cellulitis, Chronic renal failure, stage 3b ([address] Diabetes mellitus ([address] Dysphagia, oropharyngeal phase, 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[address] Femoral-popliteal atherosclerosis ([address] Hyperkalemia, Hypertension, Ischemic stroke ([address] Muscle weakness (generalized), Pacemaker, Paraplegia ([address] PVD (peripheral vascular disease) ([address] Repeated falls, Sinus node dysfunction ([address] Stroke (cerebrum) ([address] Thyroid disease, TIA (transient ischemic attack), and Ulcer of right heel and midfoot with fat layer exposed ([address] has a past surgical history that includes eye surgery; Knee arthroscopy (right knee); pr colonoscopy flx dx w/collj spec when pfrmd (N/A, 2018); Colonoscopy; pr egd percutaneous placement gastrostomy tube (N/A, 2018); pacemaker placement; pr egd percutaneous placement gastrostomy tube (N/A, 2018); Cardiac pacemaker placement (2014); and Kidney removal (Left, 2022).   Precautions:  Fall risk, contact, incontinent, assume NWB RLE until podiatry confirms 50% PWB RLE (per chart review during the last admission the pt was NWB RLE, per pt and family the pt has been 50% PWB with a boot at the [address] and been practicing ambulating with a walker. SUBJECTIVE:    Pt was admitted from [facility] and was receiving therapy there, prior to that the pt had been living alone in a 1 story home with 3 stairs to enter and 2 rails. Pt ambulated with a [employer] PTA. OBJECTIVE:   Initial Evaluation  Date: 11/15/22 Treatment Short Term/ Long Term   Goals   AM-PAC 6 Clicks 92/78     Was pt agreeable to Eval/treatment? Yes     Does pt have pain? Denied     Bed Mobility  Rolling: ModA  Supine to sit: MaxA  Sit to supine: MaxA  Scooting: MaxA  Basim   Transfers Sit to stand: Basim  Stand to sit: Basim  Stand pivot: ModA with [employer]  SBA   Ambulation    NT due to WBing restriction and no boot in room  >20 feet with [employer] + walking boot with SBA (if pt is in fact 50% PWB)   Stair negotiation: ascended and descended  NT  No stair goal   BLE ROM WFL     BLE Strength Grossly 4/5     Balance Sitting: Independent   Standing: ModA with [employer]  SBA     Pt is A & O x 4  Sensation:  Denied new numbness/tingling  Edema:  None noted in BLE    Therapeutic Exercises:    Sit<>stand x5 with Basim  Stand pivot transfer x2 with [employer] with Min/ModA  Standing balance activities: performed static and dynamic standing balance activities within AD with BUE support with Basim    Patient education  Pt educated on bed mobility, transfer technique, standing with R knee flexion to avoid placing any pressure on the R heel. Patient response to education:   Pt verbalized understanding Pt demonstrated skill Pt requires further education in this area   Yes Yes, with assist Yes     ASSESSMENT:    Conditions Requiring Skilled Therapeutic Intervention:    [x]Decreased strength     []Decreased ROM  [x]Decreased functional mobility  [x]Decreased balance   [x]Decreased endurance   []Decreased posture  []Decreased sensation  []Decreased coordination   []Decreased vision  [x]Decreased safety awareness   []Increased pain       Comments:   The pt was assisted to EOB, was incontinent upon attempting to stand, was assisted with standing activities and assisted with hygiene, the pt was then assisted with repeated sit<>stand transfers and a stand pivot transfer to and from a bedside chair as his new bed arrived. The pt moved slowly and required assistance for safety and to maintain precautions. The pt was positioned with skill in supine with call light in reach and all needs met, left resting comfortably. Treatment:  Patient practiced and was instructed in the following treatment:    Bed mobility training - pt given verbal and tactile cues to facilitate proper sequencing and safety during rolling and supine>sit as well as provided with physical assistance to complete task   Transfer training: verbal cues for hand placement during sit to stand transfer and assistance for anterior weight shift in order to facilitate initiation of the stand. Standing balance activities: performed static and dynamic standing balance activities within AD with BUE support in order to progress safety with standing activities as well as improve independence with standing ADLs. .Skilled repositioning in bed to promote improved posture and improved cardiorespiratory function. Pt positioned with bed in semi-chair position to enhance skin/joint protection. Pt's/ family goals   1. To return home    Prognosis is good for reaching above PT goals. Patient and or family understand(s) diagnosis, prognosis, and plan of care.   Yes    PHYSICAL THERAPY PLAN OF CARE:    PT POC is established based on physician order and patient diagnosis     Referring provider/PT Order:  Linda Arrington MD  Diagnosis:  Colitis [K52.9]  General weakness [R53.1]  Troponin level elevated [R77.8]  Pneumonia due to other specified infectious organisms [J16.8]  Pneumonia of both lungs due to infectious organism, unspecified part of lung [J18.9]  Chronic kidney disease, unspecified CKD stage [N18.9]  PVD (peripheral vascular disease) ([facility] [I73.9]  Specific instructions for next treatment:  Progress ambulation as able once boot arrives and WBing status is clarified. Current Treatment Recommendations:     [x] Strengthening to improve independence with functional mobility   [] ROM to improve independence with functional mobility   [x] Balance Training to improve static/dynamic balance and to reduce fall risk  [x] Endurance Training to improve activity tolerance during functional mobility   [x] Transfer Training to improve safety and independence with all functional transfers   [x] Gait Training to improve gait mechanics, endurance and assess need for appropriate assistive device  [x] Stair Training in preparation for safe discharge home and/or into the community   [] Positioning to prevent skin breakdown and contractures  [x] Safety and Education Training   [] Patient/Caregiver Education   [] HEP  [] Other     PT long term treatment goals are located in above grid    Frequency of treatments: 2-5x/week x 1-2 weeks. Time in  1428  Time out  1503    Total Treatment Time  23 minutes     Evaluation Time includes thorough review of current medical information, gathering information on past medical history/social history and prior level of function, completion of standardized testing/informal observation of tasks, assessment of data and education on plan of care and goals. CPT codes:  [x] Low Complexity PT evaluation 18823  [] Moderate Complexity PT evaluation 88324  [] High Complexity PT evaluation 63632  [] PT Re-evaluation 74895  [] Gait training 47758 0 minutes  [] Manual therapy 91771 0 minutes  [x] Therapeutic activities 36359 23 minutes  [] Therapeutic exercises 49448 0 minutes  [] Neuromuscular reeducation 53803 0 minutes     Abdulaziz Gleason.  [place], [place]  [id number]

## 2022-11-15 NOTE — PROGRESS NOTES
Admission database completed to best of this RN's ability. Care plan and education initiated. Pt from [facility]. Up until 2 days ago was able to ambulate with 1 assist and WW, now requiring WC more. On a regular pureed diet with thin liquids. DNR CCA. ICD to L chest ([employer]). Incontinent of bowel and bladder.

## 2022-11-15 NOTE — CONSULTS
Inpatient CARDIOLOGY Consultation        Reason for Consult:  Manuel Ramirez     Date of Consultation: 11/15/2022    Patient previously known to Dr. Jw Galvan: 65year old with history of PAF, bradycardia, s/p Pacer implant in 2014, PAD, HTN, CVA presented to ER for complaints of lightheadedness and generalized weakness since yesterday from [facility]. Daughter states that he just seems more fatigued and a lot weaker than usual.  Patient had nephrectomy back in July, moved to [facility] about a month ago. Patient usually ambulates well, however yesterday was noted to not able to walk at all. Patient does have a wound on his heel on the right foot, being seen by Dr. Bennett Lo podiatry. EMS state that patient had sinus pauses on cardiac monitor, and felt lightheaded during those episodes. It spontaneously resolved, and subsequently symptoms resolved as well. Daughter  also states that the patient has had a distended abdomen for about the past month. He stated all the symptoms started gradually, progressively worsening, nothing makes it better or worse, quality of symptoms is lightheadedness and weakness, with distention of the abdomen, radiation is generalized, severe according to the daughter is moderately severe, timing is constant. Admitted for Pneumonia and poss. Colitis     The patient self denies fever, chills, diaphoresis, abdominal pain, urine changes, diarrhea, constipation. He denies shortness of breath or chest pain. He is on Warfarin for Atrial fibrillation. Pacer interrogated 11/14/22, showed normal Fn, battery 1.5 years. REVIEW OF SYSTEMS:   Review of rest of 12 systems negative except as mentioned in HPI.       Please note: past medical records were reviewed per electronic medical record (EMR) - see detailed reports under Past Medical/ Surgical History. Past Medical History:    Past Medical History:   Diagnosis Date    Acquired absence of kidney     Acute kidney injury ([facility]     Atherosclerosis of native artery of right lower extremity with ulceration of heel ([facility] [address] 08/15/2022    Atrial fibrillation (HCC)     Cancer (HCC)     kidney    Cellulitis     Chronic renal failure, stage 3b ([facility] [address] 08/15/2022    Diabetes mellitus (HCC)     Dysphagia, oropharyngeal phase     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HCC)     Femoral-popliteal atherosclerosis ([facility] [address] 09/13/2022    Hyperkalemia     Hypertension     Ischemic stroke ([facility] [address] 03/06/2018    Muscle weakness (generalized)     Pacemaker     Paraplegia (HCC)     PVD (peripheral vascular disease) ([facility] [address]     Repeated falls     Sinus node dysfunction (HCC)     Stroke (cerebrum) ([facility] [address] 02/27/2018    Thyroid disease     TIA (transient ischemic attack)     Ulcer of right heel and midfoot with fat layer exposed ([facility] [address] 08/15/2022       Past Surgical History:    Past Surgical History:   Procedure Laterality Date    CARDIAC PACEMAKER PLACEMENT  12/19/2014    COLONOSCOPY      EYE SURGERY      KIDNEY REMOVAL Left 07/05/2022    CANCER [facility] removed    KNEE ARTHROSCOPY  right knee    PACEMAKER PLACEMENT      [place] COLONOSCOPY FLX DX W/COLLJ SPEC WHEN PFRMD N/A 03/20/2018    COLONOSCOPY DIAGNOSTIC performed by Fili Singh MD at [address] E[address] PERCUTANEOUS PLACEMENT GASTROSTOMY TUBE N/A 03/29/2018    PEG TUBE/PT. IN 5507 B performed by Fili Singh MD at [address] EGD PERCUTANEOUS PLACEMENT GASTROSTOMY TUBE N/A 09/12/2018    EGD PEG TUBE PLACEMENT performed by iFli Singh MD at [facility] [place]COPY         Allergies:    Bee venom    Social History:    Social History     Socioeconomic History    Marital status:       Spouse name: Not on file    Number of children: 3    Years of education: Not on file    Highest education level: Not on file   Occupational History    Not on file   Tobacco Use    Smoking status: Former Types: Cigars     Quit date:      Years since quittin.8    Smokeless tobacco: Never   Vaping Use    Vaping Use: Never used   Substance and Sexual Activity    Alcohol use: No    Drug use: No    Sexual activity: Not on file   Other Topics Concern    Not on file   Social History Narrative    Not on file     Social Determinants of Health     Financial Resource Strain: Not on file   Food Insecurity: Not on file   Transportation Needs: Not on file   Physical Activity: Not on file   Stress: Not on file   Social Connections: Not on file   Intimate Partner Violence: Not on file   Housing Stability: Not on file       Family History:   Family History   Problem Relation Age of Onset    Heart Disease Mother     Stroke Father          Medications Prior to admit: Reviewed  Prior to Admission medications    Medication Sig Start Date End Date Taking? Authorizing Provider   ferrous sulfate (IRON 325) 325 (65 Fe) MG tablet Take 325 mg by mouth 2 times daily   Yes Historical Provider, MD   bisacodyl (DULCOLAX) 10 MG suppository Place 10 mg rectally daily as needed for Constipation   Yes Historical Provider, MD   loperamide (IMODIUM) 2 MG capsule Take 2 mg by mouth every 8 hours as needed for Diarrhea   Yes Historical Provider, MD   magnesium hydroxide (MILK OF MAGNESIA) 400 MG/5ML suspension Take 30 mLs by mouth daily as needed for Constipation   Yes Historical Provider, MD   Nutritional Supplements (ARGINAID) PACK Take 1 packet by mouth 2 times daily   Yes Historical Provider, MD   insulin lispro (HUMALOG) 100 UNIT/ML SOLN injection vial Inject 0-8 Units into the skin 3 times daily (with meals)  Patient taking differently: Inject 0-8 Units into the skin 4 times daily (before meals and nightly) 10/7/22   Maren Mancia MD   warfarin (COUMADIN) 6 MG tablet Take 8 mg by mouth daily    Historical Provider, MD   gabapentin (NEURONTIN) 300 MG capsule Take 1 capsule by mouth nightly for 7 days.  8/8/22 8/15/22  Maren Mancia MD levothyroxine (SYNTHROID) 50 MCG tablet Take 1 tablet by mouth in the morning. 8/9/22   Segundo Sanford MD   atorvastatin (LIPITOR) 20 MG tablet Take 1 tablet by mouth nightly 8/8/22   Segundo Sanford MD   acetaminophen (TYLENOL) 325 MG tablet Take 650 mg by mouth every 4 hours as needed (DISCOMFORT;PAIN/ELEV TEMP >100.4F)    Historical Provider, MD   pantoprazole (PROTONIX) 40 MG tablet Take 40 mg by mouth daily    Historical Provider, MD   amLODIPine (NORVASC) 2.5 MG tablet Take 1 tablet by mouth daily 8/20/21 11/14/22  Lesley Wang DO   potassium chloride (KLOR-CON M) 20 MEQ TBCR extended release tablet Take 1 tablet by mouth daily 3/31/21 8/8/22  Lesley Wang DO   metoprolol tartrate (LOPRESSOR) 25 MG tablet Take 1 tablet by mouth 2 times daily 2/19/21   Lesley Wang DO   lovastatin (MEVACOR) 20 MG tablet Take 1 tablet by mouth nightly  Patient not taking: Reported on 8/1/2022 11/20/20 8/8/22  Lesley Wang DO   blood glucose test strips (EZ SMART BLOOD GLUCOSE TEST) strip 1 each by In Vitro route 2 times daily As needed. 2/11/19 8/8/22  Lesley Wang DO         DATA:      ECG - Reviewed     Tele strips: Reviewed    Diagnostic:      Intake/Output Summary (Last 24 hours) at 11/15/2022 1128  Last data filed at 11/15/2022 0356  Gross per 24 hour   Intake --   Output 1000 ml   Net -1000 ml       IMAGING STUDIES: Reviewed    Pacer interrogated 11/14/22 - Normal Fn, 1.5 yrs on batter, 76% AP, 1.2% ; High Atria lthresholds    Echo 10/2018   Summary   Technically difficult study (Definity contrast used). Normal left ventricular systolic function. Ejection fraction is visually estimated at > 55%. Normal right ventricular function (TAPSE 1.9 cm). There is doppler evidence of stage II diastolic dysfunction.       LABS:      Cardiac enzymes:  Recent Labs     11/14/22 1427 11/14/22 2015   TROPHS 78* 71*     CBC:   Recent Labs     11/14/22  1427 11/14/22  2330   WBC 5.9 5.0   HGB 9.6* 9.3*   HCT 31.2* 29.8*    216     BMP:   Recent Labs     11/14/22  1427 11/14/22  2330    137   K 3.4* 3.1*   CO2 25 25   BUN 28* 27*   CREATININE 1.9* 2.2*   LABGLOM 36 30   CALCIUM 8.5* 8.3*     Mag:   Recent Labs     11/14/22  1427 11/14/22  2330   MG 1.9 1.8     Phos: No results for input(s): PHOS in the last 72 hours. TSH:   Recent Labs     11/14/22 2015   TSH 1.860     HgA1c:   Lab Results   Component Value Date    LABA1C 7.3 (H) 08/09/2022     No results found for: EAG  proBNP: No results for input(s): PROBNP in the last 72 hours. PT/INR:   Recent Labs     11/14/22  1427 11/14/22  2330   PROTIME 24.2* 21.4*   INR 2.1 1.9     APTT:No results for input(s): APTT in the last 72 hours. FASTING LIPID PANEL:  Lab Results   Component Value Date/Time    CHOL 169 07/11/2022 05:25 AM    HDL 32 07/11/2022 05:25 AM    LDLCALC 96 07/11/2022 05:25 AM    TRIG 205 07/11/2022 05:25 AM     LIVER PROFILE:  Recent Labs     11/14/22 1427   AST 9   ALT <5   LABALBU 2.7*       Current Inpatient Medications:   amLODIPine  2.5 mg Oral Daily    atorvastatin  20 mg Oral Nightly    ferrous sulfate  325 mg Oral BID    gabapentin  300 mg Oral Nightly    levothyroxine  50 mcg Oral Daily    metoprolol tartrate  25 mg Oral BID    pantoprazole  40 mg Oral Daily    warfarin  8 mg Oral Daily    cefepime  2,000 mg IntraVENous Q12H    sodium chloride flush  5-40 mL IntraVENous 2 times per day       IV Infusions (if any):   0.9% NaCl with KCl 40 mEq 50 mL/hr at 11/15/22 1050    sodium chloride 75 mL/hr at 11/14/22 2240    sodium chloride         PHYSICAL EXAM:     BP (!) 166/76   Pulse 58   Temp 98.5 °F (36.9 °C) (Oral)   Resp 16   Ht 6' 3\" (1.905 m)   Wt 292 lb (132.5 kg)   SpO2 94%   BMI 36.50 kg/m²     CONST:  Well developed, appears stated age. Awake, alert and no apparent distress. HEENT:   Head- Normocephalic  Eyes- Conjunctivae pink, no icterus  Throat- Oral mucosa moist  Neck-  No stridor, no jugular venous distention.  No carotid bruit.    CHEST: Chest symmetrical and non-tender to palpation. No accessory muscle use  RESPIRATORY: Lung sounds - Few rhonchi  CARDIOVASCULAR:     Heart Inspection-Pacer site OK  Heart Palpation- No thrills. Heart Ausculation- Regular rate and rhythm, 2/6 systolic murmur. No s3 or rub   EXT: No lower extremity edema. Left Distal pulses palpable; Right leg in dressing  ABDOMEN: Soft, PEG +, Bowel sounds present. No abdominal bruit, PEG+  MS: n/a   : Deferred  RECTAL: Deferred  SKIN: Warm and dry   NEURO / PSYCH: Oriented to person, place; Good mood and affect. IMPRESSION / RECOMMENDATIONS:    Pneumonia due to other specified infectious organisms - Per Dr Barrera Appl    S/p Pacemaker - 2014, Medtronic - Interrogated 11/14/22 - 1.5 yrs on Battery. He is NOT pacer dependent. I see no bradycardia/pauses on telemetry. Normal Pacer function; Advised check heart rates if gets dizzy    Elevated Troponin - Flat pattern; likely from CKD - No CP    PAF - On Coumadin for OAC, Keep INR 2-3    Mild Hypokalemia - Potassium being supplemented    HTN - Monitor BP, adjust meds as needed    PVD (peripheral vascular disease) ([address] -s/p Right leg PCI in 10/2022 -Follows with Vascular surgery    Chronic Anemia - Monitor H/H    CKD  Stage 3b - s/p Nephrectomy July 2022; Follows with Nephrology    S/p PEG          D/w his Nursing staff. Cardiology will sign off, call if needed  F/u by [facility] EP for Pacer follow up    Above recommendations discussed with him and his family.       Electronically signed by Jaimie Geronimo MD on 11/15/2022 at 11:28 AM  [facility]) Cardiology

## 2022-11-15 NOTE — PLAN OF CARE
Problem: ABCDS Injury Assessment  Goal: Absence of physical injury  Outcome: Progressing  Flowsheets (Taken 11/15/2022 0214)  Absence of Physical Injury: Implement safety measures based on patient assessment

## 2022-11-15 NOTE — H&P
Sophia Burris is a 65 y.o. male with known history of hypertension, diabetes, Medtronic pacemaker placement due to atrial fibrillation, CKD, ischemic stroke in 2018 presents to the ED with complaints of lightheadedness and generalized weakness since yesterday from [facility]. Daughter states that he just seems more fatigued and a lot weaker than usual.  Patient had nephrectomy back in July, moved to [facility] about a month ago. Patient usually ambulates well, however yesterday was noted to not able to walk at all. Patient does have a wound on his heel on the right foot, being seen by Dr. Lucas Daily podiatry. EMS state that patient had sinus pauses on cardiac monitor, and felt lightheaded during those episodes. It spontaneously resolved, and subsequently symptoms resolved as well. Daughter  also states that the patient has had a distended abdomen for about the past month. She states all the symptoms started gradually, progressively worsening, nothing makes it better or worse, quality of symptoms is lightheadedness and weakness, with distention of the abdomen, radiation is generalized, severe according to the daughter is moderately severe, timing is constant. The patient self denies fever, chills, diaphoresis, abdominal pain, urine changes, diarrhea, constipation. He denies shortness of breath or chest pain. He is on warfarin for atrial fibrillation. work up consistent with pneumonia and poss.  Colitis admitted for management        Past Medical History:   Diagnosis Date    Acquired absence of kidney     Acute kidney injury ([address]     Atherosclerosis of native artery of right lower extremity with ulceration of heel ([address] 08/15/2022    Atrial fibrillation (HCC)     Cancer (HCC)     kidney    Cellulitis     Chronic renal failure, stage 3b ([address] 08/15/2022    Diabetes mellitus (HCC)     Dysphagia, oropharyngeal phase     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[address]     Femoral-popliteal atherosclerosis ([address] 09/13/2022    Hyperkalemia Hypertension     Ischemic stroke ([facility] [address] 03/06/2018    Muscle weakness (generalized)     Pacemaker     Paraplegia (HCC)     PVD (peripheral vascular disease) ([facility] [address]     Repeated falls     Sinus node dysfunction (HCC)     Stroke (cerebrum) ([facility] [address] 02/27/2018    Thyroid disease     TIA (transient ischemic attack)     Ulcer of right heel and midfoot with fat layer exposed ([facility] [address] 08/15/2022       Past Surgical History:   Procedure Laterality Date    CARDIAC PACEMAKER PLACEMENT  12/19/2014    COLONOSCOPY      EYE SURGERY      KIDNEY REMOVAL Left 07/05/2022    CANCER [facility] removed    KNEE ARTHROSCOPY  right knee    PACEMAKER PLACEMENT      DE COLONOSCOPY FLX DX W/COLLJ SPEC WHEN PFRMD N/A 03/20/2018    COLONOSCOPY DIAGNOSTIC performed by Sadiq Shaw MD at [address] E[address] PERCUTANEOUS PLACEMENT GASTROSTOMY TUBE N/A 03/29/2018    PEG TUBE/PT. IN 5507 B performed by Sadiq Shaw MD at [address] EGD PERCUTANEOUS PLACEMENT GASTROSTOMY TUBE N/A 09/12/2018    EGD PEG TUBE PLACEMENT performed by Sadiq Shaw MD at [facility] ENDOSCOPY       Family History   Problem Relation Age of Onset    Heart Disease Mother     Stroke Father        Prior to Admission medications    Medication Sig Start Date End Date Taking?  Authorizing Provider   ferrous sulfate (IRON 325) 325 (65 Fe) MG tablet Take 325 mg by mouth 2 times daily   Yes Historical Provider, MD   bisacodyl (DULCOLAX) 10 MG suppository Place 10 mg rectally daily as needed for Constipation   Yes Historical Provider, MD   loperamide (IMODIUM) 2 MG capsule Take 2 mg by mouth every 8 hours as needed for Diarrhea   Yes Historical Provider, MD   magnesium hydroxide (MILK OF MAGNESIA) 400 MG/5ML suspension Take 30 mLs by mouth daily as needed for Constipation   Yes Historical Provider, MD   Nutritional Supplements (ARGINAID) PACK Take 1 packet by mouth 2 times daily   Yes Historical Provider, MD   insulin lispro (HUMALOG) 100 UNIT/ML SOLN injection vial Inject 0-8 Units into the skin 3 times daily (with meals)  Patient taking differently: Inject 0-8 Units into the skin 4 times daily (before meals and nightly) 10/7/22   Zuleyma Oneal MD   warfarin (COUMADIN) 6 MG tablet Take 8 mg by mouth daily    Historical Provider, MD   gabapentin (NEURONTIN) 300 MG capsule Take 1 capsule by mouth nightly for 7 days. 8/8/22 8/15/22  Zuleyma Oneal MD   levothyroxine (SYNTHROID) 50 MCG tablet Take 1 tablet by mouth in the morning. 22   Zuleyma Oneal MD   atorvastatin (LIPITOR) 20 MG tablet Take 1 tablet by mouth nightly 22   Zuleyma Oneal MD   acetaminophen (TYLENOL) 325 MG tablet Take 650 mg by mouth every 4 hours as needed (DISCOMFORT;PAIN/ELEV TEMP >100.4F)    Historical Provider, MD   pantoprazole (PROTONIX) 40 MG tablet Take 40 mg by mouth daily    Historical Provider, MD   amLODIPine (NORVASC) 2.5 MG tablet Take 1 tablet by mouth daily 21  Lesley Wang DO   potassium chloride (KLOR-CON M) 20 MEQ TBCR extended release tablet Take 1 tablet by mouth daily 3/31/21 8/8/22  Lesley Wang DO   metoprolol tartrate (LOPRESSOR) 25 MG tablet Take 1 tablet by mouth 2 times daily 21   Lesley Wang DO   lovastatin (MEVACOR) 20 MG tablet Take 1 tablet by mouth nightly  Patient not taking: Reported on 20  Lesley Wang DO   blood glucose test strips (EZ SMART BLOOD GLUCOSE TEST) strip 1 each by In Vitro route 2 times daily As needed.  19  Lesley Wang DO        Allergies: Bee venom    Social History     Tobacco Use    Smoking status: Former     Types: Cigars     Quit date:      Years since quittin.8    Smokeless tobacco: Never   Substance Use Topics    Alcohol use: No        Review of Systems:  Respiratory: negative for cough and hemoptysis  Cardiovascular: negative for chest pain and dyspnea  Gastrointestinal: negative for abdominal pain, diarrhea, nausea and vomiting  Genitourinary:negative for dysuria and hematuria  Derm: negative for rash and skin lesion(s)  Neurological: negative for seizures and tremors,fatigue lightheaded   Endocrine: negative for diabetic symptoms including polydipsia and polyuria    Physical Exam:  Vitals:    11/14/22 2115   BP: 129/63   Pulse: 61   Resp: 16   Temp: 98.5 °F (36.9 °C)   SpO2: 95%      Skin:  Warm and dry. No rash or bruises  HEENT:  PERRLA, EOMI  Neck:  No JVD, No thyromegaly, No carotid bruit  Cardiac:  IRR, No gallop or murmur  Lungs:  basal rales   Abdomen: Normal bowel sounds, no HSM, non-tender  Extremities:  No clubbing, edema or cyanosis,dressing R foot   Neurological:  Moves all extremities.     Labs:    CBC with Differential:    Lab Results   Component Value Date/Time    WBC 5.0 11/14/2022 11:30 PM    RBC 3.33 11/14/2022 11:30 PM    HGB 9.3 11/14/2022 11:30 PM    HCT 29.8 11/14/2022 11:30 PM     11/14/2022 11:30 PM    MCV 89.5 11/14/2022 11:30 PM    MCH 27.9 11/14/2022 11:30 PM    MCHC 31.2 11/14/2022 11:30 PM    RDW 15.9 11/14/2022 11:30 PM    SEGSPCT 80 01/04/2012 03:00 AM    BANDSPCT 1 11/27/2014 04:50 AM    LYMPHOPCT 22.2 11/14/2022 02:27 PM    MONOPCT 9.3 11/14/2022 02:27 PM    MYELOPCT 0.9 02/28/2018 05:21 AM    BASOPCT 0.8 11/14/2022 02:27 PM    MONOSABS 0.55 11/14/2022 02:27 PM    LYMPHSABS 1.32 11/14/2022 02:27 PM    EOSABS 0.35 11/14/2022 02:27 PM    BASOSABS 0.05 11/14/2022 02:27 PM     CMP:    Lab Results   Component Value Date/Time     11/14/2022 11:30 PM    K 3.1 11/14/2022 11:30 PM     11/14/2022 11:30 PM    CO2 25 11/14/2022 11:30 PM    BUN 27 11/14/2022 11:30 PM    CREATININE 2.2 11/14/2022 11:30 PM    GFRAA 42 10/10/2022 05:52 AM    LABGLOM 30 11/14/2022 11:30 PM    GLUCOSE 156 11/14/2022 11:30 PM    GLUCOSE 297 01/04/2012 03:00 AM    PROT 5.7 11/14/2022 02:27 PM    LABALBU 2.7 11/14/2022 02:27 PM    LABALBU 3.4 01/04/2012 03:00 AM    CALCIUM 8.3 11/14/2022 11:30 PM    BILITOT 0.3 11/14/2022 02:27 PM    ALKPHOS 77 11/14/2022 02:27 PM AST 9 11/14/2022 02:27 PM    ALT <5 11/14/2022 02:27 PM      Imaging:CXR :1. Small patchy infiltrate seen within the right lung base. Ct scan abdomen :  1. Gas filled distended sigmoid colon as well as gas-filled distended mid and   distal transverse colon. Mild thickened appearance of wall of distal sigmoid   colon and rectum. Findings could suggest infectious or inflammatory   colitis/proctitis. 2. No free air or free fluid. 3. Opacities are present in lung bases notable on the right which could   suggest bibasilar pneumonia. Ct scan head :  1. No acute intracranial abnormality. 2. Chronic infarction in the left temporal occipital region. 3. Chronic lacunar infarctions in the right basal ganglia and the left   thalamus. Assessment and Plan:    Patient Active Problem List   Diagnosis    Pneumonia RLL ? organism   Colitis  A.Fib.   DM  CKD stage III  S/P nephrectomy  Heal ulcer   ASHD   PVD   Hypokalemia        Plan as per order

## 2022-11-16 PROBLEM — J17: Status: ACTIVE | Noted: 2022-01-01

## 2022-11-16 LAB
ANION GAP SERPL CALCULATED.3IONS-SCNC: 8 MMOL/L (ref 7–16)
BUN BLDV-MCNC: 23 MG/DL (ref 6–23)
C-REACTIVE PROTEIN: 8.9 MG/DL (ref 0–0.4)
CALCIUM SERPL-MCNC: 8.4 MG/DL (ref 8.6–10.2)
CHLORIDE BLD-SCNC: 106 MMOL/L (ref 98–107)
CO2: 24 MMOL/L (ref 22–29)
CREAT SERPL-MCNC: 2 MG/DL (ref 0.7–1.2)
GFR SERPL CREATININE-BSD FRML MDRD: 34 ML/MIN/1.73
GLUCOSE BLD-MCNC: 138 MG/DL (ref 74–99)
HCT VFR BLD CALC: 31.5 % (ref 37–54)
HEMOGLOBIN: 9.8 G/DL (ref 12.5–16.5)
POTASSIUM SERPL-SCNC: 3.1 MMOL/L (ref 3.5–5)
SODIUM BLD-SCNC: 138 MMOL/L (ref 132–146)

## 2022-11-16 PROCEDURE — 2580000003 HC RX 258: Performed by: INTERNAL MEDICINE

## 2022-11-16 PROCEDURE — 80048 BASIC METABOLIC PNL TOTAL CA: CPT

## 2022-11-16 PROCEDURE — 86140 C-REACTIVE PROTEIN: CPT

## 2022-11-16 PROCEDURE — G0378 HOSPITAL OBSERVATION PER HR: HCPCS

## 2022-11-16 PROCEDURE — 6370000000 HC RX 637 (ALT 250 FOR IP): Performed by: NURSE PRACTITIONER

## 2022-11-16 PROCEDURE — 85018 HEMOGLOBIN: CPT

## 2022-11-16 PROCEDURE — 82784 ASSAY IGA/IGD/IGG/IGM EACH: CPT

## 2022-11-16 PROCEDURE — 6370000000 HC RX 637 (ALT 250 FOR IP): Performed by: INTERNAL MEDICINE

## 2022-11-16 PROCEDURE — 96376 TX/PRO/DX INJ SAME DRUG ADON: CPT

## 2022-11-16 PROCEDURE — 85014 HEMATOCRIT: CPT

## 2022-11-16 PROCEDURE — 36415 COLL VENOUS BLD VENIPUNCTURE: CPT

## 2022-11-16 PROCEDURE — 6360000002 HC RX W HCPCS: Performed by: INTERNAL MEDICINE

## 2022-11-16 PROCEDURE — 99232 SBSQ HOSP IP/OBS MODERATE 35: CPT | Performed by: INTERNAL MEDICINE

## 2022-11-16 PROCEDURE — 96366 THER/PROPH/DIAG IV INF ADDON: CPT

## 2022-11-16 PROCEDURE — 1200000000 HC SEMI PRIVATE

## 2022-11-16 RX ORDER — DICYCLOMINE HYDROCHLORIDE 10 MG/1
10 CAPSULE ORAL NIGHTLY
Status: DISCONTINUED | OUTPATIENT
Start: 2022-11-16 | End: 2022-11-17 | Stop reason: HOSPADM

## 2022-11-16 RX ORDER — SACCHAROMYCES BOULARDII 250 MG
250 CAPSULE ORAL DAILY
Status: DISCONTINUED | OUTPATIENT
Start: 2022-11-16 | End: 2022-11-16 | Stop reason: CLARIF

## 2022-11-16 RX ORDER — CHOLESTYRAMINE 4 G/9G
1 POWDER, FOR SUSPENSION ORAL
Status: DISCONTINUED | OUTPATIENT
Start: 2022-11-16 | End: 2022-11-17 | Stop reason: HOSPADM

## 2022-11-16 RX ORDER — LACTOBACILLUS RHAMNOSUS GG 10B CELL
1 CAPSULE ORAL DAILY
Status: DISCONTINUED | OUTPATIENT
Start: 2022-11-16 | End: 2022-11-17 | Stop reason: HOSPADM

## 2022-11-16 RX ORDER — POTASSIUM CHLORIDE 20 MEQ/1
40 TABLET, EXTENDED RELEASE ORAL ONCE
Status: COMPLETED | OUTPATIENT
Start: 2022-11-16 | End: 2022-11-16

## 2022-11-16 RX ORDER — DICYCLOMINE HYDROCHLORIDE 10 MG/1
10 CAPSULE ORAL 2 TIMES DAILY
Status: DISCONTINUED | OUTPATIENT
Start: 2022-11-16 | End: 2022-11-16

## 2022-11-16 RX ADMIN — POTASSIUM CHLORIDE 40 MEQ: 1500 TABLET, EXTENDED RELEASE ORAL at 09:29

## 2022-11-16 RX ADMIN — FERROUS SULFATE TAB 325 MG (65 MG ELEMENTAL FE) 325 MG: 325 (65 FE) TAB at 21:03

## 2022-11-16 RX ADMIN — METOPROLOL TARTRATE 25 MG: 25 TABLET, FILM COATED ORAL at 21:03

## 2022-11-16 RX ADMIN — GABAPENTIN 300 MG: 300 CAPSULE ORAL at 21:04

## 2022-11-16 RX ADMIN — LEVOTHYROXINE SODIUM 50 MCG: 50 TABLET ORAL at 06:23

## 2022-11-16 RX ADMIN — POTASSIUM CHLORIDE AND SODIUM CHLORIDE: 900; 300 INJECTION, SOLUTION INTRAVENOUS at 04:48

## 2022-11-16 RX ADMIN — AMLODIPINE BESYLATE 2.5 MG: 2.5 TABLET ORAL at 09:30

## 2022-11-16 RX ADMIN — METOPROLOL TARTRATE 25 MG: 25 TABLET, FILM COATED ORAL at 09:30

## 2022-11-16 RX ADMIN — WARFARIN SODIUM 8 MG: 4 TABLET ORAL at 18:24

## 2022-11-16 RX ADMIN — CEFEPIME 2000 MG: 2 INJECTION, POWDER, FOR SOLUTION INTRAVENOUS at 06:28

## 2022-11-16 RX ADMIN — CHOLESTYRAMINE 4 G: 4 POWDER, FOR SUSPENSION ORAL at 16:33

## 2022-11-16 RX ADMIN — PANTOPRAZOLE SODIUM 40 MG: 40 TABLET, DELAYED RELEASE ORAL at 09:30

## 2022-11-16 RX ADMIN — FERROUS SULFATE TAB 325 MG (65 MG ELEMENTAL FE) 325 MG: 325 (65 FE) TAB at 09:29

## 2022-11-16 RX ADMIN — ATORVASTATIN CALCIUM 20 MG: 20 TABLET, FILM COATED ORAL at 21:03

## 2022-11-16 RX ADMIN — ANORECTAL OINTMENT: 15.7; .44; 24; 20.6 OINTMENT TOPICAL at 21:04

## 2022-11-16 RX ADMIN — DICYCLOMINE HYDROCHLORIDE 10 MG: 10 CAPSULE ORAL at 21:03

## 2022-11-16 RX ADMIN — Medication 1 CAPSULE: at 14:41

## 2022-11-16 ASSESSMENT — PAIN SCALES - GENERAL: PAINLEVEL_OUTOF10: 0

## 2022-11-16 NOTE — PROGRESS NOTES
Family is requesting a GI consult. Dr. Adan Thayer was notified and gave ok for new consult. Dr. Eliel Murray office was notified of new consult.

## 2022-11-16 NOTE — CONSULTS
Gastroenterology Consult Note   Liyah NAVARRO, ARCADIO-C with Magy Ochoa M.D. Consult Note        Date of Service: 11/16/2022  Reason for Consult: chronic diarrhea, family request  Requesting Physician: Kieran De Santiago MD    CHIEF COMPLAINT:  dizziness & light-headed    History Obtained From:  patient, electronic medical record    HISTORY OF PRESENT ILLNESS:       Feliz Ferrari is a 65 y.o. male with significant past medical history of kidney cancer with left kidney removal, ulceration of right heel, afib, cellulitis, renal failure, DM. Vactor 5 deficiency, dysphagia, HTN, ischemic stroke, muscle weakness, pacemaker, paraplegia, PVD, repeated falls, sinus node dysfunction, stroke, thyroid disease, and TIA admitted via ED for dizziness. Pt reports to diarrhea ongoing since July. Daughter states the diarrhea started after his kidney was removed. Patient didn't require any chemo or radiation. Has 3 loose mushy- pasty brown stools. Denies any black/blood appearance. Denies any OTC medications to aid with the bowels. Patient has been on antibiotics regularly for a poorly healing ulcer to the right heel. Normal bowel pattern, prior to July- soft formed brown stool daily. With a good appetite, denies any recent weight loss. Denies any N/V, or abdominal pain. Patient denies any history of such. Also denies any EGD or colon too date. Denies any FMHx of colon cancer. Admission labs K 3.4, BUN 28, creat 1.9, , calcium 8.5, protein 5.7, troponin 78, albumin 2.7. RBC 3.43, H&H 9.6 & 31.2, MCHC 30.8, RDW 15.8, PT 24.2. CXR:  Small patchy infiltrate seen within the right lung base. CT ABD/Pelvis WO: 1. Gas filled distended sigmoid colon as well as gas-filled distended mid and distal transverse colon. Mild thickened appearance of wall of distal sigmoid colon and rectum. Findings could suggest infectious or inflammatory colitis/proctitis. 2. No free air or free fluid.  3. Opacities are present in lung bases notable on the right which could suggest bibasilar pneumonia. Consultation for chronic diarrhea, family request.  Currently, pt reports to feeling better since being admitted. Tolerating diet, with immediate loose stools. Denies any N/V, or abdominal pain. Labs today K 3.1, creat 2.0, , Calcium 8.4, H&H just ordered    Past Medical History:        Diagnosis Date    Acquired absence of kidney     Acute kidney injury ([address]     Atherosclerosis of native artery of right lower extremity with ulceration of heel (HCC) 08/15/2022    Atrial fibrillation (HCC)     Cancer (HCC)     kidney    Cellulitis     Chronic renal failure, stage 3b ([address] 08/15/2022    Diabetes mellitus (HCC)     Dysphagia, oropharyngeal phase     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HCC)     Femoral-popliteal atherosclerosis ([address] 09/13/2022    Hyperkalemia     Hypertension     Ischemic stroke ([address] 03/06/2018    Muscle weakness (generalized)     Pacemaker     Paraplegia (HCC)     PVD (peripheral vascular disease) ([address]     Repeated falls     Sinus node dysfunction (HCC)     Stroke (cerebrum) ([address] 02/27/2018    Thyroid disease     TIA (transient ischemic attack)     Ulcer of right heel and midfoot with fat layer exposed ([address] 08/15/2022     Past Surgical History:        Procedure Laterality Date    CARDIAC PACEMAKER PLACEMENT  12/19/2014    COLONOSCOPY      EYE SURGERY      KIDNEY REMOVAL Left 07/05/2022    CANCER [facility] removed    KNEE ARTHROSCOPY  right knee    PACEMAKER PLACEMENT      [place] COLONOSCOPY FLX DX W/COLLJ SPEC WHEN PFRMD N/A 03/20/2018    COLONOSCOPY DIAGNOSTIC performed by Ghada Lal MD at [address] EGD PERCUTANEOUS PLACEMENT GASTROSTOMY TUBE N/A 03/29/2018    PEG TUBE/PT.  IN 5507 B performed by Ghada Lal MD at [address] EGD PERCUTANEOUS PLACEMENT GASTROSTOMY TUBE N/A 09/12/2018    EGD PEG TUBE PLACEMENT performed by Ghada Lal MD at [facility] ENDOSCOPY     Current Medications:    Current Facility-Administered Medications: warfarin placeholder: dosing by provider, , Other, RX Placeholder  acetaminophen (TYLENOL) tablet 650 mg, 650 mg, Oral, Q4H PRN  amLODIPine (NORVASC) tablet 2.5 mg, 2.5 mg, Oral, Daily  atorvastatin (LIPITOR) tablet 20 mg, 20 mg, Oral, Nightly  bisacodyl (DULCOLAX) suppository 10 mg, 10 mg, Rectal, Daily PRN  ferrous sulfate (IRON 325) tablet 325 mg, 325 mg, Oral, BID  gabapentin (NEURONTIN) capsule 300 mg, 300 mg, Oral, Nightly  levothyroxine (SYNTHROID) tablet 50 mcg, 50 mcg, Oral, Daily  metoprolol tartrate (LOPRESSOR) tablet 25 mg, 25 mg, Oral, BID  pantoprazole (PROTONIX) tablet 40 mg, 40 mg, Oral, Daily  warfarin (COUMADIN) tablet 8 mg, 8 mg, Oral, Daily  0.9% NaCl with KCl 40 mEq infusion, , IntraVENous, Continuous  [COMPLETED] cefepime (MAXIPIME) 2000 mg IVPB minibag, 2,000 mg, IntraVENous, Once **FOLLOWED BY** cefepime (MAXIPIME) 2000 mg IVPB minibag, 2,000 mg, IntraVENous, Q12H  loperamide (IMODIUM) capsule 2 mg, 2 mg, Oral, Q6H PRN  sodium chloride flush 0.9 % injection 5-40 mL, 5-40 mL, IntraVENous, 2 times per day  sodium chloride flush 0.9 % injection 5-40 mL, 5-40 mL, IntraVENous, PRN    Allergies:  Bee venom    Social History:  Denies any tobacco, alcohol or drug use    Family History:   Family History   Problem Relation Age of Onset    Heart Disease Mother     Stroke Father      Denies any FMHx of colon cancer    REVIEW OF SYSTEMS:    Aside from what was mentioned in the PMH and HPI, essentially unremarkable, all others negative.     PHYSICAL EXAM:      Vitals:    BP (!) 169/69   Pulse 62   Temp 98.7 °F (37.1 °C) (Oral)   Resp 16   Ht 6' 3\" (1.905 m)   Wt 292 lb (132.5 kg)   SpO2 95%   BMI 36.50 kg/m²       CONSTITUTIONAL:  awake, alert, cooperative, no apparent distress, and appears stated age  EYES:  pupils equal, round and reactive to light, sclera anicteric and conjunctiva normal  ENT:  normocephalic, oral pharynx with moist mucous membranes  LUNGS:  clear to auscultation bilaterally.   CARDIOVASCULAR:   regular rate and rhythm, no murmur noted; 2+ pulses; no edema  ABDOMEN:  normal bowel sounds, soft, non-distended, non-tender, no masses palpated  NEUROLOGIC:  Mental Status Exam:  Level of Alertness:   awake  Orientation:   person, place, time  SKIN:  normal skin color, texture, turgor    DATA:    CBC with Differential:    Lab Results   Component Value Date/Time    WBC 5.0 11/14/2022 11:30 PM    RBC 3.33 11/14/2022 11:30 PM    HGB 9.3 11/14/2022 11:30 PM    HCT 29.8 11/14/2022 11:30 PM     11/14/2022 11:30 PM    MCV 89.5 11/14/2022 11:30 PM    MCH 27.9 11/14/2022 11:30 PM    MCHC 31.2 11/14/2022 11:30 PM    RDW 15.9 11/14/2022 11:30 PM    SEGSPCT 80 01/04/2012 03:00 AM    BANDSPCT 1 11/27/2014 04:50 AM    LYMPHOPCT 22.2 11/14/2022 02:27 PM    MONOPCT 9.3 11/14/2022 02:27 PM    MYELOPCT 0.9 02/28/2018 05:21 AM    BASOPCT 0.8 11/14/2022 02:27 PM    MONOSABS 0.55 11/14/2022 02:27 PM    LYMPHSABS 1.32 11/14/2022 02:27 PM    EOSABS 0.35 11/14/2022 02:27 PM    BASOSABS 0.05 11/14/2022 02:27 PM     CMP:    Lab Results   Component Value Date/Time     11/16/2022 02:26 AM    K 3.1 11/16/2022 02:26 AM    K 3.1 11/14/2022 11:30 PM     11/16/2022 02:26 AM    CO2 24 11/16/2022 02:26 AM    BUN 23 11/16/2022 02:26 AM    CREATININE 2.0 11/16/2022 02:26 AM    GFRAA 42 10/10/2022 05:52 AM    LABGLOM 34 11/16/2022 02:26 AM    GLUCOSE 138 11/16/2022 02:26 AM    GLUCOSE 297 01/04/2012 03:00 AM    PROT 5.7 11/14/2022 02:27 PM    LABALBU 2.7 11/14/2022 02:27 PM    LABALBU 3.4 01/04/2012 03:00 AM    CALCIUM 8.4 11/16/2022 02:26 AM    BILITOT 0.3 11/14/2022 02:27 PM    ALKPHOS 77 11/14/2022 02:27 PM    AST 9 11/14/2022 02:27 PM    ALT <5 11/14/2022 02:27 PM     Hepatic Function Panel:    Lab Results   Component Value Date/Time    ALKPHOS 77 11/14/2022 02:27 PM    ALT <5 11/14/2022 02:27 PM    AST 9 11/14/2022 02:27 PM    PROT 5.7 11/14/2022 02:27 PM    BILITOT 0.3 11/14/2022 02:27 PM BILIDIR <0.2 08/01/2022 01:21 PM    IBILI see below 08/01/2022 01:21 PM    LABALBU 2.7 11/14/2022 02:27 PM    LABALBU 3.4 01/04/2012 03:00 AM     PT/INR:    Lab Results   Component Value Date/Time    PROTIME 21.4 11/14/2022 11:30 PM    PROTIME 12.2 12/30/2011 10:20 PM    INR 1.9 11/14/2022 11:30 PM     PTT:    Lab Results   Component Value Date/Time    APTT 30.5 09/28/2022 09:39 AM   [APTT}  Last 3 Troponin:    Lab Results   Component Value Date/Time    TROPONINI <0.01 10/01/2018 11:15 AM    TROPONINI <0.01 09/23/2018 09:10 AM    TROPONINI <0.01 09/07/2018 05:51 PM     TSH:    Lab Results   Component Value Date/Time    TSH 1.860 11/14/2022 08:15 PM     VITAMIN B12:   Lab Results   Component Value Date/Time    ATBXEJFF30 270 08/04/2022 04:11 AM     FOLATE:    Lab Results   Component Value Date/Time    FOLATE 8.7 08/04/2022 04:11 AM     IRON:    Lab Results   Component Value Date/Time    IRON 31 08/04/2022 04:11 AM     Iron Saturation:    Lab Results   Component Value Date/Time    LABIRON 18 08/04/2022 04:11 AM     TIBC:    Lab Results   Component Value Date/Time    TIBC 173 08/04/2022 04:11 AM     FERRITIN:    Lab Results   Component Value Date/Time    FERRITIN 178 08/04/2022 04:11 AM       Lab Results   Component Value Date    TRIG 205 (H) 07/11/2022    TRIG 258 (H) 04/22/2022    TRIG 273 (H) 01/21/2022       Lab Results   Component Value Date    HDL 32 07/11/2022    HDL 35 04/22/2022    HDL 34 01/21/2022       Lab Results   Component Value Date    LDLCALC 96 07/11/2022    LDLCALC 103 (H) 04/22/2022    LDLCALC 101 (H) 01/21/2022       Lab Results   Component Value Date    LABVLDL 41 07/11/2022    LABVLDL 52 04/22/2022    LABVLDL 55 01/21/2022        CT ABDOMEN PELVIS WO CONTRAST Additional Contrast? None    Result Date: 11/14/2022  EXAMINATION: CT OF THE ABDOMEN AND PELVIS WITHOUT CONTRAST 11/14/2022 4:43 pm TECHNIQUE: CT of the abdomen and pelvis was performed without the administration of intravenous contrast. Multiplanar reformatted images are provided for review. Automated exposure control, iterative reconstruction, and/or weight based adjustment of the mA/kV was utilized to reduce the radiation dose to as low as reasonably achievable. COMPARISON: None. HISTORY: ORDERING SYSTEM PROVIDED HISTORY: abd distension TECHNOLOGIST PROVIDED HISTORY: Reason for exam:->abd distension Additional Contrast?->None FINDINGS: Distended sigmoid colon measuring approximately 12 cm in greatest dimension. Moderate distension of the mid and distal transverse colon measuring up to 8.5 cm. Right colon and left colon are nondistended. Thickened appearance of wall of distal sigmoid colon and rectum. No free air or free fluid. The appendix is visualized and appears unremarkable. Liver is unremarkable. Gallbladder is unremarkable. No evidence of acute pancreatitis. Spleen is unremarkable. Evidence of left nephrectomy. No retroperitoneal lymphadenopathy. Right kidney is unremarkable. Confluent opacities are present in visualized lung bases. 1. Gas filled distended sigmoid colon as well as gas-filled distended mid and distal transverse colon. Mild thickened appearance of wall of distal sigmoid colon and rectum. Findings could suggest infectious or inflammatory colitis/proctitis. 2. No free air or free fluid. 3. Opacities are present in lung bases notable on the right which could suggest bibasilar pneumonia. CT Head W/O Contrast    Result Date: 11/14/2022  EXAMINATION: CT OF THE HEAD WITHOUT CONTRAST  11/14/2022 4:43 pm TECHNIQUE: CT of the head was performed without the administration of intravenous contrast. Automated exposure control, iterative reconstruction, and/or weight based adjustment of the mA/kV was utilized to reduce the radiation dose to as low as reasonably achievable. COMPARISON: CT head without contrast, 09/21/2022.  HISTORY: ORDERING SYSTEM PROVIDED HISTORY: weakness TECHNOLOGIST PROVIDED HISTORY: Reason for exam:->weakness Has a \"code stroke\" or \"stroke alert\" been called? ->No Decision Support Exception - unselect if not a suspected or confirmed emergency medical condition->Emergency Medical Condition (MA) FINDINGS: BRAIN/VENTRICLES: No mass effect, edema or hemorrhage is seen. A chronic infarction is seen in the left temporal occipital region. Chronic lacunar infarctions are seen in the right basal ganglia and the left thalamus. The remainder of the brain is notable for mild-to-moderate volume loss and mild chronic microvascular ischemic changes. Note is made of cavum septum pellucidum and vergae  (anatomic variant). No hydrocephalus or extra-axial fluid is seen. ORBITS: The visualized portion of the orbits demonstrate no acute abnormality. SINUSES: The visualized paranasal sinuses and mastoid air cells demonstrate no acute abnormality. SOFT TISSUES/SKULL:  No acute abnormality of the visualized skull or soft tissues. 1.  No acute intracranial abnormality. 2. Chronic infarction in the left temporal occipital region. 3. Chronic lacunar infarctions in the right basal ganglia and the left thalamus. XR CHEST PORTABLE    Result Date: 11/14/2022  EXAMINATION: ONE XRAY VIEW OF THE CHEST 11/14/2022 1:27 pm COMPARISON: None. HISTORY: ORDERING SYSTEM PROVIDED HISTORY: weakness TECHNOLOGIST PROVIDED HISTORY: Reason for exam:->weakness FINDINGS: The heart is enlarged. There is a dual lead cardiac pacer on the left There is a small patchy infiltrate seen within the right lung base. Right upper lobe and left lung are clear. There is no pleural effusion. 1. Small patchy infiltrate seen within the right lung base.        IMPRESSION:    Colitis/ proctitis   Loose stools  Fecal urgency  Anemia, normocytic  Electrolyte abnormalities- defer  HX of kidney CA with L nephrectomy   Foot ulcer  DM- defer  Afib- defer  Pneumonia- defer    RECOMMENDATIONS:      Additional stool studies have been ordered; including elastase, fecal fat, & calprotectin  Celiac panel today  H&H now  Continue Protonix as ordered  Occult stool x1  Cholestyramine 1 packet TID  Bentyl 10 MG nightly  Culturelle daily  Lactose controlled diet  CRP now  Continue antibiotics as ordered  Monitor stools  Supportive care trend labs  Will follow    Thank you very much for your consultation. We will follow closely with you. Discussed with Dr. Corrinne Siemens developed by Dr. Chris Maya, NP-C 11/16/2022 11:48 AM for Dr. Randi Daigle. I HAD A FACE TO FACE ENCOUNTER WITH THE PATIENT. AGREE WITH THE EXAM, ASSESSMENT, AND PLAN AS OUTLINED ABOVE. ADDITION AND CORRECTIONS WERE DONE AS DEEMED APPROPRIATE. MY EXAM AND PLAN INCLUDE: POSSIBLE ANTIBIOTIC ASSOCIATED  DIARRHEA. START CHOLESTYRAMINE THREE TIMES A DAY, BENTYL AT HS, AND LACTOSE FREE DIET. AWAIT STOOL STUDIES.

## 2022-11-16 NOTE — PROGRESS NOTES
Admit Date: 11/14/2022    Subjective:     Feels better same cough breathing easier     Objective:     No data found. I/O last 3 completed shifts:  In: -   Out: 1000 [Urine:1000]  No intake/output data recorded. HEENT: Normal  NECK: Thyroid normal. No carotid bruit. No lymphphadenopathy. CVS: RRR  RS: Clear. No wheeze. No rhonchi. ABD: Soft. Non tender. No mass. Normal BS. EXT: No edema. Non tender. Pulses present.    NEURO: no focal deficit       Scheduled Meds:   amLODIPine  2.5 mg Oral Daily    atorvastatin  20 mg Oral Nightly    ferrous sulfate  325 mg Oral BID    gabapentin  300 mg Oral Nightly    levothyroxine  50 mcg Oral Daily    metoprolol tartrate  25 mg Oral BID    pantoprazole  40 mg Oral Daily    warfarin  8 mg Oral Daily    cefepime  2,000 mg IntraVENous Q12H    sodium chloride flush  5-40 mL IntraVENous 2 times per day     Continuous Infusions:   0.9% NaCl with KCl 40 mEq 50 mL/hr at 11/16/22 0448       CBC with Differential:    Lab Results   Component Value Date/Time    WBC 5.0 11/14/2022 11:30 PM    RBC 3.33 11/14/2022 11:30 PM    HGB 9.3 11/14/2022 11:30 PM    HCT 29.8 11/14/2022 11:30 PM     11/14/2022 11:30 PM    MCV 89.5 11/14/2022 11:30 PM    MCH 27.9 11/14/2022 11:30 PM    MCHC 31.2 11/14/2022 11:30 PM    RDW 15.9 11/14/2022 11:30 PM    SEGSPCT 80 01/04/2012 03:00 AM    BANDSPCT 1 11/27/2014 04:50 AM    LYMPHOPCT 22.2 11/14/2022 02:27 PM    MONOPCT 9.3 11/14/2022 02:27 PM    MYELOPCT 0.9 02/28/2018 05:21 AM    BASOPCT 0.8 11/14/2022 02:27 PM    MONOSABS 0.55 11/14/2022 02:27 PM    LYMPHSABS 1.32 11/14/2022 02:27 PM    EOSABS 0.35 11/14/2022 02:27 PM    BASOSABS 0.05 11/14/2022 02:27 PM     CMP:    Lab Results   Component Value Date/Time     11/16/2022 02:26 AM    K 3.1 11/16/2022 02:26 AM    K 3.1 11/14/2022 11:30 PM     11/16/2022 02:26 AM    CO2 24 11/16/2022 02:26 AM    BUN 23 11/16/2022 02:26 AM    CREATININE 2.0 11/16/2022 02:26 AM    GFRAA 42 10/10/2022 05:52 AM LABGLOM 34 11/16/2022 02:26 AM    PROT 5.7 11/14/2022 02:27 PM    LABALBU 2.7 11/14/2022 02:27 PM    LABALBU 3.4 01/04/2012 03:00 AM    CALCIUM 8.4 11/16/2022 02:26 AM    BILITOT 0.3 11/14/2022 02:27 PM    ALKPHOS 77 11/14/2022 02:27 PM    AST 9 11/14/2022 02:27 PM    ALT <5 11/14/2022 02:27 PM     PT/INR:    Lab Results   Component Value Date/Time    PROTIME 21.4 11/14/2022 11:30 PM    PROTIME 12.2 12/30/2011 10:20 PM    INR 1.9 11/14/2022 11:30 PM       Assessment:     Principal Problem:    Pneumonia RLL ? organism   Colitis  A.Fib.   DM  CKD stage III  S/P nephrectomy  Heal ulcer   ASHD   PVD   Hypokalemia       Plan:   Continue ATB ,K+ supplement ,continue IV fluid

## 2022-11-16 NOTE — CARE COORDINATION
+UTI; IV cefipime; c-diff neg;frpm masternick; per son/pt; plan is to return there. + bedhold. Transport/faviola forms on chart. Need covid day of d/c. Elizabeth Sanders.

## 2022-11-16 NOTE — PLAN OF CARE
Problem: ABCDS Injury Assessment  Goal: Absence of physical injury  Outcome: Progressing     Problem: Skin/Tissue Integrity  Goal: Absence of new skin breakdown  Description: 1. Monitor for areas of redness and/or skin breakdown  2. Assess vascular access sites hourly  3. Every 4-6 hours minimum:  Change oxygen saturation probe site  4. Every 4-6 hours:  If on nasal continuous positive airway pressure, respiratory therapy assess nares and determine need for appliance change or resting period.   Outcome: Progressing     Problem: Safety - Adult  Goal: Free from fall injury  Outcome: Progressing

## 2022-11-17 VITALS
WEIGHT: 292 LBS | RESPIRATION RATE: 18 BRPM | DIASTOLIC BLOOD PRESSURE: 82 MMHG | HEIGHT: 75 IN | BODY MASS INDEX: 36.31 KG/M2 | OXYGEN SATURATION: 95 % | SYSTOLIC BLOOD PRESSURE: 168 MMHG | HEART RATE: 64 BPM | TEMPERATURE: 97.5 F

## 2022-11-17 LAB
ANION GAP SERPL CALCULATED.3IONS-SCNC: 9 MMOL/L (ref 7–16)
BUN BLDV-MCNC: 23 MG/DL (ref 6–23)
CALCIUM SERPL-MCNC: 8.6 MG/DL (ref 8.6–10.2)
CHLORIDE BLD-SCNC: 107 MMOL/L (ref 98–107)
CO2: 24 MMOL/L (ref 22–29)
CREAT SERPL-MCNC: 1.9 MG/DL (ref 0.7–1.2)
GFR SERPL CREATININE-BSD FRML MDRD: 36 ML/MIN/1.73
GLUCOSE BLD-MCNC: 156 MG/DL (ref 74–99)
INR BLD: 1.9
POTASSIUM SERPL-SCNC: 3.4 MMOL/L (ref 3.5–5)
PROTHROMBIN TIME: 20.5 SEC (ref 9.3–12.4)
SARS-COV-2, NAAT: NOT DETECTED
SODIUM BLD-SCNC: 140 MMOL/L (ref 132–146)

## 2022-11-17 PROCEDURE — 6370000000 HC RX 637 (ALT 250 FOR IP): Performed by: NURSE PRACTITIONER

## 2022-11-17 PROCEDURE — 97530 THERAPEUTIC ACTIVITIES: CPT

## 2022-11-17 PROCEDURE — 85610 PROTHROMBIN TIME: CPT

## 2022-11-17 PROCEDURE — 6360000002 HC RX W HCPCS: Performed by: INTERNAL MEDICINE

## 2022-11-17 PROCEDURE — 6370000000 HC RX 637 (ALT 250 FOR IP): Performed by: INTERNAL MEDICINE

## 2022-11-17 PROCEDURE — 80048 BASIC METABOLIC PNL TOTAL CA: CPT

## 2022-11-17 PROCEDURE — 99239 HOSP IP/OBS DSCHRG MGMT >30: CPT | Performed by: INTERNAL MEDICINE

## 2022-11-17 PROCEDURE — 87635 SARS-COV-2 COVID-19 AMP PRB: CPT

## 2022-11-17 PROCEDURE — 2580000003 HC RX 258: Performed by: INTERNAL MEDICINE

## 2022-11-17 PROCEDURE — 36415 COLL VENOUS BLD VENIPUNCTURE: CPT

## 2022-11-17 RX ORDER — CHOLESTYRAMINE 4 G/9G
1 POWDER, FOR SUSPENSION ORAL
Qty: 90 PACKET | Refills: 3 | Status: ON HOLD | OUTPATIENT
Start: 2022-11-17

## 2022-11-17 RX ORDER — AMOXICILLIN AND CLAVULANATE POTASSIUM 875; 125 MG/1; MG/1
1 TABLET, FILM COATED ORAL 2 TIMES DAILY
Qty: 14 TABLET | Refills: 0 | Status: SHIPPED | OUTPATIENT
Start: 2022-11-17 | End: 2022-11-24

## 2022-11-17 RX ORDER — DICYCLOMINE HYDROCHLORIDE 10 MG/1
10 CAPSULE ORAL NIGHTLY
Qty: 120 CAPSULE | Refills: 3 | Status: ON HOLD | OUTPATIENT
Start: 2022-11-17

## 2022-11-17 RX ORDER — LACTOBACILLUS RHAMNOSUS GG 10B CELL
1 CAPSULE ORAL DAILY
Qty: 30 CAPSULE | Refills: 0 | Status: ON HOLD | OUTPATIENT
Start: 2022-11-18

## 2022-11-17 RX ADMIN — ANORECTAL OINTMENT: 15.7; .44; 24; 20.6 OINTMENT TOPICAL at 07:59

## 2022-11-17 RX ADMIN — CHOLESTYRAMINE 4 G: 4 POWDER, FOR SUSPENSION ORAL at 05:50

## 2022-11-17 RX ADMIN — FERROUS SULFATE TAB 325 MG (65 MG ELEMENTAL FE) 325 MG: 325 (65 FE) TAB at 07:59

## 2022-11-17 RX ADMIN — PANTOPRAZOLE SODIUM 40 MG: 40 TABLET, DELAYED RELEASE ORAL at 07:59

## 2022-11-17 RX ADMIN — LEVOTHYROXINE SODIUM 50 MCG: 50 TABLET ORAL at 05:50

## 2022-11-17 RX ADMIN — CHOLESTYRAMINE 4 G: 4 POWDER, FOR SUSPENSION ORAL at 12:33

## 2022-11-17 RX ADMIN — AMLODIPINE BESYLATE 2.5 MG: 2.5 TABLET ORAL at 07:59

## 2022-11-17 RX ADMIN — CEFEPIME 2000 MG: 2 INJECTION, POWDER, FOR SOLUTION INTRAVENOUS at 05:56

## 2022-11-17 RX ADMIN — Medication 1 CAPSULE: at 07:59

## 2022-11-17 RX ADMIN — METOPROLOL TARTRATE 25 MG: 25 TABLET, FILM COATED ORAL at 07:59

## 2022-11-17 ASSESSMENT — PAIN SCALES - GENERAL
PAINLEVEL_OUTOF10: 0
PAINLEVEL_OUTOF10: 0

## 2022-11-17 NOTE — PROGRESS NOTES
Admit Date: 11/14/2022    Subjective:     Feels fine no complaint want to go back to NH     Objective:     Patient Vitals for the past 8 hrs:   BP Temp Temp src Pulse Resp SpO2   11/17/22 0715 (!) 168/82 97.5 °F (36.4 °C) Oral 64 18 95 %     No intake/output data recorded. No intake/output data recorded. HEENT: Normal  NECK: Thyroid normal. No carotid bruit. No lymphphadenopathy. CVS: RRR  RS: Clear. No wheeze. No rhonchi. Good airflow bilaterally. ABD: Softly distended . Non tender. No mass. Normal BS. EXT: No edema. Non tender. Pulses present. Skin intact.   NEURO: no focal deficit       Scheduled Meds:   warfarin placeholder: dosing by provider   Other RX Placeholder    cholestyramine  1 packet Oral TID AC    dicyclomine  10 mg Oral Nightly    lactobacillus  1 capsule Oral Daily    menthol-zinc oxide   Topical BID    amLODIPine  2.5 mg Oral Daily    atorvastatin  20 mg Oral Nightly    ferrous sulfate  325 mg Oral BID    gabapentin  300 mg Oral Nightly    levothyroxine  50 mcg Oral Daily    metoprolol tartrate  25 mg Oral BID    pantoprazole  40 mg Oral Daily    warfarin  8 mg Oral Daily    cefepime  2,000 mg IntraVENous Q12H    sodium chloride flush  5-40 mL IntraVENous 2 times per day     Continuous Infusions:   0.9% NaCl with KCl 40 mEq 50 mL/hr at 11/16/22 0448       CBC with Differential:    Lab Results   Component Value Date/Time    WBC 5.0 11/14/2022 11:30 PM    RBC 3.33 11/14/2022 11:30 PM    HGB 9.8 11/16/2022 01:12 PM    HCT 31.5 11/16/2022 01:12 PM     11/14/2022 11:30 PM    MCV 89.5 11/14/2022 11:30 PM    MCH 27.9 11/14/2022 11:30 PM    MCHC 31.2 11/14/2022 11:30 PM    RDW 15.9 11/14/2022 11:30 PM    SEGSPCT 80 01/04/2012 03:00 AM    BANDSPCT 1 11/27/2014 04:50 AM    LYMPHOPCT 22.2 11/14/2022 02:27 PM    MONOPCT 9.3 11/14/2022 02:27 PM    MYELOPCT 0.9 02/28/2018 05:21 AM    BASOPCT 0.8 11/14/2022 02:27 PM    MONOSABS 0.55 11/14/2022 02:27 PM    LYMPHSABS 1.32 11/14/2022 02:27 PM    EOSABS 0.35 11/14/2022 02:27 PM    BASOSABS 0.05 11/14/2022 02:27 PM     CMP:    Lab Results   Component Value Date/Time     11/17/2022 03:38 AM    K 3.4 11/17/2022 03:38 AM    K 3.1 11/14/2022 11:30 PM     11/17/2022 03:38 AM    CO2 24 11/17/2022 03:38 AM    BUN 23 11/17/2022 03:38 AM    CREATININE 1.9 11/17/2022 03:38 AM    GFRAA 42 10/10/2022 05:52 AM    LABGLOM 36 11/17/2022 03:38 AM    PROT 5.7 11/14/2022 02:27 PM    LABALBU 2.7 11/14/2022 02:27 PM    LABALBU 3.4 01/04/2012 03:00 AM    CALCIUM 8.6 11/17/2022 03:38 AM    BILITOT 0.3 11/14/2022 02:27 PM    ALKPHOS 77 11/14/2022 02:27 PM    AST 9 11/14/2022 02:27 PM    ALT <5 11/14/2022 02:27 PM     PT/INR:    Lab Results   Component Value Date/Time    PROTIME 20.5 11/17/2022 03:38 AM    PROTIME 12.2 12/30/2011 10:20 PM    INR 1.9 11/17/2022 03:38 AM       Assessment:     Principal Problem:   Pneumonia RLL ? organism   Colitis  A.Fib.   DM  CKD stage III  S/P nephrectomy  Heal ulcer   ASHD   PVD   Hypokalemia       Plan:   Improved stable ,discharge when cleared by GI on oral ATB

## 2022-11-17 NOTE — PROGRESS NOTES
Physical Therapy   Treatment Note    Name: Heike Hadley  : 1945  MRN: [id number]      Date of Service: 2022    Evaluating PT:  Kain Gamble. Arlyn Urias, [id number]    Room #:  1145/1412-L  Diagnosis:  Colitis [K52.9]  General weakness [R53.1]  Troponin level elevated [R77.8]  Pneumonia due to other specified infectious organisms [J16.8]  Pneumonia of both lungs due to infectious organism, unspecified part of lung [J18.9]  Chronic kidney disease, unspecified CKD stage [N18.9]  PVD (peripheral vascular disease) ([address] [I73.9]  Pneumonia in diseases classified elsewhere [J17]  PMHx/PSHx:     has a past medical history of Acquired absence of kidney, Acute kidney injury ([address] Atherosclerosis of native artery of right lower extremity with ulceration of heel ([address] Atrial fibrillation ([address] Cancer ([address] Cellulitis, Chronic renal failure, stage 3b ([address] Diabetes mellitus ([address] Dysphagia, oropharyngeal phase, 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[address] Femoral-popliteal atherosclerosis ([address] Hyperkalemia, Hypertension, Ischemic stroke ([address] Muscle weakness (generalized), Pacemaker, Paraplegia ([address] PVD (peripheral vascular disease) ([address] Repeated falls, Sinus node dysfunction ([address] Stroke (cerebrum) ([address] Thyroid disease, TIA (transient ischemic attack), and Ulcer of right heel and midfoot with fat layer exposed ([address] has a past surgical history that includes eye surgery; Knee arthroscopy (right knee); pr colonoscopy flx dx w/collj spec when pfrmd (N/A, 2018); Colonoscopy; pr egd percutaneous placement gastrostomy tube (N/A, 2018); pacemaker placement; pr egd percutaneous placement gastrostomy tube (N/A, 2018); Cardiac pacemaker placement (2014); and Kidney removal (Left, 2022). Precautions:  Fall risk, contact, incontinent, per pt and family the pt has been 50% PWB with a post-op boot/shoe at the [address] and been practicing ambulating with a walker.      SUBJECTIVE:    Pt was admitted from [facility] and was receiving therapy there, prior to that the pt had been living alone in a 1 story home with 3 stairs to enter and 2 rails. Pt ambulated with a [facility] PTA. OBJECTIVE:   Initial Evaluation  Date: 11/15/22 Treatment Short Term/ Long Term   Goals   Was pt agreeable to Eval/treatment? Yes yes    Does pt have pain? Denied No c/o pain    Bed Mobility  Rolling: ModA  Supine to sit: MaxA  Sit to supine: MaxA  Scooting: MaxA Rolling: MOD A  Supine to sit: MOD A  Sit to supine: NA  Scooting: MOD A Basim   Transfers Sit to stand: Basim  Stand to sit: Basim  Stand pivot: ModA with [facility] Sit to stand: MIN A/MOD A  Stand to sit: MIN A  Stand pivot: MIN A with ww SBA   Ambulation    NT due to [address] restriction and no boot in room 15 feet x2 reps with ww 50% PWB RLE and MIN A >20 feet with [facility] + walking boot with SBA (if pt is in fact 50% PWB)   Stair negotiation: ascended and descended  NT NA No stair goal   AM-PAC 6 Clicks 99/21 73/08      BLE ROM is WFL  BLE Strength is grossly 3-/5 to 4/5  Balance: sitting EOB supervision and standing with ww MIN A/SBA    Patient education  Pt educated on 50% PWB RLE, transfer technique    Patient response to education:   Pt verbalized understanding Pt demonstrated skill Pt requires further education in this area   yes yes yes     ASSESSMENT:    Comments:  Pt was found supine in bed crooked and was assisted to straighten him out. He was agreeable to PT. He sat up to EOB and required assist.  While sitting he c/o light headedness that improved with time. His B post-op shoes were donned and then he asked to use BR. He stood up from bed and walked with ww to BR with short step lengths and transferred onto commode with MIN A. Pt was able to perform self hygiene care and then required MOD A to stand up from lower commode surface. He stood at the sink with MIN A/SBA while he washed his hands.   He then walked with slow step too gait with short step lengths to chair next to his bed and sat down. Pt moved slowly and required assist and VCs during functional mobility and is a risk for falls at this time. Treatment:  Patient practiced and was instructed in the following treatment:    Bed mobility, transfers, sitting EOB, standing, and gait with ww to improve functional strength, balance, safety, and endurance. Pt was left sitting up in chair with call light left by patient. PLAN:    Pt is making good progress toward established Physical Therapy goals. Continue with physical therapy current plan of care. Time in  10:15  Time out  10:55    Total Treatment Time  40 minutes     Evaluation Time includes thorough review of current medical information, gathering information on past medical history/social history and prior level of function, completion of standardized testing/informal observation of tasks, assessment of data and education on plan of care and goals. CPT codes:  [] Low Complexity PT evaluation 94867  [] Moderate Complexity PT evaluation 68296  [] High Complexity PT evaluation 30330  [] PT Re-evaluation 11936  [] Gait training 24784 ** minutes  [] Manual therapy 52268 ** minutes  [x] Therapeutic activities 69906 40 minutes  [] Therapeutic exercises 50624 ** minutes  [] Neuromuscular reeducation 30997 ** minutes     Sebastian Reed., P.T.   License Number: [id number]

## 2022-11-17 NOTE — CARE COORDINATION
Iv cefipime; c-diff neg; plan is to return to [facility]; transport /faviola forms on chart; + bedhold. Need covid day of d/c. Katiana Red.

## 2022-11-17 NOTE — CARE COORDINATION
Social Work:    [facility] ambulance was arranged to transport Mr. Oly Ortiz back to [facility] snf today at 2:30 p.m.  Richie [phone] notified patient & son. Social work updated charge RN & Neeru at [employer].       Electronically signed by ROHIT Dodd on 11/17/2022 at 1:41 PM

## 2022-11-17 NOTE — PROGRESS NOTES
Comprehensive Nutrition Assessment    Type and Reason for Visit:  Initial, Consult    Nutrition Recommendations/Plan:   Continue current diet. Diet explanation given to pt via phone. Added wound ONS BID & HP gelatein ONS for optimal nutrition as discussed w/pt. Will monitor while inpatient. Malnutrition Assessment:  Malnutrition Status: At risk for malnutrition (Comment) (d/t chronic wounds) (11/17/22 1218)    Context:  Chronic Illness     Findings of the 6 clinical characteristics of malnutrition:  Energy Intake:  No significant decrease in energy intake  Weight Loss:  No significant weight loss     Body Fat Loss:  Unable to assess (pt in Isolation)     Muscle Mass Loss:  Unable to assess (Pt in Isolation)    Fluid Accumulation:  No significant fluid accumulation     Strength:  Not Performed    Nutrition Assessment:    ADM from [facility] w/PNA, colitis. PMH: Kidney CA w/nephrectomy, DM, CKD 3,Ischemic stroke-Dysphagia, oropharyngeal phase, Paraplegia, PEG tube- 03/29/2018); Pt at nut'l risk for wounds. Will send wound ONS BID & HP gelatein BID(non dairy) as discussed w/pt. Plans to return to facility. Will monitor while inpatient. Nutrition Related Findings:    A&O, diarrhea (ongoing since July since nephrectomy)-pt reports better today, Bloating, + BS, +2 edema, pt denies chewing or swallowing issues, Lactobabaccilus, Questran. Wound Type: Pressure Injury, Multiple, Wound Consult Pending (SCRATCHES. REDNESS, WEEPING, EXCORCIATION, follows w/Dr. Sly Gong)       Current Nutrition Intake & Therapies:    Average Meal Intake: % (per pt via telephone)  Average Supplements Intake: None Ordered  ADULT DIET; Regular; Lactose-Controlled  ADULT ORAL NUTRITION SUPPLEMENT; Lunch, Dinner; Wound Healing Oral Supplement  ADULT ORAL NUTRITION SUPPLEMENT; Lunch, Dinner;  Other Oral Supplement; HP gelatein    Anthropometric Measures:  Height: 6' 3\" (190.5 cm)  Ideal Body Weight (IBW): 196 lbs (89 kg) Admission Body Weight:  (N/A)  Current Body Weight: 292 lb (132.5 kg), 149 % IBW.  Weight Source: Bed Scale (11/14)  Current BMI (kg/m2): 36.5  Usual Body Weight: 290 lb (131.5 kg) (ov 6/12, 250# 8/30 ov, 8/15 250# OV PER emr- NOMETHOD SPECIFIED)  % Weight Change (Calculated): 0.7  Weight Adjustment For: No Adjustment                 BMI Categories: Obese Class 2 (BMI 35.0 -39.9)    Estimated Daily Nutrient Needs:  Energy Requirements Based On: Formula     Energy (kcal/day): 9920-2632  Weight Used for Protein Requirements: Ideal  Protein (g/day): 100-110  Method Used for Fluid Requirements: Other (Comment) (per renal team)  Fluid (ml/day):      Nutrition Diagnosis:   Increased nutrient needs related to increase demand for energy/nutrients as evidenced by wounds    Nutrition Interventions:   Food and/or Nutrient Delivery: Continue Current Diet, Start Oral Nutrition Supplement (Added wound ONS, added HP gelatein (non dairy))  Nutrition Education/Counseling: Education completed (Pt notified he was on lactose controlled diet & what that entailed)  Coordination of Nutrition Care: Continue to monitor while inpatient       Goals:     Goals: by next RD assessment, Meet at least 75% of estimated needs       Nutrition Monitoring and Evaluation:      Food/Nutrient Intake Outcomes: Food and Nutrient Intake, Supplement Intake  Physical Signs/Symptoms Outcomes: Biochemical Data, Chewing or Swallowing, Diarrhea, Constipation, Fluid Status or Edema, Nutrition Focused Physical Findings, Skin, Weight    Discharge Planning:    Continue current diet, Continue Oral Nutrition Supplement     Lilly Young RD  Contact: [phone]

## 2022-11-17 NOTE — PROGRESS NOTES
PROGRESS NOTE        Patient Presents with/Seen in Consultation For      *Reason for Consult: chronic diarrhea, family request  CHIEF COMPLAINT:  dizziness & light-headed  Subjective:     Patient seen Gabrielle Pink in bed in no apparent distress. Denies abdominal pain, nausea or vomiting. Tolerating diet. States last BM yesterday, additional stool studies yet to be collected. Review of Systems  Aside from what was mentioned in the PMH and HPI, essentially unremarkable, all others negative. Objective:     BP (!) 168/82   Pulse 64   Temp 97.5 °F (36.4 °C) (Oral)   Resp 18   Ht 6' 3\" (1.905 m)   Wt 292 lb (132.5 kg)   SpO2 95%   BMI 36.50 kg/m²     General appearance: alert, awake, laying in bed, and cooperative  Eyes: conjunctiva pale, sclera anicteric. PERRL.   Lungs: clear to auscultation bilaterally  Heart: regular rate and rhythm, no murmur, 2+ pulses; no edema  Abdomen: soft, non-tender; bowel sounds normal; no masses,  no organomegaly  Extremities: extremities without edema  Pulses: 2+ and symmetric  Skin: Skin color, texture, turgor normal.   Neurologic: Grossly normal    warfarin placeholder: dosing by provider, RX Placeholder  cholestyramine (QUESTRAN) packet 4 g, TID AC  dicyclomine (BENTYL) capsule 10 mg, Nightly  lactobacillus (CULTURELLE) capsule 1 capsule, Daily  menthol-zinc oxide (CALMOSEPTINE) 0.44-20.6 % ointment, BID  acetaminophen (TYLENOL) tablet 650 mg, Q4H PRN  amLODIPine (NORVASC) tablet 2.5 mg, Daily  atorvastatin (LIPITOR) tablet 20 mg, Nightly  bisacodyl (DULCOLAX) suppository 10 mg, Daily PRN  ferrous sulfate (IRON 325) tablet 325 mg, BID  gabapentin (NEURONTIN) capsule 300 mg, Nightly  levothyroxine (SYNTHROID) tablet 50 mcg, Daily  metoprolol tartrate (LOPRESSOR) tablet 25 mg, BID  pantoprazole (PROTONIX) tablet 40 mg, Daily  warfarin (COUMADIN) tablet 8 mg, Daily  0.9% NaCl with KCl 40 mEq infusion, Continuous  cefepime (MAXIPIME) 2000 mg IVPB minibag, Q12H  loperamide (IMODIUM) capsule 2 mg, Q6H PRN  sodium chloride flush 0.9 % injection 5-40 mL, 2 times per day  sodium chloride flush 0.9 % injection 5-40 mL, PRN       Data Review  CBC:   Lab Results   Component Value Date/Time    WBC 5.0 11/14/2022 11:30 PM    RBC 3.33 11/14/2022 11:30 PM    HGB 9.8 11/16/2022 01:12 PM    HCT 31.5 11/16/2022 01:12 PM    MCV 89.5 11/14/2022 11:30 PM    MCH 27.9 11/14/2022 11:30 PM    MCHC 31.2 11/14/2022 11:30 PM    RDW 15.9 11/14/2022 11:30 PM     11/14/2022 11:30 PM    MPV 9.8 11/14/2022 11:30 PM     CMP:    Lab Results   Component Value Date/Time     11/17/2022 03:38 AM    K 3.4 11/17/2022 03:38 AM    K 3.1 11/14/2022 11:30 PM     11/17/2022 03:38 AM    CO2 24 11/17/2022 03:38 AM    BUN 23 11/17/2022 03:38 AM    CREATININE 1.9 11/17/2022 03:38 AM    GFRAA 42 10/10/2022 05:52 AM    LABGLOM 36 11/17/2022 03:38 AM    GLUCOSE 156 11/17/2022 03:38 AM    GLUCOSE 297 01/04/2012 03:00 AM    PROT 5.7 11/14/2022 02:27 PM    LABALBU 2.7 11/14/2022 02:27 PM    LABALBU 3.4 01/04/2012 03:00 AM    CALCIUM 8.6 11/17/2022 03:38 AM    BILITOT 0.3 11/14/2022 02:27 PM    ALKPHOS 77 11/14/2022 02:27 PM    AST 9 11/14/2022 02:27 PM    ALT <5 11/14/2022 02:27 PM     Hepatic Function Panel:    Lab Results   Component Value Date/Time    ALKPHOS 77 11/14/2022 02:27 PM    ALT <5 11/14/2022 02:27 PM    AST 9 11/14/2022 02:27 PM    PROT 5.7 11/14/2022 02:27 PM    BILITOT 0.3 11/14/2022 02:27 PM    BILIDIR <0.2 08/01/2022 01:21 PM    IBILI see below 08/01/2022 01:21 PM    LABALBU 2.7 11/14/2022 02:27 PM    LABALBU 3.4 01/04/2012 03:00 AM     No components found for: CHLPL    Lab Results   Component Value Date    TRIG 205 (H) 07/11/2022    TRIG 258 (H) 04/22/2022    TRIG 273 (H) 01/21/2022       Lab Results   Component Value Date    HDL 32 07/11/2022    HDL 35 04/22/2022    HDL 34 01/21/2022       Lab Results   Component Value Date    LDLCALC 96 07/11/2022    LDLCALC 103 (H) 04/22/2022    LDLCALC 101 (H) 01/21/2022 Lab Results   Component Value Date    LABVLDL 41 07/11/2022    LABVLDL 52 04/22/2022    LABVLDL 55 01/21/2022      PT/INR:    Lab Results   Component Value Date/Time    PROTIME 20.5 11/17/2022 03:38 AM    PROTIME 12.2 12/30/2011 10:20 PM    INR 1.9 11/17/2022 03:38 AM     IRON:    Lab Results   Component Value Date/Time    IRON 31 08/04/2022 04:11 AM     Iron Saturation:  No components found for: PERCENTFE  FERRITIN:    Lab Results   Component Value Date/Time    FERRITIN 178 08/04/2022 04:11 AM         Assessment:     Active Problems:  Colitis/ proctitis   Loose stools  Fecal urgency  Anemia, normocytic  Electrolyte abnormalities- defer  HX of kidney CA with L nephrectomy   Foot ulcer  DM- defer  Afib- defer  Pneumonia- defer  Elevated CRP 8.9    Plan: Additional stool studies have been ordered; including elastase, fecal fat, & calprotectin, need collected with next BM  Celiac panel pending   Continue Protonix as ordered  Occult stool x1, please document in chart   Cholestyramine 1 packet TID  Bentyl 10 MG nightly  Culturelle daily  Lactose controlled diet  Continue antibiotics as ordered  Monitor stools  Supportive care   Monitor CMP and CBC daily  Continue to monitor  Discharge planning       Note: This report was completed utilizing computer voice recognition software. Every effort has been made to ensure accuracy, however; inadvertent computerized transcription errors may be present.      Discussed with Dr. Monik Grimes per Dr. Darwin ANNE-BRANT 11/17/2022  9:33 AM For Dr. Brionna Perez

## 2022-11-17 NOTE — PLAN OF CARE
Problem: ABCDS Injury Assessment  Goal: Absence of physical injury  11/17/2022 1427 by Bertrand Goldmann, RN  Outcome: Completed  11/17/2022 0600 by Mabel Maloney RN  Outcome: Progressing     Problem: Skin/Tissue Integrity  Goal: Absence of new skin breakdown  Description: 1. Monitor for areas of redness and/or skin breakdown  2. Assess vascular access sites hourly  3. Every 4-6 hours minimum:  Change oxygen saturation probe site  4. Every 4-6 hours:  If on nasal continuous positive airway pressure, respiratory therapy assess nares and determine need for appliance change or resting period.   11/17/2022 1427 by Bertrand Goldmann, RN  Outcome: Completed  11/17/2022 0600 by Mabel Maloney RN  Outcome: Progressing     Problem: Safety - Adult  Goal: Free from fall injury  11/17/2022 1427 by Bertrand Goldmann, RN  Outcome: Completed  11/17/2022 0600 by Mabel Maloney RN  Outcome: Progressing     Problem: Discharge Planning  Goal: Discharge to home or other facility with appropriate resources  11/17/2022 1427 by Bertrand Goldmann, RN  Outcome: Completed  11/17/2022 0600 by Mabel Maloney RN  Outcome: Progressing     Problem: Pain  Goal: Verbalizes/displays adequate comfort level or baseline comfort level  11/17/2022 1427 by Bertrand Goldmann, RN  Outcome: Completed  11/17/2022 0600 by Mabel Maloney RN  Outcome: Progressing     Problem: Chronic Conditions and Co-morbidities  Goal: Patient's chronic conditions and co-morbidity symptoms are monitored and maintained or improved  11/17/2022 1427 by Bertrand Goldmann, RN  Outcome: Completed  11/17/2022 0600 by Mabel Maloney RN  Outcome: Progressing     Problem: Nutrition Deficit:  Goal: Optimize nutritional status  Outcome: Completed

## 2022-11-17 NOTE — PROGRESS NOTES
Initial Inpatient Wound Care    Admit Date: 11/14/2022 12:48 PM    Reason for consult:  chronic heel wound    Significant history:  DM, pacemaker, CKD, ischemic stroke, paraplegia  Per family- vascular stent per Dr. Himanshu Saunders history:  POA, followed with Dr. Mercedes Zhou in past but [facility] another dr. Tesfaye Cool him    Findings:  alert and verbally appropriate. Daughter present  From [facility]  from ED 6x6x obs. Loose soft black with periphery open red areas  . Dressed with adaptic, opticel, gauze, wrap with kerlex  Bilateral heel boots on. Buttocks ricky, with multiple purple area.  Left sacrum buttocks  Impression:  unstageable pressure injury left heel- POA    Interventions in place:  lo air loss, heel boots, SOS    Plan:dressing changes  dietician      Gabrielle Granados RN 11/17/2022 4:18 PM

## 2022-11-18 NOTE — DISCHARGE SUMMARY
Discharge Summary    Date: 11/18/2022  Patient Name: Cassie Wilcox    YOB: 1945     Age: 65 y.o. Admit Date: 11/14/2022  Discharge Date: 11/17/2022  Discharge Condition: Stable    Admission Diagnosis  Colitis [K52.9]; General weakness [R53.1]; Troponin level elevated [R77.8]; Pneumonia due to other specified infectious organisms [J16.8]; Pneumonia of both lungs due to infectious organism, unspecified part of lung [J18.9]; Chronic kidney disease, unspecified CKD stage [N18.9];PVD (peripheral vascular disease) ([facility] [I73.9]; Pneumonia in diseases classified elsewhere [J17]      Discharge Diagnosis  Principal Problem:    Pneumonia of both lungs due to infectious organism  Active Problems:    [facility]per[facility]    Pneumonia in diseases classified elsewhere  Resolved Problems:    * No resolved hospital problems. [facility] Stay  Narrative of Hospital Course:  Cassie Wilcox is a 65 y.o. male with known history of hypertension, diabetes, [employer] pacemaker placement due to atrial fibrillation, CKD, ischemic stroke in 2018 presents to the ED with complaints of lightheadedness and generalized weakness since yesterday from [facility]. Daughter states that he just seems more fatigued and a lot weaker than usual.  Patient had nephrectomy back in July, moved to [facility] about a month ago. Patient usually ambulates well, however yesterday was noted to not able to walk at all. Patient does have a wound on his heel on the right foot, being seen by Dr. Angela Bose podiatry. EMS state that patient had sinus pauses on cardiac monitor, and felt lightheaded during those episodes. It spontaneously resolved, and subsequently symptoms resolved as well. Daughter  also states that the patient has had a distended abdomen for about the past month.   She states all the symptoms started gradually, progressively worsening, nothing makes it better or worse, quality of symptoms is lightheadedness and weakness, with distention of the abdomen, radiation is generalized, severe according to the daughter is moderately severe, timing is constant. The patient self denies fever, chills, diaphoresis, abdominal pain, urine changes, diarrhea, constipation. He denies shortness of breath or chest pain. He is on warfarin for atrial fibrillation. work up consistent with pneumonia and poss. Colitis admitted for management treated with IV ATB seen by cardiology and GI responded well stabilized transferred back to NH on oral ATB     Consultants:  [address] TO GI  IP CONSULT TO DIETITIAN    Surgeries/procedures Performed:      Treatments:            Discharge Plan/Disposition:  To [facility]/Incidental Findings Requiring Follow Up:    Patient Instructions:    Diet: Regular Diet    Activity:Activity as Tolerated  For number of days (if applicable): Other Instructions:    Provider Follow-Up:   No follow-ups on file.      Significant Diagnostic Studies:    Recent Labs:  Admission on 11/14/2022, Discharged on 11/17/2022  WBC                                           Date: 11/14/2022  Value: 5.9         Ref range: 4.5 - 11.5 E9/L    Status: Final  RBC                                           Date: 11/14/2022  Value: 3.43 (A)    Ref range: 3.80 - 5.80 E12/L  Status: Final  Hemoglobin                                    Date: 11/14/2022  Value: 9.6 (A)     Ref range: 12.5 - 16.5 g/dL   Status: Final  Hematocrit                                    Date: 11/14/2022  Value: 31.2 (A)    Ref range: 37.0 - 54.0 %      Status: Final  MCV                                           Date: 11/14/2022  Value: 91.0        Ref range: 80.0 - 99.9 fL     Status: Final  MCH                                           Date: 11/14/2022  Value: 28.0        Ref range: 26.0 - 35.0 pg     Status: Final  MCHC                                          Date: 11/14/2022  Value: 30.8 (A)    Ref range: 32.0 - 34.5 %      Status: Final  RDW                                           Date: 11/14/2022  Value: 15.8 (A)    Ref range: 11.5 - 15.0 fL     Status: Final  Platelets                                     Date: 11/14/2022  Value: 233         Ref range: 130 - 450 E9/L     Status: Final  MPV                                           Date: 11/14/2022  Value: 10.2        Ref range: 7.0 - 12.0 fL      Status: Final  Neutrophils %                                 Date: 11/14/2022  Value: 61.5        Ref range: 43.0 - 80.0 %      Status: Final  Immature Granulocytes %                       Date: 11/14/2022  Value: 0.3         Ref range: 0.0 - 5.0 %        Status: Final  Lymphocytes %                                 Date: 11/14/2022  Value: 22.2        Ref range: 20.0 - 42.0 %      Status: Final  Monocytes %                                   Date: 11/14/2022  Value: 9.3         Ref range: 2.0 - 12.0 %       Status: Final  Eosinophils %                                 Date: 11/14/2022  Value: 5.9         Ref range: 0.0 - 6.0 %        Status: Final  Basophils %                                   Date: 11/14/2022  Value: 0.8         Ref range: 0.0 - 2.0 %        Status: Final  Neutrophils Absolute                          Date: 11/14/2022  Value: 3.65        Ref range: 1.80 - 7.30 E9/L   Status: Final  Immature Granulocytes #                       Date: 11/14/2022  Value: 0.02        Ref range: E9/L               Status: Final  Lymphocytes Absolute                          Date: 11/14/2022  Value: 1.32 (A)    Ref range: 1.50 - 4.00 E9/L   Status: Final  Monocytes Absolute                            Date: 11/14/2022  Value: 0.55        Ref range: 0.10 - 0.95 E9/L   Status: Final  Eosinophils Absolute                          Date: 11/14/2022  Value: 0.35        Ref range: 0.05 - 0.50 E9/L   Status: Final  Basophils Absolute                            Date: 11/14/2022  Value: 0.05        Ref range: 0.00 - 0.20 E9/L   Status: Final  Lactic Acid                                   Date: 11/14/2022  Value: 1.1         Ref range: 0.5 - 2.2 mmol/L   Status: Final  Magnesium                                     Date: 11/14/2022  Value: 1.9         Ref range: 1.6 - 2.6 mg/dL    Status: Final  Sodium                                        Date: 11/14/2022  Value: 139         Ref range: 132 - 146 mmol/L   Status: Final  Potassium                                     Date: 11/14/2022  Value: 3.4 (A)     Ref range: 3.5 - 5.0 mmol/L   Status: Final  Chloride                                      Date: 11/14/2022  Value: 103         Ref range: 98 - 107 mmol/L    Status: Final  CO2                                           Date: 11/14/2022  Value: 25          Ref range: 22 - 29 mmol/L     Status: Final  Anion Gap                                     Date: 11/14/2022  Value: 11          Ref range: 7 - 16 mmol/L      Status: Final  Glucose                                       Date: 11/14/2022  Value: 183 (A)     Ref range: 74 - 99 mg/dL      Status: Final  BUN                                           Date: 11/14/2022  Value: 28 (A)      Ref range: 6 - 23 mg/dL       Status: Final  Creatinine                                    Date: 11/14/2022  Value: 1.9 (A)     Ref range: 0.7 - 1.2 mg/dL    Status: Final  Est, Glom Filt Rate                           Date: 11/14/2022  Value: 36          Ref range: >=60 mL/min/1.73   Status: Final                Comment: Pediatric calculator link  [email or web address]. [email or web address]  Effective Oct 3, 2022  These results are not intended for use in patients  <25years of age. eGFR results are calculated without  a race factor using the 2021 CKD-EPI equation. Careful  clinical correlation is recommended, particularly when  comparing to results calculated using previous equations.   The CKD-EPI equation is less accurate in patients with  extremes of muscle mass, extra-renal metabolism of  creatinine, excessive creatinine ingestion, or following  therapy that affects renal tubular secretion.     Calcium                                       Date: 11/14/2022  Value: 8.5 (A)     Ref range: 8.6 - 10.2 mg/dL   Status: Final  Total Protein                                 Date: 11/14/2022  Value: 5.7 (A)     Ref range: 6.4 - 8.3 g/dL     Status: Final  Albumin                                       Date: 11/14/2022  Value: 2.7 (A)     Ref range: 3.5 - 5.2 g/dL     Status: Final  Total Bilirubin                               Date: 11/14/2022  Value: 0.3         Ref range: 0.0 - 1.2 mg/dL    Status: Final  Alkaline Phosphatase                          Date: 11/14/2022  Value: 77          Ref range: 40 - 129 U/L       Status: Final  ALT                                           Date: 11/14/2022  Value: <5          Ref range: 0 - 40 U/L         Status: Final  AST                                           Date: 11/14/2022  Value: 9           Ref range: 0 - 39 U/L         Status: Final  Color, UA                                     Date: 11/14/2022  Value: Yellow      Ref range: Straw/Yellow       Status: Final  Clarity, UA                                   Date: 11/14/2022  Value: Clear       Ref range: Clear              Status: Final  Glucose, Ur                                   Date: 11/14/2022  Value: Negative    Ref range: Negative mg/dL     Status: Final  Bilirubin Urine                               Date: 11/14/2022  Value: Negative    Ref range: Negative           Status: Final  Ketones, Urine                                Date: 11/14/2022  Value: Negative    Ref range: Negative mg/dL     Status: Final  Specific [place], UA                          Date: 11/14/2022  Value: 1.020       Ref range: 1.005 - 1.030      Status: Final  Blood, Urine                                  Date: 11/14/2022  Value: Negative    Ref range: Negative           Status: Final  pH, UA Date: 11/14/2022  Value: 6.0         Ref range: 5.0 - 9.0          Status: Final  Protein, UA                                   Date: 11/14/2022  Value: TRACE       Ref range: Negative mg/dL     Status: Final  Urobilinogen, Urine                           Date: 11/14/2022  Value: 0.2         Ref range: <2.0 E.U./dL       Status: Final  Nitrite, Urine                                Date: 11/14/2022  Value: Negative    Ref range: Negative           Status: Final  Leukocyte Esterase, Urine                     Date: 11/14/2022  Value: Negative    Ref range: Negative           Status: Final  Hyaline Casts, UA                             Date: 11/14/2022  Value: 0-2         Ref range: 0 - 2 /LPF         Status: Final  WBC, UA                                       Date: 11/14/2022  Value: NONE        Ref range: 0 - 5 /HPF         Status: Final  RBC, UA                                       Date: 11/14/2022  Value: NONE        Ref range: 0 - 2 /HPF         Status: Final  Bacteria, UA                                  Date: 11/14/2022  Value: NONE SEEN   Ref range: None Seen /HPF     Status: Final  Troponin, High Sensitivity                    Date: 11/14/2022  Value: 78 (A)      Ref range: 0 - 11 ng/L        Status: Final                Comment: High Sensitivity Troponin values cannot be compared with  other Troponin methodologies. Patients with high levels of Biotin oral intake (i.e. >5 mg/day)  may have falsely decreased Troponin levels. Samples collected  within 8 hours of biotin intake may require additional information  for diagnosis.     Ventricular Rate                              Date: 11/14/2022  Value: 60          Ref range: BPM                Status: Final  Atrial Rate                                   Date: 11/14/2022  Value: 60          Ref range: BPM                Status: Final  P-R Interval                                  Date: 11/14/2022  Value: 346         Ref range: ms                 Status: Final  QRS Duration                                  Date: 11/14/2022  Value: 90          Ref range: ms                 Status: Final  Q-T Interval                                  Date: 11/14/2022  Value: 414         Ref range: ms                 Status: Final  QTc Calculation ([place]ett)                      Date: 11/14/2022  Value: 414         Ref range: ms                 Status: Final  P Axis                                        Date: 11/14/2022  Value: 52          Ref range: degrees            Status: Final  R Axis                                        Date: 11/14/2022  Value: -35         Ref range: degrees            Status: Final  T Axis                                        Date: 11/14/2022  Value: -22         Ref range: degrees            Status: Final  Protime                                       Date: 11/14/2022  Value: 24.2 (A)    Ref range: 9.3 - 12.4 sec     Status: Final  INR                                           Date: 11/14/2022  Value: 2.1           Status: Final  C.diff Toxin/Antigen                          Date: 11/15/2022  Value:               Status: Final                   Value:C. Difficile Toxin A and/or B NOT detected  Normal Range: Not detected    Blood Culture, Routine                        Date: 11/14/2022  Value: 24 Hours no growth                       Status: Preliminary  Culture, Blood 2                              Date: 11/14/2022  Value: 24 Hours no growth                       Status: Preliminary  SARS-CoV-2, NAAT                              Date: 11/14/2022  Value: Not Detected                     Ref range: Not Detected       Status: Final                Comment: Rapid NAAT:   Negative results should be treated as presumptive and,  if inconsistent with clinical signs and symptoms or necessary for  patient management, should be tested with an alternative molecular  assay.  Negative results do not preclude SARS-CoV-2 infection and  should not be used as the sole basis for patient management decisions. This test has been authorized by the FDA under an Emergency Use  Authorization (EUA) for use by authorized laboratories. Fact sheet for Healthcare Providers:  [employer].com.cy  Fact sheet for Patients: [employer].cy    METHODOLOGY: Isothermal Nucleic Acid Amplification    Troponin, High Sensitivity                    Date: 11/14/2022  Value: 71 (A)      Ref range: 0 - 11 ng/L        Status: Final                Comment: High Sensitivity Troponin values cannot be compared with  other Troponin methodologies. Patients with high levels of Biotin oral intake (i.e. >5 mg/day)  may have falsely decreased Troponin levels. Samples collected  within 8 hours of biotin intake may require additional information  for diagnosis.     TSH                                           Date: 11/14/2022  Value: 1.860       Ref range: 0.270 - 4.200 uI*  Status: Final  Sodium                                        Date: 11/14/2022  Value: 137         Ref range: 132 - 146 mmol/L   Status: Final  Potassium reflex Magnesium                    Date: 11/14/2022  Value: 3.1 (A)     Ref range: 3.5 - 5.0 mmol/L   Status: Final  Chloride                                      Date: 11/14/2022  Value: 103         Ref range: 98 - 107 mmol/L    Status: Final  CO2                                           Date: 11/14/2022  Value: 25          Ref range: 22 - 29 mmol/L     Status: Final  Anion Gap                                     Date: 11/14/2022  Value: 9           Ref range: 7 - 16 mmol/L      Status: Final  Glucose                                       Date: 11/14/2022  Value: 156 (A)     Ref range: 74 - 99 mg/dL      Status: Final  BUN                                           Date: 11/14/2022  Value: 27 (A)      Ref range: 6 - 23 mg/dL       Status: Final  Creatinine                                    Date: 11/14/2022  Value: 2.2 (A)     Ref range: 0.7 - 1.2 mg/dL    Status: Final  Est, Glom Filt Rate                           Date: 11/14/2022  Value: 30          Ref range: >=60 mL/min/1.73   Status: Final                Comment: Pediatric calculator link  [email or web address]  Effective Oct 3, 2022  These results are not intended for use in patients  <25years of age. eGFR results are calculated without  a race factor using the 2021 CKD-EPI equation. Careful  clinical correlation is recommended, particularly when  comparing to results calculated using previous equations. The CKD-EPI equation is less accurate in patients with  extremes of muscle mass, extra-renal metabolism of  creatinine, excessive creatinine ingestion, or following  therapy that affects renal tubular secretion.     Calcium                                       Date: 11/14/2022  Value: 8.3 (A)     Ref range: 8.6 - 10.2 mg/dL   Status: Final  WBC                                           Date: 11/14/2022  Value: 5.0         Ref range: 4.5 - 11.5 E9/L    Status: Final  RBC                                           Date: 11/14/2022  Value: 3.33 (A)    Ref range: 3.80 - 5.80 E12/L  Status: Final  Hemoglobin                                    Date: 11/14/2022  Value: 9.3 (A)     Ref range: 12.5 - 16.5 g/dL   Status: Final  Hematocrit                                    Date: 11/14/2022  Value: 29.8 (A)    Ref range: 37.0 - 54.0 %      Status: Final  MCV                                           Date: 11/14/2022  Value: 89.5        Ref range: 80.0 - 99.9 fL     Status: Final  MCH                                           Date: 11/14/2022  Value: 27.9        Ref range: 26.0 - 35.0 pg     Status: Final  MCHC                                          Date: 11/14/2022  Value: 31.2 (A)    Ref range: 32.0 - 34.5 %      Status: Final  RDW                                           Date: 11/14/2022  Value: 15.9 (A)    Ref range: 11.5 - 15.0 fL     Status: Final  Platelets Date: 11/14/2022  Value: 216         Ref range: 130 - 450 E9/L     Status: Final  MPV                                           Date: 11/14/2022  Value: 9.8         Ref range: 7.0 - 12.0 fL      Status: Final  Protime                                       Date: 11/14/2022  Value: 21.4 (A)    Ref range: 9.3 - 12.4 sec     Status: Final  INR                                           Date: 11/14/2022  Value: 1.9           Status: Final  ABO/Rh                                        Date: 11/14/2022  Value: AB NEG        Status: Final  Antibody Screen                               Date: 11/14/2022  Value: NEG           Status: Final  Magnesium                                     Date: 11/14/2022  Value: 1.8         Ref range: 1.6 - 2.6 mg/dL    Status: Final  Meter Glucose                                 Date: 11/15/2022  Value: 134 (A)     Ref range: 74 - 99 mg/dL      Status: Final  Sodium                                        Date: 11/16/2022  Value: 138         Ref range: 132 - 146 mmol/L   Status: Final  Potassium                                     Date: 11/16/2022  Value: 3.1 (A)     Ref range: 3.5 - 5.0 mmol/L   Status: Final  Chloride                                      Date: 11/16/2022  Value: 106         Ref range: 98 - 107 mmol/L    Status: Final  CO2                                           Date: 11/16/2022  Value: 24          Ref range: 22 - 29 mmol/L     Status: Final  Anion Gap                                     Date: 11/16/2022  Value: 8           Ref range: 7 - 16 mmol/L      Status: Final  Glucose                                       Date: 11/16/2022  Value: 138 (A)     Ref range: 74 - 99 mg/dL      Status: Final  BUN                                           Date: 11/16/2022  Value: 23          Ref range: 6 - 23 mg/dL       Status: Final  Creatinine                                    Date: 11/16/2022  Value: 2.0 (A)     Ref range: 0.7 - 1.2 mg/dL    Status: Final  EstMartin Filt Rate                           Date: 11/16/2022  Value: 34          Ref range: >=60 mL/min/1.73   Status: Final                Comment: Pediatric calculator link  [email or web address]  Effective Oct 3, 2022  These results are not intended for use in patients  <25years of age. eGFR results are calculated without  a race factor using the 2021 CKD-EPI equation. Careful  clinical correlation is recommended, particularly when  comparing to results calculated using previous equations. The CKD-EPI equation is less accurate in patients with  extremes of muscle mass, extra-renal metabolism of  creatinine, excessive creatinine ingestion, or following  therapy that affects renal tubular secretion.     Calcium                                       Date: 11/16/2022  Value: 8.4 (A)     Ref range: 8.6 - 10.2 mg/dL   Status: Final  test code                                     Date: 11/16/2022  Value: CELIAC PANEL                       Status: Final  This Test Sent To                             Date: 11/16/2022  Value: [facility]          Status: Final  Hemoglobin                                    Date: 11/16/2022  Value: 9.8 (A)     Ref range: 12.5 - 16.5 g/dL   Status: Final  Hematocrit                                    Date: 11/16/2022  Value: 31.5 (A)    Ref range: 37.0 - 54.0 %      Status: Final  CRP                                           Date: 11/16/2022  Value: 8.9 (A)     Ref range: 0.0 - 0.4 mg/dL    Status: Final  Sodium                                        Date: 11/17/2022  Value: 140         Ref range: 132 - 146 mmol/L   Status: Final  Potassium                                     Date: 11/17/2022  Value: 3.4 (A)     Ref range: 3.5 - 5.0 mmol/L   Status: Final  Chloride                                      Date: 11/17/2022  Value: 107         Ref range: 98 - 107 mmol/L    Status: Final  CO2                                           Date: 11/17/2022  Value: 24          Ref range: 22 - 29 mmol/L     Status: Final  Anion Gap                                     Date: 11/17/2022  Value: 9           Ref range: 7 - 16 mmol/L      Status: Final  Glucose                                       Date: 11/17/2022  Value: 156 (A)     Ref range: 74 - 99 mg/dL      Status: Final  BUN                                           Date: 11/17/2022  Value: 23          Ref range: 6 - 23 mg/dL       Status: Final  Creatinine                                    Date: 11/17/2022  Value: 1.9 (A)     Ref range: 0.7 - 1.2 mg/dL    Status: Final  Est, Glom Filt Rate                           Date: 11/17/2022  Value: 36          Ref range: >=60 mL/min/1.73   Status: Final                Comment: Pediatric calculator link  [facility].at. [email or web address]_[email or web address]  Effective Oct 3, 2022  These results are not intended for use in patients  <25years of age. eGFR results are calculated without  a race factor using the 2021 CKD-EPI equation. Careful  clinical correlation is recommended, particularly when  comparing to results calculated using previous equations. The CKD-EPI equation is less accurate in patients with  extremes of muscle mass, extra-renal metabolism of  creatinine, excessive creatinine ingestion, or following  therapy that affects renal tubular secretion.     Calcium                                       Date: 11/17/2022  Value: 8.6         Ref range: 8.6 - 10.2 mg/dL   Status: Final  Protime                                       Date: 11/17/2022  Value: 20.5 (A)    Ref range: 9.3 - 12.4 sec     Status: Final  INR                                           Date: 11/17/2022  Value: 1.9           Status: Final  SARS-CoV-2, NAAT                              Date: 11/17/2022  Value: Not Detected                     Ref range: Not Detected       Status: Final                Comment: Rapid NAAT:   Negative results should be treated as presumptive and,  if inconsistent with clinical signs and symptoms or necessary for  patient management, should be tested with an alternative molecular  assay. Negative results do not preclude SARS-CoV-2 infection and  should not be used as the sole basis for patient management decisions. This test has been authorized by the FDA under an Emergency Use  Authorization (EUA) for use by authorized laboratories. Fact sheet for Healthcare Providers:  http://www.wilma.kane/  Fact sheet for Patients: http://www.wilma.kane/    METHODOLOGY: Isothermal Nucleic Acid Amplification    ------------  Orders Only on 11/17/2022  Source                                        Date: 11/17/2022  Value: Anterior nares                       Status: Final  ------------    Radiology last 7 days:  CT ABDOMEN PELVIS WO CONTRAST Additional Contrast? None    Result Date: 11/14/2022  1. Gas filled distended sigmoid colon as well as gas-filled distended mid and distal transverse colon. Mild thickened appearance of wall of distal sigmoid colon and rectum. Findings could suggest infectious or inflammatory colitis/proctitis. 2. No free air or free fluid. 3. Opacities are present in lung bases notable on the right which could suggest bibasilar pneumonia. CT Head W/O Contrast    Result Date: 11/14/2022  1. No acute intracranial abnormality. 2. Chronic infarction in the left temporal occipital region. 3. Chronic lacunar infarctions in the right basal ganglia and the left thalamus. XR CHEST PORTABLE    Result Date: 11/14/2022  1. Small patchy infiltrate seen within the right lung base.         Pending Labs     Order Current Status    Culture, Stool Collected (11/15/22 1111)    TSH Collected (11/14/22 1427)    Culture, Blood 1 Preliminary result    Culture, Blood 2 Preliminary result    Miscellaneous Sendout Preliminary result        Discharge Medications    Discharge Medication List as of 11/17/2022  2:06 PM    START taking these medications    lactobacillus (CULTURELLE) CAPS capsule  Take 1 capsule by mouth daily, Disp-30 capsule, R-0  Normal    cholestyramine (QUESTRAN) 4 g packet  Take 1 packet by mouth 3 times daily (before meals), Disp-90 packet, R-3  Normal    dicyclomine (BENTYL) 10 MG capsule  Take 1 capsule by mouth nightly, Disp-120 capsule, R-3  Normal    amoxicillin-clavulanate (AUGMENTIN) 875-125 MG per tablet  Take 1 tablet by mouth 2 times daily for 7 days, Disp-14 tablet, R-0  Normal          Discharge Medication List as of 11/17/2022  2:06 PM        Discharge Medication List as of 11/17/2022  2:06 PM    CONTINUE these medications which have NOT CHANGED    ferrous sulfate (IRON 325) 325 (65 Fe) MG tablet  Take 325 mg by mouth 2 times daily  Historical Med    bisacodyl (DULCOLAX) 10 MG suppository  Place 10 mg rectally daily as needed for Constipation  Historical Med    loperamide (IMODIUM) 2 MG capsule  Take 2 mg by mouth every 8 hours as needed for Diarrhea  Historical Med    Nutritional Supplements (ARGINAID) PACK  Take 1 packet by mouth 2 times daily  Historical Med    warfarin (COUMADIN) 6 MG tablet  Take 8 mg by mouth daily  Historical Med    gabapentin (NEURONTIN) 300 MG capsule  Take 1 capsule by mouth nightly for 7 days. , Disp-90 capsule, R-3  DC to SNF    levothyroxine (SYNTHROID) 50 MCG tablet  Take 1 tablet by mouth in the morning., Disp-30 tablet, R-3  Normal    atorvastatin (LIPITOR) 20 MG tablet  Take 1 tablet by mouth nightly, Disp-30 tablet, R-3  DC to SNF    acetaminophen (TYLENOL) 325 MG tablet  Take 650 mg by mouth every 4 hours as needed (DISCOMFORT;PAIN/ELEV TEMP >100.4F)  Historical Med    pantoprazole (PROTONIX) 40 MG tablet  Take 40 mg by mouth daily  Historical Med    amLODIPine (NORVASC) 2.5 MG tablet  Take 1 tablet by mouth daily, Disp-90 tablet, R-1  Normal    metoprolol tartrate (LOPRESSOR) 25 MG tablet  Take 1 tablet by mouth 2 times daily, Disp-180 tablet, R-1  Normal          Discharge Medication List as of 11/17/2022  2:06 PM    STOP taking these medications    magnesium hydroxide (MILK OF MAGNESIA) 400 MG/5ML suspension  Comments:  Reason for Stopping:    insulin lispro (HUMALOG) 100 UNIT/ML SOLN injection vial  Comments:  Reason for Stopping:    insulin lispro (HUMALOG) 100 UNIT/ML SOLN injection vial  Comments:  Reason for Stopping:    ipratropium-albuterol (DUONEB) 0.5-2.5 (3) MG/3ML SOLN nebulizer solution  Comments:  Reason for Stopping:    potassium chloride (KLOR-CON M) 20 MEQ TBCR extended release tablet  Comments:  Reason for Stopping:    lovastatin (MEVACOR) 20 MG tablet  Comments:  Reason for Stopping:    blood glucose test strips (EZ SMART BLOOD GLUCOSE TEST) strip  Comments:  Reason for Stopping:          Time Spent on Discharge:  30 minutes were spent in patient examination, evaluation, counseling as well as medication reconciliation, prescriptions for required medications, discharge plan, and follow up.     Electronically signed by Jean Paul Law MD on 11/18/22 at 7:18 AM EST

## 2022-11-19 LAB
BLOOD CULTURE, ROUTINE: NORMAL
CULTURE, BLOOD 2: NORMAL

## 2022-11-22 LAB
Lab: NORMAL
REPORT: NORMAL
THIS TEST SENT TO: NORMAL

## 2022-11-29 PROBLEM — R14.0 ABDOMINAL DISTENSION: Status: ACTIVE | Noted: 2022-01-01

## 2022-11-29 NOTE — ED PROVIDER NOTES
78-year-old male with extensive past medical history  hypertension, diabetes, [employer] pacemaker placement due to atrial fibrillation, CKD, ischemic stroke in 2018. He was recently discharged 11/18/2022 for hospital course of pneumonia. He presents to the emergency department today from [facility] accompanied by his daughter for complaints of abdominal distention and pain. Daughter has stated his abdominal has been distended for about the past month but has been getting worse since Saturday. Alongside with worsening abdominal distention there is generalized pain in the patient describes as a sharp and achy with no radiation. No aggravating or alleviating factors noted. No palliative measures taken. Patient reports he been having loose mucousy stools, and does his last normal bowel movement. He denies the following: Chest pain, shortness of breath, fever, chills, nausea, vomiting, GI bleed, urinary symptoms, arthralgias, back pain, headache, vision changes, focal deficits. Chief Complaint   Patient presents with    Abdominal Pain     Patient has had Diarrhea for 3 weeks and is very distended. Abd pain. Also cough and O2 added 2lpm nasal cannula by EMS. Review of Systems   All review of systems are negative unless stated in the HPI  Physical Exam  Vitals reviewed. Constitutional:       General: He is not in acute distress. Appearance: He is not ill-appearing. HENT:      Head: Normocephalic. Right Ear: External ear normal.      Left Ear: External ear normal.      Nose: Nose normal.      Mouth/Throat:      Mouth: Mucous membranes are moist.   Eyes:      General:         Right eye: No discharge. Left eye: No discharge. Conjunctiva/sclera: Conjunctivae normal.   Cardiovascular:      Rate and Rhythm: Normal rate and regular rhythm. Heart sounds: No friction rub. No gallop. Pulmonary:      Effort: No respiratory distress. Breath sounds: No stridor. Abdominal:      General: There is distension. Palpations: There is no pulsatile mass. Tenderness: There is abdominal tenderness. There is no guarding or rebound. Musculoskeletal:         General: No deformity or signs of injury. Cervical back: Normal range of motion and neck supple. No rigidity or tenderness. Skin:     Coloration: Skin is not jaundiced. Neurological:      Mental Status: He is alert. Sensory: No sensory deficit. Motor: No weakness. Psychiatric:         Mood and Affect: Mood normal.         Behavior: Behavior normal.        Right foot wound that is dressed. Patient follows wound care  Procedures       MDM       65years old with pertinent PMH of recent hospitalization for colitis and pneumonia. Presents with emergency department for abdominal distention/pain. Symptoms have been getting worse since discharge or noticed loose since Saturday. No nausea no vomiting. No fever no chills. Upon arrival to the ED patients abdomen is visibly distended. Vital signs are stable and patient is afebrile. Heart and lung exam are on remarkable. Abdomen is distended with mild tenderness to palpitation. Is also mildly rigid. Labs unremarkable for infectious count, electrolyte abnormalities and a negative lactic  Imaging increase distention of the transverse and sigmoid colon from prior imaging study. I discussed the case with Dr. Kenrick Urena in great detail. He agreed to see the patient tomorrow. Patient was put on n.p.o. and NP tube as recommended. My attending spoke with the admitting physician who agreed to admit the patient  Impression is abdominal distention. I Discussed the work up at findings with the patient who agreed to be admitted. ED Course as of 11/29/22 2593   Tue Nov 29, 2022   8956 I spoke with Dr. Kenrick Urena and discussed the case in great detail with him. I informed him about the increasing distention from prior imaging.   He said he will be happy to take a look at the patient tomorrow morning. I placed a call out to internal medicine for admission [MN]   2046 Consulted with Dr. Tyrell Chester, hospitalist, who accepted for admission. [KG]      ED Course User Index  [KG] Karuna Rodas DO  [MN] Jacob Childers DO        ED Course as of 11/30/22 0138 Tue Nov 29, 2022   1826 I spoke with Dr. Audrey Castorena and discussed the case in great detail with him. I informed him about the increasing distention from prior imaging. He said he will be happy to take a look at the patient tomorrow morning. I placed a call out to internal medicine for admission [MN]   2046 Consulted with Dr. Tyrell Chester, hospitalist, who accepted for admission. [KG]      ED Course User Index  [KG] Karuna Rodas DO  [MN] Jacob Childers DO       --------------------------------------------- PAST HISTORY ---------------------------------------------  Past Medical History:  has a past medical history of Acquired absence of kidney, Acute kidney injury ([address] Atherosclerosis of native artery of right lower extremity with ulceration of heel ([address] Atrial fibrillation ([address] Cancer ([address] Cellulitis, Chronic renal failure, stage 3b ([address] Diabetes mellitus ([address] Dysphagia, oropharyngeal phase, 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[address] Femoral-popliteal atherosclerosis ([address] Hyperkalemia, Hypertension, Ischemic stroke ([address] Muscle weakness (generalized), Pacemaker, Paraplegia ([address] PVD (peripheral vascular disease) ([address] Repeated falls, Sinus node dysfunction ([address] Stroke (cerebrum) ([address] Thyroid disease, TIA (transient ischemic attack), and Ulcer of right heel and midfoot with fat layer exposed ([address] Past Surgical History:  has a past surgical history that includes eye surgery; Knee arthroscopy (right knee); pr colonoscopy flx dx w/collj spec when pfrmd (N/A, 03/20/2018);  Colonoscopy; pr egd percutaneous placement gastrostomy tube (N/A, 03/29/2018); pacemaker placement; pr egd percutaneous placement gastrostomy tube (N/A, 09/12/2018); Cardiac pacemaker placement (12/19/2014); and Kidney removal (Left, 07/05/2022). Social History:  reports that he quit smoking about 20 years ago. His smoking use included cigars. He has never used smokeless tobacco. He reports that he does not drink alcohol and does not use drugs. Family History: family history includes Heart Disease in his mother; Stroke in his father. The patients home medications have been reviewed.     Allergies: Bee venom    -------------------------------------------------- RESULTS -------------------------------------------------    LABS:  Results for orders placed or performed during the hospital encounter of 11/29/22   COVID-19, Rapid    Specimen: Nasopharyngeal Swab   Result Value Ref Range    SARS-CoV-2, NAAT Not Detected Not Detected   Rapid influenza A/B antigens    Specimen: Nasopharyngeal   Result Value Ref Range    Influenza A by PCR Not Detected Not Detected    Influenza B by PCR Not Detected Not Detected   CBC with Auto Differential   Result Value Ref Range    WBC 8.8 4.5 - 11.5 E9/L    RBC 3.40 (L) 3.80 - 5.80 E12/L    Hemoglobin 9.4 (L) 12.5 - 16.5 g/dL    Hematocrit 30.3 (L) 37.0 - 54.0 %    MCV 89.1 80.0 - 99.9 fL    MCH 27.6 26.0 - 35.0 pg    MCHC 31.0 (L) 32.0 - 34.5 %    RDW 15.9 (H) 11.5 - 15.0 fL    Platelets 991 252 - 907 E9/L    MPV 9.8 7.0 - 12.0 fL    Neutrophils % 70.0 43.0 - 80.0 %    Immature Granulocytes % 0.6 0.0 - 5.0 %    Lymphocytes % 15.9 (L) 20.0 - 42.0 %    Monocytes % 7.7 2.0 - 12.0 %    Eosinophils % 5.1 0.0 - 6.0 %    Basophils % 0.7 0.0 - 2.0 %    Neutrophils Absolute 6.14 1.80 - 7.30 E9/L    Immature Granulocytes # 0.05 E9/L    Lymphocytes Absolute 1.40 (L) 1.50 - 4.00 E9/L    Monocytes Absolute 0.68 0.10 - 0.95 E9/L    Eosinophils Absolute 0.45 0.05 - 0.50 E9/L    Basophils Absolute 0.06 0.00 - 0.20 E9/L   Comprehensive Metabolic Panel w/ Reflex to MG   Result Value Ref Range    Sodium 142 132 - 146 mmol/L    Potassium reflex Magnesium 3.5 3.5 - 5.0 mmol/L    Chloride 110 (H) 98 - 107 mmol/L    CO2 23 22 - 29 mmol/L    Anion Gap 9 7 - 16 mmol/L    Glucose 191 (H) 74 - 99 mg/dL    BUN 33 (H) 6 - 23 mg/dL    Creatinine 2.0 (H) 0.7 - 1.2 mg/dL    Est, Glom Filt Rate 34 >=60 mL/min/1.73    Calcium 8.4 (L) 8.6 - 10.2 mg/dL    Total Protein 6.0 (L) 6.4 - 8.3 g/dL    Albumin 2.7 (L) 3.5 - 5.2 g/dL    Total Bilirubin <0.2 0.0 - 1.2 mg/dL    Alkaline Phosphatase 78 40 - 129 U/L    ALT 9 0 - 40 U/L    AST 12 0 - 39 U/L   Lactic Acid   Result Value Ref Range    Lactic Acid 1.0 0.5 - 2.2 mmol/L   Lipase   Result Value Ref Range    Lipase 10 (L) 13 - 60 U/L   Protime-INR   Result Value Ref Range    Protime 26.7 (H) 9.3 - 12.4 sec    INR 2.4    APTT   Result Value Ref Range    aPTT 36.3 (H) 24.5 - 35.1 sec   Magnesium   Result Value Ref Range    Magnesium 2.0 1.6 - 2.6 mg/dL   Brain Natriuretic Peptide   Result Value Ref Range    Pro-BNP 5,515 (H) 0 - 450 pg/mL   Troponin   Result Value Ref Range    Troponin, High Sensitivity 65 (H) 0 - 11 ng/L       RADIOLOGY:  XR ABDOMEN FOR NG/OG/NE TUBE PLACEMENT   Final Result   Enteric tube tip in the fundus of the stomach. XR ABDOMEN (KUB) (SINGLE AP VIEW)   Final Result   Gas-filled distended large bowel. Please refer to report from CT abdomen and   pelvis performed today. XR CHEST PORTABLE   Final Result   Congestive heart failure. CT ABDOMEN PELVIS WO CONTRAST Additional Contrast? None   Final Result   1. Redemonstration of distended sigmoid colon and distended transverse colon. Distension has increased slightly compared to prior from November 14, 2022.   2. Distal sigmoid colon and rectum are nondistended. Subtle thickened   appearance of wall of distal sigmoid colon related to nondistention or   nonspecific colitis. 3. Increased fat stranding surrounding the rectum possibly related to   proctitis. 4. No free air or free fluid.    5. Moderate bilateral pleural effusions with adjacent opacities. Opacities   have increased suggesting bibasilar pneumonia. \    ------------------------- NURSING NOTES AND VITALS REVIEWED ---------------------------  Date / Time Roomed:  11/29/2022  3:08 PM  ED Bed Assignment:  02/02    The nursing notes within the ED encounter and vital signs as below have been reviewed. Patient Vitals for the past 24 hrs:   BP Temp Temp src Pulse Resp SpO2 Height Weight   11/30/22 0055 (!) 130/55 98.9 °F (37.2 °C) -- 61 16 92 % -- --   11/29/22 2145 (!) 152/70 -- -- 60 -- 97 % -- --   11/29/22 2100 -- -- -- -- 17 -- -- --   11/29/22 1945 (!) 157/70 -- -- 66 11 94 % -- --   11/29/22 1525 (!) 152/69 99.1 °F (37.3 °C) Oral 56 18 98 % 6' 3\" (1.905 m) 272 lb (123.4 kg)       Oxygen Saturation Interpretation: Normal            Diagnosis:  1. Abdominal distention        Disposition:  Patient's disposition: Admit to med/surg floor  Patient's condition is stable. Attending was present and available throughout encounter including all critical portions;  See Attending Note/Attestation for Final Plan      Prashant Mcnamara DO  Resident  11/30/22 Alyssa Wood DO  Resident  11/30/22 0069

## 2022-11-29 NOTE — ED NOTES
Nursing home called [facility] for Abd pain and distentioin: recently hospilized for pneumonia. O2 2lpm via nasal cannula for O2 sat 90: NSS hanging.      Mariola Hood RN  11/29/22 5556

## 2022-11-29 NOTE — ED NOTES
Daughter: Crow Sep [phone]  Son: Roshni Santos [address] [place] [address], RN  11/29/22 1600

## 2022-11-29 NOTE — TELEPHONE ENCOUNTER
Writer contacted Dr. Mckenzie Cordon to inform of 30 day readmission risk. Dr. Mckenzie Cordon informed writer there is no decision on disposition at this time. Patient's work up is still in progress.     Call Back: If you need to call back to inform of disposition you can contact me at [phone]

## 2022-11-30 NOTE — CONSULTS
GENERAL SURGERY  CONSULT NOTE  11/30/2022    Physician Consulted: Dr. Priti Grimaldo  Reason for Consult: Colonic distention      HPI  Cassie Wilcox is a 65 y.o. male who presents for evaluation of colonic distention and abdominal pain. Patient has a significant past medical history of chronic colonic distention, hypertension, diabetes, CKD, A. fib, history of ischemic stroke in 2018. Patient was a recent hospital admission on 11/18 secondary to pneumonia. Daughter brought patient in due to increased abdominal distention and discomfort. She states that this all started last month but over the last couple of days has significantly gotten worse. Patient reports having multiple loose mucousy stools.   On exam this morning patient had no abdominal tenderness but was diffusely distended      Past Medical History:   Diagnosis Date    Acquired absence of kidney     Acute kidney injury ([address]     Atherosclerosis of native artery of right lower extremity with ulceration of heel (HCC) 08/15/2022    Atrial fibrillation (HCC)     Cancer (HCC)     kidney    Cellulitis     Chronic renal failure, stage 3b ([address] 08/15/2022    Diabetes mellitus (HCC)     Dysphagia, oropharyngeal phase     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HCC)     Femoral-popliteal atherosclerosis ([address] 09/13/2022    Hyperkalemia     Hypertension     Ischemic stroke ([address] 03/06/2018    Muscle weakness (generalized)     Pacemaker     Paraplegia (HCC)     PVD (peripheral vascular disease) ([address]     Repeated falls     Sinus node dysfunction ([address]     Stroke (cerebrum) ([address] 02/27/2018    Thyroid disease     TIA (transient ischemic attack)     Ulcer of right heel and midfoot with fat layer exposed ([address] 08/15/2022       Past Surgical History:   Procedure Laterality Date    CARDIAC PACEMAKER PLACEMENT  12/19/2014    COLONOSCOPY      EYE SURGERY      KIDNEY REMOVAL Left 07/05/2022    CANCER [facility] removed    KNEE ARTHROSCOPY  right knee    PACEMAKER PLACEMENT      IN COLONOSCOPY FLX DX W/COLLJ SPEC WHEN PFRMD N/A 03/20/2018    COLONOSCOPY DIAGNOSTIC performed by Emma Blevins MD at [address] PERCUTANEOUS PLACEMENT GASTROSTOMY TUBE N/A 03/29/2018    PEG TUBE/PT. IN 5507 B performed by Emma Blevins MD at [address] EGD PERCUTANEOUS PLACEMENT GASTROSTOMY TUBE N/A 09/12/2018    EGD PEG TUBE PLACEMENT performed by Emma Blevins MD at [address] [facility]       Medications Prior to Admission:    Prior to Admission medications    Medication Sig Start Date End Date Taking? Authorizing Provider   lactobacillus (CULTURELLE) CAPS capsule Take 1 capsule by mouth daily 11/18/22   Dunia Álvarez MD   cholestyramine Leroy Flies) 4 g packet Take 1 packet by mouth 3 times daily (before meals) 11/17/22   Dunia Álvarez MD   dicyclomine (BENTYL) 10 MG capsule Take 1 capsule by mouth nightly 11/17/22   Dunia Álvarez MD   ferrous sulfate (IRON 325) 325 (65 Fe) MG tablet Take 325 mg by mouth 2 times daily    Historical Provider, MD   bisacodyl (DULCOLAX) 10 MG suppository Place 10 mg rectally daily as needed for Constipation    Historical Provider, MD   loperamide (IMODIUM) 2 MG capsule Take 2 mg by mouth every 8 hours as needed for Diarrhea    Historical Provider, MD   Nutritional Supplements (ARGINAID) PACK Take 1 packet by mouth 2 times daily    Historical Provider, MD   warfarin (COUMADIN) 6 MG tablet Take 8 mg by mouth daily    Historical Provider, MD   gabapentin (NEURONTIN) 300 MG capsule Take 1 capsule by mouth nightly for 7 days. 8/8/22 8/15/22  Dunia Álvarez MD   levothyroxine (SYNTHROID) 50 MCG tablet Take 1 tablet by mouth in the morning.  8/9/22   Dunia Álvarez MD   atorvastatin (LIPITOR) 20 MG tablet Take 1 tablet by mouth nightly 8/8/22   Dunia Álvarez MD   acetaminophen (TYLENOL) 325 MG tablet Take 650 mg by mouth every 4 hours as needed (DISCOMFORT;PAIN/ELEV TEMP >100.4F)    Historical Provider, MD   pantoprazole (PROTONIX) 40 MG tablet Take 40 mg by mouth daily Historical Provider, MD   amLODIPine (NORVASC) 2.5 MG tablet Take 1 tablet by mouth daily 21  Lesley Wang DO   potassium chloride (KLOR-CON M) 20 MEQ TBCR extended release tablet Take 1 tablet by mouth daily 3/31/21 8/8/22  Lesley Wang DO   metoprolol tartrate (LOPRESSOR) 25 MG tablet Take 1 tablet by mouth 2 times daily 21   Lesley Wang DO   lovastatin (MEVACOR) 20 MG tablet Take 1 tablet by mouth nightly  Patient not taking: Reported on 20  Lesley Wang DO   blood glucose test strips (EZ SMART BLOOD GLUCOSE TEST) strip 1 each by In Vitro route 2 times daily As needed. 19  Lesley Wang DO       Allergies   Allergen Reactions    Bee Venom Anaphylaxis       Family History   Problem Relation Age of Onset    Heart Disease Mother     Stroke Father        Social History     Tobacco Use    Smoking status: Former     Types: Cigars     Quit date:      Years since quittin.9    Smokeless tobacco: Never   Vaping Use    Vaping Use: Never used   Substance Use Topics    Alcohol use: No    Drug use: No         Review of Systems   General ROS: negative  Hematological and Lymphatic ROS: negative  Respiratory ROS: negative  Cardiovascular ROS: negative  Gastrointestinal ROS: negative  Genito-Urinary ROS: negative  Musculoskeletal ROS: negative      PHYSICAL EXAM:    Vitals:    22 0745   BP: (!) 144/59   Pulse: 60   Resp: 18   Temp: 97.8 °F (36.6 °C)   SpO2: 100%       General Appearance:  awake, alert, in no acute distress  Skin:  Skin color, texture, turgor normal. No rashes or lesions. Head/face:  NCAT  Eyes:  PERRL  Lungs:  No chest wall tenderness. Heart:  Heart regular rate and rhythm  Abdomen:  Soft, non-tender, normal bowel sounds. No bruits, organomegaly or masses.   Extremities: pulses present in all extremities      LABS:    CBC  Recent Labs     22  1618   WBC 8.8   HGB 9.4*   HCT 30.3*        BMP  Recent Labs     22  1618    K 3.5   *   CO2 23   BUN 33*   CREATININE 2.0*   CALCIUM 8.4*     Liver Function  Recent Labs     11/29/22  1618   LIPASE 10*   BILITOT <0.2   AST 12   ALT 9   ALKPHOS 78   PROT 6.0*   LABALBU 2.7*     No results for input(s): LACTATE in the last 72 hours. Recent Labs     11/29/22  1643   INR 2.4       RADIOLOGY    CT ABDOMEN PELVIS WO CONTRAST Additional Contrast? None    Result Date: 11/29/2022  EXAMINATION: CT OF THE ABDOMEN AND PELVIS WITHOUT CONTRAST 11/29/2022 5:03 pm TECHNIQUE: CT of the abdomen and pelvis was performed without the administration of intravenous contrast. Multiplanar reformatted images are provided for review. Automated exposure control, iterative reconstruction, and/or weight based adjustment of the mA/kV was utilized to reduce the radiation dose to as low as reasonably achievable. COMPARISON: November 14, 2022 HISTORY: ORDERING SYSTEM PROVIDED HISTORY: abdominal distintion TECHNOLOGIST PROVIDED HISTORY: Reason for exam:->abdominal distintion Additional Contrast?->None Decision Support Exception - unselect if not a suspected or confirmed emergency medical condition->Emergency Medical Condition (MA) FINDINGS: Redemonstration of distended sigmoid colon measuring up to 16 cm in greatest dimension. Redemonstration of gas-filled distended transverse colon measuring up to 9 cm in greatest dimension. Distal sigmoid colon and rectum are nondistended. Redemonstration of mild thickened appearance of wall of distal transverse colon. Increased fat stranding surrounding the rectum. No free air or free fluid. The appendix is visualized and appears unremarkable. Liver is unremarkable. Gallbladder is contracted. No evidence of acute pancreatitis. Spleen is unremarkable. Left kidney not visualized consistent with history of nephrectomy. No hydronephrosis or calculus associated with the right kidney. Urinary bladder appears normal in contour.   There are bilateral pleural effusions with adjacent opacities. There are increased opacities in visualized lower lung fields and right middle lobe. 1. Redemonstration of distended sigmoid colon and distended transverse colon. Distension has increased slightly compared to prior from November 14, 2022. 2. Distal sigmoid colon and rectum are nondistended. Subtle thickened appearance of wall of distal sigmoid colon related to nondistention or nonspecific colitis. 3. Increased fat stranding surrounding the rectum possibly related to proctitis. 4. No free air or free fluid. 5. Moderate bilateral pleural effusions with adjacent opacities. Opacities have increased suggesting bibasilar pneumonia. XR ABDOMEN (KUB) (SINGLE AP VIEW)    Result Date: 11/29/2022  EXAMINATION: ONE SUPINE XRAY VIEW(S) OF THE ABDOMEN 11/29/2022 5:10 pm COMPARISON: October 1, 2018 HISTORY: ORDERING SYSTEM PROVIDED HISTORY: abdominal distintion TECHNOLOGIST PROVIDED HISTORY: Reason for exam:->abdominal distintion FINDINGS: Gas-filled, distended large bowel. No evidence of pneumoperitoneum. Visualized lung bases are clear. Gas-filled distended large bowel. Please refer to report from CT abdomen and pelvis performed today. XR CHEST PORTABLE    Result Date: 11/29/2022  EXAMINATION: ONE XRAY VIEW OF THE CHEST 11/29/2022 5:10 pm COMPARISON: 11/14/2022. HISTORY: ORDERING SYSTEM PROVIDED HISTORY: short of breath TECHNOLOGIST PROVIDED HISTORY: Reason for exam:->short of breath FINDINGS: AICD visualized in the left chest with 2 leads in the heart. Moderate cardiomegaly visualized that demonstrates increase in size in comparison to the prior study. Peribronchial cuffing and bilateral hilar prominence is seen, cephalization of flow is visualized, scattered areas of patchy airspace opacification is seen, the hemidiaphragms are clear with no evidence of pleural effusion. No evidence of pneumothorax or parenchymal lung mass. Degenerative bone changes seen.      Congestive heart failure. XR ABDOMEN FOR NG/OG/NE TUBE PLACEMENT    Result Date: 11/29/2022  EXAMINATION: ONE SUPINE XRAY VIEW(S) OF THE ABDOMEN 11/29/2022 8:37 pm COMPARISON: None. HISTORY: ORDERING SYSTEM PROVIDED HISTORY: Confirmation of course of NG/OG/NE tube and location of tip of tube TECHNOLOGIST PROVIDED HISTORY: Reason for exam:->Confirmation of course of NG/OG/NE tube and location of tip of tube Portable? ->Yes FINDINGS: Nonspecific bowel gas pattern without evidence of obstruction. No abnormal calcifications. No acute osseous abnormality. Enteric tube tip in the fundus of the stomach. Enteric tube tip in the fundus of the stomach.          ASSESSMENT:  65 y.o. male with colonic distention    PLAN:  No, IV fluids  We will plan for depressive colonoscopy today 11/30    Electronically signed by Jeanne White DO on 11/30/22 at 10:07 AM EST

## 2022-11-30 NOTE — PROGRESS NOTES
Physical Therapy  Facility/Department: [address] INTERMEDIATE 1  Physical Therapy Initial Assessment    Name: Courtney Rojo  : 1945  MRN: [id number]  Date of Service: 2022      Patient Diagnosis(es): The encounter diagnosis was Abdominal distention. Past Medical History:  has a past medical history of Acquired absence of kidney, Acute kidney injury ([address] Atherosclerosis of native artery of right lower extremity with ulceration of heel ([address] Atrial fibrillation ([address] Cancer ([address] Cellulitis, Chronic renal failure, stage 3b ([address] Diabetes mellitus ([address] Dysphagia, oropharyngeal phase, 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[address] Femoral-popliteal atherosclerosis ([address] Hyperkalemia, Hypertension, Ischemic stroke ([address] Muscle weakness (generalized), Pacemaker, Paraplegia ([address] PVD (peripheral vascular disease) ([address] Repeated falls, Sinus node dysfunction ([address] Stroke (cerebrum) ([address] Thyroid disease, TIA (transient ischemic attack), and Ulcer of right heel and midfoot with fat layer exposed ([address] Past Surgical History:  has a past surgical history that includes eye surgery; Knee arthroscopy (right knee); pr colonoscopy flx dx w/collj spec when pfrmd (N/A, 2018); Colonoscopy; pr egd percutaneous placement gastrostomy tube (N/A, 2018); pacemaker placement; pr egd percutaneous placement gastrostomy tube (N/A, 2018); Cardiac pacemaker placement (2014); and Kidney removal (Left, 2022). Evaluating Therapist: Jaden Mckee PT    Room #:  4629/2695-E  Diagnosis:  Abdominal distention [R14.0]  Abdominal distension [R14.0]  PMHx/PSHx:  Chronic renal failure, DM, CVA, R heel wound  Precautions:  falls, O2, NG, 50% WB R foot with post op shoe. Social:  Pt admitted from [place] [address]. Was ambulating with ww. Initial Evaluation  Date: 22 Treatment      Short Term/ Long Term   Goals   Was pt agreeable to Eval/treatment? yes     Does pt have pain?        Bed Mobility  Rolling: max assist  Supine to sit: max assist of 2  Sit to supine: max assist of 2  Scooting: max assist of 2  Mod assist   Transfers  NT  Mod assist   Ambulation    NT  5 feet with ww with mod assist   Stair Negotiation  Ascended and descended  NT      LE strength     3-/5    4+/5   balance      Sitting balance SB to min assist     AM-PAC Raw score               7/24         Pt is alert and Oriented   LE ROM: limited by LE edema  Sensation: intact  Edema: B LEs  Endurance: fair       ASSESSMENT:    Pt displays functional ability as noted in the objective portion of this evaluation. Patient education  Pt educated on PT objectivs    Patient response to education:   Pt verbalized understanding Pt demonstrated skill Pt requires further education in this area   yes           Comments:  Pt with difficulty with mobility due to LE edema. Pt's/ family goals   1. To get stronger    Conditions Requiring Skilled Therapeutic Intervention:    [x]Decreased strength     [x]Decreased ROM  [x]Decreased functional mobility  [x]Decreased balance   [x]Decreased endurance   []Decreased posture  []Decreased sensation  []Decreased coordination   []Decreased vision  []Decreased safety awareness   []Increased pain       Patient and or family understand(s) diagnosis, prognosis, and plan of care.     Prognosis is good for reaching above PT goals    PHYSICAL THERAPY PLAN OF CARE:    PT POC is established based on physician order and patient diagnosis     Referring provider/PT Order: Logan Padron MD/ PT eval and treat      Current Treatment Recommendations:     [x] Strengthening to improve independence with functional mobility   [] ROM to improve independence with functional mobility   [x] Balance Training to improve static/dynamic balance and to reduce fall risk  [x] Endurance Training to improve activity tolerance during functional mobility   [x] Transfer Training to improve safety and independence with all functional transfers   [x] Gait Training to improve gait mechanics, endurance and assess need for appropriate assistive device  [] Stair Training in preparation for safe discharge home and/or into the community   [] Positioning to prevent skin breakdown and contractures  [x] Safety and Education Training   [x] Patient/Caregiver Education   [] HEP  [] Other     PT long term treatment goals are located in above grid    Frequency of treatments: 2-5x/week x 5 days. Time in  1105  Time out  1124      Evaluation Time includes thorough review of current medical information, gathering information on past medical history/social history and prior level of function, completion of standardized testing/informal observation of tasks, assessment of data and education on plan of care and goals.       CPT codes:  [x] Low Complexity PT evaluation 87173  [] Moderate Complexity PT evaluation 12309  [] High Complexity PT evaluation 56414  [] PT Re-evaluation 09477  [] Gait training 13209 minutes  [] Manual therapy 93283 minutes  [] Therapeutic activities 93957 minutes  [] Therapeutic exercises 35229 minutes  [] Neuromuscular reeducation 15842 minutes     [facility] PSYCHIATRY PT [id number]

## 2022-11-30 NOTE — H&P
Nathan Cantrell MD [facility]  Internal medicine   History and Physical      CHIEF COMPLAINT: Abdominal distention and emesis      HISTORY OF PRESENT ILLNESS:    Patient was seen on the floor earlier this morning in the [facility] with the ER physician at the time of admission  Data reviewed in detail with his sister at bedside  79-year-old  man presented from a local nursing home with chronic abdominal distention which got worse associated with emesis  It appears he may have aspirated  Abdominal CT showing marked colonic distention  Admitted for further management  NG tube is intact    Past Medical History:    Past Medical History:   Diagnosis Date    Acquired absence of kidney     Acute kidney injury ([address]     Atherosclerosis of native artery of right lower extremity with ulceration of heel ([address] 08/15/2022    Atrial fibrillation ([address]     Cancer ([address]     kidney    Cellulitis     Chronic renal failure, stage 3b ([address] 08/15/2022    Diabetes mellitus ([address]     Dysphagia, oropharyngeal phase     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[address]     Femoral-popliteal atherosclerosis ([address] 09/13/2022    Hyperkalemia     Hypertension     Ischemic stroke ([address] 03/06/2018    Muscle weakness (generalized)     Pacemaker     Paraplegia ([address]     PVD (peripheral vascular disease) ([address]     Repeated falls     Sinus node dysfunction ([address]     Stroke (cerebrum) ([address] 02/27/2018    Thyroid disease     TIA (transient ischemic attack)     Ulcer of right heel and midfoot with fat layer exposed ([address] 08/15/2022       Past Surgical History:    Past Surgical History:   Procedure Laterality Date    CARDIAC PACEMAKER PLACEMENT  12/19/2014    COLONOSCOPY      EYE SURGERY      KIDNEY REMOVAL Left 07/05/2022    CANCER [facility] removed    KNEE ARTHROSCOPY  right knee    PACEMAKER PLACEMENT      NC COLONOSCOPY FLX DX W/COLLJ SPEC WHEN PFRMD N/A 03/20/2018    COLONOSCOPY DIAGNOSTIC performed by Harmony Mcgowan MD at [address] EGD PERCUTANEOUS PLACEMENT GASTROSTOMY TUBE N/A 03/29/2018    PEG TUBE/PT. [address] 5[address] B performed by Mathew Servin MD at [address] EGD PERCUTANEOUS PLACEMENT GASTROSTOMY TUBE N/A 09/12/2018    EGD PEG TUBE PLACEMENT performed by Mathew Servin MD at [address] [facility]       Medications Prior to Admission:    Medications Prior to Admission: lactobacillus (CULTURELLE) CAPS capsule, Take 1 capsule by mouth daily  cholestyramine (QUESTRAN) 4 g packet, Take 1 packet by mouth 3 times daily (before meals)  dicyclomine (BENTYL) 10 MG capsule, Take 1 capsule by mouth nightly  ferrous sulfate (IRON 325) 325 (65 Fe) MG tablet, Take 325 mg by mouth 2 times daily  bisacodyl (DULCOLAX) 10 MG suppository, Place 10 mg rectally daily as needed for Constipation  loperamide (IMODIUM) 2 MG capsule, Take 2 mg by mouth every 8 hours as needed for Diarrhea  Nutritional Supplements (ARGINAID) PACK, Take 1 packet by mouth 2 times daily  warfarin (COUMADIN) 6 MG tablet, Take 8 mg by mouth daily  gabapentin (NEURONTIN) 300 MG capsule, Take 1 capsule by mouth nightly for 7 days. levothyroxine (SYNTHROID) 50 MCG tablet, Take 1 tablet by mouth in the morning. atorvastatin (LIPITOR) 20 MG tablet, Take 1 tablet by mouth nightly  acetaminophen (TYLENOL) 325 MG tablet, Take 650 mg by mouth every 4 hours as needed (DISCOMFORT;PAIN/ELEV TEMP >100.4F)  pantoprazole (PROTONIX) 40 MG tablet, Take 40 mg by mouth daily  amLODIPine (NORVASC) 2.5 MG tablet, Take 1 tablet by mouth daily  metoprolol tartrate (LOPRESSOR) 25 MG tablet, Take 1 tablet by mouth 2 times daily    Allergies:    Bee venom    Social History:    reports that he quit smoking about 20 years ago. His smoking use included cigars. He has never used smokeless tobacco. He reports that he does not drink alcohol and does not use drugs. Family History:   family history includes Heart Disease in his mother; Stroke in his father.     REVIEW OF SYSTEMS:  As above in the HPI, otherwise negative    PHYSICAL EXAM:    Vitals:  BP (!) 144/59   Pulse 60   Temp 97.8 °F (36.6 °C) (Temporal)   Resp 18   Ht 6' 3\" (1.905 m)   Wt 290 lb 3.2 oz (131.6 kg)   SpO2 100%   BMI 36.27 kg/m²     General:  Awake, alert, oriented X 3. Looks chronically ill  HEENT:  Normocephalic, atraumatic. Pupils equal, round, reactive to light. No scleral icterus. No conjunctival injection. NG tube connected to suction no nasal discharge.   Neck:  Supple  Heart:  RRR, no murmurs, gallops, rubs  Lungs: Scattered wheezes and rhonchi noted  Abdomen: Markedly distended but minimally tender  Bowel sounds sluggish  Extremities:  No clubbing, cyanosis,  Chronic peripheral edema is mild  Skin:  Warm and dry,   Right heel ulcer is healing well with eschar formation  Neuro:  Cranial nerves 2-12 intact, no focal deficits  Breast: deferred  Rectal: deferred  Genitalia:  deferred    LABS:  Data reviewed      ASSESSMENT:      Principal Problem:    Abdominal distension  Aspiration pneumonia  Acute kidney injury  Peripheral arterial disease  Multiple comorbidities present and past as listed below  Patient Active Problem List   Diagnosis    Hypertension    Hyperlipidemia with target LDL less than 100    Impaired mobility and ADLs    Cardiac pacemaker in situ    Cerebrovascular accident (CVA) determined by clinical assessment ([facility]    Left renal mass    Class 2 severe obesity due to excess calories with serious comorbidity and body mass index (BMI) of 35.0 to 35.9 in adult [facility])    Acquired hypothyroidism    H/O deep venous thrombosis    Type 2 diabetes mellitus without complication, without long-term current use of insulin (HCC)    Stage 3 chronic kidney disease (HCC)    Ulcer of right heel and midfoot with fat layer exposed ([address]    Atherosclerosis of native artery of right lower extremity with ulceration of heel (HCC)    Status post nephrectomy    Chronic renal failure, stage 3b (HCC)    Necrotic eschar (HCC)    Diabetic ulcer of left heel associated with type 2 diabetes mellitus, with fat layer exposed ([facility]    Femoral-popliteal atherosclerosis (HCC)    Hyperkalemia    Peripheral vascular disease of lower extremity with ulceration (HCC)    Acute kidney injury superimposed on chronic kidney disease ([facility]    Pneumonia of both lungs due to infectious organism    PVD (peripheral vascular disease) ([facility]    Pneumonia in diseases classified elsewhere    Abdominal distension           PLAN:  Admit to telemetry  N.p.o.  NG tube suctioning  IV fluids  Empirically Unasyn  Inhaled bronchodilators  Monitor labs  Sliding scale with insulin coverage  Scheduled for decompressive colonoscopy  Questions answered to her satisfaction    Mien Rodriguez MD  11:51 AM  11/30/2022

## 2022-11-30 NOTE — FLOWSHEET NOTE
Inpatient Wound Care    Admit Date: 11/29/2022  3:08 PM    Reason for consult:  R heel, sacrum    Significant history:  Admitted with Abdominal distention. History of HTN, DM, CVA and A-fib. Wound history:  POA    Findings:     11/30/22 1341   Wound 11/30/22 Sacrum   Date First Assessed/Time First Assessed: 11/30/22 0200   Present on Hospital Admission: Yes  [facility]: Sacrum   Wound Image    Wound Etiology Deep tissue/Injury   Wound Cleansed   (Bath wipe)   Wound Length (cm) 6 cm   Wound Width (cm) 6 cm   Wound Surface Area (cm^2) 36 cm^2   Change in Wound Size % (l*w) 7.69   Wound Assessment Purple/maroon   Drainage Amount None   Odor None   Tory-wound Assessment Denuded   Wound 11/30/22 Heel Right   Date First Assessed/Time First Assessed: 11/30/22 0200   Present on Hospital Admission: Yes  [facility]: Heel  Wound [facility] Orientation: Right   Wound Image    Wound Etiology Pressure Unstageable   Dressing/Treatment   (Santyl ordered)   Dressing Change Due 11/30/22   Wound Length (cm) 4 cm   Wound Width (cm) 4.5 cm   Wound Surface Area (cm^2) 18 cm^2   Change in Wound Size % (l*w) 33.33   Wound Assessment Eschar dry   Drainage Amount None   Odor None       Impression:  DTI sacrum, Unstageable pressure injury on R heel. Interventions in place:  Sister at the bedside. Pt is non-ambulatory. Abdomen is extremely distended. Pt going for colonoscopy today. Sister states that the nursing home is using Santyl to the heel wound. Discussed his needs re: david bed, Calmoseptine, Eucerin and dressing change. Plan:Daily dressing change to R heel using Santyl  Calmoseptine to buttocks  Eucerin cream to dry skin  David bed,SOS precautions. **Informed Consent**    The patient has given verbal consent to have photos taken of sacrum, R heel  and inserted into their chart as part of their permanent medical record for purposes of documentation, treatment management and/or medical review.    All Images taken on 11/30/22 of patient name: Syl Ward were transmitted and stored on secured [employer] located within [facility] by a registered [employer] Mobile Application Device.           Milagro Lacey RN 11/30/2022 1:44 PM

## 2022-11-30 NOTE — PROGRESS NOTES
Occupational Therapy  OCCUPATIONAL THERAPY INITIAL EVALUATION     Basilia Crain [facility] [place], [place]        EPHL:                                                  Patient Name: Denise Hagen    MRN: [id number]    : 1945    Room: [address] [place] [address]      Evaluating OT: Brittany Petersen, OTR/L [id number]      Referring Provider: Davian Mckeon MD    Specific Provider Orders/Date:OT eval and treat 22      Diagnosis:  Abdominal distention [R14.0]  Abdominal distension [R14.0]      Pertinent Medical History: a-fib, kidney CA, DM, HTN, ischemic stroke, pacemaker, paraplegia, TIA, ulcer of R heel      Precautions:  Fall Risk, NG tube, contact, per pt and family: 50% PWB with post op shoe     Assessment of current deficits    [] Functional mobility  [x]ADLs  [x] Strength               [x]Cognition    [x] Functional transfers   [x] IADLs         [x] Safety Awareness   [x]Endurance    [x] Fine Coordination              [x] Balance      [] Vision/perception   [x]Sensation     []Gross Motor Coordination  [] ROM  [] Delirium                   [] Motor Control     OT PLAN OF CARE   OT POC based on physician orders, patient diagnosis and results of clinical assessment    Frequency/Duration 2-4 days/wk for 2 weeks PRN   Specific OT Treatment Interventions to include:   * Instruction/training on adapted ADL techniques and AE recommendations to increase functional independence within precautions       * Training on energy conservation strategies, correct breathing pattern and techniques to improve independence/tolerance for self-care routine  * Functional transfer/mobility training/DME recommendations for increased independence, safety, and fall prevention  * Patient/Family education to increase follow through with safety techniques and functional independence  * Recommendation of environmental modifications for increased safety with functional transfers/mobility and ADLs  * Cognitive retraining/development of therapeutic activities to improve problem solving, judgement, memory, and attention for increased safety/participation in ADL/IADL tasks  * Therapeutic exercise to improve motor endurance, ROM, and functional strength for ADLs/functional transfers  * Therapeutic activities to facilitate/challenge dynamic balance, stand tolerance for increased safety and independence with ADLs  * Neuro-muscular re-education: facilitation of righting/equilibrium reactions, midline orientation, scapular stability/mobility, normalization of muscle tone, and facilitation of volitional active controled movement  * Positioning to improve skin integrity, interaction with environment and functional independence        Recommended Adaptive Equipment: TBD     Home Living: Pt admitted from [place] [address]. Per pt and family, pt was walking with a walker in therapy. Assist with ADLs. Pain Level: pt did not report pain this date; pt agreeable to therapy  Cognition: A&O: pt alert and oriented grossly; 2 step command follow demonstrated. Pt pleasant during session. Memory:  Fair   Sequencing:  Fair   Problem solving:  Fair   Judgement/safety:  Fair     Functional Assessment:  AM-PAC Daily Activity Raw Score: 10/24   Initial Eval Status  Date: 11/30/22 Treatment Status  Date: STGs = LTGs  Time frame: 10-14 days   Feeding NPO     Grooming Mod A/Min A, seated  SBA   UB Dressing Mod A/Min A  To doff/don gown, bed level  SBA   LB Dressing Dependent   Mod A-with use of AD as appropriate/needed   Bathing Max A  Mod A -with use of AD as appropriate/needed   Toileting Dependent  For hygiene in bed; pt incontinent of bowels and bladder  Mod A    Bed Mobility  Supine to sit: Max A X2   Sit to supine: Max A X2   Scooting: Max A X2  Rolling:  Max A  Mod A   Functional Transfers NT  Mod A    Functional Mobility NT  Mod A -with device as needed to maximize independence with ADLs and functional task completion   Balance Sitting:     Static:  CGA/SBA (cues for UE support)    Dynamic:CGA  Standing: NT  Sup for static/dynamic sitting balance to maximize independence with ADLs and functional task completion    Mod A for standing balance to maximize independence with ADLs and functional task completion   Activity Tolerance Fair with light activity  Fair+ with ADL activity. Pt will demonstrate good understanding of education provided on EC/WS techniques    Visual/  Perceptual Glasses: none at bedside                Additional long-term goal: Pt will increase functional independence to PLOF to allow pt to live in least restrictive environment. Hand Dominance R   AROM (PROM) Strength Additional Info:    RUE  WFL WFL grossly WFL  and FMC/dexterity noted during ADL tasks and functional bed mobility   LUE WFL WFL grossly WFL  and wfl FMC/dexterity noted during ADL tasks and functional bed mobility     Hearing: WFL   Sensation:  No c/o numbness or tingling   Tone: WFL   Edema: BLE and abdomen distended     Comments: Upon arrival patient lying in bed with family present. At end of session, patient returned to bed with call light and phone within reach, all lines and tubes intact, and alarm set and family present. Overall patient demonstrated decreased independence and safety during completion of ADL/functional transfer/mobility tasks. Pt would benefit from continued skilled OT to increase safety and independence with completion of ADL/IADL tasks for functional independence and quality of life. Rehab Potential: Good for established goals     Patient / Family Goal: none stated    Patient and/or family were instructed on functional diagnosis, prognosis/goals and OT plan of care. Demonstrated fair understanding.      Eval Complexity: Low    Time In: 4669  Time Out: 1124    Min Units   OT Eval Low 97165  x  1   OT Eval Medium 93417      OT Eval High 51661      OT Re-Eval [id number]       Therapeutic Ex 38761 Therapeutic Activities 65852       ADL/Self Care 50106       Orthotic Management 68781       Manual 52886     Neuro Re-Ed 24502       Non-Billable Time          Evaluation Time additionally includes thorough review of current medical information, gathering information on past medical history/social history and prior level of function, interpretation of standardized testing/informal observation of tasks, assessment of data and development of plan of care and goals.             Sabino Garrison, OTR/L, [id number]

## 2022-11-30 NOTE — OP NOTE
Operative Note      Patient: Giovany De Oliveira  YOB: 1945  MRN: [id number]    Date of Procedure: 11/30/2022    Pre-Op Diagnosis: abdominal distention    Post-Op Diagnosis:     Wilber syndrome       Procedure(s):  COLONOSCOPY FLEXIBLE W/ DECOMPRESSION    Surgeon(s):  Artis Nam MD    Assistant:   * No surgical staff found *    Anesthesia: Monitor Anesthesia Care    Estimated Blood Loss (mL): Minimal    Complications: None    Specimens:   * No specimens in log *    Implants:  * No implants in log *      Drains:   NG/OG/NJ/NE Tube Nasogastric 18 fr Right nostril (Active)   Surrounding Skin Clean, dry & intact 11/30/22 0745   Securement device Tape 11/30/22 0745   Status Clamped 11/30/22 0745   Placement Verified X-Ray (Initial); External Catheter Length;Respiratory Status 11/29/22 2023   NG/OG/NJ/NE External Measurement (cm) 65 cm 11/30/22 0745       [REMOVED] Urinary Catheter 11/14/22 Romero (Removed)   Catheter Indications Need for fluid volume management of the critically ill patient in a critical care setting 11/15/22 0915   Site Assessment Pink 11/15/22 0915   Urine Color Yellow 11/15/22 0915   Urine Appearance Hazy 11/15/22 0915   Urine Odor Malodorous 11/15/22 0915   Collection Container Standard 11/15/22 0915   Catheter Care Completed Yes 11/15/22 0915   Catheter Best Practices  Drainage tube clipped to bed; Bag below bladder;Bag not on floor; Lack of dependent loop in tubing;Drainage bag less than half full 11/15/22 0356   Status Draining 11/15/22 0915   Output (mL) 750 mL 11/15/22 0356       Findings: As above    Detailed Description of Procedure: The patient was brought to the endoscopy suite and placed on the table in the left lateral decubitus position. The patient received anesthesia per the department of anesthesiology. A digital rectal exam was performed. No gross findings were noted. The endoscope was inserted and passed without difficulty through the majority of the colon.   The colon was dilated. Subsequently, the colon was decompressed. The colon was dysfunctional.  The patient tolerated the procedure well. Assessment:    Atonic or dysfunctional:    Plan: Will speak with family regarding further management.     Electronically signed by Ryan Herrmann MD on 11/30/2022 at 3:10 PM

## 2022-11-30 NOTE — ANESTHESIA POSTPROCEDURE EVALUATION
Department of Anesthesiology  Postprocedure Note    Patient: Weston Garcia  MRN: [id number]  YOB: 1945  Date of evaluation: 11/30/2022      Procedure Summary     Date: 11/30/22 Room / [facility]: [facility] 03 / [facility]    Anesthesia Start: 2217 Anesthesia Stop: 6275    Procedure: COLONOSCOPY FLEXIBLE W/ DECOMPRESSION Diagnosis:       Abdominal distention      (abdominal distention)    Surgeons: Ryan Herrmann MD Responsible Provider: Manjit Kohli MD    Anesthesia Type: MAC ASA Status: 4          Anesthesia Type: No value filed.     Adelaide Phase I: Adelaide Score: 9    Adelaide Phase II:        Anesthesia Post Evaluation    Patient location during evaluation: PACU  Patient participation: complete - patient participated  Level of consciousness: awake and alert  Pain score: 0  Airway patency: patent  Nausea & Vomiting: no vomiting and no nausea  Complications: no  Cardiovascular status: hemodynamically stable  Respiratory status: spontaneous ventilation  Hydration status: stable

## 2022-11-30 NOTE — CONSULTS
Comprehensive Nutrition Assessment    Type and Reason for Visit:  Initial, Wound, Consult    Nutrition Recommendations/Plan:   ADAT when medically feasible  Will add Wound Healing ONS BID when diet is advanced  Continue inpatient monitoring     Malnutrition Assessment:  Malnutrition Status: At risk for malnutrition (Comment) (11/30/22 0619)    Context:  Acute Illness     Findings of the 6 clinical characteristics of malnutrition:  Energy Intake:  50% or less of estimated energy requirements for 5 or more days  Weight Loss:  No significant weight loss     Body Fat Loss:  No significant body fat loss     Muscle Mass Loss:  No significant muscle mass loss    Fluid Accumulation:  No significant fluid accumulation     Strength:  Not Performed    Nutrition Assessment:    Pt admit from NH 2/2 abd distention. Pt to have decompressive C-scope today 11/30 and is NPO. PMH=DM, CKD, CVA, chronic wounds. When PO resumed, will add wound healing ONS, as pt has chronic DTI and heel unstage PI. Continue inpatient monitoring    Nutrition Related Findings:    NGT clamped, A&Ox2, N/V/D PTA, distended tender abd, +1-+2 edema, I/O +1L Wound Type: Pressure Injury, Unstageable, Deep Tissue Injury       Current Nutrition Intake & Therapies:    Average Meal Intake: 1-25% (PTA w/N/V/D - current NPO)  Average Supplements Intake: NPO  Diet NPO    Anthropometric Measures:  Height: 6' 3\" (190.5 cm)  Ideal Body Weight (IBW): 196 lbs (89 kg)    Admission Body Weight: 290 lb 3.2 oz (131.6 kg) (11/30 bedscale)  Current Body Weight: 290 lb 3.2 oz (131.6 kg), 148.1 % IBW.  Weight Source: Bed Scale (11/30)  Current BMI (kg/m2): 36.3  Usual Body Weight: 261 lb 5 oz (118.5 kg) (per EMR x 3 mo - wt fluctuations 250-290# over last 6 mo)  % Weight Change (Calculated): 11.1                    BMI Categories: Obese Class 2 (BMI 35.0 -39.9)    Estimated Daily Nutrient Needs:  Energy Requirements Based On: Kcal/kg ([place])  Weight Used for Submittable Requirements: Current  Energy (kcal/day):  (15-18 kcal/kg)  Weight Used for Protein Requirements: Ideal  Protein (g/day): 105-115 (1.2-1.4 g/kg)  Method Used for Fluid Requirements: 1 ml/kcal  Fluid (ml/day):     Nutrition Diagnosis:   Inadequate oral intake related to altered GI function as evidenced by NPO or clear liquid status due to medical condition, poor intake prior to admission    Nutrition Interventions:   Food and/or Nutrient Delivery: Continue NPO (ADAT when medically feasible and will add wound healing ONS at that time)  Nutrition Education/Counseling: Education not indicated  Coordination of Nutrition Care: Continue to monitor while inpatient       Goals:     Goals: other (specify)  Specify Other Goals: Nutrition Progression    Nutrition Monitoring and Evaluation:   Behavioral-Environmental Outcomes: None Identified  Food/Nutrient Intake Outcomes: Diet Advancement/Tolerance  Physical Signs/Symptoms Outcomes: Biochemical Data, GI Status, Fluid Status or Edema, Nutrition Focused Physical Findings, Skin, Weight    Discharge Planning:    Continue Oral Nutrition Supplement     Aleksandra Ervin RD, [address] , [place]  Contact: [phone]

## 2022-11-30 NOTE — PROGRESS NOTES
Spoke with patients juli Whyte and updated him on the plan of care. Consent for colonoscopy signed over the phone and witnessed by 2 RN's.

## 2022-11-30 NOTE — ANESTHESIA PRE PROCEDURE
Department of Anesthesiology  Preprocedure Note       Name:  Radha Kc   Age:  65 y.o.  :  1945                                          MRN:  [id number]         Date:  2022      Surgeon: Nadir Sosa):  Joshua Olvera MD    Procedure: Procedure(s):  COLONOSCOPY FLEXIBLE W/ DECOMPRESSION    Medications prior to admission:   Prior to Admission medications    Medication Sig Start Date End Date Taking? Authorizing Provider   lactobacillus (CULTURELLE) CAPS capsule Take 1 capsule by mouth daily 22   Gabriella Nieves MD   cholestyramine [place]) 4 g packet Take 1 packet by mouth 3 times daily (before meals) 22   Gabriella Nieves MD   dicyclomine (BENTYL) 10 MG capsule Take 1 capsule by mouth nightly 22   Gabriella Nieves MD   ferrous sulfate (IRON 325) 325 (65 Fe) MG tablet Take 325 mg by mouth 2 times daily    Historical Provider, MD   bisacodyl (DULCOLAX) 10 MG suppository Place 10 mg rectally daily as needed for Constipation    Historical Provider, MD   loperamide (IMODIUM) 2 MG capsule Take 2 mg by mouth every 8 hours as needed for Diarrhea    Historical Provider, MD   Nutritional Supplements (ARGINAID) PACK Take 1 packet by mouth 2 times daily    Historical Provider, MD   warfarin (COUMADIN) 6 MG tablet Take 8 mg by mouth daily    Historical Provider, MD   gabapentin (NEURONTIN) 300 MG capsule Take 1 capsule by mouth nightly for 7 days. 8/8/22 8/15/22  Gabriella Nieves MD   levothyroxine (SYNTHROID) 50 MCG tablet Take 1 tablet by mouth in the morning.  22   Gabriella Nieves MD   atorvastatin (LIPITOR) 20 MG tablet Take 1 tablet by mouth nightly 22   Gabriella Nieves MD   acetaminophen (TYLENOL) 325 MG tablet Take 650 mg by mouth every 4 hours as needed (DISCOMFORT;PAIN/ELEV TEMP >100.4F)    Historical Provider, MD   pantoprazole (PROTONIX) 40 MG tablet Take 40 mg by mouth daily    Historical Provider, MD   amLODIPine (NORVASC) 2.5 MG tablet Take 1 tablet by mouth daily 8/20/21 11/14/22  Lesley Wang DO   potassium chloride (KLOR-CON M) 20 MEQ TBCR extended release tablet Take 1 tablet by mouth daily 3/31/21 8/8/22  Lesley Wang DO   metoprolol tartrate (LOPRESSOR) 25 MG tablet Take 1 tablet by mouth 2 times daily 2/19/21   Lesley Wang DO   lovastatin (MEVACOR) 20 MG tablet Take 1 tablet by mouth nightly  Patient not taking: Reported on 8/1/2022 11/20/20 8/8/22  Lesley Wang DO   blood glucose test strips (EZ SMART BLOOD GLUCOSE TEST) strip 1 each by In Vitro route 2 times daily As needed.  2/11/19 8/8/22  Lesley Wang DO       Current medications:    Current Facility-Administered Medications   Medication Dose Route Frequency Provider Last Rate Last Admin    miconazole (MICOTIN) 2 % powder   Topical BID Claudell Knoll, MD   Given at 11/30/22 0750    insulin lispro (HUMALOG) injection vial 0-4 Units  0-4 Units SubCUTAneous TID WC Claudell Knoll, MD        insulin lispro (HUMALOG) injection vial 0-4 Units  0-4 Units SubCUTAneous Nightly Claudell Knoll, MD        ondansetron [facility]) injection 4 mg  4 mg IntraVENous Q6H PRN Claudell Knoll, MD        glucose chewable tablet 16 g  4 tablet Oral PRN Claudell Knoll, MD        dextrose bolus 10% 125 mL  125 mL IntraVENous PRN Claudell Knoll, MD        Or    dextrose bolus 10% 250 mL  250 mL IntraVENous PRN Claudell Knoll, MD        glucagon (rDNA) injection 1 mg  1 mg SubCUTAneous PRN Claudell Knoll, MD        dextrose 10 % infusion   IntraVENous Continuous PRN Claudell Knoll, MD Kristopher Norris Janine Delaine ON 12/1/2022] collagenase ointment   Topical Daily Claudell Knoll, MD        [START ON 12/1/2022] menthol-zinc oxide (CALMOSEPTINE) 0.44-20.6 % ointment   Topical BID Claudell Knoll, MD  Janine Delaine ON 12/1/2022] white petrolatum ointment   Topical Daily Claudell Knoll, MD        ampicillin-sulbactam (UNASYN) 3,000 mg in sodium chloride 0.9 % 100 mL IVPB (mini-bag)  3,000 mg IntraVENous Q12H Claudell Knoll, MD   Stopped at 11/30/22 1330  ipratropium-albuterol (DUONEB) nebulizer solution 1 ampule  1 ampule Inhalation 4x daily Davian Mckeon MD        0.9 % sodium chloride infusion   IntraVENous Continuous Davian Mckeon  mL/hr at 11/30/22 1253 New Bag at 11/30/22 1253       Allergies:     Allergies   Allergen Reactions    Bee Venom Anaphylaxis       Problem List:    Patient Active Problem List   Diagnosis Code    Hypertension I10    Hyperlipidemia with target LDL less than 100 E78.5    Impaired mobility and ADLs Z74.09, Z78.9    Cardiac pacemaker in situ Z95.0    Cerebrovascular accident (CVA) determined by clinical assessment ([facility] [address] I63.9    Left renal mass N28.89    Class 2 severe obesity due to excess calories with serious comorbidity and body mass index (BMI) of 35.0 to 35.9 in adult ([facility]) E66.01, Z68.35    Acquired hypothyroidism E03.9    H/O deep venous thrombosis Z86.718    Type 2 diabetes mellitus without complication, without long-term current use of insulin ([facility]) E11.9    Stage 3 chronic kidney disease ([facility]) N18.30    Ulcer of right heel and midfoot with fat layer exposed ([facility] [address] L97.412    Atherosclerosis of native artery of right lower extremity with ulceration of heel ([facility]) I70.234    Status post nephrectomy Z90.5    Chronic renal failure, stage 3b ([facility]) N18.32    Necrotic eschar ([facility]) I96    Diabetic ulcer of left heel associated with type 2 diabetes mellitus, with fat layer exposed ([address] E11.621, L97.422    Femoral-popliteal atherosclerosis ([facility]) I70.209    Hyperkalemia E87.5    Peripheral vascular disease of lower extremity with ulceration ([facility]) I73.9, L97.909    Acute kidney injury superimposed on chronic kidney disease ([facility]) N17.9, N18.9    Pneumonia of both lungs due to infectious organism J18.9    PVD (peripheral vascular disease) ([facility]) I73.9    Pneumonia in diseases classified elsewhere J17    Abdominal distension R14.0       Past Medical History:        Diagnosis Date    Acquired absence of kidney     Acute kidney injury ([facility] [address]     Atherosclerosis of native artery of right lower extremity with ulceration of heel ([address] 08/15/2022    Atrial fibrillation (HCC)     Cancer (HCC)     kidney    Cellulitis     Chronic renal failure, stage 3b ([address] 08/15/2022    Diabetes mellitus ([address]     Dysphagia, oropharyngeal phase     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[address]     Femoral-popliteal atherosclerosis ([facility] [address] 2022    Hyperkalemia     Hypertension     Ischemic stroke ([address] 2018    Muscle weakness (generalized)     Pacemaker     Paraplegia ([address]     PVD (peripheral vascular disease) ([address]     Repeated falls     Sinus node dysfunction ([address]     Stroke (cerebrum) ([address] 2018    Thyroid disease     TIA (transient ischemic attack)     Ulcer of right heel and midfoot with fat layer exposed ([facility] [address] 08/15/2022       Past Surgical History:        Procedure Laterality Date    CARDIAC PACEMAKER PLACEMENT  2014    COLONOSCOPY      EYE SURGERY      KIDNEY REMOVAL Left 2022    CANCER [facility] removed    KNEE ARTHROSCOPY  right knee    PACEMAKER PLACEMENT      WA COLONOSCOPY FLX DX W/COLLJ SPEC WHEN PFRMD N/A 2018    COLONOSCOPY DIAGNOSTIC performed by Zaki Willingham MD at [place] [address] EGD PERCUTANEOUS PLACEMENT GASTROSTOMY TUBE N/A 2018    PEG TUBE/PT. [place] [address] performed by Zaki Willingham MD at [place] [address] EGD PERCUTANEOUS PLACEMENT GASTROSTOMY TUBE N/A 2018    EGD PEG TUBE PLACEMENT performed by Zaki Willingham MD at [address] History:    Social History     Tobacco Use    Smoking status: Former     Types: Cigars     Quit date:      Years since quittin.9    Smokeless tobacco: Never   Substance Use Topics    Alcohol use:  No                                Counseling given: Not Answered      Vital Signs (Current):   Vitals:    22 0200 22 0745 22 1338 22 1439   BP: (!) 158/70 (!) 144/59 (!) 144/76 Pulse: 64 60 60    Resp: 20 18 18    Temp: 36.5 °C (97.7 °F) 36.6 °C (97.8 °F) 36.9 °C (98.4 °F)    TempSrc: Oral Temporal Oral    SpO2: 100% 100%     Weight: 290 lb 3.2 oz (131.6 kg)      Height: 6' 3\" (1.905 m)   6' 3\" (1.905 m)                                              BP Readings from Last 3 Encounters:   11/30/22 (!) 144/76   11/17/22 (!) 168/82   10/07/22 133/81       NPO Status: Time of last liquid consumption: 0900                        Time of last solid consumption: 0900                        Date of last liquid consumption: 11/29/22                        Date of last solid food consumption: 11/29/22    BMI:   Wt Readings from Last 3 Encounters:   11/30/22 290 lb 3.2 oz (131.6 kg)   11/14/22 292 lb (132.5 kg)   11/09/22 272 lb (123.4 kg)     Body mass index is 36.27 kg/m². CBC:   Lab Results   Component Value Date/Time    WBC 8.8 11/29/2022 04:18 PM    RBC 3.40 11/29/2022 04:18 PM    HGB 9.4 11/29/2022 04:18 PM    HCT 30.3 11/29/2022 04:18 PM    MCV 89.1 11/29/2022 04:18 PM    RDW 15.9 11/29/2022 04:18 PM     11/29/2022 04:18 PM       CMP:   Lab Results   Component Value Date/Time     11/29/2022 04:18 PM    K 3.5 11/29/2022 04:18 PM     11/29/2022 04:18 PM    CO2 23 11/29/2022 04:18 PM    BUN 33 11/29/2022 04:18 PM    CREATININE 2.0 11/29/2022 04:18 PM    GFRAA 42 10/10/2022 05:52 AM    LABGLOM 34 11/29/2022 04:18 PM    GLUCOSE 191 11/29/2022 04:18 PM    GLUCOSE 297 01/04/2012 03:00 AM    PROT 6.0 11/29/2022 04:18 PM    CALCIUM 8.4 11/29/2022 04:18 PM    BILITOT <0.2 11/29/2022 04:18 PM    ALKPHOS 78 11/29/2022 04:18 PM    AST 12 11/29/2022 04:18 PM    ALT 9 11/29/2022 04:18 PM       POC Tests: No results for input(s): POCGLU, POCNA, POCK, POCCL, POCBUN, POCHEMO, POCHCT in the last 72 hours.     Coags:   Lab Results   Component Value Date/Time    PROTIME 26.7 11/29/2022 04:43 PM    PROTIME 12.2 12/30/2011 10:20 PM    INR 2.4 11/29/2022 04:43 PM    APTT 36.3 11/29/2022 04:43 PM HCG (If Applicable): No results found for: PREGTESTUR, PREGSERUM, HCG, HCGQUANT     ABGs: No results found for: PHART, PO2ART, SPA0GZX, VNN4RYN, BEART, N4SXORDQ     Type & Screen (If Applicable):  No results found for: LABABO, LABRH    Drug/Infectious Status (If Applicable):  No results found for: HIV, HEPCAB    COVID-19 Screening (If Applicable):   Lab Results   Component Value Date/Time    COVID19 Not Detected 11/29/2022 06:04 PM    COVID19 Not Detected 11/29/2022 04:00 AM           Anesthesia Evaluation  Patient summary reviewed and Nursing notes reviewed no history of anesthetic complications:   Airway: Mallampati: III  TM distance: >3 FB   Neck ROM: limited  Mouth opening: > = 3 FB   Dental:    (+) edentulous      Pulmonary:   (+) pneumonia:  shortness of breath: new,  rhonchi decreased breath sounds                            Cardiovascular:Negative CV ROS  Exercise tolerance: poor (<4 METS),   (+) hypertension:, pacemaker:,       ECG reviewed  Rhythm: regular  Rate: normal  Echocardiogram reviewed  Stress test reviewed                Neuro/Psych:   (+) CVA (L sided weakness): residual symptoms,             GI/Hepatic/Renal:   (+) GERD:, renal disease: CRI,           Endo/Other:    (+) DiabetesType II DM, , hypothyroidism, blood dyscrasia: anemia:., .                 Abdominal:   (+) obese,           Vascular:   + DVT, . Other Findings:           Anesthesia Plan      MAC     ASA 4       Induction: intravenous. Anesthetic plan and risks discussed with patient, healthcare power of  and child/children. Plan discussed with attending. Attending anesthesiologist reviewed and agrees with Preprocedure content                MELANIE Werner CRNA   11/30/2022      DOS STAFF ADDENDUM:    Pt seen and examined, physical exam updated, chart reviewed including anesthesia, drug and allergy history. H&P reviewed.   No interval changes to history or physical examination (unless noted above). NPO status confirmed. Anesthetic plan, risks, benefits, alternatives discussed with patient. Patient verbalized an understanding and agrees to proceed.      Linh Tinajero MD   Anesthesiologist

## 2022-12-01 NOTE — PROGRESS NOTES
Progress note:  Overall, the patient is less distended. Presently, the patient is resting. The patient remains afebrile  The patient is having bowel function. The abdomen is softly distended and nontender. Assessment:  Chronic colonic dysmotility  Plan:  Ultimately, the patient may require a colectomy with ileostomy or end colostomy.   Maurilio Mead MD

## 2022-12-01 NOTE — PROGRESS NOTES
Speech Language Pathology      NAME:  Heike Hadley  :  1945  DATE: 2022  ROOM:  0154/4445-Y      Order received for clinical swallow evaluation. SLP familiar with this pt from past admits. In the past, family has declined swallow evaluations and modified diets. Per soft chart, pt has signed waiver for a regular diet, thin liquids as [address] notified of above and encouraged to talk to family and/or physician. Abdominal distention [R14.0]  Abdominal distension [R14.0]        Flavio ARCHULETA CCC/SLP [id number]  Speech-Language Pathologist

## 2022-12-01 NOTE — PLAN OF CARE
Problem: Discharge Planning  Goal: Discharge to home or other facility with appropriate resources  Outcome: Progressing  Flowsheets (Taken 11/30/2022 1945)  Discharge to home or other facility with appropriate resources: Identify barriers to discharge with patient and caregiver     Problem: Pain  Goal: Verbalizes/displays adequate comfort level or baseline comfort level  Outcome: Progressing  Flowsheets (Taken 11/30/2022 1858)  Verbalizes/displays adequate comfort level or baseline comfort level:   Assess pain using appropriate pain scale   Encourage patient to monitor pain and request assistance     Problem: Skin/Tissue Integrity  Goal: Absence of new skin breakdown  Description: 1. Monitor for areas of redness and/or skin breakdown  2. Assess vascular access sites hourly  3. Every 4-6 hours minimum:  Change oxygen saturation probe site  4. Every 4-6 hours:  If on nasal continuous positive airway pressure, respiratory therapy assess nares and determine need for appliance change or resting period.   Outcome: Progressing     Problem: Safety - Adult  Goal: Free from fall injury  Outcome: Progressing  Flowsheets (Taken 11/30/2022 2228)  Free From Fall Injury: Instruct family/caregiver on patient safety     Problem: ABCDS Injury Assessment  Goal: Absence of physical injury  Outcome: Progressing  Flowsheets (Taken 11/30/2022 2228)  Absence of Physical Injury: Implement safety measures based on patient assessment     Problem: Chronic Conditions and Co-morbidities  Goal: Patient's chronic conditions and co-morbidity symptoms are monitored and maintained or improved  Outcome: Progressing     Problem: Nutrition Deficit:  Goal: Optimize nutritional status  11/30/2022 2229 by Lauren Lynn RN  Outcome: Progressing  11/30/2022 1449 by Claudia Stanton RD, CNSC, LD  Flowsheets (Taken 11/30/2022 1449)  Nutrient intake appropriate for improving, restoring, or maintaining nutritional needs: Assess nutritional status and recommend course of action

## 2022-12-01 NOTE — DISCHARGE INSTR - COC
Continuity of Care Form    Patient Name: Jimbo Encarnacion   :  1945  MRN:  [id number]    Admit date:  2022  Discharge date:  ***    Code Status Order: Full Code   Advance Directives:   Advance Care Flowsheet Documentation       Date/Time Healthcare Directive Type of Healthcare Directive Copy in [address] Agent's Name Healthcare Agent's Phone Number    [phone] Yes, patient has an advance directive for healthcare treatment -- -- -- -- --            Admitting Physician:  Paula Lentz MD  PCP: Judson Romero DO    Discharging Nurse: [facility] Unit/Room#: 7198/6954-C  Discharging Unit Phone Number: ***    Emergency Contact:   Extended Emergency Contact Information  Primary Emergency Contact: Sherryle Cera  Address: [address] Phone: [phone]  Mobile Phone: [phone]  Relation: Child  Secondary Emergency Contact: Mort Cowden  Address: [address] [place] [address]           Mel[address] Phone: [phone]  Mobile Phone: [phone]  Relation: Child    Past Surgical History:  Past Surgical History:   Procedure Laterality Date    CARDIAC PACEMAKER PLACEMENT  2014    COLONOSCOPY      COLONOSCOPY N/A 2022    COLONOSCOPY FLEXIBLE W/ DECOMPRESSION performed by Nava Bradshaw MD at [address] [place] [address] Left 2022    CANCER [facility] removed    KNEE ARTHROSCOPY  right knee    PACEMAKER PLACEMENT      NM COLONOSCOPY FLX DX W/COLLJ SPEC WHEN PFRMD N/A 2018    COLONOSCOPY DIAGNOSTIC performed by Baron Taylor MD at [address] EGD PERCUTANEOUS PLACEMENT GASTROSTOMY TUBE N/A 2018    PEG TUBE/PT.  IN 5507 B performed by Baron Taylor MD at [address] EGD PERCUTANEOUS PLACEMENT GASTROSTOMY TUBE N/A 2018    EGD PEG TUBE PLACEMENT performed by Baron Taylor MD at [address] [place] [address] Immunization History:   Immunization History   Administered Date(s) Administered    COVID-19, PFIZER PURPLE top, DILUTE for use, (age 15 y+), 30mcg/0.3mL 02/06/2021, 02/27/2021, 10/04/2021    Influenza Virus Vaccine 01/04/2012, 12/23/2014, 10/22/2021    Influenza, Colan Foil (age 72 y+), High Dose, 0.7mL 10/13/2020, 10/22/2021, 10/22/2021, 10/19/2022    Influenza, High Dose (Fluzone 65 yrs and older) 09/30/2019, 11/19/2019, 10/13/2020    Pneumococcal Conjugate 13-valent (Bdrbeam31) 09/30/2019    Pneumococcal Polysaccharide (Vnplmgisw05) 01/04/2012, 11/19/2019       Active Problems:  Patient Active Problem List   Diagnosis Code    Hypertension I10    Hyperlipidemia with target LDL less than 100 E78.5    Impaired mobility and ADLs Z74.09, Z78.9    Cardiac pacemaker in situ Z95.0    Cerebrovascular accident (CVA) determined by clinical assessment ([facility] I63.9    Left renal mass N28.89    Class 2 severe obesity due to excess calories with serious comorbidity and body mass index (BMI) of 35.0 to 35.9 in adult ([address] E66.01, Z68.35    Acquired hypothyroidism E03.9    H/O deep venous thrombosis Z86.718    Type 2 diabetes mellitus without complication, without long-term current use of insulin (HCC) E11.9    Stage 3 chronic kidney disease (HCC) N18.30    Ulcer of right heel and midfoot with fat layer exposed ([address] L97.412    Atherosclerosis of native artery of right lower extremity with ulceration of heel (HCC) I70.234    Status post nephrectomy Z90.5    Chronic renal failure, stage 3b (HCC) N18.32    Necrotic eschar ([address] I96    Diabetic ulcer of left heel associated with type 2 diabetes mellitus, with fat layer exposed ([address] E11.621, L97.422    Femoral-popliteal atherosclerosis ([facility]) I70.209    Hyperkalemia E87.5    Peripheral vascular disease of lower extremity with ulceration ([facility]) I73.9, L97.909    Acute kidney injury superimposed on chronic kidney disease (HCC) N17.9, N18.9    Pneumonia of both lungs due to infectious organism J18.9    PVD (peripheral vascular disease) ([facility]) I73.9    Pneumonia in diseases classified elsewhere J17    Abdominal distension R14.0       Isolation/Infection:   Isolation            Contact          Patient Infection Status       Infection Onset Added Last Indicated Last Indicated By Review Planned Expiration Resolved Resolved By    MRSA 08/01/22 08/06/22 09/22/22 Culture, Wound Aerobic Only        Foot Wound 9/22/2022  Wound right foot 8/24/22  Wound foot 8/1/22    Resolved    COVID-19 (Rule Out) 11/29/22 11/29/22 11/29/22 COVID-19, Rapid (Ordered)   11/29/22 Rule-Out Test Resulted    COVID-19 (Rule Out) 11/14/22 11/14/22 11/14/22 COVID-19, Rapid (Ordered)   11/14/22 Rule-Out Test Resulted    C-diff Rule Out 11/14/22 11/14/22 11/15/22 CLOSTRIDIUM DIFFICILE EIA (Ordered)   11/15/22 Rule-Out Test Resulted            Nurse Assessment:  Last Vital Signs: BP (!) 151/66   Pulse 68   Temp 97.5 °F (36.4 °C) (Oral)   Resp 18   Ht 6' 3\" (1.905 m)   Wt 290 lb 3.2 oz (131.6 kg)   SpO2 97%   BMI 36.27 kg/m²     Last documented pain score (0-10 scale): Pain Level: 0  Last Weight:   Wt Readings from Last 1 Encounters:   11/30/22 290 lb 3.2 oz (131.6 kg)     Mental Status:  {IP PT MENTAL STATUS:84272}    IV Access:  { [facility] IV ACCESS:066167900}    Nursing Mobility/ADLs:  Walking   {[facility] DME IEAE:735135084}  Transfer  {[facility] DME CHXJ:371420969}  Bathing  {[facility] DME MRSJ:207958353}  Dressing  {[facility] DME NBDQ:257583909}  Toileting  {[facility] DME VRZC:026680936}  Feeding  {[facility] DME FHKS:090621489}  Med Admin  {[facility] DME YQKF:804290628}  Med Delivery   { [facility] MED Delivery:651538699}    Wound Care Documentation and Therapy:  Wound 09/21/22 Perineum scrotum (Active)   Number of days: 70       Wound 10/03/22 Scrotum Distal (Active)   Number of days: 59       Wound 11/30/22 Sacrum (Active)   Wound Image   11/30/22 1341   Wound Etiology Deep tissue/Injury 12/01/22 0451   Dressing Status Clean;Dry; Intact 12/01/22 0459   Wound Cleansed Soap and water 12/01/22 0459   Dressing/Treatment Open to air; Pharmaceutical agent (see MAR) 12/01/22 0824   Dressing Change Due 12/02/22 12/01/22 0459   Wound Length (cm) 6 cm 11/30/22 1341   Wound Width (cm) 6 cm 11/30/22 1341   Wound Depth (cm) 0 cm 11/30/22 0200   Wound Surface Area (cm^2) 36 cm^2 11/30/22 1341   Change in Wound Size % (l*w) 7.69 11/30/22 1341   Wound Volume (cm^3) 0 cm^3 11/30/22 0200   Wound Assessment Purple/maroon 12/01/22 0824   Drainage Amount None 12/01/22 0824   Odor None 12/01/22 0824   Tory-wound Assessment Denuded 12/01/22 0459   Number of days: 1       Wound 11/30/22 Heel Right (Active)   Wound Image   11/30/22 1341   Wound Etiology Pressure Unstageable 12/01/22 0459   Dressing Status Clean;Dry; Intact 12/01/22 0824   Wound Cleansed Cleansed with saline 12/01/22 0459   Dressing/Treatment Other (comment) 12/01/22 0824   Dressing Change Due 12/02/22 12/01/22 0824   Wound Length (cm) 4 cm 11/30/22 1341   Wound Width (cm) 4.5 cm 11/30/22 1341   Wound Depth (cm) 0 cm 11/30/22 0200   Wound Surface Area (cm^2) 18 cm^2 11/30/22 1341   Change in Wound Size % (l*w) 33.33 11/30/22 1341   Wound Volume (cm^3) 0 cm^3 11/30/22 0200   Wound Assessment Other (Comment) 12/01/22 0824   Drainage Amount None 12/01/22 0824   Odor None 12/01/22 0824   Tory-wound Assessment Other (Comment) 12/01/22 0824   Number of days: 1       Wound 11/30/22 Abdomen Right (Active)   Wound Cleansed Cleansed with saline 11/30/22 1945   Dressing/Treatment Open to air 12/01/22 0824   Wound Length (cm) 0.5 cm 11/30/22 0200   Wound Width (cm) 1.5 cm 11/30/22 0200   Wound Depth (cm) 0.1 cm 11/30/22 0200   Wound Surface Area (cm^2) 0.75 cm^2 11/30/22 0200   Wound Volume (cm^3) 0.075 cm^3 11/30/22 0200   Wound Assessment Pink/red 12/01/22 0824   Drainage Amount None 12/01/22 0824   Drainage Description Thin;Serous 11/30/22 1945   Odor None 12/01/22 0824   Tory-wound Assessment Fragile 12/01/22 0824   Number of days: 1 Elimination:  Continence: Bowel: {YES / HE:45981}  Bladder: {YES / MB:24492}  Urinary Catheter: {Urinary Catheter:426528678}   Colostomy/Ileostomy/Ileal Conduit: {YES / LA:60805}       Date of Last BM: ***    Intake/Output Summary (Last 24 hours) at 2022 1352  Last data filed at 2022 0458  Gross per 24 hour   Intake 1606.69 ml   Output 200 ml   Net 1406.69 ml     I/O last 3 completed shifts:   In: 1606.7 [I.V.:1606.7]  Out: 201 [Urine:200; Stool:1]    Safety Concerns:     [address] Safety Concerns:788025828}    Impairments/Disabilities:      [address] Impairments/Disabilities:924526689}    Nutrition Therapy:  Current Nutrition Therapy:   [address] Diet List:530002025}    Routes of Feeding: {CHP DME Other Feedings:275947675}  Liquids: {Slp liquid thickness:71701}  Daily Fluid Restriction: {CHP DME Yes amt example:416342402}  Last Modified Barium Swallow with Video (Video Swallowing Test): {Done Not Done XTRA:423332443}    Treatments at the Time of Hospital Discharge:   Respiratory Treatments: ***  Oxygen Therapy:  {Therapy; copd oxygen:81732}  Ventilator:    { CC Vent WIBS:555165801}    Rehab Therapies: {THERAPEUTIC INTERVENTION:0000816973}  Weight Bearing Status/Restrictions: { CC Weight Bearin}  Other Medical Equipment (for information only, NOT a DME order):  {EQUIPMENT:063824011}  Other Treatments: ***    Patient's personal belongings (please select all that are sent with patient):  {P DME Belongings:830480257}    RN SIGNATURE:  {Esignature:069678830}    CASE MANAGEMENT/SOCIAL WORK SECTION    Inpatient Status Date: 2022    Readmission Risk Assessment Score:  Readmission Risk              Risk of Unplanned Readmission:  32           Discharging to Facility/ Agency   Name: [facility]  Address: [address], [address] [place] [address]  Phone: [phone]  Fax: [phone]    Dialysis Facility (if applicable)   Name:  Address:  Dialysis Schedule:  Phone:  Fax:    /Social Worker signature: Electronically signed by ROHIT Montiel on 12/1/22 at 1:52 PM EST    PHYSICIAN SECTION    Prognosis: {Prognosis:6621660713}    Condition at Discharge: [address] Patient Condition:449466614}    Rehab Potential (if transferring to Rehab): {Prognosis:3539715581}    Recommended Labs or Other Treatments After Discharge: ***    Physician Certification: I certify the above information and transfer of Denise Hagen  is necessary for the continuing treatment of the diagnosis listed and that he requires {Admit to Appropriate Level of Care:39571} for {GREATER/LESS:620365341} 30 days.      Update Admission H&P: {CHP DME Changes in FJULR:761634466}    PHYSICIAN SIGNATURE:  {Esignature:188333914}

## 2022-12-01 NOTE — PROGRESS NOTES
Patient is having difficulty clearing thick, white secretions. RN performing oral suction frequently. MD notified via perfect serve.

## 2022-12-01 NOTE — PROGRESS NOTES
Patients family refusing recommendation for NPO status and video swallow and would like patient to remain on regular diet with thin liquids.  Dr. Jorge Muller notified

## 2022-12-01 NOTE — CARE COORDINATION
Social Work/Discharge Planning:  Met with patient and completed initial assessment. Explained Social Work role and discussed transition of care/discharge planning. Patient admitted from [facility] and he plans to return to facility at discharge. He had a colonoscopy with decompression 11/30. He is on two liters oxygen. Neeru with [facility] states no pre-cert and no covid needed. Electronic N-17 in Epic and ambulance form in soft chart. Will continue to follow and assist with discharge planning.   Electronically signed by ROHIT Reynolds on 12/1/2022 at 1:51 PM

## 2022-12-01 NOTE — PROGRESS NOTES
Julio Singh MD FACP  Internal medicine  Progress Notes      CHIEF COMPLAINT: Abdominal distention and emesis      HISTORY OF PRESENT ILLNESS:    11/30/2022  Patient was seen on the floor earlier this morning in the [facility] with the ER physician at the time of admission  Data reviewed in detail with his sister at bedside  65-year-old  man presented from a local nursing home with chronic abdominal distention which got worse associated with emesis  It appears he may have aspirated  Abdominal CT showing marked colonic distention  Admitted for further management  NG tube is intact  12/1/2022  Patient was seen on the floor earlier this morning  NG tube is out  He feels a lot better  Underwent colonoscopy yesterday  Past Medical History:    Past Medical History:   Diagnosis Date    Acquired absence of kidney     Acute kidney injury ([address]     Atherosclerosis of native artery of right lower extremity with ulceration of heel ([address] 08/15/2022    Atrial fibrillation ([address]     Cancer ([address]     kidney    Cellulitis     Chronic renal failure, stage 3b ([address] 08/15/2022    Diabetes mellitus ([address]     Dysphagia, oropharyngeal phase     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[address]     Femoral-popliteal atherosclerosis ([address] 09/13/2022    Hyperkalemia     Hypertension     Ischemic stroke ([address] 03/06/2018    Muscle weakness (generalized)     Pacemaker     Paraplegia ([address]     PVD (peripheral vascular disease) ([address]     Repeated falls     Sinus node dysfunction ([address]     Stroke (cerebrum) ([address] 02/27/2018    Thyroid disease     TIA (transient ischemic attack)     Ulcer of right heel and midfoot with fat layer exposed ([address] 08/15/2022       Past Surgical History:    Past Surgical History:   Procedure Laterality Date    CARDIAC PACEMAKER PLACEMENT  12/19/2014    COLONOSCOPY      COLONOSCOPY N/A 11/30/2022    COLONOSCOPY FLEXIBLE W/ DECOMPRESSION performed by Vanda Crowley MD at [address] [place] [address] Left 07/05/2022    CANCER CCF removed    KNEE ARTHROSCOPY  right knee    PACEMAKER PLACEMENT      CT COLONOSCOPY FLX DX W/COLLJ SPEC WHEN PFRMD N/A 03/20/2018    COLONOSCOPY DIAGNOSTIC performed by Antoni Hugo MD at [address] EGD PERCUTANEOUS PLACEMENT GASTROSTOMY TUBE N/A 03/29/2018    PEG TUBE/PT. IN 5507 B performed by Antoni Hugo MD at [address] EGD PERCUTANEOUS PLACEMENT GASTROSTOMY TUBE N/A 09/12/2018    EGD PEG TUBE PLACEMENT performed by Antoni Hugo MD at [address] [place] [address]       Medications Prior to Admission:    Medications Prior to Admission: lactobacillus (CULTURELLE) CAPS capsule, Take 1 capsule by mouth daily  cholestyramine (QUESTRAN) 4 g packet, Take 1 packet by mouth 3 times daily (before meals)  dicyclomine (BENTYL) 10 MG capsule, Take 1 capsule by mouth nightly  ferrous sulfate (IRON 325) 325 (65 Fe) MG tablet, Take 325 mg by mouth 2 times daily  bisacodyl (DULCOLAX) 10 MG suppository, Place 10 mg rectally daily as needed for Constipation  loperamide (IMODIUM) 2 MG capsule, Take 2 mg by mouth every 8 hours as needed for Diarrhea  Nutritional Supplements (ARGINAID) PACK, Take 1 packet by mouth 2 times daily  warfarin (COUMADIN) 6 MG tablet, Take 8 mg by mouth daily  gabapentin (NEURONTIN) 300 MG capsule, Take 1 capsule by mouth nightly for 7 days. levothyroxine (SYNTHROID) 50 MCG tablet, Take 1 tablet by mouth in the morning. atorvastatin (LIPITOR) 20 MG tablet, Take 1 tablet by mouth nightly  acetaminophen (TYLENOL) 325 MG tablet, Take 650 mg by mouth every 4 hours as needed (DISCOMFORT;PAIN/ELEV TEMP >100.4F)  pantoprazole (PROTONIX) 40 MG tablet, Take 40 mg by mouth daily  amLODIPine (NORVASC) 2.5 MG tablet, Take 1 tablet by mouth daily  metoprolol tartrate (LOPRESSOR) 25 MG tablet, Take 1 tablet by mouth 2 times daily    Allergies:    Bee venom    Social History:    reports that he quit smoking about 20 years ago. His smoking use included cigars.  He has never used smokeless tobacco. He reports that he does not drink alcohol and does not use drugs. Family History:   family history includes Heart Disease in his mother; Stroke in his father. REVIEW OF SYSTEMS:  As above in the HPI, otherwise negative    PHYSICAL EXAM:    Vitals:  BP (!) 151/66   Pulse 72   Temp 97.5 °F (36.4 °C) (Oral)   Resp 16   Ht 6' 3\" (1.905 m)   Wt 290 lb 3.2 oz (131.6 kg)   SpO2 98%   BMI 36.27 kg/m²     General:  Awake, alert, oriented X 3. Looks chronically ill  HEENT:  Normocephalic, atraumatic. Pupils equal, round, reactive to light. No scleral icterus. No conjunctival injection. no nasal discharge.   Neck:  Supple  Heart:  RRR, no murmurs, gallops, rubs  Lungs: Scattered wheezes and rhonchi noted  Abdomen: Soft and nondistended  Extremities:  No clubbing, cyanosis,  Chronic peripheral edema is mild  Skin:  Warm and dry,   Right heel ulcer is healing well with eschar formation  Neuro:  Cranial nerves 2-12 intact, no focal deficits  Breast: deferred  Rectal: deferred  Genitalia:  deferred    LABS:  Data reviewed      ASSESSMENT:    Hypokalemic this morning  Principal Problem:    Abdominal distension resolved following decompressive colonoscopy  Aspiration pneumonia  Acute kidney injury  Peripheral arterial disease  Multiple comorbidities present and past as listed below  Patient Active Problem List   Diagnosis    Hypertension    Hyperlipidemia with target LDL less than 100    Impaired mobility and ADLs    Cardiac pacemaker in situ    Cerebrovascular accident (CVA) determined by clinical assessment ([facility]    Left renal mass    Class 2 severe obesity due to excess calories with serious comorbidity and body mass index (BMI) of 35.0 to 35.9 in [facility])    Acquired hypothyroidism    H/O deep venous thrombosis    Type 2 diabetes mellitus without complication, without long-term current use of insulin (HCC)    Stage 3 chronic kidney disease (HCC)    Ulcer of right heel and midfoot with fat layer exposed ([address]    Atherosclerosis of native artery of right lower extremity with ulceration of heel ([address]    Status post nephrectomy    Chronic renal failure, stage 3b (HCC)    Necrotic eschar (HCC)    Diabetic ulcer of left heel associated with type 2 diabetes mellitus, with fat layer exposed ([address]    Femoral-popliteal atherosclerosis (HCC)    Hyperkalemia    Peripheral vascular disease of lower extremity with ulceration (HCC)    Acute kidney injury superimposed on chronic kidney disease (HCC)    Pneumonia of both lungs due to infectious organism    PVD (peripheral vascular disease) ([address]    Pneumonia in diseases classified elsewhere    Abdominal distension           PLAN:  Advance diet as tolerated  Replace potassium  IV fluids  Empirically Unasyn  Inhaled bronchodilators  Monitor labs  Sliding scale with insulin coverage  Scheduled for decompressive colonoscopy  Questions answered to her satisfaction    Julio Singh MD  10:59 AM  12/1/2022

## 2022-12-01 NOTE — PROGRESS NOTES
This patient is on medication that requires renal, weight, and/or indication dose adjustment. Date Body Weight IBW  Adjusted BW SCr  CrCl Dialysis status   12/1/2022 290 lb 3.2 oz (131.6 kg) Ideal body weight: 84.5 kg (186 lb 4.6 oz)  Adjusted ideal body weight: 103.4 kg (227 lb 13.7 oz) Serum creatinine: 1.7 mg/dL (H) 12/01/22 0208  Estimated creatinine clearance: 52 mL/min (A) N/a       Pharmacy has dose-adjusted the following medication(s):    Date Previous Order Adjusted Order   12/1/2022 Unasyn 3 gm every 12 hr Unasyn 3 gm every 6 hr       These changes were made per protocol according to the [facility] Clinical Guidance for Pharmacists. *Please note this dose may need readjusted if patient's condition changes. Please contact pharmacy with any questions regarding these changes.     gi morgan [facility]  12/1/2022  6:59 AM

## 2022-12-02 NOTE — PLAN OF CARE
Problem: Discharge Planning  Goal: Discharge to home or other facility with appropriate resources  12/2/2022 0044 by Chapin Morales RN  Outcome: Progressing  Flowsheets (Taken 12/1/2022 2102)  Discharge to home or other facility with appropriate resources: Identify barriers to discharge with patient and caregiver  12/1/2022 1812 by Refugio Flores RN  Outcome: Progressing     Problem: Pain  Goal: Verbalizes/displays adequate comfort level or baseline comfort level  Outcome: Progressing  Flowsheets (Taken 12/1/2022 1912)  Verbalizes/displays adequate comfort level or baseline comfort level:   Encourage patient to monitor pain and request assistance   Assess pain using appropriate pain scale     Problem: Skin/Tissue Integrity  Goal: Absence of new skin breakdown  Description: 1. Monitor for areas of redness and/or skin breakdown  2. Assess vascular access sites hourly  3. Every 4-6 hours minimum:  Change oxygen saturation probe site  4. Every 4-6 hours:  If on nasal continuous positive airway pressure, respiratory therapy assess nares and determine need for appliance change or resting period.   12/2/2022 0044 by Chapin Morales RN  Outcome: Progressing  12/1/2022 1812 by Refugio Flores RN  Outcome: Progressing     Problem: Safety - Adult  Goal: Free from fall injury  12/2/2022 0044 by Chapin Morales RN  Outcome: Bridgette Kruse (Taken 12/1/2022 2115)  Free From Fall Injury: Instruct family/caregiver on patient safety  12/1/2022 1812 by Refugio Flores RN  Outcome: Progressing     Problem: ABCDS Injury Assessment  Goal: Absence of physical injury  12/2/2022 0044 by Chapin Morales RN  Outcome: Progressing  Flowsheets (Taken 12/1/2022 2115)  Absence of Physical Injury: Implement safety measures based on patient assessment  12/1/2022 1812 by Refugio Flores RN  Outcome: Progressing     Problem: Chronic Conditions and Co-morbidities  Goal: Patient's chronic conditions and co-morbidity symptoms are monitored and maintained or improved  Outcome: Progressing  Flowsheets (Taken 12/1/2022 2102)  Care Plan - Patient's Chronic Conditions and Co-Morbidity Symptoms are Monitored and Maintained or Improved: Monitor and assess patient's chronic conditions and comorbid symptoms for stability, deterioration, or improvement     Problem: Nutrition Deficit:  Goal: Optimize nutritional status  Outcome: Progressing

## 2022-12-02 NOTE — PROGRESS NOTES
Physical Therapy  Facility/Department: [address] INTERMEDIATE 1  Physical Therapy Treatment Note    Name: Daiana Pride  : 1945  MRN: [id number]  Date of Service: 2022      Patient Diagnosis(es): The encounter diagnosis was Abdominal distention. Past Medical History:  has a past medical history of Acquired absence of kidney, Acute kidney injury ([address] Atherosclerosis of native artery of right lower extremity with ulceration of heel ([address] Atrial fibrillation ([address] Cancer ([address] Cellulitis, Chronic renal failure, stage 3b ([address] Diabetes mellitus ([address] Dysphagia, oropharyngeal phase, 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[address] Femoral-popliteal atherosclerosis ([address] Hyperkalemia, Hypertension, Ischemic stroke ([address] Muscle weakness (generalized), Pacemaker, Paraplegia ([address] PVD (peripheral vascular disease) ([address] Repeated falls, Sinus node dysfunction ([address] Stroke (cerebrum) ([address] Thyroid disease, TIA (transient ischemic attack), and Ulcer of right heel and midfoot with fat layer exposed ([address] Past Surgical History:  has a past surgical history that includes eye surgery; Knee arthroscopy (right knee); pr colonoscopy flx dx w/collj spec when pfrmd (N/A, 2018); Colonoscopy; pr egd percutaneous placement gastrostomy tube (N/A, 2018); pacemaker placement; pr egd percutaneous placement gastrostomy tube (N/A, 2018); Cardiac pacemaker placement (2014); Kidney removal (Left, 2022); and Colonoscopy (N/A, 2022). Evaluating Therapist: Keely Graham PT    Room #:  9242/1872-N  Diagnosis:  Abdominal distention [R14.0]  Abdominal distension [R14.0]  PMHx/PSHx:  Chronic renal failure, DM, CVA, R heel wound  Precautions:  falls, O2, NG, 50% WB R foot with post op shoe. Social:  Pt admitted from [place] [address]. Was ambulating with ww. Initial Evaluation  Date: 22 Treatment  2022    Short Term/ Long Term   Goals   Was pt agreeable to Eval/treatment? yes yes    Does pt have pain?    No complaints    Bed Mobility  Rolling: max assist  Supine to sit: max assist of 2  Sit to supine: max assist of 2  Scooting: max assist of 2 Rolling: Max A  Supine <> sit: Max A  Scooting: Mod A seated to EOB, Mod A seated up EOB Mod assist   Transfers  NT Sit <> stand: Mod A Mod assist   Ambulation    NT NT 5 feet with ww with mod assist   Stair Negotiation  Ascended and descended  NT NT     LE strength     3-/5    4+/5   balance      Sitting balance SB to min assist     AM-PAC Raw score               7/24 9/24        Pt is alert and able to follow instruction  Balance: fair sitting EOB, poor standing using [employer] for support    Pt performed therapeutic exercise of the following: seated B ankle pumps, LAQ's x 10 to 15 reps AROM    Patient education/treatment  Pt was educated on therapeutic exercise promoting circulation and strengthening, UE usage to assist with transfer safety, [employer] mechanics promoting upright posture    Patient response to education:   Pt verbalized understanding Pt demonstrated skill Pt requires further education in this area   yes With instruction for transfers yes     ASSESSMENT:   Comments: Nurse ok with Rx. Pt found in bed, sat EOB SBA, exercise performed. B LE cast shoes then donned, Pt stood approx 1 minute x 2 using [employer] for support Min A for balance, 50% PWB R LE observed. Pt then assisted back to bed, B prevalon boots donned for heel protection as found. Pt was left in bed with call light left in reach, Nurse present    Chair/bed alarm: NA    Time in 1048   Time out 1120   Total Treatment Time 32 minutes   CPT codes:     Therapeutic activities 25439 32 minutes   Therapeutic exercises 09601 0 minutes       Pt is making good progress toward established Physical Therapy goals as per ability to stand EOB this session. Continue with physical therapy current plan of care.     Tonny Guerra PTA   License Number: [id number]

## 2022-12-02 NOTE — PROGRESS NOTES
Comprehensive Nutrition Assessment    Type and Reason for Visit:  Reassess    Nutrition Recommendations/Plan:   Continue current diet and ONS, as tolerated  Continue inpatient monitoring     Malnutrition Assessment:  Malnutrition Status: At risk for malnutrition (Comment) (11/30/22 8032)    Context:  Acute Illness     Findings of the 6 clinical characteristics of malnutrition:  Energy Intake:  50% or less of estimated energy requirements for 5 or more days  Weight Loss:  No significant weight loss     Body Fat Loss:  No significant body fat loss     Muscle Mass Loss:  No significant muscle mass loss    Fluid Accumulation:  No significant fluid accumulation     Strength:  Not Performed    Nutrition Assessment:    Pt s/p decompressive C-scope and having less distention and BM. May need colectomy/ileostomy d/t chronic colonic dysmotility per GS. Currently tolerating PO. Note that SLP had recommended NPO and MBSS but pt's family has declined this  and wish pt to remain on Regular diet with thin liquids. Added Wound Healing ONS BID d/t chronic wounds and will continue to monitor. Nutrition Related Findings:    NGT out, AMS at times, bloated and distended abd +BS, +1 edema, +I/O 5.9L Wound Type: Pressure Injury, Unstageable, Deep Tissue Injury       Current Nutrition Intake & Therapies:    Average Meal Intake: 26-50% (average at meals)  Average Supplements Intake: Unable to assess  ADULT DIET; Regular  ADULT ORAL NUTRITION SUPPLEMENT; Breakfast, Dinner; Wound Healing Oral Supplement  ADULT ORAL NUTRITION SUPPLEMENT; Lunch, Dinner; Low Calorie/High Protein Oral Supplement    Anthropometric Measures:  Height: 6' 3\" (190.5 cm)  Ideal Body Weight (IBW): 196 lbs (89 kg)    Admission Body Weight: 290 lb 3.2 oz (131.6 kg) (11/30 [facility])  Current Body Weight: 304 lb 7.3 oz (138.1 kg), 148.1 % IBW.  Weight Source: Bed Scale (12/2)  Current BMI (kg/m2): 38.1  Usual Body Weight: 261 lb 5 oz (118.5 kg) (per EMR x 3 mo - wt fluctuations 250-290# over last 6 mo)  % Weight Change (Calculated): 11.1                    BMI Categories: Obese Class 2 (BMI 35.0 -39.9)    Estimated Daily Nutrient Needs:  Energy Requirements Based On: Kcal/kg (Friedman Bakes)  Weight Used for Energy Requirements: Current  Energy (kcal/day):  (15-18 kcal/kg)  Weight Used for Protein Requirements: Ideal  Protein (g/day): 105-115 (1.2-1.4 g/kg)  Method Used for Fluid Requirements: 1 ml/kcal  Fluid (ml/day):     Nutrition Diagnosis:   Inadequate oral intake related to other (comment) (increased metabolic demand to promote healing of multiple chronic wounds) as evidenced by poor intake prior to admission, intake 26-50%, wounds    Nutrition Interventions:   Food and/or Nutrient Delivery: Continue Current Diet, Start Oral Nutrition Supplement (Wound healing ONS BID and Ensure HP BID)  Nutrition Education/Counseling: Education not indicated  Coordination of Nutrition Care: Continue to monitor while inpatient       Goals:  Previous Goal Met: Progressing toward Goal(s)  Goals: PO intake 75% or greater, by next RD assessment  Specify Other Goals: Nutrition Progression    Nutrition Monitoring and Evaluation:   Behavioral-Environmental Outcomes: None Identified  Food/Nutrient Intake Outcomes: Food and Nutrient Intake, Supplement Intake  Physical Signs/Symptoms Outcomes: Biochemical Data, GI Status, Fluid Status or Edema, Nutrition Focused Physical Findings, Skin, Weight    Discharge Planning:    Continue Oral Nutrition Supplement     Aleksandra Ervin RD, [address] , [place]  Contact: [phone]

## 2022-12-02 NOTE — CARE COORDINATION
Social Work/Discharge Planning:  Chart reviewed. Patient admitted from [facility] and plans to return at discharge. No pre-cert and no covid needed. He had a colonoscopy 11/30. He may need a colectomy with ileostomy or end colostomy per general surgery note. Patient remains on two liters oxygen. He is on IV antibiotic and to be determined if needed at discharge. Will continue to follow.   Electronically signed by ROHIT Elam Chi on 12/2/2022 at 8:23 AM

## 2022-12-02 NOTE — PROGRESS NOTES
Helene Javier MD FACP  Internal medicine  Progress Notes      CHIEF COMPLAINT: Abdominal distention and emesis      HISTORY OF PRESENT ILLNESS:    11/30/2022  Patient was seen on the floor earlier this morning in the [facility] with the ER physician at the time of admission  Data reviewed in detail with his sister at bedside  53-year-old  man presented from a local nursing home with chronic abdominal distention which got worse associated with emesis  It appears he may have aspirated  Abdominal CT showing marked colonic distention  Admitted for further management  NG tube is intact  12/1/2022  Patient was seen on the floor earlier this morning  NG tube is out  He feels a lot better  Underwent colonoscopy yesterday  12/2/2022  Patient was seen on the floor earlier this morning  Mental status at his baseline  Had bowel movements  Trying to eat breakfast  Overall he is doing better  Past Medical History:    Past Medical History:   Diagnosis Date    Acquired absence of kidney     Acute kidney injury ([address]     Atherosclerosis of native artery of right lower extremity with ulceration of heel ([address] 08/15/2022    Atrial fibrillation ([address]     Cancer ([address]     kidney    Cellulitis     Chronic renal failure, stage 3b ([address] 08/15/2022    Diabetes mellitus ([address]     Dysphagia, oropharyngeal phase     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[address]     Femoral-popliteal atherosclerosis ([address] 09/13/2022    Hyperkalemia     Hypertension     Ischemic stroke ([address] 03/06/2018    Muscle weakness (generalized)     Pacemaker     Paraplegia ([address]     PVD (peripheral vascular disease) ([address]     Repeated falls     Sinus node dysfunction ([address]     Stroke (cerebrum) ([address] 02/27/2018    Thyroid disease     TIA (transient ischemic attack)     Ulcer of right heel and midfoot with fat layer exposed ([address] 08/15/2022       Past Surgical History:    Past Surgical History:   Procedure Laterality Date    CARDIAC PACEMAKER PLACEMENT  12/19/2014 COLONOSCOPY      COLONOSCOPY N/A 11/30/2022    COLONOSCOPY FLEXIBLE W/ DECOMPRESSION performed by Ana Foy MD at [address] [address] Left 07/05/2022    CANCER [facility] removed    KNEE ARTHROSCOPY  right knee    PACEMAKER PLACEMENT      HI COLONOSCOPY FLX DX W/COLLJ SPEC WHEN PFRMD N/A 03/20/2018    COLONOSCOPY DIAGNOSTIC performed by El Katz MD at [address] N/A 03/29/2018    PEG TUBE/PT. [address] performed by El Katz MD at [address] [facility] PERCUTANEOUS PLACEMENT GASTROSTOMY TUBE N/A 09/12/2018    EGD PEG TUBE PLACEMENT performed by El Katz MD at [facility]       Medications Prior to Admission:    Medications Prior to Admission: potassium chloride (KLOR-CON M) 20 MEQ extended release tablet, Take 20 mEq by mouth 2 times daily  lactobacillus (CULTURELLE) CAPS capsule, Take 1 capsule by mouth daily  cholestyramine (QUESTRAN) 4 g packet, Take 1 packet by mouth 3 times daily (before meals)  dicyclomine (BENTYL) 10 MG capsule, Take 1 capsule by mouth nightly  ferrous sulfate (IRON 325) 325 (65 Fe) MG tablet, Take 325 mg by mouth 2 times daily  bisacodyl (DULCOLAX) 10 MG suppository, Place 10 mg rectally daily as needed for Constipation  loperamide (IMODIUM) 2 MG capsule, Take 2 mg by mouth every 8 hours as needed for Diarrhea  Nutritional Supplements (ARGINAID) PACK, Take 1 packet by mouth 2 times daily  warfarin (COUMADIN) 6 MG tablet, Take 8 mg by mouth daily  gabapentin (NEURONTIN) 300 MG capsule, Take 1 capsule by mouth nightly for 7 days. levothyroxine (SYNTHROID) 50 MCG tablet, Take 1 tablet by mouth in the morning.   atorvastatin (LIPITOR) 20 MG tablet, Take 1 tablet by mouth nightly  acetaminophen (TYLENOL) 325 MG tablet, Take 650 mg by mouth every 4 hours as needed (DISCOMFORT;PAIN/ELEV TEMP >100.4F)  pantoprazole (PROTONIX) 40 MG tablet, Take 40 mg by mouth daily  amLODIPine (NORVASC) 2.5 MG tablet, Take 1 tablet by mouth daily  metoprolol tartrate (LOPRESSOR) 25 MG tablet, Take 1 tablet by mouth 2 times daily    Allergies:    Bee venom    Social History:    reports that he quit smoking about 20 years ago. His smoking use included cigars. He has never used smokeless tobacco. He reports that he does not drink alcohol and does not use drugs. Family History:   family history includes Heart Disease in his mother; Stroke in his father. REVIEW OF SYSTEMS:  As above in the HPI, otherwise negative    PHYSICAL EXAM:    Vitals:  BP (!) 154/64   Pulse 79   Temp 98.2 °F (36.8 °C) (Oral)   Resp 18   Ht 6' 3\" (1.905 m)   Wt (!) 304 lb 7.3 oz (138.1 kg)   SpO2 96%   BMI 38.05 kg/m²     General:  Awake, alert, oriented X 3. Looks chronically ill  HEENT:  Normocephalic, atraumatic. Pupils equal, round, reactive to light. No scleral icterus. No conjunctival injection. no nasal discharge.   Neck:  Supple  Heart:  RRR, no murmurs, gallops, rubs  Lungs: Scattered wheezes and rhonchi noted  Abdomen: Soft and nondistended  Extremities:  No clubbing, cyanosis,  Chronic peripheral edema is mild  Skin:  Warm and dry,   Right heel ulcer is healing well with eschar formation  Neuro:  Cranial nerves 2-12 intact, no focal deficits  Breast: deferred  Rectal: deferred  Genitalia:  deferred    LABS:  Data reviewed      ASSESSMENT:    Mild volume overload  Principal Problem:    Abdominal distension resolved following decompressive colonoscopy  Aspiration pneumonia  Acute kidney injury  Peripheral arterial disease  Multiple comorbidities present and past as listed below  Patient Active Problem List   Diagnosis    Hypertension    Hyperlipidemia with target LDL less than 100    Impaired mobility and ADLs    Cardiac pacemaker in situ    Cerebrovascular accident (CVA) determined by clinical assessment ([facility]    Left renal mass    Class 2 severe obesity due to excess calories with serious comorbidity and body mass index (BMI) of 35.0 to 35.9 in adult [facility])    Acquired hypothyroidism    H/O deep venous thrombosis    Type 2 diabetes mellitus without complication, without long-term current use of insulin (HCC)    Stage 3 chronic kidney disease (HCC)    Ulcer of right heel and midfoot with fat layer exposed ([address]    Atherosclerosis of native artery of right lower extremity with ulceration of heel (HCC)    Status post nephrectomy    Chronic renal failure, stage 3b (HCC)    Necrotic eschar (HCC)    Diabetic ulcer of left heel associated with type 2 diabetes mellitus, with fat layer exposed ([address]    Femoral-popliteal atherosclerosis (HCC)    Hyperkalemia    Peripheral vascular disease of lower extremity with ulceration (HCC)    Acute kidney injury superimposed on chronic kidney disease (HCC)    Pneumonia of both lungs due to infectious organism    PVD (peripheral vascular disease) (HCC)    Pneumonia in diseases classified elsewhere    Abdominal distension           PLAN:  1 dose of IV Bumex  Resume home medications including warfarin  Advance diet as tolerated  Replace potassium  IV fluids can be discontinued  Empirically Unasyn  Inhaled bronchodilators  Monitor labs  Sliding scale with insulin coverage  Questions answered to her satisfaction    Garfield Mendez MD  10:30 AM  12/2/2022

## 2022-12-02 NOTE — PROGRESS NOTES
GENERAL SURGERY  DAILY PROGRESS NOTE  12/2/2022    Chief Complaint   Patient presents with    Abdominal Pain     Patient has had Diarrhea for 3 weeks and is very distended. Abd pain. Also cough and O2 added 2lpm nasal cannula by EMS. Subjective:  Having his second large bowel movement this morning. With less distended compared to yesterday. Tolerating his diet.     Objective:  BP (!) 154/64   Pulse 79   Temp 98.2 °F (36.8 °C) (Oral)   Resp 18   Ht 6' 3\" (1.905 m)   Wt (!) 304 lb 7.3 oz (138.1 kg)   SpO2 96%   BMI 38.05 kg/m²     GENERAL:  Laying in bed, awake, alert, cooperative, no apparent distress  LUNGS:  No increased work of breathing  CARDIOVASCULAR:  RR  ABDOMEN:  Soft, non-tender, mildly distended--but significantly improved  EXTREMITIES: No edema or swelling  SKIN: Warm and dry    Assessment/Plan:  65 y.o. male chronic colonic distention s/p decompressive colonoscopy    Continue diet as tolerating  No other acute surgical intervention needed at this time  Please call with any further questions  Patient will need a follow-up for possible elective colectomy    Electronically signed by Kyle Farrar DO on 12/2/2022 at 8:31 AM

## 2022-12-02 NOTE — PLAN OF CARE
[facility]  Department of Emergency Medicine     Written by: Meenakshi Leigh DO  Patient Name: Orquidea To Date: 2022 11:21 AM  MRN: [id number]                   : 1928        Chief Complaint   Patient presents with    Abdominal Pain    Chest Pain    - Chief complaint    Patient is a 50-year-old female past medical history of CAD, hypertension, peripheral vascular disease, diabetes and CHF. Patient presents with chief complaint of abdominal pain as well as chest pain. Patient said that symptoms began 4 days ago. Patient states that she has had dull generalized epigastric abdominal pain which radiates into her chest.  Patient currently rates pain 3 out of 10. Patient denies any exacerbating or relieving factors. Patient states that she has not had a bowel movement in 4 days. Patient states that she did have previous abdominal surgery with her gallbladder removed. Reports patient was given 1 nitro at facility with minimal improvement. Patient was reported to have a syncopal episode however this was not known whether read before or after nitro was given. Patient denies any exacerbating relieving factors. Stated symptoms have been moderate in severity and constant onset she denies any similar episodes in the past.  Patient denies any fevers, chills, nausea, vomiting, headache, lightheadedness, numbness or tingling. The history is provided by the patient. No  was used. Review of Systems   Constitutional:  Negative for chills and fever. HENT:  Negative for ear pain, nosebleeds, sinus pressure and sore throat. Eyes:  Negative for pain, discharge and redness. Respiratory:  Negative for cough, shortness of breath and wheezing. Cardiovascular:  Positive for chest pain. Gastrointestinal:  Positive for abdominal pain and constipation. Negative for abdominal distention, diarrhea, nausea and vomiting.    Genitourinary:  Negative for Problem: Discharge Planning  Goal: Discharge to home or other facility with appropriate resources  Outcome: Progressing     Problem: Pain  Goal: Verbalizes/displays adequate comfort level or baseline comfort level  Outcome: Progressing     Problem: Skin/Tissue Integrity  Goal: Absence of new skin breakdown  Description: 1. Monitor for areas of redness and/or skin breakdown  2. Assess vascular access sites hourly  3. Every 4-6 hours minimum:  Change oxygen saturation probe site  4. Every 4-6 hours:  If on nasal continuous positive airway pressure, respiratory therapy assess nares and determine need for appliance change or resting period.   Outcome: Progressing     Problem: Safety - Adult  Goal: Free from fall injury  Outcome: Progressing dysuria and frequency. Musculoskeletal:  Negative for arthralgias and back pain. Skin:  Negative for rash and wound. Neurological:  Negative for weakness and headaches. Hematological:  Negative for adenopathy. All other systems reviewed and are negative. Physical Exam  Vitals and nursing note reviewed. Constitutional:       General: She is not in acute distress. Appearance: Normal appearance. HENT:      Head: Normocephalic and atraumatic. Nose: No congestion or rhinorrhea. Mouth/Throat:      Mouth: Mucous membranes are moist.      Pharynx: Oropharynx is clear. Eyes:      Extraocular Movements: Extraocular movements intact. Pupils: Pupils are equal, round, and reactive to light. Cardiovascular:      Rate and Rhythm: Normal rate and regular rhythm. Heart sounds: No murmur heard. No gallop. Pulmonary:      Effort: Pulmonary effort is normal. No respiratory distress. Breath sounds: No wheezing, rhonchi or rales. Abdominal:      General: Abdomen is flat. Palpations: Abdomen is soft. There is no mass. Tenderness: There is generalized abdominal tenderness. There is no guarding. Hernia: No hernia is present. Musculoskeletal:         General: No swelling, tenderness or signs of injury. Normal range of motion. Cervical back: Normal range of motion. No rigidity. No muscular tenderness. Skin:     General: Skin is warm and dry. Capillary Refill: Capillary refill takes less than 2 seconds. Neurological:      General: No focal deficit present. Mental Status: She is alert and oriented to person, place, and time. Mental status is at baseline.    Psychiatric:         Mood and Affect: Mood normal.         Behavior: Behavior normal.        Procedures       MDM  Number of Diagnoses or Management Options  Chest pain, unspecified type  Constipation, unspecified constipation type  Generalized abdominal pain  Diagnosis management comments: Patient 69-year-old female past med history CAD, hypertension, PVD, diabetes and CHF. Patient is on treatment for abdominal pain as well as chest pain. Vital signs stable presentation. Physical exam heart regular rate and rhythm, lungs are auscultation bilaterally, abdomen soft with mild epigastric abdominal pain no rigidity rebound or guarding. EKG obtained demonstrates sinus bradycardia. Labs were obtained CBC demonstrated mildly cytosis 14.6, chronic anemia hemoglobin 8.4, CMP obtained demonstrated no acute maladies, proBNP 870, lactic acid 1.6, lipase 53, troponin obtained initially 73 on repeat 68 down from patient's previous 92. Urinalysis was obtained and was indicative infection with positive nitrite moderate leukocyte Estrace 10-20 WBCs as well as moderate bacteria. Chest x-ray obtained demonstrated right upper lobe pneumonia. CT scan of abdomen pelvis demonstrated possible pyelonephritis as well as constipation. Finding findings consistent with Possible Antonio as well as pneumonia and constipation. Patient started on Rocephin and Doxy. On reevaluation patient cristal hemodynamically stable. Lengthy discussion held with family concerning patient's possible CODE STATUS of DNR CC they note that patient is not currently under the care of hospice and would like patient evaluated. Patient was made DNR CCA, decision made to admit patient. Case discussed with EVELYN who agreed to admit patient. Plan of care discussed with patient and son at the bedside, all questions were answered, they were in agreement plan of care and patient was admitted to the hospital in stable condition.        Amount and/or Complexity of Data Reviewed  Clinical lab tests: ordered and reviewed  Tests in the radiology section of CPT®: reviewed and ordered  Decide to obtain previous medical records or to obtain history from someone other than the patient: yes    Risk of Complications, Morbidity, and/or Mortality  Presenting problems: moderate  Diagnostic procedures: moderate  Management options: moderate    Patient Progress  Patient progress: stable           --------------------------------------------- PAST HISTORY ---------------------------------------------  Past Medical History:  has a past medical history of Arthritis, Asthma, Atherosclerosis of native artery of left lower extremity with ulceration of midfoot ([address] Bronchitis, Bruising tendency ([address] CAD (coronary artery disease), Cough, COVID, Dermatophytosis, Diabetes mellitus ([address] GERD (gastroesophageal reflux disease), History of pulmonary embolus (PE), Hx of blood clots, Hyperlipidemia, Hypertension, Leg swelling, Lung disease, Peripheral vascular angioplasty status, Pseudoaneurysm of right femoral artery ([address] PVD (peripheral vascular disease) with claudication (HCC), Restless legs syndrome, Rhinitis, allergic, Sinusitis, SOB (shortness of breath), Type 1 diabetes mellitus with left diabetic foot ulcer ([address] Ulcerated, foot, left, limited to breakdown of skin ([address] and Urinary incontinence. Past Surgical History:  has a past surgical history that includes Hysterectomy; Cholecystectomy; Tonsillectomy and adenoidectomy; Coronary artery bypass graft; Abdomen surgery; Appendectomy; Colonoscopy; Endoscopy, colon, diagnostic; Cardiac surgery; Foot Debridement (Left, 5/16/2021); Foot Debridement (Left, 5/21/2021); and Upper gastrointestinal endoscopy (N/A, 7/18/2022). Social History:  reports that she has never smoked. She has never used smokeless tobacco. She reports current alcohol use. She reports that she does not use drugs. Family History: family history includes Heart Disease in her brother; Hypertension in her father. The patients home medications have been reviewed.     Allergies: Ativan [lorazepam] and Lisinopril    -------------------------------------------------- RESULTS -------------------------------------------------    LABS:  Results for orders placed or performed during the hospital encounter of 08/26/22   CBC with Auto Differential   Result Value Ref Range    WBC 14.6 (H) 4.5 - 11.5 E9/L    RBC 2.62 (L) 3.50 - 5.50 E12/L    Hemoglobin 8.4 (L) 11.5 - 15.5 g/dL    Hematocrit 26.8 (L) 34.0 - 48.0 %    .3 (H) 80.0 - 99.9 fL    MCH 32.1 26.0 - 35.0 pg    MCHC 31.3 (L) 32.0 - 34.5 %    RDW 19.4 (H) 11.5 - 15.0 fL    Platelets 341 767 - 843 E9/L    MPV 10.2 7.0 - 12.0 fL    Neutrophils % 83.6 (H) 43.0 - 80.0 %    Immature Granulocytes % 0.9 0.0 - 5.0 %    Lymphocytes % 8.3 (L) 20.0 - 42.0 %    Monocytes % 6.9 2.0 - 12.0 %    Eosinophils % 0.2 0.0 - 6.0 %    Basophils % 0.1 0.0 - 2.0 %    Neutrophils Absolute 12.19 (H) 1.80 - 7.30 E9/L    Immature Granulocytes # 0.13 E9/L    Lymphocytes Absolute 1.21 (L) 1.50 - 4.00 E9/L    Monocytes Absolute 1.00 (H) 0.10 - 0.95 E9/L    Eosinophils Absolute 0.03 (L) 0.05 - 0.50 E9/L    Basophils Absolute 0.01 0.00 - 0.20 E9/L   Comprehensive Metabolic Panel w/ Reflex to MG   Result Value Ref Range    Sodium 132 132 - 146 mmol/L    Potassium reflex Magnesium 4.2 3.5 - 5.0 mmol/L    Chloride 100 98 - 107 mmol/L    CO2 22 22 - 29 mmol/L    Anion Gap 10 7 - 16 mmol/L    Glucose 149 (H) 74 - 99 mg/dL    BUN 19 6 - 23 mg/dL    Creatinine 0.6 0.5 - 1.0 mg/dL    GFR Non-African American >60 >=60 mL/min/1.73    GFR African American >60     Calcium 8.7 8.6 - 10.2 mg/dL    Total Protein 5.7 (L) 6.4 - 8.3 g/dL    Albumin 3.4 (L) 3.5 - 5.2 g/dL    Total Bilirubin 0.2 0.0 - 1.2 mg/dL    Alkaline Phosphatase 70 35 - 104 U/L    ALT 15 0 - 32 U/L    AST 14 0 - 31 U/L   Troponin   Result Value Ref Range    Troponin, High Sensitivity 73 (H) 0 - 9 ng/L   Lipase   Result Value Ref Range    Lipase 53 13 - 60 U/L   Brain Natriuretic Peptide   Result Value Ref Range    Pro- (H) 0 - 450 pg/mL   Urinalysis with Microscopic   Result Value Ref Range    Color, UA Yellow Straw/Yellow    Clarity, UA Clear Clear    Glucose, Ur Negative Negative mg/dL Bilirubin Urine Negative Negative    Ketones, Urine Negative Negative mg/dL    Specific Gravity, UA <=1.005 1.005 - 1.030    Blood, Urine TRACE-INTACT Negative    pH, UA 6.5 5.0 - 9.0    Protein, UA Negative Negative mg/dL    Urobilinogen, Urine 0.2 <2.0 E.U./dL    Nitrite, Urine POSITIVE (A) Negative    Leukocyte Esterase, Urine MODERATE (A) Negative    WBC, UA 10-20 (A) 0 - 5 /HPF    RBC, UA 0-1 0 - 2 /HPF    Epithelial Cells, UA RARE /HPF    Bacteria, UA MODERATE (A) None Seen /HPF   Lactate, Sepsis   Result Value Ref Range    Lactic Acid, Sepsis 1.6 0.5 - 1.9 mmol/L   Troponin   Result Value Ref Range    Troponin, High Sensitivity 68 (H) 0 - 9 ng/L   Basic Metabolic Panel w/ Reflex to MG   Result Value Ref Range    Sodium 133 132 - 146 mmol/L    Potassium reflex Magnesium 4.6 3.5 - 5.0 mmol/L    Chloride 103 98 - 107 mmol/L    CO2 19 (L) 22 - 29 mmol/L    Anion Gap 11 7 - 16 mmol/L    Glucose 98 74 - 99 mg/dL    BUN 17 6 - 23 mg/dL    Creatinine 0.5 0.5 - 1.0 mg/dL    GFR Non-African American >60 >=60 mL/min/1.73    GFR African American >60     Calcium 9.0 8.6 - 10.2 mg/dL   Procalcitonin   Result Value Ref Range    Procalcitonin 0.05 0.00 - 0.08 ng/mL   Hemoglobin A1c   Result Value Ref Range    Hemoglobin A1C 5.6 4.0 - 5.6 %   POCT Glucose   Result Value Ref Range    Meter Glucose 202 (H) 74 - 99 mg/dL   POCT Glucose   Result Value Ref Range    Meter Glucose 99 74 - 99 mg/dL   EKG 12 Lead   Result Value Ref Range    Ventricular Rate 48 BPM    Atrial Rate 48 BPM    P-R Interval 222 ms    QRS Duration 102 ms    Q-T Interval 404 ms    QTc Calculation (Bazett) 360 ms    P Axis 25 degrees    R Axis -15 degrees    T Axis 85 degrees   EKG 12 Lead   Result Value Ref Range    Ventricular Rate 51 BPM    Atrial Rate 51 BPM    P-R Interval 218 ms    QRS Duration 102 ms    Q-T Interval 450 ms    QTc Calculation (Bazett) 414 ms    P Axis 64 degrees    T Axis 69 degrees       RADIOLOGY:  CT ABDOMEN PELVIS W IV CONTRAST Additional Contrast? None   Final Result   Biliary dilatation, potentially physiologic and related to cholecystectomy. Urothelial thickening in both renal pelves along with a hypoenhancing region   in the lower pole of the left kidney. Possibilities include acute   pyelonephritis. A solid left renal mass is not excluded. Catheterized urinary bladder demonstrating a thickened wall. Diffusely distended colon containing a large amount of stool. XR CHEST PORTABLE   Final Result   Right upper lobe pneumonia suggested. Consider a two-view study when   possible. EKG:  This EKG is signed and interpreted by me. Rate: 48  Rhythm: Sinus  Interpretation: EKG obtained and showed sinus bradycardia first-degree block, rate 48, left axis, , biphasic T waves noted V5 V6  Comparison: changes compared to previous EKG    EKG #2:  Interpreted by emergency department physician unless otherwise noted. Time:  1546    Rate: 51  Rhythm: Sinus. Interpretation: EKG obtained demonstrates sinus bradycardia, rate 51, normal axis, QTC 14, mild biphasic T waves noted in precordial leads stable compared to previous EKG. Comparison: stable as compared to patient's most recent EKG.      ------------------------- NURSING NOTES AND VITALS REVIEWED ---------------------------  Date / Time Roomed:  8/26/2022 11:21 AM  ED Bed Assignment:  [id number]    The nursing notes within the ED encounter and vital signs as below have been reviewed.      Patient Vitals for the past 24 hrs:   BP Temp Temp src Pulse Resp SpO2 Height Weight   08/27/22 0804 (!) 138/54 98.4 °F (36.9 °C) Oral 51 17 95 % -- --   08/26/22 2256 (!) 151/54 97.8 °F (36.6 °C) Oral 55 18 95 % -- --   08/26/22 2133 (!) 117/41 -- -- 53 18 99 % -- --   08/26/22 1738 (!) 121/37 -- -- 54 17 98 % -- --   08/26/22 1314 (!) 110/44 -- -- (!) 46 18 98 % -- --   08/26/22 1125 (!) 121/52 -- Oral (!) 49 18 99 % 5' 3\" (1.6 m) 116 lb (52.6 kg)       Oxygen Saturation Interpretation: Normal    ------------------------------------------ PROGRESS NOTES ------------------------------------------  Re-evaluation(s):  Time: 1600  Patients symptoms show no change  Repeat physical examination is not changed    Counseling:  I have spoken with the patient and discussed todays results, in addition to providing specific details for the plan of care and counseling regarding the diagnosis and prognosis. Their questions are answered at this time and they are agreeable with the plan of admission.    --------------------------------- ADDITIONAL PROVIDER NOTES ---------------------------------  Consultations:  Time: 1630. Spoke with EVELYN. Discussed case. They will admit the patient. This patient's ED course included: a personal history and physicial examination, re-evaluation prior to disposition, and multiple bedside re-evaluations    This patient has remained hemodynamically stable during their ED course. Diagnosis:  1. Generalized abdominal pain    2. Constipation, unspecified constipation type    3. Chest pain, unspecified type    4. Pyelonephritis        Disposition:  Patient's disposition: Admit to telemetry  Patient's condition is stable. Patient was seen and evaluated by myself and my attending . Assessment and Plan discussed with attending provider, please see attestation for final plan of care.      DO Claudia Armendariz DO  Resident  08/27/22 2335

## 2022-12-03 PROBLEM — J96.02 ACUTE RESPIRATORY FAILURE WITH HYPERCAPNIA (HCC): Status: ACTIVE | Noted: 2022-01-01

## 2022-12-03 PROBLEM — E87.6 HYPOKALEMIA: Status: ACTIVE | Noted: 2022-01-01

## 2022-12-03 PROBLEM — J69.0 ASPIRATION PNEUMONIA (HCC): Status: ACTIVE | Noted: 2022-01-01

## 2022-12-03 PROBLEM — R14.0 ABDOMINAL DISTENTION: Status: ACTIVE | Noted: 2022-01-01

## 2022-12-03 NOTE — PROGRESS NOTES
Rin Alejandra MD FACP  Internal medicine  Progress Notes      CHIEF COMPLAINT: Abdominal distention and emesis      HISTORY OF PRESENT ILLNESS:    11/30/2022  Patient was seen on the floor earlier this morning in the [facility] with the ER physician at the time of admission  Data reviewed in detail with his sister at bedside  69-year-old  man presented from a local nursing home with chronic abdominal distention which got worse associated with emesis  It appears he may have aspirated  Abdominal CT showing marked colonic distention  Admitted for further management  NG tube is intact  12/1/2022  Patient was seen on the floor earlier this morning  NG tube is out  He feels a lot better  Underwent colonoscopy yesterday  12/2/2022  Patient was seen on the floor earlier this morning  Mental status at his baseline  Had bowel movements  Trying to eat breakfast  Overall he is doing better  12/3/2022  Patient was seen on the floor earlier this morning  Nursing staff at bedside  Recurrent aspiration was reported  Past Medical History:    Past Medical History:   Diagnosis Date    Acquired absence of kidney     Acute kidney injury ([address]     Atherosclerosis of native artery of right lower extremity with ulceration of heel ([address] 08/15/2022    Atrial fibrillation ([address]     Cancer ([address]     kidney    Cellulitis     Chronic renal failure, stage 3b ([address] 08/15/2022    Diabetes mellitus ([address]     Dysphagia, oropharyngeal phase     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[address]     Femoral-popliteal atherosclerosis ([address] 09/13/2022    Hyperkalemia     Hypertension     Ischemic stroke ([address] 03/06/2018    Muscle weakness (generalized)     Pacemaker     Paraplegia ([address]     PVD (peripheral vascular disease) ([address]     Repeated falls     Sinus node dysfunction ([address]     Stroke (cerebrum) ([address] 02/27/2018    Thyroid disease     TIA (transient ischemic attack)     Ulcer of right heel and midfoot with fat layer exposed ([address] 08/15/2022 Past Surgical History:    Past Surgical History:   Procedure Laterality Date    CARDIAC PACEMAKER PLACEMENT  12/19/2014    COLONOSCOPY      COLONOSCOPY N/A 11/30/2022    COLONOSCOPY FLEXIBLE W/ DECOMPRESSION performed by Boaz See MD at [facility] Left 07/05/2022    CANCER [facility] removed    KNEE ARTHROSCOPY  right knee    PACEMAKER PLACEMENT      MT COLONOSCOPY FLX DX W/COLLJ SPEC WHEN PFRMD N/A 03/20/2018    COLONOSCOPY DIAGNOSTIC performed by Sadiq Shaw MD at [address] N/A 03/29/2018    PEG TUBE/PT. IN [address] performed by Sadiq Shaw MD at [address] [facility] PERCUTANEOUS PLACEMENT GASTROSTOMY TUBE N/A 09/12/2018    EGD PEG TUBE PLACEMENT performed by Sadiq Shaw MD at [facility]       Medications Prior to Admission:    Medications Prior to Admission: potassium chloride (KLOR-CON M) 20 MEQ extended release tablet, Take 20 mEq by mouth 2 times daily  lactobacillus (CULTURELLE) CAPS capsule, Take 1 capsule by mouth daily  cholestyramine (QUESTRAN) 4 g packet, Take 1 packet by mouth 3 times daily (before meals)  dicyclomine (BENTYL) 10 MG capsule, Take 1 capsule by mouth nightly  ferrous sulfate (IRON 325) 325 (65 Fe) MG tablet, Take 325 mg by mouth 2 times daily  bisacodyl (DULCOLAX) 10 MG suppository, Place 10 mg rectally daily as needed for Constipation  loperamide (IMODIUM) 2 MG capsule, Take 2 mg by mouth every 8 hours as needed for Diarrhea  Nutritional Supplements (ARGINAID) PACK, Take 1 packet by mouth 2 times daily  warfarin (COUMADIN) 6 MG tablet, Take 8 mg by mouth daily  gabapentin (NEURONTIN) 300 MG capsule, Take 1 capsule by mouth nightly for 7 days. levothyroxine (SYNTHROID) 50 MCG tablet, Take 1 tablet by mouth in the morning.   atorvastatin (LIPITOR) 20 MG tablet, Take 1 tablet by mouth nightly  acetaminophen (TYLENOL) 325 MG tablet, Take 650 mg by mouth every 4 hours as needed (DISCOMFORT;PAIN/ELEV TEMP >100.4F)  pantoprazole (PROTONIX) 40 MG tablet, Take 40 mg by mouth daily  amLODIPine (NORVASC) 2.5 MG tablet, Take 1 tablet by mouth daily  metoprolol tartrate (LOPRESSOR) 25 MG tablet, Take 1 tablet by mouth 2 times daily    Allergies:    Bee venom    Social History:    reports that he quit smoking about 20 years ago. His smoking use included cigars. He has never used smokeless tobacco. He reports that he does not drink alcohol and does not use drugs. Family History:   family history includes Heart Disease in his mother; Stroke in his father. REVIEW OF SYSTEMS:  As above in the HPI, otherwise negative    PHYSICAL EXAM:    Vitals:  BP (!) 152/69   Pulse 71   Temp 98.5 °F (36.9 °C) (Axillary)   Resp 25   Ht 6' 3\" (1.905 m)   Wt (!) 307 lb (139.3 kg)   SpO2 100%   BMI 38.37 kg/m²     General:  Awake, alert, oriented X 3. Looks chronically ill  HEENT:  Normocephalic, atraumatic. Pupils equal, round, reactive to light. No scleral icterus. No conjunctival injection. no nasal discharge.   Neck:  Supple  Heart:  RRR, no murmurs, gallops, rubs  Lungs: Scattered wheezes and rhonchi noted  Abdomen: Soft and nondistended  Extremities:  No clubbing, cyanosis,  Chronic peripheral edema is mild  Skin:  Warm and dry,   Right heel ulcer is healing well with eschar formation  Neuro:  Cranial nerves 2-12 intact, no focal deficits  Breast: deferred  Rectal: deferred  Genitalia:  deferred    LABS:  Data reviewed      ASSESSMENT:    Mild volume overload  Principal Problem:    Abdominal distension resolved following decompressive colonoscopy  Aspiration pneumonia  Acute kidney injury  Peripheral arterial disease  Multiple comorbidities present and past as listed below  Patient Active Problem List   Diagnosis    Hypertension    Hyperlipidemia with target LDL less than 100    Impaired mobility and ADLs    Cardiac pacemaker in situ    Cerebrovascular accident (CVA) determined by clinical assessment (HCC)    Left renal mass    Class 2 severe obesity due to excess calories with serious comorbidity and body mass index (BMI) of 35.0 to 35.9 in adult [facility])    Acquired hypothyroidism    H/O deep venous thrombosis    Type 2 diabetes mellitus without complication, without long-term current use of insulin (HCC)    Stage 3 chronic kidney disease (HCC)    Ulcer of right heel and midfoot with fat layer exposed ([address]    Atherosclerosis of native artery of right lower extremity with ulceration of heel (HCC)    Status post nephrectomy    Chronic renal failure, stage 3b (HCC)    Necrotic eschar (HCC)    Diabetic ulcer of left heel associated with type 2 diabetes mellitus, with fat layer exposed ([address]    Femoral-popliteal atherosclerosis (HCC)    Hyperkalemia    Peripheral vascular disease of lower extremity with ulceration (HCC)    Acute kidney injury superimposed on chronic kidney disease (HCC)    Pneumonia of both lungs due to infectious organism    PVD (peripheral vascular disease) ([address]    Pneumonia in diseases classified elsewhere    Abdominal distension           PLAN  CODE STATUS was changed to DNR CCA per family  Empirically Unasyn  Inhaled bronchodilators  Monitor labs  Sliding scale with insulin coverage  Questions answered to her satisfaction    Juan Leong MD  4:12 PM  12/3/2022

## 2022-12-03 NOTE — CONSULTS
Kwame Kamara M.D.,[facility]  Ankush Peralta D.O., F.A.C.O.I., Breana Jewell M.D. Juluis Kehr, M.D. Raquel Recinos D.O. Patient:  Eric Sampson 65 y.o. male MRN: [id number]     Date of Service: 12/3/2022      PULMONARY CONSULTATION    Reason for Consultation: respiratory acidosis, aspiration pna, s/p RRT  Referring Physician: Dr. Barrios Check with the referring physician will be sent via the electronic medical record. Chief Complaint: s/p RRT    CODE STATUS: full code    SUBJECTIVE:  HPI:  Eric Sampson is a 65 y.o. male not previously known to our practice. He has been seen in the past by pulmonologist Dr. Reuben Gomez for preoperative clearance in March of this year  prior to his L sided renal mass removal. He has a history of R sided stroke x2, DM, HTN, DVT x3 in the right leg while in his 30s on chronic coumadin, and cardiac pacemaker. He has a remote smoking history and per chart is not symptomatic for ELENO. He had PFT in 3/2022 with normal flow volume loop and normal spirometry with no obstruction. He presented to the ED on 11/30 from a nursing home with abdominal distention that had gotten worse with emesis. Abdominal CT showed marked colonic distention and he was admitted for further management. NG was placed to suction and he was started on Antibiotics. He underwent a colonic decompression with Dr. Bry Gregorio and NG was removed. He was diagnosed with chronic colonic dysmotility. He has previously signed a waiver for swallow evaluation or modified diets. Patient had an RRT this morning for acute hypoxia. He was found to be 62% on 2L oxygen. ABGs were performed and he was found to be acidotic at 7.091/94/67.7/27.9/87.1/86. He was placed on AVAPS therapy. Palliative care was also consulted. Patient was seen and examined on AVAPS: rate 16, EPAP 8, PIP 17, FiO2 90% with sat of 100%, turned down to 85%. He has rhonchi throughout the lung fields bilaterally.  Family at bedside, he does not want intubated or a PEG tube. Past Medical History:   Diagnosis Date    Acquired absence of kidney     Acute kidney injury [facility])     Atherosclerosis of native artery of right lower extremity with ulceration of heel ([address] 08/15/2022    Atrial fibrillation (HCC)     Cancer (HCC)     kidney    Cellulitis     Chronic renal failure, stage 3b ([address] 08/15/2022    Diabetes mellitus (HCC)     Dysphagia, oropharyngeal phase     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HCC)     Femoral-popliteal atherosclerosis ([address] 09/13/2022    Hyperkalemia     Hypertension     Ischemic stroke ([address] 03/06/2018    Muscle weakness (generalized)     Pacemaker     Paraplegia (HCC)     PVD (peripheral vascular disease) ([address]     Repeated falls     Sinus node dysfunction ([address]     Stroke (cerebrum) ([address] 02/27/2018    Thyroid disease     TIA (transient ischemic attack)     Ulcer of right heel and midfoot with fat layer exposed ([address] 08/15/2022       Past Surgical History:   Procedure Laterality Date    CARDIAC PACEMAKER PLACEMENT  12/19/2014    COLONOSCOPY      COLONOSCOPY N/A 11/30/2022    COLONOSCOPY FLEXIBLE W/ DECOMPRESSION performed by Tao Kamara MD at [address] [place] [address] Left 07/05/2022    CANCER [facility] removed    KNEE ARTHROSCOPY  right knee    PACEMAKER PLACEMENT      IA COLONOSCOPY FLX DX W/COLLJ SPEC WHEN PFRMD N/A 03/20/2018    COLONOSCOPY DIAGNOSTIC performed by Cristal Dueñas MD at [address] EGD PERCUTANEOUS PLACEMENT GASTROSTOMY TUBE N/A 03/29/2018    PEG TUBE/PT. IN 5507 B performed by Cristal Dueñas MD at [address] EGD PERCUTANEOUS PLACEMENT GASTROSTOMY TUBE N/A 09/12/2018    EGD PEG TUBE PLACEMENT performed by Cristal Dueñas MD at [facility] ENDOSCOPY       Family History   Problem Relation Age of Onset    Heart Disease Mother     Stroke Father        Social History:   Social History     Socioeconomic History    Marital status:       Spouse name: Not on file    Number of children: 3    Years of education: Not on file    Highest education level: Not on file   Occupational History    Not on file   Tobacco Use    Smoking status: Former     Types: Cigars     Quit date:      Years since quittin.9    Smokeless tobacco: Never   Vaping Use    Vaping Use: Never used   Substance and Sexual Activity    Alcohol use: No    Drug use: No    Sexual activity: Not on file   Other Topics Concern    Not on file   Social History Narrative    Not on file     Social Determinants of Health     Financial Resource Strain: Not on file   Food Insecurity: Not on file   Transportation Needs: Not on file   Physical Activity: Not on file   Stress: Not on file   Social Connections: Not on file   Intimate Partner Violence: Not on file   Housing Stability: Not on file     Smoking history: The patient is a Past social smoker of      ETOH:   reports no history of alcohol use. Exposures: unable to assess       Vaccines:    Influenza:   Indicated for current flu vaccination season Oct. to Feb.  Pneumococcal Polysaccharide:  Up-to-date; approximate date:   Immunization History   Administered Date(s) Administered    COVID-19, PFIZER PURPLE top, DILUTE for use, (age 15 y+), 30mcg/0.3mL 2021, 2021, 10/04/2021    Influenza Virus Vaccine 2012, 2014, 10/22/2021    Influenza, FLUZONE (age 72 y+), High Dose, 0.7mL 10/13/2020, 10/22/2021, 10/22/2021, 10/19/2022    Influenza, High Dose (Fluzone 65 yrs and older) 2019, 2019, 10/13/2020    Pneumococcal Conjugate 13-valent ([id number]) 2019    Pneumococcal Polysaccharide ([id number]) 2012, 2019        Home Meds: Medications Prior to Admission: potassium chloride (KLOR-CON M) 20 MEQ extended release tablet, Take 20 mEq by mouth 2 times daily  lactobacillus (CULTURELLE) CAPS capsule, Take 1 capsule by mouth daily  cholestyramine (QUESTRAN) 4 g packet, Take 1 packet by mouth 3 times daily (before meals)  dicyclomine (BENTYL) 10 MG capsule, Take 1 capsule by mouth nightly  ferrous sulfate (IRON 325) 325 (65 Fe) MG tablet, Take 325 mg by mouth 2 times daily  bisacodyl (DULCOLAX) 10 MG suppository, Place 10 mg rectally daily as needed for Constipation  loperamide (IMODIUM) 2 MG capsule, Take 2 mg by mouth every 8 hours as needed for Diarrhea  Nutritional Supplements (ARGINAID) PACK, Take 1 packet by mouth 2 times daily  warfarin (COUMADIN) 6 MG tablet, Take 8 mg by mouth daily  gabapentin (NEURONTIN) 300 MG capsule, Take 1 capsule by mouth nightly for 7 days. levothyroxine (SYNTHROID) 50 MCG tablet, Take 1 tablet by mouth in the morning.   atorvastatin (LIPITOR) 20 MG tablet, Take 1 tablet by mouth nightly  acetaminophen (TYLENOL) 325 MG tablet, Take 650 mg by mouth every 4 hours as needed (DISCOMFORT;PAIN/ELEV TEMP >100.4F)  pantoprazole (PROTONIX) 40 MG tablet, Take 40 mg by mouth daily  amLODIPine (NORVASC) 2.5 MG tablet, Take 1 tablet by mouth daily  metoprolol tartrate (LOPRESSOR) 25 MG tablet, Take 1 tablet by mouth 2 times daily    CURRENT MEDS :  Scheduled Meds:   potassium chloride  10 mEq IntraVENous Q1H    amLODIPine  2.5 mg Oral Daily    atorvastatin  20 mg Oral Nightly    ferrous sulfate  325 mg Oral BID    gabapentin  300 mg Oral Nightly    lactobacillus  1 capsule Oral Daily    levothyroxine  50 mcg Oral Daily    metoprolol tartrate  25 mg Oral BID    pantoprazole  40 mg Oral Daily    potassium chloride  20 mEq Oral BID    warfarin  8 mg Oral Daily    ampicillin-sulbactam  3,000 mg IntraVENous Q6H    miconazole   Topical BID    insulin lispro  0-4 Units SubCUTAneous TID WC    insulin lispro  0-4 Units SubCUTAneous Nightly    collagenase   Topical Daily    menthol-zinc oxide   Topical BID    white petrolatum   Topical Daily    ipratropium-albuterol  1 ampule Inhalation 4x daily       Continuous Infusions:   dextrose         Allergies   Allergen Reactions    Bee Venom Anaphylaxis       REVIEW OF SYSTEMS:  Unable to assess    OBJECTIVE:   BP (!) 163/81   Pulse 88   Temp 98.4 °F (36.9 °C) (Axillary)   Resp (!) 36   Ht 6' 3\" (1.905 m)   Wt (!) 307 lb (139.3 kg)   SpO2 100%   BMI 38.37 kg/m²   SpO2 Readings from Last 1 Encounters:   12/03/22 100%        I/O:    Intake/Output Summary (Last 24 hours) at 12/3/2022 1047  Last data filed at 12/2/2022 1759  Gross per 24 hour   Intake 480 ml   Output --   Net 480 ml                CPAP/EPAP: 8 cmH2O     Physical Exam:  General: The patient is lying in bed comfortably with mild distress. Breathing is not labored on AVAPS  HEENT: Pupils are equal round and reactive to light, there are no oral lesions and no post-nasal drip   Neck: supple without adenopathy  Cardiovascular: irregular rate and rhythm without murmur or gallop  Respiratory: rhonchi throughout bilateral lung fields. Air entry is symmetric  Abdomen: soft, non-tender, non-distended, normal bowel sounds  Extremities: warm, no edema, no clubbing  Skin: no rash or lesion  Neurologic: CN II-XII grossly intact, no focal deficits    Pulmonary Function Testing personally reviewed and interpreted  None on file    Imaging personally reviewed:  1. Significant worsening of the right lung airspace disease when compared to   the patient's prior study   2. Trace/small right pleural effusion   3.  Findings of CHF       Echo:  10/2/2018    Labs:  Lab Results   Component Value Date/Time    WBC 9.1 12/03/2022 02:00 AM    HGB 8.9 12/03/2022 02:00 AM    HCT 30.4 12/03/2022 02:00 AM    MCV 90.5 12/03/2022 02:00 AM    MCH 26.5 12/03/2022 02:00 AM    MCHC 29.3 12/03/2022 02:00 AM    RDW 15.4 12/03/2022 02:00 AM     12/03/2022 02:00 AM    MPV 9.6 12/03/2022 02:00 AM     Lab Results   Component Value Date/Time     12/03/2022 02:00 AM    K 3.3 12/03/2022 02:00 AM    K 3.5 11/29/2022 04:18 PM     12/03/2022 02:00 AM    CO2 25 12/03/2022 02:00 AM    BUN 17 12/03/2022 02:00 AM    CREATININE 1.6 12/03/2022 02:00 AM    LABALBU 2.6 12/03/2022 02:00 AM    LABALBU 3.4 01/04/2012 03:00 AM    CALCIUM 8.4 12/03/2022 02:00 AM    GFRAA 42 10/10/2022 05:52 AM    LABGLOM 44 12/03/2022 02:00 AM     Lab Results   Component Value Date/Time    PROTIME 29.1 12/03/2022 02:00 AM    PROTIME 12.2 12/30/2011 10:20 PM    INR 2.7 12/03/2022 02:00 AM     Recent Labs     12/03/22  0200   PROBNP 5,331*     No results for input(s): TROPONINI in the last 72 hours. No results for input(s): PROCAL in the last 72 hours. This SmartLink has not been configured with any valid records. Micro:  No results for input(s): CULTRESP in the last 72 hours. No results for input(s): LABGRAM in the last 72 hours. No results for input(s): LEGUR in the last 72 hours. No results for input(s): STREPNEUMAGU in the last 72 hours. No results for input(s): LP1UAG in the last 72 hours. Assessment:  Acute respiratory failure with hypoxia   Aspiration pneumonia  Acute kidney injury on chronic kidney disease  Pulmonary edema  Abdominal distension resolved following decompressive colonoscopy    Plan:  Continue AVAPS therapy  Lasix 40mg x1, proBNP 5331  Cxr reviewed  On Unasyn for PNA  Duonebs four times daily  On Coumadin for anticoagulation, afib/flutter  Spoke to family, no intubation or PEG tube. Palliative care consulted    Thank you for allowing me to participate in the care of Jimbo Encarnacion. Please feel free to call with questions. This plan of care was reviewed in collaboration with Dr. Fany Lopez    Electronically signed by MELANIE Henry CNP on 12/3/2022 at 10:47 AM      Note: This report was completed utilizing computer voice recognition software. Every effort has been made to ensure accuracy, however; inadvertent computerized transcription errors may be present      Attending Attestation Note:    Patient seen and examined with Hospital Staff, NP. I have extensively reviewed the chart lab work and imaging. I agree with above.   Modifications and amendment of note made as necessary.     In addition, the following apply:    Current Facility-Administered Medications   Medication Dose Route Frequency Provider Last Rate Last Admin    acetaminophen (TYLENOL) tablet 650 mg  650 mg Oral Q4H PRN Hallie Strickland MD        amLODIPine (NORVASC) tablet 2.5 mg  2.5 mg Oral Daily Hallie Strickland MD   2.5 mg at 12/02/22 1122    atorvastatin (LIPITOR) tablet 20 mg  20 mg Oral Nightly Hallie Strickland MD   20 mg at 12/02/22 2059    bisacodyl (DULCOLAX) suppository 10 mg  10 mg Rectal Daily PRN Hallie Strickland MD        ferrous sulfate (IRON 325) tablet 325 mg  325 mg Oral BID Hallie Strickland MD   325 mg at 12/02/22 2059    gabapentin (NEURONTIN) capsule 300 mg  300 mg Oral Nightly Hallie Strickland MD   300 mg at 12/02/22 2059    lactobacillus (CULTURELLE) capsule 1 capsule  1 capsule Oral Daily Hallie Strickland MD   1 capsule at 12/02/22 1122    levothyroxine (SYNTHROID) tablet 50 mcg  50 mcg Oral Daily Hallie Strickland MD   50 mcg at 12/03/22 0622    metoprolol tartrate (LOPRESSOR) tablet 25 mg  25 mg Oral BID Hallie Strickland MD   25 mg at 12/02/22 2059    pantoprazole (PROTONIX) tablet 40 mg  40 mg Oral Daily Hallie Strickland MD   40 mg at 12/02/22 1122    potassium chloride (KLOR-CON M) extended release tablet 20 mEq  20 mEq Oral BID Hallie Strickland MD   20 mEq at 12/02/22 2059    warfarin (COUMADIN) tablet 8 mg  8 mg Oral Daily Hallie Strickland MD   8 mg at 12/02/22 1742    ampicillin-sulbactam (UNASYN) 3,000 mg in sodium chloride 0.9 % 100 mL IVPB (mini-bag)  3,000 mg IntraVENous Q6H Hallie Strickland MD   Stopped at 12/03/22 1344    miconazole (MICOTIN) 2 % powder   Topical BID Hallie Strickland MD   Given at 12/03/22 1055    insulin lispro (HUMALOG) injection vial 0-4 Units  0-4 Units SubCUTAneous TID  Hallie Strickland MD        insulin lispro (HUMALOG) injection vial 0-4 Units  0-4 Units SubCUTAneous Nightly Hallie Strickland MD        ondansetron [facility]) injection 4 mg  4 mg IntraVENous Q6H PRN Terry Cornelius Apple Bowen MD        glucose chewable tablet 16 g  4 tablet Oral PRN Pool Agosto MD        dextrose bolus 10% 125 mL  125 mL IntraVENous PRN Pool Agosto MD        Or    dextrose bolus 10% 250 mL  250 mL IntraVENous PRN Pool Agosto MD        glucagon (rDNA) injection 1 mg  1 mg SubCUTAneous PRN Pool Agosto MD        dextrose 10 % infusion   IntraVENous Continuous PRN Pool Agosto MD        collagenase ointment   Topical Daily Pool Agosto MD   Given at 12/03/22 1055    menthol-zinc oxide (CALMOSEPTINE) 0.44-20.6 % ointment   Topical BID Pool Agosto MD   Given at 12/03/22 504    white petrolatum ointment   Topical Daily Pool Agosto MD   Given at 12/03/22 2212    ipratropium-albuterol (DUONEB) nebulizer solution 1 ampule  1 ampule Inhalation 4x daily Pool Agosto MD   1 ampule at 12/03/22 1547      - AVAPS 22/500/80/8  - family updated in room  - pleural US, possible pigtail chest tube placement  - diuresis  - O2, IS  - supportive care to continue for patient     Kun Waldrop MD  12/3/2022  4:26 PM

## 2022-12-03 NOTE — PROGRESS NOTES
Messaged Dr. Seven Brandon regarding patient's unchanged level of consciousness and continued need to be on bipap. Dr. Seven Brandon stated it's ok to wait for NG tube insertion. To get better and follow your care plan as instructed.

## 2022-12-03 NOTE — PLAN OF CARE
Problem: Discharge Planning  Goal: Discharge to home or other facility with appropriate resources  12/2/2022 2250 by Moira Kennedy RN  Outcome: Progressing  12/2/2022 1827 by Edmond Hays RN  Outcome: Progressing     Problem: Pain  Goal: Verbalizes/displays adequate comfort level or baseline comfort level  12/2/2022 2250 by Moira Kennedy RN  Outcome: Progressing  12/2/2022 1827 by Edmond Hays RN  Outcome: Progressing     Problem: Skin/Tissue Integrity  Goal: Absence of new skin breakdown  Description: 1. Monitor for areas of redness and/or skin breakdown  2. Assess vascular access sites hourly  3. Every 4-6 hours minimum:  Change oxygen saturation probe site  4. Every 4-6 hours:  If on nasal continuous positive airway pressure, respiratory therapy assess nares and determine need for appliance change or resting period.   12/2/2022 2250 by Moira Kennedy RN  Outcome: Progressing  12/2/2022 1827 by Edmond Hays RN  Outcome: Progressing     Problem: Safety - Adult  Goal: Free from fall injury  12/2/2022 2250 by Moira Kennedy RN  Outcome: Progressing  12/2/2022 1827 by Edmond Hays RN  Outcome: Progressing

## 2022-12-03 NOTE — CONSULTS
Palliative Care Department  [phone]  Palliative Care Initial Consult  Provider Ian Long, APRN - [id number] [facility]  [phone]  Hospital Day: 5  Date of Initial Consult: 12/3/2022  Referring Provider: Main Alan MD  Palliative Medicine was consulted for assistance with: Goals of care, CODE STATUS discussion and family support    HPI:   Wai Styles is a 65 y.o. with a medical history of chronic colonic distention, hypertension, diabetes, CKD and history of ischemic stroke who was admitted on 11/29/2022 from [facility] with a CHIEF COMPLAINT of abdominal distention and emesis. Patient was recently hospitalized on 11/18 secondary to pneumonia. Daughter brought patient into the emergency room for evaluation of abdominal distention and emesis. CT abdomen pelvis showing redemonstration of distended sigmoid colon and distended transverse colon distention has increased slightly compared to prior 11/14/2022. CXR showing congestive heart failure. 11/30 patient underwent colonoscopy flexible with decompression. 12/3 RRT called for acute hypoxia. Patient was found to be 62% on 2 L O2. Patient was placed on AVAPS. Palliative medicine was consulted family does not want patient intubated. Palliative medicine was consulted for goals of care, CODE STATUS discussion and family support. ASSESSMENT/PLAN:     Pertinent Hospital Diagnoses     Acute respiratory failure with hypoxia  Aspiration pneumonia  JUAN on CKD    Palliative Care Encounter / Counseling Regarding Goals of Care  Please see detailed goals of care discussion as below  At this time, Wai Styles, Does Not have capacity for medical decision-making.   Capacity is time limited and situation/question specific  During encounter Meg Truong was surrogate medical decision-maker  Outcome of goals of care meeting:   Change CODE STATUS to limited-DNR CCA/DNI  Family considering comfort care measures  Continue current medical management  Code status Limited DNR CCA/DNI  Advanced Directives: no POA or living will in epic  Surrogate/Legal NOK:  Brandy Portillo  (son/POA) [phone]  Thanh Del Castillo (daughter/POA) [phone]    Spiritual assessment: no spiritual distress identified  Bereavement and grief: to be determined  Referrals to: none today  SUBJECTIVE:     Current medical issues leading to Palliative Medicine involvement include   Active Hospital Problems    Diagnosis Date Noted    Abdominal distension [R14.0] 11/29/2022     Priority: Medium       Details of Conversation:    Chart reviewed. Update received from nursing. Patient seen in bed on BiPAP, obtunded. No family present at bedside. Spoke with son/POA, Kenna Pruitt, on the phone. Role of palliative medicine introduced. Son states his father does have a living will and that he is healthcare power of . In-depth discussion regarding current condition to include acute respiratory failure with hypoxia. Family states they do not want the patient intubated. In-depth discussion regarding goals of care and CODE STATUS options to include comfort care measures. At this time family states they would like to change CODE STATUS to limited-DNR CCA/DNI. Son states he is going to call his sister and discuss comfort care options. At this time plan is to continue current medical management. Emotional support given and all questions addressed. We will continue to follow for ongoing goals of care discussion as well as support for the patient and family. Addendum:  Received call back from son. Son states that this time he would like to change CODE STATUS to DNR CC and focus on comfort care measures for his father. Discussed hospice consult. Family is in agreement. We will proceed with medicating for comfort care and removal of BiPAP.     OBJECTIVE:   Prognosis: Guarded    Physical Exam:  BP (!) 163/81   Pulse 88   Temp 98.4 °F (36.9 °C) (Axillary)   Resp (!) 36   Ht 6' 3\" (1.905 m)   Wt (!) 307 lb (139.3 kg)   SpO2 100%   BMI 38.37 kg/m²   Constitutional: Obtunded  Lungs:  CTA bilaterally, no audible rhonchi or wheezes noted, respirations unlabored, no retractions  Heart[de-identified]  RRR, distant heart tones, no murmur, rub, or gallop noted during exam  Abd:  Soft, non tender, non distended, bowel sounds present  Skin:  Warm and dry, no rashes on visible skin  Neuro: Obtunded    Objective data reviewed: labs, images, records, medication use, vitals, and chart    Discussed patient and the plan of care with the other IDT members: Palliative Medicine IDT Team, Primary Team, Floor Nurse, and Family    Time/Communication  Greater than 50% of time spent, total 50 minutes in counseling and coordination of care at the bedside regarding goals of care and diagnosis and prognosis. Thank you for allowing Palliative Medicine to participate in the care of Janice Reno.

## 2022-12-04 NOTE — PROGRESS NOTES
Received a call from [address] body will be released. Wellington Alba at [facility] home updated.

## 2022-12-04 NOTE — PLAN OF CARE
Problem: Discharge Planning  Goal: Discharge to home or other facility with appropriate resources  Outcome: Not Progressing     Problem: Chronic Conditions and Co-morbidities  Goal: Patient's chronic conditions and co-morbidity symptoms are monitored and maintained or improved  Outcome: Not Progressing     Problem: Nutrition Deficit:  Goal: Optimize nutritional status  Outcome: Not Progressing     Problem: Pain  Goal: Verbalizes/displays adequate comfort level or baseline comfort level  Outcome: Progressing     Problem: Skin/Tissue Integrity  Goal: Absence of new skin breakdown  Description: 1. Monitor for areas of redness and/or skin breakdown  2. Assess vascular access sites hourly  3. Every 4-6 hours minimum:  Change oxygen saturation probe site  4. Every 4-6 hours:  If on nasal continuous positive airway pressure, respiratory therapy assess nares and determine need for appliance change or resting period.   Outcome: Progressing     Problem: Safety - Adult  Goal: Free from fall injury  Outcome: Progressing  Flowsheets (Taken 12/4/2022 0037)  Free From Fall Injury: Instruct family/caregiver on patient safety     Problem: ABCDS Injury Assessment  Goal: Absence of physical injury  Outcome: Progressing     Problem: Discharge Planning  Goal: Discharge to home or other facility with appropriate resources  Outcome: Not Progressing     Problem: Chronic Conditions and Co-morbidities  Goal: Patient's chronic conditions and co-morbidity symptoms are monitored and maintained or improved  Outcome: Not Progressing     Problem: Nutrition Deficit:  Goal: Optimize nutritional status  Outcome: Not Progressing

## 2022-12-04 NOTE — PROGRESS NOTES
Spoke to [employer] home, given patient information and informed currently patient to be held by [employer].

## 2022-12-04 NOTE — PROGRESS NOTES
Spoke to [place] given. Patient is to be held for [facility] for possible organ donation. Referral ID [id number].

## 2022-12-04 NOTE — DISCHARGE SUMMARY
Physician Discharge Summary     Patient ID:  Latanya Alanis  [id number]  60 y.o.  1945    Admit date: 11/29/2022    Discharge date and time: 12/4/2022  9:45 AM     Admission Diagnoses: Abdominal distention [R14.0]  Abdominal distension [R14.0]    Discharge Diagnoses:   Colonic distention  Aspiration pneumonia  Acute hypoxic respiratory failure  Patient Active Problem List   Diagnosis    Hypertension    Hyperlipidemia with target LDL less than 100    Impaired mobility and ADLs    Cardiac pacemaker in situ    Cerebrovascular accident (CVA) determined by clinical assessment ([address]    Left renal mass    Class 2 severe obesity due to excess calories with serious comorbidity and body mass index (BMI) of 35.0 to 35.9 in [facility])    Acquired hypothyroidism    H/O deep venous thrombosis    Type 2 diabetes mellitus without complication, without long-term current use of insulin (HCC)    Stage 3 chronic kidney disease (HCC)    Ulcer of right heel and midfoot with fat layer exposed ([address]    Atherosclerosis of native artery of right lower extremity with ulceration of heel (HCC)    Status post nephrectomy    Chronic renal failure, stage 3b (HCC)    Necrotic eschar (HCC)    Diabetic ulcer of left heel associated with type 2 diabetes mellitus, with fat layer exposed ([address]    Femoral-popliteal atherosclerosis (HCC)    Hyperkalemia    Peripheral vascular disease of lower extremity with ulceration (HCC)    Acute kidney injury superimposed on chronic kidney disease (HCC)    Pneumonia of both lungs due to infectious organism    PVD (peripheral vascular disease) ([address]    Pneumonia in diseases classified elsewhere    Abdominal distension    Hypokalemia    Aspiration pneumonia ([address]    Acute respiratory failure with hypercapnia (HCC)    Abdominal distention       Consults: General surgery palliative care    Procedures: Decompressive colonoscopy    Hospital Course:   76-year-old  man with complex medical history rehabilitating at a local nursing home for pneumonia  Admitted through emergency room due to severe abdominal distention and emesis  He was found to have aspiration pneumonia  Decompressive colonoscopy helped his distention  He continued to have dysphagia  Family insisted upon feeding him fully aware of implications  And refused G-tube placement  Patient continued to deteriorate and developed acute hypoxic respiratory failure and hypotension  CODE STATUS was changed  He went on to  at 6:20 AM this morning    Discharge Exam:  See progress note from today    Disposition: Patient                                                     Signed:  Pool Agosto MD  2022  5:18 PM

## 2023-03-15 NOTE — PROGRESS NOTES
[address] [place] [address] [facility]  Progress Note    SUBJECTIVE:  Chief Complaint   Patient presents with    Stephanie Jessica over to pick something up and felt like the room was spinning. -LOC, +head, +thinners. Abrasion forehead. Dizziness     The patient is doing well. No nausea vomiting or diarrhea. Review of systems:  As stated above in the chief complaint, otherwise negative. Medications:  Scheduled Meds:   sodium chloride flush  5-40 mL IntraVENous BID    insulin lispro  0-4 Units SubCUTAneous TID WC    insulin lispro  0-4 Units SubCUTAneous Nightly    miconazole   Topical BID    ampicillin-sulbactam  3,000 mg IntraVENous Q6H    linezolid  600 mg Oral 2 times per day    enoxaparin  60 mg SubCUTAneous Daily    ascorbic acid  500 mg Oral Daily    atorvastatin  20 mg Oral Nightly    vitamin D  50,000 Units Oral Weekly    levothyroxine  50 mcg Oral Daily    magnesium oxide  400 mg Oral Every Other Day    meclizine  25 mg Oral TID    metoprolol tartrate  25 mg Oral BID    therapeutic multivitamin-minerals  1 tablet Oral Daily    pantoprazole  40 mg Oral Daily    phenazopyridine  200 mg Oral Daily    collagenase   Topical Daily     Continuous Infusions:   dextrose       PRN Meds:glucose, dextrose bolus **OR** dextrose bolus, glucagon (rDNA), dextrose, acetaminophen, ipratropium-albuterol, loperamide, polyethylene glycol    OBJECTIVE:  /62   Pulse 62   Temp 97.7 °F (36.5 °C) (Oral)   Resp 18   Ht 6' 2\" (1.88 m)   Wt 250 lb (113.4 kg)   SpO2 98%   BMI 32.10 kg/m²   Temp  Av.8 °F (36.6 °C)  Min: 97.6 °F (36.4 °C)  Max: 98.2 °F (36.8 °C)  Constitutional: The patient is lying in bed. Sleepy, awakes for exam  Skin: Warm and dry. No rashes were noted. HEENT: Round and reactive pupils. Moist mucous membranes. No ulcerations or thrush. Neck: Supple to movements. Chest: No use of accessory muscles to breathe. Symmetrical expansion.   No wheezing, crackles or Pt states if she had one or two missed doses she just took it the next day. Otherwise has not missed any. She is taking it as MAR states. She has noticed symptoms as her left breast is still lactating and she has no sex drive. She also has painful periods and cramping. Will route to Le    ----- Message from Renetta Steward NP sent at 3/15/2023 11:21 AM CDT -----  Please contact pt with test results ~   Dx: Prolactinoma    Prolactin has risen from prior.  Please confirm current cabergoline regimen. Any gaps in management or other insight from patient?  Any change in symptoms? If she stopped or missing doses, she should resume at this time.    Seeing Le next month.     Please route pt reply to her attention for any next steps.       rhonchi. Cardiovascular: S1 and S2 are rhythmic and regular. No murmurs appreciated. Abdomen: Positive bowel sounds to auscultation. Benign to palpation. No masses felt. No hepatosplenomegaly. Extremities: Feet are wrapped. Unstageable necrotic eschar over the left heel. Ulcer dorsum of the left foot. No odor.    Lines: peripheral    Laboratory and Tests Review:  Lab Results   Component Value Date    WBC 6.6 09/24/2022    WBC 9.3 09/21/2022    WBC 7.2 08/29/2022    HGB 9.7 (L) 09/24/2022    HCT 31.1 (L) 09/24/2022    MCV 92.0 09/24/2022     09/24/2022     Lab Results   Component Value Date    NEUTROABS 7.13 09/21/2022    NEUTROABS 4.66 08/22/2022    NEUTROABS 4.96 08/16/2022     No results found for: [facility]  Lab Results   Component Value Date    ALT 7 09/21/2022    AST 21 09/21/2022    ALKPHOS 97 09/21/2022    BILITOT 0.3 09/21/2022     Lab Results   Component Value Date/Time     09/25/2022 02:48 AM    K 3.8 09/25/2022 02:48 AM    K 6.2 09/21/2022 10:44 AM     09/25/2022 02:48 AM    CO2 22 09/25/2022 02:48 AM    BUN 28 09/25/2022 02:48 AM    CREATININE 2.1 09/25/2022 02:48 AM    CREATININE 2.1 09/24/2022 11:03 AM    CREATININE 2.2 09/23/2022 04:55 AM    GFRAA 37 09/25/2022 02:48 AM    LABGLOM 31 09/25/2022 02:48 AM    GLUCOSE 122 09/25/2022 02:48 AM    GLUCOSE 297 01/04/2012 03:00 AM    PROT 7.5 09/21/2022 10:44 AM    LABALBU 3.5 09/21/2022 10:44 AM    LABALBU 3.4 01/04/2012 03:00 AM    CALCIUM 9.2 09/25/2022 02:48 AM    BILITOT 0.3 09/21/2022 10:44 AM    ALKPHOS 97 09/21/2022 10:44 AM    AST 21 09/21/2022 10:44 AM    ALT 7 09/21/2022 10:44 AM     Lab Results   Component Value Date    CRP 8.5 (H) 09/22/2022    CRP 7.2 (H) 08/01/2022    CRP 4.9 (H) 10/03/2018     Lab Results   Component Value Date    SEDRATE 93 (H) 09/22/2022    SEDRATE 87 (H) 08/01/2022    SEDRATE 77 (H) 10/03/2018     Radiology:      Microbiology:  Wound culture 9/22/2022: GNR    ASSESSMENT:  Right heel decubitus ulcer  Right heel wound infection with cellulitis. Previous cultures in August grew Proteus mirabilis, MRSA, Streptococcus agalactiae and Corynebacterium    PLAN:  Continue Unasyn and Linezolid  The patient is going down to the OR and or Vasc studies Monday ? Check final cultures  We will follow with you    MELANIE Gregory  11:22 AM  9/25/2022    Patient seen and examined. I had a face to face encounter with the patient. Agree with exam.  Assessment and plan as outlined above and directed by me. Addition and corrections were done as deemed appropriate. My exam and plan include: Patient is sitting up in bed. He is awake and in no distress. Feet are wrapped. Tolerating oral and IV antibiotic. Going to the OR tomorrow. He may need a PICC for IV antibiotic.     Bradford Lal MD  9/25/2022  11:54 AM

## 2023-09-09 NOTE — PROGRESS NOTES
Patient remains lethargic but alert. Denies pain, dizziness remains. Romero catheter maintained. Ate a container of ice cream and ate chicken salad without bread. Drank a small sip of water , immediately began to cough. Daughter at the bedside. rectum

## 2023-09-28 NOTE — PROGRESS NOTES
[facility] notified per their request that pts body was moved to the morgue and that family has left the bedside. X Size Of Lesion In Cm (Optional): 1.5 Other Plan: Pt inquired about surgical removal and was told about the excision process. She was given a card for the surgical scheduling department and will call if she desires removal. Detail Level: Detailed Size Of Lesion: 3.1

## 2024-02-19 NOTE — ED NOTES
BED READY     Summer Eli Jhaveri  09/21/22 7460 The ABCs of the Annual Wellness Visit  Subsequent Medicare Wellness Visit    Subjective    Iesha Roldan is a 71 y.o. female who presents for a Subsequent Medicare Wellness Visit.    The following portions of the patient's history were reviewed and   updated as appropriate: allergies, current medications, past family history, past medical history, past social history, past surgical history, and problem list.  Cataracts, dry eyes    Compared to one year ago, the patient feels her physical   health is the same.    Compared to one year ago, the patient feels her mental   health is worse.    Recent Hospitalizations:  She was not admitted to the hospital during the last year.       Current Medical Providers:  Patient Care Team:  Camila Mejia MD as PCP - General (Internal Medicine)  Candelario Calderon MD as Consulting Physician (Otolaryngology)  Fred Love MD (Inactive) as Consulting Physician (Radiation Oncology)  Camila Mejia MD as Referring Physician (Internal Medicine)  Camila Mejia MD Joseph, Udaya Geetha, MD as Consulting Physician (Hematology and Oncology)    Outpatient Medications Prior to Visit   Medication Sig Dispense Refill    albuterol sulfate  (90 Base) MCG/ACT inhaler Inhale 2 puffs Every 4 (Four) Hours As Needed for Wheezing. 18 g 2    alendronate (FOSAMAX) 70 MG tablet TAKE 1 TABLET BY MOUTH ONCE WEEKLY ON AN EMPTY STOMACH BEFORE BREAKFAST. REMAIN UPRIGHT FOR 30 MINUTES AND TAKE WITH 8 OUNCES OF WATER 12 tablet 1    Cholecalciferol (Vitamin D3) 50 MCG (2000 UT) capsule TAKE ONE CAPSULE BY MOUTH DAILY 90 capsule 3    fluticasone (Flonase) 50 MCG/ACT nasal spray 2 sprays into the nostril(s) as directed by provider Daily. 16 g 5    hydrocortisone 2.5 % cream       meloxicam (MOBIC) 15 MG tablet Take 1 tablet by mouth Daily. 30 tablet 5    montelukast (Singulair) 10 MG tablet Take 1 tablet by mouth Every Night. 90 tablet 3    phenol (CHLORASEPTIC) 1.4 % liquid liquid Apply 1  "spray to the mouth or throat Every 2 (Two) Hours As Needed (sore throat). 118 mL 0     Facility-Administered Medications Prior to Visit   Medication Dose Route Frequency Provider Last Rate Last Admin    Chlorhexidine Gluconate Cloth 2 % pads   Apply externally BID Giorgio Keenan MD        cyanocobalamin injection 1,000 mcg  1,000 mcg Intramuscular Q28 Days Camila Mejia MD   1,000 mcg at 01/15/24 1335    cyanocobalamin injection 1,000 mcg  1,000 mcg Intramuscular Q28 Days Camila Mejia MD   1,000 mcg at 02/12/24 1204       No opioid medication identified on active medication list. I have reviewed chart for other potential  high risk medication/s and harmful drug interactions in the elderly.        Aspirin is not on active medication list.  Aspirin use is not indicated based on review of current medical condition/s. Risk of harm outweighs potential benefits.  .    Patient Active Problem List   Diagnosis    ADD (attention deficit disorder)    Fatigue    Mucoepidermoid carcinoma    Abnormal MRI, cervical spine, not thoracic; C4 to be specific    Iron deficiency anemia    B12 deficiency    Mucoepidermoid carcinoma of parotid gland    Primary osteoarthritis of left hip    Left hip pain     Advance Care Planning   Advance Care Planning     Advance Directive is not on file.  ACP discussion was held with the patient during this visit. Patient has an advance directive (not in EMR), copy requested.     Objective    Vitals:    02/19/24 0744   BP: 124/80   Pulse: 79   SpO2: 96%   Weight: 67.1 kg (148 lb)   Height: 170.2 cm (67\")     Estimated body mass index is 23.18 kg/m² as calculated from the following:    Height as of this encounter: 170.2 cm (67\").    Weight as of this encounter: 67.1 kg (148 lb).    BMI is within normal parameters. No other follow-up for BMI required.      Does the patient have evidence of cognitive impairment? No          HEALTH RISK ASSESSMENT    Smoking Status:  Social History     Tobacco Use "   Smoking Status Never    Passive exposure: Never   Smokeless Tobacco Never     Alcohol Consumption:  Social History     Substance and Sexual Activity   Alcohol Use Not Currently    Comment: SOCIALLY     Fall Risk Screen:    MANOHARADI Fall Risk Assessment was completed, and patient is at LOW risk for falls.Assessment completed on:2024    Depression Screenin/19/2024     7:48 AM   PHQ-2/PHQ-9 Depression Screening   Little Interest or Pleasure in Doing Things 0-->not at all   Feeling Down, Depressed or Hopeless 0-->not at all   PHQ-9: Brief Depression Severity Measure Score 0       Health Habits and Functional and Cognitive Screenin/19/2024     7:47 AM   Functional & Cognitive Status   Do you have difficulty preparing food and eating? No   Do you have difficulty bathing yourself, getting dressed or grooming yourself? No   Do you have difficulty using the toilet? No   Do you have difficulty moving around from place to place? No   Do you have trouble with steps or getting out of a bed or a chair? No   Current Diet Well Balanced Diet   Dental Exam Up to date   Eye Exam Up to date   Do you need help using the phone?  No   Are you deaf or do you have serious difficulty hearing?  No   Do you need help to go to places out of walking distance? No   Do you need help shopping? No   Do you need help preparing meals?  No   Do you need help with housework?  No   Do you need help with laundry? No   Do you need help taking your medications? No   Do you need help managing money? No   Do you ever drive or ride in a car without wearing a seat belt? No   Have you felt unusual stress, anger or loneliness in the last month? No   Who do you live with? Spouse   If you need help, do you have trouble finding someone available to you? No   Have you been bothered in the last four weeks by sexual problems? No   Do you have difficulty concentrating, remembering or making decisions? No       Age-appropriate Screening  "Schedule:  Refer to the list below for future screening recommendations based on patient's age, sex and/or medical conditions. Orders for these recommended tests are listed in the plan section. The patient has been provided with a written plan.    Health Maintenance   Topic Date Due    ZOSTER VACCINE (1 of 2) Never done    RSV Vaccine - Adults (1 - 1-dose 60+ series) Never done    PAP SMEAR  01/01/2023    COLORECTAL CANCER SCREENING  03/29/2024    DXA SCAN  08/09/2024    ANNUAL WELLNESS VISIT  02/19/2025    MAMMOGRAM  03/29/2025    TDAP/TD VACCINES (2 - Td or Tdap) 04/18/2026    HEPATITIS C SCREENING  Completed    COVID-19 Vaccine  Completed    INFLUENZA VACCINE  Completed    Pneumococcal Vaccine 65+  Completed                  CMS Preventative Services Quick Reference  Risk Factors Identified During Encounter  Immunizations Discussed/Encouraged: Shingrix and RSV (Respiratory Syncytial Virus)  The above risks/problems have been discussed with the patient.  Pertinent information has been shared with the patient in the After Visit Summary.  An After Visit Summary and PPPS were made available to the patient.    Follow Up:   Next Medicare Wellness visit to be scheduled in 1 year.       Additional E&M Note during same encounter follows:  Patient has multiple medical problems which are significant and separately identifiable that require additional work above and beyond the Medicare Wellness Visit.      Chief Complaint  Annual Exam  Depressed mood w/ anxious symptoms    Subjective        HPI  Iesha M Jamar is also being seen today for depressed mood w/ anxious symptoms. Patient reports feeling \"flat\". She notes that she ruminates about things at times to the point of feeling anxious. Triggered by news events, wanting to move, etc. In general sleeping fairly well. However, falls asleep easily early then wakes early. Spouse w/ recent cancer diagnosis. He was treated in [place]. Some disruption in self care w this. " "           Objective   Vital Signs:  /80   Pulse 79   Ht 170.2 cm (67\")   Wt 67.1 kg (148 lb)   SpO2 96%   BMI 23.18 kg/m²     Physical Exam  Vitals and nursing note reviewed.   Constitutional:       Appearance: Normal appearance. She is well-developed.   HENT:      Head: Normocephalic and atraumatic.      Right Ear: Tympanic membrane and external ear normal.      Left Ear: Tympanic membrane and external ear normal.      Nose: Nose normal.      Mouth/Throat:      Mouth: Mucous membranes are moist.   Eyes:      Extraocular Movements: Extraocular movements intact.      Pupils: Pupils are equal, round, and reactive to light.   Cardiovascular:      Rate and Rhythm: Normal rate and regular rhythm.      Pulses: Normal pulses.      Heart sounds: Normal heart sounds.   Pulmonary:      Effort: Pulmonary effort is normal. No respiratory distress.      Breath sounds: Normal breath sounds.   Abdominal:      General: Abdomen is flat.      Palpations: Abdomen is soft.   Musculoskeletal:         General: Normal range of motion.      Cervical back: Normal range of motion and neck supple.   Skin:     General: Skin is warm and dry.   Neurological:      General: No focal deficit present.      Mental Status: She is alert and oriented to person, place, and time.   Psychiatric:         Mood and Affect: Mood normal.         Behavior: Behavior normal.         Thought Content: Thought content normal.         Judgment: Judgment normal.          The following data was reviewed by: Camila Mejia MD on 02/19/2024:  CMP          8/17/2023    08:42 12/11/2023    10:48 2/15/2024    09:18   CMP   Glucose 92  88  83    BUN 19  15  20    Creatinine 0.84  0.75  0.73    Sodium 140  141  139    Potassium 4.2  4.7  4.5    Chloride 101  103  102    Calcium 9.7  10.1  C 9.7    Total Protein 6.5  7.0  6.8    Albumin 4.4  4.5  4.3    Globulin 2.1  2.5  2.5    Total Bilirubin 0.6  0.3  0.3    Alkaline Phosphatase 69  77  71    AST (SGOT) 24  21  " 17    ALT (SGPT) 15  12  13    BUN/Creatinine Ratio 22.6  20.0  27.4       Details         C Corrected result             CBC w/diff          8/17/2023    08:42 12/11/2023    10:48 2/15/2024    09:18   CBC w/Diff   WBC 3.62  5.00  4.10    RBC 4.28  4.42  4.66    Hemoglobin 12.6  13.1  13.5    Hematocrit 38.1  39.1  41.2    MCV 89.0  88.5  88.4    MCH 29.4  29.6  29.0    MCHC 33.1  33.5  32.8    RDW 11.9  11.9  12.6    Platelets 214  248  229    Neutrophil Rel % 50.3  59.2  53.4    Lymphocyte Rel % 35.4  28.0  32.2    Monocyte Rel % 10.5  8.4  9.8    Eosinophil Rel % 3.0  3.4  3.7    Basophil Rel % 0.8  0.8  0.7      Lipid Panel          10/3/2023    11:26   Lipid Panel   Total Cholesterol 208    Triglycerides 80    HDL Cholesterol 73    VLDL Cholesterol 14    LDL Cholesterol  121    LDL/HDL Ratio 1.7      TSH          12/11/2023    10:48 2/15/2024    09:18   TSH   TSH 2.160  2.440      UA          2/15/2024    10:53   Urinalysis   Blood, UA Negative    Leukocytes, UA 2+    Nitrite, UA Negative    RBC, UA 0-2    Bacteria, UA None seen          EKg for exertional dyspnea. Sinus 59 bpm no st changes. Unchanged prior.        Assessment and Plan   Diagnoses and all orders for this visit:    1. Healthcare maintenance (Primary)    2. Elevated LDL cholesterol level  -     Lipid Panel With LDL / HDL Ratio    3. Exertional dyspnea  -     Adult Transthoracic Echo Complete W/ Cont if Necessary Per Protocol; Future  -     ECG 12 Lead    4. Family history of aortic aneurysm  -     Adult Transthoracic Echo Complete W/ Cont if Necessary Per Protocol; Future  -     ECG 12 Lead    5. Depressed mood    6. Anxious mood    Other orders  -     sertraline (Zoloft) 50 MG tablet; Take 1 tablet by mouth Daily.  Dispense: 90 tablet; Refill: 1      Patient with recent depressed and anxious mood. Had benefit w/ prozac once and no benefit once in the past. Will try sertraline 25 mg daily and advance to 50 mg. 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 She notes sister w/ recent diagnosis of aortic anerysm and cad. Will get ct cardiac calcium score (reschedule) as well as a transthoracic echo (also for dyspnea w/ stair climbing). She had a normal EKG in office today.       I spent 54 minutes caring for Iesha on this date of service. This time includes time spent by me in the following activities:preparing for the visit, reviewing tests, obtaining and/or reviewing a separately obtained history, performing a medically appropriate examination and/or evaluation , counseling and educating the patient/family/caregiver, ordering medications, tests, or procedures, referring and communicating with other health care professionals , documenting information in the medical record, and independently interpreting results and communicating that information with the patient/family/caregiver  Follow Up   Return in about 3 months (around 5/19/2024) for Recheck.  Patient was given instructions and counseling regarding her condition or for health maintenance advice. Please see specific information pulled into the AVS if appropriate.

## 2024-02-24 NOTE — PROGRESS NOTES
"[facility] Medicine  Progress Note    Patient Name: Purvi Quiroga  MRN: [id number]  Patient Class: IP- Inpatient   Admission Date: 1/8/2024  Length of Stay: 6 days  Attending Physician: No att. providers found  Primary Care Provider: Palomo Burgos MD        Subjective:     Principal Problem:Pre-syncope        HPI:  Ms. Purvi Quiroga is a 77 y/o white F w/ PMH HTN, HLD, neuropathy, and arthritis who was sent to the hospital from her nursing home after patient had a pre-syncopal event at her nursing home. Patient states she has had off and on weakness for weeks. Today while using her walker to get back to her bedroom patient felt a sudden onset feeling of lightheadiness and "feeling" of wanting to faint. It's feeling she has had in the past right before she faints after seeing blood. Denies any prodromal symptoms, nausea, vomiting, chest pain, palpitaitons, fevers, chills, diarrhea, dysuria, urinary frequency, suprapubic pain, flank pain, melana. States she has been eating and drinking ok but has decreased her food intake recently trying to lose weight. No changes to her home BP meds recently though she states she has had hypotensive episodes in the past. When EMS arrived patient SBP was noted to be in the 70s and patient was brought to the hospital.     In the ED, labs were notable for WBC 19, hgb 8, Cr 1.7 (baseline 0.8 as 1/2023), and positive UA (however elevated squamous epithelial cells). Patient received a dose of rocephin.         Overview/Hospital Course:  No notes on file    Interval History: awake an alert  Continue Protonix   JOSE-resolved   Tachycardia/hypertension-resume home metoprolol  Urine culture with Gram-negative rods--E coli, Raoultella ornithinolytica both sensitive to ceftriaxone-continue        Review of Systems   Constitutional:  Positive for activity change and fatigue.   Respiratory:  Negative for shortness of breath.    Gastrointestinal:  Positive for blood in stool. " [id number] [place] [address] [facility]  Progress Note      No chief complaint on file. SUBJECTIVE:  Patient is tolerating medications. No reported adverse drug reactions. No nausea, vomiting, diarrhea. Foot remains sore. Review of systems:  As stated above in the chief complaint, otherwise negative. Medications:  Scheduled Meds:   potassium chloride  10 mEq IntraVENous Q1H    ipratropium-albuterol  1 ampule Inhalation 4x daily    ampicillin-sulbactam  3,000 mg IntraVENous Q6H    linezolid  600 mg Oral 2 times per day    amLODIPine  5 mg Oral Daily    atorvastatin  20 mg Oral Nightly    levothyroxine  50 mcg Oral Daily    pantoprazole  40 mg Oral QAM AC    metoprolol tartrate  25 mg Oral BID    gabapentin  300 mg Oral TID    insulin lispro  0-8 Units SubCUTAneous TID WC    insulin lispro  0-4 Units SubCUTAneous Nightly     Continuous Infusions:   dextrose 75 mL/hr at 10/03/22 1155     PRN Meds:sodium chloride flush, acetaminophen    OBJECTIVE:  BP (!) 143/66   Pulse 65   Temp 98 °F (36.7 °C) (Temporal)   Resp 18   Ht 6' 2\" (1.88 m)   Wt 254 lb (115.2 kg)   SpO2 98%   BMI 32.61 kg/m²   Temp  Av.5 °F (36.4 °C)  Min: 97 °F (36.1 °C)  Max: 98 °F (36.7 °C)  Constitutional: The patient is awake, alert, and oriented. Skin: Warm and dry. No rashes were noted. HEENT: Round and reactive pupils. Moist mucous membranes. No ulcerations or thrush. Neck: Supple to movements. Chest: No use of accessory muscles to breathe. Symmetrical expansion. No wheezing, crackles or rhonchi. Cardiovascular: S1 and S2 are rhythmic and regular. No murmurs appreciated. Abdomen: Positive bowel sounds to auscultation. Benign to palpation. No masses felt. No hepatosplenomegaly. Genitourinary: mlae  Extremities: No clubbing, no cyanosis, no edema.  Right foot with dry dressing  Lines: peripheral    Laboratory and Tests Review:  Lab Results   Component Value Date    WBC 7.6 10/03/2022    WBC 8.7 "  Neurological:  Positive for weakness.      Objective:      Vital Signs (Most Recent):  Temp: 97.7 °F (36.5 °C) (01/11/24 0755)  Pulse: 73 (01/11/24 0848)  Resp: 20 (01/11/24 0848)  BP: (!) 149/104 (01/11/24 0829)  SpO2: 96 % (01/11/24 0848) Vital Signs (24h Range):  Temp:  [97.7 °F (36.5 °C)-98.6 °F (37 °C)] 97.7 °F (36.5 °C)  Pulse:  [73-99] 73  Resp:  [14-20] 20  SpO2:  [91 %-97 %] 96 %  BP: (128-179)/() 149/104      Weight: 101.3 kg (223 lb 5.2 oz)  Body mass index is 51.91 kg/m².  No intake or output data in the 24 hours ending 01/11/24 0938        Physical Exam  HENT:      Head: Normocephalic and atraumatic.   Cardiovascular:      Rate and Rhythm: Normal rate.   Pulmonary:      Effort: Pulmonary effort is normal.   Abdominal:      General: Bowel sounds are normal. There is no distension.      Palpations: Abdomen is soft.   Musculoskeletal:      Cervical back: Normal range of motion.      Right lower leg: No edema.      Left lower leg: No edema.   Skin:     General: Skin is warm.      Capillary Refill: Capillary refill takes less than 2 seconds.   Neurological:      Mental Status: She is alert and oriented to person, place, and time.   Psychiatric:         Mood and Affect: Mood normal.         Behavior: Behavior normal.                Significant Labs: A1C:       Recent Labs   Lab 10/09/23  1224   HGBA1C 5.1         ABGs: No results for input(s): "PH", "PCO2", "HCO3", "POCSATURATED", "BE", "TOTALHB", "COHB", "METHB", "O2HB", "POCFIO2", "PO2" in the last 48 hours.  Blood Culture: No results for input(s): "LABBLOO" in the last 48 hours.  CBC:         Recent Labs   Lab 01/10/24  0333 01/10/24  0530 01/11/24  0246   WBC 13.75*  --  12.73*   HGB 5.8* 5.6* 8.9*   HCT 20.3*  --  27.4*     --  360         CMP:        Recent Labs   Lab 01/10/24  0333 01/11/24  0246    144   K 4.0 4.2    109   CO2 22* 24   * 87   BUN 42* 25*   CREATININE 1.6* 1.0   CALCIUM 8.6* 8.9   PROT 5.1* 5.6* " 10/02/2022    WBC 8.3 10/01/2022    HGB 8.6 (L) 10/03/2022    HCT 28.2 (L) 10/03/2022    MCV 94.0 10/03/2022     10/03/2022     Lab Results   Component Value Date    NEUTROABS 5.44 10/03/2022    NEUTROABS 5.76 10/02/2022    NEUTROABS 5.87 10/01/2022     No results found for: CRPHS  Lab Results   Component Value Date    ALT 12 10/03/2022    AST 14 10/03/2022    ALKPHOS 71 10/03/2022    BILITOT <0.2 10/03/2022     Lab Results   Component Value Date/Time     10/03/2022 05:17 AM    K 3.1 10/03/2022 05:17 AM    K 6.2 09/21/2022 10:44 AM     10/03/2022 05:17 AM    CO2 20 10/03/2022 05:17 AM    BUN 24 10/03/2022 05:17 AM    CREATININE 2.3 10/03/2022 05:17 AM    CREATININE 2.4 10/02/2022 05:22 AM    CREATININE 2.3 10/01/2022 05:08 AM    GFRAA 33 10/03/2022 05:17 AM    LABGLOM 28 10/03/2022 05:17 AM    GLUCOSE 140 10/03/2022 05:17 AM    GLUCOSE 297 01/04/2012 03:00 AM    PROT 6.4 10/03/2022 05:17 AM    LABALBU 2.7 10/03/2022 05:17 AM    LABALBU 3.4 01/04/2012 03:00 AM    CALCIUM 8.7 10/03/2022 05:17 AM    BILITOT <0.2 10/03/2022 05:17 AM    ALKPHOS 71 10/03/2022 05:17 AM    AST 14 10/03/2022 05:17 AM    ALT 12 10/03/2022 05:17 AM     Lab Results   Component Value Date    CRP 8.5 (H) 09/22/2022    CRP 7.2 (H) 08/01/2022    CRP 4.9 (H) 10/03/2018     Lab Results   Component Value Date    SEDRATE 93 (H) 09/22/2022    SEDRATE 87 (H) 08/01/2022    SEDRATE 77 (H) 10/03/2018     Radiology:      Microbiology:   Lab Results   Component Value Date/Time    BC 5 Days no growth 08/01/2022 02:46 PM    BC 5 Days- no growth 10/01/2018 11:15 AM    BC 5 Days- no growth 09/23/2018 08:55 AM    ORG Proteus mirabilis 09/22/2022 04:30 PM    ORG Escherichia coli 09/22/2022 04:30 PM    ORG Staphylococcus aureus 09/22/2022 04:30 PM     Lab Results   Component Value Date/Time    BLOODCULT2 5 Days no growth 08/01/2022 03:01 PM    BLOODCULT2 5 Days- no growth 10/01/2018 11:15 AM    BLOODCULT2 5 Days- no growth 09/23/2018 09:10 AM "  ALBUMIN 2.9* 3.1*   BILITOT 0.3 1.6*   ALKPHOS 72 64   AST 11 15   ALT 6* 8*   ANIONGAP 11 11         Coagulation: No results for input(s): "PT", "INR", "APTT" in the last 48 hours.  Lactic Acid: No results for input(s): "LACTATE" in the last 48 hours.  Lipase: No results for input(s): "LIPASE" in the last 48 hours.  Magnesium: No results for input(s): "MG" in the last 48 hours.  Troponin:   No results for input(s): "TROPONINI", "TROPONINIHS" in the last 48 hours.     TSH:       Recent Labs   Lab 10/09/23  1224   TSH 1.466         Urine Culture:   No results for input(s): "LABURIN" in the last 48 hours.     Urine Studies:   No results for input(s): "COLORU", "APPEARANCEUA", "PHUR", "SPECGRAV", "PROTEINUA", "GLUCUA", "KETONESU", "BILIRUBINUA", "OCCULTUA", "NITRITE", "UROBILINOGEN", "LEUKOCYTESUR", "RBCUA", "WBCUA", "BACTERIA", "SQUAMEPITHEL", "HYALINECASTS" in the last 48 hours.     Invalid input(s): "WRIGHTSUR"    Assessment/Plan:      * Pre-syncope  Most likely secondary to hypotension in the setting of BP meds vs infection.     - Will hold home BP meds for now and restart as needed.   - Start LR at 75cc/hr.   - See UTI plan below.   - orthostatics ordered.   - previous echo in 2022 unremarkable.       GI bleed  Stool occult blood positive  IV Protonix   S/p 2 unit PRBC  Post transfusion H/H stable  Hold anti coagulation   Consult GI- appreciate rec's      Anemia  - iron panel ordered.   Transfuse with 2 unit on PRBC 1/10/2024. Post transfusion H/H stable  Stool occult blood-positive  Trend H/H    JOSE (acute kidney injury)  Most likely secondary to UTI.     - continue treatment as above.   Resolved     UTI (urinary tract infection)  Although patient is asymptomatic from UTI perspective and has possible contaiminated UA, with elevated WBC of 19 will continue rocephin for now.     - Continue rocephin 1gm daily-switch to Cipro on 01/14.   - Ucx with Gram-negative rods-with-E coli, Raoultella ornithinolytica both " ORG Proteus mirabilis 09/22/2022 04:30 PM    ORG Escherichia coli 09/22/2022 04:30 PM    ORG Staphylococcus aureus 09/22/2022 04:30 PM     WOUND/ABSCESS   Date Value Ref Range Status   09/22/2022 (A)  Final    Mixed ana cristina also isolated, including:  Corynebacteria     09/22/2022 Heavy growth  Final   09/22/2022 Heavy growth  Final   09/22/2022   Final    Heavy growth  Methicillin resistant Staph aureus isolated. Most Methicillin  resistant Staphylococcus are usually resistant to multiple  antibiotics including other B-Lactams, Aminoglycosides,  Macrolides, Clindamycin and Tetracycline. Contact isolation  is indicated. This isolate is presumed to be resistant based on the  detection of inducible Clindamycin resistance. Clindamycin  may still be effective in some patients. Smear, Respiratory   Date Value Ref Range Status   10/02/2018   Final    Group 6: <25 PMN's/LPF and <25 Epithelial cells/LPF  Rare Polymorphonuclear leukocytes  Epithelial cells not seen  No organisms seen           Component Value Date/Time    AFBCX No growth after 6 weeks of incubation. 10/03/2018 1230    FUNGSM No Fungal elements seen 10/03/2018 1230    LABFUNG No growth after 4 weeks of incubation. 10/03/2018 1230     CULTURE, RESPIRATORY   Date Value Ref Range Status   10/02/2018 Oral Pharyngeal Ana Cristina absent (A)  Final   10/02/2018 Rare growth  Final     No results found for: CXCATHTIP  Body Fluid Culture, Sterile   Date Value Ref Range Status   10/03/2018 Growth not present  Final     No results found for: CXSURG  Urine Culture, Routine   Date Value Ref Range Status   11/07/2018 >100,000 CFU/ml  Final   09/23/2018 Growth not present  Final   02/28/2018   Final    ,000 CFU/mL  Mixed ana cristina isolated. Further workup and sensitivity testing  is not routinely indicated and will not be performed.   Mixed ana cristina isolated includes:  Mixed gram positive organisms  Gram negative rods       MRSA Culture Only   Date Value Ref Range Status 10/01/2018 Methicillin resistant Staph aureus not isolated  Final   12/27/2014 Methicillin resistant Staph aureus not isolated  Final           Assessment:  Right heel pressure ulcer, necrotic     Plan:  Resume ampicillin-sulbactam 3g q6h and linezolid 600 mg q12h. Follow up Podiatry recommendations. Follow up surgical plans, if any. Continue local wound care. Had arteriogram-results pending  Podiatry note noted from oct 3     Thank you for involving me in the care of Dick Frazier. Please do not hesitate to call for any questions or concerns.        Electronically signed by Ann Miranda MD on 10/3/2022 at 12:16 PM   ,    Vishal Vigil MD sensitive to continue antibiotic     HLD (hyperlipidemia)  - continue statin      Morbid obesity  Body mass index is 51.91 kg/m². Morbid obesity complicates all aspects of disease management from diagnostic modalities to treatment. Weight loss encouraged and health benefits explained to patient.         Essential hypertension  Hold home BP meds for now.   Resume home meds      VTE Risk Mitigation (From admission, onward)           Ordered     IP VTE HIGH RISK PATIENT  Once         01/09/24 0122                    Discharge Planning   NIRMALA: 1/15/2024     Code Status: Prior   Is the patient medically ready for discharge?:     Reason for patient still in hospital (select all that apply): Patient trending condition  Discharge Plan A: Skilled Nursing Facility   Discharge Delays: (!) [facility] Arranged Transportation              Liz King MD  Department of Hospital Medicine   [place] - Telemetry

## 2024-06-12 NOTE — PROGRESS NOTES
[facility]    Subjective     Chief Complaint   Patient presents with    Left Knee - Pain        HPI    Agus Jackson is a 60 y.o. male who presents with new problem of: left knee pain.  Onset: mechanical fall. The issue has been ongoing for 2 month(s). Pain is a 0/10 on the pain scale. Pain is described as dull. Associated symptoms include swelling. The pain is worse with walking; resting improve the pain. Previous treatments have included: nothing.  Pain has improved since he made the appointment.  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  Improvement with diclofenac and icing.    I have reviewed the following portions of the patient's history and agree with: History of Present Illness and Review of Systems    There is no problem list on file for this patient.    History reviewed. No pertinent past medical history.   Past Surgical History:   Procedure Laterality Date    VASECTOMY  2000      History reviewed. No pertinent family history.  Social History     Socioeconomic History    Marital status: Single   Tobacco Use    Smoking status: Never    Smokeless tobacco: Never   Vaping Use    Vaping status: Never Used   Substance and Sexual Activity    Alcohol use: Yes     Alcohol/week: 2.0 standard drinks of alcohol     Types: 2 Cans of beer per week    Drug use: Never    Sexual activity: Defer      Current Outpatient Medications on File Prior to Visit   Medication Sig Dispense Refill    amoxicillin-clavulanate (AUGMENTIN) 500-125 MG per tablet       diclofenac (VOLTAREN) 75 MG EC tablet TAKE 1 TABLET BY MOUTH TWICE A DAY FOR 30 DAYS (QUANTITY 60)       No current facility-administered medications on file prior to visit.      No Known Allergies     Review of Systems   Constitutional:  Negative for activity change, appetite change, chills, diaphoresis, fatigue, fever and unexpected weight change.   HENT:  Negative for congestion, dental problem, drooling, ear  SPEECH/LANGUAGE PATHOLOGY  CLINICAL ASSESSMENT OF SWALLOWING FUNCTION   and PLAN OF CARE    PATIENT NAME:  Yordan Wetzel  (male)     MRN:  [id number]    :  1945  (65 y.o.)  STATUS:  Inpatient: Room 0427/0427-A    TODAY'S DATE:  2022  REFERRING PROVIDER: 22    SLP swallowing-dysphagia evaluation and treatment  Start:  22,   End:  22,   ONE TIME,   Standing Count:  1 Occurrences,   R         Veronica Aguilar MD     REASON FOR REFERRAL: assess for aspiration   EVALUATING THERAPIST: JAY Lyn                 RESULTS:    DYSPHAGIA DIAGNOSIS:RN contacted family who agreed to a clinical swallow evaluation. Pt with moist cough at baseline, that increased immediately following all consistencies presented (solid, puree, thin, NTL, HTL, PTL). Recommend MBS at this time to assess safety of oral diet. RN encouraged to contact family and discuss as they have declined video swallow evaluations in the past.       DIET RECOMMENDATIONS:  NPO until MBSS can be completed        FEEDING RECOMMENDATIONS:     Assistance level:  Not applicable      Compensatory strategies recommended: Not applicable      Discussed recommendations with nursing and/or faxed report to referring provider: Yes    SPEECH THERAPY  PLAN OF CARE   The dysphagia POC is established based on physician order, dysphagia diagnosis and results of clinical assessment     Will establish POC once MBSS is completed. Conditions Requiring Skilled Therapeutic Intervention for dysphagia:    Coughing during PO intake      Specific dysphagia interventions to include:     not applicable    Specific instructions for next treatment:  MBSS to be completed  Patient Treatment Goals:    Short Term Goals:  Pt will participate in MBSS to fully assess oropharyngeal swallow function and to assist in determining the least restrictive PO diet to maintain adequate nutrition/hydration     Long Term Goals:    A Video Swallow Study (MBSS) "discharge, ear pain, facial swelling, hearing loss, mouth sores, nosebleeds, postnasal drip, rhinorrhea, sinus pressure, sneezing, sore throat, tinnitus, trouble swallowing and voice change.    Eyes:  Negative for photophobia, pain, discharge, redness, itching and visual disturbance.   Respiratory:  Negative for apnea, cough, choking, chest tightness, shortness of breath, wheezing and stridor.    Cardiovascular:  Negative for chest pain, palpitations and leg swelling.   Gastrointestinal:  Negative for abdominal distention, abdominal pain, anal bleeding, blood in stool, constipation, diarrhea, nausea, rectal pain and vomiting.   Endocrine: Negative for cold intolerance, heat intolerance, polydipsia, polyphagia and polyuria.   Genitourinary:  Negative for decreased urine volume, difficulty urinating, dysuria, enuresis, flank pain, frequency, genital sores, hematuria and urgency.   Musculoskeletal:  Positive for arthralgias. Negative for back pain, gait problem, joint swelling, myalgias, neck pain and neck stiffness.   Skin:  Negative for color change, pallor, rash and wound.   Allergic/Immunologic: Negative for environmental allergies, food allergies and immunocompromised state.   Neurological:  Negative for dizziness, tremors, seizures, syncope, facial asymmetry, speech difficulty, weakness, light-headedness, numbness and headaches.   Hematological:  Negative for adenopathy. Does not bruise/bleed easily.   Psychiatric/Behavioral:  Negative for agitation, behavioral problems, confusion, decreased concentration, dysphoric mood, hallucinations, self-injury, sleep disturbance and suicidal ideas. The patient is not nervous/anxious and is not hyperactive.         Objective      Physical Exam  /80   Ht 187 cm (73.62\")   Wt 97.1 kg (214 lb)   BMI 27.76 kg/m²     Body mass index is 27.76 kg/m².  BMI is >= 25 and <30. (Overweight) The following options were offered after discussion;: none (medical " is recommended and requires a physician order      Patient/family Goal:    Did not state. Will further assess during treatment. Plan of care discussed with Patient   The Patient did not demonstrate complete understanding of the diagnosis, prognosis and plan of care     Rehabilitation Potential/Prognosis: fair                    ADMITTING DIAGNOSIS: Abdominal distention [R14.0]  Abdominal distension [R14.0]    VISIT DIAGNOSIS:   Visit Diagnoses         Codes    Abdominal distention    -  Primary R14.0             PATIENT REPORT/COMPLAINT: patient currently NPO pending results of this evaluation  RN cleared patient for participation in assessment     yes     PRIOR LEVEL OF SWALLOW FUNCTION:    PAST HISTORY OF DYSPHAGIA?: yes    Home diet: Regular consistency solids (IDDSI level 7) with  thin liquids (IDDSI level 0) due to signed waiver  Current Diet Order:  ADULT DIET; Regular    PROCEDURE:  Consistencies Administered During the Evaluation   Liquids: thin liquid, nectar thick liquid, honey thick liquid, and pudding thick liquid   Solids:  pureed foods and solid foods      Method of Intake:   cup, spoon  Self fed, Fed by clinician      Position:   Seated, upright    CLINICAL ASSESSMENT:  Oral Stage:        The oral stage of swallowing was within functional limits for consistencies administered      Pharyngeal Stage:    Throat clearing present after presentation of all consistencies administered  Immediate wet cough was noted after presentation of all consistencies administered  Latent wet cough was noted after presentation of all consistencies administered  Wet respirations were noted after presentation of all consistencies administered  Wet/gurgly vocal quality was noted after presentation of all consistencies administered  Multiple swallows were noted after presentation of all consistencies administered  Delayed initiation of the pharyngeal swallow noted    Cognition:   Follows 1 - step directions appropriate for contraindication)        General:   Mental Status:  Alert   Appearance: Cooperative, in no acute distress   Build and Nutrition: Well-nourished well-developed male   Orientation: Alert and oriented to person, place and time   Posture: Normal   Gait: Nonantalgic/normal    Integument:   Left knee: No skin lesions, no rash, no ecchymosis    Neurologic:   Sensation:    Left foot: Intact to light touch on the dorsal and plantar aspect   Motor:  Left lower extremity: 5/5 quadriceps, hamstrings, ankle dorsiflexors, and ankle plantar flexors  Vascular:   Left lower extremity: 2+ dorsalis pedis pulse, prompt capillary refill    Lower Extremities:   Left Knee:    Tenderness:  None    Effusion:  1+    Swelling:  None    Crepitus:  Soft    Atrophy:  None    Range of motion:  Extension: 0°       Flexion: 125°  Instability:  No varus laxity, no valgus laxity, negative anterior drawer  Deformities:  None      Imaging/Studies      Imaging Results (Last 24 Hours)       Procedure Component Value Units Date/Time    XR Knee 4+ View Left [[id number]] Resulted: 06/12/24 1118     Updated: 06/12/24 1119    Narrative:      Left Knee Radiographs  Indication: left knee pain  Views: Standing AP's and skiers of both knees, with lateral and sunrise   views of the left knee    Comparison: no prior studies available    Findings:    Bone-on-bone contact medial compartment, tricompartmental osteophytes,   medial facet arthritic changes in the patellofemoral joint, with no acute   bony abnormalities.  No unusual bony features.  Knee arthritis.              Assessment and Plan     Diagnoses and all orders for this visit:    1. Primary osteoarthritis of left knee (Primary)  -     XR Knee 4+ View Left        1. Primary osteoarthritis of left knee        I reviewed my findings with the patient.  Likely sprained his knee (medial collateral ligament), and he has some underlying arthritis in his knee.  Symptoms have improved since he made the appointment.   this assessment    Oral Peripheral Examination   Adequate lingual/labial strength     Current Respiratory Status     nasal cannula     Parameters of Speech Production  Respiration:  Adequate for speech production  Quality:   Within functional limits  Intensity: Within functional limits    Volitional Swallow: present     Volitional Cough:   present     Pain: No pain reported. EDUCATION:   The Speech Language Pathologist (SLP) completed education regarding results of evaluation and that intervention is warranted at this time. Learner: Patient  Education: Reviewed results and recommendations of this evaluation  Evaluation of Education:  Radha Lutz understanding    This plan may be re-evaluated and revised as warranted. Evaluation Time includes thorough review of current medical information, gathering information on past medical history/social history and prior level of function, completion of standardized testing/informal observation of tasks, assessment of data and education on plan of care and goals. INTERVENTION    Pt/family educated on above results and plan of care. Pt/family trained on compensatory strategies for safe swallow if oral diet continues and signs and symptoms of aspiration (throat clearing, coughing/choking, wet vocal quality. , etc.) and encouraged to notify staff if observed. Handouts provided as warranted. Pt/family encouraged to engage in question/answer session. All questions answered and pt/family verbalized understanding of above. [x]The admitting diagnosis and active problem list, have been reviewed prior to initiation of this evaluation.         ACTIVE PROBLEM LIST:   Patient Active Problem List   Diagnosis    Hypertension    Hyperlipidemia with target LDL less than 100    Impaired mobility and ADLs    Cardiac pacemaker in situ    Cerebrovascular accident (CVA) determined by clinical assessment ([facility]    Left renal mass    Class 2 severe obesity due to excess calories with Continue with conservative treatment with diclofenac as needed and icing.  Follow-up with me if becomes more painful, and I could consider an aspiration and injection.  We could get him in in short order if that is the case.  Otherwise, I will see him back as needed.    Return if symptoms worsen or fail to improve.      Marcos Modi MD  06/12/24  11:29 EDT    Dictated Utilizing Dragon Dictation   serious comorbidity and body mass index (BMI) of 35.0 to 35.9 in adult [facility])    Acquired hypothyroidism    H/O deep venous thrombosis    Type 2 diabetes mellitus without complication, without long-term current use of insulin (HCC)    Stage 3 chronic kidney disease (HCC)    Ulcer of right heel and midfoot with fat layer exposed ([address]    Atherosclerosis of native artery of right lower extremity with ulceration of heel (HCC)    Status post nephrectomy    Chronic renal failure, stage 3b (HCC)    Necrotic eschar (HCC)    Diabetic ulcer of left heel associated with type 2 diabetes mellitus, with fat layer exposed ([address]    Femoral-popliteal atherosclerosis (HCC)    Hyperkalemia    Peripheral vascular disease of lower extremity with ulceration (HCC)    Acute kidney injury superimposed on chronic kidney disease ([address]    Pneumonia of both lungs due to infectious organism    PVD (peripheral vascular disease) ([address]    Pneumonia in diseases classified elsewhere    Abdominal distension         CPT code:  48965  bedside swallow eval, 63437 dysphagia therapy    Rosa ARCHULETA CCC/SLP [id number]  Speech-Language Pathologist

## 2025-01-02 NOTE — CONSULTS
Department of Podiatry   Consult Note        Reason for Consult:  Right chronic heel wound    CHIEF COMPLAINT:  Dizziness and Fall    HISTORY OF PRESENT ILLNESS:                The patient is a 67 y.o. male with significant past medical history of diabetes, hypertension, thyroid disease, A.Fib, PVD. He presented to the Emergency Department earlier today due to a fall due to dizziness striking his head. Podiatry consulted for chronic painful Right heel wound with eschar. Pt states has been present for months. Known by podiatry services. No current nausea, vomiting, fevers, chills, but has some residual dizziness. 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[address]     Femoral-popliteal atherosclerosis ([address] 9/13/2022    Hypertension     Ischemic stroke ([address] 03/06/2018    Pacemaker     Paraplegia ([address]     Sinus node dysfunction ([address]     Stroke (cerebrum) ([address] 02/27/2018    Thyroid disease     Ulcer of right heel and midfoot with fat layer exposed ([address] 8/15/2022       Past Surgical History:        Procedure Laterality Date    CARDIAC PACEMAKER PLACEMENT  12/19/2014    COLONOSCOPY      EYE SURGERY      KNEE ARTHROSCOPY  right knee    PACEMAKER PLACEMENT      SD COLONOSCOPY FLX DX W/COLLJ SPEC WHEN PFRMD N/A 3/20/2018    COLONOSCOPY DIAGNOSTIC performed by Sally Tillman MD at [address] EGD PERCUTANEOUS PLACEMENT GASTROSTOMY TUBE N/A 3/29/2018    PEG TUBE/PT.  IN 5507 B performed by Sally Tillman MD at [address] EGD PERCUTANEOUS PLACEMENT GASTROSTOMY TUBE N/A 9/12/2018    EGD PEG TUBE PLACEMENT performed by Sally Tillman MD at [address] [place] [address]       Medications Prior to Admission:    Medications Prior to Admission: meclizine (ANTIVERT) 25 MG tablet, Take 25 mg by mouth three times daily  warfarin (COUMADIN) 6 MG tablet, Take 6 mg by mouth daily  Glucagon HCl (GLUCAGON EMERGENCY) 1 MG/ML SOLR, Inject 1 mg as directed as needed (HYPOGLYCEMIA)  glucose (GLUTOSE) 40 % GEL, Take 15 g by mouth as needed (HYPOGLYCEMIA)  ipratropium-albuterol (DUONEB) 0.5-2.5 (3) MG/3ML SOLN nebulizer solution, Inhale 1 vial into the lungs every 4 hours as needed (COUGHINGAND DYSPNEA)  potassium chloride (KLOR-CON M) 20 MEQ extended release tablet, Take 20 mEq by mouth daily  [START ON 9/23/2022] enoxaparin (LOVENOX) 60 MG/0.6ML, Inject 60 mg into the skin daily  polyethylene glycol (GLYCOLAX) 17 g packet, Take 17 g by mouth daily as needed for Constipation  phenazopyridine (PYRIDIUM) 200 MG tablet, Take 200 mg by mouth daily  dextromethorphan-guaiFENesin (ROBITUSSIN-DM)  MG/5ML syrup, Take 5 mLs by mouth every 6 hours as needed for Cough  Cholecalciferol (VITAMIN D3) 1.25 MG (88331 UT) CAPS, Take 50,000 Units by mouth once a week **FRIDAYS**  loperamide (IMODIUM) 2 MG capsule, Take 2 mg by mouth daily as needed for Diarrhea  Multiple Vitamins-Minerals (THERAPEUTIC MULTIVITAMIN-MINERALS) tablet, Take 1 tablet by mouth daily  gabapentin (NEURONTIN) 300 MG capsule, Take 1 capsule by mouth nightly for 7 days. levothyroxine (SYNTHROID) 50 MCG tablet, Take 1 tablet by mouth in the morning. atorvastatin (LIPITOR) 20 MG tablet, Take 1 tablet by mouth nightly  magnesium oxide (MAG-OX) 400 (240 Mg) MG tablet, Take 1 tablet by mouth every other day  ascorbic acid (VITAMIN C) 500 MG tablet, Take 1 tablet by mouth in the morning.   acetaminophen (TYLENOL) 325 MG tablet, Take 650 mg by mouth every 4 hours as needed (DISCOMFORT;PAIN/ELEV TEMP >100.4F)  pantoprazole (PROTONIX) 40 MG tablet, Take 40 mg by mouth daily  amLODIPine (NORVASC) 2.5 MG tablet, Take 1 tablet by mouth daily  metoprolol tartrate (LOPRESSOR) 25 MG tablet, Take 1 tablet by mouth 2 times daily  metFORMIN (GLUCOPHAGE) 1000 MG tablet, Take 1 tablet by mouth 2 times daily  omeprazole (PRILOSEC) 20 MG delayed release capsule, Take 1 capsule by mouth daily  furosemide (LASIX) 40 MG tablet, Take 0.5 tablets by mouth daily    Allergies:  Bee venom    Social History:   TOBACCO:   reports that he quit smoking about 20 years ago. His smoking use included cigars. He has never used smokeless tobacco.  ETOH:   reports no history of alcohol use. DRUGS:   Social History     Substance and Sexual Activity   Drug Use No       Family History:       Problem Relation Age of Onset    Heart Disease Mother     Stroke Father          REVIEW OF SYSTEMS:    All pertinent review of systems both positive and negative discussed in HPI      LOWER EXTREMITY EXAMINATION     VASCULAR:  DP and PT pulses weakly palpable 1+ to the bilateral LE. 1-2+ pitting edema appreciated as well. NEUROLOGIC:  Protective sensation diminished to distal aspect of bilateral lower extremities. DERM:  Right pressure heel ulceration with overlying eschar noted. Fat layer exposed. No clinical signs of infection. Does not probe deep at this time. Measures roughly 8 cm x 9 cm x unknown depth. No malodor. Little drainage. Superficial wound noted to patient's anterior Right ankle as well. Not clinically infected    MUSCULOSKELETAL: Tenderness to palpation of the right heel wound. Negative TTP of bilateral calves at this time.  MSK strength testing deferred this afternoon        CONSULTS:  IP CONSULT TO INTERNAL MEDICINE  IP CONSULT TO PODIATRY    MEDICATION:  Scheduled Meds:   [START ON 9/22/2022] ascorbic acid  500 mg Oral Daily    atorvastatin  20 mg Oral Nightly    [START ON 9/23/2022] vitamin D  50,000 Units Oral Weekly    [START ON 9/22/2022] levothyroxine  50 mcg Oral Daily    [START ON 9/22/2022] magnesium oxide  400 mg Oral Every Other Day    meclizine  25 mg Oral TID    metFORMIN  1,000 mg Oral BID    metoprolol tartrate  25 mg Oral BID [START ON 9/22/2022] therapeutic multivitamin-minerals  1 tablet Oral Daily    [START ON 9/22/2022] pantoprazole  40 mg Oral Daily    [START ON 9/22/2022] phenazopyridine  200 mg Oral Daily    warfarin  6 mg Oral Daily     Continuous Infusions:   dextrose      sodium chloride       PRN Meds:.glucose, dextrose bolus **OR** dextrose bolus, glucagon (rDNA), dextrose, acetaminophen, guaiFENesin-dextromethorphan, ipratropium-albuterol, loperamide, polyethylene glycol    RADIOLOGY:  XR CHEST PORTABLE   Final Result   No acute process         CT Head WO Contrast   Final Result   1. There is no acute intracranial abnormality. Specifically, there is no   intracranial hemorrhage. 2. Atrophy and periventricular leukomalacia,   3. Old left occipital lobe infarct         CT Cervical Spine WO Contrast   Final Result   1. There is no acute compression fracture or subluxation of the cervical   spine. 2. Advanced multilevel degenerative disc and degenerative joint disease. Vitals:    BP (!) 148/66   Pulse 93   Temp 98.2 °F (36.8 °C) (Temporal)   Resp 20   Ht 6' 2\" (1.88 m)   Wt 264 lb 6.4 oz (119.9 kg)   SpO2 96%   BMI 33.95 kg/m²     LABS:   Recent Labs     09/21/22  1044   WBC 9.3   HGB 10.0*   HCT 32.3*        Recent Labs     09/21/22  1044      K 6.2*      CO2 20*   BUN 57*   CREATININE 2.8*     Recent Labs     09/20/22  0428 09/21/22  1044   PROT  --  7.5   INR 1.8 2.1       ASSESSMENTS:   Principal Problem:  - Chronic Right heel diabetic ulceration with eschar   - Anterior right ankle diabetic ulcer /wound  - Type II diabetic     Hyperkalemia  - Recent fall with dizziness (chief complaint)  -Pain Right ankle and foot         PLAN:  - Patient seen and evaluated bedside  - Charts and labs reviewed   - Cultures : from 8/24 grew Proteus mirabilis, MRSA, and Corynebacterium.  - Prevalon offloading boots ordered  - Dressing: lightly dressed with Xeroform DSD due to anterior ankle wound. Will apply Santyl once becomes available. - Podiatry will monitor patient while in house  - Discussed patient with Dr. Ivanna Christianson  - Will continue to follow patient while they are in-house. Thank you for the opportunity to take part in the patient's care. Please do not hesitate to call for any questions or concerns. gloves

## 2025-05-23 NOTE — DISCHARGE SUMMARY
[facility])  Active Problems:    BRYON (obstructive sleep apnea)    HTN (hypertension)    Hyperlipidemia    RA (rheumatoid arthritis) ([facility])    DM2 (diabetes mellitus, type 2) ([facility])    Atrial fibrillation (HCC)    PAD (peripheral artery disease) ([facility])    Chronic diastolic congestive heart failure ([facility])    COPD (chronic obstructive pulmonary disease) ([facility])    Severe aortic stenosis    Garcia's esophagus without dysplasia    Coronary artery disease involving native coronary artery of native heart with other form of angina pectoris ([facility])    Type 2 diabetes mellitus with diabetic polyneuropathy, with long-term current use of insulin ([facility])    [facility] on 5/22/2025 with ulcer of toe of R foot, limited to breakdown of skin. Pt has the following comorbidities which  placed, tube feeds were initiated. His acute  pneumonitis was treated accordingly with IV antibiotics and then later  switched over to p.o. The patient was deemed an appropriate candidate for  subacute rehab and then he was discharged to a skilled nursing facility on  09/14/2018. PHYSICIANS FOLLOWING THE PATIENT DURING THIS HOSPITAL STAY:  Surgery,  pulmonary and infectious disease consultations. CONDITION ON DISCHARGE:  Level of function is fair to good. UNREPORTED TEST RESULTS AND INTENDED FOLLOWUP:  Nonapplicable. LEVEL OF ACTIVITY:  Up ad goldie with assist.    DISCHARGE DIET:  Includes PEG tube feeds. DISCHARGE MEDICATIONS:  See discharge med reconciliation form. FOLLOWUP:  Will be in my office upon his release from the nursing home.         Evelia Tejada DO    D: 10/01/2018 20:14:54       T: 10/01/2018 20:16:00     [email or web address]  Job#: [id number]     Doc#: [id number]    CC: [facility] Highest Level Of Mobility   -HLM Goal 6: Walk 10 steps or more   -HLM Achieved 7: Walk 25 feet or more   Modified Meigs Scale   Modified Chris Scale 4   Farhan Lo, Lorenzo Pak MD;  [facility]: [facility] MAIN OR;  Service: Pain Management     EPIDURAL BLOCK INJECTION N/A 05/31/2018    Procedure: L4 L5 Lumbar Epidural Steroid Injection;  Surgeon: Lorenzo Pak MD;  [facility]: Meeker Memorial [id number]);  Surgeon: Lorenzo Pak MD;  [facility]: [facility] Alejandro Nolasco MD;  [facility]: [facility];  Service: Neurosurgery    VA 

## (undated) DEVICE — GLOVE ORTHO 8   MSG9480

## (undated) DEVICE — BINDER ABD M/L H12IN FOR 46-62IN WHT 4 SLD PNL DSGN HOOP

## (undated) DEVICE — PONSKY PEG SAFETY SYSTEM: Brand: PONSKY PEG SAFETY SYSTEM

## (undated) DEVICE — CANNULA NSL ORAL AD FOR CAPNOFLEX CO2 O2 AIRLFE